# Patient Record
Sex: MALE | Race: WHITE | NOT HISPANIC OR LATINO | Employment: OTHER | URBAN - METROPOLITAN AREA
[De-identification: names, ages, dates, MRNs, and addresses within clinical notes are randomized per-mention and may not be internally consistent; named-entity substitution may affect disease eponyms.]

---

## 2020-02-27 ENCOUNTER — TELEPHONE (OUTPATIENT)
Dept: NEUROLOGY | Facility: CLINIC | Age: 55
End: 2020-02-27

## 2020-02-27 NOTE — TELEPHONE ENCOUNTER
----- Message from Viky Conley sent at 2/27/2020 10:56 AM CST -----  Good Morning,    Dr. Artis would like to refer the following patient to the Neurology department. The patients diagnosis is second opinion epilepsy surgery. I have scanned the patients referral and records into .     If there are any further questions in regards to the patient, please contact Dr. Saldana's office at,[phone] .   Please let me know if I can help schedule in any way.    Thank you,   Viky  Ext. [phone]  [facility]

## 2020-05-07 ENCOUNTER — TELEPHONE (OUTPATIENT)
Dept: NEUROLOGY | Facility: CLINIC | Age: 55
End: 2020-05-07

## 2020-05-18 ENCOUNTER — OFFICE VISIT (OUTPATIENT)
Dept: NEUROLOGY | Facility: CLINIC | Age: 55
End: 2020-05-18
Payer: COMMERCIAL

## 2020-05-18 VITALS — HEART RATE: 72 BPM | SYSTOLIC BLOOD PRESSURE: 139 MMHG | DIASTOLIC BLOOD PRESSURE: 98 MMHG | WEIGHT: 193 LBS

## 2020-05-18 DIAGNOSIS — G40.209 COMPLEX PARTIAL SEIZURES EVOLVING TO GENERALIZED TONIC-CLONIC SEIZURES: Primary | ICD-10-CM

## 2020-05-18 DIAGNOSIS — G43.909 MIGRAINE WITHOUT STATUS MIGRAINOSUS, NOT INTRACTABLE, UNSPECIFIED MIGRAINE TYPE: ICD-10-CM

## 2020-05-18 PROCEDURE — 99205 OFFICE O/P NEW HI 60 MIN: CPT | Mod: S$GLB,,, | Performed by: PSYCHIATRY & NEUROLOGY

## 2020-05-18 PROCEDURE — 99205 PR OFFICE/OUTPT VISIT, NEW, LEVL V, 60-74 MIN: ICD-10-PCS | Mod: S$GLB,,, | Performed by: PSYCHIATRY & NEUROLOGY

## 2020-05-18 PROCEDURE — 99999 PR PBB SHADOW E&M-EST. PATIENT-LVL II: ICD-10-PCS | Mod: PBBFAC,,, | Performed by: PSYCHIATRY & NEUROLOGY

## 2020-05-18 PROCEDURE — 99999 PR PBB SHADOW E&M-EST. PATIENT-LVL II: CPT | Mod: PBBFAC,,, | Performed by: PSYCHIATRY & NEUROLOGY

## 2020-05-18 RX ORDER — ESLICARBAZEPINE ACETATE 400 MG/1
TABLET ORAL
Status: ON HOLD | COMMUNITY
Start: 2020-05-06 | End: 2020-06-06 | Stop reason: HOSPADM

## 2020-05-18 RX ORDER — ZONISAMIDE 100 MG/1
CAPSULE ORAL
Status: ON HOLD | COMMUNITY
Start: 2020-04-28 | End: 2020-06-06 | Stop reason: HOSPADM

## 2020-05-18 RX ORDER — LACOSAMIDE 200 MG/1
TABLET, FILM COATED ORAL
Status: ON HOLD | COMMUNITY
Start: 2020-04-24 | End: 2020-06-06 | Stop reason: HOSPADM

## 2020-05-18 RX ORDER — RIZATRIPTAN BENZOATE 10 MG/1
10 TABLET, ORALLY DISINTEGRATING ORAL
COMMUNITY
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
## 2020-05-18 NOTE — ASSESSMENT & PLAN NOTE
54yo M with hx of seizures x 2013: sent for 2nd opinion  - prior normal (1.5T) MRI and 1 Left Temporal sz recorded in A-EEG (2018)  - intractable despite 3 current AEDs and trial of 4 AEDs prior    Current AEDs:  - EsliCBZ 1200mg q day  - LCS 200mg bid  - ZNS 300mg q day    Plan:  EMU evaluation - for diagnosis and to quantify sz  - consider changing LCS to CLOB in EMU    - will need 3T MRI    - possibly a surgical candidate: will need additional w/u including Neuro-Psy, PET and KATHY - will decide after EMU evaluation    Seizure log  Seizure precautions/restrictions    Plan of care was discussed in detail with patient and his wife.

## 2020-05-19 ENCOUNTER — TELEPHONE (OUTPATIENT)
Dept: NEUROLOGY | Facility: CLINIC | Age: 55
End: 2020-05-19

## 2020-05-19 DIAGNOSIS — Z01.812 PRE-PROCEDURE LAB EXAM: Primary | ICD-10-CM

## 2020-05-19 DIAGNOSIS — R56.9 SEIZURES: Primary | ICD-10-CM

## 2020-05-19 NOTE — TELEPHONE ENCOUNTER
Emu scheduled with wife. Wife aware of visitation policy. Covid testing to be done on 05/25 prior to 12:30. Testing times and location given. All information emailed to wife.

## 2020-05-22 ENCOUNTER — TELEPHONE (OUTPATIENT)
Dept: NEUROLOGY | Facility: CLINIC | Age: 55
End: 2020-05-22

## 2020-05-24 ENCOUNTER — LAB VISIT (OUTPATIENT)
Dept: FAMILY MEDICINE | Facility: CLINIC | Age: 55
End: 2020-05-24
Payer: COMMERCIAL

## 2020-05-24 DIAGNOSIS — Z01.812 PRE-PROCEDURE LAB EXAM: ICD-10-CM

## 2020-05-24 LAB — SARS-COV-2 RNA RESP QL NAA+PROBE: NOT DETECTED

## 2020-05-24 PROCEDURE — U0003 INFECTIOUS AGENT DETECTION BY NUCLEIC ACID (DNA OR RNA); SEVERE ACUTE RESPIRATORY SYNDROME CORONAVIRUS 2 (SARS-COV-2) (CORONAVIRUS DISEASE [COVID-19]), AMPLIFIED PROBE TECHNIQUE, MAKING USE OF HIGH THROUGHPUT TECHNOLOGIES AS DESCRIBED BY CMS-2020-01-R: HCPCS

## 2020-05-25 ENCOUNTER — TELEPHONE (OUTPATIENT)
Dept: NEUROLOGY | Facility: CLINIC | Age: 55
End: 2020-05-25

## 2020-05-25 NOTE — TELEPHONE ENCOUNTER
Spoke with wife. Informed her that 's insurance for emu is still pending. I informed her that we could move the emu admit to 05/27 or 28th. She stated they are at the [facility] until 05/29 and she will call her insurance in the am and call me.

## 2020-05-26 ENCOUNTER — HOSPITAL ENCOUNTER (INPATIENT)
Facility: HOSPITAL | Age: 55
LOS: 11 days | Discharge: HOME | DRG: 101 | End: 2020-06-06
Attending: PSYCHIATRY & NEUROLOGY | Admitting: PSYCHIATRY & NEUROLOGY
Payer: COMMERCIAL

## 2020-05-26 ENCOUNTER — TELEPHONE (OUTPATIENT)
Dept: NEUROLOGY | Facility: CLINIC | Age: 55
End: 2020-05-26

## 2020-05-26 DIAGNOSIS — R56.9 SEIZURES: ICD-10-CM

## 2020-05-26 DIAGNOSIS — G40.219 COMPLEX PARTIAL EPILEPSY WITH GENERALIZATION AND WITH INTRACTABLE EPILEPSY: ICD-10-CM

## 2020-05-26 DIAGNOSIS — G40.209 COMPLEX PARTIAL SEIZURES EVOLVING TO GENERALIZED TONIC-CLONIC SEIZURES: Primary | ICD-10-CM

## 2020-05-26 DIAGNOSIS — R06.02 SOB (SHORTNESS OF BREATH): ICD-10-CM

## 2020-05-26 LAB
ALBUMIN SERPL BCP-MCNC: 4.1 G/DL (ref 3.5–5.2)
ALP SERPL-CCNC: 123 U/L (ref 55–135)
ALT SERPL W/O P-5'-P-CCNC: 20 U/L (ref 10–44)
ANION GAP SERPL CALC-SCNC: 8 MMOL/L (ref 8–16)
AST SERPL-CCNC: 18 U/L (ref 10–40)
BASOPHILS # BLD AUTO: 0.06 K/UL (ref 0–0.2)
BASOPHILS NFR BLD: 0.7 % (ref 0–1.9)
BILIRUB SERPL-MCNC: 0.3 MG/DL (ref 0.1–1)
BUN SERPL-MCNC: 11 MG/DL (ref 6–20)
CALCIUM SERPL-MCNC: 8.9 MG/DL (ref 8.7–10.5)
CHLORIDE SERPL-SCNC: 105 MMOL/L (ref 95–110)
CO2 SERPL-SCNC: 23 MMOL/L (ref 23–29)
CREAT SERPL-MCNC: 0.8 MG/DL (ref 0.5–1.4)
DIFFERENTIAL METHOD: NORMAL
EOSINOPHIL # BLD AUTO: 0.3 K/UL (ref 0–0.5)
EOSINOPHIL NFR BLD: 3.7 % (ref 0–8)
ERYTHROCYTE [DISTWIDTH] IN BLOOD BY AUTOMATED COUNT: 13 % (ref 11.5–14.5)
EST. GFR  (AFRICAN AMERICAN): >60 ML/MIN/1.73 M^2
EST. GFR  (NON AFRICAN AMERICAN): >60 ML/MIN/1.73 M^2
GLUCOSE SERPL-MCNC: 80 MG/DL (ref 70–110)
HCT VFR BLD AUTO: 49.2 % (ref 40–54)
HGB BLD-MCNC: 16 G/DL (ref 14–18)
IMM GRANULOCYTES # BLD AUTO: 0.03 K/UL (ref 0–0.04)
IMM GRANULOCYTES NFR BLD AUTO: 0.3 % (ref 0–0.5)
LYMPHOCYTES # BLD AUTO: 2.3 K/UL (ref 1–4.8)
LYMPHOCYTES NFR BLD: 26.4 % (ref 18–48)
MCH RBC QN AUTO: 30.6 PG (ref 27–31)
MCHC RBC AUTO-ENTMCNC: 32.5 G/DL (ref 32–36)
MCV RBC AUTO: 94 FL (ref 82–98)
MONOCYTES # BLD AUTO: 0.9 K/UL (ref 0.3–1)
MONOCYTES NFR BLD: 9.7 % (ref 4–15)
NEUTROPHILS # BLD AUTO: 5.2 K/UL (ref 1.8–7.7)
NEUTROPHILS NFR BLD: 59.2 % (ref 38–73)
NRBC BLD-RTO: 0 /100 WBC
PLATELET # BLD AUTO: 318 K/UL (ref 150–350)
PMV BLD AUTO: 10.3 FL (ref 9.2–12.9)
POTASSIUM SERPL-SCNC: 4.1 MMOL/L (ref 3.5–5.1)
PROT SERPL-MCNC: 7.5 G/DL (ref 6–8.4)
RBC # BLD AUTO: 5.23 M/UL (ref 4.6–6.2)
SODIUM SERPL-SCNC: 136 MMOL/L (ref 136–145)
WBC # BLD AUTO: 8.85 K/UL (ref 3.9–12.7)

## 2020-05-26 PROCEDURE — 94761 N-INVAS EAR/PLS OXIMETRY MLT: CPT

## 2020-05-26 PROCEDURE — 99223 1ST HOSP IP/OBS HIGH 75: CPT | Mod: ,,, | Performed by: PSYCHIATRY & NEUROLOGY

## 2020-05-26 PROCEDURE — 95714 VEEG EA 12-26 HR UNMNTR: CPT

## 2020-05-26 PROCEDURE — 11000001 HC ACUTE MED/SURG PRIVATE ROOM

## 2020-05-26 PROCEDURE — 95720 EEG PHY/QHP EA INCR W/VEEG: CPT | Mod: ,,, | Performed by: PSYCHIATRY & NEUROLOGY

## 2020-05-26 PROCEDURE — 95700 EEG CONT REC W/VID EEG TECH: CPT

## 2020-05-26 PROCEDURE — 25000003 PHARM REV CODE 250: Performed by: STUDENT IN AN ORGANIZED HEALTH CARE EDUCATION/TRAINING PROGRAM

## 2020-05-26 PROCEDURE — 80299 QUANTITATIVE ASSAY DRUG: CPT | Mod: 91

## 2020-05-26 PROCEDURE — 80235 DRUG ASSAY LACOSAMIDE: CPT

## 2020-05-26 PROCEDURE — 99223 PR INITIAL HOSPITAL CARE,LEVL III: ICD-10-PCS | Mod: ,,, | Performed by: PSYCHIATRY & NEUROLOGY

## 2020-05-26 PROCEDURE — 80203 DRUG SCREEN QUANT ZONISAMIDE: CPT

## 2020-05-26 PROCEDURE — 95720 PR EEG, W/VIDEO, CONT RECORD, I&R, >12<26 HRS: ICD-10-PCS | Mod: ,,, | Performed by: PSYCHIATRY & NEUROLOGY

## 2020-05-26 PROCEDURE — 80053 COMPREHEN METABOLIC PANEL: CPT

## 2020-05-26 PROCEDURE — 85025 COMPLETE CBC W/AUTO DIFF WBC: CPT

## 2020-05-26 RX ORDER — ONDANSETRON 8 MG/1
8 TABLET, ORALLY DISINTEGRATING ORAL EVERY 8 HOURS PRN
Status: DISCONTINUED | OUTPATIENT
Start: 2020-05-26 | End: 2020-06-06 | Stop reason: HOSPADM

## 2020-05-26 RX ORDER — SODIUM CHLORIDE 0.9 % (FLUSH) 0.9 %
10 SYRINGE (ML) INJECTION
Status: DISCONTINUED | OUTPATIENT
Start: 2020-05-26 | End: 2020-06-06 | Stop reason: HOSPADM

## 2020-05-26 RX ORDER — LORAZEPAM 2 MG/ML
2 INJECTION INTRAMUSCULAR EVERY 5 MIN PRN
Status: DISCONTINUED | OUTPATIENT
Start: 2020-05-26 | End: 2020-06-06 | Stop reason: HOSPADM

## 2020-05-26 RX ORDER — IBUPROFEN 400 MG/1
800 TABLET ORAL EVERY 6 HOURS PRN
Status: DISCONTINUED | OUTPATIENT
Start: 2020-05-26 | End: 2020-06-06 | Stop reason: HOSPADM

## 2020-05-26 RX ORDER — ACETAMINOPHEN 325 MG/1
650 TABLET ORAL EVERY 4 HOURS PRN
Status: DISCONTINUED | OUTPATIENT
Start: 2020-05-26 | End: 2020-06-06 | Stop reason: HOSPADM

## 2020-05-26 RX ORDER — ASPIRIN 81 MG/1
81 TABLET ORAL DAILY
Status: DISCONTINUED | OUTPATIENT
Start: 2020-05-27 | End: 2020-06-06 | Stop reason: HOSPADM

## 2020-05-26 RX ADMIN — ESLICARBAZEPINE ACETATE 1200 MG: 400 TABLET ORAL at 08:05

## 2020-05-26 NOTE — NURSING
Patient direct admit for EMU.  Vitals stable at present.  IV attempt x2 unsuccessful.  No complaints or signs of distress noted.
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
## 2020-05-27 PROCEDURE — 94761 N-INVAS EAR/PLS OXIMETRY MLT: CPT

## 2020-05-27 PROCEDURE — 95714 VEEG EA 12-26 HR UNMNTR: CPT

## 2020-05-27 PROCEDURE — 99233 SBSQ HOSP IP/OBS HIGH 50: CPT | Mod: ,,, | Performed by: PSYCHIATRY & NEUROLOGY

## 2020-05-27 PROCEDURE — 93010 EKG 12-LEAD: ICD-10-PCS | Mod: ,,, | Performed by: INTERNAL MEDICINE

## 2020-05-27 PROCEDURE — 25000003 PHARM REV CODE 250: Performed by: STUDENT IN AN ORGANIZED HEALTH CARE EDUCATION/TRAINING PROGRAM

## 2020-05-27 PROCEDURE — 95720 EEG PHY/QHP EA INCR W/VEEG: CPT | Mod: ,,, | Performed by: PSYCHIATRY & NEUROLOGY

## 2020-05-27 PROCEDURE — 93005 ELECTROCARDIOGRAM TRACING: CPT

## 2020-05-27 PROCEDURE — 95720 PR EEG, W/VIDEO, CONT RECORD, I&R, >12<26 HRS: ICD-10-PCS | Mod: ,,, | Performed by: PSYCHIATRY & NEUROLOGY

## 2020-05-27 PROCEDURE — 99233 PR SUBSEQUENT HOSPITAL CARE,LEVL III: ICD-10-PCS | Mod: ,,, | Performed by: PSYCHIATRY & NEUROLOGY

## 2020-05-27 PROCEDURE — 11000001 HC ACUTE MED/SURG PRIVATE ROOM

## 2020-05-27 PROCEDURE — 93010 ELECTROCARDIOGRAM REPORT: CPT | Mod: ,,, | Performed by: INTERNAL MEDICINE

## 2020-05-27 RX ADMIN — ACETAMINOPHEN 650 MG: 325 TABLET ORAL at 11:05

## 2020-05-27 RX ADMIN — ASPIRIN 81 MG: 81 TABLET, COATED ORAL at 08:05

## 2020-05-27 NOTE — PROCEDURES
[facility] EEG/VIDEO MONITORING REPORT  Declan Burleson  [id number]  1965    DATE OF SERVICE:  05/26/2020  DATE OF ADMISSION: 5/26/2020  3:07 PM    ADMITTING/REQUESTING PROVIDER: Xuan Sadny MD PhD    REASON FOR CONSULT:  55-year-old man with episodes of staring and full body shaking admitted to the epilepsy monitoring unit for event capture and characterization.    METHODOLOGY   Electroencephalographic (EEG) recording is with electrodes placed according to the International 10-20 placement system.  Thirty two (32) channels of digital signal (sampling rate of 512/sec) including T1 and T2 was simultaneously recorded from the scalp and may include  EKG, EMG, and/or eye monitors.  Recording band pass was 0.1 to 512 hz.  Digital video recording of the patient is simultaneously recorded with the EEG.  The patient is instructed report clinical symptoms which may occur during the recording session.  EEG and video recording is stored and archived in digital format.  Activation procedures which include photic stimulation, hyperventilation and instructing patients to perform simple task are done in selected patients.   The EEG is displayed on a monitor screen and can be reviewed using different montages.  Computer assisted analysis is employed to detect spike and electrographic seizure activity.   The entire record is submitted for computer analysis.  The entire recording is visually reviewed and the times identified by computer analysis as being spikes or seizures are reviewed again.  Compresses spectral analysis (CSA) is also performed on the activity recorded from each individual channel.  This is displayed as a power display of frequencies from 0 to 30 Hz over time.   The CSA is reviewed looking for asymmetries in power between homologous areas of the scalp and then compared with the original EEG recording.     Vizibility software is also utilized in the review of this study.  This software suite analyzes the EEG recording  in multiple domains.  Coherence and rhythmicity is computed to identify EEG sections which may contain organized seizures.  Each channel undergoes analysis to detect presence of spike and sharp waves which have special and morphological characteristic of epileptic activity.  The routine EEG recording is converted from spacial into frequency domain.  This is then displayed comparing homologous areas to identify areas of significant asymmetry.  Algorithm to identify non-cortically generated artifact is used to separate eye movement, EMG and other artifact from the EEG.      RECORDING TIMES  Start on 05/26/2020 at 16:22 p.m.  Stop on 05/27/2020 at 07:00 a.m.  A total of 14 hr and 36 min of EEG recording is obtained.    EEG FINDINGS  Background activity:   The background is disorganized predominantly alpha/theta activity. There is a moderately well-formed, poorly sustained, 7-8 hz posterior dominant rhythm somewhat better formed over the right.  There are frequent intermittent bursts of higher voltage generalized theta and/or delta slowing as well as bilateral independent theta/delta slowing.   Primarily in sleep, there are occasional spike and wave epileptiform appearing discharges in the left frontotemporal region phase reversing at F7/T1/T9.    There are no pushbutton activations.    Sleep:  The patient transitions from wakefulness to sleep with the appearance of sleep spindles, K complexes, and vertex waves.  There are occasional left frontotemporal epileptiform appearing discharges as described above.    Activation procedures:   Hyperventilation is not performed  Photic stimulation is not performed    Cardiac Monitor:   Heart rate appears generally regular on a single lead EKG.    Impression:   This is an abnormal continuous EEG monitoring study because of occasional epileptiform appearing discharges in the left frontotemporal region, occurring primarily in sleep, consistent with an area of focal cortical  dysfunction and a potential seizure focus in this region.  There is generalized and bilateral independent intermittent slowing consistent with subcortical or deep midline dysfunction.  There is generalized background slowing consistent with diffuse cortical dysfunction and a mild-moderate encephalopathy.  This finding is nonspecific with regards to etiology but can be seen in the setting of toxic/metabolic derangements, infection, as a postictal phenomenon (the patient had a seizure earlier in the day while off EEG) and as a medication effect.  There are no pushbutton activations, no electrographic seizures, and none of the patient's typical events are captured during this recording session.    LTM Summary 05/26/2020 - TBD:  Patient events/Seizures:  None  Interictal findings:   occasional left frontotemporal epileptiform appearing discharges in sleep  Other notable abnormalities:   intermittent generalized and multifocal slowing consistent with subcortical or deep midline dysfunction as well as generalized background slowing consistent with a mild-moderate encephalopathy.    Xuan Sandy MD PhD  Neurology-Epilepsy  [facility].  [facility]

## 2020-05-27 NOTE — PROGRESS NOTES
[facility]  Epilepsy Monitoring Unit  Daily Progress Note    Date: 5/19/20  Patient Name: Declan Burleson   MRN: [id number]   PCP: Matteo Rizzo    Subjective:   This is Declan Burleson, 55 y.o. male who presents to the EMU for localization and medication adjustment. Patient w/ longstanding seizure history that has been refractory to multiple AEDs.   HOSPITAL COURSE:  05/26-05/27 - NAEON. No electrographic or clinical seizures recorded.   PAST MEDICAL HISTORY:  History reviewed. No pertinent past medical history.  PAST SURGICAL HISTORY:  History reviewed. No pertinent surgical history.  CURRENT MEDS:  Current Facility-Administered Medications   Medication Dose Route Frequency Provider Last Rate Last Dose    acetaminophen tablet 650 mg  650 mg Oral Q4H PRN Kristen Jean MD   650 mg at 05/27/20 1103    aspirin EC tablet 81 mg  81 mg Oral Daily Kristen Jean MD   81 mg at 05/27/20 0809    ibuprofen tablet 800 mg  800 mg Oral Q6H PRN Kristen Jean MD        lorazepam injection 2 mg  2 mg Intravenous Q5 Min PRN Kristen Jean MD        ondansetron disintegrating tablet 8 mg  8 mg Oral Q8H PRN Kristen Jean MD        sodium chloride 0.9% flush 10 mL  10 mL Intravenous PRN Kristen Jean MD         ALLERGIES:  Review of patient's allergies indicates:  No Known Allergies  FAMILY HISTORY:  History reviewed. No pertinent family history.  SOCIAL HISTORY:  Social History     Tobacco Use    Smoking status: Former Smoker   Substance Use Topics    Alcohol use: Yes     Frequency: Monthly or less     Drinks per session: 1 or 2     Binge frequency: Less than monthly    Drug use: Never     Review of Systems:  12 system review of systems is negative except as noted in HPI.      Objective:     Vitals:    05/27/20 0900 05/27/20 1100 05/27/20 1200 05/27/20 1500   BP: (!) 133/94  (!) 126/92    Pulse: 74 73 76 76   Resp: 14  17    Temp:       TempSrc:       SpO2: 97%  98%    Weight:       Height:           General: NAD,  well nourished   Eyes: no tearing, discharge, no erythema   ENT: moist mucous membranes of the oral cavity, nares patent    Neck: Supple, Full range of motion  Cardiovascular: Warm and well perfused, pulses equal and symmetrical  Lungs: Normal work of breathing, normal chest wall excursions  Skin: No rash, lesions, or breakdown on exposed skin  Extremeties: No cyanosis, clubbing or edema.    Neurological   MENTAL STATUS: Alert and oriented to person, place, and time. Attention and concentration within normal limits. Speech without dysarthria, able to name and repeat without difficulty. Recent and remote memory within normal limits   CRANIAL NERVES: Visual fields intact. PERRL. EOMI. Facial sensation intact. Face symmetrical. Hearing grossly intact. Full shoulder shrug bilaterally. Tongue protrudes midline   SENSORY: Sensation is intact to light touch throughout.  Joint position perception intact. Negative Romberg.   MOTOR: Normal bulk and tone. No pronator drift.  5/5 deltoid, biceps, triceps, interosseous, hand  bilaterally. 5/5 iliopsoas, knee extension/flexion, foot dorsi/plantarflexion bilaterally.    CEREBELLAR/COORDINATION/GAIT: Gait steady with normal arm swing and stride length.  Heel to shin intact. Finger to nose intact. Normal rapid alternating movements.     Labs: Reviewed.  Imaging: I have reviewed all pertinent imaging results/findings within the past 24 hours.    Diagnostic EEG Results:  05/26-05/27 Date: EEG Background: 8-9 Hz PRD    Activation Procedures: none    Seizures/Events: none    Abnormality: Abnormal EEG due to diffuse cortical dysfunction noted, as well as two isolated L temporal sharp waves noted during the study duration.          Assessment:      This is Declan Burleson, 55 y.o. male who presents to the EMU for localization and medication adjustment. Patient w/ longstanding seizure history that has been refractory to multiple AEDs.   No electrographic seizures noted on day one of  admission, no clinical seizures reported. Will stop all AEDs at this time and monitor, w/ activation procedures on 05/27.      Plan:      Problem List as of 5/27/2020 Reviewed: 5/27/2020  Kristen Jean MD       Neuro    Complex partial seizures evolving to generalized tonic-clonic seizures  - Admit to EMU  - Medication adjustment as following        - Stop Aptiom 1200 mg QHS        - Stop Zonisamide 200 mg QD and stop Vimpat 200 mg QD  - vEEG monitoring  - Activation procedure on 05/27   - Seizure precautions  - Ativan PRN for break through events ongoing for >5 minutes. Please contact the epileptologist on call prior to the administration of the PRN medication    Migraine without status migrainosus, not intractable  - PRN medication as needed

## 2020-05-27 NOTE — PLAN OF CARE
Problem: Adult Inpatient Plan of Care  Goal: Plan of Care Review  Outcome: Ongoing, Progressing     Problem: Adult Inpatient Plan of Care  Goal: Absence of Hospital-Acquired Illness or Injury  Outcome: Ongoing, Progressing     Problem: Adult Inpatient Plan of Care  Goal: Optimal Comfort and Wellbeing  Outcome: Ongoing, Progressing     Problem: Fall Injury Risk  Goal: Absence of Fall and Fall-Related Injury  Outcome: Ongoing, Progressing  Reviewed POC with Pt and spouse at bedside at 1930. Answered all questions. Acclimated Pt and spouse to unit and hospital setting. EEG in place. Pt had no acute events overnight. Safety checks completed, call bell within reach. VSS and full assessments found in flowsheets.

## 2020-05-27 NOTE — PLAN OF CARE
05/26/20 1600   Post-Acute Status   Post-Acute Authorization Other   Other Status No Post-Acute Service Needs   Discharge Delays (!) Procedure Scheduling (IR, OR, Labs, Echo, Cath, Echo, EEG)  (EMU - EEG)   Discharge Plan   Discharge Plan A Home with family     Mar Rae RN  Case Management  Ext: [phone]  05/27/2020  2:28 PM

## 2020-05-27 NOTE — PLAN OF CARE
CM spoke with patient and wife via phone in room for Discharge Planning Assessment.  Per wife,  patient lives with spouse and adult daughter in a(n) SSH with 0 steps to enter.   Per wife, patient was independent with ADLS and used no DME for ambulation.  Per patient, the patient will have assistance from family upon discharge.  Discharge Planning Booklet given to patient/family and discussed.  All questions addressed.       PCP:  Matteo Rizzo MD      Pharmacy:    [facility] - [place], [place] - [address]  [address] [facility] [place] [address]  Phone: [phone] Fax: [phone]        Emergency Contacts:  Extended Emergency Contact Information  Primary Emergency Contact: PEPPER MOSES  Address: [address] [place] [address] [facility]  Home Phone: [phone]  Mobile Phone: [phone]  Relation: Spouse  Preferred language: English   needed? No      Insurance:    Payor: [employer] [facility] / Plan: [employer] [facility] / Product Type: Commercial /        05/27/20 1424   Discharge Assessment   Assessment Type Discharge Planning Assessment   Confirmed/corrected address and phone number on facesheet? Yes   Assessment information obtained from? Patient;Caregiver  (wife)   Expected Length of Stay (days) 7   Communicated expected length of stay with patient/caregiver yes   Prior to hospitilization cognitive status: Alert/Oriented   Prior to hospitalization functional status: Independent   Current cognitive status: Alert/Oriented   Current Functional Status: Independent   Lives With spouse;child(woody), adult   Able to Return to Prior Arrangements yes   Is patient able to care for self after discharge? Yes   Who are your caregiver(s) and their phone number(s)? PEPPER MOSSE Spouse [phone]    Patient's perception of discharge disposition home or selfcare   Readmission Within the Last 30 Days no previous admission in last 30 days    Patient currently being followed by outpatient case management? No   Patient currently receives any other outside agency services? No   Equipment Currently Used at Home glucometer   Do you have any problems affording any of your prescribed medications? TBD   Is the patient taking medications as prescribed? yes   Does the patient have transportation home? Yes   Transportation Anticipated family or friend will provide  (wife)   Dialysis Name and Scheduled days na   Does the patient receive services at the Coumadin Clinic? No   Discharge Plan A Home with family   Discharge Plan B Home with family   DME Needed Upon Discharge  none   Patient/Family in Agreement with Plan yes       Mar Rae RN  Case Management  Ext: [phone]  05/27/2020  2:28 PM

## 2020-05-28 PROCEDURE — 11000001 HC ACUTE MED/SURG PRIVATE ROOM

## 2020-05-28 PROCEDURE — 25000003 PHARM REV CODE 250: Performed by: STUDENT IN AN ORGANIZED HEALTH CARE EDUCATION/TRAINING PROGRAM

## 2020-05-28 PROCEDURE — 99233 PR SUBSEQUENT HOSPITAL CARE,LEVL III: ICD-10-PCS | Mod: ,,, | Performed by: PSYCHIATRY & NEUROLOGY

## 2020-05-28 PROCEDURE — 99233 SBSQ HOSP IP/OBS HIGH 50: CPT | Mod: ,,, | Performed by: PSYCHIATRY & NEUROLOGY

## 2020-05-28 PROCEDURE — 94761 N-INVAS EAR/PLS OXIMETRY MLT: CPT

## 2020-05-28 PROCEDURE — 95714 VEEG EA 12-26 HR UNMNTR: CPT

## 2020-05-28 RX ADMIN — ACETAMINOPHEN 650 MG: 325 TABLET ORAL at 01:05

## 2020-05-28 RX ADMIN — ASPIRIN 81 MG: 81 TABLET, COATED ORAL at 09:05

## 2020-05-28 NOTE — PROGRESS NOTES
Date: 5/28/20  Patient Name: Declan Burleson   MRN: [id number]   PCP: Matteo Rizzo     Subjective:   This is Declan Burleson, 55 y.o. male who presents to the EMU for localization and medication adjustment. Patient w/ longstanding seizure history that has been refractory to multiple AEDs.   HOSPITAL COURSE:  05/26-05/27 - NAEON. No electrographic or clinical seizures recorded.   05/27-05/28 - Patient w/ some visual hallucinations as well as intermittent numbness and tingling of various body parts/face throughout the night. No electrographic seizures or typical events noted throughout the night.     PAST MEDICAL HISTORY:  History reviewed. No pertinent past medical history.  PAST SURGICAL HISTORY:  History reviewed. No pertinent surgical history.  CURRENT MEDS:  Current Medications             Current Facility-Administered Medications   Medication Dose Route Frequency Provider Last Rate Last Dose    acetaminophen tablet 650 mg  650 mg Oral Q4H PRN Kristen Jean MD   650 mg at 05/27/20 1103    aspirin EC tablet 81 mg  81 mg Oral Daily Kristen Jean MD   81 mg at 05/27/20 0809    ibuprofen tablet 800 mg  800 mg Oral Q6H PRN Kristen Jean MD        lorazepam injection 2 mg  2 mg Intravenous Q5 Min PRN Kristen Jean MD        ondansetron disintegrating tablet 8 mg  8 mg Oral Q8H PRN Kristen Jean MD        sodium chloride 0.9% flush 10 mL  10 mL Intravenous PRN Kristen Jean MD             ALLERGIES:  Review of patient's allergies indicates:  No Known Allergies  FAMILY HISTORY:  History reviewed. No pertinent family history.  SOCIAL HISTORY:  Social History            Tobacco Use    Smoking status: Former Smoker   Substance Use Topics    Alcohol use: Yes       Frequency: Monthly or less       Drinks per session: 1 or 2       Binge frequency: Less than monthly    Drug use: Never      Review of Systems:  12 system review of systems is negative except as noted in HPI.   Objective:      Vitals   Vitals:     05/27/20  0900 05/27/20 1100 05/27/20 1200 05/27/20 1500   BP: (!) 133/94   (!) 126/92     Pulse: 74 73 76 76   Resp: 14   17     Temp:           TempSrc:           SpO2: 97%   98%     Weight:           Height:                    General: NAD, well nourished   Eyes: no tearing, discharge, no erythema   ENT: moist mucous membranes of the oral cavity, nares patent    Neck: Supple, Full range of motion  Cardiovascular: Warm and well perfused, pulses equal and symmetrical  Lungs: Normal work of breathing, normal chest wall excursions  Skin: No rash, lesions, or breakdown on exposed skin  Extremeties: No cyanosis, clubbing or edema.     Neurological   MENTAL STATUS: Alert and oriented to person, place, and time. Attention and concentration within normal limits. Speech without dysarthria, able to name and repeat without difficulty. Recent and remote memory within normal limits   CRANIAL NERVES: Visual fields intact. PERRL. EOMI. Facial sensation intact. Face symmetrical. Hearing grossly intact. Full shoulder shrug bilaterally. Tongue protrudes midline   SENSORY: Sensation is intact to light touch throughout.   MOTOR: Normal bulk and tone. No pronator drift.  5/5 deltoid, biceps, triceps, interosseous, hand  bilaterally. 5/5 iliopsoas, knee extension/flexion, foot dorsi/plantarflexion bilaterally.    CEREBELLAR/COORDINATION/GAIT: Deferred 2/2 vEEG monitoring.      Labs: Reviewed.  Imaging: I have reviewed all pertinent imaging results/findings within the past 24 hours.     Diagnostic EEG Results:  05/27-05/28 Date: EEG Background: Disorganized predominantly alpha/theta activity. There is a moderately well-formed, poorly sustained, 7-8 hz PDR.     Activation Procedures: none    Seizures/Events: none    Abnormality: Abnormal EEG due to diffuse cortical dysfunction noted, as well as intermittent,  isolated L temporal sharp waves noted during the study duration, primarily during sleep            Assessment:   This is Declan  Sandeepcyrus, 55 y.o. male who presents to the EMU for localization and medication adjustment. Patient w/ longstanding seizure history that has been refractory to multiple AEDs.   No electrographic seizures noted on day one of admission, no clinical seizures reported. Will stop all AEDs at this time and monitor, w/ activation procedures on 05/27.   Sleep deprivation on 05/28-05/29.   Plan:         Problem List as of 5/28/2020 Reviewed: 5/28/2020  Kristen Jean MD              Neuro     Complex partial seizures evolving to generalized tonic-clonic seizures  - Admit to EMU  - Medication adjustment as following        - Stop Aptiom 1200 mg QHS        - Stop Zonisamide 200 mg QD and stop Vimpat 200 mg QD  - vEEG monitoring  - Activation procedure on 05/27   - Sleep deprivation on 05/28-05/29  - Seizure precautions  - Ativan PRN for break through events ongoing for >5 minutes. Please contact the epileptologist on call prior to the administration of the PRN medication     Migraine without status migrainosus, not intractable  - PRN medication as needed

## 2020-05-28 NOTE — PLAN OF CARE
Pt AAO*4, calm, cooperative. Seizure precautions maintained. Continuous EEG at bedside. Pt complained of headache and bad dreams. Administered PRN Tylenol for headache and resolved. VSS, afebrile, call light in reach, bed alarm on. No falls or injuries reported. Will continue to monitor.

## 2020-05-28 NOTE — PLAN OF CARE
Problem: Adult Inpatient Plan of Care  Goal: Plan of Care Review  Outcome: Ongoing, Progressing     Problem: Adult Inpatient Plan of Care  Goal: Absence of Hospital-Acquired Illness or Injury  Outcome: Ongoing, Progressing     Problem: Adult Inpatient Plan of Care  Goal: Optimal Comfort and Wellbeing  Outcome: Ongoing, Progressing     Problem: Fall Injury Risk  Goal: Absence of Fall and Fall-Related Injury  Outcome: Ongoing, Progressing     Problem: Seizure, Active Management  Goal: Absence of Seizure/Seizure-Related Injury  Outcome: Ongoing, Progressing   Patient remains free from injury or fall. EEG in progress. Reported once, trembling and shaking of hands and legs. Also intermittent visual hallucinations . EMU  MD was notified. Will continue to monitor.   Plan= sleep deprive patient.

## 2020-05-28 NOTE — PROCEDURES
[facility] EEG/VIDEO MONITORING REPORT  Declan Burleson  [id number]  1965    DATE OF SERVICE:  05/27/2020  DATE OF ADMISSION: 5/26/2020  3:07 PM    ADMITTING/REQUESTING PROVIDER: Xuan Sandy MD PhD    REASON FOR CONSULT:  55-year-old man with episodes of staring and full body shaking admitted to the epilepsy monitoring unit for event capture and characterization.    METHODOLOGY   Electroencephalographic (EEG) recording is with electrodes placed according to the International 10-20 placement system.  Thirty two (32) channels of digital signal (sampling rate of 512/sec) including T1 and T2 was simultaneously recorded from the scalp and may include  EKG, EMG, and/or eye monitors.  Recording band pass was 0.1 to 512 hz.  Digital video recording of the patient is simultaneously recorded with the EEG.  The patient is instructed report clinical symptoms which may occur during the recording session.  EEG and video recording is stored and archived in digital format.  Activation procedures which include photic stimulation, hyperventilation and instructing patients to perform simple task are done in selected patients.   The EEG is displayed on a monitor screen and can be reviewed using different montages.  Computer assisted analysis is employed to detect spike and electrographic seizure activity.   The entire record is submitted for computer analysis.  The entire recording is visually reviewed and the times identified by computer analysis as being spikes or seizures are reviewed again.  Compresses spectral analysis (CSA) is also performed on the activity recorded from each individual channel.  This is displayed as a power display of frequencies from 0 to 30 Hz over time.   The CSA is reviewed looking for asymmetries in power between homologous areas of the scalp and then compared with the original EEG recording.     [employer] software is also utilized in the review of this study.  This software suite analyzes the EEG recording  in multiple domains.  Coherence and rhythmicity is computed to identify EEG sections which may contain organized seizures.  Each channel undergoes analysis to detect presence of spike and sharp waves which have special and morphological characteristic of epileptic activity.  The routine EEG recording is converted from spacial into frequency domain.  This is then displayed comparing homologous areas to identify areas of significant asymmetry.  Algorithm to identify non-cortically generated artifact is used to separate eye movement, EMG and other artifact from the EEG.      RECORDING TIMES  Start on 05/27/2020 at 07:00 a.m.  Stop on 05/28/2020 at 07:00 a.m.  A total of 23 hr and 42 min of EEG recording is obtained.    EEG FINDINGS  Background activity:   The background is mildly disorganized predominantly alpha/theta activity. There is a moderately well-formed, poorly sustained, 7-8 hz posterior dominant rhythm somewhat better formed over the right.  There are frequent intermittent bursts of higher voltage generalized theta and/or delta slowing as well as bilateral independent theta/delta slowing.  In sleep, there are rare spike and wave epileptiform appearing discharges in the left frontotemporal region phase reversing at F7/T1/T9.    There are no pushbutton activations.    Sleep:  The patient transitions from wakefulness to sleep with the appearance of sleep spindles, K complexes, and vertex waves.  In sleep, there are rare left frontotemporal epileptiform appearing discharges as described above.    Activation procedures:   Hyperventilation is not performed  Photic stimulation is not performed    Cardiac Monitor:   Heart rate appears generally regular on a single lead EKG.    Impression:   This is an abnormal continuous EEG monitoring study because of rare epileptiform appearing discharges in the left frontotemporal region occurring in sleep, consistent with an area of focal cortical dysfunction and a potential  seizure focus in this region.  There is generalized and bilateral independent intermittent slowing consistent with subcortical or deep midline dysfunction.  There is generalized background slowing consistent with diffuse cortical dysfunction and a mild-moderate encephalopathy.  This finding is nonspecific with regards to etiology but can be seen in the setting of toxic/metabolic derangements, infection, as a postictal phenomenon, and as a medication effect.  There are no pushbutton activations, no electrographic seizures, and none of the patient's typical events are captured during this recording session.    LTM Summary 05/26/2020 - TBD:  Patient events/Seizures:  None  Interictal findings:   rare left frontotemporal epileptiform appearing discharges in sleep  Other notable abnormalities:   intermittent generalized and multifocal slowing consistent with subcortical or deep midline dysfunction as well as generalized background slowing consistent with a mild-moderate encephalopathy.    Xuan Sandy MD PhD  Neurology-Epilepsy  [facility].  [facility]

## 2020-05-29 LAB
LACOSAMIDE: 4 MCG/ML (ref 1–10)
ZONISAMIDE SERPL-MCNC: 6.7 MCG/ML (ref 10–40)

## 2020-05-29 PROCEDURE — 99233 PR SUBSEQUENT HOSPITAL CARE,LEVL III: ICD-10-PCS | Mod: ,,, | Performed by: PSYCHIATRY & NEUROLOGY

## 2020-05-29 PROCEDURE — 11000001 HC ACUTE MED/SURG PRIVATE ROOM

## 2020-05-29 PROCEDURE — 25000003 PHARM REV CODE 250: Performed by: STUDENT IN AN ORGANIZED HEALTH CARE EDUCATION/TRAINING PROGRAM

## 2020-05-29 PROCEDURE — 99233 SBSQ HOSP IP/OBS HIGH 50: CPT | Mod: ,,, | Performed by: PSYCHIATRY & NEUROLOGY

## 2020-05-29 PROCEDURE — 95714 VEEG EA 12-26 HR UNMNTR: CPT

## 2020-05-29 PROCEDURE — 94761 N-INVAS EAR/PLS OXIMETRY MLT: CPT

## 2020-05-29 PROCEDURE — 95720 PR EEG, W/VIDEO, CONT RECORD, I&R, >12<26 HRS: ICD-10-PCS | Mod: ,,, | Performed by: PSYCHIATRY & NEUROLOGY

## 2020-05-29 PROCEDURE — 95720 EEG PHY/QHP EA INCR W/VEEG: CPT | Mod: ,,, | Performed by: PSYCHIATRY & NEUROLOGY

## 2020-05-29 RX ADMIN — IBUPROFEN 800 MG: 400 TABLET, FILM COATED ORAL at 09:05

## 2020-05-29 RX ADMIN — ASPIRIN 81 MG: 81 TABLET, COATED ORAL at 09:05

## 2020-05-29 NOTE — PLAN OF CARE
Problem: Adult Inpatient Plan of Care  Goal: Plan of Care Review  Outcome: Ongoing, Progressing     Problem: Fall Injury Risk  Goal: Absence of Fall and Fall-Related Injury  Outcome: Ongoing, Progressing     Problem: Seizure, Active Management  Goal: Absence of Seizure/Seizure-Related Injury  Outcome: Ongoing, Progressing     Patient is AAO x4. POC reviewed with patient and wife. Patient verbalized understanding. Patient's breathing is unlabored with equal chest expansion. Patient has continuous EEG monitoring in place. Patient denies any numbness and tingling. Patient voids per urinal. Patient remained free from falls. Patient rested well through shift. Bed in lowest position,bed alarm on, side rails up x3, side rails padded, no complaints or signs of distress. WCTM.

## 2020-05-29 NOTE — NURSING
Patient's wife pressed event monitor at 22:22pm. Upon entering room, wife and patient stated that he wasn't having an event, but that he had back pain, chest pain when he breathes in and out, and that his hands feel funny like they feel rigid. VSS stable,PERRLA.  BP:155/92 P:72 O2:97% RR:20  WCTM.

## 2020-05-29 NOTE — PROCEDURES
[facility] EEG/VIDEO MONITORING REPORT  Declan Burleson  [id number]  1965    DATE OF SERVICE:  05/28/2020  DATE OF ADMISSION: 5/26/2020  3:07 PM    ADMITTING/REQUESTING PROVIDER: Xuan Sandy MD PhD    REASON FOR CONSULT:  55-year-old man with episodes of staring and full body shaking admitted to the epilepsy monitoring unit for event capture and characterization.    METHODOLOGY   Electroencephalographic (EEG) recording is with electrodes placed according to the International 10-20 placement system.  Thirty two (32) channels of digital signal (sampling rate of 512/sec) including T1 and T2 was simultaneously recorded from the scalp and may include  EKG, EMG, and/or eye monitors.  Recording band pass was 0.1 to 512 hz.  Digital video recording of the patient is simultaneously recorded with the EEG.  The patient is instructed report clinical symptoms which may occur during the recording session.  EEG and video recording is stored and archived in digital format.  Activation procedures which include photic stimulation, hyperventilation and instructing patients to perform simple task are done in selected patients.   The EEG is displayed on a monitor screen and can be reviewed using different montages.  Computer assisted analysis is employed to detect spike and electrographic seizure activity.   The entire record is submitted for computer analysis.  The entire recording is visually reviewed and the times identified by computer analysis as being spikes or seizures are reviewed again.  Compresses spectral analysis (CSA) is also performed on the activity recorded from each individual channel.  This is displayed as a power display of frequencies from 0 to 30 Hz over time.   The CSA is reviewed looking for asymmetries in power between homologous areas of the scalp and then compared with the original EEG recording.     [employer] software is also utilized in the review of this study.  This software suite analyzes the EEG recording  in multiple domains.  Coherence and rhythmicity is computed to identify EEG sections which may contain organized seizures.  Each channel undergoes analysis to detect presence of spike and sharp waves which have special and morphological characteristic of epileptic activity.  The routine EEG recording is converted from spacial into frequency domain.  This is then displayed comparing homologous areas to identify areas of significant asymmetry.  Algorithm to identify non-cortically generated artifact is used to separate eye movement, EMG and other artifact from the EEG.      RECORDING TIMES  Start on 05/28/2020 at 07:00 a.m.  Stop on 05/29/2020 at 07:00 a.m.  A total of 23 hr and 56 min of EEG recording is obtained.    EEG FINDINGS  Background activity:   The background is continuous predominantly alpha/theta activity. At times, there is a moderately well-formed 9 hz maximal posterior dominant rhythm seen bilaterally.  There are intermittent bursts of higher voltage generalized theta and/or delta slowing as well as bilateral independent theta/delta slowing.  In sleep, there are rare spike and wave epileptiform appearing discharges in the left frontotemporal region phase reversing at F7/T1/T9.    There are 4 pushbutton activations when the patient feels tingling in his feet, abnormal hand sensations, pain in the back and chest.  On video, there are no abnormal movements or vocalizations.  There are no epileptiform features on the EEG at these times.    Sleep:  The patient transitions from wakefulness to sleep with the appearance of sleep spindles, K complexes, and vertex waves.  In sleep, there are rare left frontotemporal epileptiform appearing discharges as described above.    Activation procedures:   Hyperventilation is not performed  Photic stimulation is not performed    Cardiac Monitor:   Heart rate appears generally regular on a single lead EKG.    Impression:   This is an abnormal continuous EEG monitoring study  because of rare epileptiform appearing discharges in the left frontotemporal region occurring in sleep consistent with an area of focal cortical dysfunction and a potential seizure focus in this region.  There is generalized and bilateral independent intermittent slowing consistent with subcortical or deep midline dysfunction.  There are several pushbutton activations when the patient feels tingling in his feet, stiff hands, back/chest pain, and other sensations with no EEG correlate.  There are no electrographic seizures and none of the patient's typical events are captured during this recording session.    LTM Summary 05/26/2020 - TBD:  Patient events/Seizures:  None  Interictal findings:   rare left frontotemporal epileptiform appearing discharges in sleep  Other notable abnormalities:   intermittent generalized and multifocal slowing consistent with subcortical or deep midline dysfunction as well as generalized background slowing consistent with a mild-moderate encephalopathy which improves into the normal range several days off AEDs and after his last reported seizure (prior to the start of cvEEG).     Xuan Sandy MD PhD  Neurology-Epilepsy  [facility].  [facility]

## 2020-05-29 NOTE — PLAN OF CARE
Plan is to discharge home with family when medically ready.       05/29/20 1305   Discharge Reassessment   Assessment Type Discharge Planning Reassessment   Do you have any problems affording any of your prescribed medications? TBD   Discharge Plan A Home with family   Discharge Plan B Home   DME Needed Upon Discharge  none   Anticipated Discharge Disposition Home

## 2020-05-29 NOTE — PLAN OF CARE
Problem: Adult Inpatient Plan of Care  Goal: Plan of Care Review  Outcome: Ongoing, Progressing     Problem: Fall Injury Risk  Goal: Absence of Fall and Fall-Related Injury  Outcome: Ongoing, Progressing     Problem: Adult Inpatient Plan of Care  Goal: Optimal Comfort and Wellbeing  Outcome: Ongoing, Progressing   Patient remains free from injury or fall. No seizures like activity noted or reported during this shift. Fall, aspirations and seizures precautions applied. EEG in progress. Plan= sleep deprive tonight. Will continue to monitor.

## 2020-05-29 NOTE — PROGRESS NOTES
[facility]  Epilepsy Monitoring Unit  Daily Progress Note    Date: 5/29/20  Patient Name: Declan Burleson   MRN: [id number]   PCP: Matteo Rizzo    Subjective:   This is Declan Burleson, 55 y.o. male who presents to the EMU for localization and medication adjustment. Patient w/ longstanding seizure history that has been refractory to multiple AEDs.     HOSPITAL COURSE:  05/26-05/27 - NAEON. No electrographic or clinical seizures recorded.   05/27-05/28 - Patient w/ some visual hallucinations as well as intermittent numbness and tingling of various body parts/face throughout the night. No electrographic seizures or typical events noted throughout the night.   05/28-05/29 -  No electrographic seizures nor any typical clinical events recorded overnight.     PAST MEDICAL HISTORY:  History reviewed. No pertinent past medical history.  PAST SURGICAL HISTORY:  History reviewed. No pertinent surgical history.  CURRENT MEDS:  Current Facility-Administered Medications   Medication Dose Route Frequency Provider Last Rate Last Dose    acetaminophen tablet 650 mg  650 mg Oral Q4H PRN Kristen Jean MD   650 mg at 05/28/20 0116    aspirin EC tablet 81 mg  81 mg Oral Daily Kristen Jean MD   81 mg at 05/29/20 0915    ibuprofen tablet 800 mg  800 mg Oral Q6H PRN Kristen Jean MD        lorazepam injection 2 mg  2 mg Intravenous Q5 Min PRN Kristen Jean MD        ondansetron disintegrating tablet 8 mg  8 mg Oral Q8H PRN Kristen Jean MD        sodium chloride 0.9% flush 10 mL  10 mL Intravenous PRN Kristen Jean MD         ALLERGIES:  Review of patient's allergies indicates:  No Known Allergies  FAMILY HISTORY:  History reviewed. No pertinent family history.  SOCIAL HISTORY:  Social History     Tobacco Use    Smoking status: Former Smoker   Substance Use Topics    Alcohol use: Yes     Frequency: Monthly or less     Drinks per session: 1 or 2     Binge frequency: Less than monthly    Drug use: Never     Review of  Systems:  12 system review of systems is negative except as noted in HPI.      Objective:     Vitals:    05/29/20 0810 05/29/20 1134 05/29/20 1526 05/29/20 1535   BP: 122/81   (!) 154/101   Patient Position: Lying      Pulse: 91 62 72    Resp: 19      Temp:       TempSrc:       SpO2: (!) 87%      Weight:       Height:           General: NAD, well nourished   Eyes: no tearing, discharge, no erythema   ENT: moist mucous membranes of the oral cavity, nares patent    Neck: Supple, Full range of motion  Cardiovascular: Warm and well perfused, pulses equal and symmetrical  Lungs: Normal work of breathing, normal chest wall excursions  Skin: No rash, lesions, or breakdown on exposed skin  Extremeties: No cyanosis, clubbing or edema.     Neurological   MENTAL STATUS: Alert and oriented to person, place, and time. Attention and concentration within normal limits. Speech without dysarthria, able to name and repeat without difficulty. Recent and remote memory within normal limits   CRANIAL NERVES: Visual fields intact. PERRL. EOMI. Facial sensation intact. Face symmetrical. Hearing grossly intact. Full shoulder shrug bilaterally. Tongue protrudes midline   SENSORY: Sensation is intact to light touch throughout.   MOTOR: Normal bulk and tone. No pronator drift.  5/5 deltoid, biceps, triceps, interosseous, hand  bilaterally. 5/5 iliopsoas, knee extension/flexion, foot dorsi/plantarflexion bilaterally.    CEREBELLAR/COORDINATION/GAIT: Deferred 2/2 vEEG monitoring.      Labs: Reviewed.  Imaging: I have reviewed and interpreted all pertinent imaging results/findings within the past 24 hours.    Diagnostic EEG Results:    05/28-05/29 Date: EEG Background: Disorganized predominantly alpha/theta activity. There is a moderately well-formed, poorly sustained, 7-8 hz PDR.     Activation Procedures: none    Seizures/Events: none    Abnormality: Abnormal EEG due to diffuse cortical dysfunction noted, as well as intermittent,  isolated  L temporal sharp waves noted during the study duration, primarily during sleep        Assessment:      This is Declan Burleson, 55 y.o. male who presents to the EMU for localization and medication adjustment. Patient w/ longstanding seizure history that has been refractory to multiple AEDs.   No electrographic seizures noted on day one of admission, no clinical seizures reported. Will stop all AEDs at this time and monitor, w/ activation procedures on 05/27.   Sleep deprivation, again, on 05/29-05/30.     Plan:      Problem List as of 5/29/2020 Reviewed: 5/29/2020  Kristen Jean MD       Neuro    Complex partial seizures evolving to generalized tonic-clonic seizures  - Admit to EMU  - Medication adjustment as following        - Stop Aptiom 1200 mg QHS        - Stop Zonisamide 200 mg QD and stop Vimpat 200 mg QD  - vEEG monitoring  - Activation procedure on 05/27   - Sleep deprivation on 05/29-05/30  - Seizure precautions  - Ativan PRN for break through events ongoing for >5 minutes. Please contact the epileptologist on call prior to the administration of the     Migraine without status migrainosus, not intractable  - PRN medication available     * (Principal) Complex partial epilepsy with generalization and with intractable epilepsy  - As above

## 2020-05-30 LAB
POCT GLUCOSE: 126 MG/DL (ref 70–110)
POCT GLUCOSE: 220 MG/DL (ref 70–110)

## 2020-05-30 PROCEDURE — 25000003 PHARM REV CODE 250: Performed by: STUDENT IN AN ORGANIZED HEALTH CARE EDUCATION/TRAINING PROGRAM

## 2020-05-30 PROCEDURE — 93005 ELECTROCARDIOGRAM TRACING: CPT

## 2020-05-30 PROCEDURE — 94761 N-INVAS EAR/PLS OXIMETRY MLT: CPT

## 2020-05-30 PROCEDURE — 99233 SBSQ HOSP IP/OBS HIGH 50: CPT | Mod: ,,, | Performed by: PSYCHIATRY & NEUROLOGY

## 2020-05-30 PROCEDURE — 93010 EKG 12-LEAD: ICD-10-PCS | Mod: ,,, | Performed by: INTERNAL MEDICINE

## 2020-05-30 PROCEDURE — 95714 VEEG EA 12-26 HR UNMNTR: CPT

## 2020-05-30 PROCEDURE — 93010 ELECTROCARDIOGRAM REPORT: CPT | Mod: ,,, | Performed by: INTERNAL MEDICINE

## 2020-05-30 PROCEDURE — 25000003 PHARM REV CODE 250: Performed by: NURSE PRACTITIONER

## 2020-05-30 PROCEDURE — 95720 PR EEG, W/VIDEO, CONT RECORD, I&R, >12<26 HRS: ICD-10-PCS | Mod: ,,, | Performed by: PSYCHIATRY & NEUROLOGY

## 2020-05-30 PROCEDURE — 11000001 HC ACUTE MED/SURG PRIVATE ROOM

## 2020-05-30 PROCEDURE — 99233 PR SUBSEQUENT HOSPITAL CARE,LEVL III: ICD-10-PCS | Mod: ,,, | Performed by: PSYCHIATRY & NEUROLOGY

## 2020-05-30 PROCEDURE — 95720 EEG PHY/QHP EA INCR W/VEEG: CPT | Mod: ,,, | Performed by: PSYCHIATRY & NEUROLOGY

## 2020-05-30 PROCEDURE — 25000003 PHARM REV CODE 250: Performed by: PSYCHIATRY & NEUROLOGY

## 2020-05-30 RX ORDER — LABETALOL HCL 20 MG/4 ML
2.5 SYRINGE (ML) INTRAVENOUS ONCE
Status: COMPLETED | OUTPATIENT
Start: 2020-05-30 | End: 2020-05-30

## 2020-05-30 RX ORDER — METOPROLOL TARTRATE 25 MG/1
12.5 TABLET ORAL 2 TIMES DAILY
Status: DISCONTINUED | OUTPATIENT
Start: 2020-05-30 | End: 2020-06-01

## 2020-05-30 RX ADMIN — METOPROLOL TARTRATE 12.5 MG: 25 TABLET, FILM COATED ORAL at 06:05

## 2020-05-30 RX ADMIN — ASPIRIN 81 MG: 81 TABLET, COATED ORAL at 08:05

## 2020-05-30 RX ADMIN — Medication 2.5 MG: at 09:05

## 2020-05-30 NOTE — PLAN OF CARE
Problem: Adult Inpatient Plan of Care  Goal: Plan of Care Review  Outcome: Ongoing, Progressing     Problem: Adult Inpatient Plan of Care  Goal: Optimal Comfort and Wellbeing  Outcome: Ongoing, Progressing     Problem: Fall Injury Risk  Goal: Absence of Fall and Fall-Related Injury  Outcome: Ongoing, Progressing     Problem: Seizure, Active Management  Goal: Absence of Seizure/Seizure-Related Injury  Outcome: Ongoing, Progressing   Patient remains free from injury or fall. EEG in progress. No seizure like activity noted or reported during this shift. Cardiac rhythm monitored. No neuro changes noted. will continue to monitor.

## 2020-05-30 NOTE — PROGRESS NOTES
[facility]  Epilepsy Monitoring Unit  Daily Progress Note    Date: 5/29/20  Patient Name: Declan Burleson   MRN: [id number]   PCP: Matteo Rizzo    Subjective:   This is Declan Burleson, 55 y.o. male who presents to the EMU for localization and medication adjustment. Patient w/ longstanding seizure history that has been refractory to multiple AEDs.     HOSPITAL COURSE:  05/26-05/27 - NAEON. No electrographic or clinical seizures recorded.   05/27-05/28 - Patient w/ some visual hallucinations as well as intermittent numbness and tingling of various body parts/face throughout the night. No electrographic seizures or typical events noted throughout the night.   05/28-05/29 -  No electrographic seizures nor any typical clinical events recorded overnight.   05/29-05/30 -  No electrographic seizures or clinical events overnight    PAST MEDICAL HISTORY:  History reviewed. No pertinent past medical history.  PAST SURGICAL HISTORY:  History reviewed. No pertinent surgical history.  CURRENT MEDS:  Current Facility-Administered Medications   Medication Dose Route Frequency Provider Last Rate Last Dose    acetaminophen tablet 650 mg  650 mg Oral Q4H PRN Kristen Jean MD   650 mg at 05/28/20 0116    aspirin EC tablet 81 mg  81 mg Oral Daily Kristen Jean MD   81 mg at 05/30/20 0818    ibuprofen tablet 800 mg  800 mg Oral Q6H PRN Kristen Jean MD   800 mg at 05/29/20 2135    lorazepam injection 2 mg  2 mg Intravenous Q5 Min PRN Kristen Jean MD        ondansetron disintegrating tablet 8 mg  8 mg Oral Q8H PRN Kristen Jean MD        sodium chloride 0.9% flush 10 mL  10 mL Intravenous PRN Kristen Jean MD         ALLERGIES:  Review of patient's allergies indicates:  No Known Allergies  FAMILY HISTORY:  History reviewed. No pertinent family history.  SOCIAL HISTORY:  Social History     Tobacco Use    Smoking status: Former Smoker   Substance Use Topics    Alcohol use: Yes     Frequency: Monthly or less     Drinks  per session: 1 or 2     Binge frequency: Less than monthly    Drug use: Never     Review of Systems:  12 system review of systems is negative except as noted in HPI.      Objective:     Vitals:    05/30/20 0500 05/30/20 0705 05/30/20 0825 05/30/20 1217   BP: 135/81 (!) 169/108 (!) 142/91 (!) 137/98   BP Location: Left arm  Right arm    Patient Position:   Lying    Pulse: 67 (!) 58  68   Resp: 20 19 11   Temp: 97.8 °F (36.6 °C) 98.3 °F (36.8 °C) 98.6 °F (37 °C) 98 °F (36.7 °C)   TempSrc: Oral      SpO2: 97% 96% 96% 99%   Weight:       Height:           General: NAD, well nourished   Eyes: no tearing, discharge, no erythema   ENT: moist mucous membranes of the oral cavity, nares patent    Neck: Supple, Full range of motion  Cardiovascular: Warm and well perfused, pulses equal and symmetrical  Lungs: Normal work of breathing, normal chest wall excursions  Skin: No rash, lesions, or breakdown on exposed skin  Extremeties: No cyanosis, clubbing or edema.     Neurological   MENTAL STATUS: Alert and oriented to person, place, and time. Attention and concentration within normal limits. Speech without dysarthria, able to name and repeat without difficulty. Recent and remote memory within normal limits   CRANIAL NERVES: Visual fields intact. PERRL. EOMI. Facial sensation intact. Face symmetrical. Hearing grossly intact. Full shoulder shrug bilaterally. Tongue protrudes midline   SENSORY: Sensation is intact to light touch throughout.   MOTOR: Normal bulk and tone. No pronator drift.  5/5 deltoid, biceps, triceps, interosseous, hand  bilaterally. 5/5 iliopsoas, knee extension/flexion, foot dorsi/plantarflexion bilaterally.    CEREBELLAR/COORDINATION/GAIT: Deferred 2/2 vEEG monitoring.      Labs: Reviewed.  Imaging: I have reviewed and interpreted all pertinent imaging results/findings within the past 24 hours.    Diagnostic EEG Results:    05/28-05/29 Date: EEG Background: Disorganized predominantly alpha/theta activity.  There is a moderately well-formed, poorly sustained, 7-8 hz PDR.     Activation Procedures: none    Seizures/Events: none    Abnormality: Abnormal EEG due to diffuse cortical dysfunction noted, as well as intermittent,  isolated L temporal sharp waves noted during the study duration, primarily during sleep        Assessment:      This is Declan Burleson, 55 y.o. male who presents to the EMU for localization and medication adjustment. Patient w/ longstanding seizure history that has been refractory to multiple AEDs.   No electrographic seizures noted on day one of admission, no clinical seizures reported. Will stop all AEDs at this time and monitor, w/ activation procedures on 05/27.   Sleep deprivation, again, on 05/29-05/30.     Plan:      Problem List as of 5/29/2020 Reviewed: 5/29/2020  Kristne Jean MD       Neuro    Complex partial seizures evolving to generalized tonic-clonic seizures  - Admit to EMU  - Medication adjustment as following        - Stop Aptiom 1200 mg QHS        - Stop Zonisamide 200 mg QD and stop Vimpat 200 mg QD  - vEEG monitoring  - Activation procedure on 05/27   - Sleep deprivation on 05/29-05/30  - Seizure precautions  - Ativan PRN for break through events ongoing for >5 minutes. Please contact the epileptologist on call prior to the administration of the     Migraine without status migrainosus, not intractable  - PRN medication available     * (Principal) Complex partial epilepsy with generalization and with intractable epilepsy  - As above

## 2020-05-30 NOTE — PROCEDURES
EMU Report    DATE OF PROCEDURE:  5/29/20     EEG NUMBER:  U -[id number]     REFERRING PHYSICIAN: Dr. Sandy      This EEG was performed to characterize the patient's events.     ELECTROENCEPHALOGRAM REPORT     METHODOLOGY:  Electroencephalographic (EEG) recording is recorded with electrodes placed according to the International 10-20 placement system.  Thirty two (32) channels of digital signal (sampling rate of 512/sec), including T1 and T2, were simultaneously recorded from the scalp and may include EKG, EMG, and/or eye monitors.  Recording band pass was 0.1 to 512 Hz.  Digital video recording of the patient is simultaneously recorded with the EEG.  The patient is instructed to report clinical symptoms which may occur during the recording session.  EEG and video recording are stored and archived in digital format.    Activation procedures, which include photic stimulation, hyperventilation and instructing patients to perform simple tasks, are done in selected patients.   The EEG is displayed on a monitor screen and can be reviewed using different montages.  Computer assisted-analysis is employed to detect spike and electrographic seizure activity.   The entire record is submitted for computer analysis.  The entire recording is visually reviewed, and the times identified by computer analysis as being spikes or seizures are reviewed again.    Compressed spectral analysis (CSA) is also performed on the activity recorded from each individual channel.  This is displayed as a power display of frequencies from 0 to 30 Hz over time.   The CSA is reviewed looking for asymmetries in power between homologous areas of the scalp, then compared with the original EEG recording.    [employer] software was also utilized in the review of this study.  This software suite analyzes the EEG recording in multiple domains.  Coherence and rhythmicity are computed to identify EEG sections which may contain organized seizures.  Each channel  undergoes analysis to detect the presence of spike and sharp waves which have special and morphological characteristics of epileptic activity.  The routine EEG recording is converted from special into frequency domain.  This is then displayed comparing homologous areas to identify areas of significant asymmetry.  Algorithm to identify non-cortically generated artifact is used to separate artifact from the EEG.       RECORDING TIMES:    start on May 29, 2020 at hours 7 min 1 sec 22  End on May 30, 2020 hr 7 min 0 sec 0  The total time of EEG recording for the study was 23 hr and 58 min    SEIZURES RECORDED:  During this recording no events were recorded     ELECTROENCEPHALOGRAM:  Interictal:  The recording was obtained with a number of standard bipolar and referential montages during wakefulness, drowsiness and sleep.  In the alert state, the posterior background rhythm was a symmetric, well-modulated 10 Hz alpha rhythm, which reacted symmetrically to eye opening.  Activation procedures were not performed. Intermittent photic stimulation evoked symmetric posterior driving responses. Hyperventilation produced physiological slowing.  No abnormalities were activated by photic stimulation or hyperventilation. During drowsiness, the background rhythm waxed and waned and there were periods of slowing. During stage II sleep, symmetric V waves, K complexes and sleep spindles were noted.    during this study burst of generalized irregular theta range slowing were noted which were symmetric.  In addition frequent left temporal polymorphic mixed delta and theta range slowing was noted with a large field involving the frontotemporal and frontal central regions.    Ictal:  No events recorded      FINAL SUMMARY:  ELECTROENCEPHALOGRAM:  Interictal:  This is an abnormal EEG during wakefulness, drowsiness and sleep. Burst of generalized irregular theta range slowing were noted which were symmetric suggesting a mild encephalopathy.  Left temporal polymorphic mixed delta and theta range slowing was noted with a large field involving the frontotemporal and frontal central regions.     Ictal:  During this recording no events were recorded      CLINICAL SEIZURE:  Classification:  Not applicable      CLINICAL CORRELATION:  The patient is a 55-year-old male with a history of focal epilepsy who was previously maintained on Vimpat zonisamide and Aptiom.  These medications were held during this study.  This is an abnormal EEG during wakefulness, drowsiness and sleep.  The overall degree of slowing, which occurred in bursts, is suggestive mild encephalopathy nonspecific to the cause.  The presence of left fronto-centro-temporal focal slowing suggests of focal neural dysfunction in this region.  No seizures were recorded during this study.

## 2020-05-30 NOTE — PLAN OF CARE
Problem: Adult Inpatient Plan of Care  Goal: Plan of Care Review  Outcome: Ongoing, Progressing  Goal: Optimal Comfort and Wellbeing  Outcome: Ongoing, Progressing     Problem: Fall Injury Risk  Goal: Absence of Fall and Fall-Related Injury  Outcome: Ongoing, Progressing     Problem: Seizure, Active Management  Goal: Absence of Seizure/Seizure-Related Injury  Outcome: Ongoing, Progressing     Patient is AAO x4. POC reviewed with patient and wife. Patient verbalized understanding. Patient's breathing is unlabored with equal chest expansion. Patient has continuous EEG monitoring in place. Patient did complain of numbness and tingling of LE at beginning of shift. Patient complained of HA;PRN med given. Patient voids per urinal. Patient remained free from falls. Patient rested well through shift. Bed in lowest position,bed alarm on, side rails up x3, side rails padded, no complaints or signs of distress. WCTM.   Patient remained sleep deprived until 3am.

## 2020-05-30 NOTE — NURSING
Spoke to Dr. Edgar. Blood glucose =220, LO=305/96, HR=89,O2 sat =97% on room air. Patient stated no change.   Plan=will initiate metoprolol PO, EKG ,check glucose tonight and check lab values. Will continue to monitor.

## 2020-05-30 NOTE — NURSING
At 1725 patient reported numbness feeling on arms, legs, face and tongue. Able to speak clear and answering questions clear and appropriate. Reporting dizziness. Awake and alert. See changes on vital signs. 2 attempts to call Dr. Edgar to notified event. EEG tech responded to [phone] phone and said that she will notified epilepsy 's team. Charge nurse Maggy also was notified.Will continue to monitor.

## 2020-05-31 PROBLEM — I10 HYPERTENSION: Status: ACTIVE | Noted: 2020-05-31

## 2020-05-31 PROBLEM — R73.9 ELEVATED RANDOM BLOOD GLUCOSE LEVEL: Status: ACTIVE | Noted: 2020-05-31

## 2020-05-31 PROBLEM — R73.09 ELEVATED RANDOM BLOOD GLUCOSE LEVEL: Status: ACTIVE | Noted: 2020-05-31

## 2020-05-31 LAB
ALBUMIN SERPL BCP-MCNC: 3.6 G/DL (ref 3.5–5.2)
ALP SERPL-CCNC: 124 U/L (ref 55–135)
ALT SERPL W/O P-5'-P-CCNC: 20 U/L (ref 10–44)
ANION GAP SERPL CALC-SCNC: 8 MMOL/L (ref 8–16)
AST SERPL-CCNC: 16 U/L (ref 10–40)
BILIRUB SERPL-MCNC: 0.4 MG/DL (ref 0.1–1)
BUN SERPL-MCNC: 16 MG/DL (ref 6–20)
CALCIUM SERPL-MCNC: 9 MG/DL (ref 8.7–10.5)
CHLORIDE SERPL-SCNC: 104 MMOL/L (ref 95–110)
CO2 SERPL-SCNC: 24 MMOL/L (ref 23–29)
CREAT SERPL-MCNC: 0.8 MG/DL (ref 0.5–1.4)
EST. GFR  (AFRICAN AMERICAN): >60 ML/MIN/1.73 M^2
EST. GFR  (NON AFRICAN AMERICAN): >60 ML/MIN/1.73 M^2
ESTIMATED AVG GLUCOSE: 108 MG/DL (ref 68–131)
GLUCOSE SERPL-MCNC: 95 MG/DL (ref 70–110)
HBA1C MFR BLD HPLC: 5.4 % (ref 4–5.6)
POCT GLUCOSE: 105 MG/DL (ref 70–110)
POCT GLUCOSE: 84 MG/DL (ref 70–110)
POTASSIUM SERPL-SCNC: 4.6 MMOL/L (ref 3.5–5.1)
PROT SERPL-MCNC: 7 G/DL (ref 6–8.4)
SODIUM SERPL-SCNC: 136 MMOL/L (ref 136–145)

## 2020-05-31 PROCEDURE — 25000003 PHARM REV CODE 250: Performed by: PSYCHIATRY & NEUROLOGY

## 2020-05-31 PROCEDURE — 25000003 PHARM REV CODE 250: Performed by: STUDENT IN AN ORGANIZED HEALTH CARE EDUCATION/TRAINING PROGRAM

## 2020-05-31 PROCEDURE — 99233 SBSQ HOSP IP/OBS HIGH 50: CPT | Mod: ,,, | Performed by: PSYCHIATRY & NEUROLOGY

## 2020-05-31 PROCEDURE — 94761 N-INVAS EAR/PLS OXIMETRY MLT: CPT

## 2020-05-31 PROCEDURE — 83036 HEMOGLOBIN GLYCOSYLATED A1C: CPT

## 2020-05-31 PROCEDURE — 80053 COMPREHEN METABOLIC PANEL: CPT

## 2020-05-31 PROCEDURE — 36415 COLL VENOUS BLD VENIPUNCTURE: CPT

## 2020-05-31 PROCEDURE — 99233 PR SUBSEQUENT HOSPITAL CARE,LEVL III: ICD-10-PCS | Mod: ,,, | Performed by: PSYCHIATRY & NEUROLOGY

## 2020-05-31 PROCEDURE — 99254 IP/OBS CNSLTJ NEW/EST MOD 60: CPT | Mod: ,,, | Performed by: INTERNAL MEDICINE

## 2020-05-31 PROCEDURE — 95720 PR EEG, W/VIDEO, CONT RECORD, I&R, >12<26 HRS: ICD-10-PCS | Mod: ,,, | Performed by: PSYCHIATRY & NEUROLOGY

## 2020-05-31 PROCEDURE — 95714 VEEG EA 12-26 HR UNMNTR: CPT

## 2020-05-31 PROCEDURE — 95720 EEG PHY/QHP EA INCR W/VEEG: CPT | Mod: ,,, | Performed by: PSYCHIATRY & NEUROLOGY

## 2020-05-31 PROCEDURE — 11000001 HC ACUTE MED/SURG PRIVATE ROOM

## 2020-05-31 PROCEDURE — 99254 PR INITIAL INPATIENT CONSULT,LEVL IV: ICD-10-PCS | Mod: ,,, | Performed by: INTERNAL MEDICINE

## 2020-05-31 RX ORDER — LOSARTAN POTASSIUM 25 MG/1
25 TABLET ORAL DAILY
Status: DISCONTINUED | OUTPATIENT
Start: 2020-05-31 | End: 2020-05-31

## 2020-05-31 RX ORDER — AMLODIPINE BESYLATE 5 MG/1
5 TABLET ORAL DAILY
Status: DISCONTINUED | OUTPATIENT
Start: 2020-06-01 | End: 2020-06-06 | Stop reason: HOSPADM

## 2020-05-31 RX ORDER — HYDROCORTISONE 1 %
CREAM (GRAM) TOPICAL 2 TIMES DAILY
Status: DISCONTINUED | OUTPATIENT
Start: 2020-05-31 | End: 2020-06-01

## 2020-05-31 RX ORDER — HYDROCORTISONE 1 %
CREAM (GRAM) TOPICAL 2 TIMES DAILY
Status: DISCONTINUED | OUTPATIENT
Start: 2020-05-31 | End: 2020-05-31

## 2020-05-31 RX ADMIN — IBUPROFEN 800 MG: 400 TABLET, FILM COATED ORAL at 02:05

## 2020-05-31 RX ADMIN — HYDROCORTISONE: 10 CREAM TOPICAL at 02:05

## 2020-05-31 RX ADMIN — METOPROLOL TARTRATE 12.5 MG: 25 TABLET, FILM COATED ORAL at 10:05

## 2020-05-31 RX ADMIN — LOSARTAN POTASSIUM 25 MG: 25 TABLET ORAL at 10:05

## 2020-05-31 RX ADMIN — METOPROLOL TARTRATE 12.5 MG: 25 TABLET, FILM COATED ORAL at 08:05

## 2020-05-31 RX ADMIN — ASPIRIN 81 MG: 81 TABLET, COATED ORAL at 10:05

## 2020-05-31 NOTE — PROCEDURES
EMU Report     DATE OF PROCEDURE:  5/30/20     EEG NUMBER:  U -[id number]     REFERRING PHYSICIAN: Dr. Sandy      This EEG was performed to characterize the patient's events.     ELECTROENCEPHALOGRAM REPORT     METHODOLOGY:  Electroencephalographic (EEG) recording is recorded with electrodes placed according to the International 10-20 placement system.  Thirty two (32) channels of digital signal (sampling rate of 512/sec), including T1 and T2, were simultaneously recorded from the scalp and may include EKG, EMG, and/or eye monitors.  Recording band pass was 0.1 to 512 Hz.  Digital video recording of the patient is simultaneously recorded with the EEG.  The patient is instructed to report clinical symptoms which may occur during the recording session.  EEG and video recording are stored and archived in digital format.    Activation procedures, which include photic stimulation, hyperventilation and instructing patients to perform simple tasks, are done in selected patients.   The EEG is displayed on a monitor screen and can be reviewed using different montages.  Computer assisted-analysis is employed to detect spike and electrographic seizure activity.   The entire record is submitted for computer analysis.  The entire recording is visually reviewed, and the times identified by computer analysis as being spikes or seizures are reviewed again.    Compressed spectral analysis (CSA) is also performed on the activity recorded from each individual channel.  This is displayed as a power display of frequencies from 0 to 30 Hz over time.   The CSA is reviewed looking for asymmetries in power between homologous areas of the scalp, then compared with the original EEG recording.    [employer] software was also utilized in the review of this study.  This software suite analyzes the EEG recording in multiple domains.  Coherence and rhythmicity are computed to identify EEG sections which may contain organized seizures.  Each channel  undergoes analysis to detect the presence of spike and sharp waves which have special and morphological characteristics of epileptic activity.  The routine EEG recording is converted from special into frequency domain.  This is then displayed comparing homologous areas to identify areas of significant asymmetry.  Algorithm to identify non-cortically generated artifact is used to separate artifact from the EEG.       RECORDING TIMES:    start on May 30, 2020 at hours 7 min 1 sec 22  End on May 31, 2020 hr 7 min 0 sec 3  The total time of EEG recording for the study was 23 hr and 49 min     EVENTS RECORDED:  During this recording 1 event was recorded; start at 17:15, end 18:01 on 5/30/20     ELECTROENCEPHALOGRAM:  Interictal:  The recording was obtained with a number of standard bipolar and referential montages during wakefulness, drowsiness and sleep.  In the alert state, the posterior background rhythm was a symmetric, well-modulated 10 Hz alpha rhythm, which reacted symmetrically to eye opening.  Activation procedures were not performed. Intermittent photic stimulation evoked symmetric posterior driving responses. Hyperventilation produced physiological slowing.  No abnormalities were activated by photic stimulation or hyperventilation. During drowsiness, the background rhythm waxed and waned and there were periods of slowing. During stage II sleep, symmetric V waves, K complexes and sleep spindles were noted.    during this study burst of generalized irregular theta range slowing were noted which were symmetric.  In addition frequent left temporal polymorphic mixed delta and theta range slowing was noted with a large field involving the frontotemporal and frontal central regions.     Ictal:  No events recorded      FINAL SUMMARY:  ELECTROENCEPHALOGRAM:  Interictal:  This is an abnormal EEG during wakefulness, drowsiness and sleep. Burst of generalized irregular theta range slowing were noted which were symmetric  suggesting a mild encephalopathy. Left temporal polymorphic mixed delta and theta range slowing was noted with a large field involving the frontotemporal and frontal central regions.   Infrequent sharp waves were noted maximally at FP 1, F7, T3 and T5.    Ictal:  During this 1 event was recorded.    Clinically the patient noted an alarm indicating elevated blood pressure.  He subsequently reported numbness in his lower extremities, tongue and mild dizziness.  By the end of the episode he reported resolution of the symptoms.  He did not lose awareness or consciousness.  He was able to communicate appropriately during the episode.  This was not associated any changes on EEG     CLINICAL SEIZURE:  Classification:  Elevated blood pressure with neurological symptoms     CLINICAL CORRELATION:  The patient is a 55-year-old male with a history of focal epilepsy who was previously maintained on Vimpat zonisamide and Aptiom.  These medications were held during this study.  This is an abnormal EEG during wakefulness, drowsiness and sleep.  The overall degree of slowing, which occurred in bursts, is suggestive mild encephalopathy nonspecific to the cause.  The presence of left fronto-centro-temporal focal slowing suggests of focal neural dysfunction in this region.   the presence of left temporal maximum sharp waves confers an increased risk of focal seizures from this region.  During this study 1 episode of elevated blood pressure with neurological symptoms was recorded.  No seizures were recorded during this study.
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
## 2020-05-31 NOTE — SUBJECTIVE & OBJECTIVE
History reviewed. No pertinent past medical history.    History reviewed. No pertinent surgical history.    Review of patient's allergies indicates:  No Known Allergies    No current facility-administered medications on file prior to encounter.      Current Outpatient Medications on File Prior to Encounter   Medication Sig    APTIOM 400 mg Tab     eslicarbazepine (APTIOM) 800 mg Tab Take 1,200 mg by mouth.    rizatriptan (MAXALT-MLT) 10 MG disintegrating tablet Take 10 mg by mouth.    VIMPAT 200 mg Tab tablet     zonisamide (ZONEGRAN) 100 MG Cap      Family History     None        Tobacco Use    Smoking status: Former Smoker   Substance and Sexual Activity    Alcohol use: Yes     Frequency: Monthly or less     Drinks per session: 1 or 2     Binge frequency: Less than monthly    Drug use: Never    Sexual activity: Yes     Partners: Female     Review of Systems   Constitutional: Negative for activity change, appetite change, chills, fatigue and unexpected weight change.   HENT: Negative for sinus pressure, sinus pain, trouble swallowing and voice change.    Eyes: Negative for photophobia and visual disturbance.   Respiratory: Negative for shortness of breath and wheezing.    Cardiovascular: Negative for chest pain, palpitations and leg swelling.   Gastrointestinal: Negative for abdominal distention, abdominal pain, blood in stool, constipation, diarrhea and vomiting.   Genitourinary: Negative for difficulty urinating and dysuria.   Musculoskeletal: Negative for arthralgias, back pain and gait problem.   Skin: Negative for color change and pallor.   Neurological: Negative for dizziness and headaches.   Hematological: Negative for adenopathy. Does not bruise/bleed easily.   Psychiatric/Behavioral: Negative for agitation and behavioral problems.     Objective:     Vital Signs (Most Recent):  Temp: 98.3 °F (36.8 °C) (05/31/20 1048)  Pulse: 87 (05/31/20 1048)  Resp: 18 (05/31/20 1048)  BP: (!) 140/92 (05/31/20  1048)  SpO2: 98 % (05/31/20 0847) Vital Signs (24h Range):  Temp:  [97.7 °F (36.5 °C)-98.4 °F (36.9 °C)] 98.3 °F (36.8 °C)  Pulse:  [59-91] 87  Resp:  [9-27] 18  SpO2:  [93 %-99 %] 98 %  BP: (125-202)/() 140/92     Weight: 81.2 kg (179 lb)  Body mass index is 24.97 kg/m².    Physical Exam   Constitutional: He is oriented to person, place, and time. No distress.   HENT:   Mouth/Throat: Oropharynx is clear and moist. No oropharyngeal exudate.   Eyes: Conjunctivae are normal. No scleral icterus.   Neck: Normal range of motion. Neck supple. No JVD present. No tracheal deviation present. No thyromegaly present.   Cardiovascular: Normal rate, regular rhythm, normal heart sounds and intact distal pulses. Exam reveals no gallop and no friction rub.   No murmur heard.  Pulmonary/Chest: Effort normal and breath sounds normal. No stridor. No respiratory distress. He has no wheezes. He has no rales.   Abdominal: Soft. Bowel sounds are normal. He exhibits no distension and no mass. There is no tenderness. There is no guarding.   Musculoskeletal: Normal range of motion. He exhibits no edema, tenderness or deformity.   Lymphadenopathy:     He has no cervical adenopathy.   Neurological: He is alert and oriented to person, place, and time. No cranial nerve deficit. Coordination normal.   Skin: Skin is warm. Capillary refill takes less than 2 seconds. He is not diaphoretic. No erythema. No pallor.     Significant Labs:   CBC: No results for input(s): WBC, HGB, HCT, PLT in the last 48 hours.  CMP:   Recent Labs   Lab 05/31/20  0300      K 4.6      CO2 24   GLU 95   BUN 16   CREATININE 0.8   CALCIUM 9.0   PROT 7.0   ALBUMIN 3.6   BILITOT 0.4   ALKPHOS 124   AST 16   ALT 20   ANIONGAP 8   EGFRNONAA >60.0     All pertinent labs within the past 24 hours have been reviewed.    Significant Imaging: I have reviewed all pertinent imaging results/findings within the past 24 hours.

## 2020-05-31 NOTE — NURSING
Notified Chaz the patient's wife is concerned about the rash in his face. She will notify the physician. Will continue to monitor.

## 2020-05-31 NOTE — PLAN OF CARE
POC reviewed with pt.  Pt verbalized understanding.  Questions and concerns addressed.  No acute events overnight.  AAOx4 and moves all extremities spontaneously.  Pt stated that he does have intermittent numbness and tingling to his lower extremities, which has been ongoing since his admission to the hospital.  DBP elevated >100 at beginning of shift.  Stroke team called, and one time dose of labetalol ordered.  BP stable at this time. Pt blood glucose 126.  Fall/safety/seizure precautions maintained.  Bed locked and in lowest position.  See flowsheets for full assessment and vital sign information.       Problem: Adult Inpatient Plan of Care  Goal: Plan of Care Review  Outcome: Ongoing, Progressing     Problem: Fall Injury Risk  Goal: Absence of Fall and Fall-Related Injury  Outcome: Ongoing, Progressing     Problem: Seizure, Active Management  Goal: Absence of Seizure/Seizure-Related Injury  Outcome: Ongoing, Progressing
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
## 2020-05-31 NOTE — ASSESSMENT & PLAN NOTE
Medicine team consulted for elevated glucose levels  AM glucose 95 (wnl)  HbA1C 5.4 (wnl)  Plan:  Will continue pre-meal acu-checks today and can discontinue from 06/01/20.

## 2020-05-31 NOTE — ASSESSMENT & PLAN NOTE
New onset hypertension  BP yesterday > 200 systolic, given labetalol IV  Assessment: New Onset HTN  Plan:  -- will start losartan 25 mg daily  -- low sodium diet

## 2020-06-01 PROBLEM — R29.818 NEUROLOGICAL DEFICIT, TRANSIENT: Status: ACTIVE | Noted: 2020-06-01

## 2020-06-01 PROBLEM — R21 FACIAL RASH: Status: ACTIVE | Noted: 2020-06-01

## 2020-06-01 LAB
ESLICARBAZEPINE: 12 MCG/ML
TSH SERPL DL<=0.005 MIU/L-ACNC: 2.16 UIU/ML (ref 0.4–4)
VIT B12 SERPL-MCNC: 405 PG/ML (ref 210–950)

## 2020-06-01 PROCEDURE — 25000003 PHARM REV CODE 250: Performed by: PSYCHIATRY & NEUROLOGY

## 2020-06-01 PROCEDURE — 99232 SBSQ HOSP IP/OBS MODERATE 35: CPT | Mod: ,,, | Performed by: INTERNAL MEDICINE

## 2020-06-01 PROCEDURE — 99223 PR INITIAL HOSPITAL CARE,LEVL III: ICD-10-PCS | Mod: ,,, | Performed by: PSYCHIATRY & NEUROLOGY

## 2020-06-01 PROCEDURE — 25000003 PHARM REV CODE 250: Performed by: STUDENT IN AN ORGANIZED HEALTH CARE EDUCATION/TRAINING PROGRAM

## 2020-06-01 PROCEDURE — 99223 1ST HOSP IP/OBS HIGH 75: CPT | Mod: ,,, | Performed by: PSYCHIATRY & NEUROLOGY

## 2020-06-01 PROCEDURE — 99233 SBSQ HOSP IP/OBS HIGH 50: CPT | Mod: ,,, | Performed by: PSYCHIATRY & NEUROLOGY

## 2020-06-01 PROCEDURE — 82607 VITAMIN B-12: CPT

## 2020-06-01 PROCEDURE — 95720 EEG PHY/QHP EA INCR W/VEEG: CPT | Mod: ,,, | Performed by: PSYCHIATRY & NEUROLOGY

## 2020-06-01 PROCEDURE — 94761 N-INVAS EAR/PLS OXIMETRY MLT: CPT

## 2020-06-01 PROCEDURE — 95720 PR EEG, W/VIDEO, CONT RECORD, I&R, >12<26 HRS: ICD-10-PCS | Mod: ,,, | Performed by: PSYCHIATRY & NEUROLOGY

## 2020-06-01 PROCEDURE — 84425 ASSAY OF VITAMIN B-1: CPT

## 2020-06-01 PROCEDURE — 86592 SYPHILIS TEST NON-TREP QUAL: CPT

## 2020-06-01 PROCEDURE — 95714 VEEG EA 12-26 HR UNMNTR: CPT

## 2020-06-01 PROCEDURE — 11000001 HC ACUTE MED/SURG PRIVATE ROOM

## 2020-06-01 PROCEDURE — 84443 ASSAY THYROID STIM HORMONE: CPT

## 2020-06-01 PROCEDURE — 99233 PR SUBSEQUENT HOSPITAL CARE,LEVL III: ICD-10-PCS | Mod: ,,, | Performed by: PSYCHIATRY & NEUROLOGY

## 2020-06-01 PROCEDURE — 99232 PR SUBSEQUENT HOSPITAL CARE,LEVL II: ICD-10-PCS | Mod: ,,, | Performed by: INTERNAL MEDICINE

## 2020-06-01 RX ORDER — DOXYLAMINE SUCCINATE 25 MG
TABLET ORAL 2 TIMES DAILY
Status: DISPENSED | OUTPATIENT
Start: 2020-06-01 | End: 2020-06-04

## 2020-06-01 RX ADMIN — HYDROCORTISONE: 10 CREAM TOPICAL at 08:06

## 2020-06-01 RX ADMIN — AMLODIPINE BESYLATE 5 MG: 5 TABLET ORAL at 08:06

## 2020-06-01 RX ADMIN — METOPROLOL TARTRATE 12.5 MG: 25 TABLET, FILM COATED ORAL at 08:06

## 2020-06-01 RX ADMIN — ASPIRIN 81 MG: 81 TABLET, COATED ORAL at 08:06

## 2020-06-01 RX ADMIN — MICONAZOLE NITRATE: 20 CREAM TOPICAL at 09:06

## 2020-06-01 NOTE — HPI
Declan Burleson is a 55 y.o. male with PMHx of GTC seizures and staring episodes who presented to hospital for admission to EMU. Patient was participating in seizure inducing activities (light therapy and hyperventilation) when he began having numbness and tingling. He stated he felt pressure all over his body and it felt like his head and eyes were shrinking. Nurse noted that he was also having difficulty speaking and his BP elevated to 188/117, stroke code was activated. He was last known normal at 1645 and his symptoms began at 1700.

## 2020-06-01 NOTE — NURSING
Pt had an episode following, lights and breating test done by EMU, could not state full name, numbness in toes, lasting 1-2 minutes, says head is shrinking, chest pressure from chest to your toes, bp 161/96 ,  p 67 , map 120 - five minutes later numbness returned in face, says throat feels like it is shrinking and head is also shrinking, bp 146/95 p 64 po2 94 r 18 EMU techs were present. Repeated numbness over his entire body   Pt can state his name ,  10 min later bp 137/94, pt started crying, but could not tell me why and his wife said this is new. Repeatedly states his head and eyes are shrinking. EMU MD on call notified, awaiting call.

## 2020-06-01 NOTE — ASSESSMENT & PLAN NOTE
- HM following, concern for Seborrheic Dermatitis  - Will start Ketoconazole at this time  - Appreciate recommendations

## 2020-06-01 NOTE — ASSESSMENT & PLAN NOTE
- Stable now   - Will stop  Metoprolol at this time  - Will continue Amlodipine 5mg QD for BP control, room to increase the medication if BP remains elevated

## 2020-06-01 NOTE — ASSESSMENT & PLAN NOTE
- Intermittent numbness, tingling, pressure reported over the scalp as well as in hands and b/l LE  - Intermittent visual hallucinations  - B1 - pending B12 - 405, TSH - WNL and RPR - non-reactive

## 2020-06-01 NOTE — CONSULTS
[facility]  Vascular Neurology  Comprehensive Stroke Center  Consult Note    Consults  Assessment/Plan:     Patient is a 55 y.o. year old male with:    Neurological deficit, transient  Declan Benjy is a 55 y.o. male with PMHx of seizures who presented to hospital as EMU admit. Stroke code called after seizure inducing activities (light therapy and hyperventilation) due to generalized numbness/tingling and speech difficulty. Patient's exam nonfocal. NIH 2 for dysarthria (pt has [place] accent) and generalized numbness/tingling. Patient noted to have stuttering speech and hypokinesis. Sensation equal bilaterally. Exam not correlating with cerebrovascular territory, therefore low suspicion for cerebrovascular origin for patient's symptoms. Symptoms possibly related to respiratory alkalosis in setting of hyperventilation. Discussed patient with EMU and stroke staff. Stroke code de-activated due to low concern for stroke and EEG study of greater importance than other neurologic imaging. Will defer CT at this time. Consider further neurologic workup if patient begins to experience focal neurologic deficits.        STROKE DOCUMENTATION     Acute Stroke Times   Last Known Normal Date: 06/01/20  Last Known Normal Time: 1645  Symptom Onset Date: 06/01/20  Symptom Onset Time: 1700  Stroke Team Called Date: 06/01/20  Stroke Team Called Time: 1733  Stroke Team Arrival Date: 06/01/20  Stroke Team Arrival Time: 1734  CT Interpretation Time: (n/a, stroke code deactivated)  Decision to Treat Time for Alteplase: (n/a, stroke code deactivated)  Decision to Treat Time for IR: (n/a, stroke code deactivated)    NIH Scale:  Interval: baseline  1a. Level of Consciousness: 0-->Alert, keenly responsive  1b. LOC Questions: 0-->Answers both questions correctly  1c. LOC Commands: 0-->Performs both tasks correctly  2. Best Gaze: 0-->Normal  3. Visual: 0-->No visual loss  4. Facial Palsy: 0-->Normal symmetrical  movements  5a. Motor Arm, Left: 0-->No drift, limb holds 90 (or 45) degrees for full 10 secs  5b. Motor Arm, Right: 0-->No drift, limb holds 90 (or 45) degrees for full 10 secs  6a. Motor Leg, Left: 0-->No drift, leg holds 30 degree position for full 5 secs  6b. Motor Leg, Right: 0-->No drift, leg holds 30 degree position for full 5 secs  7. Limb Ataxia: 0-->Absent  8. Sensory: 1-->Mild-to-moderate sensory loss, patient feels pinprick is less sharp or is dull on the affected side, or there is a loss of superficial pain with pinprick, but patient is aware of being touched  9. Best Language: 0-->No aphasia, normal  10. Dysarthria: 1-->Mild-to-moderate dysarthria, patient slurs at least some words and, at worst, can be understood with some difficulty  11. Extinction and Inattention (formerly Neglect): 0-->No abnormality  Total (NIH Stroke Scale): 2    Modified [place] Score: 0  [place] Coma Scale:15   ABCD2 Score:    [id number] Score:   HAS -BLED Score:   ICH Score:   Hunt & Crain Classification:       Thrombolysis Candidate? No, Strong suspicion for stroke mimic or alternative diagnosis     Delays to Thrombolysis?  No    Interventional Revascularization Candidate?   Is the patient eligible for mechanical endovascular reperfusion (VANESSA)?  No; No significant neurological deficit      Hemorrhagic change of an Ischemic Stroke: Does this patient have an ischemic stroke with hemorrhagic changes? No     Subjective:     History of Present Illness:  Declan Burleson is a 55 y.o. male with PMHx of GTC seizures and staring episodes who presented to hospital for admission to EMU. Patient was participating in seizure inducing activities (light therapy and hyperventilation) when he began having numbness and tingling. He stated he felt pressure all over his body and it felt like his head and eyes were shrinking. Nurse noted that he was also having difficulty speaking and his BP elevated to 188/117, stroke code was activated. He was  last known normal at 1645 and his symptoms began at 1700.         History reviewed. No pertinent past medical history.  History reviewed. No pertinent surgical history.  History reviewed. No pertinent family history.  Social History     Tobacco Use    Smoking status: Former Smoker   Substance Use Topics    Alcohol use: Yes     Frequency: Monthly or less     Drinks per session: 1 or 2     Binge frequency: Less than monthly    Drug use: Never     Review of patient's allergies indicates:   Allergen Reactions    Losartan Rash       Medications: I have reviewed the current medication administration record.    Medications Prior to Admission   Medication Sig Dispense Refill Last Dose    APTIOM 400 mg Tab    5/25/2020 at Unknown time    eslicarbazepine (APTIOM) 800 mg Tab Take 1,200 mg by mouth.   5/25/2020 at Unknown time    rizatriptan (MAXALT-MLT) 10 MG disintegrating tablet Take 10 mg by mouth.   5/25/2020 at Unknown time    VIMPAT 200 mg Tab tablet    5/25/2020 at 0730am    zonisamide (ZONEGRAN) 100 MG Cap    Unknown at Unknown time       Review of Systems   Constitutional: Negative for chills and fever.   Eyes: Negative for visual disturbance.   Respiratory: Negative for cough.    Cardiovascular: Negative for chest pain.   Gastrointestinal: Negative for nausea and vomiting.   Neurological: Positive for speech difficulty and numbness. Negative for facial asymmetry and weakness.     Objective:     Vital Signs (Most Recent):  Temp: 97 °F (36.1 °C) (06/01/20 1635)  Pulse: 72 (06/01/20 1635)  Resp: 18 (06/01/20 1635)  BP: (!) 146/92 (06/01/20 1635)  SpO2: (!) 94 % (06/01/20 1635)    Vital Signs Range (Last 24H):  Temp:  [97 °F (36.1 °C)-98.9 °F (37.2 °C)]   Pulse:  [52-76]   Resp:  [12-18]   BP: ()/(72-92)   SpO2:  [94 %-98 %]     Physical Exam   Constitutional: He is oriented to person, place, and time. He appears well-developed and well-nourished. No distress.   HENT:   Head: Normocephalic and atraumatic.    Eyes: EOM are normal.   Cardiovascular: Normal rate.   Pulmonary/Chest: Effort normal. No respiratory distress.   Neurological: He is alert and oriented to person, place, and time.   Skin: Skin is warm and dry.   Vitals reviewed.      Neurological Exam:   LOC: alert  Attention Span: Good   Language: No aphasia, stuttering speech  Articulation: + dysarthria but patient with [place] accent  Orientation: Person, Place, Time   Visual Fields: Full  EOM (CN III, IV, VI): Full/intact  Facial Movement (CN VII): Symmetric facial expression    Motor: Arm left  Normal 5/5  Leg left  Normal 5/5  Arm right  Normal 5/5  Leg right Normal 5/5  + hypokinesis  Cebellar: No evidence of appendicular or axial ataxia  Sensation: sensation equal bilaterally, however subjective total body numbness/tingling  Tone: Normal tone throughout      Laboratory:  CMP: No results for input(s): GLUCOSE, CALCIUM, ALBUMIN, PROT, NA, K, CO2, CL, BUN, CREATININE, ALKPHOS, ALT, AST, BILITOT in the last 24 hours.  CBC:   Recent Labs   Lab 05/26/20  1734   WBC 8.85   RBC 5.23   HGB 16.0   HCT 49.2      MCV 94   MCH 30.6   MCHC 32.5     Lipid Panel: No results for input(s): CHOL, LDLCALC, HDL, TRIG in the last 168 hours.  Coagulation: No results for input(s): PT, INR, APTT in the last 168 hours.  Hgb A1C:   Recent Labs   Lab 05/31/20  0300   HGBA1C 5.4     TSH:   Recent Labs   Lab 06/01/20  1254   TSH 2.159       Diagnostic Results:    none      Rachel Lebron PA-C  Comprehensive Stroke Center  Department of Vascular Neurology   [facility]

## 2020-06-01 NOTE — PLAN OF CARE
Patient AAOx4. POC and meds reviewed with patient. Questions answered. Patient verbalized understanding. V/S stable throughout shift; please see flowsheets for V/S information and full assessment data. NAEO. Siderails up x 3 and padded, bed locked in lowest position, call bell in reach, O2 & suction at bedside, seizure precautions maintained. WCTM.

## 2020-06-01 NOTE — PLAN OF CARE
Problem: Adult Inpatient Plan of Care  Goal: Plan of Care Review  Outcome: Ongoing, Progressing  Goal: Patient-Specific Goal (Individualization)  Outcome: Ongoing, Progressing  Goal: Absence of Hospital-Acquired Illness or Injury  Outcome: Ongoing, Progressing  Goal: Optimal Comfort and Wellbeing  Outcome: Ongoing, Progressing  Goal: Readiness for Transition of Care  Outcome: Ongoing, Progressing  Goal: Rounds/Family Conference  Outcome: Ongoing, Progressing     Problem: Fall Injury Risk  Goal: Absence of Fall and Fall-Related Injury  Outcome: Ongoing, Progressing     Problem: Seizure, Active Management  Goal: Absence of Seizure/Seizure-Related Injury  Outcome: Ongoing, Progressing     Problem: Skin Injury Risk Increased  Goal: Skin Health and Integrity  Outcome: Ongoing, Progressing     POC reviewed with pt/spouse, episode occurred, suspected stroke, pt is A/O x4, ongoing, progressing, will continue to monitor, spouse is at the bedside
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
## 2020-06-01 NOTE — ASSESSMENT & PLAN NOTE
AED on hold for EEG study  Management per Epilepsy team  Recommend MRI Brain to exclude stroke as an underlying etiology

## 2020-06-01 NOTE — SUBJECTIVE & OBJECTIVE
Interval History: Pt developed HA and facial rash after taking losartan    Current Facility-Administered Medications   Medication Dose Route Frequency Provider Last Rate Last Dose    acetaminophen tablet 650 mg  650 mg Oral Q4H PRN Kristen Jean MD   650 mg at 05/28/20 0116    [START ON 6/1/2020] amLODIPine tablet 5 mg  5 mg Oral Daily Leeann Rodriguez MD        aspirin EC tablet 81 mg  81 mg Oral Daily Kristen Jean MD   81 mg at 05/31/20 1049    hydrocortisone 1 % cream   Topical (Top) BID Leeann Rodriguez MD        ibuprofen tablet 800 mg  800 mg Oral Q6H PRN Kristen Jean MD   800 mg at 05/31/20 1423    lorazepam injection 2 mg  2 mg Intravenous Q5 Min PRN Kristen Jean MD        metoprolol tartrate (LOPRESSOR) split tablet 12.5 mg  12.5 mg Oral BID Kalbe Edgar MD   12.5 mg at 05/31/20 1048    ondansetron disintegrating tablet 8 mg  8 mg Oral Q8H PRN Kristen Jean MD        sodium chloride 0.9% flush 10 mL  10 mL Intravenous PRN Kristen Jean MD         Continuous Infusions:    Review of Systems   Constitutional: Negative.    HENT: Negative.    Eyes: Negative.    Respiratory: Negative.    Cardiovascular: Negative.    Gastrointestinal: Negative.    Endocrine: Negative.    Genitourinary: Negative.    Musculoskeletal: Negative.    Skin: Positive for rash.   Allergic/Immunologic:        Drug allergy    Neurological: Positive for headaches.   Hematological: Negative.    Psychiatric/Behavioral: Negative.      Objective:     Vital Signs (Most Recent):  Temp: 98 °F (36.7 °C) (05/31/20 1647)  Pulse: 89 (05/31/20 1700)  Resp: 15 (05/31/20 1700)  BP: (!) 134/91 (05/31/20 1700)  SpO2: 97 % (05/31/20 1700) Vital Signs (24h Range):  Temp:  [97.7 °F (36.5 °C)-98.4 °F (36.9 °C)] 98 °F (36.7 °C)  Pulse:  [59-89] 89  Resp:  [9-18] 15  SpO2:  [93 %-98 %] 97 %  BP: (119-159)/() 134/91     Weight: 81.2 kg (179 lb)  Body mass index is 24.97 kg/m².    Physical Exam   Constitutional: He is oriented to person, place, and time.  He appears well-developed and well-nourished.   HENT:   Head: Normocephalic and atraumatic.   Right Ear: External ear normal.   Mouth/Throat: Oropharynx is clear and moist.   Eyes: Pupils are equal, round, and reactive to light. EOM are normal.   Neck: Normal range of motion. Neck supple.   Cardiovascular: Normal rate and regular rhythm.   Pulmonary/Chest: Effort normal.   Musculoskeletal: Normal range of motion.   Neurological: He is alert and oriented to person, place, and time. He displays normal reflexes. No cranial nerve deficit or sensory deficit. He exhibits normal muscle tone. Coordination normal.   Skin: Rash noted.   Psychiatric: He has a normal mood and affect. His behavior is normal. Judgment and thought content normal.       NEUROLOGICAL EXAMINATION:     MENTAL STATUS   Oriented to person, place, and time.     CRANIAL NERVES     CN III, IV, VI   Pupils are equal, round, and reactive to light.  Extraocular motions are normal.       Significant Labs: All pertinent lab results from the past 24 hours have been reviewed.    Significant Studies: I have reviewed all pertinent imaging results/findings within the past 24 hours.

## 2020-06-01 NOTE — ASSESSMENT & PLAN NOTE
Declan Burleson is a 55 y.o. male with PMHx of seizures who presented to hospital as EMU admit. Stroke code called after seizure inducing activities (light therapy and hyperventilation) due to generalized numbness/tingling and speech difficulty. Patient's exam nonfocal. NIH 2 for dysarthria (pt has Romansh accent) and generalized numbness/tingling. Patient noted to have stuttering speech and hypokinesis. Sensation equal bilaterally. Exam not correlating with cerebrovascular territory, therefore low suspicion for cerebrovascular origin for patient's symptoms. Discussed patient with EMU. Stroke code de-activated due to low concern for stroke and EEG study of greater importance than other neurologic imaging. Will defer CT at this time. Consider further neurologic workup if patient begins to experience focal neurologic deficits.

## 2020-06-01 NOTE — ASSESSMENT & PLAN NOTE
New onset hypertension  BP on admit > 200 systolic, given labetalol IV  Assessment: New Onset HTN  Plan:  -- avoid losartan as patient developed facial rash afterwards  -- start amlodipine 5 mg PO daily (hold for BP <100/60) and monitor response  -- metoprolol 12.5 PO mg BID started by primary team  -- low sodium diet  -- goal BP < 140/80 mmHg

## 2020-06-01 NOTE — SUBJECTIVE & OBJECTIVE
Interval History: NAEON. Denies any seizure activity. BP better controlled, no elevated spikes recorded. Rash improving. See full EEG report for details.     Current Facility-Administered Medications   Medication Dose Route Frequency Provider Last Rate Last Dose    acetaminophen tablet 650 mg  650 mg Oral Q4H PRN Kristen Jean MD   650 mg at 05/28/20 0116    amLODIPine tablet 5 mg  5 mg Oral Daily Melissa Alarcon MD   5 mg at 06/01/20 0835    aspirin EC tablet 81 mg  81 mg Oral Daily Kristen Jean MD   81 mg at 06/01/20 0835    hydrocortisone 1 % cream   Topical (Top) BID Leeann Rodriguez MD        ibuprofen tablet 800 mg  800 mg Oral Q6H PRN Kristen Jean MD   800 mg at 05/31/20 1423    lorazepam injection 2 mg  2 mg Intravenous Q5 Min PRN Kristen Jean MD        metoprolol tartrate (LOPRESSOR) split tablet 12.5 mg  12.5 mg Oral BID Kaleb Edgar MD   12.5 mg at 06/01/20 0835    miconazole 2 % cream   Topical (Top) BID Meilssa Alarcon MD        ondansetron disintegrating tablet 8 mg  8 mg Oral Q8H PRN Kristen Jean MD        sodium chloride 0.9% flush 10 mL  10 mL Intravenous PRN Kristen Jean MD         Continuous Infusions:    Review of Systems   Constitutional: Negative.    HENT: Negative.    Eyes: Negative.    Respiratory: Negative.    Cardiovascular: Negative.    Gastrointestinal: Negative.    Endocrine: Negative.    Genitourinary: Negative.    Musculoskeletal: Negative.    Skin: Positive for rash.   Allergic/Immunologic:        Drug allergy    Neurological: Positive for headaches.   Hematological: Negative.    Psychiatric/Behavioral: Negative.       Objective:     Vital Signs (Most Recent):  Temp: 97 °F (36.1 °C) (06/01/20 1257)  Pulse: 61 (06/01/20 1257)  Resp: 18 (06/01/20 1257)  BP: (!) 148/90 (06/01/20 1257)  SpO2: 96 % (06/01/20 1257) Vital Signs (24h Range):  Temp:  [97 °F (36.1 °C)-98.9 °F (37.2 °C)] 97 °F (36.1 °C)  Pulse:  [52-89] 61  Resp:  [12-18] 18  SpO2:  [96 %-98 %] 96 %  BP:  ()/(72-98) 148/90     Weight: 81.2 kg (179 lb)  Body mass index is 24.97 kg/m².    Physical Exam  General:  Well-developed, well-nourished, nad  HEENT:  NCAT, PERRLA, EOMI, oropharyngeal membranes non-erythematous/without exudate.  Rash overlying the face as well as neck, erythematous, flaking of the skin noted.   Neck:  Supple, normal ROM without nuchal rigidity  Resp:  No visible increased WOB  CVS:  No LE edema    Neurologic Exam:  Mental Status:  AAOx3.  Speech, thought content appropriate.  Cranial Nerves:  VFs intact on counting fingers in all quadrants bilaterally.  PERRLA, EOMI.  Facial movement, sensation intact and symmetric.  Palate raises symmetrically, tongue protrudes midline.  Trapezius, SCM strength 5/5 bilaterally.  Motor:  Normal bulk and tone.  RUE shoulder abduction, biceps/triceps, wrist flexion/extension,  strength 5/5.  RLE hip flexors, knee flexion/extension, plantarflexion/dorsiflexion 5/5.  LUE shoulder abduction, biceps/triceps, wrist flexion/extension,  strength 5/5.  LLE hip flexors, knee flexion/extension, plantarflexion/dorsiflexion 5/5.  Sensory:  Intact to light touch at all extremities and face without inattention.  Vibratory sensation intact at BUE/BLE digits.  Reflexes:  Biceps, brachioradialis, patellar, Achilles 2+ and symmetric.  No ankle clonus.  Downgoing toe bilaterally.  Coordination:  FNF, CAL, HTS intact with no dysmetria/ataxia/dysdiadochokinesia.  No resting tremor.  Gait: Deferred.          Significant Labs: All pertinent lab results from the past 24 hours have been reviewed.    Significant Studies: I have reviewed all pertinent imaging results/findings within the past 24 hours.

## 2020-06-01 NOTE — ASSESSMENT & PLAN NOTE
55 yr Male with L temp slow and sharp waves   No seizures recorded     Plan:  1, cont VEEG  2, Hold AEDs  3, Sz precautions  4, Activation procedures per protocol   5, IV Ativan 2 mg if GTC longer than 5 minutes     Discussed with pt and wife

## 2020-06-01 NOTE — PROGRESS NOTES
[facility]  Neurology-Epilepsy  Progress Note    Patient Name: Declan Burleson  MRN: [id number]  Admission Date: 5/26/2020  Hospital Length of Stay: 5 days  Code Status: Full Code   Attending Provider: Kaleb Edgar MD  Primary Care Physician: Matteo Rizzo MD   Principal Problem:Complex partial epilepsy with generalization and with intractable epilepsy    Subjective:     Hospital Course:   05/26-05/27 - NAEON. No electrographic or clinical seizures recorded.   05/27-05/28 - Patient w/ some visual hallucinations as well as intermittent numbness and tingling of various body parts/face throughout the night. No electrographic seizures or typical events noted throughout the night.   05/28-05/29 -  No electrographic seizures nor any typical clinical events recorded overnight.   05/29-05/30 -  No electrographic seizures or clinical events overnight  05/30-05/31 -   Pt reported numbness in LE and dizziness. Had elevated BP and bl glucose during the event. No epileptiform activity during event     Interval History: Pt developed HA and facial rash after taking losartan    Current Facility-Administered Medications   Medication Dose Route Frequency Provider Last Rate Last Dose    acetaminophen tablet 650 mg  650 mg Oral Q4H PRN Kristen Jean MD   650 mg at 05/28/20 0116    [START ON 6/1/2020] amLODIPine tablet 5 mg  5 mg Oral Daily Leeann Rodriguez MD        aspirin EC tablet 81 mg  81 mg Oral Daily Kristen Jean MD   81 mg at 05/31/20 1049    hydrocortisone 1 % cream   Topical (Top) BID Leeann Rodriguez MD        ibuprofen tablet 800 mg  800 mg Oral Q6H PRN Kristen Jean MD   800 mg at 05/31/20 1423    lorazepam injection 2 mg  2 mg Intravenous Q5 Min PRN Kristen Jean MD        metoprolol tartrate (LOPRESSOR) split tablet 12.5 mg  12.5 mg Oral BID Kaleb Edgar MD   12.5 mg at 05/31/20 1048    ondansetron disintegrating tablet 8 mg  8 mg Oral Q8H PRN Kristen Jean MD        sodium chloride 0.9% flush  10 mL  10 mL Intravenous PRN Kristen Jean MD         Continuous Infusions:    Review of Systems   Constitutional: Negative.    HENT: Negative.    Eyes: Negative.    Respiratory: Negative.    Cardiovascular: Negative.    Gastrointestinal: Negative.    Endocrine: Negative.    Genitourinary: Negative.    Musculoskeletal: Negative.    Skin: Positive for rash.   Allergic/Immunologic:        Drug allergy    Neurological: Positive for headaches.   Hematological: Negative.    Psychiatric/Behavioral: Negative.      Objective:     Vital Signs (Most Recent):  Temp: 98 °F (36.7 °C) (05/31/20 1647)  Pulse: 89 (05/31/20 1700)  Resp: 15 (05/31/20 1700)  BP: (!) 134/91 (05/31/20 1700)  SpO2: 97 % (05/31/20 1700) Vital Signs (24h Range):  Temp:  [97.7 °F (36.5 °C)-98.4 °F (36.9 °C)] 98 °F (36.7 °C)  Pulse:  [59-89] 89  Resp:  [9-18] 15  SpO2:  [93 %-98 %] 97 %  BP: (119-159)/() 134/91     Weight: 81.2 kg (179 lb)  Body mass index is 24.97 kg/m².    Physical Exam   Constitutional: He is oriented to person, place, and time. He appears well-developed and well-nourished.   HENT:   Head: Normocephalic and atraumatic.   Right Ear: External ear normal.   Mouth/Throat: Oropharynx is clear and moist.   Eyes: Pupils are equal, round, and reactive to light. EOM are normal.   Neck: Normal range of motion. Neck supple.   Cardiovascular: Normal rate and regular rhythm.   Pulmonary/Chest: Effort normal.   Musculoskeletal: Normal range of motion.   Neurological: He is alert and oriented to person, place, and time. He displays normal reflexes. No cranial nerve deficit or sensory deficit. He exhibits normal muscle tone. Coordination normal.   Skin: Rash noted.   Psychiatric: He has a normal mood and affect. His behavior is normal. Judgment and thought content normal.       NEUROLOGICAL EXAMINATION:     MENTAL STATUS   Oriented to person, place, and time.     CRANIAL NERVES     CN III, IV, VI   Pupils are equal, round, and reactive to  light.  Extraocular motions are normal.       Significant Labs: All pertinent lab results from the past 24 hours have been reviewed.    Significant Studies: I have reviewed all pertinent imaging results/findings within the past 24 hours.    Assessment and Plan:     * Complex partial epilepsy with generalization and with intractable epilepsy  55 yr Male with L temp slow and sharp waves   No seizures recorded     Plan:  1, cont VEEG  2, Hold AEDs  3, Sz precautions  4, Activation procedures per protocol   5, IV Ativan 2 mg if GTC longer than 5 minutes     Discussed with pt and wife     Elevated random blood glucose level  Will follow closely   A1C - nml    Hypertension  Stable now   Appreciate input from         VTE Risk Mitigation (From admission, onward)         Ordered     IP VTE LOW RISK PATIENT  Once      05/26/20 4301                Kaleb Edgar MD  Neurology-Epilepsy  [facility]

## 2020-06-01 NOTE — PLAN OF CARE
Plan of care reviewed with patient and wife. Verbalized understanding. AAO and respirations equal and unlabored. Pt complains of intermittent numbness and tingling to lower extremities since admission. Blood pressure 140/90 beginning of shift metoprolol administered. Pt complains of a red and scaly face rash hydrocortisone cream applied. Pt c/o headache ibuprofen 800 mg administered. No acute events this shift. See flow chart for full assessment. Fall/ safety/ seizure precautions maintained. Bed in lowest position, bed alarm on, and personal belongings and call light within reach. Wife at the bedside. Pt in no acute distress. Will continue to monitor.

## 2020-06-01 NOTE — PLAN OF CARE
Plan is to discharge home with family.       06/01/20 1503   Discharge Reassessment   Assessment Type Discharge Planning Reassessment   Do you have any problems affording any of your prescribed medications? TBD   Discharge Plan A Home with family   Discharge Plan B Home   DME Needed Upon Discharge  none   Anticipated Discharge Disposition Home

## 2020-06-01 NOTE — ASSESSMENT & PLAN NOTE
Patient reports he has had a rash/dry skin on his forehead prior to hospitalization, though it changed/worsened on 5/31 after taking losartan. Today, he reports improvement with some persistent pruritus on his forehead. Given improvement in new rash and concern for seborrheic dermatitis, especially with his history of a rash prior to hospitalization, will recommend changing management below:  -- recommend discontinuing PRN hydrocortisone cream  -- would start ketoconazole cream for possible seborrheic dermatitis

## 2020-06-01 NOTE — HOSPITAL COURSE
Metoprolol 12.5 mg PO BID started by primary team. Patient developed facial rash after starting losartan 25 mg PO on 5/31. Started amlodipine 5 mg PO daily on 6/1. Metoprolol was discontinued 06/02. BP has been ranging 150s-130s.

## 2020-06-01 NOTE — ASSESSMENT & PLAN NOTE
55 yr male with L temp slowing and occasional sharp waves. No seizures recorded thus far.     - Cont VEEG monitoring   - Continue holding all home AEDs  - Sz precautions  - Activation procedures per protocol, sleep deprivation on 06/01  - IV Ativan 2 mg if GTC longer than 5 minutes; call epileptologist on call prior to the drug administration

## 2020-06-01 NOTE — NURSING
Responded to stroke code at 1740, stroke provider, house supervisor, and respiratory therapist at bedside. Pt on continuous EEG.  Neurology team contacted, CT to be discontinued to keep EEG cap in place. EEG notified to fix EEG leads.

## 2020-06-01 NOTE — PROGRESS NOTES
[facility]  Neurology-Epilepsy  Progress Note    Patient Name: Declan Burleson  MRN: [id number]  Admission Date: 5/26/2020  Hospital Length of Stay: 6 days  Code Status: Full Code   Attending Provider: Kaleb Edgar MD  Primary Care Physician: Matteo Rizzo MD   Principal Problem:Complex partial epilepsy with generalization and with intractable epilepsy    Subjective:     Hospital Course:   05/26-05/27 - NAEON. No electrographic or clinical seizures recorded.   05/27-05/28 - Patient w/ some visual hallucinations as well as intermittent numbness and tingling of various body parts/face throughout the night. No electrographic seizures or typical events noted throughout the night.   05/28-05/29 -  No electrographic seizures nor any typical clinical events recorded overnight.   05/29-05/30 -  No electrographic seizures or clinical events overnight  05/30-05/31 -   Pt reported numbness in LE and dizziness. Had elevated BP and bl glucose during the event. No epileptiform activity during event   05/31-06/01 - NAEON. Denies any seizure activity. BP better controlled, no elevated spikes recorded. Rash improving. See full EEG report for details.     Interval History: NAEON. Denies any seizure activity. BP better controlled, no elevated spikes recorded. Rash improving. See full EEG report for details.                Current Facility-Administered Medications   Medication Dose Route Frequency Provider Last Rate Last Dose    acetaminophen tablet 650 mg  650 mg Oral Q4H PRN Kristen Jean MD   650 mg at 05/28/20 0116    amLODIPine tablet 5 mg  5 mg Oral Daily Melissa Alarcon MD   5 mg at 06/01/20 0835    aspirin EC tablet 81 mg  81 mg Oral Daily Kristen Jean MD   81 mg at 06/01/20 0835    hydrocortisone 1 % cream   Topical (Top) BID Leeann Rodriguez MD        ibuprofen tablet 800 mg  800 mg Oral Q6H PRN Kristen Jean MD   800 mg at 05/31/20 1423    lorazepam injection 2 mg  2 mg Intravenous Q5 Min PRN Kristen  MD Miranda        metoprolol tartrate (LOPRESSOR) split tablet 12.5 mg  12.5 mg Oral BID Kaleb Edgar MD   12.5 mg at 06/01/20 0835    miconazole 2 % cream   Topical (Top) BID Melissa Alarcon MD        ondansetron disintegrating tablet 8 mg  8 mg Oral Q8H PRN Kristen Jean MD        sodium chloride 0.9% flush 10 mL  10 mL Intravenous PRN Kristen Jean MD          Continuous Infusions:     Review of Systems   Constitutional: Negative.    HENT: Negative.    Eyes: Negative.    Respiratory: Negative.    Cardiovascular: Negative.    Gastrointestinal: Negative.    Endocrine: Negative.    Genitourinary: Negative.    Musculoskeletal: Negative.    Skin: Positive for rash.   Allergic/Immunologic:        Drug allergy    Neurological: Positive for headaches.   Hematological: Negative.    Psychiatric/Behavioral: Negative.       Objective:      Vital Signs (Most Recent):  Temp: 97 °F (36.1 °C) (06/01/20 1257)  Pulse: 61 (06/01/20 1257)  Resp: 18 (06/01/20 1257)  BP: (!) 148/90 (06/01/20 1257)  SpO2: 96 % (06/01/20 1257) Vital Signs (24h Range):  Temp:  [97 °F (36.1 °C)-98.9 °F (37.2 °C)] 97 °F (36.1 °C)  Pulse:  [52-89] 61  Resp:  [12-18] 18  SpO2:  [96 %-98 %] 96 %  BP: ()/(72-98) 148/90      Weight: 81.2 kg (179 lb)  Body mass index is 24.97 kg/m².     Physical Exam  General:  Well-developed, well-nourished, nad  HEENT:  NCAT, PERRLA, EOMI, oropharyngeal membranes non-erythematous/without exudate.  Rash overlying the face as well as neck, erythematous, flaking of the skin noted.   Neck:  Supple, normal ROM without nuchal rigidity  Resp:  No visible increased WOB  CVS:  No LE edema     Neurologic Exam:  Mental Status:  AAOx3.  Speech, thought content appropriate.  Cranial Nerves:  VFs intact on counting fingers in all quadrants bilaterally.  PERRLA, EOMI.  Facial movement, sensation intact and symmetric.  Palate raises symmetrically, tongue protrudes midline.  Trapezius, SCM strength 5/5 bilaterally.  Motor:  Normal  bulk and tone.  RUE shoulder abduction, biceps/triceps, wrist flexion/extension,  strength 5/5.  RLE hip flexors, knee flexion/extension, plantarflexion/dorsiflexion 5/5.  LUE shoulder abduction, biceps/triceps, wrist flexion/extension,  strength 5/5.  LLE hip flexors, knee flexion/extension, plantarflexion/dorsiflexion 5/5.  Sensory:  Intact to light touch at all extremities and face without inattention.  Vibratory sensation intact at BUE/BLE digits.  Reflexes:  Biceps, brachioradialis, patellar, Achilles 2+ and symmetric.  No ankle clonus.  Downgoing toe bilaterally.  Coordination:  FNF, CAL, HTS intact with no dysmetria/ataxia/dysdiadochokinesia.  No resting tremor.  Gait: Deferred.            Significant Labs: All pertinent lab results from the past 24 hours have been reviewed.     Significant Studies: I have reviewed all pertinent imaging results/findings within the past 24 hour    Assessment and Plan:     * Complex partial epilepsy with generalization and with intractable epilepsy  55 yr male with L temp slowing and occasional sharp waves. No seizures recorded thus far.     - Cont VEEG monitoring   - Continue holding all home AEDs  - Sz precautions  - Activation procedures per protocol, sleep deprivation on 06/01  - IV Ativan 2 mg if GTC longer than 5 minutes; call epileptologist on call prior to the drug administration    Facial rash  - HM following, concern for Seborrheic Dermatitis  - Will start Ketoconazole at this time  - Appreciate recommendations     Elevated random blood glucose level  - Will follow closely   - A1C - nml  - No need of BG checks     Hypertension  - Stable now   - Will stop  Metoprolol at this time  - Will continue Amlodipine 5mg QD for BP control, room to increase the medication if BP remains elevated    Neurological deficit, transient   - Intermittent numbness, tingling, pressure reported over the scalp as well as in hands and b/l LE  - Intermittent visual hallucinations  -  Will obtain B1,B12, TSH and RPR at this time       VTE Risk Mitigation (From admission, onward)         Ordered     IP VTE LOW RISK PATIENT  Once      05/26/20 4247                Kristen Jean MD  Neurology-Epilepsy  [facility]

## 2020-06-01 NOTE — SUBJECTIVE & OBJECTIVE
Interval History: Mr. Burleson feels well today. He denies headaches, lightheadedness, vision changes, nausea, vomiting. BP range 124-145 systolic yesterday, dropped to 98/72 then 106/77 while sleeping this morning. Received amlodipine 5 and metoprolol 12.5. /90 this afternoon. Facial rash improving.    Review of Systems   Constitutional: Negative for appetite change, chills, fatigue and unexpected weight change.   HENT: Negative for sinus pressure, sinus pain, trouble swallowing and voice change.    Eyes: Negative for photophobia and visual disturbance.   Respiratory: Negative for shortness of breath and wheezing.    Cardiovascular: Negative for chest pain, palpitations and leg swelling.   Gastrointestinal: Negative for abdominal distention, abdominal pain, blood in stool, constipation, diarrhea and vomiting.   Genitourinary: Negative for difficulty urinating and dysuria.   Musculoskeletal: Negative for arthralgias, back pain and gait problem.   Skin: Positive for rash. Negative for color change and pallor.   Neurological: Negative for dizziness, light-headedness and headaches.   Hematological: Negative for adenopathy. Does not bruise/bleed easily.   Psychiatric/Behavioral: Negative for agitation and behavioral problems.     Objective:     Vital Signs (Most Recent):  Temp: 98.9 °F (37.2 °C) (06/01/20 0458)  Pulse: 62 (06/01/20 0724)  Resp: 17 (06/01/20 0724)  BP: 106/77 (06/01/20 0724)  SpO2: 96 % (06/01/20 0724) Vital Signs (24h Range):  Temp:  [98 °F (36.7 °C)-98.9 °F (37.2 °C)] 98.9 °F (37.2 °C)  Pulse:  [52-89] 62  Resp:  [12-18] 17  SpO2:  [96 %-98 %] 96 %  BP: ()/(72-98) 106/77     Weight: 81.2 kg (179 lb)  Body mass index is 24.97 kg/m².    Intake/Output Summary (Last 24 hours) at 6/1/2020 0929  Last data filed at 6/1/2020 0800  Gross per 24 hour   Intake 1440 ml   Output 1100 ml   Net 340 ml      Physical Exam   Constitutional: No distress.   Eyes: Conjunctivae are normal. No scleral icterus.    Neck: Normal range of motion. Neck supple. No JVD present. No tracheal deviation present. No thyromegaly present.   Cardiovascular: Normal rate, regular rhythm, normal heart sounds and intact distal pulses. Exam reveals no gallop and no friction rub.   No murmur heard.  Pulmonary/Chest: Effort normal and breath sounds normal. No stridor. No respiratory distress. He has no wheezes. He has no rales.   Abdominal: Soft. Bowel sounds are normal. He exhibits no distension and no mass. There is no tenderness. There is no guarding.   Musculoskeletal: He exhibits no edema, tenderness or deformity.   Lymphadenopathy:     He has no cervical adenopathy.   Neurological: He is alert.   oriented   Skin: Skin is warm. Rash noted. He is not diaphoretic. No erythema. No pallor.     Significant Labs: All pertinent labs within the past 24 hours have been reviewed.    Significant Imaging: I have reviewed and interpreted all pertinent imaging results/findings within the past 24 hours.
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
## 2020-06-01 NOTE — PROCEDURES
EPILEPSY MONITORING UNIT  EEG/VIDEO TELEMETRY REPORT    DATE OF SERVICE: 5/31/2020  EEG NUMBER: [id number] -[id number]  REQUESTED BY:   LOCATION OF SERVICE:     METHODOLOGY      Electroencephalographic (EEG) is recorded with electrodes placed according to the International 10-20 placement system.  Thirty Two (32) channels of digital signal including the T1 and T2 electrodes are simultaneously recorded from the scalp and also including EKG, EMG  and/or eye movement monitors.  Recording band pass was 0.1 to 512 hz.  Digital video recording of the patient is simultaneously recorded with the EEG.  The patient is instructed report clinical symptoms which may occur during the recording session.  EEG and video recording is stored and archived in digital format. Activation procedures which include photic stimulation, hyperventilation and instructing patients to perform simple task are done in selected patients.       The EEG is displayed on a monitor screen and can be reformatted into different montages for evaluation.  The entire recoding is submitted for computer assisted analysis to detect spike and electrographic seizure activity.  The entire recording is visually reviewed and the times identified by computer analysis as being spikes or seizures are reviewed again.  Compresses spectral analysis (CSA) is also performed on the activity recorded from each individual channel.  This is displayed as a power display of frequencies from 0 to 30 Hz over time.   The CSA analysis is done and displayed continuously.  This is reviewed for asymmetries in power between homologous areas of the scalp and for presence of changes in power which can be seen when seizures occur.  Sections of suspected abnormalities on the CSA is then compared with the original EEG recording.      [employer] software was also utilized in the review of this study.  This software suite analyzes the EEG recording in multiple domains.  Coherence and rhythmicity is computed  to identify EEG sections which may contain organized seizures.  Each channel undergoes analysis to detect presence of spike and sharp waves which have special and morphological characteristic of epileptic activity.  The routine EEG recording is converted from spacial into frequency domain.  This is then displayed comparing homologous areas to identify areas of significant asymmetry.  Algorithm to identify non-cortically generated artifact is used to separate eye movement, EMG and other artifact from the EEG.      Recording Times  Start on 5/31/2020 at 07:00:15 stop on 6/1/2020 at 07:00:01    A total of 23:55:58 hours of EEG/Video telemetry was recorded.    Events/Seizures recorded  None    ELECTROENCEPHALOGRAM  INTERICTAL:  Background activity:   The background over the right hemisphere consists of alpha and anterior dominant beta with 9-10Hz posterior dominant rhythm.    The left hemisphere demonstrates a similar alpha beta background with frequent polymorphic delta and theta activity in the frontal and temporal regions.    There are occasional runs of generalized polymorphic and quasirhythmic delta and theta activity.     Sleep:   Normal sleep transients including sleep spindles, K complexes, vertex waves and POSTS were seen.    Abnormal activity:   There is frequent rhythmic delta activity in the left frontotemporal region best seen at F7>Fp1, T3, F3 in the waking state.  There are occasional left anterior temporal sharp waves in sleep phase reversing at F7, T1.    FINAL SUMMARY  This is an abnormal one day video EEG due to the finding of left fronto-temporal cerebral dysfunction as well a seizure focus in that area.  There is also evidence of a more generalized cerebral dysfunction.  None of the patient's typical seizures were recorded.      Douglas Feliciano M.D.

## 2020-06-02 PROBLEM — F41.9 ANXIETY: Status: ACTIVE | Noted: 2020-06-02

## 2020-06-02 LAB — RPR SER QL: NORMAL

## 2020-06-02 PROCEDURE — 99233 SBSQ HOSP IP/OBS HIGH 50: CPT | Mod: ,,, | Performed by: PSYCHIATRY & NEUROLOGY

## 2020-06-02 PROCEDURE — 94761 N-INVAS EAR/PLS OXIMETRY MLT: CPT

## 2020-06-02 PROCEDURE — 63600175 PHARM REV CODE 636 W HCPCS: Performed by: STUDENT IN AN ORGANIZED HEALTH CARE EDUCATION/TRAINING PROGRAM

## 2020-06-02 PROCEDURE — 11000001 HC ACUTE MED/SURG PRIVATE ROOM

## 2020-06-02 PROCEDURE — 99232 PR SUBSEQUENT HOSPITAL CARE,LEVL II: ICD-10-PCS | Mod: ,,, | Performed by: INTERNAL MEDICINE

## 2020-06-02 PROCEDURE — 95714 VEEG EA 12-26 HR UNMNTR: CPT

## 2020-06-02 PROCEDURE — 95720 EEG PHY/QHP EA INCR W/VEEG: CPT | Mod: ,,, | Performed by: PSYCHIATRY & NEUROLOGY

## 2020-06-02 PROCEDURE — 25000003 PHARM REV CODE 250: Performed by: STUDENT IN AN ORGANIZED HEALTH CARE EDUCATION/TRAINING PROGRAM

## 2020-06-02 PROCEDURE — 99233 PR SUBSEQUENT HOSPITAL CARE,LEVL III: ICD-10-PCS | Mod: ,,, | Performed by: PSYCHIATRY & NEUROLOGY

## 2020-06-02 PROCEDURE — 99232 SBSQ HOSP IP/OBS MODERATE 35: CPT | Mod: ,,, | Performed by: INTERNAL MEDICINE

## 2020-06-02 PROCEDURE — 27000221 HC OXYGEN, UP TO 24 HOURS

## 2020-06-02 PROCEDURE — 95720 PR EEG, W/VIDEO, CONT RECORD, I&R, >12<26 HRS: ICD-10-PCS | Mod: ,,, | Performed by: PSYCHIATRY & NEUROLOGY

## 2020-06-02 RX ORDER — ESCITALOPRAM OXALATE 10 MG/1
10 TABLET ORAL DAILY
Status: DISCONTINUED | OUTPATIENT
Start: 2020-06-02 | End: 2020-06-06 | Stop reason: HOSPADM

## 2020-06-02 RX ADMIN — IBUPROFEN 800 MG: 400 TABLET, FILM COATED ORAL at 07:06

## 2020-06-02 RX ADMIN — LORAZEPAM 2 MG: 2 INJECTION INTRAMUSCULAR; INTRAVENOUS at 05:06

## 2020-06-02 RX ADMIN — MICONAZOLE NITRATE: 20 CREAM TOPICAL at 09:06

## 2020-06-02 RX ADMIN — ASPIRIN 81 MG: 81 TABLET, COATED ORAL at 10:06

## 2020-06-02 RX ADMIN — IBUPROFEN 800 MG: 400 TABLET, FILM COATED ORAL at 10:06

## 2020-06-02 RX ADMIN — ESCITALOPRAM OXALATE 10 MG: 10 TABLET ORAL at 06:06

## 2020-06-02 RX ADMIN — MICONAZOLE NITRATE: 20 CREAM TOPICAL at 10:06

## 2020-06-02 RX ADMIN — AMLODIPINE BESYLATE 5 MG: 5 TABLET ORAL at 10:06

## 2020-06-02 NOTE — ASSESSMENT & PLAN NOTE
New onset hypertension  BP on admit > 200 systolic, given labetalol IV  Assessment: New Onset HTN  Plan:  -- avoid losartan as patient developed facial rash afterwards  -- Continue amlodipine 5 mg PO daily (hold for BP <100/60) increase to 10 if needed  -- Agree with discontinuing metoprolol as does not have great BP lowering effect.  -- low sodium diet  -- goal BP < 140/80 mmHg

## 2020-06-02 NOTE — PROGRESS NOTES
Responded to call from wife at bedside during shift change between [id number] that patient was seizing. RN walked into patient's room and patient was laying down, awake, alert, and oriented x4. Vitals obtained per flowsheet. Patrient satted 86% per ViSI and was confirmed with vitals. Oxygen increased to 5L NC. Patient closed eyes and snored. Upon RN opening eyes to check pupils, eyes were roving side to side and patient appeared to be having seizure-like activity. EEG button pressed at this time to capture. Patient appeared to come out of episode with calling name in clear-loud voice and touching the patient. Patient answered orientation questions and repeated historical words spoken from RN. Patient continued to have episodes lasting approximately 1-2 minutes and appeared to come out of them quickly. Dr. Jean called and notified of events of this morning. No new orders at this time.    Patient's spouse has personal SPO2 monitor at bedside and it was placed on patient's finger to verify accuracy of ViSi and Dynamap. Recommended to MD to order continuous pulse ox for patient and order was placed for patient safety monitoring of vitals. Simple face mask, venti, and non-rebreather equipment also placed at bedside at this time in case of desat events throughout shift. Patient educated provided to patient and spouse as well as updates on plan of care.

## 2020-06-02 NOTE — PROCEDURES
EPILEPSY MONITORING UNIT  EEG/VIDEO TELEMETRY REPORT    DATE OF SERVICE: 6/1/2020  EEG NUMBER: EMU -[id number]  REQUESTED BY:   LOCATION OF SERVICE:     METHODOLOGY      Electroencephalographic (EEG) is recorded with electrodes placed according to the International 10-20 placement system.  Thirty Two (32) channels of digital signal including the T1 and T2 electrodes are simultaneously recorded from the scalp and also including EKG, EMG  and/or eye movement monitors.  Recording band pass was 0.1 to 512 hz.  Digital video recording of the patient is simultaneously recorded with the EEG.  The patient is instructed report clinical symptoms which may occur during the recording session.  EEG and video recording is stored and archived in digital format. Activation procedures which include photic stimulation, hyperventilation and instructing patients to perform simple task are done in selected patients.       The EEG is displayed on a monitor screen and can be reformatted into different montages for evaluation.  The entire recoding is submitted for computer assisted analysis to detect spike and electrographic seizure activity.  The entire recording is visually reviewed and the times identified by computer analysis as being spikes or seizures are reviewed again.  Compresses spectral analysis (CSA) is also performed on the activity recorded from each individual channel.  This is displayed as a power display of frequencies from 0 to 30 Hz over time.   The CSA analysis is done and displayed continuously.  This is reviewed for asymmetries in power between homologous areas of the scalp and for presence of changes in power which can be seen when seizures occur.  Sections of suspected abnormalities on the CSA is then compared with the original EEG recording.      [employer] software was also utilized in the review of this study.  This software suite analyzes the EEG recording in multiple domains.  Coherence and rhythmicity is computed to  identify EEG sections which may contain organized seizures.  Each channel undergoes analysis to detect presence of spike and sharp waves which have special and morphological characteristic of epileptic activity.  The routine EEG recording is converted from spacial into frequency domain.  This is then displayed comparing homologous areas to identify areas of significant asymmetry.  Algorithm to identify non-cortically generated artifact is used to separate eye movement, EMG and other artifact from the EEG.      Recording Times  Start on 6/1/2020 at 07:00:15 stop on 6/2/2020 at 07:00:01    A total of 23:38:55 hours of EEG/Video telemetry was recorded.    Events/Seizures recorded  Seizure 1 on 6/2/2020 from 04:57:51 to 04:59:14    ELECTROENCEPHALOGRAM  INTERICTAL:  Background activity:   The background over the right hemisphere consists of alpha and anterior dominant beta with 9-10Hz posterior dominant rhythm.    The left hemisphere demonstrates a similar alpha beta background with frequent polymorphic delta and theta activity in the frontal and temporal regions.    There are occasional runs of generalized polymorphic and quasirhythmic delta and theta activity.     Sleep:   Normal sleep transients including sleep spindles, K complexes, vertex waves and POSTS were seen.    Abnormal activity:   There is frequent rhythmic delta activity in the left frontotemporal region best seen at F7>Fp1, T3, F3 in the waking state.  There are occasional left anterior temporal sharp waves in sleep phase reversing at F7, T1.    Seizures:    Seizure 1  Electrographic:  Seizure occurs from sleep with brief appearance of disorganized alpha and theta activity for 2-3 seconds before rapid transition to confluent muscle artifact.  There is generalized EEG suppression for over a minute following offset.  Clinical:  Patient is lying on his right side when he develops posturing of the right arm transitioning into figure of four posturing with  clonic movements while remaining on his right side.    FINAL SUMMARY  This is an abnormal one day video EEG due to the finding of left fronto-temporal cerebral dysfunction as well a seizure focus in that area.  There is also evidence of a more generalized cerebral dysfunction.  One typical seizure was recorded which was poorly localizing electrographically although with semiology clearly lateralizing to the left and suggestive of localization to the temporal lobe.    Douglas Feliciano M.D.

## 2020-06-02 NOTE — PROGRESS NOTES
Wife educated at this time to not feed patient breakfast due to increased periods of lethargy and seizure-like episodes. Patient at high risk for aspiration at this time. Patient's spouse verbalized understanding at this time.

## 2020-06-02 NOTE — ASSESSMENT & PLAN NOTE
55 yr male with L temp slowing and occasional sharp waves. 1 GTC recorded thus far on 06/02, will continue to monitor, hoping to capture at least 2 more typical events.     - Cont VEEG monitoring   - Continue holding all home AEDs  - Sz precautions  - Activation procedures per protocol  - IV Ativan 2 mg if GTC longer than 5 minutes; call epileptologist on call prior to the drug administration

## 2020-06-02 NOTE — ASSESSMENT & PLAN NOTE
- Discussed could be the reason for intermittent transient symptoms w/o EEG correlate as well as relation to limbic pathway dysregulation w/ Epilepsy  - Also shown to have benefit in reducing the risk of SUDEP  - Will start Lexapro 10 mg QD   - Patient and wife are both agreeable at this time

## 2020-06-02 NOTE — NURSING
"After giving report off to day shift nurse, Franny, we entered the room to find the patient seizing. Patient's spouse said pt was disoriented to place. O2 increased to 5L NC. Patient was in and out of consciousness, with roving eye movements. Patient was able to follow commands, open eyes to voice, and remember 2/3 words when asked to remember "fork, knife, spoon." Patient had several events while we were in room. Patient had another seizure after we left room. Franny called epileptologist. Patient handed off to GWEN Blanton.   "

## 2020-06-02 NOTE — PROGRESS NOTES
[facility]  Neurology-Epilepsy  Progress Note    Patient Name: Declan Burleson  MRN: [id number]  Admission Date: 5/26/2020  Hospital Length of Stay: 7 days  Code Status: Full Code   Attending Provider: Douglas Feliciano MD  Primary Care Physician: Matteo Rizzo MD   Principal Problem:Complex partial epilepsy with generalization and with intractable epilepsy    Subjective:     Hospital Course:   05/26-05/27 - NAEON. No electrographic or clinical seizures recorded.   05/27-05/28 - Patient w/ some visual hallucinations as well as intermittent numbness and tingling of various body parts/face throughout the night. No electrographic seizures or typical events noted throughout the night.   05/28-05/29 -  No electrographic seizures nor any typical clinical events recorded overnight.   05/29-05/30 -  No electrographic seizures or clinical events overnight  05/30-05/31 -   Pt reported numbness in LE and dizziness. Had elevated BP and bl glucose during the event. No epileptiform activity during event   05/31-06/01 - NAEON. Denies any seizure activity. BP better controlled, no elevated spikes recorded. Rash improving. See full EEG report for details.   06/01-06/02 - 1 typical GTC event recorded overnight. Episodes of AMS intermittently on 06/01 PM and 06/02 in the AM w/o electrographic correlate. Continuing w/ EEG to capture at least 2 more events to complete the initial pre-surgical w/u.      Interval History: NAEON. Denies any seizure activity. BP better controlled, no elevated spikes recorded. Rash improving. See full EEG report for details.     Current Facility-Administered Medications   Medication Dose Route Frequency Provider Last Rate Last Dose    acetaminophen tablet 650 mg  650 mg Oral Q4H PRN Kristen Jean MD   650 mg at 05/28/20 0116    amLODIPine tablet 5 mg  5 mg Oral Daily Melissa Alarcon MD   5 mg at 06/02/20 1039    aspirin EC tablet 81 mg  81 mg Oral Daily Kristen Jean MD   81 mg at  06/02/20 1039    ibuprofen tablet 800 mg  800 mg Oral Q6H PRN Kristen Jean MD   800 mg at 06/02/20 1039    lorazepam injection 2 mg  2 mg Intravenous Q5 Min PRN Kristen Jean MD   2 mg at 06/02/20 0505    miconazole 2 % cream   Topical (Top) BID Melissa Alarcon MD        ondansetron disintegrating tablet 8 mg  8 mg Oral Q8H PRN Kristen Jean MD        sodium chloride 0.9% flush 10 mL  10 mL Intravenous PRN Kristen Jean MD         Continuous Infusions:    Review of Systems     Constitutional: Negative.    HENT: Negative.    Eyes: Negative.    Respiratory: Negative.    Cardiovascular: Negative.    Gastrointestinal: Negative.    Endocrine: Negative.    Genitourinary: Negative.    Musculoskeletal: Negative.    Skin: Positive for rash.   Allergic/Immunologic:        Drug allergy    Neurological: Positive for headaches.   Hematological: Negative.    Psychiatric/Behavioral: Negative.       Objective:     Vital Signs (Most Recent):  Temp: 98.5 °F (36.9 °C) (06/02/20 1143)  Pulse: 88 (06/02/20 1244)  Resp: 15 (06/02/20 1244)  BP: 100/80 (06/02/20 1244)  SpO2: (!) 93 % (06/02/20 1244) Vital Signs (24h Range):  Temp:  [96.4 °F (35.8 °C)-98.8 °F (37.1 °C)] 98.5 °F (36.9 °C)  Pulse:  [] 88  Resp:  [11-24] 15  SpO2:  [93 %-99 %] 93 %  BP: (100-174)/(57-99) 100/80     Weight: 81.2 kg (179 lb)  Body mass index is 24.97 kg/m².    Physical Exam  General:  Well-developed, well-nourished, nad  HEENT:  NCAT, PERRLA, EOMI, oropharyngeal membranes non-erythematous/without exudate.  Rash overlying the face as well as neck, erythematous, but improving.  L sided frontal tongue laceration noted.   Neck:  Supple, normal ROM without nuchal rigidity  Resp:  No visible increased WOB  CVS:  No LE edema    Neurologic Exam:  Mental Status:  AAOx3.  Speech, thought content appropriate.  Cranial Nerves:  VFs intact on counting fingers in all quadrants bilaterally.  PERRLA, EOMI.  Facial movement, sensation intact and symmetric.  Palate  raises symmetrically, tongue protrudes midline.  Trapezius, SCM strength 5/5 bilaterally.  Motor:  Normal bulk and tone.  RUE shoulder abduction, biceps/triceps, wrist flexion/extension,  strength 5/5.  RLE hip flexors, knee flexion/extension, plantarflexion/dorsiflexion 5/5.  LUE shoulder abduction, biceps/triceps, wrist flexion/extension,  strength 5/5.  LLE hip flexors, knee flexion/extension, plantarflexion/dorsiflexion 5/5.  Sensory:  Intact to light touch at all extremities and face without inattention.  Vibratory sensation intact at BUE/BLE digits.  Reflexes:  Biceps, brachioradialis, patellar, Achilles 2+ and symmetric.  No ankle clonus.  Downgoing toe bilaterally.  Coordination:  FNF, CAL, HTS intact with no dysmetria/ataxia/dysdiadochokinesia.  No resting tremor.  Gait: Deferred.        EEG 06/01-06/02 - Abnormal EEG   1 event recorded on 6/2/2020 from 04:57:51 to 04:59:14  Seizure occurs from sleep with brief appearance of disorganized alpha and theta activity for 2-3 seconds before rapid transition to confluent muscle artifact. There is generalized EEG suppression for over a minute following offset. Patient is lying on his right side when he develops posturing of the right arm transitioning into figure of four posturing with clonic movements while remaining on his right side.  PDR 9-10Hz noted, occasional runs of generalized polymorphic and quasirhythmic delta and theta activity.       Significant Labs: All pertinent lab results from the past 24 hours have been reviewed.    Significant Studies: I have reviewed all pertinent imaging results/findings within the past 24 hours.    Assessment and Plan:     * Complex partial epilepsy with generalization and with intractable epilepsy  55 yr male with L temp slowing and occasional sharp waves. 1 GTC recorded thus far on 06/02, will continue to monitor, hoping to capture at least 2 more typical events.     - Cont VEEG monitoring   - Continue holding all  home AEDs  - Sz precautions  - Activation procedures per protocol  - IV Ativan 2 mg if GTC longer than 5 minutes; call epileptologist on call prior to the drug administration     Anxiety  - Discussed could be the reason for intermittent transient symptoms w/o EEG correlate as well as relation to limbic pathway dysregulation w/ Epilepsy  - Also shown to have benefit in reducing the risk of SUDEP  - Will start Lexapro 10 mg QD   - Patient and wife are both agreeable at this time     Neurological deficit, transient  - Intermittent numbness, tingling, pressure reported over the scalp as well as in hands and b/l LE  - Intermittent visual hallucinations  - B1 - pending B12 - 405, TSH - WNL and RPR - non-reactive     Facial rash  - HM following, concern for Seborrheic Dermatitis  - Will start Ketoconazole at this time  - Appreciate recommendations from H/M    Elevated random blood glucose level  - Will follow closely   - A1C - nml  - No need of BG checks    Hypertension  - Stable now   - Will continue Amlodipine 5mg QD for BP control, room to increase the medication if BP remains elevated      Migraine without status migrainosus, not intractable  - PRN medication available         VTE Risk Mitigation (From admission, onward)         Ordered     IP VTE LOW RISK PATIENT  Once      05/26/20 1537                Kristen Jean MD  Neurology-Epilepsy  [facility]

## 2020-06-02 NOTE — SUBJECTIVE & OBJECTIVE
Interval History: NAEON. Denies any seizure activity. BP better controlled, no elevated spikes recorded. Rash improving. See full EEG report for details.     Current Facility-Administered Medications   Medication Dose Route Frequency Provider Last Rate Last Dose    acetaminophen tablet 650 mg  650 mg Oral Q4H PRN Kristen Jean MD   650 mg at 05/28/20 0116    amLODIPine tablet 5 mg  5 mg Oral Daily Melissa Alarcon MD   5 mg at 06/02/20 1039    aspirin EC tablet 81 mg  81 mg Oral Daily Kristen Jean MD   81 mg at 06/02/20 1039    ibuprofen tablet 800 mg  800 mg Oral Q6H PRN Kristen Jean MD   800 mg at 06/02/20 1039    lorazepam injection 2 mg  2 mg Intravenous Q5 Min PRN Kristen Jean MD   2 mg at 06/02/20 0505    miconazole 2 % cream   Topical (Top) BID Melissa Alarcon MD        ondansetron disintegrating tablet 8 mg  8 mg Oral Q8H PRN Kristen Jean MD        sodium chloride 0.9% flush 10 mL  10 mL Intravenous PRN Kristen Jean MD         Continuous Infusions:    Review of Systems     Constitutional: Negative.    HENT: Negative.    Eyes: Negative.    Respiratory: Negative.    Cardiovascular: Negative.    Gastrointestinal: Negative.    Endocrine: Negative.    Genitourinary: Negative.    Musculoskeletal: Negative.    Skin: Positive for rash.   Allergic/Immunologic:        Drug allergy    Neurological: Positive for headaches.   Hematological: Negative.    Psychiatric/Behavioral: Negative.       Objective:     Vital Signs (Most Recent):  Temp: 98.5 °F (36.9 °C) (06/02/20 1143)  Pulse: 88 (06/02/20 1244)  Resp: 15 (06/02/20 1244)  BP: 100/80 (06/02/20 1244)  SpO2: (!) 93 % (06/02/20 1244) Vital Signs (24h Range):  Temp:  [96.4 °F (35.8 °C)-98.8 °F (37.1 °C)] 98.5 °F (36.9 °C)  Pulse:  [] 88  Resp:  [11-24] 15  SpO2:  [93 %-99 %] 93 %  BP: (100-174)/(57-99) 100/80     Weight: 81.2 kg (179 lb)  Body mass index is 24.97 kg/m².    Physical Exam  General:  Well-developed, well-nourished, nad  HEENT:  MAURISIO ANN,  EOMI, oropharyngeal membranes non-erythematous/without exudate.  Rash overlying the face as well as neck, erythematous, but improving.  L sided frontal tongue laceration noted.   Neck:  Supple, normal ROM without nuchal rigidity  Resp:  No visible increased WOB  CVS:  No LE edema    Neurologic Exam:  Mental Status:  AAOx3.  Speech, thought content appropriate.  Cranial Nerves:  VFs intact on counting fingers in all quadrants bilaterally.  PERRLA, EOMI.  Facial movement, sensation intact and symmetric.  Palate raises symmetrically, tongue protrudes midline.  Trapezius, SCM strength 5/5 bilaterally.  Motor:  Normal bulk and tone.  RUE shoulder abduction, biceps/triceps, wrist flexion/extension,  strength 5/5.  RLE hip flexors, knee flexion/extension, plantarflexion/dorsiflexion 5/5.  LUE shoulder abduction, biceps/triceps, wrist flexion/extension,  strength 5/5.  LLE hip flexors, knee flexion/extension, plantarflexion/dorsiflexion 5/5.  Sensory:  Intact to light touch at all extremities and face without inattention.  Vibratory sensation intact at BUE/BLE digits.  Reflexes:  Biceps, brachioradialis, patellar, Achilles 2+ and symmetric.  No ankle clonus.  Downgoing toe bilaterally.  Coordination:  FNF, CAL, HTS intact with no dysmetria/ataxia/dysdiadochokinesia.  No resting tremor.  Gait: Deferred.        EEG 06/01-06/02 - Abnormal EEG   1 event recorded on 6/2/2020 from 04:57:51 to 04:59:14  Seizure occurs from sleep with brief appearance of disorganized alpha and theta activity for 2-3 seconds before rapid transition to confluent muscle artifact. There is generalized EEG suppression for over a minute following offset. Patient is lying on his right side when he develops posturing of the right arm transitioning into figure of four posturing with clonic movements while remaining on his right side.  PDR 9-10Hz noted, occasional runs of generalized polymorphic and quasirhythmic delta and theta  activity.       Significant Labs: All pertinent lab results from the past 24 hours have been reviewed.    Significant Studies: I have reviewed all pertinent imaging results/findings within the past 24 hours.

## 2020-06-02 NOTE — PROGRESS NOTES
Received call from CT ([place] [phone]) to obtain CT scan ordered from yesterday. Called Dr. Jean to verify if team wants CT this morning. Per verbal order, please hold off on CT scan at this time. CT called and notified. Will update family as well. Will continue to monitor.

## 2020-06-02 NOTE — ASSESSMENT & PLAN NOTE
- HM following, concern for Seborrheic Dermatitis  - Will start Ketoconazole at this time  - Appreciate recommendations from H/M

## 2020-06-02 NOTE — ASSESSMENT & PLAN NOTE
- Stable now   - Will continue Amlodipine 5mg QD for BP control, room to increase the medication if BP remains elevated
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
## 2020-06-02 NOTE — NURSING
"Patient sleep deprived until 0300 tonight. Patient's wife hit event button at 0459. Upon entering, patient was convulsing, drooling (with saliva and blood coming from mouth), snorting, and was dusky/blue in color. O2 was 87, oxygen placed, patient was not responding. At 0501, /77, O2 up to 96 on 10L NC, RR 24, HR 87, pupils 2 mm equal & reactive. Patient still seizing at 0504; charge nurse notified, given ativan per order at 0505. Called MD on call as order on MAR states "notify MD when administered." No new orders. Current VS: /60, O2 99 on 1L NC, HR 92, RR 20.  Patient currently (0535) opening eyes to touch but still not responding verbally and not following commands. WCTM.  "

## 2020-06-02 NOTE — SUBJECTIVE & OBJECTIVE
Interval History: Mr. Burleson feels well today. He denies headaches, lightheadedness, vision changes, nausea, vomiting. BP range 150-120 systolic yesterday. Received amlodipine 5 and metoprolol was discontinued. Facial rash improving.    Review of Systems   Constitutional: Negative for appetite change, chills, fatigue and unexpected weight change.   HENT: Negative for sinus pressure, sinus pain, trouble swallowing and voice change.    Eyes: Negative for photophobia and visual disturbance.   Respiratory: Negative for shortness of breath and wheezing.    Cardiovascular: Negative for chest pain, palpitations and leg swelling.   Gastrointestinal: Negative for abdominal distention, abdominal pain, blood in stool, constipation, diarrhea and vomiting.   Genitourinary: Negative for difficulty urinating and dysuria.   Musculoskeletal: Negative for arthralgias, back pain and gait problem.   Skin: Positive for rash. Negative for color change and pallor.   Neurological: Negative for dizziness, light-headedness and headaches.   Hematological: Negative for adenopathy. Does not bruise/bleed easily.   Psychiatric/Behavioral: Negative for agitation and behavioral problems.     Objective:     Vital Signs (Most Recent):  Temp: 98.8 °F (37.1 °C) (06/02/20 0700)  Pulse: 95 (06/02/20 0809)  Resp: 17 (06/02/20 0809)  BP: (!) 137/94 (06/02/20 0700)  SpO2: 99 % (06/02/20 0809) Vital Signs (24h Range):  Temp:  [96.4 °F (35.8 °C)-98.8 °F (37.1 °C)] 98.8 °F (37.1 °C)  Pulse:  [] 95  Resp:  [11-24] 17  SpO2:  [93 %-99 %] 99 %  BP: (121-174)/(57-99) 137/94     Weight: 81.2 kg (179 lb)  Body mass index is 24.97 kg/m².    Intake/Output Summary (Last 24 hours) at 6/2/2020 0930  Last data filed at 6/1/2020 1300  Gross per 24 hour   Intake 240 ml   Output 350 ml   Net -110 ml      Physical Exam   Constitutional: No distress.   Eyes: Conjunctivae are normal. No scleral icterus.   Neck: Normal range of motion. Neck supple. No JVD present. No  tracheal deviation present. No thyromegaly present.   Cardiovascular: Normal rate, regular rhythm, normal heart sounds and intact distal pulses. Exam reveals no gallop and no friction rub.   No murmur heard.  Pulmonary/Chest: Effort normal and breath sounds normal. No stridor. No respiratory distress. He has no wheezes. He has no rales.   Abdominal: Soft. Bowel sounds are normal. He exhibits no distension and no mass. There is no tenderness. There is no guarding.   Musculoskeletal: He exhibits no edema, tenderness or deformity.   Lymphadenopathy:     He has no cervical adenopathy.   Neurological: He is alert.   oriented   Skin: Skin is warm. Rash noted. He is not diaphoretic. No erythema. No pallor.     Significant Labs: All pertinent labs within the past 24 hours have been reviewed.    Significant Imaging: I have reviewed and interpreted all pertinent imaging results/findings within the past 24 hours.

## 2020-06-02 NOTE — PLAN OF CARE
Plan of care reviewed with patient and family at 1400. Patient and spouse verbalized understanding. All questions and concerns addressed today. Patient remains free from injury and falls. No acute events today. Patient is progressing towards goals. CEEG remains in place today. Patient had no active seizures today, but had few episodes this morning of eyes roving while having periods of wakefulness and sleepyness. CT d/c today Continuous pulse ox ordered this morning, but still not at bedside. Called telemetry again for status. Will continue to monitor. See flowsheets for full assessments and vitals throughout shift.

## 2020-06-02 NOTE — PROGRESS NOTES
"Vitals obtained during seizure-like episode this morning. See note. Oxygen increased from 1LPM to 5LPM and titrated down as needed.     06/02/20 0653   Vital Signs   Temp 98.8 °F (37.1 °C)   Temp src Axillary   Pulse 106   Heart Rate Source Monitor   Resp (!) 24   SpO2 (!) 93 %   Pulse Oximetry Type Intermittent   Flow (L/min) 4   O2 Device (Oxygen Therapy) nasal cannula   /81   MAP (mmHg) 103   BP Location Left arm   BP Method Automatic   Patient Position Lying        Cardiac/Telemetry Details / Alarms   Cardiac/Telemetry Monitor On Yes   Cardiac/Telemetry Audible Yes   Cardiac/Telemetry Alarms Set Yes   Height and Weight   Height 5' 11" (1.803 m)   Height Method Stated   Weight in (lb) to have BMI = 25 178.9     "

## 2020-06-03 PROBLEM — R73.09 ELEVATED RANDOM BLOOD GLUCOSE LEVEL: Status: RESOLVED | Noted: 2020-05-31 | Resolved: 2020-06-03

## 2020-06-03 PROBLEM — R73.9 ELEVATED RANDOM BLOOD GLUCOSE LEVEL: Status: RESOLVED | Noted: 2020-05-31 | Resolved: 2020-06-03

## 2020-06-03 PROCEDURE — 94761 N-INVAS EAR/PLS OXIMETRY MLT: CPT

## 2020-06-03 PROCEDURE — 95720 PR EEG, W/VIDEO, CONT RECORD, I&R, >12<26 HRS: ICD-10-PCS | Mod: ,,, | Performed by: PSYCHIATRY & NEUROLOGY

## 2020-06-03 PROCEDURE — 11000001 HC ACUTE MED/SURG PRIVATE ROOM

## 2020-06-03 PROCEDURE — 25000003 PHARM REV CODE 250: Performed by: STUDENT IN AN ORGANIZED HEALTH CARE EDUCATION/TRAINING PROGRAM

## 2020-06-03 PROCEDURE — 99233 PR SUBSEQUENT HOSPITAL CARE,LEVL III: ICD-10-PCS | Mod: ,,, | Performed by: PSYCHIATRY & NEUROLOGY

## 2020-06-03 PROCEDURE — 95714 VEEG EA 12-26 HR UNMNTR: CPT

## 2020-06-03 PROCEDURE — 99233 SBSQ HOSP IP/OBS HIGH 50: CPT | Mod: ,,, | Performed by: PSYCHIATRY & NEUROLOGY

## 2020-06-03 PROCEDURE — 95720 EEG PHY/QHP EA INCR W/VEEG: CPT | Mod: ,,, | Performed by: PSYCHIATRY & NEUROLOGY

## 2020-06-03 RX ADMIN — ESCITALOPRAM OXALATE 10 MG: 10 TABLET ORAL at 11:06

## 2020-06-03 RX ADMIN — MICONAZOLE NITRATE: 20 CREAM TOPICAL at 11:06

## 2020-06-03 RX ADMIN — AMLODIPINE BESYLATE 5 MG: 5 TABLET ORAL at 11:06

## 2020-06-03 RX ADMIN — ASPIRIN 81 MG: 81 TABLET, COATED ORAL at 11:06

## 2020-06-03 NOTE — ASSESSMENT & PLAN NOTE
- Intermittent numbness, tingling, pressure reported over the scalp as well as in hands and b/l LE  - Intermittent visual hallucinations  - B1 - pending   - B12 - 405, TSH - WNL and RPR - non-reactive

## 2020-06-03 NOTE — ASSESSMENT & PLAN NOTE
- Discussed could be the reason for intermittent transient symptoms w/o EEG correlate as well as relation to limbic pathway dysregulation w/ Epilepsy  - Also shown to have benefit in reducing the risk of SUDEP  - Continue Lexapro 10 mg QD   - Patient and wife are both agreeable at this time

## 2020-06-03 NOTE — PROCEDURES
EPILEPSY MONITORING UNIT  EEG/VIDEO TELEMETRY REPORT    DATE OF SERVICE: 6/2/2020  EEG NUMBER: [id number]  REQUESTED BY:   LOCATION OF SERVICE:     METHODOLOGY      Electroencephalographic (EEG) is recorded with electrodes placed according to the International 10-20 placement system.  Thirty Two (32) channels of digital signal including the T1 and T2 electrodes are simultaneously recorded from the scalp and also including EKG, EMG  and/or eye movement monitors.  Recording band pass was 0.1 to 512 hz.  Digital video recording of the patient is simultaneously recorded with the EEG.  The patient is instructed report clinical symptoms which may occur during the recording session.  EEG and video recording is stored and archived in digital format. Activation procedures which include photic stimulation, hyperventilation and instructing patients to perform simple task are done in selected patients.       The EEG is displayed on a monitor screen and can be reformatted into different montages for evaluation.  The entire recoding is submitted for computer assisted analysis to detect spike and electrographic seizure activity.  The entire recording is visually reviewed and the times identified by computer analysis as being spikes or seizures are reviewed again.  Compresses spectral analysis (CSA) is also performed on the activity recorded from each individual channel.  This is displayed as a power display of frequencies from 0 to 30 Hz over time.   The CSA analysis is done and displayed continuously.  This is reviewed for asymmetries in power between homologous areas of the scalp and for presence of changes in power which can be seen when seizures occur.  Sections of suspected abnormalities on the CSA is then compared with the original EEG recording.      [employer] software was also utilized in the review of this study.  This software suite analyzes the EEG recording in multiple domains.  Coherence and rhythmicity is computed to  identify EEG sections which may contain organized seizures.  Each channel undergoes analysis to detect presence of spike and sharp waves which have special and morphological characteristic of epileptic activity.  The routine EEG recording is converted from spacial into frequency domain.  This is then displayed comparing homologous areas to identify areas of significant asymmetry.  Algorithm to identify non-cortically generated artifact is used to separate eye movement, EMG and other artifact from the EEG.      Recording Times  Start on 6/2/2020 at 07:00:50 stop on 6/3/2020 at 07:00:02    A total of 23:58:11 hours of EEG/Video telemetry was recorded.    Events/Seizures recorded  None    ELECTROENCEPHALOGRAM  INTERICTAL:  Background activity:   The background over the right hemisphere consists of alpha and anterior dominant beta with 9-10Hz posterior dominant rhythm.    The left hemisphere demonstrates a similar alpha beta background with frequent polymorphic delta and theta activity in the frontal and temporal regions.    There are occasional runs of generalized polymorphic and quasirhythmic delta and theta activity.     Sleep:   Normal sleep transients including sleep spindles, K complexes, vertex waves and POSTS were seen.    Abnormal activity:   There is occasional rhythmic delta activity in the left frontotemporal region best seen at F7>Fp1, T3, F3 in the waking state.  There are occasional left anterior temporal sharp waves in sleep phase reversing at F7, T1.    FINAL SUMMARY  This is an abnormal one day video EEG due to the finding of left fronto-temporal cerebral dysfunction as well a seizure focus in that area.  There is also evidence of a more generalized cerebral dysfunction.  None of the patient's typical seizures were recorded.    Douglas Feliciano M.D.

## 2020-06-03 NOTE — SUBJECTIVE & OBJECTIVE
"Interval History:  NAEON. No typical events overnight. Did have some intermittent non-specific complaints such as "lightning bolt going through the head" as well as intermittent numbness and tingling - pt event button pressed by wife several times during those episodes w/o EEG correlate noted.    Current Facility-Administered Medications   Medication Dose Route Frequency Provider Last Rate Last Dose    acetaminophen tablet 650 mg  650 mg Oral Q4H PRN Kristen Jean MD   650 mg at 05/28/20 0116    amLODIPine tablet 5 mg  5 mg Oral Daily Melissa Alarcon MD   5 mg at 06/03/20 1130    aspirin EC tablet 81 mg  81 mg Oral Daily Kristen Jean MD   81 mg at 06/03/20 1129    escitalopram oxalate tablet 10 mg  10 mg Oral Daily Kristen Jean MD   10 mg at 06/03/20 1129    ibuprofen tablet 800 mg  800 mg Oral Q6H PRN Kristen Jean MD   800 mg at 06/02/20 1938    lorazepam injection 2 mg  2 mg Intravenous Q5 Min PRN Kristen Jean MD   2 mg at 06/02/20 0505    miconazole 2 % cream   Topical (Top) BID Melissa Alarcon MD        ondansetron disintegrating tablet 8 mg  8 mg Oral Q8H PRN Kristen Jean MD        sodium chloride 0.9% flush 10 mL  10 mL Intravenous PRN Kristen Jean MD         Continuous Infusions:    Review of Systems     Constitutional: Negative.    HENT: Negative.    Eyes: Negative.    Respiratory: Negative.    Cardiovascular: Negative.    Gastrointestinal: Negative.    Endocrine: Negative.    Genitourinary: Negative.    Musculoskeletal: Negative.    Skin: Positive for rash, improving   Allergic/Immunologic:        Drug allergy    Neurological: Positive for headaches.   Hematological: Negative.    Psychiatric/Behavioral: Negative.       Objective:     Vital Signs (Most Recent):  Temp: 99.6 °F (37.6 °C) (06/03/20 0800)  Pulse: 86 (06/03/20 1120)  Resp: 18 (06/03/20 1120)  BP: (!) 137/99 (06/03/20 1120)  SpO2: 96 % (06/03/20 1120) Vital Signs (24h Range):  Temp:  [98.3 °F (36.8 °C)-99.6 °F (37.6 °C)] 99.6 °F (37.6 " °C)  Pulse:  [] 86  Resp:  [10-19] 18  SpO2:  [92 %-99 %] 96 %  BP: (103-138)/() 137/99     Weight: 81.2 kg (179 lb)  Body mass index is 24.97 kg/m².    Physical Exam  General:  Well-developed, well-nourished, nad  HEENT:  NCAT, PERRLA, EOMI, oropharyngeal membranes non-erythematous/without exudate.  Rash overlying the face as well as neck, erythematous, but improving.  L sided frontal tongue laceration noted.   Neck:  Supple, normal ROM without nuchal rigidity  Resp:  No visible increased WOB  CVS:  No LE edema    Neurologic Exam:  Mental Status:  AAOx3.  Speech, thought content appropriate.  Cranial Nerves:  VFs intact on counting fingers in all quadrants bilaterally.  PERRLA, EOMI.  Facial movement, sensation intact and symmetric.  Palate raises symmetrically, tongue protrudes midline.  Trapezius, SCM strength 5/5 bilaterally.  Motor:  Normal bulk and tone.  RUE shoulder abduction, biceps/triceps, wrist flexion/extension,  strength 5/5.  RLE hip flexors, knee flexion/extension, plantarflexion/dorsiflexion 5/5.  LUE shoulder abduction, biceps/triceps, wrist flexion/extension,  strength 5/5.  LLE hip flexors, knee flexion/extension, plantarflexion/dorsiflexion 5/5.  Sensory:  Intact to light touch at all extremities and face without inattention.  Vibratory sensation intact at BUE/BLE digits.  Reflexes:  Biceps, brachioradialis, patellar, Achilles 2+ and symmetric.  No ankle clonus.  Downgoing toe bilaterally.  Coordination:  FNF, CAL, HTS intact with no dysmetria/ataxia/dysdiadochokinesia.  No resting tremor.  Gait: Deferred.        EEG 06/02-06/03 - Abnormal EEG  Frequent rhythmic delta activity in the left frontotemporal region best seen at F7>Fp1, T3, F3 in the waking state.  There are occasional left anterior temporal sharp waves in sleep phase reversing at F7, T1.  Normal sleep transients including sleep spindles, K complexes, vertex waves and POSTS were seen.  PDR 9-10Hz noted, occasional  runs of generalized polymorphic and quasirhythmic delta and theta activity.   EEG 06/01-06/02  1 event recorded on 6/2/2020 from 04:57:51 to 04:59:14  Seizure occurs from sleep with brief appearance of disorganized alpha and theta activity for 2-3 seconds before rapid transition to confluent muscle artifact. There is generalized EEG suppression for over a minute following offset. Patient is lying on his right side when he develops posturing of the right arm transitioning into figure of four posturing with clonic movements while remaining on his right side.  PDR 9-10Hz noted, occasional runs of generalized polymorphic and quasirhythmic delta and theta activity.       Significant Labs: All pertinent lab results from the past 24 hours have been reviewed.    Significant Studies: I have reviewed all pertinent imaging results/findings within the past 24 hours.

## 2020-06-03 NOTE — PROGRESS NOTES
"[facility]  Neurology-Epilepsy  Progress Note    Patient Name: Declan Burleson  MRN: [id number]  Admission Date: 5/26/2020  Hospital Length of Stay: 8 days  Code Status: Full Code   Attending Provider: Douglas Feliciano MD  Primary Care Physician: Matteo Rizzo MD   Principal Problem:Complex partial epilepsy with generalization and with intractable epilepsy    Subjective:     Hospital Course:   05/26-05/27 - NAEON. No electrographic or clinical seizures recorded.   05/27-05/28 - Patient w/ some visual hallucinations as well as intermittent numbness and tingling of various body parts/face throughout the night. No electrographic seizures or typical events noted throughout the night.   05/28-05/29 -  No electrographic seizures nor any typical clinical events recorded overnight.   05/29-05/30 -  No electrographic seizures or clinical events overnight  05/30-05/31 -   Pt reported numbness in LE and dizziness. Had elevated BP and bl glucose during the event. No epileptiform activity during event   05/31-06/01 - NAEON. Denies any seizure activity. BP better controlled, no elevated spikes recorded. Rash improving. See full EEG report for details.   06/01-06/02 - 1 typical GTC event recorded overnight. Episodes of AMS intermittently on 06/01 PM and 06/02 in the AM w/o electrographic correlate. Continuing w/ EEG to capture at least 2 more events to complete the initial pre-surgical w/u.    06/02-06/03 - NAEON. No typical events overnight. Did have some intermittent non-specific complaints such as "lightning bolt going through the head" as well as intermittent numbness and tingling - pt event button pressed by wife several times during those episodes w/o EEG correlate noted.    Interval History:  NAEON. No typical events overnight. Did have some intermittent non-specific complaints such as "lightning bolt going through the head" as well as intermittent numbness and tingling - pt event button pressed by " wife several times during those episodes w/o EEG correlate noted.    Current Facility-Administered Medications   Medication Dose Route Frequency Provider Last Rate Last Dose    acetaminophen tablet 650 mg  650 mg Oral Q4H PRN Kristen Jean MD   650 mg at 05/28/20 0116    amLODIPine tablet 5 mg  5 mg Oral Daily Melissa Alarcon MD   5 mg at 06/03/20 1130    aspirin EC tablet 81 mg  81 mg Oral Daily Kristen Jean MD   81 mg at 06/03/20 1129    escitalopram oxalate tablet 10 mg  10 mg Oral Daily Kristen Jean MD   10 mg at 06/03/20 1129    ibuprofen tablet 800 mg  800 mg Oral Q6H PRN Kristen Jean MD   800 mg at 06/02/20 1938    lorazepam injection 2 mg  2 mg Intravenous Q5 Min PRN Kristen Jean MD   2 mg at 06/02/20 0505    miconazole 2 % cream   Topical (Top) BID Melissa Alarcon MD        ondansetron disintegrating tablet 8 mg  8 mg Oral Q8H PRN Kristen Jean MD        sodium chloride 0.9% flush 10 mL  10 mL Intravenous PRN Kristen Jean MD         Continuous Infusions:    Review of Systems     Constitutional: Negative.    HENT: Negative.    Eyes: Negative.    Respiratory: Negative.    Cardiovascular: Negative.    Gastrointestinal: Negative.    Endocrine: Negative.    Genitourinary: Negative.    Musculoskeletal: Negative.    Skin: Positive for rash, improving   Allergic/Immunologic:        Drug allergy    Neurological: Positive for headaches.   Hematological: Negative.    Psychiatric/Behavioral: Negative.       Objective:     Vital Signs (Most Recent):  Temp: 99.6 °F (37.6 °C) (06/03/20 0800)  Pulse: 86 (06/03/20 1120)  Resp: 18 (06/03/20 1120)  BP: (!) 137/99 (06/03/20 1120)  SpO2: 96 % (06/03/20 1120) Vital Signs (24h Range):  Temp:  [98.3 °F (36.8 °C)-99.6 °F (37.6 °C)] 99.6 °F (37.6 °C)  Pulse:  [] 86  Resp:  [10-19] 18  SpO2:  [92 %-99 %] 96 %  BP: (103-138)/() 137/99     Weight: 81.2 kg (179 lb)  Body mass index is 24.97 kg/m².    Physical Exam  General:  Well-developed, well-nourished,  nad  HEENT:  NCAT, PERRLA, EOMI, oropharyngeal membranes non-erythematous/without exudate.  Rash overlying the face as well as neck, erythematous, but improving.  L sided frontal tongue laceration noted.   Neck:  Supple, normal ROM without nuchal rigidity  Resp:  No visible increased WOB  CVS:  No LE edema    Neurologic Exam:  Mental Status:  AAOx3.  Speech, thought content appropriate.  Cranial Nerves:  VFs intact on counting fingers in all quadrants bilaterally.  PERRLA, EOMI.  Facial movement, sensation intact and symmetric.  Palate raises symmetrically, tongue protrudes midline.  Trapezius, SCM strength 5/5 bilaterally.  Motor:  Normal bulk and tone.  RUE shoulder abduction, biceps/triceps, wrist flexion/extension,  strength 5/5.  RLE hip flexors, knee flexion/extension, plantarflexion/dorsiflexion 5/5.  LUE shoulder abduction, biceps/triceps, wrist flexion/extension,  strength 5/5.  LLE hip flexors, knee flexion/extension, plantarflexion/dorsiflexion 5/5.  Sensory:  Intact to light touch at all extremities and face without inattention.  Vibratory sensation intact at BUE/BLE digits.  Reflexes:  Biceps, brachioradialis, patellar, Achilles 2+ and symmetric.  No ankle clonus.  Downgoing toe bilaterally.  Coordination:  FNF, CAL, HTS intact with no dysmetria/ataxia/dysdiadochokinesia.  No resting tremor.  Gait: Deferred.        EEG 06/02-06/03 - Abnormal EEG  Frequent rhythmic delta activity in the left frontotemporal region best seen at F7>Fp1, T3, F3 in the waking state.  There are occasional left anterior temporal sharp waves in sleep phase reversing at F7, T1.  Normal sleep transients including sleep spindles, K complexes, vertex waves and POSTS were seen.  PDR 9-10Hz noted, occasional runs of generalized polymorphic and quasirhythmic delta and theta activity.   EEG 06/01-06/02  1 event recorded on 6/2/2020 from 04:57:51 to 04:59:14  Seizure occurs from sleep with brief appearance of disorganized alpha  and theta activity for 2-3 seconds before rapid transition to confluent muscle artifact. There is generalized EEG suppression for over a minute following offset. Patient is lying on his right side when he develops posturing of the right arm transitioning into figure of four posturing with clonic movements while remaining on his right side.  PDR 9-10Hz noted, occasional runs of generalized polymorphic and quasirhythmic delta and theta activity.       Significant Labs: All pertinent lab results from the past 24 hours have been reviewed.    Significant Studies: I have reviewed all pertinent imaging results/findings within the past 24 hours.    Assessment and Plan:     * Complex partial epilepsy with generalization and with intractable epilepsy  55 yr male with L temp slowing and occasional sharp waves. 1 GTC recorded thus far on 06/02, will continue to monitor, hoping to capture at least 2 more typical events.     - Cont VEEG monitoring   - Continue holding all home AEDs  - Sz precautions  - Activation procedures per protocol  - IV Ativan 2 mg if GTC longer than 5 minutes; call epileptologist on call prior to the drug administration     Anxiety  - Discussed could be the reason for intermittent transient symptoms w/o EEG correlate as well as relation to limbic pathway dysregulation w/ Epilepsy  - Also shown to have benefit in reducing the risk of SUDEP  - Continue Lexapro 10 mg QD   - Patient and wife are both agreeable at this time      Neurological deficit, transient  - Intermittent numbness, tingling, pressure reported over the scalp as well as in hands and b/l LE  - Intermittent visual hallucinations  - B1 - pending   - B12 - 405, TSH - WNL and RPR - non-reactive     Facial rash  - HM following, concern for Seborrheic Dermatitis  - Continue Ketoconazole; improving        Hypertension  - Stable now   - Will continue Amlodipine 5mg QD       Migraine without status migrainosus, not intractable  - PRN medication  available        VTE Risk Mitigation (From admission, onward)         Ordered     IP VTE LOW RISK PATIENT  Once      05/26/20 0198                Kristen Jean MD  Neurology-Epilepsy  [facility]

## 2020-06-04 PROCEDURE — 95720 EEG PHY/QHP EA INCR W/VEEG: CPT | Mod: ,,, | Performed by: PSYCHIATRY & NEUROLOGY

## 2020-06-04 PROCEDURE — 25000003 PHARM REV CODE 250: Performed by: STUDENT IN AN ORGANIZED HEALTH CARE EDUCATION/TRAINING PROGRAM

## 2020-06-04 PROCEDURE — 95714 VEEG EA 12-26 HR UNMNTR: CPT

## 2020-06-04 PROCEDURE — 99233 PR SUBSEQUENT HOSPITAL CARE,LEVL III: ICD-10-PCS | Mod: ,,, | Performed by: PSYCHIATRY & NEUROLOGY

## 2020-06-04 PROCEDURE — 94761 N-INVAS EAR/PLS OXIMETRY MLT: CPT

## 2020-06-04 PROCEDURE — 11000001 HC ACUTE MED/SURG PRIVATE ROOM

## 2020-06-04 PROCEDURE — 95720 PR EEG, W/VIDEO, CONT RECORD, I&R, >12<26 HRS: ICD-10-PCS | Mod: ,,, | Performed by: PSYCHIATRY & NEUROLOGY

## 2020-06-04 PROCEDURE — 99233 SBSQ HOSP IP/OBS HIGH 50: CPT | Mod: ,,, | Performed by: PSYCHIATRY & NEUROLOGY

## 2020-06-04 PROCEDURE — 27000221 HC OXYGEN, UP TO 24 HOURS

## 2020-06-04 PROCEDURE — 99900035 HC TECH TIME PER 15 MIN (STAT)

## 2020-06-04 RX ADMIN — ESCITALOPRAM OXALATE 10 MG: 10 TABLET ORAL at 08:06

## 2020-06-04 RX ADMIN — AMLODIPINE BESYLATE 5 MG: 5 TABLET ORAL at 08:06

## 2020-06-04 RX ADMIN — ACETAMINOPHEN 650 MG: 325 TABLET ORAL at 08:06

## 2020-06-04 RX ADMIN — IBUPROFEN 800 MG: 400 TABLET, FILM COATED ORAL at 08:06

## 2020-06-04 RX ADMIN — IBUPROFEN 800 MG: 400 TABLET, FILM COATED ORAL at 10:06

## 2020-06-04 RX ADMIN — MICONAZOLE NITRATE: 20 CREAM TOPICAL at 08:06

## 2020-06-04 RX ADMIN — ACETAMINOPHEN 650 MG: 325 TABLET ORAL at 10:06

## 2020-06-04 RX ADMIN — ASPIRIN 81 MG: 81 TABLET, COATED ORAL at 08:06

## 2020-06-04 NOTE — PROCEDURES
EPILEPSY MONITORING UNIT  EEG/VIDEO TELEMETRY REPORT    DATE OF SERVICE: 6/3/2020  EEG NUMBER: [id number] -[id number]  REQUESTED BY:   LOCATION OF SERVICE:     METHODOLOGY      Electroencephalographic (EEG) is recorded with electrodes placed according to the International 10-20 placement system.  Thirty Two (32) channels of digital signal including the T1 and T2 electrodes are simultaneously recorded from the scalp and also including EKG, EMG  and/or eye movement monitors.  Recording band pass was 0.1 to 512 hz.  Digital video recording of the patient is simultaneously recorded with the EEG.  The patient is instructed report clinical symptoms which may occur during the recording session.  EEG and video recording is stored and archived in digital format. Activation procedures which include photic stimulation, hyperventilation and instructing patients to perform simple task are done in selected patients.       The EEG is displayed on a monitor screen and can be reformatted into different montages for evaluation.  The entire recoding is submitted for computer assisted analysis to detect spike and electrographic seizure activity.  The entire recording is visually reviewed and the times identified by computer analysis as being spikes or seizures are reviewed again.  Compresses spectral analysis (CSA) is also performed on the activity recorded from each individual channel.  This is displayed as a power display of frequencies from 0 to 30 Hz over time.   The CSA analysis is done and displayed continuously.  This is reviewed for asymmetries in power between homologous areas of the scalp and for presence of changes in power which can be seen when seizures occur.  Sections of suspected abnormalities on the CSA is then compared with the original EEG recording.      [employer] software was also utilized in the review of this study.  This software suite analyzes the EEG recording in multiple domains.  Coherence and rhythmicity is computed to  identify EEG sections which may contain organized seizures.  Each channel undergoes analysis to detect presence of spike and sharp waves which have special and morphological characteristic of epileptic activity.  The routine EEG recording is converted from spacial into frequency domain.  This is then displayed comparing homologous areas to identify areas of significant asymmetry.  Algorithm to identify non-cortically generated artifact is used to separate eye movement, EMG and other artifact from the EEG.      Recording Times  Start on 6/3/2020 at 07:00:51 stop on 6/4/2020 at 07:00:00    A total of 23:48:20 hours of EEG/Video telemetry was recorded.    Events/Seizures recorded  None    ELECTROENCEPHALOGRAM  INTERICTAL:  Background activity:   The background over the right hemisphere consists of alpha and anterior dominant beta with 9-10Hz posterior dominant rhythm.    The left hemisphere demonstrates a similar alpha beta background with frequent polymorphic delta and theta activity in the frontal and temporal regions.    There are occasional runs of generalized polymorphic and quasirhythmic delta and theta activity.     Sleep:   Normal sleep transients including sleep spindles, K complexes, vertex waves and POSTS were seen.    Abnormal activity:   There is occasional rhythmic delta activity in the left frontotemporal region best seen at F7>Fp1, T3, F3 in the waking state.  There are occasional left anterior temporal sharp waves in sleep phase reversing at F7, T1.    FINAL SUMMARY  This is an abnormal one day video EEG due to the finding of left fronto-temporal cerebral dysfunction as well a seizure focus in that area.  There is also evidence of a more generalized cerebral dysfunction.  None of the patient's typical seizures were recorded.    Douglas Feliciano M.D.

## 2020-06-04 NOTE — CARE UPDATE
Rapid Response Nurse Chart Check     Chart check completed, abnormal VS noted. bedside RNElena contacted, no concerns verbalized at this time, instructed to call [phone] for further concerns or assistance.

## 2020-06-04 NOTE — PLAN OF CARE
Problem: Fall Injury Risk  Goal: Absence of Fall and Fall-Related Injury  Outcome: Ongoing, Progressing     Problem: Seizure, Active Management  Goal: Absence of Seizure/Seizure-Related Injury  Outcome: Ongoing, Progressing     Problem: Adult Inpatient Plan of Care  Goal: Optimal Comfort and Wellbeing  Outcome: Ongoing, Progressing     Patient has had no acute falls or injury. Safety precautions in place( call light in reach, side rails up x 2 , bed in low position, wheels locked.    Patient has been free of seizure activity throughout shift , seizure precautions in place ( padded bed rails, suction, oxygen, emergency equipment)    Patient has been free of acute pain , pain management interventions in place ( round the clock medication regimen if needed, diversional activities, comfort measures)

## 2020-06-04 NOTE — SUBJECTIVE & OBJECTIVE
Interval History:  NAEON. One typical GTC event early in the AM on 6/04 has been recorded w/ associated lip biting and loss of bladder control. Fairly lengthy post-ictal state. .    Current Facility-Administered Medications   Medication Dose Route Frequency Provider Last Rate Last Dose    acetaminophen tablet 650 mg  650 mg Oral Q4H PRN Kristen Jean MD   650 mg at 06/04/20 1050    amLODIPine tablet 5 mg  5 mg Oral Daily Melissa Alarcon MD   5 mg at 06/04/20 0832    aspirin EC tablet 81 mg  81 mg Oral Daily Kristen Jean MD   81 mg at 06/04/20 0833    escitalopram oxalate tablet 10 mg  10 mg Oral Daily Kristen Jean MD   10 mg at 06/04/20 0833    ibuprofen tablet 800 mg  800 mg Oral Q6H PRN Kristen Jean MD   800 mg at 06/04/20 1051    lorazepam injection 2 mg  2 mg Intravenous Q5 Min PRN Kristen Jean MD   2 mg at 06/02/20 0505    miconazole 2 % cream   Topical (Top) BID Melissa Alarcon MD        ondansetron disintegrating tablet 8 mg  8 mg Oral Q8H PRN Kristen Jean MD        sodium chloride 0.9% flush 10 mL  10 mL Intravenous PRN Kristen Jean MD         Continuous Infusions:    Review of Systems    Constitutional: Lethargy, fatigue.   HENT: Negative.    Eyes: Negative.    Respiratory: Negative.    Cardiovascular: Negative.    Gastrointestinal: Negative.    Endocrine: Negative.    Genitourinary: Negative.    Musculoskeletal: Negative.    Skin: Positive for rash, improving   Allergic/Immunologic:        Drug allergy    Neurological: Positive for headaches.   Hematological: Negative.    Psychiatric/Behavioral: Negative.       Objective:     Vital Signs (Most Recent):  Temp: 98.9 °F (37.2 °C) (06/04/20 1142)  Pulse: 80 (06/04/20 1142)  Resp: 14 (06/04/20 1142)  BP: 100/73 (06/04/20 1142)  SpO2: 96 % (06/04/20 1142) Vital Signs (24h Range):  Temp:  [97.8 °F (36.6 °C)-99.3 °F (37.4 °C)] 98.9 °F (37.2 °C)  Pulse:  [] 80  Resp:  [12-33] 14  SpO2:  [93 %-99 %] 96 %  BP: (100-157)/() 100/73     Weight:  81.2 kg (179 lb)  Body mass index is 24.97 kg/m².    Physical Exam  General:  Well-developed, well-nourished, nad  HEENT:  NCAT, PERRLA, EOMI, oropharyngeal membranes non-erythematous/without exudate.  Rash overlying the face as well as neck, erythematous, but improving.  L sided frontal tongue laceration noted.   L bite noted.   Neck:  Supple, normal ROM without nuchal rigidity  Resp:  No visible increased WOB  CVS:  No LE edema    Neurologic Exam:  Mental Status:  AAOx3.  Speech, thought content appropriate.  Cranial Nerves:  VFs intact on counting fingers in all quadrants bilaterally.  PERRLA, EOMI.  Facial movement, sensation intact and symmetric.  Palate raises symmetrically, tongue protrudes midline.  Trapezius, SCM strength 5/5 bilaterally.  Motor:  Normal bulk and tone.  RUE shoulder abduction, biceps/triceps, wrist flexion/extension,  strength 5/5.  RLE hip flexors, knee flexion/extension, plantarflexion/dorsiflexion 5/5.  LUE shoulder abduction, biceps/triceps, wrist flexion/extension,  strength 5/5.  LLE hip flexors, knee flexion/extension, plantarflexion/dorsiflexion 5/5.  Sensory:  Intact to light touch at all extremities and face without inattention.  Vibratory sensation intact at BUE/BLE digits.  Reflexes:  Biceps, brachioradialis, patellar, Achilles 2+ and symmetric.  No ankle clonus.  Downgoing toe bilaterally.  Coordination:  FNF, CAL, HTS intact with no dysmetria/ataxia/dysdiadochokinesia.  No resting tremor.  Gait: Deferred.        EEG 06/03-06/04 - Abnormal EEG  9-10Hz PDR.    The left hemisphere demonstrates a similar alpha beta background with frequent polymorphic delta and theta activity in the frontal and temporal regions.    Normal sleep transients including sleep spindles, K complexes, vertex waves and POSTS were seen.  Occasional rhythmic delta activity in the left frontotemporal region best seen at F7>Fp1, T3, F3 in the waking state.  There are occasional left anterior temporal  sharp waves in sleep phase reversing at F7, T1.  EEG 06/02-06/03 - Abnormal EEG  Frequent rhythmic delta activity in the left frontotemporal region best seen at F7>Fp1, T3, F3 in the waking state.  There are occasional left anterior temporal sharp waves in sleep phase reversing at F7, T1.  Normal sleep transients including sleep spindles, K complexes, vertex waves and POSTS were seen.  PDR 9-10Hz noted, occasional runs of generalized polymorphic and quasirhythmic delta and theta activity.   EEG 06/01-06/02  1 event recorded on 6/2/2020 from 04:57:51 to 04:59:14  Seizure occurs from sleep with brief appearance of disorganized alpha and theta activity for 2-3 seconds before rapid transition to confluent muscle artifact. There is generalized EEG suppression for over a minute following offset. Patient is lying on his right side when he develops posturing of the right arm transitioning into figure of four posturing with clonic movements while remaining on his right side.  PDR 9-10Hz noted, occasional runs of generalized polymorphic and quasirhythmic delta and theta activity.       Significant Labs: All pertinent lab results from the past 24 hours have been reviewed.    Significant Studies: I have reviewed all pertinent imaging results/findings within the past 24 hours.

## 2020-06-04 NOTE — PROGRESS NOTES
"[facility]  Neurology-Epilepsy  Progress Note    Patient Name: Declan Burleson  MRN: [id number]  Admission Date: 5/26/2020  Hospital Length of Stay: 9 days  Code Status: Full Code   Attending Provider: Douglas Feliciano MD  Primary Care Physician: Matteo Rizzo MD   Principal Problem:Complex partial epilepsy with generalization and with intractable epilepsy    Subjective:     Hospital Course:   05/26-05/27 - NAEON. No electrographic or clinical seizures recorded.   05/27-05/28 - Patient w/ some visual hallucinations as well as intermittent numbness and tingling of various body parts/face throughout the night. No electrographic seizures or typical events noted throughout the night.   05/28-05/29 -  No electrographic seizures nor any typical clinical events recorded overnight.   05/29-05/30 -  No electrographic seizures or clinical events overnight  05/30-05/31 -   Pt reported numbness in LE and dizziness. Had elevated BP and bl glucose during the event. No epileptiform activity during event   05/31-06/01 - NAEON. Denies any seizure activity. BP better controlled, no elevated spikes recorded. Rash improving. See full EEG report for details.   06/01-06/02 - 1 typical GTC event recorded overnight. Episodes of AMS intermittently on 06/01 PM and 06/02 in the AM w/o electrographic correlate. Continuing w/ EEG to capture at least 2 more events to complete the initial pre-surgical w/u.    06/02-06/03 - NAEON. No typical events overnight. Did have some intermittent non-specific complaints such as "lightning bolt going through the head" as well as intermittent numbness and tingling - pt event button pressed by wife several times during those episodes w/o EEG correlate noted.  06/03-06/04 - NAEON. One typical GTC event early in the AM on 6/04 has been recorded w/ associated lip biting and loss of bladder control. Fairly lengthy post-ictal state.     Interval History:  NAEON. One typical GTC event early in " the AM on 6/04 has been recorded w/ associated lip biting and loss of bladder control. Fairly lengthy post-ictal state. .    Current Facility-Administered Medications   Medication Dose Route Frequency Provider Last Rate Last Dose    acetaminophen tablet 650 mg  650 mg Oral Q4H PRN Kristen Jean MD   650 mg at 06/04/20 1050    amLODIPine tablet 5 mg  5 mg Oral Daily Melissa Alarcon MD   5 mg at 06/04/20 0832    aspirin EC tablet 81 mg  81 mg Oral Daily Kristen Jean MD   81 mg at 06/04/20 0833    escitalopram oxalate tablet 10 mg  10 mg Oral Daily Kristen Jean MD   10 mg at 06/04/20 0833    ibuprofen tablet 800 mg  800 mg Oral Q6H PRN Kristen Jean MD   800 mg at 06/04/20 1051    lorazepam injection 2 mg  2 mg Intravenous Q5 Min PRN Kristen Jean MD   2 mg at 06/02/20 0505    miconazole 2 % cream   Topical (Top) BID Melissa Alarcon MD        ondansetron disintegrating tablet 8 mg  8 mg Oral Q8H PRN Kristen Jean MD        sodium chloride 0.9% flush 10 mL  10 mL Intravenous PRN Kristen Jean MD         Continuous Infusions:    Review of Systems    Constitutional: Lethargy, fatigue.   HENT: Negative.    Eyes: Negative.    Respiratory: Negative.    Cardiovascular: Negative.    Gastrointestinal: Negative.    Endocrine: Negative.    Genitourinary: Negative.    Musculoskeletal: Negative.    Skin: Positive for rash, improving   Allergic/Immunologic:        Drug allergy    Neurological: Positive for headaches.   Hematological: Negative.    Psychiatric/Behavioral: Negative.       Objective:     Vital Signs (Most Recent):  Temp: 98.9 °F (37.2 °C) (06/04/20 1142)  Pulse: 80 (06/04/20 1142)  Resp: 14 (06/04/20 1142)  BP: 100/73 (06/04/20 1142)  SpO2: 96 % (06/04/20 1142) Vital Signs (24h Range):  Temp:  [97.8 °F (36.6 °C)-99.3 °F (37.4 °C)] 98.9 °F (37.2 °C)  Pulse:  [] 80  Resp:  [12-33] 14  SpO2:  [93 %-99 %] 96 %  BP: (100-157)/() 100/73     Weight: 81.2 kg (179 lb)  Body mass index is 24.97  kg/m².    Physical Exam  General:  Well-developed, well-nourished, nad  HEENT:  NCAT, PERRLA, EOMI, oropharyngeal membranes non-erythematous/without exudate.  Rash overlying the face as well as neck, erythematous, but improving.  L sided frontal tongue laceration noted.   L bite noted.   Neck:  Supple, normal ROM without nuchal rigidity  Resp:  No visible increased WOB  CVS:  No LE edema    Neurologic Exam:  Mental Status:  AAOx3.  Speech, thought content appropriate.  Cranial Nerves:  VFs intact on counting fingers in all quadrants bilaterally.  PERRLA, EOMI.  Facial movement, sensation intact and symmetric.  Palate raises symmetrically, tongue protrudes midline.  Trapezius, SCM strength 5/5 bilaterally.  Motor:  Normal bulk and tone.  RUE shoulder abduction, biceps/triceps, wrist flexion/extension,  strength 5/5.  RLE hip flexors, knee flexion/extension, plantarflexion/dorsiflexion 5/5.  LUE shoulder abduction, biceps/triceps, wrist flexion/extension,  strength 5/5.  LLE hip flexors, knee flexion/extension, plantarflexion/dorsiflexion 5/5.  Sensory:  Intact to light touch at all extremities and face without inattention.  Vibratory sensation intact at BUE/BLE digits.  Reflexes:  Biceps, brachioradialis, patellar, Achilles 2+ and symmetric.  No ankle clonus.  Downgoing toe bilaterally.  Coordination:  FNF, CAL, HTS intact with no dysmetria/ataxia/dysdiadochokinesia.  No resting tremor.  Gait: Deferred.        EEG 06/03-06/04 - Abnormal EEG  9-10Hz PDR.    The left hemisphere demonstrates a similar alpha beta background with frequent polymorphic delta and theta activity in the frontal and temporal regions.    Normal sleep transients including sleep spindles, K complexes, vertex waves and POSTS were seen.  Occasional rhythmic delta activity in the left frontotemporal region best seen at F7>Fp1, T3, F3 in the waking state.  There are occasional left anterior temporal sharp waves in sleep phase reversing at F7,  T1.  EEG 06/02-06/03 - Abnormal EEG  Frequent rhythmic delta activity in the left frontotemporal region best seen at F7>Fp1, T3, F3 in the waking state.  There are occasional left anterior temporal sharp waves in sleep phase reversing at F7, T1.  Normal sleep transients including sleep spindles, K complexes, vertex waves and POSTS were seen.  PDR 9-10Hz noted, occasional runs of generalized polymorphic and quasirhythmic delta and theta activity.   EEG 06/01-06/02  1 event recorded on 6/2/2020 from 04:57:51 to 04:59:14  Seizure occurs from sleep with brief appearance of disorganized alpha and theta activity for 2-3 seconds before rapid transition to confluent muscle artifact. There is generalized EEG suppression for over a minute following offset. Patient is lying on his right side when he develops posturing of the right arm transitioning into figure of four posturing with clonic movements while remaining on his right side.  PDR 9-10Hz noted, occasional runs of generalized polymorphic and quasirhythmic delta and theta activity.       Significant Labs: All pertinent lab results from the past 24 hours have been reviewed.    Significant Studies: I have reviewed all pertinent imaging results/findings within the past 24 hours.    Assessment and Plan:     * Complex partial epilepsy with generalization and with intractable epilepsy  55 yr male with L temp slowing and occasional sharp waves. 2 GTC recorded thus far on 06/02 and 06/04, will continue to monitor, hoping to capture at least 21 more typical event as this is a pre-surgical evaluation/EMU admission.      - Cont VEEG monitoring   - Continue holding all home AEDs  - Sz precautions  - Activation procedures per protocol  - IV Ativan 2 mg if GTC longer than 5 minutes; call epileptologist on call prior to the drug administration   - Discussed with pt and wife regarding driving laws in [place] after seizures.  Pt must refrain from driving for 6 months after having a seizure  before can legally resume driving.  Physician team not required to report pt to DMV.  Informed pt that I would document this conversation in the medical record.  Additionally, advised pt to avoid bathing alone, working in high places, and to not supervise children swimming alone or engage in other activities where losing consciousness or motor control would risk harm to self or others.      Anxiety  - Discussed could be the reason for intermittent transient symptoms w/o EEG correlate as well as relation to limbic pathway dysregulation w/ Epilepsy  - Also shown to have benefit in reducing the risk of SUDEP  - Continue Lexapro 10 mg QD   - Patient and wife are both agreeable at this time       Neurological deficit, transient  - Intermittent numbness, tingling, pressure reported over the scalp as well as in hands and b/l LE  - Intermittent visual hallucinations  - B1 - pending   - B12 - 405, TSH - WNL and RPR - non-reactive    Facial rash  - HM following, concern for Seborrheic Dermatitis  - Continue Ketoconazole; improving       Hypertension  - Stable now   - Will continue Amlodipine 5mg QD    Migraine without status migrainosus, not intractable  - PRN medication available         VTE Risk Mitigation (From admission, onward)         Ordered     IP VTE LOW RISK PATIENT  Once      05/26/20 8961                Kristen Jean MD  Neurology-Epilepsy  [facility]

## 2020-06-04 NOTE — NURSING
Patient started to open eyes at 7:56. Able to Answer some neuro questions but still foggy. Unable to answer time/date. Patient resting but easily arouses to name.

## 2020-06-04 NOTE — NURSING
EMU SEIZURE EVENT NOTE  Time event started:729  Duration: undetermined , shaking lasted for 1 minute but patient still asleep and unable to answer neuro questions at this time  Describe symptoms: upon entering the room, patient found on left side, shaking. Patient snorting and drool coming out of mouth. Patient on 2 liters nc at the time. Spo2 dropped to 84%. Nc increased to 8 liters and spo2 was at 94%  Who notified: Epilepsy team called at 730.  Intervention: Secretions and blood suctioned from patients mouth. Oxygen via nc increased. Vital signs taken see flowsheet  Notified EMU team.

## 2020-06-04 NOTE — ASSESSMENT & PLAN NOTE
55 yr male with L temp slowing and occasional sharp waves. 2 GTC recorded thus far on 06/02 and 06/04, will continue to monitor, hoping to capture at least 21 more typical event as this is a pre-surgical evaluation/EMU admission.      - Cont VEEG monitoring   - Continue holding all home AEDs  - Sz precautions  - Activation procedures per protocol  - IV Ativan 2 mg if GTC longer than 5 minutes; call epileptologist on call prior to the drug administration   - Discussed with pt and wife regarding driving laws in [place] after seizures.  Pt must refrain from driving for 6 months after having a seizure before can legally resume driving.  Physician team not required to report pt to DMV.  Informed pt that I would document this conversation in the medical record.  Additionally, advised pt to avoid bathing alone, working in high places, and to not supervise children swimming alone or engage in other activities where losing consciousness or motor control would risk harm to self or others.

## 2020-06-05 ENCOUNTER — TELEPHONE (OUTPATIENT)
Dept: NEUROLOGY | Facility: CLINIC | Age: 55
End: 2020-06-05

## 2020-06-05 DIAGNOSIS — R56.9 SEIZURE: ICD-10-CM

## 2020-06-05 LAB — VIT B1 BLD-MCNC: 50 UG/L (ref 38–122)

## 2020-06-05 PROCEDURE — 99233 PR SUBSEQUENT HOSPITAL CARE,LEVL III: ICD-10-PCS | Mod: ,,, | Performed by: PSYCHIATRY & NEUROLOGY

## 2020-06-05 PROCEDURE — 95720 EEG PHY/QHP EA INCR W/VEEG: CPT | Mod: ,,, | Performed by: PSYCHIATRY & NEUROLOGY

## 2020-06-05 PROCEDURE — 63600175 PHARM REV CODE 636 W HCPCS: Performed by: PSYCHIATRY & NEUROLOGY

## 2020-06-05 PROCEDURE — 94761 N-INVAS EAR/PLS OXIMETRY MLT: CPT

## 2020-06-05 PROCEDURE — 99233 SBSQ HOSP IP/OBS HIGH 50: CPT | Mod: ,,, | Performed by: PSYCHIATRY & NEUROLOGY

## 2020-06-05 PROCEDURE — 95714 VEEG EA 12-26 HR UNMNTR: CPT

## 2020-06-05 PROCEDURE — 25000003 PHARM REV CODE 250: Performed by: STUDENT IN AN ORGANIZED HEALTH CARE EDUCATION/TRAINING PROGRAM

## 2020-06-05 PROCEDURE — 95720 PR EEG, W/VIDEO, CONT RECORD, I&R, >12<26 HRS: ICD-10-PCS | Mod: ,,, | Performed by: PSYCHIATRY & NEUROLOGY

## 2020-06-05 PROCEDURE — 63600175 PHARM REV CODE 636 W HCPCS: Performed by: STUDENT IN AN ORGANIZED HEALTH CARE EDUCATION/TRAINING PROGRAM

## 2020-06-05 PROCEDURE — 25000003 PHARM REV CODE 250: Performed by: PSYCHIATRY & NEUROLOGY

## 2020-06-05 PROCEDURE — 97802 MEDICAL NUTRITION INDIV IN: CPT

## 2020-06-05 PROCEDURE — 11000001 HC ACUTE MED/SURG PRIVATE ROOM

## 2020-06-05 RX ORDER — LORAZEPAM 2 MG/ML
2 INJECTION INTRAMUSCULAR ONCE
Status: COMPLETED | OUTPATIENT
Start: 2020-06-05 | End: 2020-06-05

## 2020-06-05 RX ORDER — ZONISAMIDE 100 MG/1
300 CAPSULE ORAL DAILY
Status: DISCONTINUED | OUTPATIENT
Start: 2020-06-05 | End: 2020-06-06 | Stop reason: HOSPADM

## 2020-06-05 RX ORDER — CLOBAZAM 10 MG/1
20 TABLET ORAL DAILY
Status: DISCONTINUED | OUTPATIENT
Start: 2020-06-05 | End: 2020-06-06 | Stop reason: HOSPADM

## 2020-06-05 RX ADMIN — ESLICARBAZEPINE ACETATE 1200 MG: 400 TABLET ORAL at 01:06

## 2020-06-05 RX ADMIN — LORAZEPAM 2 MG: 2 INJECTION INTRAMUSCULAR; INTRAVENOUS at 11:06

## 2020-06-05 RX ADMIN — ESCITALOPRAM OXALATE 10 MG: 10 TABLET ORAL at 08:06

## 2020-06-05 RX ADMIN — LORAZEPAM 2 MG: 2 INJECTION INTRAMUSCULAR; INTRAVENOUS at 03:06

## 2020-06-05 RX ADMIN — AMLODIPINE BESYLATE 5 MG: 5 TABLET ORAL at 08:06

## 2020-06-05 RX ADMIN — ZONISAMIDE 300 MG: 100 CAPSULE ORAL at 11:06

## 2020-06-05 RX ADMIN — ASPIRIN 81 MG: 81 TABLET, COATED ORAL at 08:06

## 2020-06-05 RX ADMIN — CLOBAZAM 20 MG: 10 TABLET ORAL at 12:06

## 2020-06-05 NOTE — PLAN OF CARE
Recommendations    1. Continue low sodium diet as tolerated.    - Pt with good po intake.     2. If po intake <50%, recommend adding Boost Plus with meals.     3. Please obtain new wt for accuracy.     4. RD following.     Goals: continue to consume % of meals by RD follow-up  Nutrition Goal Status: new

## 2020-06-05 NOTE — ASSESSMENT & PLAN NOTE
55 yr male with L temp slowing and occasional sharp waves. 3 GTC recorded thus far on 06/02,06/04, and 06/05.    - Cont VEEG monitoring   - Continue holding all home AEDs  - Sz precautions  - Activation procedures per protocol  - IV Ativan 2 mg if GTC longer than 5 minutes; call epileptologist on call prior to the drug administration    - Administered at 03:17 on 06/05  - Will require a MRI Epilepsy protocol on discharge as well as a PET scan.   - Discussed with pt and wife regarding driving laws in [place] after seizures.  Pt must refrain from driving for 6 months after having a seizure before can legally resume driving.  Physician team not required to report pt to DMV.  Informed pt that I would document this conversation in the medical record.  Additionally, advised pt to avoid bathing alone, working in high places, and to not supervise children swimming alone or engage in other activities where losing consciousness or motor control would risk harm to self or others.

## 2020-06-05 NOTE — PLAN OF CARE
06/05/20 1341   Post-Acute Status   Post-Acute Authorization Other   Other Status Community Services       Called and talked with wife Kilo . She asked for the number for financial assistance. Called back and number given to her.  All questions answered.  SW in contact with CM and Medical staff. Will continue to follow and offer support as needed.     Clinton Palma, [place]  [facility]   Ext. [phone]

## 2020-06-05 NOTE — ASSESSMENT & PLAN NOTE
- Intermittent numbness, tingling, pressure reported over the scalp as well as in hands and b/l LE  - Intermittent visual hallucinations  - B1 - 50, B12 - 405, TSH - WNL and RPR - non-reactive  - Likely all related to anxiety

## 2020-06-05 NOTE — PLAN OF CARE
CM completed reassessment - Seizure activity this morning - no medically stable at this time. Will continue to monitor.        06/05/20 1401   Discharge Reassessment   Assessment Type Discharge Planning Reassessment   Do you have any problems affording any of your prescribed medications? TBD   Discharge Plan A Home with family   Discharge Plan B Home with family   DME Needed Upon Discharge  none   Patient choice form signed by patient/caregiver N/A   Anticipated Discharge Disposition Home   Can the patient/caregiver answer the patient profile reliably? Yes, cognitively intact

## 2020-06-05 NOTE — NURSING
EMU SEIZURE EVENT NOTE    Time event started: 0311  Duration: 6 minutes before receiving PRN IV lorazepem. MD notified.  Describe symptoms: Patient is on his left side convulsing, snorting, and drooling salvia and blood. Patient suctioned and placed on oxygen. See flow sheet for vitals.  Postictal: Patient is snoring, but responds to noxious stimuli, and is incontinent of urine.

## 2020-06-05 NOTE — NURSING
Patient is alert and responsive. Patient is disoriented and unable to recall seizure but is able to state that he is okay. Patient is up to the bathroom with assistance. Will continue to monitor.

## 2020-06-05 NOTE — PROCEDURES
EPILEPSY MONITORING UNIT  EEG/VIDEO TELEMETRY REPORT    DATE OF SERVICE: 6/4/2020  EEG NUMBER: [id number]  REQUESTED BY:   LOCATION OF SERVICE:     METHODOLOGY      Electroencephalographic (EEG) is recorded with electrodes placed according to the International 10-20 placement system.  Thirty Two (32) channels of digital signal including the T1 and T2 electrodes are simultaneously recorded from the scalp and also including EKG, EMG  and/or eye movement monitors.  Recording band pass was 0.1 to 512 hz.  Digital video recording of the patient is simultaneously recorded with the EEG.  The patient is instructed report clinical symptoms which may occur during the recording session.  EEG and video recording is stored and archived in digital format. Activation procedures which include photic stimulation, hyperventilation and instructing patients to perform simple task are done in selected patients.       The EEG is displayed on a monitor screen and can be reformatted into different montages for evaluation.  The entire recoding is submitted for computer assisted analysis to detect spike and electrographic seizure activity.  The entire recording is visually reviewed and the times identified by computer analysis as being spikes or seizures are reviewed again.  Compresses spectral analysis (CSA) is also performed on the activity recorded from each individual channel.  This is displayed as a power display of frequencies from 0 to 30 Hz over time.   The CSA analysis is done and displayed continuously.  This is reviewed for asymmetries in power between homologous areas of the scalp and for presence of changes in power which can be seen when seizures occur.  Sections of suspected abnormalities on the CSA is then compared with the original EEG recording.      "[employer] software was also utilized in the review of this study.  This software suite analyzes the EEG recording in multiple domains.  Coherence and rhythmicity is computed  to identify EEG sections which may contain organized seizures.  Each channel undergoes analysis to detect presence of spike and sharp waves which have special and morphological characteristic of epileptic activity.  The routine EEG recording is converted from spacial into frequency domain.  This is then displayed comparing homologous areas to identify areas of significant asymmetry.  Algorithm to identify non-cortically generated artifact is used to separate eye movement, EMG and other artifact from the EEG.      Recording Times  Start on 6/4/2020 at 07:00:46 stop on 6/5/2020 at 06:59:59    A total of 23:56:23 hours of EEG/Video telemetry was recorded.    Events/Seizures recorded  Seizure 1 from 07:28:45 to 07:30:09   Seizure 2 from 03:09:55 to 03:11:00    ELECTROENCEPHALOGRAM  INTERICTAL:  Background activity:   The background over the right hemisphere consists of alpha and anterior dominant beta with 9-10Hz posterior dominant rhythm.    The left hemisphere demonstrates a similar alpha beta background with frequent polymorphic delta and theta activity in the frontal and temporal regions.    There are occasional runs of generalized polymorphic and quasirhythmic delta and theta activity.     Sleep:   Normal sleep transients including sleep spindles, K complexes, vertex waves and POSTS were seen.    Abnormal activity:   There is occasional rhythmic delta activity in the left frontotemporal region best seen at F7>Fp1, T3, F3 in the waking state.  There are occasional left anterior temporal sharp waves in sleep phase reversing at F7, T1.    Seizure 1  Electrographic:  From electrographic drowsiness, there is arousal then two seconds of generalized quasi rhythmic delta prior to several seconds in which the recording is obscured by artifact.  Following this there is a few seconds where rhythmic delta activity is visible in the parasaggital chain before the tracing is obscured by confluent muscle artifact.  Clinical:   Patient is lying on his right side and makes restless movement at onset electrographic then remains without significant clinical activity for several seconds before turning into the supine position with both arms flexed and then transitioning into extensor posturing    Seizure 2  Electrographic:  Same as seizure 1, left parasaggital rhythmic delta is somewhat more clearly visible.  Clinical: Same as seizure 1    FINAL SUMMARY  This is an abnormal one day video EEG due to the finding of left fronto-temporal cerebral dysfunction as well a seizure focus in that area.  There is also evidence of a more generalized cerebral dysfunction.  Two of the patient's seizure were recorded which did not have localizing or lateralizing semiology but did appear to lateralize to the left hemisphere electrographically.    Douglas Feliciano M.D.

## 2020-06-05 NOTE — PROGRESS NOTES
"[facility]  Adult Nutrition  Progress Note    SUMMARY       Recommendations    1. Continue low sodium diet as tolerated.    - Pt with good po intake.     2. If po intake <50%, recommend adding Boost Plus with meals.     3. Please obtain new wt for accuracy.     4. RD following.     Goals: continue to consume % of meals by RD follow-up  Nutrition Goal Status: new  Communication of RD Recs: (POC)    Reason for Assessment    Reason For Assessment: length of stay  Diagnosis: (Complex partial epilepsy)  Relevant Medical History: HTN  Interdisciplinary Rounds: did not attend  General Information Comments: Pt resting in bed with family member at bedside. Pt reports good appetite at this time and eating most of all meals. Pt states that appetite was good with no wt loss PTA. UBW 193lbs. Noted wt loss per chart review, possibly inaccurate. NFPE not warranted. Pt appears nourished.  Nutrition Discharge Planning: adequate po intake    Nutrition Risk Screen    Nutrition Risk Screen: no indicators present    Nutrition/Diet History    Factors Affecting Nutritional Intake: None identified at this time    Anthropometrics    Temp: 98.7 °F (37.1 °C)  Height Method: Stated  Height: 5' 11" (180.3 cm)  Height (inches): 71 in  Weight Method: Bed Scale  Weight: 81.2 kg (179 lb)  Weight (lb): 179 lb  Ideal Body Weight (IBW), Male: 172 lb  % Ideal Body Weight, Male (lb): 104.07 %  BMI (Calculated): 25  BMI Grade: 25 - 29.9 - overweight     Lab/Procedures/Meds    Pertinent Labs Reviewed: reviewed  Pertinent Medications Reviewed: reviewed  Pertinent Medications Comments: amlodipine, aspirin     Estimated/Assessed Needs    Weight Used For Calorie Calculations: 87.5 kg (192 lb 14.4 oz)(per chart reveiw 5/18)  Energy Calorie Requirements (kcal): 2152 kcal/day  Energy Need Method: Coral-St Jeor(x 1.25)  Protein Requirements: 88 gm/day(1.0 gm/kg)  Weight Used For Protein Calculations: 87.5 kg (192 lb 14.4 oz)  Fluid " Requirements (mL): 1 mL/kcal or per MD     RDA Method (mL): 2152     Nutrition Prescription Ordered    Current Diet Order: Low Sodium    Evaluation of Received Nutrient/Fluid Intake    Comments: LBM 6/4  Tolerance: tolerating  % Intake of Estimated Energy Needs: 75 - 100 %  % Meal Intake: 75 - 100 %    Nutrition Risk    Level of Risk/Frequency of Follow-up: (f/u 1 x wk)     Assessment and Plan    No nutrition diagnosis at this time. Will continue to monitor.     Monitor and Evaluation    Food and Nutrient Intake: energy intake, food and beverage intake  Food and Nutrient Adminstration: diet order  Physical Activity and Function: nutrition-related ADLs and IADLs  Anthropometric Measurements: weight, weight change, body mass index  Biochemical Data, Medical Tests and Procedures: electrolyte and renal panel, gastrointestinal profile, glucose/endocrine profile, inflammatory profile, lipid profile  Nutrition-Focused Physical Findings: overall appearance     Nutrition Follow-Up    RD Follow-up?: Yes

## 2020-06-05 NOTE — SUBJECTIVE & OBJECTIVE
Interval History:   One typical event overnight at around 3 AM - GTC w/ associated tongue biting and bladder incontinence. Received Ativan 2 overnight at 3:17 as he was still post-ictal. Was awake and alert, although not aware of the event this AM.     Current Facility-Administered Medications   Medication Dose Route Frequency Provider Last Rate Last Dose    acetaminophen tablet 650 mg  650 mg Oral Q4H PRN Kristen Jean MD   650 mg at 06/04/20 2026    amLODIPine tablet 5 mg  5 mg Oral Daily Melissa Alarcon MD   5 mg at 06/05/20 0828    aspirin EC tablet 81 mg  81 mg Oral Daily Kristen Jean MD   81 mg at 06/05/20 0828    escitalopram oxalate tablet 10 mg  10 mg Oral Daily Kristen Jean MD   10 mg at 06/05/20 0828    ibuprofen tablet 800 mg  800 mg Oral Q6H PRN Kristen Jean MD   800 mg at 06/04/20 2025    lorazepam injection 2 mg  2 mg Intravenous Q5 Min PRN Kristen Jean MD   2 mg at 06/05/20 0317    ondansetron disintegrating tablet 8 mg  8 mg Oral Q8H PRN Kristen Jean MD        sodium chloride 0.9% flush 10 mL  10 mL Intravenous PRN Kristen Jean MD         Continuous Infusions:    Review of Systems  Constitutional: Lethargy, fatigue.   HENT: Negative.    Eyes: Negative.    Respiratory: Negative.    Cardiovascular: Negative.    Gastrointestinal: Negative.    Endocrine: Negative.    Genitourinary: Negative.    Musculoskeletal: Negative.    Skin: Positive for rash, improving   Allergic/Immunologic:        Drug allergy    Neurological: Positive for headaches.   Hematological: Negative.    Psychiatric/Behavioral: Negative.       Objective:     Vital Signs (Most Recent):  Temp: 98.9 °F (37.2 °C) (06/05/20 0729)  Pulse: 73 (06/05/20 0729)  Resp: 14 (06/05/20 0729)  BP: (!) 130/96 (06/05/20 0729)  SpO2: 96 % (06/05/20 0729) Vital Signs (24h Range):  Temp:  [97.6 °F (36.4 °C)-98.9 °F (37.2 °C)] 98.9 °F (37.2 °C)  Pulse:  [] 73  Resp:  [10-16] 14  SpO2:  [90 %-100 %] 96 %  BP: ()/(61-99) 130/96      Weight: 81.2 kg (179 lb)  Body mass index is 24.97 kg/m².    Physical Exam  General:  Well-developed, well-nourished, nad  HEENT:  NCAT, PERRLA, EOMI, oropharyngeal membranes non-erythematous/without exudate.  Rash overlying the face as well as neck, erythematous, but improving.  L sided frontal tongue laceration noted.   L bite noted.   Neck:  Supple, normal ROM without nuchal rigidity  Resp:  No visible increased WOB  CVS:  No LE edema    Neurologic Exam:  Mental Status:  AAOx3.  Speech, thought content appropriate. Does not recall the events this morning.   Cranial Nerves:  VFs intact on counting fingers in all quadrants bilaterally.  PERRLA, EOMI.  Facial movement, sensation intact and symmetric.  Palate raises symmetrically, tongue protrudes midline.  Trapezius, SCM strength 5/5 bilaterally.  Motor:  Normal bulk and tone.  RUE shoulder abduction, biceps/triceps, wrist flexion/extension,  strength 5/5.  RLE hip flexors, knee flexion/extension, plantarflexion/dorsiflexion 5/5.  LUE shoulder abduction, biceps/triceps, wrist flexion/extension,  strength 5/5.  LLE hip flexors, knee flexion/extension, plantarflexion/dorsiflexion 5/5.  Sensory:  Intact to light touch at all extremities and face without inattention.  Vibratory sensation intact at BUE/BLE digits.  Coordination:  FNF, CAL, HTS intact with no dysmetria/ataxia/dysdiadochokinesia.  No resting tremor.  Gait: Deferred.        EEG 06/04 - 06/05- Abnormal EEG   9-10Hz PDR.    The left hemisphere demonstrates a similar alpha beta background with frequent polymorphic delta and theta activity in the frontal and temporal regions.    Normal sleep transients including sleep spindles, K complexes, vertex waves and POSTS were seen.  Occasional rhythmic delta activity in the left frontotemporal region best seen at F7>Fp1, T3, F3 in the waking state.   1 typical event occurred overnight at 3:09:55. Please see full EEG procedure report for details.   EEG  06/03-06/04 - Abnormal EEG  9-10Hz PDR.    The left hemisphere demonstrates a similar alpha beta background with frequent polymorphic delta and theta activity in the frontal and temporal regions.    Normal sleep transients including sleep spindles, K complexes, vertex waves and POSTS were seen.  Occasional rhythmic delta activity in the left frontotemporal region best seen at F7>Fp1, T3, F3 in the waking state.  There are occasional left anterior temporal sharp waves in sleep phase reversing at F7, T1.  EEG 06/02-06/03 - Abnormal EEG  Frequent rhythmic delta activity in the left frontotemporal region best seen at F7>Fp1, T3, F3 in the waking state.  There are occasional left anterior temporal sharp waves in sleep phase reversing at F7, T1.  Normal sleep transients including sleep spindles, K complexes, vertex waves and POSTS were seen.  PDR 9-10Hz noted, occasional runs of generalized polymorphic and quasirhythmic delta and theta activity.   EEG 06/01-06/02  1 event recorded on 6/2/2020 from 04:57:51 to 04:59:14  Seizure occurs from sleep with brief appearance of disorganized alpha and theta activity for 2-3 seconds before rapid transition to confluent muscle artifact. There is generalized EEG suppression for over a minute following offset. Patient is lying on his right side when he develops posturing of the right arm transitioning into figure of four posturing with clonic movements while remaining on his right side.  PDR 9-10Hz noted, occasional runs of generalized polymorphic and quasirhythmic delta and theta activity.       Significant Labs: All pertinent lab results from the past 24 hours have been reviewed.    Significant Studies: I have reviewed all pertinent imaging results/findings within the past 24 hours.

## 2020-06-05 NOTE — PROGRESS NOTES
"[facility]  Neurology-Epilepsy  Progress Note    Patient Name: Declan Burleson  MRN: [id number]  Admission Date: 5/26/2020  Hospital Length of Stay: 10 days  Code Status: Full Code   Attending Provider: Douglas Feliciano MD  Primary Care Physician: Matteo Rizzo MD   Principal Problem:Complex partial epilepsy with generalization and with intractable epilepsy    Subjective:     Hospital Course:   05/26-05/27 - NAEON. No electrographic or clinical seizures recorded.   05/27-05/28 - Patient w/ some visual hallucinations as well as intermittent numbness and tingling of various body parts/face throughout the night. No electrographic seizures or typical events noted throughout the night.   05/28-05/29 -  No electrographic seizures nor any typical clinical events recorded overnight.   05/29-05/30 -  No electrographic seizures or clinical events overnight  05/30-05/31 -   Pt reported numbness in LE and dizziness. Had elevated BP and bl glucose during the event. No epileptiform activity during event   05/31-06/01 - NAEON. Denies any seizure activity. BP better controlled, no elevated spikes recorded. Rash improving. See full EEG report for details.   06/01-06/02 - 1 typical GTC event recorded overnight. Episodes of AMS intermittently on 06/01 PM and 06/02 in the AM w/o electrographic correlate. Continuing w/ EEG to capture at least 2 more events to complete the initial pre-surgical w/u.    06/02-06/03 - NAEON. No typical events overnight. Did have some intermittent non-specific complaints such as "lightning bolt going through the head" as well as intermittent numbness and tingling - pt event button pressed by wife several times during those episodes w/o EEG correlate noted.  06/03-06/04 - NAEON. One typical GTC event early in the AM on 6/04 has been recorded w/ associated lip biting and loss of bladder control. Fairly lengthy post-ictal state.   06/04-06/05 - One typical event overnight at around 3 AM - " GTC w/ associated tongue biting and bladder incontinence. Received Ativan 2 overnight at 3:17 as he was still post-ictal.     Interval History:   One typical event overnight at around 3 AM - GTC w/ associated tongue biting and bladder incontinence. Received Ativan 2 overnight at 3:17 as he was still post-ictal. Was awake and alert, although not aware of the event this AM.     Current Facility-Administered Medications   Medication Dose Route Frequency Provider Last Rate Last Dose    acetaminophen tablet 650 mg  650 mg Oral Q4H PRN Kristen Jean MD   650 mg at 06/04/20 2026    amLODIPine tablet 5 mg  5 mg Oral Daily Melissa Alarcon MD   5 mg at 06/05/20 0828    aspirin EC tablet 81 mg  81 mg Oral Daily Kristen Jean MD   81 mg at 06/05/20 0828    escitalopram oxalate tablet 10 mg  10 mg Oral Daily Kristen Jean MD   10 mg at 06/05/20 0828    ibuprofen tablet 800 mg  800 mg Oral Q6H PRN Kristen Jean MD   800 mg at 06/04/20 2025    lorazepam injection 2 mg  2 mg Intravenous Q5 Min PRN Kristen Jean MD   2 mg at 06/05/20 0317    ondansetron disintegrating tablet 8 mg  8 mg Oral Q8H PRN Kristen Jean MD        sodium chloride 0.9% flush 10 mL  10 mL Intravenous PRN Kristen Jean MD         Continuous Infusions:    Review of Systems  Constitutional: Lethargy, fatigue.   HENT: Negative.    Eyes: Negative.    Respiratory: Negative.    Cardiovascular: Negative.    Gastrointestinal: Negative.    Endocrine: Negative.    Genitourinary: Negative.    Musculoskeletal: Negative.    Skin: Positive for rash, improving   Allergic/Immunologic:        Drug allergy    Neurological: Positive for headaches.   Hematological: Negative.    Psychiatric/Behavioral: Negative.       Objective:     Vital Signs (Most Recent):  Temp: 98.9 °F (37.2 °C) (06/05/20 0729)  Pulse: 73 (06/05/20 0729)  Resp: 14 (06/05/20 0729)  BP: (!) 130/96 (06/05/20 0729)  SpO2: 96 % (06/05/20 0729) Vital Signs (24h Range):  Temp:  [97.6 °F (36.4 °C)-98.9 °F (37.2  °C)] 98.9 °F (37.2 °C)  Pulse:  [] 73  Resp:  [10-16] 14  SpO2:  [90 %-100 %] 96 %  BP: ()/(61-99) 130/96     Weight: 81.2 kg (179 lb)  Body mass index is 24.97 kg/m².    Physical Exam  General:  Well-developed, well-nourished, nad  HEENT:  NCAT, PERRLA, EOMI, oropharyngeal membranes non-erythematous/without exudate.  Rash overlying the face as well as neck, erythematous, but improving.  L sided frontal tongue laceration noted.   L bite noted.   Neck:  Supple, normal ROM without nuchal rigidity  Resp:  No visible increased WOB  CVS:  No LE edema    Neurologic Exam:  Mental Status:  AAOx3.  Speech, thought content appropriate. Does not recall the events this morning.   Cranial Nerves:  VFs intact on counting fingers in all quadrants bilaterally.  PERRLA, EOMI.  Facial movement, sensation intact and symmetric.  Palate raises symmetrically, tongue protrudes midline.  Trapezius, SCM strength 5/5 bilaterally.  Motor:  Normal bulk and tone.  RUE shoulder abduction, biceps/triceps, wrist flexion/extension,  strength 5/5.  RLE hip flexors, knee flexion/extension, plantarflexion/dorsiflexion 5/5.  LUE shoulder abduction, biceps/triceps, wrist flexion/extension,  strength 5/5.  LLE hip flexors, knee flexion/extension, plantarflexion/dorsiflexion 5/5.  Sensory:  Intact to light touch at all extremities and face without inattention.  Vibratory sensation intact at BUE/BLE digits.  Coordination:  FNF, CAL, HTS intact with no dysmetria/ataxia/dysdiadochokinesia.  No resting tremor.  Gait: Deferred.        EEG 06/04 - 06/05- Abnormal EEG   9-10Hz PDR.    The left hemisphere demonstrates a similar alpha beta background with frequent polymorphic delta and theta activity in the frontal and temporal regions.    Normal sleep transients including sleep spindles, K complexes, vertex waves and POSTS were seen.  Occasional rhythmic delta activity in the left frontotemporal region best seen at F7>Fp1, T3, F3 in the waking  state.   1 typical event occurred overnight at 3:09:55. Please see full EEG procedure report for details.   EEG 06/03-06/04 - Abnormal EEG  9-10Hz PDR.    The left hemisphere demonstrates a similar alpha beta background with frequent polymorphic delta and theta activity in the frontal and temporal regions.    Normal sleep transients including sleep spindles, K complexes, vertex waves and POSTS were seen.  Occasional rhythmic delta activity in the left frontotemporal region best seen at F7>Fp1, T3, F3 in the waking state.  There are occasional left anterior temporal sharp waves in sleep phase reversing at F7, T1.  EEG 06/02-06/03 - Abnormal EEG  Frequent rhythmic delta activity in the left frontotemporal region best seen at F7>Fp1, T3, F3 in the waking state.  There are occasional left anterior temporal sharp waves in sleep phase reversing at F7, T1.  Normal sleep transients including sleep spindles, K complexes, vertex waves and POSTS were seen.  PDR 9-10Hz noted, occasional runs of generalized polymorphic and quasirhythmic delta and theta activity.   EEG 06/01-06/02  1 event recorded on 6/2/2020 from 04:57:51 to 04:59:14  Seizure occurs from sleep with brief appearance of disorganized alpha and theta activity for 2-3 seconds before rapid transition to confluent muscle artifact. There is generalized EEG suppression for over a minute following offset. Patient is lying on his right side when he develops posturing of the right arm transitioning into figure of four posturing with clonic movements while remaining on his right side.  PDR 9-10Hz noted, occasional runs of generalized polymorphic and quasirhythmic delta and theta activity.       Significant Labs: All pertinent lab results from the past 24 hours have been reviewed.    Significant Studies: I have reviewed all pertinent imaging results/findings within the past 24 hours.    Assessment and Plan:     * Complex partial epilepsy with generalization and with  intractable epilepsy  55 yr male with L temp slowing and occasional sharp waves. 3 GTC recorded thus far on 06/02,06/04, and 06/05.    - Cont VEEG monitoring   - Continue holding all home AEDs  - Sz precautions  - Activation procedures per protocol  - IV Ativan 2 mg if GTC longer than 5 minutes; call epileptologist on call prior to the drug administration    - Administered at 03:17 on 06/05  - Will require a MRI Epilepsy protocol on discharge as well as a PET scan.   - Discussed with pt and wife regarding driving laws in [place] after seizures.  Pt must refrain from driving for 6 months after having a seizure before can legally resume driving.  Physician team not required to report pt to [facility].  Informed pt that I would document this conversation in the medical record.  Additionally, advised pt to avoid bathing alone, working in high places, and to not supervise children swimming alone or engage in other activities where losing consciousness or motor control would risk harm to self or others.      Anxiety  - Discussed could be the reason for intermittent transient symptoms w/o EEG correlate as well as relation to limbic pathway dysregulation w/ Epilepsy  - Also shown to have benefit in reducing the risk of SUDEP  - Continue Lexapro 10 mg QD   - Patient and wife are both agreeable at this time    Neurological deficit, transient  - Intermittent numbness, tingling, pressure reported over the scalp as well as in hands and b/l LE  - Intermittent visual hallucinations  - B1 - 50, B12 - 405, TSH - WNL and RPR - non-reactive  - Likely all related to anxiety     Facial rash  - HM following, concern for Seborrheic Dermatitis  - Continue Ketoconazole; improving        Hypertension  - Stable now   - Will continue Amlodipine 5mg QD  - Variation during seizure events     Migraine without status migrainosus, not intractable  - PRN medication available        VTE Risk Mitigation (From admission, onward)         Ordered     IP VTE LOW RISK  PATIENT  Once      05/26/20 0887                Kristen Jean MD  Neurology-Epilepsy  [facility]

## 2020-06-06 ENCOUNTER — HOSPITAL ENCOUNTER (OUTPATIENT)
Dept: RADIOLOGY | Facility: HOSPITAL | Age: 55
Discharge: HOME | DRG: 101 | End: 2020-06-06
Attending: PSYCHIATRY & NEUROLOGY | Admitting: PSYCHIATRY & NEUROLOGY
Payer: COMMERCIAL

## 2020-06-06 VITALS
HEART RATE: 80 BPM | SYSTOLIC BLOOD PRESSURE: 136 MMHG | OXYGEN SATURATION: 94 % | WEIGHT: 179 LBS | DIASTOLIC BLOOD PRESSURE: 93 MMHG | BODY MASS INDEX: 25.06 KG/M2 | HEIGHT: 71 IN | RESPIRATION RATE: 12 BRPM | TEMPERATURE: 98 F

## 2020-06-06 DIAGNOSIS — R56.9 SEIZURE: ICD-10-CM

## 2020-06-06 PROCEDURE — 70553 MRI BRAIN STEM W/O & W/DYE: CPT | Mod: 26,,, | Performed by: RADIOLOGY

## 2020-06-06 PROCEDURE — 95718 EEG PHYS/QHP 2-12 HR W/VEEG: CPT | Mod: ,,, | Performed by: PSYCHIATRY & NEUROLOGY

## 2020-06-06 PROCEDURE — A9585 GADOBUTROL INJECTION: HCPCS | Performed by: PSYCHIATRY & NEUROLOGY

## 2020-06-06 PROCEDURE — 25000003 PHARM REV CODE 250: Performed by: STUDENT IN AN ORGANIZED HEALTH CARE EDUCATION/TRAINING PROGRAM

## 2020-06-06 PROCEDURE — 25000003 PHARM REV CODE 250: Performed by: PSYCHIATRY & NEUROLOGY

## 2020-06-06 PROCEDURE — 70551 MRI BRAIN STEM W/O DYE: CPT | Mod: TC

## 2020-06-06 PROCEDURE — 99239 PR HOSPITAL DISCHARGE DAY,>30 MIN: ICD-10-PCS | Mod: ,,, | Performed by: PSYCHIATRY & NEUROLOGY

## 2020-06-06 PROCEDURE — 95718 PR EEG, W/VIDEO, CONT RECORD, I&R, 2-12 HRS: ICD-10-PCS | Mod: ,,, | Performed by: PSYCHIATRY & NEUROLOGY

## 2020-06-06 PROCEDURE — 25500020 PHARM REV CODE 255: Performed by: PSYCHIATRY & NEUROLOGY

## 2020-06-06 PROCEDURE — 70553 MRI BRAIN EPILEPSY W W/O CONTRAST: ICD-10-PCS | Mod: 26,,, | Performed by: RADIOLOGY

## 2020-06-06 PROCEDURE — 70553 MRI BRAIN STEM W/O & W/DYE: CPT | Mod: TC

## 2020-06-06 PROCEDURE — 99239 HOSP IP/OBS DSCHRG MGMT >30: CPT | Mod: ,,, | Performed by: PSYCHIATRY & NEUROLOGY

## 2020-06-06 RX ORDER — LORAZEPAM 1 MG/1
1 TABLET ORAL ONCE
Status: COMPLETED | OUTPATIENT
Start: 2020-06-06 | End: 2020-06-06

## 2020-06-06 RX ORDER — AMLODIPINE BESYLATE 5 MG/1
5 TABLET ORAL DAILY
Qty: 30 TABLET | Refills: 11 | Status: SHIPPED | OUTPATIENT
Start: 2020-06-07 | End: 2021-07-06 | Stop reason: SDUPTHER

## 2020-06-06 RX ORDER — LORAZEPAM 1 MG/1
1 TABLET ORAL NIGHTLY
Qty: 5 TABLET | Refills: 0 | Status: SHIPPED | OUTPATIENT
Start: 2020-06-06 | End: 2020-12-22 | Stop reason: CLARIF

## 2020-06-06 RX ORDER — ASPIRIN 81 MG/1
81 TABLET ORAL DAILY
Qty: 360 TABLET | Refills: 0 | Status: SHIPPED | OUTPATIENT
Start: 2020-06-07 | End: 2023-03-30 | Stop reason: CLARIF

## 2020-06-06 RX ORDER — CLOBAZAM 20 MG/1
20 TABLET ORAL DAILY
Qty: 30 TABLET | Refills: 5 | Status: SHIPPED | OUTPATIENT
Start: 2020-06-07 | End: 2020-06-06

## 2020-06-06 RX ORDER — ZONISAMIDE 100 MG/1
300 CAPSULE ORAL DAILY
Qty: 90 CAPSULE | Refills: 11 | Status: SHIPPED | OUTPATIENT
Start: 2020-06-07 | End: 2021-06-17

## 2020-06-06 RX ORDER — LORAZEPAM 1 MG/1
1 TABLET ORAL NIGHTLY
Qty: 5 TABLET | Refills: 0 | Status: SHIPPED | OUTPATIENT
Start: 2020-06-06 | End: 2020-06-06 | Stop reason: SDUPTHER

## 2020-06-06 RX ORDER — CLOBAZAM 20 MG/1
20 TABLET ORAL DAILY
Qty: 30 TABLET | Refills: 0 | Status: SHIPPED | OUTPATIENT
Start: 2020-06-07 | End: 2020-06-06

## 2020-06-06 RX ORDER — ESCITALOPRAM OXALATE 10 MG/1
10 TABLET ORAL DAILY
Qty: 30 TABLET | Refills: 11 | Status: SHIPPED | OUTPATIENT
Start: 2020-06-07 | End: 2021-07-06 | Stop reason: SDUPTHER

## 2020-06-06 RX ORDER — GADOBUTROL 604.72 MG/ML
9 INJECTION INTRAVENOUS
Status: COMPLETED | OUTPATIENT
Start: 2020-06-06 | End: 2020-06-06

## 2020-06-06 RX ORDER — CLOBAZAM 20 MG/1
20 TABLET ORAL DAILY
Qty: 30 TABLET | Refills: 5 | Status: SHIPPED | OUTPATIENT
Start: 2020-06-07 | End: 2020-12-01

## 2020-06-06 RX ADMIN — LORAZEPAM 1 MG: 1 TABLET ORAL at 09:06

## 2020-06-06 RX ADMIN — AMLODIPINE BESYLATE 5 MG: 5 TABLET ORAL at 08:06

## 2020-06-06 RX ADMIN — CLOBAZAM 20 MG: 10 TABLET ORAL at 09:06

## 2020-06-06 RX ADMIN — ZONISAMIDE 300 MG: 100 CAPSULE ORAL at 08:06

## 2020-06-06 RX ADMIN — ASPIRIN 81 MG: 81 TABLET, COATED ORAL at 08:06

## 2020-06-06 RX ADMIN — ESCITALOPRAM OXALATE 10 MG: 10 TABLET ORAL at 08:06

## 2020-06-06 RX ADMIN — GADOBUTROL 9 ML: 604.72 INJECTION INTRAVENOUS at 02:06

## 2020-06-06 NOTE — ASSESSMENT & PLAN NOTE
55 yr male with L temp slowing and occasional sharp waves. 3 GTC recorded thus far on 06/02,06/04, and 06/05.    - Cont VEEG monitoring   - Sz precautions  - New medication regimen as follows   - Aptiom 1200 mg QD   - Zonisamide 300 mg QHS   - Onfi 20 mg QD  - Will be discharged on Ativan taper - 1 mg QHS x 5 days.   - Follow up w/ Dr. Benoit   - IV Ativan 2 mg if GTC longer than 5 minutes; call epileptologist on call prior to the drug administration    - Administered at 03:17 on 06/05  - MRI Epilepsy protocol on discharge as well as a PET scan outpatient for surgical planning   - Discussed with pt and wife regarding driving laws in [place] after seizures.  Pt must refrain from driving for 6 months after having a seizure before can legally resume driving.  Physician team not required to report pt to DM.  Informed pt that I would document this conversation in the medical record.  Additionally, advised pt to avoid bathing alone, working in high places, and to not supervise children swimming alone or engage in other activities where losing consciousness or motor control would risk harm to self or others.

## 2020-06-06 NOTE — ASSESSMENT & PLAN NOTE
- HM following, concern for Seborrheic Dermatitis  - Continue Ketoconazole; practically resolved
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
## 2020-06-06 NOTE — ASSESSMENT & PLAN NOTE
- Discussed could be the reason for intermittent transient symptoms w/o EEG correlate as well as relation to limbic pathway dysregulation w/ Epilepsy  - Also shown to have benefit in reducing the risk of SUDEP  - Continue Lexapro 10 mg QD   - Patient and wife are both agreeable at this time   - Will continue medication outpatient

## 2020-06-06 NOTE — PLAN OF CARE
Patient is discharging home with family. MESSI faxed cost transfer of $43.30 to pharmacy for medication ONFI and Ativan to be filled. SW scanned cost transfer form into .Pharmacy will deliver to bedside.        06/06/20 1219   Discharge Assessment   Assessment Type Final Discharge Note   Discharge Plan A Home   Discharge Plan B Home with family     Matteo Rizzo MD    [facility] ([facility] - [place], [place] - [address]  [address] [place] [address]  Phone: [phone] Fax: [phone]    Nayely Palacios LMSW  [facility]  [id number]
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
## 2020-06-06 NOTE — CARE UPDATE
Issues w/ insurance as no information available for Part D of Medicare. CM contacted, able to cover the cost of Onfi x 30 days as well as Ativan x 5 days. Patient will have to contact Medicare during business hours to address the issue.   Bedside delivery for Medication discussed w/ the pharmacy prior to d/c. Patient is scheduled for MRI across the street at 1:15.     Kristen Jean MD  PGY-III  Neurology Resident  Epilepsy Monitoring Unit

## 2020-06-06 NOTE — PLAN OF CARE
Problem: Adult Inpatient Plan of Care  Goal: Plan of Care Review  Outcome: Ongoing, Progressing  Flowsheets (Taken 6/6/2020 0318)  Plan of Care Reviewed With: patient; spouse     Problem: Fall Injury Risk  Goal: Absence of Fall and Fall-Related Injury  Outcome: Ongoing, Progressing     Problem: Seizure, Active Management  Goal: Absence of Seizure/Seizure-Related Injury  Outcome: Ongoing, Progressing   POC reviewed with patient and spouse, voiced understanding. Pt aaox4; VSS. Denies any pain or discomfort upon assessment. Continuous EEG/Tele/Visi monitoring ongoing. No seizure events over night; patient resting in bed with spouse present at bedside. Fall and seizure precautions maintained; side rails padded, suction and emergency equipments at bedside. Bed locked and in lowest position. Pt instructed to call for assistance with mobility; WCTM.

## 2020-06-06 NOTE — PROGRESS NOTES
"[facility]  Neurology-Epilepsy  Progress Note    Patient Name: Declan Burleson  MRN: [id number]  Admission Date: 5/26/2020  Hospital Length of Stay: 11 days  Code Status: Full Code   Attending Provider: Maxime Drummond MD  Primary Care Physician: Matteo Rizzo MD   Principal Problem:Complex partial epilepsy with generalization and with intractable epilepsy    Subjective:     Hospital Course:   05/26-05/27 - NAEON. No electrographic or clinical seizures recorded.   05/27-05/28 - Patient w/ some visual hallucinations as well as intermittent numbness and tingling of various body parts/face throughout the night. No electrographic seizures or typical events noted throughout the night.   05/28-05/29 -  No electrographic seizures nor any typical clinical events recorded overnight.   05/29-05/30 -  No electrographic seizures or clinical events overnight  05/30-05/31 -   Pt reported numbness in LE and dizziness. Had elevated BP and bl glucose during the event. No epileptiform activity during event   05/31-06/01 - NAEON. Denies any seizure activity. BP better controlled, no elevated spikes recorded. Rash improving. See full EEG report for details.   06/01-06/02 - 1 typical GTC event recorded overnight. Episodes of AMS intermittently on 06/01 PM and 06/02 in the AM w/o electrographic correlate. Continuing w/ EEG to capture at least 2 more events to complete the initial pre-surgical w/u.    06/02-06/03 - NAEON. No typical events overnight. Did have some intermittent non-specific complaints such as "lightning bolt going through the head" as well as intermittent numbness and tingling - pt event button pressed by wife several times during those episodes w/o EEG correlate noted.  06/03-06/04 - NAEON. One typical GTC event early in the AM on 6/04 has been recorded w/ associated lip biting and loss of bladder control. Fairly lengthy post-ictal state.   06/04-06/05 - One typical event overnight at around 3 AM - " GTC w/ associated tongue biting and bladder incontinence. Received Ativan 2 overnight at 3:17 as he was still post-ictal.   06/05-06/06 - No seizures overnight. Tolerating medication well w/o issues. Pending discharge and outpatient MRI.     Interval History:  No seizures overnight. Tolerating medication well w/o issues. Pending discharge and outpatient MRI.   In good spirits, all questions answered. Will follow up w/ Dr. Benoit     Current Facility-Administered Medications   Medication Dose Route Frequency Provider Last Rate Last Dose    acetaminophen tablet 650 mg  650 mg Oral Q4H PRN Kristen Jean MD   650 mg at 06/04/20 2026    amLODIPine tablet 5 mg  5 mg Oral Daily Melissa Alarcon MD   5 mg at 06/06/20 0804    aspirin EC tablet 81 mg  81 mg Oral Daily Kristen Jean MD   81 mg at 06/06/20 0804    cloBAZam Tab 20 mg  20 mg Oral Daily Douglas Feliciano MD   20 mg at 06/06/20 0900    escitalopram oxalate tablet 10 mg  10 mg Oral Daily Kristen Jean MD   10 mg at 06/06/20 0805    eslicarbazepine Tab 1,200 mg  1,200 mg Oral QHS Kristen Jean MD   1,200 mg at 06/05/20 1330    ibuprofen tablet 800 mg  800 mg Oral Q6H PRN Kristen Jean MD   800 mg at 06/04/20 2025    lorazepam injection 2 mg  2 mg Intravenous Q5 Min PRN Kristen Jean MD   2 mg at 06/05/20 0317    ondansetron disintegrating tablet 8 mg  8 mg Oral Q8H PRN Kristen Jean MD        sodium chloride 0.9% flush 10 mL  10 mL Intravenous PRN Kristen Jean MD        zonisamide capsule 300 mg  300 mg Oral Daily Douglas Feliciano MD   300 mg at 06/06/20 0804     Continuous Infusions:    Review of Systems  Constitutional: Lethargy, fatigue.   HENT: Negative.    Eyes: Negative.    Respiratory: Negative.    Cardiovascular: Negative.    Gastrointestinal: Negative.    Endocrine: Negative.    Genitourinary: Negative.    Musculoskeletal: Negative.    Skin: Positive for rash, practically resolved.   Allergic/Immunologic:        Drug allergy    Neurological:  Denies any symptoms  Hematological: Negative.    Psychiatric/Behavioral: Negative.       Objective:     Vital Signs (Most Recent):  Temp: 97.6 °F (36.4 °C) (06/06/20 0420)  Pulse: 80 (06/06/20 0740)  Resp: 12 (06/06/20 0718)  BP: (!) 136/93 (06/06/20 0718)  SpO2: (!) 94 % (06/06/20 0718) Vital Signs (24h Range):  Temp:  [96.3 °F (35.7 °C)-98.7 °F (37.1 °C)] 97.6 °F (36.4 °C)  Pulse:  [] 80  Resp:  [12-20] 12  SpO2:  [93 %-97 %] 94 %  BP: (120-141)/() 136/93     Weight: 81.2 kg (179 lb)  Body mass index is 24.97 kg/m².    Physical Exam  General:  Well-developed, well-nourished, nad  HEENT:  NCAT, PERRLA, EOMI, oropharyngeal membranes non-erythematous/without exudate.  Rash overlying the face as well as neck, erythematous, but improving.  L sided frontal tongue laceration noted.   L bite noted.   Neck:  Supple, normal ROM without nuchal rigidity  Resp:  No visible increased WOB  CVS:  No LE edema    Neurologic Exam:  Mental Status:  AAOx3.  Speech, thought content appropriate. Does not recall the events this morning.   Cranial Nerves:  VFs intact on counting fingers in all quadrants bilaterally.  PERRLA, EOMI.  Facial movement, sensation intact and symmetric.  Palate raises symmetrically, tongue protrudes midline.  Trapezius, SCM strength 5/5 bilaterally.  Motor:  Normal bulk and tone.  RUE shoulder abduction, biceps/triceps, wrist flexion/extension,  strength 5/5.  RLE hip flexors, knee flexion/extension, plantarflexion/dorsiflexion 5/5.  LUE shoulder abduction, biceps/triceps, wrist flexion/extension,  strength 5/5.  LLE hip flexors, knee flexion/extension, plantarflexion/dorsiflexion 5/5.  Sensory:  Intact to light touch at all extremities and face without inattention.  Vibratory sensation intact at BUE/BLE digits.  Coordination:  FNF, CAL, HTS intact with no dysmetria/ataxia/dysdiadochokinesia.  No resting tremor.  Gait: Deferred.        EEG 06/05-06/06- Abnormal EEG  8-9 Hz PDR  There are  occasional left frontotemporal sharp waves noted predominantly during sleep ( phase reversal at T1).  Normal sleep transients including sleep spindles, K compleze, vertex waves and POSTS.   Towards the end of the study, overriding beta activity noted over the typical background, likely related to initiation of Onfi.   Occasional rhythmic delta activity in the left frontotemporal region best seen at F7>Fp1, T3, F3 in the waking state. Diffusely slow background.   EEG 06/04 - 06/05- Abnormal EEG   9-10Hz PDR.    The left hemisphere demonstrates a similar alpha beta background with frequent polymorphic delta and theta activity in the frontal and temporal regions.    Normal sleep transients including sleep spindles, K complexes, vertex waves and POSTS were seen.  Occasional rhythmic delta activity in the left frontotemporal region best seen at F7>Fp1, T3, F3 in the waking state.   1 typical event occurred overnight at 3:09:55. Please see full EEG procedure report for details.   EEG 06/03-06/04 - Abnormal EEG  9-10Hz PDR.    The left hemisphere demonstrates a similar alpha beta background with frequent polymorphic delta and theta activity in the frontal and temporal regions.    Normal sleep transients including sleep spindles, K complexes, vertex waves and POSTS were seen.  Occasional rhythmic delta activity in the left frontotemporal region best seen at F7>Fp1, T3, F3 in the waking state.  There are occasional left anterior temporal sharp waves in sleep phase reversing at F7, T1.  EEG 06/02-06/03 - Abnormal EEG  Frequent rhythmic delta activity in the left frontotemporal region best seen at F7>Fp1, T3, F3 in the waking state.  There are occasional left anterior temporal sharp waves in sleep phase reversing at F7, T1.  Normal sleep transients including sleep spindles, K complexes, vertex waves and POSTS were seen.  PDR 9-10Hz noted, occasional runs of generalized polymorphic and quasirhythmic delta and theta  activity.   EEG 06/01-06/02  1 event recorded on 6/2/2020 from 04:57:51 to 04:59:14  Seizure occurs from sleep with brief appearance of disorganized alpha and theta activity for 2-3 seconds before rapid transition to confluent muscle artifact. There is generalized EEG suppression for over a minute following offset. Patient is lying on his right side when he develops posturing of the right arm transitioning into figure of four posturing with clonic movements while remaining on his right side.  PDR 9-10Hz noted, occasional runs of generalized polymorphic and quasirhythmic delta and theta activity.       Significant Labs: All pertinent lab results from the past 24 hours have been reviewed.    Significant Studies: I have reviewed all pertinent imaging results/findings within the past 24 hours.    Assessment and Plan:     * Complex partial epilepsy with generalization and with intractable epilepsy  55 yr male with L temp slowing and occasional sharp waves. 3 GTC recorded thus far on 06/02,06/04, and 06/05.    - Cont VEEG monitoring   - Sz precautions  - New medication regimen as follows   - Aptiom 1200 mg QD   - Zonisamide 300 mg QHS   - Onfi 20 mg QD  - Will be discharged on Ativan taper - 1 mg QHS x 5 days.   - Follow up w/ Dr. Benoit   - IV Ativan 2 mg if GTC longer than 5 minutes; call epileptologist on call prior to the drug administration    - Administered at 03:17 on 06/05  - MRI Epilepsy protocol on discharge as well as a PET scan outpatient for surgical planning   - Discussed with pt and wife regarding driving laws in [place] after seizures.  Pt must refrain from driving for 6 months after having a seizure before can legally resume driving.  Physician team not required to report pt to DM.  Informed pt that I would document this conversation in the medical record.  Additionally, advised pt to avoid bathing alone, working in high places, and to not supervise children swimming alone or engage in other activities where  losing consciousness or motor control would risk harm to self or others.      Anxiety  - Discussed could be the reason for intermittent transient symptoms w/o EEG correlate as well as relation to limbic pathway dysregulation w/ Epilepsy  - Also shown to have benefit in reducing the risk of SUDEP  - Continue Lexapro 10 mg QD   - Patient and wife are both agreeable at this time   - Will continue medication outpatient     Neurological deficit, transient  - Intermittent numbness, tingling, pressure reported over the scalp as well as in hands and b/l LE  - Intermittent visual hallucinations  - B1 - 50, B12 - 405, TSH - WNL and RPR - non-reactive  - Likely all related to anxiety    Facial rash  - HM following, concern for Seborrheic Dermatitis  - Continue Ketoconazole; practically resolved       Hypertension  - Stable now   - Continue Amlodipine 5mg QD    Migraine without status migrainosus, not intractable  - PRN medication available        VTE Risk Mitigation (From admission, onward)         Ordered     IP VTE LOW RISK PATIENT  Once      05/26/20 1478                Kristen Jean MD  Neurology-Epilepsy  [facility]

## 2020-06-06 NOTE — SUBJECTIVE & OBJECTIVE
Interval History:  No seizures overnight. Tolerating medication well w/o issues. Pending discharge and outpatient MRI.   In good spirits, all questions answered. Will follow up w/ Dr. Benoit     Current Facility-Administered Medications   Medication Dose Route Frequency Provider Last Rate Last Dose    acetaminophen tablet 650 mg  650 mg Oral Q4H PRN Kristen Jean MD   650 mg at 06/04/20 2026    amLODIPine tablet 5 mg  5 mg Oral Daily Melissa Alarcon MD   5 mg at 06/06/20 0804    aspirin EC tablet 81 mg  81 mg Oral Daily Kristen Jean MD   81 mg at 06/06/20 0804    cloBAZam Tab 20 mg  20 mg Oral Daily Douglas Feliciano MD   20 mg at 06/06/20 0900    escitalopram oxalate tablet 10 mg  10 mg Oral Daily Kristen Jean MD   10 mg at 06/06/20 0805    eslicarbazepine Tab 1,200 mg  1,200 mg Oral QHS Kristen Jean MD   1,200 mg at 06/05/20 1330    ibuprofen tablet 800 mg  800 mg Oral Q6H PRN Kristen Jean MD   800 mg at 06/04/20 2025    lorazepam injection 2 mg  2 mg Intravenous Q5 Min PRN Kristen Jean MD   2 mg at 06/05/20 0317    ondansetron disintegrating tablet 8 mg  8 mg Oral Q8H PRN Kristen Jean MD        sodium chloride 0.9% flush 10 mL  10 mL Intravenous PRN Kristen Jean MD        zonisamide capsule 300 mg  300 mg Oral Daily Douglas Feliciano MD   300 mg at 06/06/20 0804     Continuous Infusions:    Review of Systems  Constitutional: Lethargy, fatigue.   HENT: Negative.    Eyes: Negative.    Respiratory: Negative.    Cardiovascular: Negative.    Gastrointestinal: Negative.    Endocrine: Negative.    Genitourinary: Negative.    Musculoskeletal: Negative.    Skin: Positive for rash, practically resolved.   Allergic/Immunologic:        Drug allergy    Neurological: Denies any symptoms  Hematological: Negative.    Psychiatric/Behavioral: Negative.       Objective:     Vital Signs (Most Recent):  Temp: 97.6 °F (36.4 °C) (06/06/20 0420)  Pulse: 80 (06/06/20 0740)  Resp: 12 (06/06/20 0718)  BP: (!) 136/93  (06/06/20 0718)  SpO2: (!) 94 % (06/06/20 0718) Vital Signs (24h Range):  Temp:  [96.3 °F (35.7 °C)-98.7 °F (37.1 °C)] 97.6 °F (36.4 °C)  Pulse:  [] 80  Resp:  [12-20] 12  SpO2:  [93 %-97 %] 94 %  BP: (120-141)/() 136/93     Weight: 81.2 kg (179 lb)  Body mass index is 24.97 kg/m².    Physical Exam  General:  Well-developed, well-nourished, nad  HEENT:  NCAT, PERRLA, EOMI, oropharyngeal membranes non-erythematous/without exudate.  Rash overlying the face as well as neck, erythematous, but improving.  L sided frontal tongue laceration noted.   L bite noted.   Neck:  Supple, normal ROM without nuchal rigidity  Resp:  No visible increased WOB  CVS:  No LE edema    Neurologic Exam:  Mental Status:  AAOx3.  Speech, thought content appropriate. Does not recall the events this morning.   Cranial Nerves:  VFs intact on counting fingers in all quadrants bilaterally.  PERRLA, EOMI.  Facial movement, sensation intact and symmetric.  Palate raises symmetrically, tongue protrudes midline.  Trapezius, SCM strength 5/5 bilaterally.  Motor:  Normal bulk and tone.  RUE shoulder abduction, biceps/triceps, wrist flexion/extension,  strength 5/5.  RLE hip flexors, knee flexion/extension, plantarflexion/dorsiflexion 5/5.  LUE shoulder abduction, biceps/triceps, wrist flexion/extension,  strength 5/5.  LLE hip flexors, knee flexion/extension, plantarflexion/dorsiflexion 5/5.  Sensory:  Intact to light touch at all extremities and face without inattention.  Vibratory sensation intact at BUE/BLE digits.  Coordination:  FNF, CAL, HTS intact with no dysmetria/ataxia/dysdiadochokinesia.  No resting tremor.  Gait: Deferred.        EEG 06/05-06/06- Abnormal EEG  8-9 Hz PDR  There are occasional left frontotemporal sharp waves noted predominantly during sleep ( phase reversal at T1).  Normal sleep transients including sleep spindles, K compleze, vertex waves and POSTS.   Towards the end of the study, overriding beta activity  noted over the typical background, likely related to initiation of Onfi.   Occasional rhythmic delta activity in the left frontotemporal region best seen at F7>Fp1, T3, F3 in the waking state. Diffusely slow background.   EEG 06/04 - 06/05- Abnormal EEG   9-10Hz PDR.    The left hemisphere demonstrates a similar alpha beta background with frequent polymorphic delta and theta activity in the frontal and temporal regions.    Normal sleep transients including sleep spindles, K complexes, vertex waves and POSTS were seen.  Occasional rhythmic delta activity in the left frontotemporal region best seen at F7>Fp1, T3, F3 in the waking state.   1 typical event occurred overnight at 3:09:55. Please see full EEG procedure report for details.   EEG 06/03-06/04 - Abnormal EEG  9-10Hz PDR.    The left hemisphere demonstrates a similar alpha beta background with frequent polymorphic delta and theta activity in the frontal and temporal regions.    Normal sleep transients including sleep spindles, K complexes, vertex waves and POSTS were seen.  Occasional rhythmic delta activity in the left frontotemporal region best seen at F7>Fp1, T3, F3 in the waking state.  There are occasional left anterior temporal sharp waves in sleep phase reversing at F7, T1.  EEG 06/02-06/03 - Abnormal EEG  Frequent rhythmic delta activity in the left frontotemporal region best seen at F7>Fp1, T3, F3 in the waking state.  There are occasional left anterior temporal sharp waves in sleep phase reversing at F7, T1.  Normal sleep transients including sleep spindles, K complexes, vertex waves and POSTS were seen.  PDR 9-10Hz noted, occasional runs of generalized polymorphic and quasirhythmic delta and theta activity.   EEG 06/01-06/02  1 event recorded on 6/2/2020 from 04:57:51 to 04:59:14  Seizure occurs from sleep with brief appearance of disorganized alpha and theta activity for 2-3 seconds before rapid transition to confluent muscle artifact. There is  generalized EEG suppression for over a minute following offset. Patient is lying on his right side when he develops posturing of the right arm transitioning into figure of four posturing with clonic movements while remaining on his right side.  PDR 9-10Hz noted, occasional runs of generalized polymorphic and quasirhythmic delta and theta activity.       Significant Labs: All pertinent lab results from the past 24 hours have been reviewed.    Significant Studies: I have reviewed all pertinent imaging results/findings within the past 24 hours.

## 2020-06-08 ENCOUNTER — TELEPHONE (OUTPATIENT)
Dept: NEUROLOGY | Facility: CLINIC | Age: 55
End: 2020-06-08

## 2020-06-08 NOTE — TELEPHONE ENCOUNTER
----- Message from Federico Villasenor sent at 6/8/2020  9:48 AM CDT -----  Pharmacy Calling    Reason for call: states insurance will not recognize NPI, needing the attending doctor name, verbal that it's okay to fill under them, and MIC#    Pharmacy Name: [facility]    Prescription Name:     Phone Number: [phone]    Additional Information:

## 2020-06-08 NOTE — TELEPHONE ENCOUNTER
During EMU rotation. He typically follows w/ Dr. Benoit here and Dr. Ambriz in [facility].

## 2020-06-10 ENCOUNTER — TELEPHONE (OUTPATIENT)
Dept: NEUROLOGY | Facility: CLINIC | Age: 55
End: 2020-06-10

## 2020-06-15 ENCOUNTER — PATIENT MESSAGE (OUTPATIENT)
Dept: NEUROLOGY | Facility: CLINIC | Age: 55
End: 2020-06-15

## 2020-06-23 ENCOUNTER — HOSPITAL ENCOUNTER (OUTPATIENT)
Dept: RADIOLOGY | Facility: HOSPITAL | Age: 55
Discharge: HOME | End: 2020-06-23
Attending: PSYCHIATRY & NEUROLOGY
Payer: COMMERCIAL

## 2020-06-23 DIAGNOSIS — R56.9 SEIZURE: ICD-10-CM

## 2020-06-23 LAB — POCT GLUCOSE: 83 MG/DL (ref 70–110)

## 2020-06-23 PROCEDURE — 78608 BRAIN IMAGING (PET): CPT | Mod: TC

## 2020-06-23 PROCEDURE — 78608 BRAIN IMAGING (PET): CPT | Mod: 26,PS,, | Performed by: RADIOLOGY

## 2020-06-23 PROCEDURE — 78608 NM PET BRAIN: ICD-10-PCS | Mod: 26,PS,, | Performed by: RADIOLOGY

## 2020-06-29 NOTE — PROCEDURES
EPILEPSY MONITORING UNIT  EEG/VIDEO TELEMETRY REPORT    DATE OF SERVICE: 6/5/2020  EEG NUMBER: [id number]  REQUESTED BY:   LOCATION OF SERVICE:     METHODOLOGY      Electroencephalographic (EEG) is recorded with electrodes placed according to the International 10-20 placement system.  Thirty Two (32) channels of digital signal including the T1 and T2 electrodes are simultaneously recorded from the scalp and also including EKG, EMG  and/or eye movement monitors.  Recording band pass was 0.1 to 512 hz.  Digital video recording of the patient is simultaneously recorded with the EEG.  The patient is instructed report clinical symptoms which may occur during the recording session.  EEG and video recording is stored and archived in digital format. Activation procedures which include photic stimulation, hyperventilation and instructing patients to perform simple task are done in selected patients.       The EEG is displayed on a monitor screen and can be reformatted into different montages for evaluation.  The entire recoding is submitted for computer assisted analysis to detect spike and electrographic seizure activity.  The entire recording is visually reviewed and the times identified by computer analysis as being spikes or seizures are reviewed again.  Compresses spectral analysis (CSA) is also performed on the activity recorded from each individual channel.  This is displayed as a power display of frequencies from 0 to 30 Hz over time.   The CSA analysis is done and displayed continuously.  This is reviewed for asymmetries in power between homologous areas of the scalp and for presence of changes in power which can be seen when seizures occur.  Sections of suspected abnormalities on the CSA is then compared with the original EEG recording.      [employer] software was also utilized in the review of this study.  This software suite analyzes the EEG recording in multiple domains.  Coherence and rhythmicity is computed  to identify EEG sections which may contain organized seizures.  Each channel undergoes analysis to detect presence of spike and sharp waves which have special and morphological characteristic of epileptic activity.  The routine EEG recording is converted from spacial into frequency domain.  This is then displayed comparing homologous areas to identify areas of significant asymmetry.  Algorithm to identify non-cortically generated artifact is used to separate eye movement, EMG and other artifact from the EEG.      Recording Times  Start on 6/5/2020 at 07:00:46 stop on 6/6/2020 at 11:57am    A total of 28 hours of EEG/Video telemetry was recorded.    Events/Seizures recorded  None      ELECTROENCEPHALOGRAM  INTERICTAL:  Background activity:   The background rhythm consists of a posterior dominant alpha rhythm with frequency of 9Hz in addition to a mixture of alpha and beta frequencies anteriorly. The PDR was at times assymetric between 6-8Hz in left hemisphere. There was frequent generalized irregular theta-delta activity. There was frequent left temporal theta-delta activity that was at times semi-rhythmic and other times irregular. There were occasional left temporal epileptiform discharges.      Events/Seizures recorded  none       Sleep:   Normal sleep transients including sleep spindles, K complexes, vertex waves and POSTS were seen.     Activation procedures:   Hyperventilation was not performed.  Photic stimulation was not performed.     Abnormal activity:   1. Left temporal epileptiform discharges  2. Left temporal theta-delta activity.  3. Generalized theta-delta activity.        FINAL SUMMARY  This is an abnormal video EEG suggestive of a seizure tendency in the left temporal region. There is additional evidence of a left temporal>generalized nonspecific cerebral dysfunction. There were no electrographic seizures recorded during this study.     Maxime Drummond MD  [facility]

## 2020-06-30 ENCOUNTER — TELEPHONE (OUTPATIENT)
Dept: NEUROLOGY | Facility: CLINIC | Age: 55
End: 2020-06-30

## 2020-06-30 NOTE — TELEPHONE ENCOUNTER
PA submitted again to [employer]. Requested cover sheet to submit additional information but was told that they could only send me that if they needed additional information

## 2020-07-06 ENCOUNTER — TELEPHONE (OUTPATIENT)
Dept: NEUROLOGY | Facility: CLINIC | Age: 55
End: 2020-07-06

## 2020-07-06 NOTE — TELEPHONE ENCOUNTER
----- Message from Johnnie Briones sent at 7/6/2020  9:53 AM CDT -----  Contact: Trista ( spouse ) @[phone]  Caller requesting a return call regarding the Prior Authorization on ( cloBAZam (ONFI) 20 mg Tab) pls call     Patient is out of medication     [facility] ([place]) - [place], [place] - [address]  [address] [place] [address]  Phone: [phone] Fax: [phone]

## 2020-07-06 NOTE — TELEPHONE ENCOUNTER
Spoke to insurance company, they state that they can not see where the additional information has been scanned in to the system. I have faxed it 3 times. I will check back this afternoon to see if it has been sent. Notified patients mother of ongoing issue with insurance company

## 2020-07-09 ENCOUNTER — TELEPHONE (OUTPATIENT)
Dept: NEUROLOGY | Facility: CLINIC | Age: 55
End: 2020-07-09

## 2020-07-09 DIAGNOSIS — G40.812 LENNOX-GASTAUT SYNDROME WITH TONIC SEIZURES: Primary | ICD-10-CM

## 2020-07-09 NOTE — TELEPHONE ENCOUNTER
Spoke with patients mother, advised that the prior authorization is still pending for the Clobazam. Patient is currently out of medication and is at risk for seizures. Advised he should go to the ED if seizures occur. Paperwork has been faxed multiple times to the insurance.
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
## 2020-07-13 ENCOUNTER — OFFICE VISIT (OUTPATIENT)
Dept: NEUROLOGY | Facility: CLINIC | Age: 55
End: 2020-07-13
Payer: COMMERCIAL

## 2020-07-13 VITALS
WEIGHT: 193.31 LBS | HEART RATE: 55 BPM | HEIGHT: 71 IN | BODY MASS INDEX: 27.06 KG/M2 | DIASTOLIC BLOOD PRESSURE: 87 MMHG | SYSTOLIC BLOOD PRESSURE: 126 MMHG

## 2020-07-13 DIAGNOSIS — G40.209 COMPLEX PARTIAL SEIZURES EVOLVING TO GENERALIZED TONIC-CLONIC SEIZURES: Primary | ICD-10-CM

## 2020-07-13 PROCEDURE — 99214 OFFICE O/P EST MOD 30 MIN: CPT | Mod: S$GLB,,, | Performed by: PSYCHIATRY & NEUROLOGY

## 2020-07-13 PROCEDURE — 3008F BODY MASS INDEX DOCD: CPT | Mod: S$GLB,,, | Performed by: PSYCHIATRY & NEUROLOGY

## 2020-07-13 PROCEDURE — 99214 PR OFFICE/OUTPT VISIT, EST, LEVL IV, 30-39 MIN: ICD-10-PCS | Mod: S$GLB,,, | Performed by: PSYCHIATRY & NEUROLOGY

## 2020-07-13 PROCEDURE — 3008F PR BODY MASS INDEX (BMI) DOCUMENTED: ICD-10-PCS | Mod: S$GLB,,, | Performed by: PSYCHIATRY & NEUROLOGY

## 2020-07-13 PROCEDURE — 99999 PR PBB SHADOW E&M-EST. PATIENT-LVL III: ICD-10-PCS | Mod: PBBFAC,,, | Performed by: PSYCHIATRY & NEUROLOGY

## 2020-07-13 PROCEDURE — 99999 PR PBB SHADOW E&M-EST. PATIENT-LVL III: CPT | Mod: PBBFAC,,, | Performed by: PSYCHIATRY & NEUROLOGY

## 2020-07-20 NOTE — ASSESSMENT & PLAN NOTE
56yo M with hx of seizures x 2013: sent for 2nd opinion  - prior normal (1.5T) MRI and 1 Left Temporal sz recorded in A-EEG (2018)  - intractable despite 3 current AEDs and trial of 4 AEDs prior    Recent EMU evaluation (May-June 2020): One typical seizure was recorded which was poorly localizing electrographically although with semiology clearly lateralizing to the left and suggestive of localization to the temporal lobe.    Current AEDs:  - EsliCBZ 1200mg q day  - ZNS 300mg q day  - Clob 20mg bid - started in EMU  (LCS 200mg bid was d/grant in EMU)    Plan:  - continue current AEDs   - seizure-free on the current AED regimen after d/c from EMU and patient desirous of trying AEDs for now  - however, will initiate surgical evaluation in anticipation - had a detailed discussion with patient and his wife and they are in agreement of the plan  - will need Neuro-Psy evaluation    COVID-19 precautions  Seizure log  Seizure precautions/restrictions    Plan of care was discussed in detail with patient and his wife.

## 2020-07-22 ENCOUNTER — OFFICE VISIT (OUTPATIENT)
Dept: NEUROLOGY | Facility: CLINIC | Age: 55
End: 2020-07-22
Payer: COMMERCIAL

## 2020-07-22 DIAGNOSIS — G40.219 COMPLEX PARTIAL EPILEPSY WITH GENERALIZATION AND WITH INTRACTABLE EPILEPSY: Primary | ICD-10-CM

## 2020-07-22 DIAGNOSIS — G43.909 MIGRAINE WITHOUT STATUS MIGRAINOSUS, NOT INTRACTABLE, UNSPECIFIED MIGRAINE TYPE: ICD-10-CM

## 2020-07-22 DIAGNOSIS — R41.89 OTHER SYMPTOMS AND SIGNS INVOLVING COGNITIVE FUNCTIONS AND AWARENESS: ICD-10-CM

## 2020-07-22 DIAGNOSIS — G40.209 COMPLEX PARTIAL SEIZURES EVOLVING TO GENERALIZED TONIC-CLONIC SEIZURES: ICD-10-CM

## 2020-07-22 PROCEDURE — 96116 NUBHVL XM PHYS/QHP 1ST HR: CPT | Mod: 95,,, | Performed by: CLINICAL NEUROPSYCHOLOGIST

## 2020-07-22 PROCEDURE — 99499 NO LOS: ICD-10-PCS | Mod: 95,,, | Performed by: CLINICAL NEUROPSYCHOLOGIST

## 2020-07-22 PROCEDURE — 96116 PR NEUROBEHAVIORAL STATUS EXAM BY PSYCH/PHYS: ICD-10-PCS | Mod: 95,,, | Performed by: CLINICAL NEUROPSYCHOLOGIST

## 2020-07-22 PROCEDURE — 99499 UNLISTED E&M SERVICE: CPT | Mod: 95,,, | Performed by: CLINICAL NEUROPSYCHOLOGIST
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
## 2020-08-05 ENCOUNTER — TELEPHONE (OUTPATIENT)
Dept: NEUROLOGY | Facility: CLINIC | Age: 55
End: 2020-08-05

## 2020-08-05 RX ORDER — CLOBAZAM 20 MG/1
20 TABLET ORAL DAILY
Qty: 30 TABLET | Refills: 5 | Status: CANCELLED | OUTPATIENT
Start: 2020-08-05 | End: 2021-02-01

## 2020-08-05 NOTE — TELEPHONE ENCOUNTER
----- Message from Oralia Baker sent at 8/5/2020  9:05 AM CDT -----  Regarding: refill  Contact: Roxanna (wife) @ [phone]  Rx Refill/Request     Is this a Refill or New Rx:  refill (needing a prior authorization)    Rx Name and Strength:  cloBAZam (ONFI) 20 mg Tab    Preferred Pharmacy with phone number:     [facility] - [place], [place] - [address]  [address] [address]  Phone: [phone] Fax: [phone]    Communication Preference:Roxanna (wife)@ [phone]  Additional Information: caller says the patient is completely out of the medication. Please call.

## 2020-08-07 ENCOUNTER — TELEPHONE (OUTPATIENT)
Dept: NEUROLOGY | Facility: CLINIC | Age: 55
End: 2020-08-07

## 2020-08-10 ENCOUNTER — INITIAL CONSULT (OUTPATIENT)
Dept: NEUROLOGY | Facility: CLINIC | Age: 55
End: 2020-08-10
Payer: COMMERCIAL

## 2020-08-10 DIAGNOSIS — G40.219 COMPLEX PARTIAL EPILEPSY WITH GENERALIZATION AND WITH INTRACTABLE EPILEPSY: Primary | ICD-10-CM

## 2020-08-10 DIAGNOSIS — F06.70 MILD NEUROCOGNITIVE DISORDER DUE TO ANOTHER MEDICAL CONDITION: ICD-10-CM

## 2020-08-10 DIAGNOSIS — G40.209 COMPLEX PARTIAL SEIZURES EVOLVING TO GENERALIZED TONIC-CLONIC SEIZURES: ICD-10-CM

## 2020-08-10 DIAGNOSIS — F43.23 ADJUSTMENT DISORDER WITH MIXED ANXIETY AND DEPRESSED MOOD: ICD-10-CM

## 2020-08-10 PROCEDURE — 96133 NRPSYC TST EVAL PHYS/QHP EA: CPT | Mod: S$GLB,,, | Performed by: CLINICAL NEUROPSYCHOLOGIST

## 2020-08-10 PROCEDURE — 96139 PSYCL/NRPSYC TST TECH EA: CPT | Mod: S$GLB,,, | Performed by: CLINICAL NEUROPSYCHOLOGIST

## 2020-08-10 PROCEDURE — 99499 UNLISTED E&M SERVICE: CPT | Mod: S$GLB,,, | Performed by: CLINICAL NEUROPSYCHOLOGIST

## 2020-08-10 PROCEDURE — 96132 PR NEUROPSYCHOLOGIC TEST EVAL SVCS, 1ST HR: ICD-10-PCS | Mod: S$GLB,,, | Performed by: CLINICAL NEUROPSYCHOLOGIST

## 2020-08-10 PROCEDURE — 96139 PR PSYCH/NEUROPSYCH TEST ADMIN/SCORING, BY TECH, 2+ TESTS, EA ADDTL 30 MIN: ICD-10-PCS | Mod: S$GLB,,, | Performed by: CLINICAL NEUROPSYCHOLOGIST

## 2020-08-10 PROCEDURE — 96138 PR PSYCH/NEUROPSYCH TEST ADMIN/SCORING, BY TECH, 2+ TESTS, 1ST 30 MIN: ICD-10-PCS | Mod: S$GLB,,, | Performed by: CLINICAL NEUROPSYCHOLOGIST

## 2020-08-10 PROCEDURE — 99999 PR PBB SHADOW E&M-EST. PATIENT-LVL I: ICD-10-PCS | Mod: PBBFAC,,, | Performed by: CLINICAL NEUROPSYCHOLOGIST

## 2020-08-10 PROCEDURE — 99999 PR PBB SHADOW E&M-EST. PATIENT-LVL I: CPT | Mod: PBBFAC,,, | Performed by: CLINICAL NEUROPSYCHOLOGIST

## 2020-08-10 PROCEDURE — 96132 NRPSYC TST EVAL PHYS/QHP 1ST: CPT | Mod: S$GLB,,, | Performed by: CLINICAL NEUROPSYCHOLOGIST

## 2020-08-10 PROCEDURE — 96138 PSYCL/NRPSYC TECH 1ST: CPT | Mod: S$GLB,,, | Performed by: CLINICAL NEUROPSYCHOLOGIST

## 2020-08-10 PROCEDURE — 96133 PR NEUROPSYCHOLOGIC TEST EVAL SVCS, EA ADDTL HR: ICD-10-PCS | Mod: S$GLB,,, | Performed by: CLINICAL NEUROPSYCHOLOGIST

## 2020-08-10 PROCEDURE — 99499 NO LOS: ICD-10-PCS | Mod: S$GLB,,, | Performed by: CLINICAL NEUROPSYCHOLOGIST

## 2020-08-17 NOTE — PROGRESS NOTES
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 69 Average   Trial 2 6 34 5 Below Average   Trial 3 8 37 9 Low Average   Trial 4 8 32 4 Below Average   Trial 5 9 35 7 Below Average   List B 3 38 12 Low Average   Immediate FR 6 36 8 Below Average   Delayed FR 7 41 18 Low Average   Recognition 10 22 0.3 Exceptionally Low    Total Learning 38 38 12 Low Average   Learning to Learn N/A 30 2 Below Average   Proactive Interference N/A 33 4 Below Average   WMS-IV Subtests        LM I 27 11 63 Average   LM II 19 9 37 Average   LM Recognition 21 - 10-16 Low Average    BVMT-R        IR 10 26 1 Exceptionally Low    DR 4 28 1 Exceptionally Low    Discrimination Index 4 - 6-10 Below Average   ACS Social Cognition        Faces I 49 3 1 Exceptionally Low    Faces II 12 1 0.1 Exceptionally Low    Faces Content 27 3 1 Exceptionally Low    Faces Spatial 34 5 5 Below Average   ATTENTION/WORKING MEMORY Raw Score Standardized Score Percentile/CP Descriptor   WAIS-IV WMI - 80 9 Low Average   WAIS-IV Digit Span 18 6 9 Low Average         DS Forward 6 5 5 Below Average         DS Backward 6 7 16 Low Average         DS Sequence 6 7 16 Low Average         Longest Digit Forward 5 - - -         Longest Digit Backward 3 - - -         Longest Digit Sequence 5 - - -   WAIS-IV Arithmetic 11 7 16 Low Average   MENTAL PROCESSING SPEED  Raw Score Standardized Score Percentile/CP Descriptor   WAIS-IV PSI - 84 14 Low Average   WAIS-IV Symbol Search 18 6 9 Low Average   WAIS-IV Coding 47 8 25 Average   TMT A  36 43 24 Low Average   TMT A errors 0 - - -   Stroop Color Word: Word Reading 72 29 2 Below Average   Stroop Color Word: Color Naming 55 33 4 Below Average   EXECUTIVE FUNCTIONING Raw Score Standardized Score Percentile/CP Descriptor   TMT B 108 37 9 Low Average   TMT B errors 1 - - -   Stroop Color Word: C/W 10 22 0.3 Exceptionally Low    Stroop Color Word: Interference -20 30 2 Below Average   WCST-128        Total Correct 55  - -   Total Errors 73 68 2 Below Average   Perseverative Resp. 53 71 3 Below Average   Perseverative Err. 46 69 2 Below Average   Nonperseverative Err. 27 75 5 Below Average   Concept. Level Response 33 69 2 Below Average   Categories Completed 2 - 6-10 Below Average   FMS 1 - >16 WNL   Learning to Learn -30.07 - <1 Exceptionally Low    FRONTOMOTOR  Raw Score Standardized Score Percentile/CP Descriptor   GPT DH 83 39 14 Low Average   GPD NDH 95 39 14 Low Average   MOOD & PERSONALITY Raw Score Standardized Score Percentile/CP Descriptor   GDS-30 14 - - Mild   STAISF:State 22 - 87 High Average   STAISF:Trait 21 - 87 High Average   QOLIE-31        Seizure Worry 7 30 2 Moderate   Overall Quality of Life 78 56 73 WNL   Emotional Well-Being 64 48 42 WNL   Energy/Fatigue 65 55 70 WNL   Cognitive 39 41 19 WNL   Medication Effects 0 32 4 Moderate   Social Function 26 35 7 Moderate   Overall Score N/A 39 14 Moderate         BILLING  Service Description CPT Code Minutes Units   Psychiatric diagnostic evaluation by physician [id number] (previously billed)   Neurobehavioral status exam by physician [id number] (previously billed)   Each additional hour by physician [id number] (previously billed)   Test Evaluation Services --  --   Neuropsychological testing evaluation services by physician [id number] [phone] 1   Each additional hour by physician [id number]  4    Test Administration and Scoring --  --   Psychological or neuropsychological test administration and scoring by physician [id number]  0   Each additional 30 minutes by physician [id number]  0   Psychological or neuropsychological test administration and scoring by technician [id number] 1   Each additional 30 minutes by technician [id number]  11

## 2020-08-19 PROBLEM — F43.23 ADJUSTMENT DISORDER WITH MIXED ANXIETY AND DEPRESSED MOOD: Status: ACTIVE | Noted: 2020-06-02

## 2020-08-19 PROBLEM — F06.70 MILD NEUROCOGNITIVE DISORDER DUE TO ANOTHER MEDICAL CONDITION: Status: ACTIVE | Noted: 2020-07-22

## 2020-09-09 ENCOUNTER — TELEPHONE (OUTPATIENT)
Dept: NEUROLOGY | Facility: CLINIC | Age: 55
End: 2020-09-09

## 2020-09-24 ENCOUNTER — OFFICE VISIT (OUTPATIENT)
Dept: NEUROLOGY | Facility: CLINIC | Age: 55
End: 2020-09-24
Payer: COMMERCIAL

## 2020-09-24 DIAGNOSIS — F43.23 ADJUSTMENT DISORDER WITH MIXED ANXIETY AND DEPRESSED MOOD: ICD-10-CM

## 2020-09-24 DIAGNOSIS — G40.219 COMPLEX PARTIAL EPILEPSY WITH GENERALIZATION AND WITH INTRACTABLE EPILEPSY: ICD-10-CM

## 2020-09-24 DIAGNOSIS — F06.70 MILD NEUROCOGNITIVE DISORDER DUE TO ANOTHER MEDICAL CONDITION: ICD-10-CM

## 2020-09-24 PROCEDURE — 99499 UNLISTED E&M SERVICE: CPT | Mod: 95,,, | Performed by: CLINICAL NEUROPSYCHOLOGIST

## 2020-09-24 PROCEDURE — 99499 NO LOS: ICD-10-PCS | Mod: 95,,, | Performed by: CLINICAL NEUROPSYCHOLOGIST

## 2020-09-24 NOTE — PROGRESS NOTES
NEUROPSYCHOLOGICAL EVALUATION FEEDBACK    Declan Burleson attended a feedback session today in person and was accompanied by his wife. We discussed the results of the neuropsychological evaluation and I gave time to discuss questions and concerns. For full evaluation details, please see the note from this provider dated 8/10/2020 . A copy of the report was provided today along with some handouts.     Gwen Horton, PhD  Licensed Clinical Neuropsychologist  [facility] - Department of Neurology

## 2020-10-15 ENCOUNTER — TELEPHONE (OUTPATIENT)
Dept: NEUROLOGY | Facility: CLINIC | Age: 55
End: 2020-10-15

## 2020-10-15 NOTE — TELEPHONE ENCOUNTER
Called wife to discuss next steps in process for surgery. She states he is ready. Stated I would let  know. We will contact them soon.

## 2020-10-15 NOTE — TELEPHONE ENCOUNTER
----- Message from Teresa Evans MA sent at 10/15/2020 11:44 AM CDT -----  Patient has questions about moving forward with scheduling surgery

## 2020-10-23 ENCOUNTER — TELEPHONE (OUTPATIENT)
Dept: NEUROLOGY | Facility: CLINIC | Age: 55
End: 2020-10-23

## 2020-10-23 NOTE — TELEPHONE ENCOUNTER
Left message for wife. Informed her that we presented  today in conference.We wanted to discuss the numbers of seizures that he has and consider doing at home video eeg.

## 2020-10-26 ENCOUNTER — TELEPHONE (OUTPATIENT)
Dept: NEUROLOGY | Facility: CLINIC | Age: 55
End: 2020-10-26

## 2020-10-26 DIAGNOSIS — R56.9 CONVULSIONS, UNSPECIFIED CONVULSION TYPE: ICD-10-CM

## 2020-11-03 ENCOUNTER — HOSPITAL ENCOUNTER (OUTPATIENT)
Dept: RADIOLOGY | Facility: HOSPITAL | Age: 55
Discharge: HOME | End: 2020-11-03
Attending: PSYCHIATRY & NEUROLOGY
Payer: COMMERCIAL

## 2020-11-03 DIAGNOSIS — R56.9 CONVULSIONS, UNSPECIFIED CONVULSION TYPE: ICD-10-CM

## 2020-11-03 PROCEDURE — 70555 FMRI BRAIN BY PHYS/PSYCH: CPT | Mod: TC

## 2020-11-03 PROCEDURE — 70555 MRI BRAIN FUNCTIONAL WITH A PHYSICIAN: ICD-10-PCS | Mod: 26,,, | Performed by: RADIOLOGY

## 2020-11-03 PROCEDURE — 70555 FMRI BRAIN BY PHYS/PSYCH: CPT | Mod: 26,,, | Performed by: RADIOLOGY

## 2020-11-03 PROCEDURE — 25500020 PHARM REV CODE 255: Performed by: PSYCHIATRY & NEUROLOGY

## 2020-11-03 PROCEDURE — A9585 GADOBUTROL INJECTION: HCPCS | Performed by: PSYCHIATRY & NEUROLOGY

## 2020-11-03 PROCEDURE — 70553 MRI BRAIN STEM W/O & W/DYE: CPT | Mod: TC

## 2020-11-03 PROCEDURE — 70553 MRI BRAIN STEM W/O & W/DYE: CPT | Mod: 26,,, | Performed by: RADIOLOGY

## 2020-11-03 PROCEDURE — 70553 MRI BRAIN W WO CONTRAST: ICD-10-PCS | Mod: 26,,, | Performed by: RADIOLOGY

## 2020-11-03 RX ORDER — GADOBUTROL 604.72 MG/ML
9 INJECTION INTRAVENOUS
Status: COMPLETED | OUTPATIENT
Start: 2020-11-03 | End: 2020-11-03

## 2020-11-03 RX ADMIN — GADOBUTROL 9 ML: 604.72 INJECTION INTRAVENOUS at 11:11

## 2020-11-17 ENCOUNTER — OFFICE VISIT (OUTPATIENT)
Dept: NEUROSURGERY | Facility: CLINIC | Age: 55
End: 2020-11-17
Payer: COMMERCIAL

## 2020-11-17 VITALS
HEART RATE: 65 BPM | SYSTOLIC BLOOD PRESSURE: 134 MMHG | HEIGHT: 71 IN | BODY MASS INDEX: 26.97 KG/M2 | TEMPERATURE: 99 F | DIASTOLIC BLOOD PRESSURE: 75 MMHG

## 2020-11-17 DIAGNOSIS — G40.209 COMPLEX PARTIAL SEIZURES EVOLVING TO GENERALIZED TONIC-CLONIC SEIZURES: Primary | ICD-10-CM

## 2020-11-17 PROCEDURE — 99205 OFFICE O/P NEW HI 60 MIN: CPT | Mod: 57,S$GLB,, | Performed by: NEUROLOGICAL SURGERY

## 2020-11-17 PROCEDURE — 99999 PR PBB SHADOW E&M-EST. PATIENT-LVL III: ICD-10-PCS | Mod: PBBFAC,,, | Performed by: NEUROLOGICAL SURGERY

## 2020-11-17 PROCEDURE — 99999 PR PBB SHADOW E&M-EST. PATIENT-LVL III: CPT | Mod: PBBFAC,,, | Performed by: NEUROLOGICAL SURGERY

## 2020-11-17 PROCEDURE — 99205 PR OFFICE/OUTPT VISIT, NEW, LEVL V, 60-74 MIN: ICD-10-PCS | Mod: 57,S$GLB,, | Performed by: NEUROLOGICAL SURGERY

## 2020-11-17 RX ORDER — ESLICARBAZEPINE ACETATE 800 MG/1
1200 TABLET ORAL NIGHTLY
Status: ON HOLD | COMMUNITY
End: 2021-01-23 | Stop reason: HOSPADM

## 2020-11-17 NOTE — LETTER
November 22, 2020      Jyoti Benoit MD  [address]  [id number]  [place] [facility] [address]           [facility] - Neurosurgery 7th Fl  [address]  [facility] [address]  Phone: [phone]  Fax: [phone]          Patient: Declan Burleson   MR Number: [id number]   YOB: 1965   Date of Visit: 11/17/2020       Dear Dr. Jyoti Benoit:    Thank you for referring Declan Burleson to me for evaluation. Attached you will find relevant portions of my assessment and plan of care.    If you have questions, please do not hesitate to call me. I look forward to following Declan Burleson along with you.    Sincerely,    Ruchi Chen MD    Enclosure  CC:  No Recipients    If you would like to receive this communication electronically, please contact [email or web address] or [phone] to request more information on [facility] access.    For providers and/or their staff who would like to refer a patient to [facility], please contact us through our one-stop-shop provider referral line, [facility], at [phone].    If you feel you have received this communication in error or would no longer like to receive these types of communications, please e-mail [email or web address]

## 2020-11-17 NOTE — PROGRESS NOTES
"Neurosurgery  History & Physical    SUBJECTIVE:     Chief Complaint: intractable epilepsy      History of Present Illness:  Declan Burleson is a 55 y.o. right-handed male referred by Dr. Benoit (originally by Dr. Saldana of [place]) for consideration of surgical therapy for intractable epilepsy. The patient was formerly employed as a . He is accompanied by his wife Trista today, who helps to provide the history. His seizures began when he was 48 years old. He can recall no inciting event. He has 3 semiologies: "full blown" generalized events, staring episodes, and "mild" seizures, which are characterized as generalized events of shorter duration without incontinence and a shorter post-ictal period. He and his wife estimate that he persists in having about 2 events/month.     He has failed multiple AEDs, including topiramate, lamotrigine, levetiracetam, and oxcarbazepine. He is currently taking eslicarbazepine, zonisamide, and clobazam. He had EMU monitoring in May-June of this year, which suggests left-sided activity. He had 3T MRI in June (personally reviewed) that was non-lesional; he also had PET at that time suggesting possible left-sided activity. He had neuropsychological testing on 8/10/20; summary findings included below. He lives with his wife and 16-year-old daughter, who provide excellent social support.     He takes ASA 81, which will need to be held for one week prior to surgery.     Review of patient's allergies indicates:   Allergen Reactions    Losartan Rash       Current Outpatient Medications   Medication Sig Dispense Refill    amLODIPine (NORVASC) 5 MG tablet Take 1 tablet (5 mg total) by mouth once daily. 30 tablet 11    aspirin (ECOTRIN) 81 MG EC tablet Take 1 tablet (81 mg total) by mouth once daily. 360 tablet 0    cloBAZam (ONFI) 20 mg Tab Take 1 tablet (20 mg total) by mouth once daily. 30 tablet 5    escitalopram oxalate (LEXAPRO) 10 MG tablet Take 1 tablet (10 " mg total) by mouth once daily. 30 tablet 11    LORazepam (ATIVAN) 1 MG tablet Take 1 tablet (1 mg total) by mouth every evening. for 5 days 5 tablet 0    rizatriptan (MAXALT-MLT) 10 MG disintegrating tablet Take 10 mg by mouth.      zonisamide (ZONEGRAN) 100 MG Cap Take 3 capsules (300 mg total) by mouth once daily. 90 capsule 11     No current facility-administered medications for this visit.        Past Medical History:   Diagnosis Date    Anxiety     Migraine      No past surgical history on file.  Family History     None        Social History     Socioeconomic History    Marital status:      Spouse name: Reilly    Number of children: Not on file    Years of education: 2 year college    Highest education level: Associate degree: occupational, technical, or vocational program   Occupational History    Not on file   Social Needs    Financial resource strain: Not very hard    Food insecurity     Worry: Not on file     Inability: Not on file    Transportation needs     Medical: Not on file     Non-medical: Not on file   Tobacco Use    Smoking status: Former Smoker   Substance and Sexual Activity    Alcohol use: Yes     Frequency: Monthly or less     Drinks per session: 1 or 2     Binge frequency: Less than monthly    Drug use: Never    Sexual activity: Yes     Partners: Female   Lifestyle    Physical activity     Days per week: Not on file     Minutes per session: Not on file    Stress: Not on file   Relationships    Social connections     Talks on phone: Not on file     Gets together: Not on file     Attends [employer]ism service: Not on file     Active member of club or organization: Not on file     Attends meetings of clubs or organizations: Not on file     Relationship status: Not on file   Other Topics Concern    Not on file   Social History Narrative    Not on file       Review of Systems   Constitutional: Negative for fever.   HENT: Positive for nosebleeds.    Eyes: Negative for visual  disturbance.   Respiratory: Negative for shortness of breath.    Cardiovascular: Negative for chest pain.   Gastrointestinal: Negative for vomiting.   Endocrine: Negative for cold intolerance.   Genitourinary: Negative for difficulty urinating.   Musculoskeletal: Negative for back pain.   Skin: Negative for color change.   Neurological: Positive for seizures.   Hematological: Does not bruise/bleed easily.   Psychiatric/Behavioral: The patient is nervous/anxious.        OBJECTIVE:     Vital Signs     There is no height or weight on file to calculate BMI.      Physical Exam:    Constitutional: He appears well-developed and well-nourished. No distress.     Eyes: Pupils are equal, round, and reactive to light. EOM are normal.     Cardiovascular: No edema.     Abdominal: Soft.     Skin: Skin displays no rash on extremities. Skin displays no lesions on extremities.   Mostly bald     Psych/Behavior: He is alert. He is oriented to person, place, and time.     Musculoskeletal: Gait is normal.        Right Upper Extremities: Muscle strength is 5/5.        Left Upper Extremities: Muscle strength is 5/5.       Right Lower Extremities: Muscle strength is 5/5.        Left Lower Extremities: Muscle strength is 5/5.     Neurological:        Coordination: He has normal finger to nose coordination.        DTRs: DTRs are DTRS NORMAL AND SYMMETRICnormal and symmetric.        Cranial nerves:   Face grossly symmetric   Shoulder shrug equal bilaterally    Pulmonary: symmetric expansion     Diagnostic Results:  Neuropsych:     Handedness: Right      Language Acquisition: English third language (Ch[place]an first, [place] second). Was taught [place] English at a young age. Moved to [place] at 14 years old. Began using English regularly at the age of 14 years and his classes were then taught in English (Ch[place]an prior to this). Has primarily used English in his adult life; speaks with brother in [place]an, though they switch back and forth to  "English.      Cognitive Functioning:   Low average attention/working memory   Low average mental processing speed   Low average fine motor skills bilaterally   Below average language skills but exceptionally low confrontation naming and auditory comprehension   Below average visuospatial skills   Average to below average verbal learning and memory and exceptionally low visual learning and memory      Lateralization & Localization:   · EEG suggestive of "a left temporal>generalized nonspecific cerebral dysfunction"  · PET - "multiple regions of decreased uptake, most prominent within the left rolandic operculum"  · MRI brain - "few scattered punctate size foci of T2 FLAIR signal hyperintensity supratentorial white matter most concentrated in the right frontal lobe which are nonspecific and of doubtful clinical significance...The temporal lobes are relatively symmetric"  · Cognitive testing -   ? Lateralization - Pattern seen on memory testing only (no pattern seen on fine motor skills or verbal/visuospatial IQ measures). VERBAL LEARNING AND MEMORY BETTER THAN VISUAL LEARNING AND MEMORY   ? Localization - Temporal lobe involvement as well as extra-temporal lobe involvement (frontosubortical). Significant naming and comprehension difficulties > possibly related to English as third language?      Mood/Psychiatric:   Mild anxiety and depression indicated on screening questionnaires, seemingly related to his seizure disorder.   Moderate worry regarding seizures, medication effects, and social functioning   Recently started on Lexapro with limited benefit seen. May benefit from increase in dosage if indicated.   Mood symptoms are not a contraindication to surgery.  Reporting having auditory hallucinations for the past year - friendly, giving him advice, "Be a good boy and take care of the family." Unsure of exact etiology of these.      Presurgical Considerations:   · Capacity - No concerns for decision-making " capacity  · Risk - Given that verbal learning and memory are areas of strength, he is at increased risk of experience cognitive decline following surgical intervention (should left temporal lobe be target area).   · Given pattern of cognitive testing, further evaluation for localization may be warranted.     ASSESSMENT/PLAN:     Declan Burleson is a 55 y.o. male with intractable epilepsy who presents to discuss surgical treatment options. He was discussed at length in the interdisciplinary epilepsy surgical conference, and sEEG for intracranial localization was recommended.     I had a lengthy, detailed discussion with the patient and his wife about the risks, benefits, and alternatives to intracranial localization for seizures.      We discussed that monitoring typically takes 1-2 weeks but may be longer or shorter depending on how long it takes for him to have concordant seizures.  We discussed that he will not be able to get out of bed while the electrodes are in his head, and that he will remain in the ICU during that time. We also discussed that this is a diagnostic, not therapeutic, procedure, and that the definitive surgery would be performed 3-8 weeks after the time of sEEG localization.      We discussed that there is approximately 20% rate of a symptomatic hemorrhage and approximately 1% rate of symptomatic hemorrhage from placement of sEEG electrode per review of the international literature.  They expressed understanding of this.      We also discussed the specific risks of the localization surgery. Risks include, but are not limited to, bleeding, pain, infection, scarring, need for further/repeat procedure, death, paralysis, stroke (including venous infarct)/damage to major blood vessels, leak of cerebrospinal fluid, speech difficulty, and hardware-related complications. There is a chance that we would fail to localize the seizures with the initial electrode plan, which would require placement of  additional electrodes. We discussed that my partner Dr. Montoya will also be involved in the surgical process. Informed consent was obtained for placement and removal of sEEG electrodes, together with blood transfusion.      He asked about her chances for seizure freedom, which we explained we cannot predict until we determine where the seizures are beginning, and moreover, whether they are uni- or multifocal. We also discussed that there are also surgical treatments for seizures that do not require localization intracranially: VNS and DBS.     Because he has only had epilepsy care in the [facility] system, I would like him to see Dr. Gilman for medical optimization and clearance. He and his wife are in agreement with this plan. A decolonization protocol was given.     We will tentatively plan for surgery in early January due to sEEG monitoring equipment availability. He has already obtained an MRI brain STEALTH protocol, which I believe should be sufficient for operative planning purposes.     I have encouraged him to contact the clinic in interim with any questions, concerns, or adverse clinical change.     Note generated with voice recognition software; please excuse any typographical errors.

## 2020-11-22 NOTE — PATIENT INSTRUCTIONS
Please use the Bactroban ointment twice daily in your nose for 5 days leading up to surgery. (You may use a Q-tip to apply a little bit of ointment inside each nostril.)    Please use the Hibiclens soap every other day in the shower for the 5 days before your surgery. For example, if surgery is on Monday, use the Hibiclens when you shower on Thursday, Saturday, and Monday morning.     We are asking you to do this to help reduce the chance of having an infection associated with surgery. Thank you!     Please hold aspirin and all other blood thinning agents for one week prior to surgery.

## 2020-12-16 ENCOUNTER — TELEPHONE (OUTPATIENT)
Dept: NEUROSURGERY | Facility: CLINIC | Age: 55
End: 2020-12-16

## 2020-12-17 ENCOUNTER — TELEPHONE (OUTPATIENT)
Dept: NEUROLOGY | Facility: CLINIC | Age: 55
End: 2020-12-17

## 2020-12-18 ENCOUNTER — TELEPHONE (OUTPATIENT)
Dept: NEUROLOGY | Facility: CLINIC | Age: 55
End: 2020-12-18

## 2020-12-18 ENCOUNTER — TELEPHONE (OUTPATIENT)
Dept: NEUROSURGERY | Facility: CLINIC | Age: 55
End: 2020-12-18

## 2020-12-18 NOTE — TELEPHONE ENCOUNTER
Spoke to patients wife. Advised that they need to fill out paperwork and resubmit proof of income to [facility]

## 2020-12-18 NOTE — TELEPHONE ENCOUNTER
I spoke with pt wife regarding scheduling a f/u appt to discuss DBS surgery.  agreed to Tuesday @7am   ----- Message from Leyda Ewing sent at 12/18/2020 10:42 AM CST -----  Contact: Trista (wife) @ [phone] until 4:30 then  [phone]  Pt says he is returning [employer]'s call.

## 2020-12-18 NOTE — TELEPHONE ENCOUNTER
----- Message from Johnnie Briones sent at 12/18/2020 10:46 AM CST -----  Contact: Trista ( spouse ) @ [phone] till 4:30pm  Caller returning a missed call from amol Moore return call ( caller states the eslicarbazepine (APTIOM) ? mg Tab is not covered and they're having issues getting it filled )

## 2020-12-18 NOTE — TELEPHONE ENCOUNTER
Spoke to pharmacy and insurance regarding a PA for Aptiom. Patient needs to resubmit paperwork and proof of income to [facility] in order for them to cover the medication

## 2020-12-22 ENCOUNTER — INITIAL CONSULT (OUTPATIENT)
Dept: INTERNAL MEDICINE | Facility: CLINIC | Age: 55
End: 2020-12-22
Payer: COMMERCIAL

## 2020-12-22 ENCOUNTER — HOSPITAL ENCOUNTER (OUTPATIENT)
Dept: CARDIOLOGY | Facility: CLINIC | Age: 55
Discharge: HOME | End: 2020-12-22
Payer: COMMERCIAL

## 2020-12-22 ENCOUNTER — OFFICE VISIT (OUTPATIENT)
Dept: NEUROSURGERY | Facility: CLINIC | Age: 55
End: 2020-12-22
Payer: COMMERCIAL

## 2020-12-22 ENCOUNTER — HOSPITAL ENCOUNTER (OUTPATIENT)
Dept: PREADMISSION TESTING | Facility: HOSPITAL | Age: 55
Discharge: HOME | End: 2020-12-22
Attending: ANESTHESIOLOGY
Payer: COMMERCIAL

## 2020-12-22 VITALS — WEIGHT: 193 LBS | HEIGHT: 71 IN | BODY MASS INDEX: 27.02 KG/M2

## 2020-12-22 VITALS
SYSTOLIC BLOOD PRESSURE: 141 MMHG | RESPIRATION RATE: 16 BRPM | BODY MASS INDEX: 27.44 KG/M2 | OXYGEN SATURATION: 98 % | HEART RATE: 82 BPM | WEIGHT: 196 LBS | HEIGHT: 71 IN | DIASTOLIC BLOOD PRESSURE: 81 MMHG | TEMPERATURE: 98 F

## 2020-12-22 DIAGNOSIS — K21.9 GASTROESOPHAGEAL REFLUX DISEASE, UNSPECIFIED WHETHER ESOPHAGITIS PRESENT: ICD-10-CM

## 2020-12-22 DIAGNOSIS — Z87.891 FORMER SMOKER: ICD-10-CM

## 2020-12-22 DIAGNOSIS — I10 ESSENTIAL HYPERTENSION: ICD-10-CM

## 2020-12-22 DIAGNOSIS — G43.909 MIGRAINE WITHOUT STATUS MIGRAINOSUS, NOT INTRACTABLE, UNSPECIFIED MIGRAINE TYPE: ICD-10-CM

## 2020-12-22 DIAGNOSIS — F43.23 ADJUSTMENT DISORDER WITH MIXED ANXIETY AND DEPRESSED MOOD: ICD-10-CM

## 2020-12-22 DIAGNOSIS — G40.209 COMPLEX PARTIAL SEIZURES EVOLVING TO GENERALIZED TONIC-CLONIC SEIZURES: Primary | ICD-10-CM

## 2020-12-22 DIAGNOSIS — R60.9 EDEMA, UNSPECIFIED TYPE: ICD-10-CM

## 2020-12-22 DIAGNOSIS — R21 FACIAL RASH: ICD-10-CM

## 2020-12-22 DIAGNOSIS — Z79.02 LONG TERM (CURRENT) USE OF ANTITHROMBOTICS/ANTIPLATELETS: ICD-10-CM

## 2020-12-22 DIAGNOSIS — F06.70 MILD NEUROCOGNITIVE DISORDER DUE TO ANOTHER MEDICAL CONDITION: ICD-10-CM

## 2020-12-22 DIAGNOSIS — R06.83 SNORING: ICD-10-CM

## 2020-12-22 DIAGNOSIS — Z01.818 PRE-OP TESTING: ICD-10-CM

## 2020-12-22 DIAGNOSIS — R74.8 ALKALINE PHOSPHATASE ELEVATION: ICD-10-CM

## 2020-12-22 PROCEDURE — 99999 PR PBB SHADOW E&M-EST. PATIENT-LVL III: ICD-10-PCS | Mod: PBBFAC,,, | Performed by: NEUROLOGICAL SURGERY

## 2020-12-22 PROCEDURE — 99999 PR PBB SHADOW E&M-EST. PATIENT-LVL III: CPT | Mod: PBBFAC,,, | Performed by: NEUROLOGICAL SURGERY

## 2020-12-22 PROCEDURE — 99244 OFF/OP CNSLTJ NEW/EST MOD 40: CPT | Mod: S$GLB,,, | Performed by: HOSPITALIST

## 2020-12-22 PROCEDURE — 93005 ELECTROCARDIOGRAM TRACING: CPT | Mod: S$GLB,,, | Performed by: ANESTHESIOLOGY

## 2020-12-22 PROCEDURE — 99244 PR OFFICE CONSULTATION,LEVEL IV: ICD-10-PCS | Mod: S$GLB,,, | Performed by: HOSPITALIST

## 2020-12-22 PROCEDURE — 1126F AMNT PAIN NOTED NONE PRSNT: CPT | Mod: S$GLB,,, | Performed by: NEUROLOGICAL SURGERY

## 2020-12-22 PROCEDURE — 93010 ELECTROCARDIOGRAM REPORT: CPT | Mod: S$GLB,,, | Performed by: INTERNAL MEDICINE

## 2020-12-22 PROCEDURE — 3008F PR BODY MASS INDEX (BMI) DOCUMENTED: ICD-10-PCS | Mod: S$GLB,,, | Performed by: NEUROLOGICAL SURGERY

## 2020-12-22 PROCEDURE — 99999 PR PBB SHADOW E&M-EST. PATIENT-LVL III: ICD-10-PCS | Mod: PBBFAC,,, | Performed by: HOSPITALIST

## 2020-12-22 PROCEDURE — 99214 PR OFFICE/OUTPT VISIT, EST, LEVL IV, 30-39 MIN: ICD-10-PCS | Mod: S$GLB,,, | Performed by: NEUROLOGICAL SURGERY

## 2020-12-22 PROCEDURE — 3008F BODY MASS INDEX DOCD: CPT | Mod: S$GLB,,, | Performed by: NEUROLOGICAL SURGERY

## 2020-12-22 PROCEDURE — 99999 PR PBB SHADOW E&M-EST. PATIENT-LVL III: CPT | Mod: PBBFAC,,, | Performed by: HOSPITALIST

## 2020-12-22 PROCEDURE — 93005 EKG 12-LEAD: ICD-10-PCS | Mod: S$GLB,,, | Performed by: ANESTHESIOLOGY

## 2020-12-22 PROCEDURE — 93010 EKG 12-LEAD: ICD-10-PCS | Mod: S$GLB,,, | Performed by: INTERNAL MEDICINE

## 2020-12-22 PROCEDURE — 1126F PR PAIN SEVERITY QUANTIFIED, NO PAIN PRESENT: ICD-10-PCS | Mod: S$GLB,,, | Performed by: NEUROLOGICAL SURGERY

## 2020-12-22 PROCEDURE — 99214 OFFICE O/P EST MOD 30 MIN: CPT | Mod: S$GLB,,, | Performed by: NEUROLOGICAL SURGERY

## 2020-12-22 RX ORDER — ASCORBIC ACID 500 MG
500 TABLET ORAL DAILY
COMMUNITY
End: 2022-02-22

## 2020-12-22 NOTE — ASSESSMENT & PLAN NOTE
He Is an active person  [place]   Due to his limitation from driving and not able to do things     I suggest monitoring the sodium as SIADH from Escitalopram use and hypersecretion of ADH associated with surgery can reduce sodium in the perioperative period   (1) weak, irregular

## 2020-12-22 NOTE — OUTPATIENT SUBJECTIVE & OBJECTIVE
"Outpatient Subjective & Objective     Chief complaint-Preoperative evaluation, Perioperative Medical management, complication reduction plan     Active cardiac conditions- none    Revised cardiac risk index predictors- none    Functional capacity -Examples of physical activity, stays active , gardening ,  house work and can take 1 flight of stairs----- He can undertake all the above activities without  chest pain,chest tightness, Shortness of breath ,dizziness,lightheadedness making his exercise tolerance more,  than 4 Mets.       Review of Systems   Constitutional: Negative for chills and fever.          Weight gain from reduced activity   HENT:        STOPBANG score 4 / 8    Occasional snoring   HTN  Age over 50 years  Neck size over 40 CM  Male gender    Suggested follow up    Eyes:        No unusual vision changes   Respiratory:          Dry cough - not new  No Hemoptysis   Cardiovascular:        As noted   Gastrointestinal:        Bowels- Regular    No overt GI/ blood losses   Endocrine:        Prednisone use > 20 mg daily for 3 weeks- none   Genitourinary: Negative for dysuria.        No urinary hesitancy    Musculoskeletal:          No unusual muscle/ joint pains   Neurological:        No unilateral weakness   Hematological:        Current use of Anticoagulants  None  Aspirin use for preventive reasons    Psychiatric/Behavioral:          No SI/HI       No past medical history pertinent negatives.  No family history on file.  No past surgical history on file.    No anesthesia, bleeding, cardiac problems , PONVwith previous surgeries/procedures.  Medications and Allergies reviewed in epic.   FH- No anesthesia,bleeding / venous thrombosis , problems  in family   Physical Exam  Blood pressure (!) 141/81, pulse 82, temperature 98.4 °F (36.9 °C), temperature source Oral, resp. rate 16, height 5' 11" (1.803 m), weight 88.9 kg (196 lb), SpO2 98 %.      Physical Exam  Constitutional- Vitals - Body mass index is " 27.34 kg/m².,   Vitals:    12/22/20 0933   BP: (!) 141/81   Pulse: 82   Resp:    Temp:      General appearance-Conscious,Coherent  Eyes- No conjunctival icterus,pupils  round  and reactive to light   ENT-Oral cavity- moist  , Hearing grossly normal   Neck- No thyromegaly ,Trachea -central, No jugular venous distension,   No Carotid Bruit   Cardiovascular -Heart Sounds- Normal  and  no murmur   , No gallop rhythm   Respiratory - Normal Respiratory Effort, Normal breath sounds, crepitations bases,  no wheeze  and  no forced expiratory wheeze    Peripheral pitting pedal edema-- mild, no calf pain   Gastrointestinal -Soft abdomen, No palpable masses, Non Tender,Liver,Spleen not palpable. No-- free fluid and shifting dullness  Musculoskeletal- No finger Clubbing. Strength grossly normal   Lymphatic-No Palpable cervical, axillary,Inguinal lymphadenopathy   Psychiatric - normal effect,Orientation  Rt Dorsalis pedis pulses-palpable    Lt Dorsalis pedis pulses- palpable   Rt Posterior tibial pulses -palpable   Left posterior tibial pulses -palpable   Miscellaneous -  no renal bruit  Investigations  Lab and Imaging have been reviewed in [facility].    Review of Medicine tests    EKG- I had independently reviewed the EKG from--5/30/2020  It was reported to be showing     Normal sinus rhythm   Voltage criteria for left ventricular hypertrophy   Abnormal ECG   When compared with ECG of 27-MAY-2020 18:13,   No significant change was found  Review of clinical lab tests:  Lab Results   Component Value Date    CREATININE 0.8 05/31/2020    HGB 16.0 05/26/2020     05/26/2020           Review of old records- Was done and information gathered regards to events leading to surgery and health conditions of significance in the perioperative period.  Neurology paper records reviewed     Outpatient Subjective & Objective

## 2020-12-22 NOTE — ASSESSMENT & PLAN NOTE
ASA 81 mg po daily   On ASA Since having seizures   Not known to have circulation trouble     No vascular stenting     Planning on Holding for surgery

## 2020-12-22 NOTE — HPI
History of present illness- I had the pleasure of meeting this pleasant 55 y.o. gentleman in the pre op clinic prior to his elective Neuro  surgery. The patient is new to me . Declan was accompanied by wife Jonah.    I have obtained the history by speaking to the patient and by reviewing the electronic health records.    Events leading up to surgery / History of presenting illness -    Intractable epilepsy   His Seizures started at age 48 Years   He tried multiple seizures medication without lasting relief   When he tries Medication , they work for some time , but have recurrence of seizures    He does not have pre warning signs      Under the care of [facility]   Planned for surgical Intervention    He is unable to drive and follows seizures safety precautions     Discussed not to have bath and not to lock the bathroom door -so that wife has access and can help  No aggravating factors. No relieving factors     Relevant health conditions of significance for the perioperative period/ History of presenting illness -    Subjectively describes health as excellent      Health conditions of significance for the perioperative period     -Migraine headaches    - HTN    He lives in [place] wife who works at a Pharmacy   Single level house   Wife can help post op   Daughter can help also   [place] at about age [age] Years   He was a        Not known to have heart disease , Diabetes Mellitus, Lung disease

## 2020-12-22 NOTE — ASSESSMENT & PLAN NOTE
Possible sleep apnea- I suggest a sleep study and suggest caution with usage of medication that can cause respiratory suppression in the perioperative period  potential ramifications of untreated sleep apnea, which could include daytime sleepiness, hypertension, heart disease and stroke were discussed    Avoidance of  supine sleep, weight gain , alcoholic beverages , care with , sedative , CNS depressant use indicated  since all of these can worsen SHILO     Suggested sleep evaluation  Offered sleep evaluation that might help seizures   They wants to address it by them selves in [place]

## 2020-12-22 NOTE — LETTER
December 22, 2020      Ruchi Chen MD  [address] [facility] [address]           [facility]  [address] [facility] [address]  Phone: [phone]          Patient: Declan Burleson   MR Number: [id number]   YOB: 1965   Date of Visit: 12/22/2020       Dear Dr. Ruchi Chen:    Thank you for referring Declan Burleson to me for evaluation. Attached you will find relevant portions of my assessment and plan of care.    If you have questions, please do not hesitate to call me. I look forward to following Declan Burleson along with you.    Sincerely,    Allegra Gilman MD    Enclosure  CC:  MD Matteo Roy MD    If you would like to receive this communication electronically, please contact [email or web address] or [phone] to request more information on [employer] Link access.    For providers and/or their staff who would like to refer a patient to [facility], please contact us through our one-stop-shop provider referral line, [facility], at [phone].    If you feel you have received this communication in error or would no longer like to receive these types of communications, please e-mail [email or web address]

## 2020-12-22 NOTE — PROGRESS NOTES
[facility]  Progress Note    Patient Name: Declan Burleson  MRN: [id number]  Date of Evaluation- 01/08/2021  PCP- Matteo iRzzo MD        HPI:  History of present illness- I had the pleasure of meeting this pleasant 55 y.o. gentleman in the pre op clinic prior to his elective Neuro  surgery. The patient is new to me . Declan was accompanied by wife Jonah.    I have obtained the history by speaking to the patient and by reviewing the electronic health records.    Events leading up to surgery / History of presenting illness -    Intractable epilepsy   His Seizures started at age 48 Years   He tried multiple seizures medication without lasting relief   When he tries Medication , they work for some time , but have recurrence of seizures    He does not have pre warning signs      Under the care of [facility]   Planned for surgical Intervention    He is unable to drive and follows seizures safety precautions     Discussed not to have bath and not to lock the bathroom door -so that wife has access and can help  No aggravating factors. No relieving factors     Relevant health conditions of significance for the perioperative period/ History of presenting illness -    Subjectively describes health as excellent      Health conditions of significance for the perioperative period     -Migraine headaches    - HTN    He lives in [place] wife who works at a [email or web address]   Single level house   Wife can help post op   Daughter can help also   He moved [place] from [place] at about age [age] Years   He was a        Not known to have heart disease , Diabetes Mellitus, Lung disease         Subjective/ Objective:     Chief complaint-Preoperative evaluation, Perioperative Medical management, complication reduction plan     Active cardiac conditions- none    Revised cardiac risk index predictors- none    Functional capacity -Examples of physical activity, stays active , gardening ,  house work  "and can take 1 flight of stairs----- He can undertake all the above activities without  chest pain,chest tightness, Shortness of breath ,dizziness,lightheadedness making his exercise tolerance more,  than 4 Mets.       Review of Systems   Constitutional: Negative for chills and fever.          Weight gain from reduced activity   HENT:        STOPBANG score 4 / 8    Occasional snoring   HTN  Age over 48 years  Neck size over 40 CM  Male gender    Suggested follow up    Eyes:        No unusual vision changes   Respiratory:          Dry cough - not new  No Hemoptysis   Cardiovascular:        As noted   Gastrointestinal:        Bowels- Regular    No overt GI/ blood losses   Endocrine:        Prednisone use > 20 mg daily for 3 weeks- none   Genitourinary: Negative for dysuria.        No urinary hesitancy    Musculoskeletal:          No unusual muscle/ joint pains   Neurological:        No unilateral weakness   Hematological:        Current use of Anticoagulants  None  Aspirin use for preventive reasons    Psychiatric/Behavioral:          No SI/HI       No past medical history pertinent negatives.  No family history on file.  No past surgical history on file.    No anesthesia, bleeding, cardiac problems , PONVwith previous surgeries/procedures.  Medications and Allergies reviewed in epic.   FH- No anesthesia,bleeding / venous thrombosis , problems  in family   Physical Exam  Blood pressure (!) 141/81, pulse 82, temperature 98.4 °F (36.9 °C), temperature source Oral, resp. rate 16, height 5' 11" (1.803 m), weight 88.9 kg (196 lb), SpO2 98 %.      Physical Exam  Constitutional- Vitals - Body mass index is 27.34 kg/m².,   Vitals:    12/22/20 0933   BP: (!) 141/81   Pulse: 82   Resp:    Temp:      General appearance-Conscious,Coherent  Eyes- No conjunctival icterus,pupils  round  and reactive to light   ENT-Oral cavity- moist  , Hearing grossly normal   Neck- No thyromegaly ,Trachea -central, No jugular venous distension,   " No Carotid Bruit   Cardiovascular -Heart Sounds- Normal  and  no murmur   , No gallop rhythm   Respiratory - Normal Respiratory Effort, Normal breath sounds, crepitations bases,  no wheeze  and  no forced expiratory wheeze    Peripheral pitting pedal edema-- mild, no calf pain   Gastrointestinal -Soft abdomen, No palpable masses, Non Tender,Liver,Spleen not palpable. No-- free fluid and shifting dullness  Musculoskeletal- No finger Clubbing. Strength grossly normal   Lymphatic-No Palpable cervical, axillary,Inguinal lymphadenopathy   Psychiatric - normal effect,Orientation  Rt Dorsalis pedis pulses-palpable    Lt Dorsalis pedis pulses- palpable   Rt Posterior tibial pulses -palpable   Left posterior tibial pulses -palpable   Miscellaneous -  no renal bruit  Investigations  Lab and Imaging have been reviewed in [facility].    Review of Medicine tests    EKG- I had independently reviewed the EKG from--5/30/2020  It was reported to be showing     Normal sinus rhythm   Voltage criteria for left ventricular hypertrophy   Abnormal ECG   When compared with ECG of 27-MAY-2020 18:13,   No significant change was found  Review of clinical lab tests:  Lab Results   Component Value Date    CREATININE 0.8 05/31/2020    HGB 16.0 05/26/2020     05/26/2020           Review of old records- Was done and information gathered regards to events leading to surgery and health conditions of significance in the perioperative period.  Neurology paper records reviewed         Preoperative cardiac risk assessment-  The patient does not have any active cardiac conditions . Revised cardiac risk index predictors- 0---.Functional capacity is more than 4 Mets. He will be undergoing a Neuro  procedure that carries a Moderate Risk risk     Risk of a major Cardiac event ( Defined as death, myocardial infarction, or cardiac arrest at 30 days after noncardiac surgery), based on RCRI score     -3.9%         No further cardiac work up is indicated prior  to proceeding with the surgery     Orders Placed This Encounter    Ambulatory referral/consult to Sleep Disorders       American Society of Anesthesiologists Physical status classification ( ASA ) class: 3     Postoperative pulmonary complication risk assessment:     ARISCAT ( Canet) risk index- risk class -  Low, if duration of surgery is under 3 hours, intermediate, if duration of surgery is over 3 hours      Sophie Respiratory failure index- percentage risk of respiratory failure: 1.8 %     Assessment/Plan:     Hypertension  Amlodipine nightly   Home BP readings -135/85's   Recent BP readings in the record-135/85's   Hypertension-  Blood pressure is acceptable . I suggest continuation of Amlodipine - during the entire perioperative period. I suggest  blood pressure, monitoring .I suggest addressing pain control as uncontrolled pain can increased blood pressure     Tries to avoid lozoya, salty foods  Suggested avoidance ofd potato chips  Hopefully once his physical activity improves , BP might get better           Mild neurocognitive disorder due to another medical condition  Has occasional difficulty with expression   Since having multiple seizures     Migraine without status migrainosus, not intractable  Long standing migraines   Has troublesome migraines   On Maxalt   When has migraines - rests in a dark room / quiet place   Suggested follow up for consideration of prophylactic Medication for migraine     Adjustment disorder with mixed anxiety and depressed mood  He Is an active person  Was a  [place]   Due to his limitation from driving and not able to do things     I suggest monitoring the sodium as SIADH from Escitalopram use and hypersecretion of ADH associated with surgery can reduce sodium in the perioperative period    Facial rash  Attributes to Medication  Not acute   Uses Mositurizing     Long term (current) use of antithrombotics/antiplatelets  ASA 81 mg po daily   On ASA  Since having seizures   Not known to have circulation trouble     No vascular stenting     Planning on Holding for surgery     Snoring    Possible sleep apnea- I suggest a sleep study and suggest caution with usage of medication that can cause respiratory suppression in the perioperative period  potential ramifications of untreated sleep apnea, which could include daytime sleepiness, hypertension, heart disease and stroke were discussed    Avoidance of  supine sleep, weight gain , alcoholic beverages , care with , sedative , CNS depressant use indicated  since all of these can worsen SHILO     Suggested sleep evaluation  Offered sleep evaluation that might help seizures   They wants to address it by them selves in [place]    Acid reflux  Triggered by acidic foods like Citrus/ tomato based foods  GERD Symptoms -acid taste to the throat    Does not sound Cardiac   Tips to control reflux discussed      I suggest aspiration precautions    Former smoker  Smoked for about 30 years   Not known to have COPD, Bronchitis, Emphysema, wheezing , chronic phlegm   No inhaler, oxygen use   Subjectively feels good with breathing     Edema        Edema- I suggested avoidance of added salt,avoidance of NSAID's, unless advised or ordered  and suggested Limb elevation and akanksha hose use    Alkaline phosphatase elevation  Lab from 12/22/2020    Alkaline phosphatase  mildly elevated at 147 - Normal bilirubin  No suggestion of  hepatic decompensation      Suggest follow up for abnormal Alk phosphatase     Alkaline phosphatase-   Not known to have liver disease    No jaundice , itching , dark urine , pale stool   No gall stone problem, RUQ abdomen   No colon problem  No fractures     Suggested follow up         Preventive perioperative care    Thromboembolic prophylaxis:  His risk factors for thrombosis include surgical procedure and age.I suggest  thromboembolic prophylaxis ( mechanical/pharmacological, weighing the risk benefits of  pharmacological agent use considering tamar procedural bleeding )  during the perioperative period.I suggested being active in the post operative period.      Postoperative pulmonary complication prophylaxis-Risk factors for post operative pulmonary complications include ASA class >2- I suggest incentive spirometry use, early ambulation and end tidal carbon dioxide monitoring  Oral care , head end of bed elevation      Renal complication prophylaxis-Risk factors for renal complications include hypertension . I suggest keeping him well hydrated  in the perioperative period.     Surgical site Infection Prophylaxis-I  suggest appropriate antibiotic for Prophylaxis against Surgical site infections  No reported Staph infection   Skin antibacterial discussed      Delirium prophylaxis-Risk factors - cognitive impairment  - I suggest avoidance / minimizing the use of  Benzodiazepines ( unless the patient has been taking it on a regular basis ),Anticholinergic medication,Antihistamines ( like  Benadryl).I suggest minimizing the use of opioid medication and use of IV tylenol,if it is appropriate. I suggest using the lowest possible dose of opioids for the shortest duration possible in the perioperative period. I suggest to Keep shades/blinds open during the day, lights off and shades closed at night to encourage normal sleep/wake cycle.I encourage the presence of the family member with the patient at all times, if at all possible as mental status changes can be picked up early by the family members and they help with reorientation. I encouraged the presence of family to help with orientation in the perioperative period. Benadryl avoidance suggested        This visit was focused on Preoperative evaluation, Perioperative Medical management, complication reduction plans. I suggest that the patient follows up with primary care or relevant sub specialists for ongoing health care.    I appreciate the opportunity to be involved in  this patients care. Please feel free to contact me if there were any questions about this consultation.    Patient is optimized     Patient  was instructed to call and update me about any changes to health,  medication, office visits ,testing out side of the [facility] , hospitalizations between now and surgery     Allegra Gilman MD  Perioperative Medicine  [facility]   Pager [phone]    COVID screening     No fever   No new cough   No SOB  No sore throat   No loss of taste or smell   No muscle aches   No nausea, vomiting , diarrhea  --  1/7/2021- 15 30     Lab from 12/22/2020  Glucose was 113  Alkaline phosphatase  mildly elevated at 147 - Normal bilirubin  No suggestion of  hepatic decompensation    Called him to discuss labs   Unable to speak -  Left a message     Contact , if needed     Called wife   Spoke to father in law   Could not tell, if the lab is fasting / non fasting     Called wife   Left a message   Call, if needed     Suggest follow up for abnormal Alk phosphatase     ---  1/8/2021- 16 48     Spoke to him this afternoon  Lab from today - APTT-N     Called to follow up , spoke to wife  to address any concerns with the up coming surgery or any questions on Medication instructions -  Doing well ,No changes to Medication, Health -     Spoke to wife - lab was non fasting   Not a known diabetic- gets tested    Alkaline phosphatase-   Not known to have liver disease    No jaundice , itching , dark urine , pale stool   No gall stone problem, RUQ abdomen   No colon problem  No fractures     No suggestions of UTI- no fever chills

## 2020-12-22 NOTE — ASSESSMENT & PLAN NOTE
Smoked for about 30 years   Not known to have COPD, Bronchitis, Emphysema, wheezing , chronic phlegm   No inhaler, oxygen use   Subjectively feels good with breathing

## 2020-12-22 NOTE — DISCHARGE INSTRUCTIONS
Your surgery has been scheduled for:______1/11/2020____________________    You should report to:  __X__[facility], located on the [facility] side of the            Second floor of the [facility] ([phone])  Please Note   - Tell your doctor if you take Aspirin, products containing Aspirin, herbal medications  or blood thinners, such as Coumadin, Ticlid, or Plavix.  (Consult your provider regarding holding or stopping before surgery).  - Arrange for someone to drive you home following surgery.  You will not be allowed to leave the surgical facility alone or drive yourself home following sedation and anesthesia.  Before Surgery  - Stop taking all vitamins/ herbal medications 7days prior to surgery  - No Motrin/Advil (Ibuprofen) 7 day before surgery  - No Aleve (Naproxen) 7 days before surgery  - Stop Taking Asprin, products containing Asprin __7___days before surgery  - Stop taking blood thinners___7____days before surgery  - No Goody's/BC  Powder 7 days before surgery  - Refrain from drinking alcoholic beverages for 24hours before and after surgery  - Stop or limit smoking _________days before surgery  - You may take Tylenol for pain  Night before Surgery   Stop ALL solid food, gum, candy (including vitamins) 8 hours before arrival time.  (Please note: If your surgeon gives you different eating and drinking instructions, please follow surgeon's directions.)   Stop all CLOUDY liquids: coffee with creamer, formula, tube feeds, cloudy juices, non-human milk and breast milk with additives, 6 hours prior to arrival time.   Stop plain breast milk 4 hours prior to arrival time.   The patient should be ENCOURAGED to drink carbohydrate-rich clear liquids (sports drinks, clear juices) until 2 hours prior to arrival time.   CLEAR liquids include only water, black coffee NO creamer, clear oral rehydration drinks, clear sports drinks or clear fruit juices (no orange juice, no pulpy  juices, no apple cider). Advise patients if they can read newsprint through the liquid, it qualifies as clear liquid.    IF IN DOUBT, drink water instead.   - Take a shower or bath (shower is recommended).  Bathe with Hibiclens soap or an antibacterial soap from the neck down.  If not supplied by your surgeon, hibiclens soap will need to be purchased over the counter in pharmacy.  Rinse soap off thoroughly.  - Shampoo your hair with your regular shampoo  The Day of Surgery  · NOTHING TO  DRINK 2 hours before arrival time. If you are told to take medication on the morning of surgery, it may be taken with a sip of water.   - Take another bath or shower with hibiclens or any antibacterial soap, to reduce the chance of infection.  - Take heart and blood pressure medications with a small sip of water, as advised by the perioperative team.  - Do not take fluid pills  - You may brush your teeth and rinse your mouth, but do not swall any additional water.   - Do not apply perfumes, powder, body lotions or deodorant on the day of surgery.  - Nail polish should be removed.  - Do not wear makeup or moisturizer  - Wear comfortable clothes, such as a button front shirt and loose fitting pants.  - Leave all jewelry, including body piercings, and valuables at home.    - Bring any devices you will neeed after surgery such as crutches or canes.  - If you have sleep apnea, please bring your CPAP machine  In the event that your physical condition changes including the onset of a cold or respiratory illness, or if you have to delay or cancel your surgery, please notify your surgeon.    Anesthesia: General Anesthesia     You are watched continuously during your procedure by your anesthesia provider.     Youre due to have surgery. During surgery, youll be given medicine called anesthesia or anesthetic. This will keep you comfortable and pain-free. Your anesthesia provider will use general anesthesia.  What is general  anesthesia?  General anesthesia puts you into a state like deep sleep. It goes into the bloodstream (IV anesthetics), into the lungs (gas anesthetics), or both. You feel nothing during the procedure. You will not remember it. During the procedure, the anesthesia provider monitors you continuously. He or she checks your heart rate and rhythm, blood pressure, breathing, and blood oxygen.  · IV anesthetics. IV anesthetics are given through an IV line in your arm. Theyre often given first. This is so you are asleep before a gas anesthetic is started. Some kinds of IV anesthetics relieve pain. Others relax you. Your doctor will decide which kind is best in your case.  · Gas anesthetics. Gas anesthetics are breathed into the lungs. They are often used to keep you asleep. They can be given through a facemask or a tube placed in your larynx or trachea (breathing tube).  ? If you have a facemask, your anesthesia provider will most likely place it over your nose and mouth while youre still awake. Youll breathe oxygen through the mask as your IV anesthetic is started. Gas anesthetic may be added through the mask.  ? If you have a tube in the larynx or trachea, it will be inserted into your throat after youre asleep.  Anesthesia tools and medicines  You will likely have:  · IV anesthetics. These are put into an IV line into your bloodstream.  · Gas anesthetics. You breathe these anesthetics into your lungs, where they pass into your bloodstream.  · Pulse oximeter. This is a small clip that is attached to the end of your finger. This measures your blood oxygen level.  · Electrocardiography leads (electrodes). These are small sticky pads that are placed on your chest. They record your heart rate and rhythm.  · Blood pressure cuff. This reads your blood pressure.  Risks and possible complications  General anesthesia has some risks. These include:  · Breathing problems  · Nausea and vomiting  · Sore throat or hoarseness  (usually temporary)  · Allergic reaction to the anesthetic  · Irregular heartbeat (rare)  · Cardiac arrest (rare)   Anesthesia safety  · Follow all instructions you are given for how long not to eat or drink before your procedure.  · Be sure your doctor knows what medicines and drugs you take. This includes over-the-counter medicines, herbs, supplements, alcohol or other drugs. You will be asked when those were last taken.  · Have an adult family member or friend drive you home after the procedure.  · For the first 24 hours after your surgery:  ? Do not drive or use heavy equipment.  ? Do not make important decisions or sign legal documents. If important decisions or signing legal documents is necessary during the first 24 hours after surgery, have a trusted family member or spouse act on your behalf.  ? Avoid alcohol.  ? Have a responsible adult stay with you. He or she can watch for problems and help keep you safe.  Date Last Reviewed: 12/1/2016  © 3426-1681 [employer]. [address] [place] [address], [place] [address]. All rights reserved. This information is not intended as a substitute for professional medical care. Always follow your healthcare professional's instructions

## 2020-12-22 NOTE — ASSESSMENT & PLAN NOTE
Edema- I suggested avoidance of added salt,avoidance of NSAID's, unless advised or ordered  and suggested Limb elevation and akanksha hose use

## 2020-12-22 NOTE — LETTER
December 23, 2020      Kaleb Edgar MD  [address]  [facility] [address]           [facility]  [address]  [facility] [address]  Phone: [phone]  Fax: [phone]          Patient: Declan Burleson   MR Number: [id number]   YOB: 1965   Date of Visit: 12/22/2020       Dear Dr. Kaleb Edgar:    Thank you for referring Declan Burleson to me for evaluation. Attached you will find relevant portions of my assessment and plan of care.    If you have questions, please do not hesitate to call me. I look forward to following Declan Burleson along with you.    Sincerely,    Ruchi Chen MD    Enclosure  CC:  No Recipients    If you would like to receive this communication electronically, please contact [email or web address] or [phone] to request more information on [facility] access.    For providers and/or their staff who would like to refer a patient to [facility], please contact us through our one-stop-shop provider referral line, [facility] [place], at [phone].    If you feel you have received this communication in error or would no longer like to receive these types of communications, please e-mail [email or web address]

## 2020-12-22 NOTE — ASSESSMENT & PLAN NOTE
Long standing migraines   Has troublesome migraines   On Maxalt   When has migraines - rests in a dark room / quiet place   Suggested follow up for consideration of prophylactic Medication for migraine

## 2020-12-22 NOTE — PROGRESS NOTES
"Neurosurgery  Follow-up     SUBJECTIVE:     Chief Complaint: intractable epilepsy      History of Present Illness:  Declan Burleson is a 55 y.o. right-handed male referred by Dr. Benoit (originally by Dr. Saldana of [facility]) for consideration of surgical therapy for intractable epilepsy. The patient was formerly employed as a . He is accompanied by his wife Trista today, who helps to provide the history. His seizures began when he was 48 years old. He can recall no inciting event. He has 3 semiologies: "full blown" generalized events, staring episodes, and "mild" seizures, which are characterized as generalized events of shorter duration without incontinence and a shorter post-ictal period. He and his wife estimate that he persists in having about 2 events/month.     He has failed multiple AEDs, including topiramate, lamotrigine, levetiracetam, and oxcarbazepine. He is currently taking eslicarbazepine, zonisamide, and clobazam. He had EMU monitoring in May-June of this year, which suggests left-sided activity. He had 3T MRI in June (personally reviewed) that was non-lesional; he also had PET at that time suggesting possible left-sided activity. He had neuropsychological testing on 8/10/20; summary findings included below. He lives with his wife and 16-year-old daughter, who provide excellent social support.     He takes ASA 81, which will need to be held for one week prior to surgery.     As of 12/22/20, the patient reports he has had no significant change. He is hoping to be seen today for pre-operative optimization. He is out of Aptiom, which his wife is working to re-obtain for him.      Review of patient's allergies indicates:   Allergen Reactions    Losartan Rash       Current Outpatient Medications   Medication Sig Dispense Refill    amLODIPine (NORVASC) 5 MG tablet Take 1 tablet (5 mg total) by mouth once daily. 30 tablet 11    aspirin (ECOTRIN) 81 MG EC tablet Take 1 tablet (81 mg " total) by mouth once daily. 360 tablet 0    cloBAZam (ONFI) 20 mg Tab TAKE 1 TABLET BY MOUTH ONCE DAILY 30 tablet 5    escitalopram oxalate (LEXAPRO) 10 MG tablet Take 1 tablet (10 mg total) by mouth once daily. 30 tablet 11    rizatriptan (MAXALT-MLT) 10 MG disintegrating tablet Take 10 mg by mouth.      zonisamide (ZONEGRAN) 100 MG Cap Take 3 capsules (300 mg total) by mouth once daily. 90 capsule 11    eslicarbazepine (APTIOM) 800 mg Tab Take 800 mg by mouth. 800mg +400mg  1200mg total      LORazepam (ATIVAN) 1 MG tablet Take 1 tablet (1 mg total) by mouth every evening. for 5 days 5 tablet 0     No current facility-administered medications for this visit.        Past Medical History:   Diagnosis Date    Anxiety     Migraine      No past surgical history on file.  Family History     None        Social History     Socioeconomic History    Marital status:      Spouse name: Reilly    Number of children: Not on file    Years of education: 2 year college    Highest education level: Associate degree: occupational, technical, or vocational program   Occupational History    Not on file   Social Needs    Financial resource strain: Not very hard    Food insecurity     Worry: Not on file     Inability: Not on file    Transportation needs     Medical: Not on file     Non-medical: Not on file   Tobacco Use    Smoking status: Former Smoker   Substance and Sexual Activity    Alcohol use: Yes     Frequency: Monthly or less     Drinks per session: 1 or 2     Binge frequency: Less than monthly    Drug use: Never    Sexual activity: Yes     Partners: Female   Lifestyle    Physical activity     Days per week: Not on file     Minutes per session: Not on file    Stress: Not on file   Relationships    Social connections     Talks on phone: Not on file     Gets together: Not on file     Attends Latter-day service: Not on file     Active member of club or organization: Not on file     Attends meetings of  "clubs or organizations: Not on file     Relationship status: Not on file   Other Topics Concern    Not on file   Social History Narrative    Not on file       Review of Systems   Constitutional: Negative for fever.   HENT: Positive for nosebleeds.    Eyes: Negative for visual disturbance.   Respiratory: Negative for shortness of breath.    Cardiovascular: Negative for chest pain.   Gastrointestinal: Negative for vomiting.   Endocrine: Negative for cold intolerance.   Genitourinary: Negative for difficulty urinating.   Musculoskeletal: Negative for back pain.   Skin: Negative for color change.   Neurological: Positive for seizures.   Hematological: Does not bruise/bleed easily.   Psychiatric/Behavioral: The patient is nervous/anxious.        OBJECTIVE:     Vital Signs  Pain Score: 0-No pain  Height: 5' 11" (180.3 cm)  Weight: 87.5 kg (193 lb)  Body mass index is 26.92 kg/m².      Physical Exam:    Constitutional: He appears well-developed and well-nourished. No distress.     Eyes: Pupils are equal, round, and reactive to light. EOM are normal.     Cardiovascular: No edema.     Abdominal: Soft.     Skin: Skin displays no rash on extremities. Skin displays no lesions on extremities.   Mostly bald     Psych/Behavior: He is alert. He is oriented to person, place, and time.     Musculoskeletal: Gait is normal.        Right Upper Extremities: Muscle strength is 5/5.        Left Upper Extremities: Muscle strength is 5/5.       Right Lower Extremities: Muscle strength is 5/5.        Left Lower Extremities: Muscle strength is 5/5.     Neurological:        Coordination: He has normal finger to nose coordination.        DTRs: DTRs are DTRS NORMAL AND SYMMETRICnormal and symmetric.        Cranial nerves:   Face grossly symmetric   Shoulder shrug equal bilaterally    Pulmonary: symmetric expansion     Diagnostic Results:  Neuropsych:     Handedness: Right      Language Acquisition: English third language (Swedish first, " "Kazakh second). Was taught Ethiopian English at a young age. Moved to [place] at 14 years old. Began using English regularly at the age of 14 years and his classes were then taught in English ([place]inean prior to this). Has primarily used English in his adult life; speaks with brother in Guinean, though they switch back and forth to English.      Cognitive Functioning:   Low average attention/working memory   Low average mental processing speed   Low average fine motor skills bilaterally   Below average language skills but exceptionally low confrontation naming and auditory comprehension   Below average visuospatial skills   Average to below average verbal learning and memory and exceptionally low visual learning and memory      Lateralization & Localization:   · EEG suggestive of "a left temporal>generalized nonspecific cerebral dysfunction"  · PET - "multiple regions of decreased uptake, most prominent within the left rolandic operculum"  · MRI brain - "few scattered punctate size foci of T2 FLAIR signal hyperintensity supratentorial white matter most concentrated in the right frontal lobe which are nonspecific and of doubtful clinical significance...The temporal lobes are relatively symmetric"  · Cognitive testing -   ? Lateralization - Pattern seen on memory testing only (no pattern seen on fine motor skills or verbal/visuospatial IQ measures). VERBAL LEARNING AND MEMORY BETTER THAN VISUAL LEARNING AND MEMORY   ? Localization - Temporal lobe involvement as well as extra-temporal lobe involvement (frontosubortical). Significant naming and comprehension difficulties > possibly related to English as third language?      Mood/Psychiatric:   Mild anxiety and depression indicated on screening questionnaires, seemingly related to his seizure disorder.   Moderate worry regarding seizures, medication effects, and social functioning   Recently started on Lexapro with limited benefit seen. May benefit from increase in dosage if " "indicated.   Mood symptoms are not a contraindication to surgery.  Reporting having auditory hallucinations for the past year - friendly, giving him advice, "Be a good boy and take care of the family." Unsure of exact etiology of these.      Presurgical Considerations:   · Capacity - No concerns for decision-making capacity  · Risk - Given that verbal learning and memory are areas of strength, he is at increased risk of experience cognitive decline following surgical intervention (should left temporal lobe be target area).   · Given pattern of cognitive testing, further evaluation for localization may be warranted.     ASSESSMENT/PLAN:     Declan Burleson is a 55 y.o. male with intractable epilepsy who presents to discuss surgical treatment options. He was discussed at length in the interdisciplinary epilepsy surgical conference, and sEEG for intracranial localization was recommended.      We again discussed that monitoring typically takes 1-2 weeks but may be longer or shorter depending on how long it takes for him to have concordant seizures.  We discussed that he will not be able to get out of bed while the electrodes are in his head, and that he will remain in the ICU during that time. We also discussed that this is a diagnostic, not therapeutic, procedure, and that the definitive surgery would be performed 3-8 weeks after the time of sEEG localization.      We discussed that there is approximately 20% rate of a symptomatic hemorrhage and approximately 1% rate of symptomatic hemorrhage from placement of sEEG electrode per review of the international literature.  They expressed understanding of this.      We also discussed the specific risks of the localization surgery. Risks include, but are not limited to, bleeding, pain, infection, scarring, need for further/repeat procedure, death, paralysis, stroke (including venous infarct)/damage to major blood vessels, leak of cerebrospinal fluid, speech difficulty, " and hardware-related complications. There is a chance that we would fail to localize the seizures with the initial electrode plan, which would require placement of additional electrodes. We discussed that my partner Dr. Montoya may also be involved in the surgical process.      He asked about her chances for seizure freedom, which we explained we cannot predict until we determine where the seizures are beginning, and moreover, whether they are uni- or multifocal. We also discussed that there are also surgical treatments for seizures that do not require localization intracranially: VNS and DBS.     We are planning for surgery on January 11 due to sEEG monitoring equipment availability. He has already obtained an MRI brain STEALTH protocol, which I believe should be sufficient for operative planning purposes.     Coleman John RN, came into the visit to help with Aptiom re-approval.     I have encouraged him to contact the clinic in interim with any questions, concerns, or adverse clinical change.     Note generated with voice recognition software; please excuse any typographical errors.

## 2020-12-22 NOTE — ASSESSMENT & PLAN NOTE
Amlodipine nightly   Home BP readings -135/85's   Recent BP readings in the record-135/85's   Hypertension-  Blood pressure is acceptable . I suggest continuation of Amlodipine - during the entire perioperative period. I suggest  blood pressure, monitoring .I suggest addressing pain control as uncontrolled pain can increased blood pressure     Tries to avoid lozoya, salty foods  Suggested avoidance ofd potato chips  Hopefully once his physical activity improves , BP might get better

## 2020-12-22 NOTE — ASSESSMENT & PLAN NOTE
Triggered by acidic foods like Citrus/ tomato based foods  GERD Symptoms -acid taste to the throat    Does not sound Cardiac   Tips to control reflux discussed      I suggest aspiration precautions

## 2021-01-04 ENCOUNTER — TELEPHONE (OUTPATIENT)
Dept: NEUROSURGERY | Facility: CLINIC | Age: 56
End: 2021-01-04

## 2021-01-04 DIAGNOSIS — Z01.818 PRE-OP TESTING: ICD-10-CM

## 2021-01-04 DIAGNOSIS — Z78.9 KNOWN HEALTH PROBLEMS: NONE: ICD-10-CM

## 2021-01-04 DIAGNOSIS — G40.211 PARTIAL SYMPTOMATIC EPILEPSY WITH COMPLEX PARTIAL SEIZURES, INTRACTABLE, WITH STATUS EPILEPTICUS: Primary | ICD-10-CM

## 2021-01-05 ENCOUNTER — TELEPHONE (OUTPATIENT)
Dept: PREADMISSION TESTING | Facility: HOSPITAL | Age: 56
End: 2021-01-05

## 2021-01-07 ENCOUNTER — TELEPHONE (OUTPATIENT)
Dept: NEUROSURGERY | Facility: CLINIC | Age: 56
End: 2021-01-07

## 2021-01-07 PROBLEM — R74.8 ALKALINE PHOSPHATASE ELEVATION: Status: ACTIVE | Noted: 2021-01-07

## 2021-01-07 NOTE — ASSESSMENT & PLAN NOTE
Lab from 12/22/2020    Alkaline phosphatase  mildly elevated at 147 - Normal bilirubin  No suggestion of  hepatic decompensation      Suggest follow up for abnormal Alk phosphatase     Alkaline phosphatase-   Not known to have liver disease    No jaundice , itching , dark urine , pale stool   No gall stone problem, RUQ abdomen   No colon problem  No fractures     Suggested follow up

## 2021-01-08 ENCOUNTER — LAB VISIT (OUTPATIENT)
Dept: LAB | Facility: HOSPITAL | Age: 56
End: 2021-01-08
Attending: ANESTHESIOLOGY
Payer: COMMERCIAL

## 2021-01-08 ENCOUNTER — LAB VISIT (OUTPATIENT)
Dept: FAMILY MEDICINE | Facility: CLINIC | Age: 56
End: 2021-01-08
Payer: COMMERCIAL

## 2021-01-08 DIAGNOSIS — Z01.818 PRE-OP TESTING: ICD-10-CM

## 2021-01-08 DIAGNOSIS — Z78.9 KNOWN HEALTH PROBLEMS: NONE: ICD-10-CM

## 2021-01-08 LAB
ABO + RH BLD: NORMAL
APTT BLDCRRT: 27.1 SEC (ref 21–32)
BLD GP AB SCN CELLS X3 SERPL QL: NORMAL

## 2021-01-08 PROCEDURE — U0003 INFECTIOUS AGENT DETECTION BY NUCLEIC ACID (DNA OR RNA); SEVERE ACUTE RESPIRATORY SYNDROME CORONAVIRUS 2 (SARS-COV-2) (CORONAVIRUS DISEASE [COVID-19]), AMPLIFIED PROBE TECHNIQUE, MAKING USE OF HIGH THROUGHPUT TECHNOLOGIES AS DESCRIBED BY CMS-2020-01-R: HCPCS

## 2021-01-08 PROCEDURE — 36415 COLL VENOUS BLD VENIPUNCTURE: CPT

## 2021-01-08 PROCEDURE — 86900 BLOOD TYPING SEROLOGIC ABO: CPT

## 2021-01-08 PROCEDURE — 85730 THROMBOPLASTIN TIME PARTIAL: CPT

## 2021-01-09 LAB — SARS-COV-2 RNA RESP QL NAA+PROBE: NOT DETECTED

## 2021-01-11 ENCOUNTER — HOSPITAL ENCOUNTER (INPATIENT)
Facility: HOSPITAL | Age: 56
LOS: 13 days | Discharge: HOME HEALTH | DRG: 026 | End: 2021-01-24
Attending: NEUROLOGICAL SURGERY | Admitting: NEUROLOGICAL SURGERY
Payer: COMMERCIAL

## 2021-01-11 ENCOUNTER — ANESTHESIA EVENT (OUTPATIENT)
Dept: SURGERY | Facility: HOSPITAL | Age: 56
DRG: 026 | End: 2021-01-11
Payer: COMMERCIAL

## 2021-01-11 ENCOUNTER — ANESTHESIA (OUTPATIENT)
Dept: SURGERY | Facility: HOSPITAL | Age: 56
DRG: 026 | End: 2021-01-11
Payer: COMMERCIAL

## 2021-01-11 DIAGNOSIS — E83.51 HYPOCALCEMIA: ICD-10-CM

## 2021-01-11 DIAGNOSIS — G40.219 COMPLEX PARTIAL EPILEPSY WITH GENERALIZATION AND WITH INTRACTABLE EPILEPSY: ICD-10-CM

## 2021-01-11 DIAGNOSIS — G40.909 EPILEPSY: Primary | ICD-10-CM

## 2021-01-11 DIAGNOSIS — I10 ESSENTIAL HYPERTENSION: ICD-10-CM

## 2021-01-11 DIAGNOSIS — F41.8 DEPRESSION WITH ANXIETY: ICD-10-CM

## 2021-01-11 DIAGNOSIS — E87.6 HYPOKALEMIA: ICD-10-CM

## 2021-01-11 DIAGNOSIS — R07.9 CHEST PAIN: ICD-10-CM

## 2021-01-11 DIAGNOSIS — G40.209 PARTIAL SYMPTOMATIC EPILEPSY WITH COMPLEX PARTIAL SEIZURES, NOT INTRACTABLE, WITHOUT STATUS EPILEPTICUS: ICD-10-CM

## 2021-01-11 LAB
ANION GAP SERPL CALC-SCNC: 10 MMOL/L (ref 8–16)
ANION GAP SERPL CALC-SCNC: 9 MMOL/L (ref 8–16)
APTT BLDCRRT: 25.1 SEC (ref 21–32)
BASOPHILS # BLD AUTO: 0.02 K/UL (ref 0–0.2)
BASOPHILS # BLD AUTO: 0.05 K/UL (ref 0–0.2)
BASOPHILS NFR BLD: 0.2 % (ref 0–1.9)
BASOPHILS NFR BLD: 0.7 % (ref 0–1.9)
BUN SERPL-MCNC: 13 MG/DL (ref 6–20)
BUN SERPL-MCNC: 17 MG/DL (ref 6–20)
CALCIUM SERPL-MCNC: 7.7 MG/DL (ref 8.7–10.5)
CALCIUM SERPL-MCNC: 8.5 MG/DL (ref 8.7–10.5)
CHLORIDE SERPL-SCNC: 104 MMOL/L (ref 95–110)
CHLORIDE SERPL-SCNC: 105 MMOL/L (ref 95–110)
CO2 SERPL-SCNC: 19 MMOL/L (ref 23–29)
CO2 SERPL-SCNC: 25 MMOL/L (ref 23–29)
CREAT SERPL-MCNC: 0.8 MG/DL (ref 0.5–1.4)
CREAT SERPL-MCNC: 0.8 MG/DL (ref 0.5–1.4)
DIFFERENTIAL METHOD: ABNORMAL
DIFFERENTIAL METHOD: ABNORMAL
EOSINOPHIL # BLD AUTO: 0 K/UL (ref 0–0.5)
EOSINOPHIL # BLD AUTO: 0.4 K/UL (ref 0–0.5)
EOSINOPHIL NFR BLD: 0.2 % (ref 0–8)
EOSINOPHIL NFR BLD: 6 % (ref 0–8)
ERYTHROCYTE [DISTWIDTH] IN BLOOD BY AUTOMATED COUNT: 13 % (ref 11.5–14.5)
ERYTHROCYTE [DISTWIDTH] IN BLOOD BY AUTOMATED COUNT: 13.1 % (ref 11.5–14.5)
EST. GFR  (AFRICAN AMERICAN): >60 ML/MIN/1.73 M^2
EST. GFR  (AFRICAN AMERICAN): >60 ML/MIN/1.73 M^2
EST. GFR  (NON AFRICAN AMERICAN): >60 ML/MIN/1.73 M^2
EST. GFR  (NON AFRICAN AMERICAN): >60 ML/MIN/1.73 M^2
GLUCOSE SERPL-MCNC: 133 MG/DL (ref 70–110)
GLUCOSE SERPL-MCNC: 90 MG/DL (ref 70–110)
HCT VFR BLD AUTO: 43.2 % (ref 40–54)
HCT VFR BLD AUTO: 43.7 % (ref 40–54)
HGB BLD-MCNC: 14 G/DL (ref 14–18)
HGB BLD-MCNC: 14.1 G/DL (ref 14–18)
IMM GRANULOCYTES # BLD AUTO: 0.02 K/UL (ref 0–0.04)
IMM GRANULOCYTES # BLD AUTO: 0.05 K/UL (ref 0–0.04)
IMM GRANULOCYTES NFR BLD AUTO: 0.3 % (ref 0–0.5)
IMM GRANULOCYTES NFR BLD AUTO: 0.6 % (ref 0–0.5)
INR PPP: 1 (ref 0.8–1.2)
LYMPHOCYTES # BLD AUTO: 0.7 K/UL (ref 1–4.8)
LYMPHOCYTES # BLD AUTO: 2.2 K/UL (ref 1–4.8)
LYMPHOCYTES NFR BLD: 31.5 % (ref 18–48)
LYMPHOCYTES NFR BLD: 7.7 % (ref 18–48)
MCH RBC QN AUTO: 30.6 PG (ref 27–31)
MCH RBC QN AUTO: 30.7 PG (ref 27–31)
MCHC RBC AUTO-ENTMCNC: 32.3 G/DL (ref 32–36)
MCHC RBC AUTO-ENTMCNC: 32.4 G/DL (ref 32–36)
MCV RBC AUTO: 94 FL (ref 82–98)
MCV RBC AUTO: 95 FL (ref 82–98)
MONOCYTES # BLD AUTO: 0.1 K/UL (ref 0.3–1)
MONOCYTES # BLD AUTO: 0.7 K/UL (ref 0.3–1)
MONOCYTES NFR BLD: 1.4 % (ref 4–15)
MONOCYTES NFR BLD: 9.8 % (ref 4–15)
NEUTROPHILS # BLD AUTO: 3.6 K/UL (ref 1.8–7.7)
NEUTROPHILS # BLD AUTO: 8.2 K/UL (ref 1.8–7.7)
NEUTROPHILS NFR BLD: 51.7 % (ref 38–73)
NEUTROPHILS NFR BLD: 89.9 % (ref 38–73)
NRBC BLD-RTO: 0 /100 WBC
NRBC BLD-RTO: 0 /100 WBC
PLATELET # BLD AUTO: 268 K/UL (ref 150–350)
PLATELET # BLD AUTO: 285 K/UL (ref 150–350)
PMV BLD AUTO: 10.2 FL (ref 9.2–12.9)
PMV BLD AUTO: 9.9 FL (ref 9.2–12.9)
POTASSIUM SERPL-SCNC: 3.7 MMOL/L (ref 3.5–5.1)
POTASSIUM SERPL-SCNC: 4.8 MMOL/L (ref 3.5–5.1)
PROTHROMBIN TIME: 10.5 SEC (ref 9–12.5)
RBC # BLD AUTO: 4.58 M/UL (ref 4.6–6.2)
RBC # BLD AUTO: 4.59 M/UL (ref 4.6–6.2)
SODIUM SERPL-SCNC: 133 MMOL/L (ref 136–145)
SODIUM SERPL-SCNC: 137 MMOL/L (ref 136–145)
SODIUM SERPL-SCNC: 139 MMOL/L (ref 136–145)
WBC # BLD AUTO: 6.96 K/UL (ref 3.9–12.7)
WBC # BLD AUTO: 9.08 K/UL (ref 3.9–12.7)

## 2021-01-11 PROCEDURE — 99223 PR INITIAL HOSPITAL CARE,LEVL III: ICD-10-PCS | Mod: ,,, | Performed by: PSYCHIATRY & NEUROLOGY

## 2021-01-11 PROCEDURE — 37000008 HC ANESTHESIA 1ST 15 MINUTES: Performed by: NEUROLOGICAL SURGERY

## 2021-01-11 PROCEDURE — 94761 N-INVAS EAR/PLS OXIMETRY MLT: CPT

## 2021-01-11 PROCEDURE — 61760 IMPLANT BRAIN ELECTRODES: CPT | Mod: ,,, | Performed by: NEUROLOGICAL SURGERY

## 2021-01-11 PROCEDURE — 64999 UNLISTED PX NERVOUS SYSTEM: CPT | Mod: ,,, | Performed by: NEUROLOGICAL SURGERY

## 2021-01-11 PROCEDURE — 25000003 PHARM REV CODE 250: Performed by: NURSE PRACTITIONER

## 2021-01-11 PROCEDURE — 85730 THROMBOPLASTIN TIME PARTIAL: CPT

## 2021-01-11 PROCEDURE — 64999 PR  PLACEMENT OF SKULL FIDUCIALS FOR DBS: ICD-10-PCS | Mod: ,,, | Performed by: NEUROLOGICAL SURGERY

## 2021-01-11 PROCEDURE — 37000009 HC ANESTHESIA EA ADD 15 MINS: Performed by: NEUROLOGICAL SURGERY

## 2021-01-11 PROCEDURE — 36000710: Performed by: NEUROLOGICAL SURGERY

## 2021-01-11 PROCEDURE — 25000003 PHARM REV CODE 250: Performed by: STUDENT IN AN ORGANIZED HEALTH CARE EDUCATION/TRAINING PROGRAM

## 2021-01-11 PROCEDURE — 95957 PR EEG DIGITAL ANALYSIS: ICD-10-PCS | Mod: 26,,, | Performed by: PSYCHIATRY & NEUROLOGY

## 2021-01-11 PROCEDURE — A4648 IMPLANTABLE TISSUE MARKER: HCPCS | Performed by: NEUROLOGICAL SURGERY

## 2021-01-11 PROCEDURE — 95714 VEEG EA 12-26 HR UNMNTR: CPT

## 2021-01-11 PROCEDURE — 84295 ASSAY OF SERUM SODIUM: CPT

## 2021-01-11 PROCEDURE — 27201423 OPTIME MED/SURG SUP & DEVICES STERILE SUPPLY: Performed by: NEUROLOGICAL SURGERY

## 2021-01-11 PROCEDURE — 36620 INSERTION CATHETER ARTERY: CPT | Mod: 59,,, | Performed by: ANESTHESIOLOGY

## 2021-01-11 PROCEDURE — 36000711: Performed by: NEUROLOGICAL SURGERY

## 2021-01-11 PROCEDURE — C1713 ANCHOR/SCREW BN/BN,TIS/BN: HCPCS | Performed by: NEUROLOGICAL SURGERY

## 2021-01-11 PROCEDURE — 99223 PR INITIAL HOSPITAL CARE,LEVL III: ICD-10-PCS | Mod: ,,, | Performed by: NURSE PRACTITIONER

## 2021-01-11 PROCEDURE — 20000000 HC ICU ROOM

## 2021-01-11 PROCEDURE — 95720 PR EEG, W/VIDEO, CONT RECORD, I&R, >12<26 HRS: ICD-10-PCS | Mod: ,,, | Performed by: PSYCHIATRY & NEUROLOGY

## 2021-01-11 PROCEDURE — 63600175 PHARM REV CODE 636 W HCPCS: Performed by: STUDENT IN AN ORGANIZED HEALTH CARE EDUCATION/TRAINING PROGRAM

## 2021-01-11 PROCEDURE — 85025 COMPLETE CBC W/AUTO DIFF WBC: CPT | Mod: 91

## 2021-01-11 PROCEDURE — 80048 BASIC METABOLIC PNL TOTAL CA: CPT | Mod: 91

## 2021-01-11 PROCEDURE — 85610 PROTHROMBIN TIME: CPT

## 2021-01-11 PROCEDURE — 25000003 PHARM REV CODE 250: Performed by: PSYCHIATRY & NEUROLOGY

## 2021-01-11 PROCEDURE — 95720 EEG PHY/QHP EA INCR W/VEEG: CPT | Mod: ,,, | Performed by: PSYCHIATRY & NEUROLOGY

## 2021-01-11 PROCEDURE — 95957 EEG DIGITAL ANALYSIS: CPT | Mod: 26,,, | Performed by: PSYCHIATRY & NEUROLOGY

## 2021-01-11 PROCEDURE — 25000003 PHARM REV CODE 250: Performed by: NEUROLOGICAL SURGERY

## 2021-01-11 PROCEDURE — D9220A PRA ANESTHESIA: ICD-10-PCS | Mod: ,,, | Performed by: ANESTHESIOLOGY

## 2021-01-11 PROCEDURE — 99223 1ST HOSP IP/OBS HIGH 75: CPT | Mod: ,,, | Performed by: PSYCHIATRY & NEUROLOGY

## 2021-01-11 PROCEDURE — 61760 PR STEREO IMPLANT BRAIN ELECTRODES, SEIZURE MONITOR: ICD-10-PCS | Mod: ,,, | Performed by: NEUROLOGICAL SURGERY

## 2021-01-11 PROCEDURE — 27201037 HC PRESSURE MONITORING SET UP

## 2021-01-11 PROCEDURE — C1778 LEAD, NEUROSTIMULATOR: HCPCS | Performed by: NEUROLOGICAL SURGERY

## 2021-01-11 PROCEDURE — 36620 PR INSERT CATH,ART,PERCUT,SHORTTERM: ICD-10-PCS | Mod: 59,,, | Performed by: ANESTHESIOLOGY

## 2021-01-11 PROCEDURE — D9220A PRA ANESTHESIA: Mod: ,,, | Performed by: ANESTHESIOLOGY

## 2021-01-11 PROCEDURE — 99223 1ST HOSP IP/OBS HIGH 75: CPT | Mod: ,,, | Performed by: NURSE PRACTITIONER

## 2021-01-11 DEVICE — FIDUCIAL KIT UNI STEREO 10 MM: Type: IMPLANTABLE DEVICE | Site: CRANIAL | Status: FUNCTIONAL

## 2021-01-11 RX ORDER — CLOBAZAM 10 MG/1
20 TABLET ORAL NIGHTLY
Status: DISCONTINUED | OUTPATIENT
Start: 2021-01-11 | End: 2021-01-12

## 2021-01-11 RX ORDER — SODIUM CHLORIDE 9 MG/ML
INJECTION, SOLUTION INTRAVENOUS CONTINUOUS PRN
Status: DISCONTINUED | OUTPATIENT
Start: 2021-01-11 | End: 2021-01-11

## 2021-01-11 RX ORDER — MAG HYDROX/ALUMINUM HYD/SIMETH 200-200-20
15 SUSPENSION, ORAL (FINAL DOSE FORM) ORAL EVERY 6 HOURS PRN
Status: CANCELLED | OUTPATIENT
Start: 2021-01-11

## 2021-01-11 RX ORDER — ACETAMINOPHEN 325 MG/1
650 TABLET ORAL EVERY 6 HOURS PRN
Status: DISCONTINUED | OUTPATIENT
Start: 2021-01-11 | End: 2021-01-12

## 2021-01-11 RX ORDER — ROCURONIUM BROMIDE 10 MG/ML
INJECTION, SOLUTION INTRAVENOUS
Status: DISCONTINUED | OUTPATIENT
Start: 2021-01-11 | End: 2021-01-11

## 2021-01-11 RX ORDER — MIDAZOLAM HYDROCHLORIDE 1 MG/ML
INJECTION INTRAMUSCULAR; INTRAVENOUS
Status: DISCONTINUED | OUTPATIENT
Start: 2021-01-11 | End: 2021-01-11

## 2021-01-11 RX ORDER — ZONISAMIDE 100 MG/1
300 CAPSULE ORAL DAILY
Status: CANCELLED | OUTPATIENT
Start: 2021-01-11

## 2021-01-11 RX ORDER — VASOPRESSIN 20 [USP'U]/ML
INJECTION, SOLUTION INTRAMUSCULAR; SUBCUTANEOUS
Status: DISCONTINUED | OUTPATIENT
Start: 2021-01-11 | End: 2021-01-11

## 2021-01-11 RX ORDER — ONDANSETRON 2 MG/ML
8 INJECTION INTRAMUSCULAR; INTRAVENOUS EVERY 8 HOURS PRN
Status: DISCONTINUED | OUTPATIENT
Start: 2021-01-11 | End: 2021-01-12

## 2021-01-11 RX ORDER — MUPIROCIN 20 MG/G
OINTMENT TOPICAL
Status: DISCONTINUED | OUTPATIENT
Start: 2021-01-11 | End: 2021-01-11 | Stop reason: HOSPADM

## 2021-01-11 RX ORDER — AMLODIPINE BESYLATE 5 MG/1
5 TABLET ORAL NIGHTLY
Status: DISCONTINUED | OUTPATIENT
Start: 2021-01-11 | End: 2021-01-24 | Stop reason: HOSPADM

## 2021-01-11 RX ORDER — PROPOFOL 10 MG/ML
VIAL (ML) INTRAVENOUS
Status: DISCONTINUED | OUTPATIENT
Start: 2021-01-11 | End: 2021-01-11

## 2021-01-11 RX ORDER — CLOBAZAM 10 MG/1
20 TABLET ORAL DAILY
Status: CANCELLED | OUTPATIENT
Start: 2021-01-11

## 2021-01-11 RX ORDER — ESCITALOPRAM OXALATE 10 MG/1
10 TABLET ORAL NIGHTLY
Status: DISCONTINUED | OUTPATIENT
Start: 2021-01-11 | End: 2021-01-24 | Stop reason: HOSPADM

## 2021-01-11 RX ORDER — ONDANSETRON 8 MG/1
8 TABLET, ORALLY DISINTEGRATING ORAL EVERY 8 HOURS PRN
Status: DISCONTINUED | OUTPATIENT
Start: 2021-01-11 | End: 2021-01-24 | Stop reason: HOSPADM

## 2021-01-11 RX ORDER — ONDANSETRON 2 MG/ML
INJECTION INTRAMUSCULAR; INTRAVENOUS
Status: DISCONTINUED | OUTPATIENT
Start: 2021-01-11 | End: 2021-01-11

## 2021-01-11 RX ORDER — LIDOCAINE HYDROCHLORIDE AND EPINEPHRINE 15; 5 MG/ML; UG/ML
INJECTION, SOLUTION EPIDURAL
Status: DISCONTINUED | OUTPATIENT
Start: 2021-01-11 | End: 2021-01-11 | Stop reason: HOSPADM

## 2021-01-11 RX ORDER — CEFAZOLIN SODIUM 1 G/3ML
INJECTION, POWDER, FOR SOLUTION INTRAMUSCULAR; INTRAVENOUS
Status: DISCONTINUED | OUTPATIENT
Start: 2021-01-11 | End: 2021-01-11

## 2021-01-11 RX ORDER — PHENYLEPHRINE HYDROCHLORIDE 10 MG/ML
INJECTION INTRAVENOUS
Status: DISCONTINUED | OUTPATIENT
Start: 2021-01-11 | End: 2021-01-11

## 2021-01-11 RX ORDER — MUPIROCIN 20 MG/G
OINTMENT TOPICAL 2 TIMES DAILY
Status: COMPLETED | OUTPATIENT
Start: 2021-01-11 | End: 2021-01-16

## 2021-01-11 RX ORDER — SODIUM CHLORIDE 0.9 % (FLUSH) 0.9 %
10 SYRINGE (ML) INJECTION
Status: DISCONTINUED | OUTPATIENT
Start: 2021-01-11 | End: 2021-01-24 | Stop reason: HOSPADM

## 2021-01-11 RX ORDER — BACITRACIN ZINC 500 UNIT/G
OINTMENT (GRAM) TOPICAL
Status: DISCONTINUED | OUTPATIENT
Start: 2021-01-11 | End: 2021-01-11 | Stop reason: HOSPADM

## 2021-01-11 RX ORDER — FENTANYL CITRATE 50 UG/ML
INJECTION, SOLUTION INTRAMUSCULAR; INTRAVENOUS
Status: DISCONTINUED | OUTPATIENT
Start: 2021-01-11 | End: 2021-01-11

## 2021-01-11 RX ORDER — ACETAMINOPHEN 650 MG/1
650 SUPPOSITORY RECTAL EVERY 6 HOURS PRN
Status: DISCONTINUED | OUTPATIENT
Start: 2021-01-11 | End: 2021-01-12

## 2021-01-11 RX ORDER — DEXAMETHASONE SODIUM PHOSPHATE 4 MG/ML
INJECTION, SOLUTION INTRA-ARTICULAR; INTRALESIONAL; INTRAMUSCULAR; INTRAVENOUS; SOFT TISSUE
Status: DISCONTINUED | OUTPATIENT
Start: 2021-01-11 | End: 2021-01-11

## 2021-01-11 RX ORDER — SUMATRIPTAN 50 MG/1
50 TABLET, FILM COATED ORAL EVERY 6 HOURS PRN
Status: CANCELLED | OUTPATIENT
Start: 2021-01-11

## 2021-01-11 RX ORDER — NEOSTIGMINE METHYLSULFATE 0.5 MG/ML
INJECTION, SOLUTION INTRAVENOUS
Status: DISCONTINUED | OUTPATIENT
Start: 2021-01-11 | End: 2021-01-11

## 2021-01-11 RX ORDER — LIDOCAINE HYDROCHLORIDE 10 MG/ML
1 INJECTION, SOLUTION EPIDURAL; INFILTRATION; INTRACAUDAL; PERINEURAL ONCE
Status: COMPLETED | OUTPATIENT
Start: 2021-01-11 | End: 2021-01-11

## 2021-01-11 RX ORDER — DOCUSATE SODIUM 100 MG/1
100 CAPSULE, LIQUID FILLED ORAL 2 TIMES DAILY
Status: DISCONTINUED | OUTPATIENT
Start: 2021-01-11 | End: 2021-01-24 | Stop reason: HOSPADM

## 2021-01-11 RX ORDER — LIDOCAINE HCL/PF 100 MG/5ML
SYRINGE (ML) INTRAVENOUS
Status: DISCONTINUED | OUTPATIENT
Start: 2021-01-11 | End: 2021-01-11

## 2021-01-11 RX ORDER — SODIUM CHLORIDE 9 MG/ML
INJECTION, SOLUTION INTRAVENOUS CONTINUOUS
Status: DISCONTINUED | OUTPATIENT
Start: 2021-01-11 | End: 2021-01-11

## 2021-01-11 RX ORDER — MUPIROCIN 20 MG/G
1 OINTMENT TOPICAL 2 TIMES DAILY
Status: DISCONTINUED | OUTPATIENT
Start: 2021-01-11 | End: 2021-01-11 | Stop reason: HOSPADM

## 2021-01-11 RX ORDER — HEPARIN SODIUM 5000 [USP'U]/ML
5000 INJECTION, SOLUTION INTRAVENOUS; SUBCUTANEOUS EVERY 8 HOURS
Status: DISCONTINUED | OUTPATIENT
Start: 2021-01-12 | End: 2021-01-24 | Stop reason: HOSPADM

## 2021-01-11 RX ORDER — HYDROCODONE BITARTRATE AND ACETAMINOPHEN 5; 325 MG/1; MG/1
1 TABLET ORAL EVERY 4 HOURS PRN
Status: DISCONTINUED | OUTPATIENT
Start: 2021-01-11 | End: 2021-01-12

## 2021-01-11 RX ADMIN — CEFAZOLIN 2 G: 330 INJECTION, POWDER, FOR SOLUTION INTRAMUSCULAR; INTRAVENOUS at 07:01

## 2021-01-11 RX ADMIN — MUPIROCIN: 20 OINTMENT TOPICAL at 06:01

## 2021-01-11 RX ADMIN — SODIUM CHLORIDE 10 ML/HR: 0.9 INJECTION, SOLUTION INTRAVENOUS at 06:01

## 2021-01-11 RX ADMIN — CEFTRIAXONE SODIUM 2 G: 2 INJECTION, SOLUTION INTRAVENOUS at 09:01

## 2021-01-11 RX ADMIN — ROCURONIUM BROMIDE 10 MG: 10 INJECTION, SOLUTION INTRAVENOUS at 09:01

## 2021-01-11 RX ADMIN — DOCUSATE SODIUM 100 MG: 100 CAPSULE, LIQUID FILLED ORAL at 09:01

## 2021-01-11 RX ADMIN — PROPOFOL 30 MG: 10 INJECTION, EMULSION INTRAVENOUS at 08:01

## 2021-01-11 RX ADMIN — ONDANSETRON 4 MG: 2 INJECTION, SOLUTION INTRAMUSCULAR; INTRAVENOUS at 11:01

## 2021-01-11 RX ADMIN — ESCITALOPRAM OXALATE 10 MG: 10 TABLET ORAL at 09:01

## 2021-01-11 RX ADMIN — CEFAZOLIN 2 G: 330 INJECTION, POWDER, FOR SOLUTION INTRAMUSCULAR; INTRAVENOUS at 11:01

## 2021-01-11 RX ADMIN — ROCURONIUM BROMIDE 10 MG: 10 INJECTION, SOLUTION INTRAVENOUS at 10:01

## 2021-01-11 RX ADMIN — NEOSTIGMINE METHYLSULFATE 4 MG: 0.5 INJECTION INTRAVENOUS at 12:01

## 2021-01-11 RX ADMIN — ACETAMINOPHEN 650 MG: 325 TABLET ORAL at 10:01

## 2021-01-11 RX ADMIN — SODIUM CHLORIDE: 0.9 INJECTION, SOLUTION INTRAVENOUS at 06:01

## 2021-01-11 RX ADMIN — DEXAMETHASONE SODIUM PHOSPHATE 12 MG: 4 INJECTION, SOLUTION INTRAMUSCULAR; INTRAVENOUS at 07:01

## 2021-01-11 RX ADMIN — AMLODIPINE BESYLATE 5 MG: 5 TABLET ORAL at 09:01

## 2021-01-11 RX ADMIN — PHENYLEPHRINE HYDROCHLORIDE 100 MCG: 10 INJECTION INTRAVENOUS at 07:01

## 2021-01-11 RX ADMIN — CEFTRIAXONE SODIUM 2 G: 2 INJECTION, SOLUTION INTRAVENOUS at 01:01

## 2021-01-11 RX ADMIN — ROCURONIUM BROMIDE 10 MG: 10 INJECTION, SOLUTION INTRAVENOUS at 07:01

## 2021-01-11 RX ADMIN — PROPOFOL 170 MG: 10 INJECTION, EMULSION INTRAVENOUS at 07:01

## 2021-01-11 RX ADMIN — VASOPRESSIN 1 UNITS: 20 INJECTION INTRAVENOUS at 09:01

## 2021-01-11 RX ADMIN — SODIUM CHLORIDE, SODIUM GLUCONATE, SODIUM ACETATE, POTASSIUM CHLORIDE, MAGNESIUM CHLORIDE, SODIUM PHOSPHATE, DIBASIC, AND POTASSIUM PHOSPHATE: .53; .5; .37; .037; .03; .012; .00082 INJECTION, SOLUTION INTRAVENOUS at 07:01

## 2021-01-11 RX ADMIN — ROCURONIUM BROMIDE 40 MG: 10 INJECTION, SOLUTION INTRAVENOUS at 07:01

## 2021-01-11 RX ADMIN — LIDOCAINE HYDROCHLORIDE 80 MG: 20 INJECTION, SOLUTION INTRAVENOUS at 07:01

## 2021-01-11 RX ADMIN — VASOPRESSIN 1 UNITS: 20 INJECTION INTRAVENOUS at 10:01

## 2021-01-11 RX ADMIN — ACETAMINOPHEN 650 MG: 325 TABLET ORAL at 02:01

## 2021-01-11 RX ADMIN — MIDAZOLAM HYDROCHLORIDE 2 MG: 1 INJECTION, SOLUTION INTRAMUSCULAR; INTRAVENOUS at 07:01

## 2021-01-11 RX ADMIN — LIDOCAINE HYDROCHLORIDE 10 MG: 10 INJECTION, SOLUTION EPIDURAL; INFILTRATION; INTRACAUDAL at 06:01

## 2021-01-11 RX ADMIN — MUPIROCIN: 20 OINTMENT TOPICAL at 09:01

## 2021-01-11 RX ADMIN — CLOBAZAM 20 MG: 10 TABLET ORAL at 09:01

## 2021-01-11 RX ADMIN — SODIUM CHLORIDE 0.4 MCG/KG/MIN: 9 INJECTION, SOLUTION INTRAVENOUS at 07:01

## 2021-01-11 RX ADMIN — ROCURONIUM BROMIDE 20 MG: 10 INJECTION, SOLUTION INTRAVENOUS at 08:01

## 2021-01-11 RX ADMIN — ROCURONIUM BROMIDE 20 MG: 10 INJECTION, SOLUTION INTRAVENOUS at 07:01

## 2021-01-11 RX ADMIN — FENTANYL CITRATE 100 MCG: 50 INJECTION, SOLUTION INTRAMUSCULAR; INTRAVENOUS at 07:01

## 2021-01-11 RX ADMIN — HYDROCODONE BITARTRATE AND ACETAMINOPHEN 1 TABLET: 5; 325 TABLET ORAL at 05:01

## 2021-01-12 ENCOUNTER — RESEARCH ENCOUNTER (OUTPATIENT)
Dept: RESEARCH | Facility: HOSPITAL | Age: 56
End: 2021-01-12

## 2021-01-12 LAB
ALBUMIN SERPL BCP-MCNC: 3.4 G/DL (ref 3.5–5.2)
ALP SERPL-CCNC: 105 U/L (ref 55–135)
ALT SERPL W/O P-5'-P-CCNC: 18 U/L (ref 10–44)
ANION GAP SERPL CALC-SCNC: 9 MMOL/L (ref 8–16)
AST SERPL-CCNC: 22 U/L (ref 10–40)
BASOPHILS # BLD AUTO: 0.02 K/UL (ref 0–0.2)
BASOPHILS NFR BLD: 0.2 % (ref 0–1.9)
BILIRUB SERPL-MCNC: 0.3 MG/DL (ref 0.1–1)
BUN SERPL-MCNC: 15 MG/DL (ref 6–20)
CALCIUM SERPL-MCNC: 8.4 MG/DL (ref 8.7–10.5)
CHLORIDE SERPL-SCNC: 104 MMOL/L (ref 95–110)
CO2 SERPL-SCNC: 21 MMOL/L (ref 23–29)
CREAT SERPL-MCNC: 0.7 MG/DL (ref 0.5–1.4)
DIFFERENTIAL METHOD: ABNORMAL
EOSINOPHIL # BLD AUTO: 0 K/UL (ref 0–0.5)
EOSINOPHIL NFR BLD: 0.1 % (ref 0–8)
ERYTHROCYTE [DISTWIDTH] IN BLOOD BY AUTOMATED COUNT: 13.2 % (ref 11.5–14.5)
EST. GFR  (AFRICAN AMERICAN): >60 ML/MIN/1.73 M^2
EST. GFR  (NON AFRICAN AMERICAN): >60 ML/MIN/1.73 M^2
GLUCOSE SERPL-MCNC: 99 MG/DL (ref 70–110)
HCT VFR BLD AUTO: 41.6 % (ref 40–54)
HGB BLD-MCNC: 14 G/DL (ref 14–18)
IMM GRANULOCYTES # BLD AUTO: 0.05 K/UL (ref 0–0.04)
IMM GRANULOCYTES NFR BLD AUTO: 0.4 % (ref 0–0.5)
LYMPHOCYTES # BLD AUTO: 1.6 K/UL (ref 1–4.8)
LYMPHOCYTES NFR BLD: 12.4 % (ref 18–48)
MAGNESIUM SERPL-MCNC: 1.8 MG/DL (ref 1.6–2.6)
MCH RBC QN AUTO: 30.8 PG (ref 27–31)
MCHC RBC AUTO-ENTMCNC: 33.7 G/DL (ref 32–36)
MCV RBC AUTO: 91 FL (ref 82–98)
MONOCYTES # BLD AUTO: 1.1 K/UL (ref 0.3–1)
MONOCYTES NFR BLD: 8.8 % (ref 4–15)
NEUTROPHILS # BLD AUTO: 9.9 K/UL (ref 1.8–7.7)
NEUTROPHILS NFR BLD: 78.1 % (ref 38–73)
NRBC BLD-RTO: 0 /100 WBC
PHOSPHATE SERPL-MCNC: 4.1 MG/DL (ref 2.7–4.5)
PLATELET # BLD AUTO: 326 K/UL (ref 150–350)
PMV BLD AUTO: 9.9 FL (ref 9.2–12.9)
POTASSIUM SERPL-SCNC: 3.8 MMOL/L (ref 3.5–5.1)
PROT SERPL-MCNC: 6.4 G/DL (ref 6–8.4)
RBC # BLD AUTO: 4.55 M/UL (ref 4.6–6.2)
SODIUM SERPL-SCNC: 134 MMOL/L (ref 136–145)
SODIUM SERPL-SCNC: 136 MMOL/L (ref 136–145)
WBC # BLD AUTO: 12.7 K/UL (ref 3.9–12.7)

## 2021-01-12 PROCEDURE — 99233 PR SUBSEQUENT HOSPITAL CARE,LEVL III: ICD-10-PCS | Mod: ,,, | Performed by: PSYCHIATRY & NEUROLOGY

## 2021-01-12 PROCEDURE — 84100 ASSAY OF PHOSPHORUS: CPT

## 2021-01-12 PROCEDURE — 63600175 PHARM REV CODE 636 W HCPCS: Performed by: STUDENT IN AN ORGANIZED HEALTH CARE EDUCATION/TRAINING PROGRAM

## 2021-01-12 PROCEDURE — 25000003 PHARM REV CODE 250: Performed by: STUDENT IN AN ORGANIZED HEALTH CARE EDUCATION/TRAINING PROGRAM

## 2021-01-12 PROCEDURE — 95714 VEEG EA 12-26 HR UNMNTR: CPT

## 2021-01-12 PROCEDURE — 94761 N-INVAS EAR/PLS OXIMETRY MLT: CPT

## 2021-01-12 PROCEDURE — 95720 PR EEG, W/VIDEO, CONT RECORD, I&R, >12<26 HRS: ICD-10-PCS | Mod: ,,, | Performed by: PSYCHIATRY & NEUROLOGY

## 2021-01-12 PROCEDURE — 84295 ASSAY OF SERUM SODIUM: CPT

## 2021-01-12 PROCEDURE — 25000003 PHARM REV CODE 250: Performed by: NURSE PRACTITIONER

## 2021-01-12 PROCEDURE — 99233 SBSQ HOSP IP/OBS HIGH 50: CPT | Mod: ,,, | Performed by: NURSE PRACTITIONER

## 2021-01-12 PROCEDURE — 95720 EEG PHY/QHP EA INCR W/VEEG: CPT | Mod: ,,, | Performed by: PSYCHIATRY & NEUROLOGY

## 2021-01-12 PROCEDURE — 85025 COMPLETE CBC W/AUTO DIFF WBC: CPT

## 2021-01-12 PROCEDURE — 99233 SBSQ HOSP IP/OBS HIGH 50: CPT | Mod: ,,, | Performed by: PSYCHIATRY & NEUROLOGY

## 2021-01-12 PROCEDURE — 99233 PR SUBSEQUENT HOSPITAL CARE,LEVL III: ICD-10-PCS | Mod: ,,, | Performed by: NURSE PRACTITIONER

## 2021-01-12 PROCEDURE — 80053 COMPREHEN METABOLIC PANEL: CPT

## 2021-01-12 PROCEDURE — 83735 ASSAY OF MAGNESIUM: CPT

## 2021-01-12 PROCEDURE — 20000000 HC ICU ROOM

## 2021-01-12 RX ORDER — HYDROMORPHONE HYDROCHLORIDE 1 MG/ML
0.2 INJECTION, SOLUTION INTRAMUSCULAR; INTRAVENOUS; SUBCUTANEOUS EVERY 4 HOURS PRN
Status: DISCONTINUED | OUTPATIENT
Start: 2021-01-12 | End: 2021-01-13

## 2021-01-12 RX ORDER — ACETAMINOPHEN 500 MG
1000 TABLET ORAL EVERY 6 HOURS PRN
Status: DISCONTINUED | OUTPATIENT
Start: 2021-01-12 | End: 2021-01-13

## 2021-01-12 RX ORDER — OXYCODONE HYDROCHLORIDE 5 MG/1
5 TABLET ORAL EVERY 6 HOURS PRN
Status: DISCONTINUED | OUTPATIENT
Start: 2021-01-12 | End: 2021-01-24 | Stop reason: HOSPADM

## 2021-01-12 RX ORDER — ONDANSETRON 2 MG/ML
8 INJECTION INTRAMUSCULAR; INTRAVENOUS EVERY 8 HOURS PRN
Status: DISCONTINUED | OUTPATIENT
Start: 2021-01-12 | End: 2021-01-24 | Stop reason: HOSPADM

## 2021-01-12 RX ORDER — OXYCODONE HYDROCHLORIDE 5 MG/1
10 TABLET ORAL EVERY 6 HOURS PRN
Status: DISCONTINUED | OUTPATIENT
Start: 2021-01-12 | End: 2021-01-13

## 2021-01-12 RX ORDER — ACETAMINOPHEN 500 MG
1000 TABLET ORAL EVERY 6 HOURS PRN
Status: DISCONTINUED | OUTPATIENT
Start: 2021-01-12 | End: 2021-01-12

## 2021-01-12 RX ORDER — PANTOPRAZOLE SODIUM 40 MG/1
40 TABLET, DELAYED RELEASE ORAL DAILY
Status: DISCONTINUED | OUTPATIENT
Start: 2021-01-12 | End: 2021-01-24 | Stop reason: HOSPADM

## 2021-01-12 RX ORDER — HYDROMORPHONE HYDROCHLORIDE 1 MG/ML
0.2 INJECTION, SOLUTION INTRAMUSCULAR; INTRAVENOUS; SUBCUTANEOUS EVERY 6 HOURS PRN
Status: DISCONTINUED | OUTPATIENT
Start: 2021-01-12 | End: 2021-01-12

## 2021-01-12 RX ADMIN — ESCITALOPRAM OXALATE 10 MG: 10 TABLET ORAL at 08:01

## 2021-01-12 RX ADMIN — HYDROMORPHONE HYDROCHLORIDE 0.2 MG: 1 INJECTION, SOLUTION INTRAMUSCULAR; INTRAVENOUS; SUBCUTANEOUS at 05:01

## 2021-01-12 RX ADMIN — DOCUSATE SODIUM 100 MG: 100 CAPSULE, LIQUID FILLED ORAL at 08:01

## 2021-01-12 RX ADMIN — PANTOPRAZOLE SODIUM 40 MG: 40 TABLET, DELAYED RELEASE ORAL at 04:01

## 2021-01-12 RX ADMIN — HEPARIN SODIUM 5000 UNITS: 5000 INJECTION INTRAVENOUS; SUBCUTANEOUS at 10:01

## 2021-01-12 RX ADMIN — HYDROCODONE BITARTRATE AND ACETAMINOPHEN 1 TABLET: 5; 325 TABLET ORAL at 03:01

## 2021-01-12 RX ADMIN — AMLODIPINE BESYLATE 5 MG: 5 TABLET ORAL at 08:01

## 2021-01-12 RX ADMIN — HYDROCODONE BITARTRATE AND ACETAMINOPHEN 1 TABLET: 5; 325 TABLET ORAL at 10:01

## 2021-01-12 RX ADMIN — MUPIROCIN: 20 OINTMENT TOPICAL at 08:01

## 2021-01-12 RX ADMIN — OXYCODONE HYDROCHLORIDE 10 MG: 5 TABLET ORAL at 04:01

## 2021-01-12 RX ADMIN — HEPARIN SODIUM 5000 UNITS: 5000 INJECTION INTRAVENOUS; SUBCUTANEOUS at 01:01

## 2021-01-12 RX ADMIN — HYDROCODONE BITARTRATE AND ACETAMINOPHEN 1 TABLET: 5; 325 TABLET ORAL at 02:01

## 2021-01-12 RX ADMIN — ACETAMINOPHEN 1000 MG: 500 TABLET ORAL at 04:01

## 2021-01-12 RX ADMIN — CEFTRIAXONE SODIUM 2 G: 2 INJECTION, SOLUTION INTRAVENOUS at 01:01

## 2021-01-13 ENCOUNTER — SOCIAL WORK (OUTPATIENT)
Dept: NEUROLOGY | Facility: CLINIC | Age: 56
End: 2021-01-13
Payer: COMMERCIAL

## 2021-01-13 PROBLEM — E87.6 HYPOKALEMIA: Status: ACTIVE | Noted: 2021-01-13

## 2021-01-13 PROBLEM — E83.51 HYPOCALCEMIA: Status: ACTIVE | Noted: 2021-01-13

## 2021-01-13 LAB
ALBUMIN SERPL BCP-MCNC: 3.3 G/DL (ref 3.5–5.2)
ALP SERPL-CCNC: 113 U/L (ref 55–135)
ALT SERPL W/O P-5'-P-CCNC: 20 U/L (ref 10–44)
ANION GAP SERPL CALC-SCNC: 11 MMOL/L (ref 8–16)
AST SERPL-CCNC: 31 U/L (ref 10–40)
BASOPHILS # BLD AUTO: 0.06 K/UL (ref 0–0.2)
BASOPHILS NFR BLD: 0.7 % (ref 0–1.9)
BILIRUB SERPL-MCNC: 0.3 MG/DL (ref 0.1–1)
BUN SERPL-MCNC: 15 MG/DL (ref 6–20)
CALCIUM SERPL-MCNC: 8.4 MG/DL (ref 8.7–10.5)
CHLORIDE SERPL-SCNC: 106 MMOL/L (ref 95–110)
CO2 SERPL-SCNC: 21 MMOL/L (ref 23–29)
CREAT SERPL-MCNC: 0.8 MG/DL (ref 0.5–1.4)
DIFFERENTIAL METHOD: ABNORMAL
EOSINOPHIL # BLD AUTO: 0.3 K/UL (ref 0–0.5)
EOSINOPHIL NFR BLD: 3.3 % (ref 0–8)
ERYTHROCYTE [DISTWIDTH] IN BLOOD BY AUTOMATED COUNT: 13.3 % (ref 11.5–14.5)
EST. GFR  (AFRICAN AMERICAN): >60 ML/MIN/1.73 M^2
EST. GFR  (NON AFRICAN AMERICAN): >60 ML/MIN/1.73 M^2
GLUCOSE SERPL-MCNC: 87 MG/DL (ref 70–110)
HCT VFR BLD AUTO: 41.8 % (ref 40–54)
HGB BLD-MCNC: 13.8 G/DL (ref 14–18)
IMM GRANULOCYTES # BLD AUTO: 0.03 K/UL (ref 0–0.04)
IMM GRANULOCYTES NFR BLD AUTO: 0.3 % (ref 0–0.5)
LYMPHOCYTES # BLD AUTO: 2.4 K/UL (ref 1–4.8)
LYMPHOCYTES NFR BLD: 27.8 % (ref 18–48)
MAGNESIUM SERPL-MCNC: 1.9 MG/DL (ref 1.6–2.6)
MCH RBC QN AUTO: 30.5 PG (ref 27–31)
MCHC RBC AUTO-ENTMCNC: 33 G/DL (ref 32–36)
MCV RBC AUTO: 92 FL (ref 82–98)
MONOCYTES # BLD AUTO: 0.9 K/UL (ref 0.3–1)
MONOCYTES NFR BLD: 10.5 % (ref 4–15)
NEUTROPHILS # BLD AUTO: 5 K/UL (ref 1.8–7.7)
NEUTROPHILS NFR BLD: 57.4 % (ref 38–73)
NRBC BLD-RTO: 0 /100 WBC
PHOSPHATE SERPL-MCNC: 4.3 MG/DL (ref 2.7–4.5)
PLATELET # BLD AUTO: 287 K/UL (ref 150–350)
PMV BLD AUTO: 10 FL (ref 9.2–12.9)
POTASSIUM SERPL-SCNC: 3.4 MMOL/L (ref 3.5–5.1)
POTASSIUM SERPL-SCNC: 3.6 MMOL/L (ref 3.5–5.1)
PROT SERPL-MCNC: 6.4 G/DL (ref 6–8.4)
RBC # BLD AUTO: 4.53 M/UL (ref 4.6–6.2)
SODIUM SERPL-SCNC: 138 MMOL/L (ref 136–145)
WBC # BLD AUTO: 8.78 K/UL (ref 3.9–12.7)

## 2021-01-13 PROCEDURE — 84132 ASSAY OF SERUM POTASSIUM: CPT

## 2021-01-13 PROCEDURE — 84100 ASSAY OF PHOSPHORUS: CPT

## 2021-01-13 PROCEDURE — 63600175 PHARM REV CODE 636 W HCPCS: Performed by: STUDENT IN AN ORGANIZED HEALTH CARE EDUCATION/TRAINING PROGRAM

## 2021-01-13 PROCEDURE — 95720 EEG PHY/QHP EA INCR W/VEEG: CPT | Mod: ,,, | Performed by: PSYCHIATRY & NEUROLOGY

## 2021-01-13 PROCEDURE — 25000003 PHARM REV CODE 250: Performed by: STUDENT IN AN ORGANIZED HEALTH CARE EDUCATION/TRAINING PROGRAM

## 2021-01-13 PROCEDURE — 25000003 PHARM REV CODE 250: Performed by: NURSE PRACTITIONER

## 2021-01-13 PROCEDURE — 80053 COMPREHEN METABOLIC PANEL: CPT

## 2021-01-13 PROCEDURE — 83735 ASSAY OF MAGNESIUM: CPT

## 2021-01-13 PROCEDURE — 94761 N-INVAS EAR/PLS OXIMETRY MLT: CPT

## 2021-01-13 PROCEDURE — 99233 PR SUBSEQUENT HOSPITAL CARE,LEVL III: ICD-10-PCS | Mod: ,,, | Performed by: PSYCHIATRY & NEUROLOGY

## 2021-01-13 PROCEDURE — 85025 COMPLETE CBC W/AUTO DIFF WBC: CPT

## 2021-01-13 PROCEDURE — 99233 SBSQ HOSP IP/OBS HIGH 50: CPT | Mod: ,,, | Performed by: PSYCHIATRY & NEUROLOGY

## 2021-01-13 PROCEDURE — 99233 SBSQ HOSP IP/OBS HIGH 50: CPT | Mod: ,,, | Performed by: NURSE PRACTITIONER

## 2021-01-13 PROCEDURE — 99233 PR SUBSEQUENT HOSPITAL CARE,LEVL III: ICD-10-PCS | Mod: ,,, | Performed by: NURSE PRACTITIONER

## 2021-01-13 PROCEDURE — 99999 PR PBB SHADOW E&M-EST. PATIENT-LVL III: CPT | Mod: PBBFAC,,, | Performed by: SOCIAL WORKER

## 2021-01-13 PROCEDURE — 95720 PR EEG, W/VIDEO, CONT RECORD, I&R, >12<26 HRS: ICD-10-PCS | Mod: ,,, | Performed by: PSYCHIATRY & NEUROLOGY

## 2021-01-13 PROCEDURE — 95714 VEEG EA 12-26 HR UNMNTR: CPT

## 2021-01-13 PROCEDURE — 99999 PR PBB SHADOW E&M-EST. PATIENT-LVL III: ICD-10-PCS | Mod: PBBFAC,,, | Performed by: SOCIAL WORKER

## 2021-01-13 PROCEDURE — 20000000 HC ICU ROOM

## 2021-01-13 RX ORDER — LANOLIN ALCOHOL/MO/W.PET/CERES
800 CREAM (GRAM) TOPICAL
Status: DISCONTINUED | OUTPATIENT
Start: 2021-01-13 | End: 2021-01-24 | Stop reason: HOSPADM

## 2021-01-13 RX ORDER — SODIUM,POTASSIUM PHOSPHATES 280-250MG
2 POWDER IN PACKET (EA) ORAL
Status: DISCONTINUED | OUTPATIENT
Start: 2021-01-13 | End: 2021-01-24 | Stop reason: HOSPADM

## 2021-01-13 RX ORDER — OXYCODONE HYDROCHLORIDE 10 MG/1
10 TABLET ORAL EVERY 4 HOURS PRN
Status: DISCONTINUED | OUTPATIENT
Start: 2021-01-13 | End: 2021-01-23

## 2021-01-13 RX ORDER — ACETAMINOPHEN 500 MG
1000 TABLET ORAL EVERY 8 HOURS
Status: DISCONTINUED | OUTPATIENT
Start: 2021-01-13 | End: 2021-01-20

## 2021-01-13 RX ORDER — HYDROMORPHONE HYDROCHLORIDE 1 MG/ML
0.2 INJECTION, SOLUTION INTRAMUSCULAR; INTRAVENOUS; SUBCUTANEOUS EVERY 6 HOURS PRN
Status: DISCONTINUED | OUTPATIENT
Start: 2021-01-13 | End: 2021-01-23

## 2021-01-13 RX ADMIN — OXYCODONE HYDROCHLORIDE 5 MG: 5 TABLET ORAL at 07:01

## 2021-01-13 RX ADMIN — ACETAMINOPHEN 1000 MG: 500 TABLET ORAL at 09:01

## 2021-01-13 RX ADMIN — HYDROMORPHONE HYDROCHLORIDE 0.2 MG: 1 INJECTION, SOLUTION INTRAMUSCULAR; INTRAVENOUS; SUBCUTANEOUS at 05:01

## 2021-01-13 RX ADMIN — PANTOPRAZOLE SODIUM 40 MG: 40 TABLET, DELAYED RELEASE ORAL at 09:01

## 2021-01-13 RX ADMIN — MUPIROCIN: 20 OINTMENT TOPICAL at 09:01

## 2021-01-13 RX ADMIN — DOCUSATE SODIUM 100 MG: 100 CAPSULE, LIQUID FILLED ORAL at 09:01

## 2021-01-13 RX ADMIN — AMLODIPINE BESYLATE 5 MG: 5 TABLET ORAL at 09:01

## 2021-01-13 RX ADMIN — HEPARIN SODIUM 5000 UNITS: 5000 INJECTION INTRAVENOUS; SUBCUTANEOUS at 09:01

## 2021-01-13 RX ADMIN — CEFTRIAXONE SODIUM 2 G: 2 INJECTION, SOLUTION INTRAVENOUS at 01:01

## 2021-01-13 RX ADMIN — HEPARIN SODIUM 5000 UNITS: 5000 INJECTION INTRAVENOUS; SUBCUTANEOUS at 05:01

## 2021-01-13 RX ADMIN — POTASSIUM BICARBONATE 35 MEQ: 391 TABLET, EFFERVESCENT ORAL at 09:01

## 2021-01-13 RX ADMIN — ESCITALOPRAM OXALATE 10 MG: 10 TABLET ORAL at 09:01

## 2021-01-13 RX ADMIN — OXYCODONE HYDROCHLORIDE 5 MG: 5 TABLET ORAL at 12:01

## 2021-01-13 RX ADMIN — HEPARIN SODIUM 5000 UNITS: 5000 INJECTION INTRAVENOUS; SUBCUTANEOUS at 01:01

## 2021-01-13 RX ADMIN — ACETAMINOPHEN 1000 MG: 500 TABLET ORAL at 01:01

## 2021-01-13 RX ADMIN — POTASSIUM BICARBONATE 35 MEQ: 391 TABLET, EFFERVESCENT ORAL at 05:01

## 2021-01-13 RX ADMIN — OXYCODONE HYDROCHLORIDE 10 MG: 5 TABLET ORAL at 11:01

## 2021-01-14 LAB
ALBUMIN SERPL BCP-MCNC: 3.3 G/DL (ref 3.5–5.2)
ALP SERPL-CCNC: 122 U/L (ref 55–135)
ALT SERPL W/O P-5'-P-CCNC: 27 U/L (ref 10–44)
ANION GAP SERPL CALC-SCNC: 12 MMOL/L (ref 8–16)
AST SERPL-CCNC: 39 U/L (ref 10–40)
BASOPHILS # BLD AUTO: 0.04 K/UL (ref 0–0.2)
BASOPHILS NFR BLD: 0.4 % (ref 0–1.9)
BILIRUB SERPL-MCNC: 0.4 MG/DL (ref 0.1–1)
BUN SERPL-MCNC: 13 MG/DL (ref 6–20)
CALCIUM SERPL-MCNC: 8.8 MG/DL (ref 8.7–10.5)
CHLORIDE SERPL-SCNC: 105 MMOL/L (ref 95–110)
CO2 SERPL-SCNC: 21 MMOL/L (ref 23–29)
CREAT SERPL-MCNC: 0.7 MG/DL (ref 0.5–1.4)
DIFFERENTIAL METHOD: ABNORMAL
EOSINOPHIL # BLD AUTO: 0.3 K/UL (ref 0–0.5)
EOSINOPHIL NFR BLD: 3.1 % (ref 0–8)
ERYTHROCYTE [DISTWIDTH] IN BLOOD BY AUTOMATED COUNT: 13.1 % (ref 11.5–14.5)
EST. GFR  (AFRICAN AMERICAN): >60 ML/MIN/1.73 M^2
EST. GFR  (NON AFRICAN AMERICAN): >60 ML/MIN/1.73 M^2
GLUCOSE SERPL-MCNC: 101 MG/DL (ref 70–110)
HCT VFR BLD AUTO: 42.6 % (ref 40–54)
HGB BLD-MCNC: 13.9 G/DL (ref 14–18)
IMM GRANULOCYTES # BLD AUTO: 0.03 K/UL (ref 0–0.04)
IMM GRANULOCYTES NFR BLD AUTO: 0.3 % (ref 0–0.5)
LYMPHOCYTES # BLD AUTO: 2.1 K/UL (ref 1–4.8)
LYMPHOCYTES NFR BLD: 22.7 % (ref 18–48)
MAGNESIUM SERPL-MCNC: 1.9 MG/DL (ref 1.6–2.6)
MCH RBC QN AUTO: 30.3 PG (ref 27–31)
MCHC RBC AUTO-ENTMCNC: 32.6 G/DL (ref 32–36)
MCV RBC AUTO: 93 FL (ref 82–98)
MONOCYTES # BLD AUTO: 1.1 K/UL (ref 0.3–1)
MONOCYTES NFR BLD: 12 % (ref 4–15)
NEUTROPHILS # BLD AUTO: 5.6 K/UL (ref 1.8–7.7)
NEUTROPHILS NFR BLD: 61.5 % (ref 38–73)
NRBC BLD-RTO: 0 /100 WBC
PHOSPHATE SERPL-MCNC: 4.3 MG/DL (ref 2.7–4.5)
PLATELET # BLD AUTO: 275 K/UL (ref 150–350)
PMV BLD AUTO: 9.9 FL (ref 9.2–12.9)
POTASSIUM SERPL-SCNC: 3.7 MMOL/L (ref 3.5–5.1)
PROT SERPL-MCNC: 6.7 G/DL (ref 6–8.4)
RBC # BLD AUTO: 4.59 M/UL (ref 4.6–6.2)
SODIUM SERPL-SCNC: 138 MMOL/L (ref 136–145)
TROPONIN I SERPL DL<=0.01 NG/ML-MCNC: 0.01 NG/ML (ref 0–0.03)
WBC # BLD AUTO: 9.09 K/UL (ref 3.9–12.7)

## 2021-01-14 PROCEDURE — 99233 SBSQ HOSP IP/OBS HIGH 50: CPT | Mod: ,,, | Performed by: PSYCHIATRY & NEUROLOGY

## 2021-01-14 PROCEDURE — 95720 EEG PHY/QHP EA INCR W/VEEG: CPT | Mod: ,,, | Performed by: PSYCHIATRY & NEUROLOGY

## 2021-01-14 PROCEDURE — 80053 COMPREHEN METABOLIC PANEL: CPT

## 2021-01-14 PROCEDURE — 95714 VEEG EA 12-26 HR UNMNTR: CPT

## 2021-01-14 PROCEDURE — 93010 EKG 12-LEAD: ICD-10-PCS | Mod: ,,, | Performed by: INTERNAL MEDICINE

## 2021-01-14 PROCEDURE — 20000000 HC ICU ROOM

## 2021-01-14 PROCEDURE — 99233 SBSQ HOSP IP/OBS HIGH 50: CPT | Mod: ,,, | Performed by: NURSE PRACTITIONER

## 2021-01-14 PROCEDURE — 83735 ASSAY OF MAGNESIUM: CPT

## 2021-01-14 PROCEDURE — 95720 PR EEG, W/VIDEO, CONT RECORD, I&R, >12<26 HRS: ICD-10-PCS | Mod: ,,, | Performed by: PSYCHIATRY & NEUROLOGY

## 2021-01-14 PROCEDURE — 25000003 PHARM REV CODE 250: Performed by: STUDENT IN AN ORGANIZED HEALTH CARE EDUCATION/TRAINING PROGRAM

## 2021-01-14 PROCEDURE — 94761 N-INVAS EAR/PLS OXIMETRY MLT: CPT

## 2021-01-14 PROCEDURE — 99233 PR SUBSEQUENT HOSPITAL CARE,LEVL III: ICD-10-PCS | Mod: ,,, | Performed by: NURSE PRACTITIONER

## 2021-01-14 PROCEDURE — 84100 ASSAY OF PHOSPHORUS: CPT

## 2021-01-14 PROCEDURE — 85025 COMPLETE CBC W/AUTO DIFF WBC: CPT

## 2021-01-14 PROCEDURE — 84484 ASSAY OF TROPONIN QUANT: CPT

## 2021-01-14 PROCEDURE — 63600175 PHARM REV CODE 636 W HCPCS: Performed by: STUDENT IN AN ORGANIZED HEALTH CARE EDUCATION/TRAINING PROGRAM

## 2021-01-14 PROCEDURE — 93010 ELECTROCARDIOGRAM REPORT: CPT | Mod: ,,, | Performed by: INTERNAL MEDICINE

## 2021-01-14 PROCEDURE — 93005 ELECTROCARDIOGRAM TRACING: CPT

## 2021-01-14 PROCEDURE — 25000003 PHARM REV CODE 250: Performed by: NURSE PRACTITIONER

## 2021-01-14 PROCEDURE — 99233 PR SUBSEQUENT HOSPITAL CARE,LEVL III: ICD-10-PCS | Mod: ,,, | Performed by: PSYCHIATRY & NEUROLOGY

## 2021-01-14 RX ADMIN — DOCUSATE SODIUM 100 MG: 100 CAPSULE, LIQUID FILLED ORAL at 08:01

## 2021-01-14 RX ADMIN — HEPARIN SODIUM 5000 UNITS: 5000 INJECTION INTRAVENOUS; SUBCUTANEOUS at 02:01

## 2021-01-14 RX ADMIN — MUPIROCIN: 20 OINTMENT TOPICAL at 08:01

## 2021-01-14 RX ADMIN — ACETAMINOPHEN 1000 MG: 500 TABLET ORAL at 02:01

## 2021-01-14 RX ADMIN — OXYCODONE HYDROCHLORIDE 5 MG: 5 TABLET ORAL at 04:01

## 2021-01-14 RX ADMIN — ACETAMINOPHEN 1000 MG: 500 TABLET ORAL at 09:01

## 2021-01-14 RX ADMIN — AMLODIPINE BESYLATE 5 MG: 5 TABLET ORAL at 09:01

## 2021-01-14 RX ADMIN — HEPARIN SODIUM 5000 UNITS: 5000 INJECTION INTRAVENOUS; SUBCUTANEOUS at 09:01

## 2021-01-14 RX ADMIN — CEFTRIAXONE SODIUM 2 G: 2 INJECTION, SOLUTION INTRAVENOUS at 01:01

## 2021-01-14 RX ADMIN — PANTOPRAZOLE SODIUM 40 MG: 40 TABLET, DELAYED RELEASE ORAL at 08:01

## 2021-01-14 RX ADMIN — ESCITALOPRAM OXALATE 10 MG: 10 TABLET ORAL at 09:01

## 2021-01-14 RX ADMIN — CEFTRIAXONE SODIUM 2 G: 2 INJECTION, SOLUTION INTRAVENOUS at 02:01

## 2021-01-14 RX ADMIN — DOCUSATE SODIUM 100 MG: 100 CAPSULE, LIQUID FILLED ORAL at 09:01

## 2021-01-14 RX ADMIN — OXYCODONE HYDROCHLORIDE 5 MG: 5 TABLET ORAL at 03:01

## 2021-01-14 RX ADMIN — MUPIROCIN: 20 OINTMENT TOPICAL at 09:01

## 2021-01-15 PROBLEM — F41.8 DEPRESSION WITH ANXIETY: Status: ACTIVE | Noted: 2021-01-15

## 2021-01-15 LAB
ABO + RH BLD: NORMAL
ALBUMIN SERPL BCP-MCNC: 3.3 G/DL (ref 3.5–5.2)
ALP SERPL-CCNC: 131 U/L (ref 55–135)
ALT SERPL W/O P-5'-P-CCNC: 39 U/L (ref 10–44)
ANION GAP SERPL CALC-SCNC: 11 MMOL/L (ref 8–16)
AST SERPL-CCNC: 39 U/L (ref 10–40)
BASOPHILS # BLD AUTO: 0.05 K/UL (ref 0–0.2)
BASOPHILS NFR BLD: 0.7 % (ref 0–1.9)
BILIRUB SERPL-MCNC: 0.3 MG/DL (ref 0.1–1)
BLD GP AB SCN CELLS X3 SERPL QL: NORMAL
BUN SERPL-MCNC: 13 MG/DL (ref 6–20)
CALCIUM SERPL-MCNC: 8.9 MG/DL (ref 8.7–10.5)
CHLORIDE SERPL-SCNC: 103 MMOL/L (ref 95–110)
CO2 SERPL-SCNC: 21 MMOL/L (ref 23–29)
CREAT SERPL-MCNC: 0.7 MG/DL (ref 0.5–1.4)
DIFFERENTIAL METHOD: NORMAL
EOSINOPHIL # BLD AUTO: 0.4 K/UL (ref 0–0.5)
EOSINOPHIL NFR BLD: 5 % (ref 0–8)
ERYTHROCYTE [DISTWIDTH] IN BLOOD BY AUTOMATED COUNT: 13.1 % (ref 11.5–14.5)
EST. GFR  (AFRICAN AMERICAN): >60 ML/MIN/1.73 M^2
EST. GFR  (NON AFRICAN AMERICAN): >60 ML/MIN/1.73 M^2
GLUCOSE SERPL-MCNC: 111 MG/DL (ref 70–110)
HCT VFR BLD AUTO: 44.7 % (ref 40–54)
HGB BLD-MCNC: 14.4 G/DL (ref 14–18)
IMM GRANULOCYTES # BLD AUTO: 0.03 K/UL (ref 0–0.04)
IMM GRANULOCYTES NFR BLD AUTO: 0.4 % (ref 0–0.5)
LYMPHOCYTES # BLD AUTO: 1.8 K/UL (ref 1–4.8)
LYMPHOCYTES NFR BLD: 24.6 % (ref 18–48)
MAGNESIUM SERPL-MCNC: 1.9 MG/DL (ref 1.6–2.6)
MCH RBC QN AUTO: 30.1 PG (ref 27–31)
MCHC RBC AUTO-ENTMCNC: 32.2 G/DL (ref 32–36)
MCV RBC AUTO: 94 FL (ref 82–98)
MONOCYTES # BLD AUTO: 0.8 K/UL (ref 0.3–1)
MONOCYTES NFR BLD: 10.5 % (ref 4–15)
NEUTROPHILS # BLD AUTO: 4.4 K/UL (ref 1.8–7.7)
NEUTROPHILS NFR BLD: 58.8 % (ref 38–73)
NRBC BLD-RTO: 0 /100 WBC
PHOSPHATE SERPL-MCNC: 4.2 MG/DL (ref 2.7–4.5)
PLATELET # BLD AUTO: 286 K/UL (ref 150–350)
PMV BLD AUTO: 9.8 FL (ref 9.2–12.9)
POTASSIUM SERPL-SCNC: 4 MMOL/L (ref 3.5–5.1)
PROT SERPL-MCNC: 7.1 G/DL (ref 6–8.4)
RBC # BLD AUTO: 4.78 M/UL (ref 4.6–6.2)
SODIUM SERPL-SCNC: 135 MMOL/L (ref 136–145)
WBC # BLD AUTO: 7.44 K/UL (ref 3.9–12.7)

## 2021-01-15 PROCEDURE — 25000003 PHARM REV CODE 250: Performed by: STUDENT IN AN ORGANIZED HEALTH CARE EDUCATION/TRAINING PROGRAM

## 2021-01-15 PROCEDURE — 95720 EEG PHY/QHP EA INCR W/VEEG: CPT | Mod: ,,, | Performed by: PSYCHIATRY & NEUROLOGY

## 2021-01-15 PROCEDURE — 63600175 PHARM REV CODE 636 W HCPCS: Performed by: STUDENT IN AN ORGANIZED HEALTH CARE EDUCATION/TRAINING PROGRAM

## 2021-01-15 PROCEDURE — 99233 PR SUBSEQUENT HOSPITAL CARE,LEVL III: ICD-10-PCS | Mod: ,,, | Performed by: NURSE PRACTITIONER

## 2021-01-15 PROCEDURE — 20000000 HC ICU ROOM

## 2021-01-15 PROCEDURE — 25000003 PHARM REV CODE 250: Performed by: NURSE PRACTITIONER

## 2021-01-15 PROCEDURE — 83735 ASSAY OF MAGNESIUM: CPT

## 2021-01-15 PROCEDURE — 86900 BLOOD TYPING SEROLOGIC ABO: CPT

## 2021-01-15 PROCEDURE — 85025 COMPLETE CBC W/AUTO DIFF WBC: CPT

## 2021-01-15 PROCEDURE — 99233 SBSQ HOSP IP/OBS HIGH 50: CPT | Mod: ,,, | Performed by: PSYCHIATRY & NEUROLOGY

## 2021-01-15 PROCEDURE — 84100 ASSAY OF PHOSPHORUS: CPT

## 2021-01-15 PROCEDURE — 94761 N-INVAS EAR/PLS OXIMETRY MLT: CPT

## 2021-01-15 PROCEDURE — 80053 COMPREHEN METABOLIC PANEL: CPT

## 2021-01-15 PROCEDURE — 95714 VEEG EA 12-26 HR UNMNTR: CPT

## 2021-01-15 PROCEDURE — 99233 PR SUBSEQUENT HOSPITAL CARE,LEVL III: ICD-10-PCS | Mod: ,,, | Performed by: PSYCHIATRY & NEUROLOGY

## 2021-01-15 PROCEDURE — 95720 PR EEG, W/VIDEO, CONT RECORD, I&R, >12<26 HRS: ICD-10-PCS | Mod: ,,, | Performed by: PSYCHIATRY & NEUROLOGY

## 2021-01-15 PROCEDURE — 99233 SBSQ HOSP IP/OBS HIGH 50: CPT | Mod: ,,, | Performed by: NURSE PRACTITIONER

## 2021-01-15 RX ORDER — POLYETHYLENE GLYCOL 3350 17 G/17G
17 POWDER, FOR SOLUTION ORAL DAILY
Status: DISCONTINUED | OUTPATIENT
Start: 2021-01-15 | End: 2021-01-24 | Stop reason: HOSPADM

## 2021-01-15 RX ADMIN — MUPIROCIN: 20 OINTMENT TOPICAL at 12:01

## 2021-01-15 RX ADMIN — OXYCODONE HYDROCHLORIDE 5 MG: 5 TABLET ORAL at 02:01

## 2021-01-15 RX ADMIN — HEPARIN SODIUM 5000 UNITS: 5000 INJECTION INTRAVENOUS; SUBCUTANEOUS at 02:01

## 2021-01-15 RX ADMIN — AMLODIPINE BESYLATE 5 MG: 5 TABLET ORAL at 09:01

## 2021-01-15 RX ADMIN — HEPARIN SODIUM 5000 UNITS: 5000 INJECTION INTRAVENOUS; SUBCUTANEOUS at 10:01

## 2021-01-15 RX ADMIN — PANTOPRAZOLE SODIUM 40 MG: 40 TABLET, DELAYED RELEASE ORAL at 09:01

## 2021-01-15 RX ADMIN — OXYCODONE HYDROCHLORIDE 10 MG: 5 TABLET ORAL at 10:01

## 2021-01-15 RX ADMIN — CEFTRIAXONE SODIUM 2 G: 2 INJECTION, SOLUTION INTRAVENOUS at 12:01

## 2021-01-15 RX ADMIN — ESCITALOPRAM OXALATE 10 MG: 10 TABLET ORAL at 09:01

## 2021-01-15 RX ADMIN — ACETAMINOPHEN 1000 MG: 500 TABLET ORAL at 03:01

## 2021-01-15 RX ADMIN — OXYCODONE HYDROCHLORIDE 10 MG: 5 TABLET ORAL at 06:01

## 2021-01-15 RX ADMIN — OXYCODONE HYDROCHLORIDE 10 MG: 5 TABLET ORAL at 12:01

## 2021-01-15 RX ADMIN — DOCUSATE SODIUM 100 MG: 100 CAPSULE, LIQUID FILLED ORAL at 09:01

## 2021-01-15 RX ADMIN — ACETAMINOPHEN 1000 MG: 500 TABLET ORAL at 10:01

## 2021-01-15 RX ADMIN — ACETAMINOPHEN 1000 MG: 500 TABLET ORAL at 06:01

## 2021-01-15 RX ADMIN — HEPARIN SODIUM 5000 UNITS: 5000 INJECTION INTRAVENOUS; SUBCUTANEOUS at 06:01

## 2021-01-15 RX ADMIN — MUPIROCIN: 20 OINTMENT TOPICAL at 09:01

## 2021-01-15 RX ADMIN — POLYETHYLENE GLYCOL 3350 17 G: 17 POWDER, FOR SOLUTION ORAL at 12:01

## 2021-01-16 LAB
ALBUMIN SERPL BCP-MCNC: 3.5 G/DL (ref 3.5–5.2)
ALP SERPL-CCNC: 145 U/L (ref 55–135)
ALT SERPL W/O P-5'-P-CCNC: 70 U/L (ref 10–44)
ANION GAP SERPL CALC-SCNC: 11 MMOL/L (ref 8–16)
AST SERPL-CCNC: 66 U/L (ref 10–40)
BASOPHILS # BLD AUTO: 0.04 K/UL (ref 0–0.2)
BASOPHILS # BLD AUTO: 0.04 K/UL (ref 0–0.2)
BASOPHILS NFR BLD: 0.5 % (ref 0–1.9)
BASOPHILS NFR BLD: 0.5 % (ref 0–1.9)
BILIRUB SERPL-MCNC: 0.3 MG/DL (ref 0.1–1)
BUN SERPL-MCNC: 15 MG/DL (ref 6–20)
CALCIUM SERPL-MCNC: 8.9 MG/DL (ref 8.7–10.5)
CHLORIDE SERPL-SCNC: 103 MMOL/L (ref 95–110)
CO2 SERPL-SCNC: 22 MMOL/L (ref 23–29)
CREAT SERPL-MCNC: 0.7 MG/DL (ref 0.5–1.4)
DIFFERENTIAL METHOD: NORMAL
DIFFERENTIAL METHOD: NORMAL
EOSINOPHIL # BLD AUTO: 0.5 K/UL (ref 0–0.5)
EOSINOPHIL # BLD AUTO: 0.5 K/UL (ref 0–0.5)
EOSINOPHIL NFR BLD: 6 % (ref 0–8)
EOSINOPHIL NFR BLD: 6 % (ref 0–8)
ERYTHROCYTE [DISTWIDTH] IN BLOOD BY AUTOMATED COUNT: 13.2 % (ref 11.5–14.5)
ERYTHROCYTE [DISTWIDTH] IN BLOOD BY AUTOMATED COUNT: 13.2 % (ref 11.5–14.5)
EST. GFR  (AFRICAN AMERICAN): >60 ML/MIN/1.73 M^2
EST. GFR  (NON AFRICAN AMERICAN): >60 ML/MIN/1.73 M^2
GLUCOSE SERPL-MCNC: 111 MG/DL (ref 70–110)
HCT VFR BLD AUTO: 47 % (ref 40–54)
HCT VFR BLD AUTO: 47 % (ref 40–54)
HGB BLD-MCNC: 15.2 G/DL (ref 14–18)
HGB BLD-MCNC: 15.2 G/DL (ref 14–18)
IMM GRANULOCYTES # BLD AUTO: 0.03 K/UL (ref 0–0.04)
IMM GRANULOCYTES # BLD AUTO: 0.03 K/UL (ref 0–0.04)
IMM GRANULOCYTES NFR BLD AUTO: 0.4 % (ref 0–0.5)
IMM GRANULOCYTES NFR BLD AUTO: 0.4 % (ref 0–0.5)
LYMPHOCYTES # BLD AUTO: 1.8 K/UL (ref 1–4.8)
LYMPHOCYTES # BLD AUTO: 1.8 K/UL (ref 1–4.8)
LYMPHOCYTES NFR BLD: 21.4 % (ref 18–48)
LYMPHOCYTES NFR BLD: 21.4 % (ref 18–48)
MAGNESIUM SERPL-MCNC: 2 MG/DL (ref 1.6–2.6)
MAGNESIUM SERPL-MCNC: 2 MG/DL (ref 1.6–2.6)
MCH RBC QN AUTO: 30.6 PG (ref 27–31)
MCH RBC QN AUTO: 30.6 PG (ref 27–31)
MCHC RBC AUTO-ENTMCNC: 32.3 G/DL (ref 32–36)
MCHC RBC AUTO-ENTMCNC: 32.3 G/DL (ref 32–36)
MCV RBC AUTO: 95 FL (ref 82–98)
MCV RBC AUTO: 95 FL (ref 82–98)
MONOCYTES # BLD AUTO: 0.8 K/UL (ref 0.3–1)
MONOCYTES # BLD AUTO: 0.8 K/UL (ref 0.3–1)
MONOCYTES NFR BLD: 9.6 % (ref 4–15)
MONOCYTES NFR BLD: 9.6 % (ref 4–15)
NEUTROPHILS # BLD AUTO: 5.1 K/UL (ref 1.8–7.7)
NEUTROPHILS # BLD AUTO: 5.1 K/UL (ref 1.8–7.7)
NEUTROPHILS NFR BLD: 62.1 % (ref 38–73)
NEUTROPHILS NFR BLD: 62.1 % (ref 38–73)
NRBC BLD-RTO: 0 /100 WBC
NRBC BLD-RTO: 0 /100 WBC
PHOSPHATE SERPL-MCNC: 4.6 MG/DL (ref 2.7–4.5)
PHOSPHATE SERPL-MCNC: 4.6 MG/DL (ref 2.7–4.5)
PLATELET # BLD AUTO: 287 K/UL (ref 150–350)
PLATELET # BLD AUTO: 287 K/UL (ref 150–350)
PMV BLD AUTO: 9.8 FL (ref 9.2–12.9)
PMV BLD AUTO: 9.8 FL (ref 9.2–12.9)
POTASSIUM SERPL-SCNC: 4 MMOL/L (ref 3.5–5.1)
PROT SERPL-MCNC: 7.2 G/DL (ref 6–8.4)
RBC # BLD AUTO: 4.97 M/UL (ref 4.6–6.2)
RBC # BLD AUTO: 4.97 M/UL (ref 4.6–6.2)
SODIUM SERPL-SCNC: 136 MMOL/L (ref 136–145)
WBC # BLD AUTO: 8.19 K/UL (ref 3.9–12.7)
WBC # BLD AUTO: 8.19 K/UL (ref 3.9–12.7)

## 2021-01-16 PROCEDURE — 80053 COMPREHEN METABOLIC PANEL: CPT

## 2021-01-16 PROCEDURE — 25000003 PHARM REV CODE 250: Performed by: NURSE PRACTITIONER

## 2021-01-16 PROCEDURE — 25000003 PHARM REV CODE 250: Performed by: STUDENT IN AN ORGANIZED HEALTH CARE EDUCATION/TRAINING PROGRAM

## 2021-01-16 PROCEDURE — 99233 SBSQ HOSP IP/OBS HIGH 50: CPT | Mod: ,,, | Performed by: PSYCHIATRY & NEUROLOGY

## 2021-01-16 PROCEDURE — 94761 N-INVAS EAR/PLS OXIMETRY MLT: CPT

## 2021-01-16 PROCEDURE — 99233 PR SUBSEQUENT HOSPITAL CARE,LEVL III: ICD-10-PCS | Mod: ,,, | Performed by: PSYCHIATRY & NEUROLOGY

## 2021-01-16 PROCEDURE — 63600175 PHARM REV CODE 636 W HCPCS: Performed by: STUDENT IN AN ORGANIZED HEALTH CARE EDUCATION/TRAINING PROGRAM

## 2021-01-16 PROCEDURE — 95714 VEEG EA 12-26 HR UNMNTR: CPT

## 2021-01-16 PROCEDURE — 27000221 HC OXYGEN, UP TO 24 HOURS

## 2021-01-16 PROCEDURE — 85025 COMPLETE CBC W/AUTO DIFF WBC: CPT

## 2021-01-16 PROCEDURE — 95720 PR EEG, W/VIDEO, CONT RECORD, I&R, >12<26 HRS: ICD-10-PCS | Mod: ,,, | Performed by: PSYCHIATRY & NEUROLOGY

## 2021-01-16 PROCEDURE — 83735 ASSAY OF MAGNESIUM: CPT

## 2021-01-16 PROCEDURE — 20000000 HC ICU ROOM

## 2021-01-16 PROCEDURE — 84100 ASSAY OF PHOSPHORUS: CPT

## 2021-01-16 PROCEDURE — 95720 EEG PHY/QHP EA INCR W/VEEG: CPT | Mod: ,,, | Performed by: PSYCHIATRY & NEUROLOGY

## 2021-01-16 RX ADMIN — ONDANSETRON 8 MG: 8 TABLET, ORALLY DISINTEGRATING ORAL at 12:01

## 2021-01-16 RX ADMIN — HEPARIN SODIUM 5000 UNITS: 5000 INJECTION INTRAVENOUS; SUBCUTANEOUS at 09:01

## 2021-01-16 RX ADMIN — CEFTRIAXONE SODIUM 2 G: 2 INJECTION, SOLUTION INTRAVENOUS at 12:01

## 2021-01-16 RX ADMIN — DOCUSATE SODIUM 100 MG: 100 CAPSULE, LIQUID FILLED ORAL at 09:01

## 2021-01-16 RX ADMIN — ACETAMINOPHEN 1000 MG: 500 TABLET ORAL at 05:01

## 2021-01-16 RX ADMIN — OXYCODONE HYDROCHLORIDE 10 MG: 5 TABLET ORAL at 11:01

## 2021-01-16 RX ADMIN — ACETAMINOPHEN 1000 MG: 500 TABLET ORAL at 09:01

## 2021-01-16 RX ADMIN — OXYCODONE HYDROCHLORIDE 5 MG: 5 TABLET ORAL at 09:01

## 2021-01-16 RX ADMIN — PANTOPRAZOLE SODIUM 40 MG: 40 TABLET, DELAYED RELEASE ORAL at 11:01

## 2021-01-16 RX ADMIN — AMLODIPINE BESYLATE 5 MG: 5 TABLET ORAL at 09:01

## 2021-01-16 RX ADMIN — HEPARIN SODIUM 5000 UNITS: 5000 INJECTION INTRAVENOUS; SUBCUTANEOUS at 01:01

## 2021-01-16 RX ADMIN — HEPARIN SODIUM 5000 UNITS: 5000 INJECTION INTRAVENOUS; SUBCUTANEOUS at 05:01

## 2021-01-16 RX ADMIN — MUPIROCIN: 20 OINTMENT TOPICAL at 11:01

## 2021-01-16 RX ADMIN — CEFTRIAXONE SODIUM 2 G: 2 INJECTION, SOLUTION INTRAVENOUS at 01:01

## 2021-01-16 RX ADMIN — ESCITALOPRAM OXALATE 10 MG: 10 TABLET ORAL at 09:01

## 2021-01-17 LAB
ALBUMIN SERPL BCP-MCNC: 3.1 G/DL (ref 3.5–5.2)
ALP SERPL-CCNC: 140 U/L (ref 55–135)
ALT SERPL W/O P-5'-P-CCNC: 96 U/L (ref 10–44)
ANION GAP SERPL CALC-SCNC: 11 MMOL/L (ref 8–16)
AST SERPL-CCNC: 73 U/L (ref 10–40)
BASOPHILS # BLD AUTO: 0.06 K/UL (ref 0–0.2)
BASOPHILS NFR BLD: 0.7 % (ref 0–1.9)
BILIRUB SERPL-MCNC: 0.3 MG/DL (ref 0.1–1)
BUN SERPL-MCNC: 20 MG/DL (ref 6–20)
CALCIUM SERPL-MCNC: 8.7 MG/DL (ref 8.7–10.5)
CHLORIDE SERPL-SCNC: 102 MMOL/L (ref 95–110)
CO2 SERPL-SCNC: 22 MMOL/L (ref 23–29)
CREAT SERPL-MCNC: 0.7 MG/DL (ref 0.5–1.4)
DIFFERENTIAL METHOD: ABNORMAL
EOSINOPHIL # BLD AUTO: 0.7 K/UL (ref 0–0.5)
EOSINOPHIL NFR BLD: 7.9 % (ref 0–8)
ERYTHROCYTE [DISTWIDTH] IN BLOOD BY AUTOMATED COUNT: 13.2 % (ref 11.5–14.5)
EST. GFR  (AFRICAN AMERICAN): >60 ML/MIN/1.73 M^2
EST. GFR  (NON AFRICAN AMERICAN): >60 ML/MIN/1.73 M^2
GLUCOSE SERPL-MCNC: 104 MG/DL (ref 70–110)
HCT VFR BLD AUTO: 43.9 % (ref 40–54)
HGB BLD-MCNC: 13.9 G/DL (ref 14–18)
HGB BLD-MCNC: 14.1 G/DL (ref 14–18)
IMM GRANULOCYTES # BLD AUTO: 0.03 K/UL (ref 0–0.04)
IMM GRANULOCYTES NFR BLD AUTO: 0.4 % (ref 0–0.5)
LYMPHOCYTES # BLD AUTO: 2.1 K/UL (ref 1–4.8)
LYMPHOCYTES NFR BLD: 25.2 % (ref 18–48)
MAGNESIUM SERPL-MCNC: 1.9 MG/DL (ref 1.6–2.6)
MCH RBC QN AUTO: 30.4 PG (ref 27–31)
MCHC RBC AUTO-ENTMCNC: 31.7 G/DL (ref 32–36)
MCV RBC AUTO: 96 FL (ref 82–98)
MONOCYTES # BLD AUTO: 1 K/UL (ref 0.3–1)
MONOCYTES NFR BLD: 12.3 % (ref 4–15)
NEUTROPHILS # BLD AUTO: 4.4 K/UL (ref 1.8–7.7)
NEUTROPHILS NFR BLD: 53.5 % (ref 38–73)
NRBC BLD-RTO: 0 /100 WBC
PHOSPHATE SERPL-MCNC: 4.5 MG/DL (ref 2.7–4.5)
PLATELET # BLD AUTO: 282 K/UL (ref 150–350)
PMV BLD AUTO: 9.9 FL (ref 9.2–12.9)
POCT GLUCOSE: 114 MG/DL (ref 70–110)
POTASSIUM SERPL-SCNC: 3.7 MMOL/L (ref 3.5–5.1)
POTASSIUM SERPL-SCNC: 4 MMOL/L (ref 3.5–5.1)
PROT SERPL-MCNC: 6.5 G/DL (ref 6–8.4)
RBC # BLD AUTO: 4.57 M/UL (ref 4.6–6.2)
SODIUM SERPL-SCNC: 135 MMOL/L (ref 136–145)
WBC # BLD AUTO: 8.18 K/UL (ref 3.9–12.7)

## 2021-01-17 PROCEDURE — 25000003 PHARM REV CODE 250: Performed by: NURSE PRACTITIONER

## 2021-01-17 PROCEDURE — 20000000 HC ICU ROOM

## 2021-01-17 PROCEDURE — 83735 ASSAY OF MAGNESIUM: CPT

## 2021-01-17 PROCEDURE — 95720 EEG PHY/QHP EA INCR W/VEEG: CPT | Mod: ,,, | Performed by: PSYCHIATRY & NEUROLOGY

## 2021-01-17 PROCEDURE — 25000003 PHARM REV CODE 250: Performed by: STUDENT IN AN ORGANIZED HEALTH CARE EDUCATION/TRAINING PROGRAM

## 2021-01-17 PROCEDURE — 94761 N-INVAS EAR/PLS OXIMETRY MLT: CPT

## 2021-01-17 PROCEDURE — 85018 HEMOGLOBIN: CPT

## 2021-01-17 PROCEDURE — 95720 PR EEG, W/VIDEO, CONT RECORD, I&R, >12<26 HRS: ICD-10-PCS | Mod: ,,, | Performed by: PSYCHIATRY & NEUROLOGY

## 2021-01-17 PROCEDURE — 63600175 PHARM REV CODE 636 W HCPCS: Performed by: STUDENT IN AN ORGANIZED HEALTH CARE EDUCATION/TRAINING PROGRAM

## 2021-01-17 PROCEDURE — 99233 PR SUBSEQUENT HOSPITAL CARE,LEVL III: ICD-10-PCS | Mod: ,,, | Performed by: NURSE PRACTITIONER

## 2021-01-17 PROCEDURE — 80053 COMPREHEN METABOLIC PANEL: CPT

## 2021-01-17 PROCEDURE — 95714 VEEG EA 12-26 HR UNMNTR: CPT

## 2021-01-17 PROCEDURE — 99233 SBSQ HOSP IP/OBS HIGH 50: CPT | Mod: ,,, | Performed by: NURSE PRACTITIONER

## 2021-01-17 PROCEDURE — 99233 PR SUBSEQUENT HOSPITAL CARE,LEVL III: ICD-10-PCS | Mod: ,,, | Performed by: PSYCHIATRY & NEUROLOGY

## 2021-01-17 PROCEDURE — 84100 ASSAY OF PHOSPHORUS: CPT

## 2021-01-17 PROCEDURE — 85025 COMPLETE CBC W/AUTO DIFF WBC: CPT

## 2021-01-17 PROCEDURE — 84132 ASSAY OF SERUM POTASSIUM: CPT

## 2021-01-17 PROCEDURE — 99233 SBSQ HOSP IP/OBS HIGH 50: CPT | Mod: ,,, | Performed by: PSYCHIATRY & NEUROLOGY

## 2021-01-17 RX ADMIN — HEPARIN SODIUM 5000 UNITS: 5000 INJECTION INTRAVENOUS; SUBCUTANEOUS at 02:01

## 2021-01-17 RX ADMIN — HEPARIN SODIUM 5000 UNITS: 5000 INJECTION INTRAVENOUS; SUBCUTANEOUS at 09:01

## 2021-01-17 RX ADMIN — POLYETHYLENE GLYCOL 3350 17 G: 17 POWDER, FOR SOLUTION ORAL at 08:01

## 2021-01-17 RX ADMIN — ACETAMINOPHEN 1000 MG: 500 TABLET ORAL at 05:01

## 2021-01-17 RX ADMIN — HEPARIN SODIUM 5000 UNITS: 5000 INJECTION INTRAVENOUS; SUBCUTANEOUS at 05:01

## 2021-01-17 RX ADMIN — ACETAMINOPHEN 1000 MG: 500 TABLET ORAL at 09:01

## 2021-01-17 RX ADMIN — AMLODIPINE BESYLATE 5 MG: 5 TABLET ORAL at 09:01

## 2021-01-17 RX ADMIN — CEFTRIAXONE SODIUM 2 G: 2 INJECTION, SOLUTION INTRAVENOUS at 01:01

## 2021-01-17 RX ADMIN — DOCUSATE SODIUM 100 MG: 100 CAPSULE, LIQUID FILLED ORAL at 08:01

## 2021-01-17 RX ADMIN — ESCITALOPRAM OXALATE 10 MG: 10 TABLET ORAL at 09:01

## 2021-01-17 RX ADMIN — CEFTRIAXONE SODIUM 2 G: 2 INJECTION, SOLUTION INTRAVENOUS at 12:01

## 2021-01-17 RX ADMIN — ACETAMINOPHEN 1000 MG: 500 TABLET ORAL at 01:01

## 2021-01-17 RX ADMIN — POTASSIUM BICARBONATE 50 MEQ: 977.5 TABLET, EFFERVESCENT ORAL at 05:01

## 2021-01-17 RX ADMIN — OXYCODONE HYDROCHLORIDE 10 MG: 5 TABLET ORAL at 08:01

## 2021-01-17 RX ADMIN — OXYCODONE HYDROCHLORIDE 10 MG: 5 TABLET ORAL at 04:01

## 2021-01-17 RX ADMIN — DOCUSATE SODIUM 100 MG: 100 CAPSULE, LIQUID FILLED ORAL at 09:01

## 2021-01-17 RX ADMIN — PANTOPRAZOLE SODIUM 40 MG: 40 TABLET, DELAYED RELEASE ORAL at 08:01

## 2021-01-18 LAB
ALBUMIN SERPL BCP-MCNC: 3.4 G/DL (ref 3.5–5.2)
ALP SERPL-CCNC: 129 U/L (ref 55–135)
ALT SERPL W/O P-5'-P-CCNC: 88 U/L (ref 10–44)
ANION GAP SERPL CALC-SCNC: 8 MMOL/L (ref 8–16)
AST SERPL-CCNC: 58 U/L (ref 10–40)
BASOPHILS # BLD AUTO: 0.06 K/UL (ref 0–0.2)
BASOPHILS NFR BLD: 0.8 % (ref 0–1.9)
BILIRUB SERPL-MCNC: 0.3 MG/DL (ref 0.1–1)
BUN SERPL-MCNC: 18 MG/DL (ref 6–20)
CALCIUM SERPL-MCNC: 8.7 MG/DL (ref 8.7–10.5)
CHLORIDE SERPL-SCNC: 103 MMOL/L (ref 95–110)
CO2 SERPL-SCNC: 24 MMOL/L (ref 23–29)
CREAT SERPL-MCNC: 0.8 MG/DL (ref 0.5–1.4)
DIFFERENTIAL METHOD: ABNORMAL
EOSINOPHIL # BLD AUTO: 0.6 K/UL (ref 0–0.5)
EOSINOPHIL NFR BLD: 7.1 % (ref 0–8)
ERYTHROCYTE [DISTWIDTH] IN BLOOD BY AUTOMATED COUNT: 13.1 % (ref 11.5–14.5)
EST. GFR  (AFRICAN AMERICAN): >60 ML/MIN/1.73 M^2
EST. GFR  (NON AFRICAN AMERICAN): >60 ML/MIN/1.73 M^2
GLUCOSE SERPL-MCNC: 96 MG/DL (ref 70–110)
HCT VFR BLD AUTO: 43.5 % (ref 40–54)
HGB BLD-MCNC: 14.2 G/DL (ref 14–18)
IMM GRANULOCYTES # BLD AUTO: 0.03 K/UL (ref 0–0.04)
IMM GRANULOCYTES NFR BLD AUTO: 0.4 % (ref 0–0.5)
LYMPHOCYTES # BLD AUTO: 2.2 K/UL (ref 1–4.8)
LYMPHOCYTES NFR BLD: 27.5 % (ref 18–48)
MAGNESIUM SERPL-MCNC: 1.9 MG/DL (ref 1.6–2.6)
MCH RBC QN AUTO: 31 PG (ref 27–31)
MCHC RBC AUTO-ENTMCNC: 32.6 G/DL (ref 32–36)
MCV RBC AUTO: 95 FL (ref 82–98)
MONOCYTES # BLD AUTO: 0.9 K/UL (ref 0.3–1)
MONOCYTES NFR BLD: 11.8 % (ref 4–15)
NEUTROPHILS # BLD AUTO: 4.2 K/UL (ref 1.8–7.7)
NEUTROPHILS NFR BLD: 52.4 % (ref 38–73)
NRBC BLD-RTO: 0 /100 WBC
PHOSPHATE SERPL-MCNC: 4.4 MG/DL (ref 2.7–4.5)
PLATELET # BLD AUTO: 278 K/UL (ref 150–350)
PMV BLD AUTO: 10.1 FL (ref 9.2–12.9)
POTASSIUM SERPL-SCNC: 3.9 MMOL/L (ref 3.5–5.1)
PROT SERPL-MCNC: 7 G/DL (ref 6–8.4)
RBC # BLD AUTO: 4.58 M/UL (ref 4.6–6.2)
SODIUM SERPL-SCNC: 135 MMOL/L (ref 136–145)
WBC # BLD AUTO: 8 K/UL (ref 3.9–12.7)

## 2021-01-18 PROCEDURE — 83735 ASSAY OF MAGNESIUM: CPT

## 2021-01-18 PROCEDURE — 99233 PR SUBSEQUENT HOSPITAL CARE,LEVL III: ICD-10-PCS | Mod: ,,, | Performed by: NURSE PRACTITIONER

## 2021-01-18 PROCEDURE — 99233 SBSQ HOSP IP/OBS HIGH 50: CPT | Mod: ,,, | Performed by: PSYCHIATRY & NEUROLOGY

## 2021-01-18 PROCEDURE — 25000003 PHARM REV CODE 250: Performed by: NURSE PRACTITIONER

## 2021-01-18 PROCEDURE — 25000003 PHARM REV CODE 250: Performed by: STUDENT IN AN ORGANIZED HEALTH CARE EDUCATION/TRAINING PROGRAM

## 2021-01-18 PROCEDURE — 63600175 PHARM REV CODE 636 W HCPCS: Performed by: STUDENT IN AN ORGANIZED HEALTH CARE EDUCATION/TRAINING PROGRAM

## 2021-01-18 PROCEDURE — 80053 COMPREHEN METABOLIC PANEL: CPT

## 2021-01-18 PROCEDURE — 95720 EEG PHY/QHP EA INCR W/VEEG: CPT | Mod: ,,, | Performed by: PSYCHIATRY & NEUROLOGY

## 2021-01-18 PROCEDURE — 99233 PR SUBSEQUENT HOSPITAL CARE,LEVL III: ICD-10-PCS | Mod: ,,, | Performed by: PSYCHIATRY & NEUROLOGY

## 2021-01-18 PROCEDURE — 20000000 HC ICU ROOM

## 2021-01-18 PROCEDURE — 99233 SBSQ HOSP IP/OBS HIGH 50: CPT | Mod: ,,, | Performed by: NURSE PRACTITIONER

## 2021-01-18 PROCEDURE — 85025 COMPLETE CBC W/AUTO DIFF WBC: CPT

## 2021-01-18 PROCEDURE — 95720 PR EEG, W/VIDEO, CONT RECORD, I&R, >12<26 HRS: ICD-10-PCS | Mod: ,,, | Performed by: PSYCHIATRY & NEUROLOGY

## 2021-01-18 PROCEDURE — 95714 VEEG EA 12-26 HR UNMNTR: CPT

## 2021-01-18 PROCEDURE — 84100 ASSAY OF PHOSPHORUS: CPT

## 2021-01-18 PROCEDURE — 94761 N-INVAS EAR/PLS OXIMETRY MLT: CPT

## 2021-01-18 RX ADMIN — OXYCODONE HYDROCHLORIDE 5 MG: 5 TABLET ORAL at 12:01

## 2021-01-18 RX ADMIN — AMLODIPINE BESYLATE 5 MG: 5 TABLET ORAL at 09:01

## 2021-01-18 RX ADMIN — DOCUSATE SODIUM 100 MG: 100 CAPSULE, LIQUID FILLED ORAL at 12:01

## 2021-01-18 RX ADMIN — ACETAMINOPHEN 1000 MG: 500 TABLET ORAL at 03:01

## 2021-01-18 RX ADMIN — CEFTRIAXONE SODIUM 2 G: 2 INJECTION, SOLUTION INTRAVENOUS at 01:01

## 2021-01-18 RX ADMIN — ACETAMINOPHEN 1000 MG: 500 TABLET ORAL at 06:01

## 2021-01-18 RX ADMIN — ACETAMINOPHEN 1000 MG: 500 TABLET ORAL at 10:01

## 2021-01-18 RX ADMIN — HEPARIN SODIUM 5000 UNITS: 5000 INJECTION INTRAVENOUS; SUBCUTANEOUS at 06:01

## 2021-01-18 RX ADMIN — ESCITALOPRAM OXALATE 10 MG: 10 TABLET ORAL at 09:01

## 2021-01-18 RX ADMIN — HEPARIN SODIUM 5000 UNITS: 5000 INJECTION INTRAVENOUS; SUBCUTANEOUS at 09:01

## 2021-01-18 RX ADMIN — HEPARIN SODIUM 5000 UNITS: 5000 INJECTION INTRAVENOUS; SUBCUTANEOUS at 03:01

## 2021-01-18 RX ADMIN — PANTOPRAZOLE SODIUM 40 MG: 40 TABLET, DELAYED RELEASE ORAL at 10:01

## 2021-01-19 LAB
ALBUMIN SERPL BCP-MCNC: 3.1 G/DL (ref 3.5–5.2)
ALP SERPL-CCNC: 122 U/L (ref 55–135)
ALT SERPL W/O P-5'-P-CCNC: 79 U/L (ref 10–44)
ANION GAP SERPL CALC-SCNC: 10 MMOL/L (ref 8–16)
AST SERPL-CCNC: 50 U/L (ref 10–40)
BILIRUB SERPL-MCNC: 0.3 MG/DL (ref 0.1–1)
BUN SERPL-MCNC: 17 MG/DL (ref 6–20)
CALCIUM SERPL-MCNC: 8.6 MG/DL (ref 8.7–10.5)
CHLORIDE SERPL-SCNC: 103 MMOL/L (ref 95–110)
CO2 SERPL-SCNC: 24 MMOL/L (ref 23–29)
CREAT SERPL-MCNC: 0.7 MG/DL (ref 0.5–1.4)
EST. GFR  (AFRICAN AMERICAN): >60 ML/MIN/1.73 M^2
EST. GFR  (NON AFRICAN AMERICAN): >60 ML/MIN/1.73 M^2
GLUCOSE SERPL-MCNC: 116 MG/DL (ref 70–110)
POTASSIUM SERPL-SCNC: 3.9 MMOL/L (ref 3.5–5.1)
PROT SERPL-MCNC: 6.6 G/DL (ref 6–8.4)
SODIUM SERPL-SCNC: 137 MMOL/L (ref 136–145)

## 2021-01-19 PROCEDURE — 27000221 HC OXYGEN, UP TO 24 HOURS

## 2021-01-19 PROCEDURE — 63600175 PHARM REV CODE 636 W HCPCS: Performed by: STUDENT IN AN ORGANIZED HEALTH CARE EDUCATION/TRAINING PROGRAM

## 2021-01-19 PROCEDURE — 25000003 PHARM REV CODE 250: Performed by: STUDENT IN AN ORGANIZED HEALTH CARE EDUCATION/TRAINING PROGRAM

## 2021-01-19 PROCEDURE — 99233 SBSQ HOSP IP/OBS HIGH 50: CPT | Mod: ,,, | Performed by: PSYCHIATRY & NEUROLOGY

## 2021-01-19 PROCEDURE — 20000000 HC ICU ROOM

## 2021-01-19 PROCEDURE — 95714 VEEG EA 12-26 HR UNMNTR: CPT

## 2021-01-19 PROCEDURE — 80053 COMPREHEN METABOLIC PANEL: CPT

## 2021-01-19 PROCEDURE — 63600175 PHARM REV CODE 636 W HCPCS: Performed by: NURSE PRACTITIONER

## 2021-01-19 PROCEDURE — 25000003 PHARM REV CODE 250: Performed by: NURSE PRACTITIONER

## 2021-01-19 PROCEDURE — 99233 PR SUBSEQUENT HOSPITAL CARE,LEVL III: ICD-10-PCS | Mod: ,,, | Performed by: PSYCHIATRY & NEUROLOGY

## 2021-01-19 PROCEDURE — 63600175 PHARM REV CODE 636 W HCPCS: Performed by: PSYCHIATRY & NEUROLOGY

## 2021-01-19 PROCEDURE — 94761 N-INVAS EAR/PLS OXIMETRY MLT: CPT

## 2021-01-19 RX ADMIN — ACETAMINOPHEN 1000 MG: 500 TABLET ORAL at 09:01

## 2021-01-19 RX ADMIN — ACETAMINOPHEN 1000 MG: 500 TABLET ORAL at 06:01

## 2021-01-19 RX ADMIN — AMLODIPINE BESYLATE 5 MG: 5 TABLET ORAL at 09:01

## 2021-01-19 RX ADMIN — HYDROMORPHONE HYDROCHLORIDE 0.2 MG: 1 INJECTION, SOLUTION INTRAMUSCULAR; INTRAVENOUS; SUBCUTANEOUS at 06:01

## 2021-01-19 RX ADMIN — ESCITALOPRAM OXALATE 10 MG: 10 TABLET ORAL at 09:01

## 2021-01-19 RX ADMIN — OXYCODONE HYDROCHLORIDE 5 MG: 5 TABLET ORAL at 10:01

## 2021-01-19 RX ADMIN — HEPARIN SODIUM 5000 UNITS: 5000 INJECTION INTRAVENOUS; SUBCUTANEOUS at 06:01

## 2021-01-19 RX ADMIN — DOCUSATE SODIUM 100 MG: 100 CAPSULE, LIQUID FILLED ORAL at 09:01

## 2021-01-19 RX ADMIN — OXYCODONE HYDROCHLORIDE 10 MG: 5 TABLET ORAL at 04:01

## 2021-01-19 RX ADMIN — HEPARIN SODIUM 5000 UNITS: 5000 INJECTION INTRAVENOUS; SUBCUTANEOUS at 03:01

## 2021-01-19 RX ADMIN — DOCUSATE SODIUM 100 MG: 100 CAPSULE, LIQUID FILLED ORAL at 10:01

## 2021-01-19 RX ADMIN — HEPARIN SODIUM 5000 UNITS: 5000 INJECTION INTRAVENOUS; SUBCUTANEOUS at 09:01

## 2021-01-19 RX ADMIN — ACETAMINOPHEN 1000 MG: 500 TABLET ORAL at 04:01

## 2021-01-19 RX ADMIN — ONDANSETRON 8 MG: 2 INJECTION INTRAMUSCULAR; INTRAVENOUS at 02:01

## 2021-01-20 ENCOUNTER — SOCIAL WORK (OUTPATIENT)
Dept: NEUROLOGY | Facility: CLINIC | Age: 56
End: 2021-01-20

## 2021-01-20 LAB
ABO + RH BLD: NORMAL
ALBUMIN SERPL BCP-MCNC: 3.5 G/DL (ref 3.5–5.2)
ALP SERPL-CCNC: 117 U/L (ref 55–135)
ALT SERPL W/O P-5'-P-CCNC: 81 U/L (ref 10–44)
ANION GAP SERPL CALC-SCNC: 10 MMOL/L (ref 8–16)
AST SERPL-CCNC: 48 U/L (ref 10–40)
BASOPHILS # BLD AUTO: 0.06 K/UL (ref 0–0.2)
BASOPHILS NFR BLD: 0.7 % (ref 0–1.9)
BILIRUB SERPL-MCNC: 0.5 MG/DL (ref 0.1–1)
BLD GP AB SCN CELLS X3 SERPL QL: NORMAL
BUN SERPL-MCNC: 18 MG/DL (ref 6–20)
CALCIUM SERPL-MCNC: 9.1 MG/DL (ref 8.7–10.5)
CHLORIDE SERPL-SCNC: 103 MMOL/L (ref 95–110)
CO2 SERPL-SCNC: 22 MMOL/L (ref 23–29)
CREAT SERPL-MCNC: 0.7 MG/DL (ref 0.5–1.4)
DIFFERENTIAL METHOD: ABNORMAL
EOSINOPHIL # BLD AUTO: 0.2 K/UL (ref 0–0.5)
EOSINOPHIL NFR BLD: 2.7 % (ref 0–8)
ERYTHROCYTE [DISTWIDTH] IN BLOOD BY AUTOMATED COUNT: 13.1 % (ref 11.5–14.5)
EST. GFR  (AFRICAN AMERICAN): >60 ML/MIN/1.73 M^2
EST. GFR  (NON AFRICAN AMERICAN): >60 ML/MIN/1.73 M^2
GLUCOSE SERPL-MCNC: 108 MG/DL (ref 70–110)
HCT VFR BLD AUTO: 44.2 % (ref 40–54)
HGB BLD-MCNC: 14.2 G/DL (ref 14–18)
IMM GRANULOCYTES # BLD AUTO: 0.04 K/UL (ref 0–0.04)
IMM GRANULOCYTES NFR BLD AUTO: 0.5 % (ref 0–0.5)
LYMPHOCYTES # BLD AUTO: 2.2 K/UL (ref 1–4.8)
LYMPHOCYTES NFR BLD: 25.5 % (ref 18–48)
MAGNESIUM SERPL-MCNC: 2.1 MG/DL (ref 1.6–2.6)
MCH RBC QN AUTO: 30.5 PG (ref 27–31)
MCHC RBC AUTO-ENTMCNC: 32.1 G/DL (ref 32–36)
MCV RBC AUTO: 95 FL (ref 82–98)
MONOCYTES # BLD AUTO: 1.1 K/UL (ref 0.3–1)
MONOCYTES NFR BLD: 12.1 % (ref 4–15)
NEUTROPHILS # BLD AUTO: 5.1 K/UL (ref 1.8–7.7)
NEUTROPHILS NFR BLD: 58.5 % (ref 38–73)
NRBC BLD-RTO: 0 /100 WBC
PHOSPHATE SERPL-MCNC: 4.2 MG/DL (ref 2.7–4.5)
PLATELET # BLD AUTO: 289 K/UL (ref 150–350)
PMV BLD AUTO: 9.9 FL (ref 9.2–12.9)
POCT GLUCOSE: 118 MG/DL (ref 70–110)
POTASSIUM SERPL-SCNC: 3.9 MMOL/L (ref 3.5–5.1)
PROT SERPL-MCNC: 7.1 G/DL (ref 6–8.4)
RBC # BLD AUTO: 4.66 M/UL (ref 4.6–6.2)
SODIUM SERPL-SCNC: 135 MMOL/L (ref 136–145)
WBC # BLD AUTO: 8.77 K/UL (ref 3.9–12.7)

## 2021-01-20 PROCEDURE — 99233 SBSQ HOSP IP/OBS HIGH 50: CPT | Mod: ,,, | Performed by: NURSE PRACTITIONER

## 2021-01-20 PROCEDURE — 25000003 PHARM REV CODE 250: Performed by: STUDENT IN AN ORGANIZED HEALTH CARE EDUCATION/TRAINING PROGRAM

## 2021-01-20 PROCEDURE — 95720 PR EEG, W/VIDEO, CONT RECORD, I&R, >12<26 HRS: ICD-10-PCS | Mod: ,,, | Performed by: PSYCHIATRY & NEUROLOGY

## 2021-01-20 PROCEDURE — 99233 SBSQ HOSP IP/OBS HIGH 50: CPT | Mod: ,,, | Performed by: PSYCHIATRY & NEUROLOGY

## 2021-01-20 PROCEDURE — 27000221 HC OXYGEN, UP TO 24 HOURS

## 2021-01-20 PROCEDURE — 83735 ASSAY OF MAGNESIUM: CPT

## 2021-01-20 PROCEDURE — 99233 PR SUBSEQUENT HOSPITAL CARE,LEVL III: ICD-10-PCS | Mod: ,,, | Performed by: PSYCHIATRY & NEUROLOGY

## 2021-01-20 PROCEDURE — 86900 BLOOD TYPING SEROLOGIC ABO: CPT

## 2021-01-20 PROCEDURE — 25000003 PHARM REV CODE 250: Performed by: NURSE PRACTITIONER

## 2021-01-20 PROCEDURE — 84100 ASSAY OF PHOSPHORUS: CPT

## 2021-01-20 PROCEDURE — 85025 COMPLETE CBC W/AUTO DIFF WBC: CPT

## 2021-01-20 PROCEDURE — 99233 PR SUBSEQUENT HOSPITAL CARE,LEVL III: ICD-10-PCS | Mod: ,,, | Performed by: NURSE PRACTITIONER

## 2021-01-20 PROCEDURE — 80053 COMPREHEN METABOLIC PANEL: CPT

## 2021-01-20 PROCEDURE — 95711 VEEG 2-12 HR UNMONITORED: CPT

## 2021-01-20 PROCEDURE — 95720 EEG PHY/QHP EA INCR W/VEEG: CPT | Mod: ,,, | Performed by: PSYCHIATRY & NEUROLOGY

## 2021-01-20 PROCEDURE — 20000000 HC ICU ROOM

## 2021-01-20 PROCEDURE — 63600175 PHARM REV CODE 636 W HCPCS: Performed by: STUDENT IN AN ORGANIZED HEALTH CARE EDUCATION/TRAINING PROGRAM

## 2021-01-20 PROCEDURE — 94761 N-INVAS EAR/PLS OXIMETRY MLT: CPT

## 2021-01-20 RX ORDER — ACETAMINOPHEN 500 MG
1000 TABLET ORAL EVERY 8 HOURS PRN
Status: DISCONTINUED | OUTPATIENT
Start: 2021-01-20 | End: 2021-01-24 | Stop reason: HOSPADM

## 2021-01-20 RX ADMIN — HEPARIN SODIUM 5000 UNITS: 5000 INJECTION INTRAVENOUS; SUBCUTANEOUS at 09:01

## 2021-01-20 RX ADMIN — ESCITALOPRAM OXALATE 10 MG: 10 TABLET ORAL at 08:01

## 2021-01-20 RX ADMIN — AMLODIPINE BESYLATE 5 MG: 5 TABLET ORAL at 08:01

## 2021-01-20 RX ADMIN — HEPARIN SODIUM 5000 UNITS: 5000 INJECTION INTRAVENOUS; SUBCUTANEOUS at 05:01

## 2021-01-20 RX ADMIN — HEPARIN SODIUM 5000 UNITS: 5000 INJECTION INTRAVENOUS; SUBCUTANEOUS at 03:01

## 2021-01-20 RX ADMIN — OXYCODONE HYDROCHLORIDE 10 MG: 5 TABLET ORAL at 09:01

## 2021-01-20 RX ADMIN — OXYCODONE HYDROCHLORIDE 5 MG: 5 TABLET ORAL at 06:01

## 2021-01-20 RX ADMIN — DOCUSATE SODIUM 100 MG: 100 CAPSULE, LIQUID FILLED ORAL at 08:01

## 2021-01-20 RX ADMIN — PANTOPRAZOLE SODIUM 40 MG: 40 TABLET, DELAYED RELEASE ORAL at 09:01

## 2021-01-20 RX ADMIN — DOCUSATE SODIUM 100 MG: 100 CAPSULE, LIQUID FILLED ORAL at 09:01

## 2021-01-21 ENCOUNTER — ANESTHESIA (OUTPATIENT)
Dept: SURGERY | Facility: HOSPITAL | Age: 56
DRG: 026 | End: 2021-01-21
Payer: COMMERCIAL

## 2021-01-21 ENCOUNTER — ANESTHESIA EVENT (OUTPATIENT)
Dept: SURGERY | Facility: HOSPITAL | Age: 56
DRG: 026 | End: 2021-01-21
Payer: COMMERCIAL

## 2021-01-21 ENCOUNTER — SOCIAL WORK (OUTPATIENT)
Dept: NEUROLOGY | Facility: CLINIC | Age: 56
End: 2021-01-21

## 2021-01-21 LAB
ABO + RH BLD: NORMAL
ALBUMIN SERPL BCP-MCNC: 3.6 G/DL (ref 3.5–5.2)
ALP SERPL-CCNC: 117 U/L (ref 55–135)
ALT SERPL W/O P-5'-P-CCNC: 73 U/L (ref 10–44)
ANION GAP SERPL CALC-SCNC: 10 MMOL/L (ref 8–16)
APTT BLDCRRT: 29.5 SEC (ref 21–32)
AST SERPL-CCNC: 43 U/L (ref 10–40)
BASOPHILS # BLD AUTO: 0.06 K/UL (ref 0–0.2)
BASOPHILS NFR BLD: 0.5 % (ref 0–1.9)
BILIRUB SERPL-MCNC: 0.4 MG/DL (ref 0.1–1)
BLD GP AB SCN CELLS X3 SERPL QL: NORMAL
BUN SERPL-MCNC: 22 MG/DL (ref 6–20)
CALCIUM SERPL-MCNC: 9.3 MG/DL (ref 8.7–10.5)
CHLORIDE SERPL-SCNC: 102 MMOL/L (ref 95–110)
CO2 SERPL-SCNC: 26 MMOL/L (ref 23–29)
CREAT SERPL-MCNC: 0.8 MG/DL (ref 0.5–1.4)
DIFFERENTIAL METHOD: ABNORMAL
EOSINOPHIL # BLD AUTO: 0.3 K/UL (ref 0–0.5)
EOSINOPHIL NFR BLD: 2.5 % (ref 0–8)
ERYTHROCYTE [DISTWIDTH] IN BLOOD BY AUTOMATED COUNT: 12.9 % (ref 11.5–14.5)
EST. GFR  (AFRICAN AMERICAN): >60 ML/MIN/1.73 M^2
EST. GFR  (NON AFRICAN AMERICAN): >60 ML/MIN/1.73 M^2
GLUCOSE SERPL-MCNC: 111 MG/DL (ref 70–110)
HCT VFR BLD AUTO: 43.3 % (ref 40–54)
HGB BLD-MCNC: 14 G/DL (ref 14–18)
IMM GRANULOCYTES # BLD AUTO: 0.08 K/UL (ref 0–0.04)
IMM GRANULOCYTES NFR BLD AUTO: 0.7 % (ref 0–0.5)
INR PPP: 1 (ref 0.8–1.2)
LYMPHOCYTES # BLD AUTO: 2.3 K/UL (ref 1–4.8)
LYMPHOCYTES NFR BLD: 18.4 % (ref 18–48)
MAGNESIUM SERPL-MCNC: 2 MG/DL (ref 1.6–2.6)
MCH RBC QN AUTO: 30.5 PG (ref 27–31)
MCHC RBC AUTO-ENTMCNC: 32.3 G/DL (ref 32–36)
MCV RBC AUTO: 94 FL (ref 82–98)
MONOCYTES # BLD AUTO: 1.3 K/UL (ref 0.3–1)
MONOCYTES NFR BLD: 10.6 % (ref 4–15)
NEUTROPHILS # BLD AUTO: 8.2 K/UL (ref 1.8–7.7)
NEUTROPHILS NFR BLD: 67.3 % (ref 38–73)
NRBC BLD-RTO: 0 /100 WBC
PHOSPHATE SERPL-MCNC: 4.1 MG/DL (ref 2.7–4.5)
PLATELET # BLD AUTO: 295 K/UL (ref 150–350)
PMV BLD AUTO: 10.2 FL (ref 9.2–12.9)
POTASSIUM SERPL-SCNC: 4 MMOL/L (ref 3.5–5.1)
PROT SERPL-MCNC: 7.2 G/DL (ref 6–8.4)
PROTHROMBIN TIME: 11.3 SEC (ref 9–12.5)
RBC # BLD AUTO: 4.59 M/UL (ref 4.6–6.2)
SARS-COV-2 RNA RESP QL NAA+PROBE: NOT DETECTED
SODIUM SERPL-SCNC: 138 MMOL/L (ref 136–145)
WBC # BLD AUTO: 12.23 K/UL (ref 3.9–12.7)

## 2021-01-21 PROCEDURE — 84100 ASSAY OF PHOSPHORUS: CPT

## 2021-01-21 PROCEDURE — 99233 SBSQ HOSP IP/OBS HIGH 50: CPT | Mod: ,,, | Performed by: PSYCHIATRY & NEUROLOGY

## 2021-01-21 PROCEDURE — D9220A PRA ANESTHESIA: Mod: ,,, | Performed by: ANESTHESIOLOGY

## 2021-01-21 PROCEDURE — 64999 UNLISTED PX NERVOUS SYSTEM: CPT | Mod: 58,,, | Performed by: NEUROLOGICAL SURGERY

## 2021-01-21 PROCEDURE — 20000000 HC ICU ROOM

## 2021-01-21 PROCEDURE — 37000008 HC ANESTHESIA 1ST 15 MINUTES: Performed by: NEUROLOGICAL SURGERY

## 2021-01-21 PROCEDURE — 25000003 PHARM REV CODE 250: Performed by: NEUROLOGICAL SURGERY

## 2021-01-21 PROCEDURE — U0003 INFECTIOUS AGENT DETECTION BY NUCLEIC ACID (DNA OR RNA); SEVERE ACUTE RESPIRATORY SYNDROME CORONAVIRUS 2 (SARS-COV-2) (CORONAVIRUS DISEASE [COVID-19]), AMPLIFIED PROBE TECHNIQUE, MAKING USE OF HIGH THROUGHPUT TECHNOLOGIES AS DESCRIBED BY CMS-2020-01-R: HCPCS

## 2021-01-21 PROCEDURE — 86900 BLOOD TYPING SEROLOGIC ABO: CPT

## 2021-01-21 PROCEDURE — 36000711: Performed by: NEUROLOGICAL SURGERY

## 2021-01-21 PROCEDURE — 25000003 PHARM REV CODE 250: Performed by: NURSE ANESTHETIST, CERTIFIED REGISTERED

## 2021-01-21 PROCEDURE — 63600175 PHARM REV CODE 636 W HCPCS: Performed by: NEUROLOGICAL SURGERY

## 2021-01-21 PROCEDURE — 63600175 PHARM REV CODE 636 W HCPCS: Performed by: PHYSICIAN ASSISTANT

## 2021-01-21 PROCEDURE — 36000707: Performed by: NEUROLOGICAL SURGERY

## 2021-01-21 PROCEDURE — 97802 MEDICAL NUTRITION INDIV IN: CPT

## 2021-01-21 PROCEDURE — 80053 COMPREHEN METABOLIC PANEL: CPT

## 2021-01-21 PROCEDURE — 36000706: Performed by: NEUROLOGICAL SURGERY

## 2021-01-21 PROCEDURE — 85610 PROTHROMBIN TIME: CPT

## 2021-01-21 PROCEDURE — 83735 ASSAY OF MAGNESIUM: CPT

## 2021-01-21 PROCEDURE — 63600175 PHARM REV CODE 636 W HCPCS: Performed by: NURSE ANESTHETIST, CERTIFIED REGISTERED

## 2021-01-21 PROCEDURE — 25000003 PHARM REV CODE 250: Performed by: STUDENT IN AN ORGANIZED HEALTH CARE EDUCATION/TRAINING PROGRAM

## 2021-01-21 PROCEDURE — 25000003 PHARM REV CODE 250: Performed by: PHYSICIAN ASSISTANT

## 2021-01-21 PROCEDURE — 94761 N-INVAS EAR/PLS OXIMETRY MLT: CPT

## 2021-01-21 PROCEDURE — 25000003 PHARM REV CODE 250: Performed by: NURSE PRACTITIONER

## 2021-01-21 PROCEDURE — 63600175 PHARM REV CODE 636 W HCPCS: Performed by: STUDENT IN AN ORGANIZED HEALTH CARE EDUCATION/TRAINING PROGRAM

## 2021-01-21 PROCEDURE — 37000009 HC ANESTHESIA EA ADD 15 MINS: Performed by: NEUROLOGICAL SURGERY

## 2021-01-21 PROCEDURE — 85025 COMPLETE CBC W/AUTO DIFF WBC: CPT

## 2021-01-21 PROCEDURE — D9220A PRA ANESTHESIA: ICD-10-PCS | Mod: ,,, | Performed by: ANESTHESIOLOGY

## 2021-01-21 PROCEDURE — 64999 PR REMOVAL OF EEG ELECTRODES: ICD-10-PCS | Mod: 58,,, | Performed by: NEUROLOGICAL SURGERY

## 2021-01-21 PROCEDURE — 85730 THROMBOPLASTIN TIME PARTIAL: CPT

## 2021-01-21 PROCEDURE — 36000710: Performed by: NEUROLOGICAL SURGERY

## 2021-01-21 PROCEDURE — 99233 PR SUBSEQUENT HOSPITAL CARE,LEVL III: ICD-10-PCS | Mod: ,,, | Performed by: PSYCHIATRY & NEUROLOGY

## 2021-01-21 RX ORDER — CLOBAZAM 10 MG/1
20 TABLET ORAL NIGHTLY
Status: DISCONTINUED | OUTPATIENT
Start: 2021-01-21 | End: 2021-01-24 | Stop reason: HOSPADM

## 2021-01-21 RX ORDER — NEOSTIGMINE METHYLSULFATE 0.5 MG/ML
INJECTION, SOLUTION INTRAVENOUS
Status: DISCONTINUED | OUTPATIENT
Start: 2021-01-21 | End: 2021-01-21

## 2021-01-21 RX ORDER — PROPOFOL 10 MG/ML
VIAL (ML) INTRAVENOUS
Status: DISCONTINUED | OUTPATIENT
Start: 2021-01-21 | End: 2021-01-21

## 2021-01-21 RX ORDER — ROCURONIUM BROMIDE 10 MG/ML
INJECTION, SOLUTION INTRAVENOUS
Status: DISCONTINUED | OUTPATIENT
Start: 2021-01-21 | End: 2021-01-21

## 2021-01-21 RX ORDER — LIDOCAINE HCL/PF 100 MG/5ML
SYRINGE (ML) INTRAVENOUS
Status: DISCONTINUED | OUTPATIENT
Start: 2021-01-21 | End: 2021-01-21

## 2021-01-21 RX ORDER — FENTANYL CITRATE 50 UG/ML
INJECTION, SOLUTION INTRAMUSCULAR; INTRAVENOUS
Status: DISCONTINUED | OUTPATIENT
Start: 2021-01-21 | End: 2021-01-21

## 2021-01-21 RX ORDER — FENTANYL CITRATE 50 UG/ML
25 INJECTION, SOLUTION INTRAMUSCULAR; INTRAVENOUS EVERY 5 MIN PRN
Status: CANCELLED | OUTPATIENT
Start: 2021-01-21

## 2021-01-21 RX ORDER — HYDROMORPHONE HYDROCHLORIDE 1 MG/ML
0.2 INJECTION, SOLUTION INTRAMUSCULAR; INTRAVENOUS; SUBCUTANEOUS EVERY 5 MIN PRN
Status: CANCELLED | OUTPATIENT
Start: 2021-01-21

## 2021-01-21 RX ORDER — ZONISAMIDE 100 MG/1
300 CAPSULE ORAL DAILY
Status: DISCONTINUED | OUTPATIENT
Start: 2021-01-21 | End: 2021-01-24 | Stop reason: HOSPADM

## 2021-01-21 RX ORDER — GENTAMICIN SULFATE 40 MG/ML
INJECTION, SOLUTION INTRAMUSCULAR; INTRAVENOUS
Status: DISCONTINUED | OUTPATIENT
Start: 2021-01-21 | End: 2021-01-21 | Stop reason: HOSPADM

## 2021-01-21 RX ORDER — ONDANSETRON 2 MG/ML
INJECTION INTRAMUSCULAR; INTRAVENOUS
Status: DISCONTINUED | OUTPATIENT
Start: 2021-01-21 | End: 2021-01-21

## 2021-01-21 RX ORDER — ONDANSETRON 2 MG/ML
4 INJECTION INTRAMUSCULAR; INTRAVENOUS ONCE AS NEEDED
Status: CANCELLED | OUTPATIENT
Start: 2021-01-21 | End: 2032-06-18

## 2021-01-21 RX ORDER — VANCOMYCIN HYDROCHLORIDE 1 G/20ML
INJECTION, POWDER, LYOPHILIZED, FOR SOLUTION INTRAVENOUS
Status: DISCONTINUED | OUTPATIENT
Start: 2021-01-21 | End: 2021-01-21 | Stop reason: HOSPADM

## 2021-01-21 RX ORDER — CEFTRIAXONE 2 G/50ML
2 INJECTION, SOLUTION INTRAVENOUS
Status: DISCONTINUED | OUTPATIENT
Start: 2021-01-21 | End: 2021-01-23

## 2021-01-21 RX ORDER — LABETALOL HYDROCHLORIDE 5 MG/ML
10 INJECTION, SOLUTION INTRAVENOUS EVERY 6 HOURS PRN
Status: DISCONTINUED | OUTPATIENT
Start: 2021-01-21 | End: 2021-01-24 | Stop reason: HOSPADM

## 2021-01-21 RX ORDER — CEFAZOLIN SODIUM 1 G/3ML
INJECTION, POWDER, FOR SOLUTION INTRAMUSCULAR; INTRAVENOUS
Status: DISCONTINUED | OUTPATIENT
Start: 2021-01-21 | End: 2021-01-21

## 2021-01-21 RX ORDER — BACITRACIN ZINC 500 UNIT/G
OINTMENT (GRAM) TOPICAL
Status: DISCONTINUED | OUTPATIENT
Start: 2021-01-21 | End: 2021-01-21 | Stop reason: HOSPADM

## 2021-01-21 RX ADMIN — HEPARIN SODIUM 5000 UNITS: 5000 INJECTION INTRAVENOUS; SUBCUTANEOUS at 09:01

## 2021-01-21 RX ADMIN — NEOSTIGMINE METHYLSULFATE 5 MG: 0.5 INJECTION INTRAVENOUS at 08:01

## 2021-01-21 RX ADMIN — ESLICARBAZEPINE ACETATE 1200 MG: 200 TABLET ORAL at 11:01

## 2021-01-21 RX ADMIN — POLYETHYLENE GLYCOL 3350 17 G: 17 POWDER, FOR SOLUTION ORAL at 10:01

## 2021-01-21 RX ADMIN — HEPARIN SODIUM 5000 UNITS: 5000 INJECTION INTRAVENOUS; SUBCUTANEOUS at 03:01

## 2021-01-21 RX ADMIN — LIDOCAINE HYDROCHLORIDE 100 MG: 20 INJECTION, SOLUTION INTRAVENOUS at 07:01

## 2021-01-21 RX ADMIN — OXYCODONE HYDROCHLORIDE 10 MG: 5 TABLET ORAL at 10:01

## 2021-01-21 RX ADMIN — PANTOPRAZOLE SODIUM 40 MG: 40 TABLET, DELAYED RELEASE ORAL at 10:01

## 2021-01-21 RX ADMIN — GLYCOPYRROLATE 0.6 MG: 0.2 INJECTION, SOLUTION INTRAMUSCULAR; INTRAVITREAL at 08:01

## 2021-01-21 RX ADMIN — AMLODIPINE BESYLATE 5 MG: 5 TABLET ORAL at 08:01

## 2021-01-21 RX ADMIN — PROPOFOL 120 MG: 10 INJECTION, EMULSION INTRAVENOUS at 07:01

## 2021-01-21 RX ADMIN — DOCUSATE SODIUM 100 MG: 100 CAPSULE, LIQUID FILLED ORAL at 08:01

## 2021-01-21 RX ADMIN — PROPOFOL 30 MG: 10 INJECTION, EMULSION INTRAVENOUS at 08:01

## 2021-01-21 RX ADMIN — FENTANYL CITRATE 100 MCG: 50 INJECTION, SOLUTION INTRAMUSCULAR; INTRAVENOUS at 07:01

## 2021-01-21 RX ADMIN — DOCUSATE SODIUM 100 MG: 100 CAPSULE, LIQUID FILLED ORAL at 09:01

## 2021-01-21 RX ADMIN — ROCURONIUM BROMIDE 50 MG: 10 INJECTION, SOLUTION INTRAVENOUS at 07:01

## 2021-01-21 RX ADMIN — CEFAZOLIN 2 G: 330 INJECTION, POWDER, FOR SOLUTION INTRAMUSCULAR; INTRAVENOUS at 07:01

## 2021-01-21 RX ADMIN — CLOBAZAM 20 MG: 10 TABLET ORAL at 08:01

## 2021-01-21 RX ADMIN — ZONISAMIDE 300 MG: 100 CAPSULE ORAL at 10:01

## 2021-01-21 RX ADMIN — ESCITALOPRAM OXALATE 10 MG: 10 TABLET ORAL at 08:01

## 2021-01-21 RX ADMIN — CEFTRIAXONE SODIUM 2 G: 2 INJECTION, SOLUTION INTRAVENOUS at 02:01

## 2021-01-21 RX ADMIN — ONDANSETRON 4 MG: 2 INJECTION, SOLUTION INTRAMUSCULAR; INTRAVENOUS at 08:01

## 2021-01-22 ENCOUNTER — TELEPHONE (OUTPATIENT)
Dept: NEUROLOGY | Facility: CLINIC | Age: 56
End: 2021-01-22

## 2021-01-22 ENCOUNTER — SOCIAL WORK (OUTPATIENT)
Dept: NEUROLOGY | Facility: CLINIC | Age: 56
End: 2021-01-22

## 2021-01-22 LAB
ALBUMIN SERPL BCP-MCNC: 3.8 G/DL (ref 3.5–5.2)
ALP SERPL-CCNC: 120 U/L (ref 55–135)
ALT SERPL W/O P-5'-P-CCNC: 55 U/L (ref 10–44)
ANION GAP SERPL CALC-SCNC: 12 MMOL/L (ref 8–16)
AST SERPL-CCNC: 30 U/L (ref 10–40)
BASOPHILS # BLD AUTO: 0.05 K/UL (ref 0–0.2)
BASOPHILS NFR BLD: 0.5 % (ref 0–1.9)
BILIRUB SERPL-MCNC: 0.5 MG/DL (ref 0.1–1)
BUN SERPL-MCNC: 13 MG/DL (ref 6–20)
CALCIUM SERPL-MCNC: 9.5 MG/DL (ref 8.7–10.5)
CHLORIDE SERPL-SCNC: 102 MMOL/L (ref 95–110)
CO2 SERPL-SCNC: 24 MMOL/L (ref 23–29)
CREAT SERPL-MCNC: 0.9 MG/DL (ref 0.5–1.4)
DIFFERENTIAL METHOD: ABNORMAL
EOSINOPHIL # BLD AUTO: 0.4 K/UL (ref 0–0.5)
EOSINOPHIL NFR BLD: 4.1 % (ref 0–8)
ERYTHROCYTE [DISTWIDTH] IN BLOOD BY AUTOMATED COUNT: 12.8 % (ref 11.5–14.5)
EST. GFR  (AFRICAN AMERICAN): >60 ML/MIN/1.73 M^2
EST. GFR  (NON AFRICAN AMERICAN): >60 ML/MIN/1.73 M^2
GLUCOSE SERPL-MCNC: 104 MG/DL (ref 70–110)
HCT VFR BLD AUTO: 45.3 % (ref 40–54)
HGB BLD-MCNC: 14.8 G/DL (ref 14–18)
IMM GRANULOCYTES # BLD AUTO: 0.04 K/UL (ref 0–0.04)
IMM GRANULOCYTES NFR BLD AUTO: 0.4 % (ref 0–0.5)
LYMPHOCYTES # BLD AUTO: 2.4 K/UL (ref 1–4.8)
LYMPHOCYTES NFR BLD: 23.4 % (ref 18–48)
MAGNESIUM SERPL-MCNC: 2.2 MG/DL (ref 1.6–2.6)
MCH RBC QN AUTO: 31 PG (ref 27–31)
MCHC RBC AUTO-ENTMCNC: 32.7 G/DL (ref 32–36)
MCV RBC AUTO: 95 FL (ref 82–98)
MONOCYTES # BLD AUTO: 1.5 K/UL (ref 0.3–1)
MONOCYTES NFR BLD: 14.5 % (ref 4–15)
NEUTROPHILS # BLD AUTO: 5.9 K/UL (ref 1.8–7.7)
NEUTROPHILS NFR BLD: 57.1 % (ref 38–73)
NRBC BLD-RTO: 0 /100 WBC
PHOSPHATE SERPL-MCNC: 4.7 MG/DL (ref 2.7–4.5)
PLATELET # BLD AUTO: 308 K/UL (ref 150–350)
PMV BLD AUTO: 9.9 FL (ref 9.2–12.9)
POTASSIUM SERPL-SCNC: 4.2 MMOL/L (ref 3.5–5.1)
PROT SERPL-MCNC: 7.5 G/DL (ref 6–8.4)
RBC # BLD AUTO: 4.78 M/UL (ref 4.6–6.2)
SODIUM SERPL-SCNC: 138 MMOL/L (ref 136–145)
WBC # BLD AUTO: 10.39 K/UL (ref 3.9–12.7)

## 2021-01-22 PROCEDURE — 97116 GAIT TRAINING THERAPY: CPT

## 2021-01-22 PROCEDURE — 27000221 HC OXYGEN, UP TO 24 HOURS

## 2021-01-22 PROCEDURE — 25000003 PHARM REV CODE 250: Performed by: NURSE PRACTITIONER

## 2021-01-22 PROCEDURE — 25000003 PHARM REV CODE 250: Performed by: STUDENT IN AN ORGANIZED HEALTH CARE EDUCATION/TRAINING PROGRAM

## 2021-01-22 PROCEDURE — 25000003 PHARM REV CODE 250: Performed by: PHYSICIAN ASSISTANT

## 2021-01-22 PROCEDURE — 97161 PT EVAL LOW COMPLEX 20 MIN: CPT

## 2021-01-22 PROCEDURE — 11000001 HC ACUTE MED/SURG PRIVATE ROOM

## 2021-01-22 PROCEDURE — 99233 PR SUBSEQUENT HOSPITAL CARE,LEVL III: ICD-10-PCS | Mod: ,,, | Performed by: PSYCHIATRY & NEUROLOGY

## 2021-01-22 PROCEDURE — 83735 ASSAY OF MAGNESIUM: CPT

## 2021-01-22 PROCEDURE — 99233 SBSQ HOSP IP/OBS HIGH 50: CPT | Mod: ,,, | Performed by: PSYCHIATRY & NEUROLOGY

## 2021-01-22 PROCEDURE — 84100 ASSAY OF PHOSPHORUS: CPT

## 2021-01-22 PROCEDURE — 80053 COMPREHEN METABOLIC PANEL: CPT

## 2021-01-22 PROCEDURE — 99900035 HC TECH TIME PER 15 MIN (STAT)

## 2021-01-22 PROCEDURE — 97165 OT EVAL LOW COMPLEX 30 MIN: CPT

## 2021-01-22 PROCEDURE — 63600175 PHARM REV CODE 636 W HCPCS: Performed by: STUDENT IN AN ORGANIZED HEALTH CARE EDUCATION/TRAINING PROGRAM

## 2021-01-22 PROCEDURE — 94761 N-INVAS EAR/PLS OXIMETRY MLT: CPT

## 2021-01-22 PROCEDURE — 63600175 PHARM REV CODE 636 W HCPCS: Performed by: PHYSICIAN ASSISTANT

## 2021-01-22 PROCEDURE — 99233 SBSQ HOSP IP/OBS HIGH 50: CPT | Mod: ,,, | Performed by: NURSE PRACTITIONER

## 2021-01-22 PROCEDURE — 97530 THERAPEUTIC ACTIVITIES: CPT

## 2021-01-22 PROCEDURE — 85025 COMPLETE CBC W/AUTO DIFF WBC: CPT

## 2021-01-22 PROCEDURE — 99233 PR SUBSEQUENT HOSPITAL CARE,LEVL III: ICD-10-PCS | Mod: ,,, | Performed by: NURSE PRACTITIONER

## 2021-01-22 RX ADMIN — CEFTRIAXONE SODIUM 2 G: 2 INJECTION, SOLUTION INTRAVENOUS at 02:01

## 2021-01-22 RX ADMIN — CLOBAZAM 20 MG: 10 TABLET ORAL at 09:01

## 2021-01-22 RX ADMIN — ESCITALOPRAM OXALATE 10 MG: 10 TABLET ORAL at 09:01

## 2021-01-22 RX ADMIN — PANTOPRAZOLE SODIUM 40 MG: 40 TABLET, DELAYED RELEASE ORAL at 08:01

## 2021-01-22 RX ADMIN — ESLICARBAZEPINE ACETATE 1200 MG: 200 TABLET ORAL at 10:01

## 2021-01-22 RX ADMIN — DOCUSATE SODIUM 100 MG: 100 CAPSULE, LIQUID FILLED ORAL at 09:01

## 2021-01-22 RX ADMIN — DOCUSATE SODIUM 100 MG: 100 CAPSULE, LIQUID FILLED ORAL at 08:01

## 2021-01-22 RX ADMIN — HEPARIN SODIUM 5000 UNITS: 5000 INJECTION INTRAVENOUS; SUBCUTANEOUS at 02:01

## 2021-01-22 RX ADMIN — HEPARIN SODIUM 5000 UNITS: 5000 INJECTION INTRAVENOUS; SUBCUTANEOUS at 06:01

## 2021-01-22 RX ADMIN — AMLODIPINE BESYLATE 5 MG: 5 TABLET ORAL at 09:01

## 2021-01-22 RX ADMIN — ZONISAMIDE 300 MG: 100 CAPSULE ORAL at 08:01

## 2021-01-22 RX ADMIN — POLYETHYLENE GLYCOL 3350 17 G: 17 POWDER, FOR SOLUTION ORAL at 08:01

## 2021-01-22 RX ADMIN — HEPARIN SODIUM 5000 UNITS: 5000 INJECTION INTRAVENOUS; SUBCUTANEOUS at 09:01

## 2021-01-23 LAB
ALBUMIN SERPL BCP-MCNC: 3.5 G/DL (ref 3.5–5.2)
ALP SERPL-CCNC: 111 U/L (ref 55–135)
ALT SERPL W/O P-5'-P-CCNC: 41 U/L (ref 10–44)
ANION GAP SERPL CALC-SCNC: 12 MMOL/L (ref 8–16)
AST SERPL-CCNC: 21 U/L (ref 10–40)
BASOPHILS # BLD AUTO: 0.06 K/UL (ref 0–0.2)
BASOPHILS NFR BLD: 0.6 % (ref 0–1.9)
BILIRUB SERPL-MCNC: 0.3 MG/DL (ref 0.1–1)
BUN SERPL-MCNC: 17 MG/DL (ref 6–20)
CALCIUM SERPL-MCNC: 9.1 MG/DL (ref 8.7–10.5)
CHLORIDE SERPL-SCNC: 100 MMOL/L (ref 95–110)
CO2 SERPL-SCNC: 23 MMOL/L (ref 23–29)
CREAT SERPL-MCNC: 0.8 MG/DL (ref 0.5–1.4)
DIFFERENTIAL METHOD: ABNORMAL
EOSINOPHIL # BLD AUTO: 0.9 K/UL (ref 0–0.5)
EOSINOPHIL NFR BLD: 8.7 % (ref 0–8)
ERYTHROCYTE [DISTWIDTH] IN BLOOD BY AUTOMATED COUNT: 13.1 % (ref 11.5–14.5)
EST. GFR  (AFRICAN AMERICAN): >60 ML/MIN/1.73 M^2
EST. GFR  (NON AFRICAN AMERICAN): >60 ML/MIN/1.73 M^2
GLUCOSE SERPL-MCNC: 116 MG/DL (ref 70–110)
HCT VFR BLD AUTO: 41.9 % (ref 40–54)
HGB BLD-MCNC: 13.8 G/DL (ref 14–18)
IMM GRANULOCYTES # BLD AUTO: 0.05 K/UL (ref 0–0.04)
IMM GRANULOCYTES NFR BLD AUTO: 0.5 % (ref 0–0.5)
LYMPHOCYTES # BLD AUTO: 2.1 K/UL (ref 1–4.8)
LYMPHOCYTES NFR BLD: 21.6 % (ref 18–48)
MAGNESIUM SERPL-MCNC: 2 MG/DL (ref 1.6–2.6)
MCH RBC QN AUTO: 31.2 PG (ref 27–31)
MCHC RBC AUTO-ENTMCNC: 32.9 G/DL (ref 32–36)
MCV RBC AUTO: 95 FL (ref 82–98)
MONOCYTES # BLD AUTO: 0.9 K/UL (ref 0.3–1)
MONOCYTES NFR BLD: 9.5 % (ref 4–15)
NEUTROPHILS # BLD AUTO: 5.8 K/UL (ref 1.8–7.7)
NEUTROPHILS NFR BLD: 59.1 % (ref 38–73)
NRBC BLD-RTO: 0 /100 WBC
PHOSPHATE SERPL-MCNC: 3.9 MG/DL (ref 2.7–4.5)
PLATELET # BLD AUTO: 281 K/UL (ref 150–350)
PMV BLD AUTO: 10.9 FL (ref 9.2–12.9)
POTASSIUM SERPL-SCNC: 3.7 MMOL/L (ref 3.5–5.1)
PROT SERPL-MCNC: 7 G/DL (ref 6–8.4)
RBC # BLD AUTO: 4.42 M/UL (ref 4.6–6.2)
SODIUM SERPL-SCNC: 135 MMOL/L (ref 136–145)
WBC # BLD AUTO: 9.8 K/UL (ref 3.9–12.7)

## 2021-01-23 PROCEDURE — 25000003 PHARM REV CODE 250: Performed by: NURSE PRACTITIONER

## 2021-01-23 PROCEDURE — 84100 ASSAY OF PHOSPHORUS: CPT

## 2021-01-23 PROCEDURE — 25000003 PHARM REV CODE 250: Performed by: PHYSICIAN ASSISTANT

## 2021-01-23 PROCEDURE — 63600175 PHARM REV CODE 636 W HCPCS: Performed by: PHYSICIAN ASSISTANT

## 2021-01-23 PROCEDURE — 99233 SBSQ HOSP IP/OBS HIGH 50: CPT | Mod: ,,, | Performed by: PSYCHIATRY & NEUROLOGY

## 2021-01-23 PROCEDURE — 25000003 PHARM REV CODE 250: Performed by: STUDENT IN AN ORGANIZED HEALTH CARE EDUCATION/TRAINING PROGRAM

## 2021-01-23 PROCEDURE — 85025 COMPLETE CBC W/AUTO DIFF WBC: CPT

## 2021-01-23 PROCEDURE — 11000001 HC ACUTE MED/SURG PRIVATE ROOM

## 2021-01-23 PROCEDURE — 80053 COMPREHEN METABOLIC PANEL: CPT

## 2021-01-23 PROCEDURE — 99233 PR SUBSEQUENT HOSPITAL CARE,LEVL III: ICD-10-PCS | Mod: ,,, | Performed by: PSYCHIATRY & NEUROLOGY

## 2021-01-23 PROCEDURE — 63600175 PHARM REV CODE 636 W HCPCS: Performed by: STUDENT IN AN ORGANIZED HEALTH CARE EDUCATION/TRAINING PROGRAM

## 2021-01-23 PROCEDURE — 36415 COLL VENOUS BLD VENIPUNCTURE: CPT

## 2021-01-23 PROCEDURE — 83735 ASSAY OF MAGNESIUM: CPT

## 2021-01-23 RX ORDER — ONDANSETRON 8 MG/1
8 TABLET, ORALLY DISINTEGRATING ORAL EVERY 8 HOURS PRN
Qty: 14 TABLET | Refills: 0 | Status: SHIPPED | OUTPATIENT
Start: 2021-01-23 | End: 2021-01-24

## 2021-01-23 RX ORDER — CEPHALEXIN 500 MG/1
500 CAPSULE ORAL EVERY 6 HOURS
Qty: 10 CAPSULE | Refills: 0 | Status: SHIPPED | OUTPATIENT
Start: 2021-01-23 | End: 2021-01-23 | Stop reason: SDUPTHER

## 2021-01-23 RX ORDER — DOCUSATE SODIUM 100 MG/1
100 CAPSULE, LIQUID FILLED ORAL 2 TIMES DAILY
Qty: 14 CAPSULE | Refills: 0 | Status: SHIPPED | OUTPATIENT
Start: 2021-01-23 | End: 2021-01-24

## 2021-01-23 RX ORDER — ACETAMINOPHEN 500 MG
1000 TABLET ORAL EVERY 8 HOURS PRN
Qty: 28 TABLET | Refills: 0 | Status: SHIPPED | OUTPATIENT
Start: 2021-01-23 | End: 2021-01-24

## 2021-01-23 RX ORDER — CEPHALEXIN 500 MG/1
500 CAPSULE ORAL EVERY 6 HOURS
Qty: 10 CAPSULE | Refills: 0 | Status: SHIPPED | OUTPATIENT
Start: 2021-01-23 | End: 2021-01-24 | Stop reason: HOSPADM

## 2021-01-23 RX ORDER — CLOBAZAM 20 MG/1
20 TABLET ORAL NIGHTLY
Qty: 30 TABLET | Refills: 5 | Status: SHIPPED | OUTPATIENT
Start: 2021-01-23 | End: 2021-08-05 | Stop reason: SDUPTHER

## 2021-01-23 RX ORDER — CEPHALEXIN 500 MG/1
500 CAPSULE ORAL EVERY 12 HOURS
Status: DISCONTINUED | OUTPATIENT
Start: 2021-01-23 | End: 2021-01-24 | Stop reason: HOSPADM

## 2021-01-23 RX ORDER — OXYCODONE HYDROCHLORIDE 5 MG/1
5 TABLET ORAL EVERY 6 HOURS PRN
Qty: 15 TABLET | Refills: 0 | Status: SHIPPED | OUTPATIENT
Start: 2021-01-23 | End: 2021-01-26

## 2021-01-23 RX ADMIN — DOCUSATE SODIUM 100 MG: 100 CAPSULE, LIQUID FILLED ORAL at 08:01

## 2021-01-23 RX ADMIN — ESCITALOPRAM OXALATE 10 MG: 10 TABLET ORAL at 08:01

## 2021-01-23 RX ADMIN — CLOBAZAM 20 MG: 10 TABLET ORAL at 08:01

## 2021-01-23 RX ADMIN — POLYETHYLENE GLYCOL 3350 17 G: 17 POWDER, FOR SOLUTION ORAL at 08:01

## 2021-01-23 RX ADMIN — PANTOPRAZOLE SODIUM 40 MG: 40 TABLET, DELAYED RELEASE ORAL at 08:01

## 2021-01-23 RX ADMIN — ESLICARBAZEPINE ACETATE 1200 MG: 200 TABLET ORAL at 11:01

## 2021-01-23 RX ADMIN — CEFTRIAXONE SODIUM 2 G: 2 INJECTION, SOLUTION INTRAVENOUS at 02:01

## 2021-01-23 RX ADMIN — CEPHALEXIN 500 MG: 500 CAPSULE ORAL at 08:01

## 2021-01-23 RX ADMIN — HEPARIN SODIUM 5000 UNITS: 5000 INJECTION INTRAVENOUS; SUBCUTANEOUS at 05:01

## 2021-01-23 RX ADMIN — HEPARIN SODIUM 5000 UNITS: 5000 INJECTION INTRAVENOUS; SUBCUTANEOUS at 10:01

## 2021-01-23 RX ADMIN — ZONISAMIDE 300 MG: 100 CAPSULE ORAL at 08:01

## 2021-01-23 RX ADMIN — AMLODIPINE BESYLATE 5 MG: 5 TABLET ORAL at 08:01

## 2021-01-24 VITALS
WEIGHT: 198.44 LBS | OXYGEN SATURATION: 99 % | RESPIRATION RATE: 17 BRPM | HEIGHT: 71 IN | SYSTOLIC BLOOD PRESSURE: 141 MMHG | BODY MASS INDEX: 27.78 KG/M2 | TEMPERATURE: 98 F | DIASTOLIC BLOOD PRESSURE: 77 MMHG | HEART RATE: 95 BPM

## 2021-01-24 LAB
ALBUMIN SERPL BCP-MCNC: 3.6 G/DL (ref 3.5–5.2)
ALP SERPL-CCNC: 105 U/L (ref 55–135)
ALT SERPL W/O P-5'-P-CCNC: 36 U/L (ref 10–44)
ANION GAP SERPL CALC-SCNC: 12 MMOL/L (ref 8–16)
AST SERPL-CCNC: 18 U/L (ref 10–40)
BASOPHILS # BLD AUTO: 0.08 K/UL (ref 0–0.2)
BASOPHILS NFR BLD: 0.7 % (ref 0–1.9)
BILIRUB SERPL-MCNC: 0.4 MG/DL (ref 0.1–1)
BUN SERPL-MCNC: 20 MG/DL (ref 6–20)
CALCIUM SERPL-MCNC: 9.2 MG/DL (ref 8.7–10.5)
CHLORIDE SERPL-SCNC: 102 MMOL/L (ref 95–110)
CO2 SERPL-SCNC: 21 MMOL/L (ref 23–29)
CREAT SERPL-MCNC: 0.8 MG/DL (ref 0.5–1.4)
DIFFERENTIAL METHOD: ABNORMAL
EOSINOPHIL # BLD AUTO: 0.8 K/UL (ref 0–0.5)
EOSINOPHIL NFR BLD: 7.6 % (ref 0–8)
ERYTHROCYTE [DISTWIDTH] IN BLOOD BY AUTOMATED COUNT: 13.1 % (ref 11.5–14.5)
EST. GFR  (AFRICAN AMERICAN): >60 ML/MIN/1.73 M^2
EST. GFR  (NON AFRICAN AMERICAN): >60 ML/MIN/1.73 M^2
GLUCOSE SERPL-MCNC: 109 MG/DL (ref 70–110)
HCT VFR BLD AUTO: 44.2 % (ref 40–54)
HGB BLD-MCNC: 14.5 G/DL (ref 14–18)
IMM GRANULOCYTES # BLD AUTO: 0.07 K/UL (ref 0–0.04)
IMM GRANULOCYTES NFR BLD AUTO: 0.6 % (ref 0–0.5)
LYMPHOCYTES # BLD AUTO: 2.2 K/UL (ref 1–4.8)
LYMPHOCYTES NFR BLD: 20.6 % (ref 18–48)
MAGNESIUM SERPL-MCNC: 2 MG/DL (ref 1.6–2.6)
MCH RBC QN AUTO: 31 PG (ref 27–31)
MCHC RBC AUTO-ENTMCNC: 32.8 G/DL (ref 32–36)
MCV RBC AUTO: 95 FL (ref 82–98)
MONOCYTES # BLD AUTO: 1.1 K/UL (ref 0.3–1)
MONOCYTES NFR BLD: 10 % (ref 4–15)
NEUTROPHILS # BLD AUTO: 6.6 K/UL (ref 1.8–7.7)
NEUTROPHILS NFR BLD: 60.5 % (ref 38–73)
NRBC BLD-RTO: 0 /100 WBC
PHOSPHATE SERPL-MCNC: 4.6 MG/DL (ref 2.7–4.5)
PLATELET # BLD AUTO: 322 K/UL (ref 150–350)
PMV BLD AUTO: 10.6 FL (ref 9.2–12.9)
POTASSIUM SERPL-SCNC: 4.1 MMOL/L (ref 3.5–5.1)
PROT SERPL-MCNC: 7.2 G/DL (ref 6–8.4)
RBC # BLD AUTO: 4.67 M/UL (ref 4.6–6.2)
SODIUM SERPL-SCNC: 135 MMOL/L (ref 136–145)
WBC # BLD AUTO: 10.84 K/UL (ref 3.9–12.7)

## 2021-01-24 PROCEDURE — 36415 COLL VENOUS BLD VENIPUNCTURE: CPT

## 2021-01-24 PROCEDURE — 25000003 PHARM REV CODE 250: Performed by: PHYSICIAN ASSISTANT

## 2021-01-24 PROCEDURE — 25000003 PHARM REV CODE 250: Performed by: NURSE PRACTITIONER

## 2021-01-24 PROCEDURE — 63600175 PHARM REV CODE 636 W HCPCS: Performed by: STUDENT IN AN ORGANIZED HEALTH CARE EDUCATION/TRAINING PROGRAM

## 2021-01-24 PROCEDURE — 84100 ASSAY OF PHOSPHORUS: CPT

## 2021-01-24 PROCEDURE — 25000003 PHARM REV CODE 250: Performed by: STUDENT IN AN ORGANIZED HEALTH CARE EDUCATION/TRAINING PROGRAM

## 2021-01-24 PROCEDURE — 83735 ASSAY OF MAGNESIUM: CPT

## 2021-01-24 PROCEDURE — 80053 COMPREHEN METABOLIC PANEL: CPT

## 2021-01-24 PROCEDURE — 85025 COMPLETE CBC W/AUTO DIFF WBC: CPT

## 2021-01-24 RX ORDER — ACETAMINOPHEN 500 MG
1000 TABLET ORAL EVERY 8 HOURS PRN
Qty: 28 TABLET | Refills: 0 | Status: SHIPPED | OUTPATIENT
Start: 2021-01-24 | End: 2021-02-07

## 2021-01-24 RX ORDER — ONDANSETRON 8 MG/1
8 TABLET, ORALLY DISINTEGRATING ORAL EVERY 8 HOURS PRN
Qty: 14 TABLET | Refills: 0 | Status: SHIPPED | OUTPATIENT
Start: 2021-01-24 | End: 2021-01-24

## 2021-01-24 RX ORDER — ONDANSETRON 8 MG/1
8 TABLET, ORALLY DISINTEGRATING ORAL EVERY 8 HOURS PRN
Qty: 14 TABLET | Refills: 0 | Status: SHIPPED | OUTPATIENT
Start: 2021-01-24 | End: 2021-01-31

## 2021-01-24 RX ORDER — DOCUSATE SODIUM 100 MG/1
100 CAPSULE, LIQUID FILLED ORAL 2 TIMES DAILY
Qty: 14 CAPSULE | Refills: 0 | Status: SHIPPED | OUTPATIENT
Start: 2021-01-24 | End: 2021-02-07

## 2021-01-24 RX ORDER — CEPHALEXIN 500 MG/1
500 CAPSULE ORAL EVERY 12 HOURS
Qty: 8 CAPSULE | Refills: 0 | Status: SHIPPED | OUTPATIENT
Start: 2021-01-24 | End: 2021-01-28

## 2021-01-24 RX ADMIN — ZONISAMIDE 300 MG: 100 CAPSULE ORAL at 09:01

## 2021-01-24 RX ADMIN — PANTOPRAZOLE SODIUM 40 MG: 40 TABLET, DELAYED RELEASE ORAL at 09:01

## 2021-01-24 RX ADMIN — ESLICARBAZEPINE ACETATE 1200 MG: 200 TABLET ORAL at 09:01

## 2021-01-24 RX ADMIN — HEPARIN SODIUM 5000 UNITS: 5000 INJECTION INTRAVENOUS; SUBCUTANEOUS at 05:01

## 2021-01-24 RX ADMIN — CEPHALEXIN 500 MG: 500 CAPSULE ORAL at 09:01

## 2021-01-24 RX ADMIN — POLYETHYLENE GLYCOL 3350 17 G: 17 POWDER, FOR SOLUTION ORAL at 09:01

## 2021-01-24 RX ADMIN — DOCUSATE SODIUM 100 MG: 100 CAPSULE, LIQUID FILLED ORAL at 09:01

## 2021-01-25 ENCOUNTER — TELEPHONE (OUTPATIENT)
Dept: NEUROLOGY | Facility: CLINIC | Age: 56
End: 2021-01-25

## 2021-01-26 ENCOUNTER — PATIENT OUTREACH (OUTPATIENT)
Dept: ADMINISTRATIVE | Facility: CLINIC | Age: 56
End: 2021-01-26

## 2021-01-26 DIAGNOSIS — G40.219 COMPLEX PARTIAL EPILEPSY WITH GENERALIZATION AND WITH INTRACTABLE EPILEPSY: Primary | ICD-10-CM

## 2021-01-26 PROCEDURE — G0180 PR HOME HEALTH MD CERTIFICATION: ICD-10-PCS | Mod: ,,, | Performed by: PSYCHIATRY & NEUROLOGY

## 2021-01-26 PROCEDURE — G0180 MD CERTIFICATION HHA PATIENT: HCPCS | Mod: ,,, | Performed by: PSYCHIATRY & NEUROLOGY

## 2021-01-29 ENCOUNTER — TELEPHONE (OUTPATIENT)
Dept: NEUROLOGY | Facility: CLINIC | Age: 56
End: 2021-01-29

## 2021-02-09 ENCOUNTER — OFFICE VISIT (OUTPATIENT)
Dept: NEUROLOGY | Facility: CLINIC | Age: 56
End: 2021-02-09
Payer: COMMERCIAL

## 2021-02-09 ENCOUNTER — OFFICE VISIT (OUTPATIENT)
Dept: NEUROSURGERY | Facility: CLINIC | Age: 56
End: 2021-02-09
Payer: COMMERCIAL

## 2021-02-09 ENCOUNTER — EXTERNAL HOME HEALTH (OUTPATIENT)
Dept: HOME HEALTH SERVICES | Facility: HOSPITAL | Age: 56
End: 2021-02-09
Payer: COMMERCIAL

## 2021-02-09 ENCOUNTER — TELEPHONE (OUTPATIENT)
Dept: NEUROSURGERY | Facility: CLINIC | Age: 56
End: 2021-02-09

## 2021-02-09 VITALS — DIASTOLIC BLOOD PRESSURE: 90 MMHG | HEART RATE: 69 BPM | TEMPERATURE: 98 F | SYSTOLIC BLOOD PRESSURE: 131 MMHG

## 2021-02-09 DIAGNOSIS — G40.209 COMPLEX PARTIAL SEIZURES EVOLVING TO GENERALIZED TONIC-CLONIC SEIZURES: Primary | ICD-10-CM

## 2021-02-09 DIAGNOSIS — G40.209 COMPLEX PARTIAL SEIZURES EVOLVING TO GENERALIZED TONIC-CLONIC SEIZURES: ICD-10-CM

## 2021-02-09 PROCEDURE — 99999 PR PBB SHADOW E&M-EST. PATIENT-LVL III: ICD-10-PCS | Mod: PBBFAC,,, | Performed by: NEUROLOGICAL SURGERY

## 2021-02-09 PROCEDURE — 99024 POSTOP FOLLOW-UP VISIT: CPT | Mod: S$GLB,,, | Performed by: NEUROLOGICAL SURGERY

## 2021-02-09 PROCEDURE — 99999 PR PBB SHADOW E&M-EST. PATIENT-LVL III: CPT | Mod: PBBFAC,,, | Performed by: NEUROLOGICAL SURGERY

## 2021-02-09 PROCEDURE — 99214 PR OFFICE/OUTPT VISIT, EST, LEVL IV, 30-39 MIN: ICD-10-PCS | Mod: S$GLB,,, | Performed by: PSYCHIATRY & NEUROLOGY

## 2021-02-09 PROCEDURE — 99214 OFFICE O/P EST MOD 30 MIN: CPT | Mod: S$GLB,,, | Performed by: PSYCHIATRY & NEUROLOGY

## 2021-02-09 PROCEDURE — 99024 PR POST-OP FOLLOW-UP VISIT: ICD-10-PCS | Mod: S$GLB,,, | Performed by: NEUROLOGICAL SURGERY

## 2021-02-11 ENCOUNTER — TELEPHONE (OUTPATIENT)
Dept: NEUROSURGERY | Facility: CLINIC | Age: 56
End: 2021-02-11

## 2021-02-11 ENCOUNTER — TELEPHONE (OUTPATIENT)
Dept: PREADMISSION TESTING | Facility: HOSPITAL | Age: 56
End: 2021-02-11

## 2021-02-11 DIAGNOSIS — G40.209 COMPLEX PARTIAL SEIZURES EVOLVING TO GENERALIZED TONIC-CLONIC SEIZURES: ICD-10-CM

## 2021-02-11 DIAGNOSIS — Z01.818 PRE-OP TESTING: Primary | ICD-10-CM

## 2021-02-11 DIAGNOSIS — Z01.818 PREOPERATIVE TESTING: Primary | ICD-10-CM

## 2021-02-11 DIAGNOSIS — G40.219 COMPLEX PARTIAL EPILEPSY WITH GENERALIZATION AND WITH INTRACTABLE EPILEPSY: ICD-10-CM

## 2021-02-19 ENCOUNTER — PATIENT MESSAGE (OUTPATIENT)
Dept: SURGERY | Facility: HOSPITAL | Age: 56
End: 2021-02-19

## 2021-02-19 DIAGNOSIS — G43.009 MIGRAINE WITHOUT AURA AND WITHOUT STATUS MIGRAINOSUS, NOT INTRACTABLE: Primary | ICD-10-CM

## 2021-02-22 ENCOUNTER — PATIENT MESSAGE (OUTPATIENT)
Dept: SURGERY | Facility: HOSPITAL | Age: 56
End: 2021-02-22

## 2021-02-22 ENCOUNTER — PATIENT MESSAGE (OUTPATIENT)
Dept: NEUROLOGY | Facility: CLINIC | Age: 56
End: 2021-02-22

## 2021-02-22 RX ORDER — RIZATRIPTAN BENZOATE 10 MG/1
10 TABLET, ORALLY DISINTEGRATING ORAL
Qty: 10 TABLET | Refills: 5 | OUTPATIENT
Start: 2021-02-22

## 2021-02-23 ENCOUNTER — TELEPHONE (OUTPATIENT)
Dept: NEUROLOGY | Facility: CLINIC | Age: 56
End: 2021-02-23

## 2021-02-26 ENCOUNTER — HOSPITAL ENCOUNTER (OUTPATIENT)
Dept: RADIOLOGY | Facility: HOSPITAL | Age: 56
Discharge: HOME | End: 2021-02-26
Attending: PHYSICIAN ASSISTANT
Payer: COMMERCIAL

## 2021-02-26 ENCOUNTER — OFFICE VISIT (OUTPATIENT)
Dept: INTERNAL MEDICINE | Facility: CLINIC | Age: 56
End: 2021-02-26
Payer: COMMERCIAL

## 2021-02-26 ENCOUNTER — LAB VISIT (OUTPATIENT)
Dept: INTERNAL MEDICINE | Facility: CLINIC | Age: 56
End: 2021-02-26
Payer: COMMERCIAL

## 2021-02-26 ENCOUNTER — HOSPITAL ENCOUNTER (OUTPATIENT)
Dept: CARDIOLOGY | Facility: CLINIC | Age: 56
Discharge: HOME | End: 2021-02-26
Payer: COMMERCIAL

## 2021-02-26 ENCOUNTER — CLINICAL SUPPORT (OUTPATIENT)
Dept: OPHTHALMOLOGY | Facility: CLINIC | Age: 56
End: 2021-02-26
Payer: COMMERCIAL

## 2021-02-26 ENCOUNTER — OFFICE VISIT (OUTPATIENT)
Dept: OPHTHALMOLOGY | Facility: CLINIC | Age: 56
End: 2021-02-26
Payer: COMMERCIAL

## 2021-02-26 ENCOUNTER — TELEPHONE (OUTPATIENT)
Dept: NEUROLOGY | Facility: CLINIC | Age: 56
End: 2021-02-26

## 2021-02-26 ENCOUNTER — HOSPITAL ENCOUNTER (OUTPATIENT)
Dept: PREADMISSION TESTING | Facility: HOSPITAL | Age: 56
Discharge: HOME | End: 2021-02-26
Attending: ANESTHESIOLOGY
Payer: COMMERCIAL

## 2021-02-26 VITALS
DIASTOLIC BLOOD PRESSURE: 84 MMHG | HEIGHT: 71 IN | SYSTOLIC BLOOD PRESSURE: 124 MMHG | BODY MASS INDEX: 27.16 KG/M2 | WEIGHT: 194 LBS | RESPIRATION RATE: 18 BRPM | TEMPERATURE: 98 F | OXYGEN SATURATION: 98 % | HEART RATE: 60 BPM

## 2021-02-26 DIAGNOSIS — I49.9 CARDIAC ARRHYTHMIA, UNSPECIFIED CARDIAC ARRHYTHMIA TYPE: ICD-10-CM

## 2021-02-26 DIAGNOSIS — R29.818 NEUROLOGICAL DEFICIT, TRANSIENT: ICD-10-CM

## 2021-02-26 DIAGNOSIS — I10 ESSENTIAL HYPERTENSION: ICD-10-CM

## 2021-02-26 DIAGNOSIS — Z79.02 LONG TERM (CURRENT) USE OF ANTITHROMBOTICS/ANTIPLATELETS: ICD-10-CM

## 2021-02-26 DIAGNOSIS — G40.209 COMPLEX PARTIAL SEIZURES EVOLVING TO GENERALIZED TONIC-CLONIC SEIZURES: Primary | ICD-10-CM

## 2021-02-26 DIAGNOSIS — Z01.818 PRE-OP TESTING: ICD-10-CM

## 2021-02-26 DIAGNOSIS — E87.1 HYPONATREMIA: ICD-10-CM

## 2021-02-26 DIAGNOSIS — R06.83 SNORING: ICD-10-CM

## 2021-02-26 DIAGNOSIS — G40.209 PARTIAL SYMPTOMATIC EPILEPSY WITH COMPLEX PARTIAL SEIZURES, NOT INTRACTABLE, WITHOUT STATUS EPILEPTICUS: ICD-10-CM

## 2021-02-26 DIAGNOSIS — G40.209 COMPLEX PARTIAL SEIZURES EVOLVING TO GENERALIZED TONIC-CLONIC SEIZURES: ICD-10-CM

## 2021-02-26 DIAGNOSIS — R21 FACIAL RASH: ICD-10-CM

## 2021-02-26 DIAGNOSIS — R60.9 EDEMA, UNSPECIFIED TYPE: ICD-10-CM

## 2021-02-26 DIAGNOSIS — R41.0 DELIRIUM: ICD-10-CM

## 2021-02-26 DIAGNOSIS — G43.909 MIGRAINE WITHOUT STATUS MIGRAINOSUS, NOT INTRACTABLE, UNSPECIFIED MIGRAINE TYPE: ICD-10-CM

## 2021-02-26 DIAGNOSIS — Z87.891 FORMER SMOKER: ICD-10-CM

## 2021-02-26 DIAGNOSIS — F41.8 DEPRESSION WITH ANXIETY: ICD-10-CM

## 2021-02-26 PROBLEM — E87.6 HYPOKALEMIA: Status: RESOLVED | Noted: 2021-01-13 | Resolved: 2021-02-26

## 2021-02-26 PROBLEM — E83.51 HYPOCALCEMIA: Status: RESOLVED | Noted: 2021-01-13 | Resolved: 2021-02-26

## 2021-02-26 PROBLEM — R74.8 ALKALINE PHOSPHATASE ELEVATION: Status: RESOLVED | Noted: 2021-01-07 | Resolved: 2021-02-26

## 2021-02-26 LAB — SARS-COV-2 RNA RESP QL NAA+PROBE: NOT DETECTED

## 2021-02-26 PROCEDURE — 93010 EKG 12-LEAD: ICD-10-PCS | Mod: S$GLB,,, | Performed by: INTERNAL MEDICINE

## 2021-02-26 PROCEDURE — 3074F PR MOST RECENT SYSTOLIC BLOOD PRESSURE < 130 MM HG: ICD-10-PCS | Mod: CPTII,S$GLB,, | Performed by: HOSPITALIST

## 2021-02-26 PROCEDURE — 92004 PR EYE EXAM, NEW PATIENT,COMPREHESV: ICD-10-PCS | Mod: S$GLB,,, | Performed by: OPHTHALMOLOGY

## 2021-02-26 PROCEDURE — 99214 PR OFFICE/OUTPT VISIT, EST, LEVL IV, 30-39 MIN: ICD-10-PCS | Mod: S$GLB,,, | Performed by: HOSPITALIST

## 2021-02-26 PROCEDURE — 93010 ELECTROCARDIOGRAM REPORT: CPT | Mod: S$GLB,,, | Performed by: INTERNAL MEDICINE

## 2021-02-26 PROCEDURE — U0005 INFEC AGEN DETEC AMPLI PROBE: HCPCS

## 2021-02-26 PROCEDURE — 99214 OFFICE O/P EST MOD 30 MIN: CPT | Mod: S$GLB,,, | Performed by: HOSPITALIST

## 2021-02-26 PROCEDURE — 70552 MRI BRAIN STEALTH WITHOUT FIDUCIALS: ICD-10-PCS | Mod: 26,,, | Performed by: RADIOLOGY

## 2021-02-26 PROCEDURE — 3078F PR MOST RECENT DIASTOLIC BLOOD PRESSURE < 80 MM HG: ICD-10-PCS | Mod: CPTII,S$GLB,, | Performed by: HOSPITALIST

## 2021-02-26 PROCEDURE — U0003 INFECTIOUS AGENT DETECTION BY NUCLEIC ACID (DNA OR RNA); SEVERE ACUTE RESPIRATORY SYNDROME CORONAVIRUS 2 (SARS-COV-2) (CORONAVIRUS DISEASE [COVID-19]), AMPLIFIED PROBE TECHNIQUE, MAKING USE OF HIGH THROUGHPUT TECHNOLOGIES AS DESCRIBED BY CMS-2020-01-R: HCPCS

## 2021-02-26 PROCEDURE — 3078F DIAST BP <80 MM HG: CPT | Mod: CPTII,S$GLB,, | Performed by: HOSPITALIST

## 2021-02-26 PROCEDURE — 92004 COMPRE OPH EXAM NEW PT 1/>: CPT | Mod: S$GLB,,, | Performed by: OPHTHALMOLOGY

## 2021-02-26 PROCEDURE — 93005 ELECTROCARDIOGRAM TRACING: CPT | Mod: S$GLB,,, | Performed by: HOSPITALIST

## 2021-02-26 PROCEDURE — 99999 PR PBB SHADOW E&M-EST. PATIENT-LVL II: CPT | Mod: PBBFAC,,, | Performed by: OPHTHALMOLOGY

## 2021-02-26 PROCEDURE — 99999 PR PBB SHADOW E&M-EST. PATIENT-LVL II: ICD-10-PCS | Mod: PBBFAC,,, | Performed by: OPHTHALMOLOGY

## 2021-02-26 PROCEDURE — 99999 PR PBB SHADOW E&M-EST. PATIENT-LVL II: CPT | Mod: PBBFAC,,, | Performed by: HOSPITALIST

## 2021-02-26 PROCEDURE — 1126F AMNT PAIN NOTED NONE PRSNT: CPT | Mod: S$GLB,,, | Performed by: OPHTHALMOLOGY

## 2021-02-26 PROCEDURE — A9585 GADOBUTROL INJECTION: HCPCS | Performed by: PHYSICIAN ASSISTANT

## 2021-02-26 PROCEDURE — 3074F SYST BP LT 130 MM HG: CPT | Mod: CPTII,S$GLB,, | Performed by: HOSPITALIST

## 2021-02-26 PROCEDURE — 92083 EXTENDED VISUAL FIELD XM: CPT | Mod: S$GLB,,, | Performed by: OPHTHALMOLOGY

## 2021-02-26 PROCEDURE — 93005 EKG 12-LEAD: ICD-10-PCS | Mod: S$GLB,,, | Performed by: HOSPITALIST

## 2021-02-26 PROCEDURE — 1126F PR PAIN SEVERITY QUANTIFIED, NO PAIN PRESENT: ICD-10-PCS | Mod: S$GLB,,, | Performed by: OPHTHALMOLOGY

## 2021-02-26 PROCEDURE — 25500020 PHARM REV CODE 255: Performed by: PHYSICIAN ASSISTANT

## 2021-02-26 PROCEDURE — 92083 HUMPHREY VISUAL FIELD - OU - BOTH EYES: ICD-10-PCS | Mod: S$GLB,,, | Performed by: OPHTHALMOLOGY

## 2021-02-26 PROCEDURE — 99999 PR PBB SHADOW E&M-EST. PATIENT-LVL II: ICD-10-PCS | Mod: PBBFAC,,, | Performed by: HOSPITALIST

## 2021-02-26 PROCEDURE — 70552 MRI BRAIN STEM W/DYE: CPT | Mod: 26,,, | Performed by: RADIOLOGY

## 2021-02-26 PROCEDURE — 70552 MRI BRAIN STEM W/DYE: CPT | Mod: TC

## 2021-02-26 RX ORDER — OXYCODONE HYDROCHLORIDE 5 MG/1
5 TABLET ORAL EVERY 6 HOURS PRN
Status: ON HOLD | COMMUNITY
Start: 2021-01-26 | End: 2021-03-03 | Stop reason: SDUPTHER

## 2021-02-26 RX ORDER — ONDANSETRON 8 MG/1
8 TABLET, ORALLY DISINTEGRATING ORAL EVERY 8 HOURS PRN
COMMUNITY

## 2021-02-26 RX ORDER — GADOBUTROL 604.72 MG/ML
10 INJECTION INTRAVENOUS
Status: COMPLETED | OUTPATIENT
Start: 2021-02-26 | End: 2021-02-26

## 2021-02-26 RX ADMIN — GADOBUTROL 10 ML: 604.72 INJECTION INTRAVENOUS at 12:02

## 2021-02-28 ENCOUNTER — CLINICAL SUPPORT (OUTPATIENT)
Dept: URGENT CARE | Facility: CLINIC | Age: 56
End: 2021-02-28
Payer: COMMERCIAL

## 2021-02-28 ENCOUNTER — ANESTHESIA EVENT (OUTPATIENT)
Dept: SURGERY | Facility: HOSPITAL | Age: 56
End: 2021-02-28
Payer: COMMERCIAL

## 2021-02-28 DIAGNOSIS — E87.1 HYPONATREMIA: ICD-10-CM

## 2021-02-28 LAB
ALBUMIN SERPL BCP-MCNC: 4 G/DL (ref 3.5–5.2)
ALP SERPL-CCNC: 118 U/L (ref 55–135)
ALT SERPL W/O P-5'-P-CCNC: 18 U/L (ref 10–44)
ANION GAP SERPL CALC-SCNC: 9 MMOL/L (ref 8–16)
AST SERPL-CCNC: 17 U/L (ref 10–40)
BILIRUB SERPL-MCNC: 0.3 MG/DL (ref 0.1–1)
BUN SERPL-MCNC: 10 MG/DL (ref 6–20)
CALCIUM SERPL-MCNC: 9.3 MG/DL (ref 8.7–10.5)
CHLORIDE SERPL-SCNC: 102 MMOL/L (ref 95–110)
CO2 SERPL-SCNC: 24 MMOL/L (ref 23–29)
CREAT SERPL-MCNC: 0.9 MG/DL (ref 0.5–1.4)
EST. GFR  (AFRICAN AMERICAN): >60 ML/MIN/1.73 M^2
EST. GFR  (NON AFRICAN AMERICAN): >60 ML/MIN/1.73 M^2
GLUCOSE SERPL-MCNC: 86 MG/DL (ref 70–110)
POTASSIUM SERPL-SCNC: 4.3 MMOL/L (ref 3.5–5.1)
PROT SERPL-MCNC: 7.1 G/DL (ref 6–8.4)
SODIUM SERPL-SCNC: 135 MMOL/L (ref 136–145)

## 2021-02-28 PROCEDURE — 80053 COMPREHEN METABOLIC PANEL: CPT

## 2021-03-01 ENCOUNTER — HOSPITAL ENCOUNTER (OUTPATIENT)
Dept: RADIOLOGY | Facility: HOSPITAL | Age: 56
Discharge: HOME | End: 2021-03-01
Attending: PHYSICIAN ASSISTANT | Admitting: NEUROLOGICAL SURGERY
Payer: COMMERCIAL

## 2021-03-01 ENCOUNTER — PATIENT MESSAGE (OUTPATIENT)
Dept: SURGERY | Facility: HOSPITAL | Age: 56
End: 2021-03-01

## 2021-03-01 ENCOUNTER — ANESTHESIA (OUTPATIENT)
Dept: SURGERY | Facility: HOSPITAL | Age: 56
End: 2021-03-01
Payer: COMMERCIAL

## 2021-03-01 ENCOUNTER — HOSPITAL ENCOUNTER (OUTPATIENT)
Dept: RADIOLOGY | Facility: HOSPITAL | Age: 56
Discharge: HOME | End: 2021-03-01
Attending: NEUROLOGICAL SURGERY | Admitting: NEUROLOGICAL SURGERY
Payer: COMMERCIAL

## 2021-03-01 ENCOUNTER — HOSPITAL ENCOUNTER (OUTPATIENT)
Facility: HOSPITAL | Age: 56
Discharge: HOME | End: 2021-03-04
Attending: NEUROLOGICAL SURGERY | Admitting: NEUROLOGICAL SURGERY
Payer: COMMERCIAL

## 2021-03-01 DIAGNOSIS — G40.909 EPILEPSY: ICD-10-CM

## 2021-03-01 DIAGNOSIS — G40.209 COMPLEX PARTIAL SEIZURES EVOLVING TO GENERALIZED TONIC-CLONIC SEIZURES: ICD-10-CM

## 2021-03-01 DIAGNOSIS — E87.1 HYPONATREMIA: ICD-10-CM

## 2021-03-01 DIAGNOSIS — G40.219 COMPLEX PARTIAL EPILEPSY WITH GENERALIZATION AND WITH INTRACTABLE EPILEPSY: ICD-10-CM

## 2021-03-01 PROBLEM — R41.0 DELIRIUM: Status: RESOLVED | Noted: 2021-02-26 | Resolved: 2021-03-01

## 2021-03-01 LAB
ANION GAP SERPL CALC-SCNC: 7 MMOL/L (ref 8–16)
ANION GAP SERPL CALC-SCNC: 7 MMOL/L (ref 8–16)
ANION GAP SERPL CALC-SCNC: 8 MMOL/L (ref 8–16)
APTT BLDCRRT: 25.4 SEC (ref 21–32)
BACTERIA #/AREA URNS AUTO: ABNORMAL /HPF
BASOPHILS # BLD AUTO: 0.04 K/UL (ref 0–0.2)
BASOPHILS # BLD AUTO: 0.07 K/UL (ref 0–0.2)
BASOPHILS NFR BLD: 0.6 % (ref 0–1.9)
BASOPHILS NFR BLD: 1.1 % (ref 0–1.9)
BILIRUB UR QL STRIP: NEGATIVE
BUN SERPL-MCNC: 10 MG/DL (ref 6–20)
BUN SERPL-MCNC: 11 MG/DL (ref 6–20)
BUN SERPL-MCNC: 17 MG/DL (ref 6–20)
CALCIUM SERPL-MCNC: 7.5 MG/DL (ref 8.7–10.5)
CALCIUM SERPL-MCNC: 8.6 MG/DL (ref 8.7–10.5)
CALCIUM SERPL-MCNC: 8.8 MG/DL (ref 8.7–10.5)
CHLORIDE SERPL-SCNC: 105 MMOL/L (ref 95–110)
CHLORIDE SERPL-SCNC: 108 MMOL/L (ref 95–110)
CHLORIDE SERPL-SCNC: 109 MMOL/L (ref 95–110)
CLARITY UR REFRACT.AUTO: ABNORMAL
CO2 SERPL-SCNC: 22 MMOL/L (ref 23–29)
COLOR UR AUTO: ABNORMAL
CREAT SERPL-MCNC: 0.7 MG/DL (ref 0.5–1.4)
CREAT SERPL-MCNC: 0.7 MG/DL (ref 0.5–1.4)
CREAT SERPL-MCNC: 0.9 MG/DL (ref 0.5–1.4)
DIFFERENTIAL METHOD: ABNORMAL
DIFFERENTIAL METHOD: ABNORMAL
EOSINOPHIL # BLD AUTO: 0.1 K/UL (ref 0–0.5)
EOSINOPHIL # BLD AUTO: 0.4 K/UL (ref 0–0.5)
EOSINOPHIL NFR BLD: 1.9 % (ref 0–8)
EOSINOPHIL NFR BLD: 6.5 % (ref 0–8)
ERYTHROCYTE [DISTWIDTH] IN BLOOD BY AUTOMATED COUNT: 13.1 % (ref 11.5–14.5)
ERYTHROCYTE [DISTWIDTH] IN BLOOD BY AUTOMATED COUNT: 13.2 % (ref 11.5–14.5)
EST. GFR  (AFRICAN AMERICAN): >60 ML/MIN/1.73 M^2
EST. GFR  (NON AFRICAN AMERICAN): >60 ML/MIN/1.73 M^2
GLUCOSE SERPL-MCNC: 102 MG/DL (ref 70–110)
GLUCOSE SERPL-MCNC: 104 MG/DL (ref 70–110)
GLUCOSE SERPL-MCNC: 80 MG/DL (ref 70–110)
GLUCOSE UR QL STRIP: NEGATIVE
HCT VFR BLD AUTO: 37 % (ref 40–54)
HCT VFR BLD AUTO: 43.4 % (ref 40–54)
HGB BLD-MCNC: 12.6 G/DL (ref 14–18)
HGB BLD-MCNC: 14.2 G/DL (ref 14–18)
HGB UR QL STRIP: ABNORMAL
IMM GRANULOCYTES # BLD AUTO: 0.02 K/UL (ref 0–0.04)
IMM GRANULOCYTES # BLD AUTO: 0.04 K/UL (ref 0–0.04)
IMM GRANULOCYTES NFR BLD AUTO: 0.3 % (ref 0–0.5)
IMM GRANULOCYTES NFR BLD AUTO: 0.6 % (ref 0–0.5)
INR PPP: 0.9 (ref 0.8–1.2)
KETONES UR QL STRIP: NEGATIVE
LEUKOCYTE ESTERASE UR QL STRIP: NEGATIVE
LYMPHOCYTES # BLD AUTO: 1.5 K/UL (ref 1–4.8)
LYMPHOCYTES # BLD AUTO: 2.3 K/UL (ref 1–4.8)
LYMPHOCYTES NFR BLD: 20.1 % (ref 18–48)
LYMPHOCYTES NFR BLD: 34.1 % (ref 18–48)
MCH RBC QN AUTO: 31.1 PG (ref 27–31)
MCH RBC QN AUTO: 31.3 PG (ref 27–31)
MCHC RBC AUTO-ENTMCNC: 32.7 G/DL (ref 32–36)
MCHC RBC AUTO-ENTMCNC: 34.1 G/DL (ref 32–36)
MCV RBC AUTO: 92 FL (ref 82–98)
MCV RBC AUTO: 95 FL (ref 82–98)
MICROSCOPIC COMMENT: ABNORMAL
MONOCYTES # BLD AUTO: 0.7 K/UL (ref 0.3–1)
MONOCYTES # BLD AUTO: 0.7 K/UL (ref 0.3–1)
MONOCYTES NFR BLD: 10.2 % (ref 4–15)
MONOCYTES NFR BLD: 10.8 % (ref 4–15)
NEUTROPHILS # BLD AUTO: 3.1 K/UL (ref 1.8–7.7)
NEUTROPHILS # BLD AUTO: 4.8 K/UL (ref 1.8–7.7)
NEUTROPHILS NFR BLD: 47.2 % (ref 38–73)
NEUTROPHILS NFR BLD: 66.6 % (ref 38–73)
NITRITE UR QL STRIP: NEGATIVE
NRBC BLD-RTO: 0 /100 WBC
NRBC BLD-RTO: 0 /100 WBC
PH UR STRIP: 8 [PH] (ref 5–8)
PLATELET # BLD AUTO: 221 K/UL (ref 150–350)
PLATELET # BLD AUTO: 272 K/UL (ref 150–350)
PMV BLD AUTO: 9.7 FL (ref 9.2–12.9)
PMV BLD AUTO: 9.9 FL (ref 9.2–12.9)
POTASSIUM SERPL-SCNC: 3.9 MMOL/L (ref 3.5–5.1)
POTASSIUM SERPL-SCNC: 4 MMOL/L (ref 3.5–5.1)
POTASSIUM SERPL-SCNC: 4.2 MMOL/L (ref 3.5–5.1)
PROT UR QL STRIP: NEGATIVE
PROTHROMBIN TIME: 10.4 SEC (ref 9–12.5)
RBC # BLD AUTO: 4.02 M/UL (ref 4.6–6.2)
RBC # BLD AUTO: 4.56 M/UL (ref 4.6–6.2)
RBC #/AREA URNS AUTO: >100 /HPF (ref 0–4)
SODIUM SERPL-SCNC: 135 MMOL/L (ref 136–145)
SODIUM SERPL-SCNC: 137 MMOL/L (ref 136–145)
SODIUM SERPL-SCNC: 138 MMOL/L (ref 136–145)
SODIUM UR-SCNC: 144 MMOL/L (ref 20–250)
SP GR UR STRIP: 1.01 (ref 1–1.03)
URN SPEC COLLECT METH UR: ABNORMAL
WBC # BLD AUTO: 6.6 K/UL (ref 3.9–12.7)
WBC # BLD AUTO: 7.25 K/UL (ref 3.9–12.7)

## 2021-03-01 PROCEDURE — 94761 N-INVAS EAR/PLS OXIMETRY MLT: CPT

## 2021-03-01 PROCEDURE — 63600175 PHARM REV CODE 636 W HCPCS: Performed by: ANESTHESIOLOGY

## 2021-03-01 PROCEDURE — 85610 PROTHROMBIN TIME: CPT

## 2021-03-01 PROCEDURE — 64999 UNLISTED PX NERVOUS SYSTEM: CPT | Mod: ,,, | Performed by: NEUROLOGICAL SURGERY

## 2021-03-01 PROCEDURE — 25000003 PHARM REV CODE 250: Performed by: STUDENT IN AN ORGANIZED HEALTH CARE EDUCATION/TRAINING PROGRAM

## 2021-03-01 PROCEDURE — 63600175 PHARM REV CODE 636 W HCPCS: Performed by: NEUROLOGICAL SURGERY

## 2021-03-01 PROCEDURE — 83935 ASSAY OF URINE OSMOLALITY: CPT

## 2021-03-01 PROCEDURE — 81001 URINALYSIS AUTO W/SCOPE: CPT

## 2021-03-01 PROCEDURE — 25500020 PHARM REV CODE 255: Performed by: NEUROLOGICAL SURGERY

## 2021-03-01 PROCEDURE — 70553 MRI BRAIN STEM W/O & W/DYE: CPT | Mod: TC

## 2021-03-01 PROCEDURE — A9585 GADOBUTROL INJECTION: HCPCS | Performed by: NEUROLOGICAL SURGERY

## 2021-03-01 PROCEDURE — 70553 MRI BRAIN W WO CONTRAST: ICD-10-PCS | Mod: 26,,, | Performed by: RADIOLOGY

## 2021-03-01 PROCEDURE — 70450 CT HEAD/BRAIN W/O DYE: CPT | Mod: 26,,, | Performed by: RADIOLOGY

## 2021-03-01 PROCEDURE — 36415 COLL VENOUS BLD VENIPUNCTURE: CPT

## 2021-03-01 PROCEDURE — 27100019 HC AMBU BAG ADULT/PED: Performed by: STUDENT IN AN ORGANIZED HEALTH CARE EDUCATION/TRAINING PROGRAM

## 2021-03-01 PROCEDURE — 37000009 HC ANESTHESIA EA ADD 15 MINS: Performed by: NEUROLOGICAL SURGERY

## 2021-03-01 PROCEDURE — 27201037 HC PRESSURE MONITORING SET UP

## 2021-03-01 PROCEDURE — A4648 IMPLANTABLE TISSUE MARKER: HCPCS | Performed by: NEUROLOGICAL SURGERY

## 2021-03-01 PROCEDURE — 36620 ARTERIAL: ICD-10-PCS | Mod: 59,,, | Performed by: STUDENT IN AN ORGANIZED HEALTH CARE EDUCATION/TRAINING PROGRAM

## 2021-03-01 PROCEDURE — 27100025 HC TUBING, SET FLUID WARMER: Performed by: STUDENT IN AN ORGANIZED HEALTH CARE EDUCATION/TRAINING PROGRAM

## 2021-03-01 PROCEDURE — 64999 PR MRI GUIDED LASER ABLATION(VISUALASE); BRAIN: ICD-10-PCS | Mod: ,,, | Performed by: NEUROLOGICAL SURGERY

## 2021-03-01 PROCEDURE — D9220A PRA ANESTHESIA: ICD-10-PCS | Mod: ,,, | Performed by: STUDENT IN AN ORGANIZED HEALTH CARE EDUCATION/TRAINING PROGRAM

## 2021-03-01 PROCEDURE — 71000033 HC RECOVERY, INTIAL HOUR: Performed by: NEUROLOGICAL SURGERY

## 2021-03-01 PROCEDURE — 63600175 PHARM REV CODE 636 W HCPCS: Performed by: STUDENT IN AN ORGANIZED HEALTH CARE EDUCATION/TRAINING PROGRAM

## 2021-03-01 PROCEDURE — 85730 THROMBOPLASTIN TIME PARTIAL: CPT

## 2021-03-01 PROCEDURE — 70553 MRI BRAIN STEM W/O & W/DYE: CPT | Mod: 26,,, | Performed by: RADIOLOGY

## 2021-03-01 PROCEDURE — 27800505 HC CATH, RADIAL ARTERY KIT: Performed by: STUDENT IN AN ORGANIZED HEALTH CARE EDUCATION/TRAINING PROGRAM

## 2021-03-01 PROCEDURE — 37000008 HC ANESTHESIA 1ST 15 MINUTES: Performed by: NEUROLOGICAL SURGERY

## 2021-03-01 PROCEDURE — D9220A PRA ANESTHESIA: Mod: ,,, | Performed by: STUDENT IN AN ORGANIZED HEALTH CARE EDUCATION/TRAINING PROGRAM

## 2021-03-01 PROCEDURE — 36000710: Performed by: NEUROLOGICAL SURGERY

## 2021-03-01 PROCEDURE — 25000003 PHARM REV CODE 250: Performed by: NEUROLOGICAL SURGERY

## 2021-03-01 PROCEDURE — 36620 INSERTION CATHETER ARTERY: CPT | Mod: 59,,, | Performed by: STUDENT IN AN ORGANIZED HEALTH CARE EDUCATION/TRAINING PROGRAM

## 2021-03-01 PROCEDURE — 71000039 HC RECOVERY, EACH ADD'L HOUR: Performed by: NEUROLOGICAL SURGERY

## 2021-03-01 PROCEDURE — 70450 CT HEAD/BRAIN W/O DYE: CPT | Mod: TC

## 2021-03-01 PROCEDURE — 27201423 OPTIME MED/SURG SUP & DEVICES STERILE SUPPLY: Performed by: NEUROLOGICAL SURGERY

## 2021-03-01 PROCEDURE — C1713 ANCHOR/SCREW BN/BN,TIS/BN: HCPCS | Performed by: NEUROLOGICAL SURGERY

## 2021-03-01 PROCEDURE — C1751 CATH, INF, PER/CENT/MIDLINE: HCPCS | Performed by: STUDENT IN AN ORGANIZED HEALTH CARE EDUCATION/TRAINING PROGRAM

## 2021-03-01 PROCEDURE — 70450 CT HEAD WITHOUT CONTRAST: ICD-10-PCS | Mod: 26,,, | Performed by: RADIOLOGY

## 2021-03-01 PROCEDURE — 84300 ASSAY OF URINE SODIUM: CPT

## 2021-03-01 PROCEDURE — 86920 COMPATIBILITY TEST SPIN: CPT

## 2021-03-01 PROCEDURE — 36000711: Performed by: NEUROLOGICAL SURGERY

## 2021-03-01 PROCEDURE — 80048 BASIC METABOLIC PNL TOTAL CA: CPT | Mod: 91

## 2021-03-01 PROCEDURE — 85025 COMPLETE CBC W/AUTO DIFF WBC: CPT

## 2021-03-01 RX ORDER — ESCITALOPRAM OXALATE 10 MG/1
10 TABLET ORAL DAILY
Status: DISCONTINUED | OUTPATIENT
Start: 2021-03-01 | End: 2021-03-04 | Stop reason: HOSPADM

## 2021-03-01 RX ORDER — FENTANYL CITRATE 50 UG/ML
INJECTION, SOLUTION INTRAMUSCULAR; INTRAVENOUS
Status: DISCONTINUED | OUTPATIENT
Start: 2021-03-01 | End: 2021-03-01

## 2021-03-01 RX ORDER — CEFAZOLIN SODIUM 1 G/3ML
2 INJECTION, POWDER, FOR SOLUTION INTRAMUSCULAR; INTRAVENOUS
Status: COMPLETED | OUTPATIENT
Start: 2021-03-01 | End: 2021-03-02

## 2021-03-01 RX ORDER — LANOLIN ALCOHOL/MO/W.PET/CERES
800 CREAM (GRAM) TOPICAL
Status: DISCONTINUED | OUTPATIENT
Start: 2021-03-01 | End: 2021-03-03

## 2021-03-01 RX ORDER — SODIUM,POTASSIUM PHOSPHATES 280-250MG
2 POWDER IN PACKET (EA) ORAL
Status: DISCONTINUED | OUTPATIENT
Start: 2021-03-01 | End: 2021-03-03

## 2021-03-01 RX ORDER — ONDANSETRON 2 MG/ML
4 INJECTION INTRAMUSCULAR; INTRAVENOUS DAILY PRN
Status: DISCONTINUED | OUTPATIENT
Start: 2021-03-01 | End: 2021-03-01

## 2021-03-01 RX ORDER — MUPIROCIN 20 MG/G
1 OINTMENT TOPICAL 2 TIMES DAILY
Status: DISCONTINUED | OUTPATIENT
Start: 2021-03-01 | End: 2021-03-01

## 2021-03-01 RX ORDER — ONDANSETRON 8 MG/1
8 TABLET, ORALLY DISINTEGRATING ORAL EVERY 6 HOURS PRN
Status: DISCONTINUED | OUTPATIENT
Start: 2021-03-01 | End: 2021-03-04 | Stop reason: HOSPADM

## 2021-03-01 RX ORDER — GADOBUTROL 604.72 MG/ML
10 INJECTION INTRAVENOUS
Status: COMPLETED | OUTPATIENT
Start: 2021-03-01 | End: 2021-03-01

## 2021-03-01 RX ORDER — AMLODIPINE BESYLATE 5 MG/1
5 TABLET ORAL DAILY
Status: DISCONTINUED | OUTPATIENT
Start: 2021-03-01 | End: 2021-03-04 | Stop reason: HOSPADM

## 2021-03-01 RX ORDER — ACETAMINOPHEN 325 MG/1
650 TABLET ORAL EVERY 4 HOURS PRN
Status: DISCONTINUED | OUTPATIENT
Start: 2021-03-01 | End: 2021-03-04 | Stop reason: HOSPADM

## 2021-03-01 RX ORDER — LABETALOL HYDROCHLORIDE 5 MG/ML
20 INJECTION, SOLUTION INTRAVENOUS EVERY 10 MIN PRN
Status: DISCONTINUED | OUTPATIENT
Start: 2021-03-01 | End: 2021-03-04 | Stop reason: HOSPADM

## 2021-03-01 RX ORDER — MUPIROCIN 20 MG/G
OINTMENT TOPICAL 2 TIMES DAILY
Status: DISCONTINUED | OUTPATIENT
Start: 2021-03-01 | End: 2021-03-04 | Stop reason: HOSPADM

## 2021-03-01 RX ORDER — MIDAZOLAM HYDROCHLORIDE 1 MG/ML
INJECTION INTRAMUSCULAR; INTRAVENOUS
Status: DISCONTINUED | OUTPATIENT
Start: 2021-03-01 | End: 2021-03-01

## 2021-03-01 RX ORDER — DEXAMETHASONE SODIUM PHOSPHATE 4 MG/ML
INJECTION, SOLUTION INTRA-ARTICULAR; INTRALESIONAL; INTRAMUSCULAR; INTRAVENOUS; SOFT TISSUE
Status: DISCONTINUED | OUTPATIENT
Start: 2021-03-01 | End: 2021-03-01

## 2021-03-01 RX ORDER — SODIUM CHLORIDE 9 MG/ML
INJECTION, SOLUTION INTRAVENOUS CONTINUOUS PRN
Status: DISCONTINUED | OUTPATIENT
Start: 2021-03-01 | End: 2021-03-01

## 2021-03-01 RX ORDER — ZONISAMIDE 100 MG/1
300 CAPSULE ORAL DAILY
Status: DISCONTINUED | OUTPATIENT
Start: 2021-03-01 | End: 2021-03-04 | Stop reason: HOSPADM

## 2021-03-01 RX ORDER — GENTAMICIN SULFATE 40 MG/ML
INJECTION, SOLUTION INTRAMUSCULAR; INTRAVENOUS
Status: DISCONTINUED | OUTPATIENT
Start: 2021-03-01 | End: 2021-03-01 | Stop reason: HOSPADM

## 2021-03-01 RX ORDER — MUPIROCIN 20 MG/G
OINTMENT TOPICAL
Status: DISCONTINUED | OUTPATIENT
Start: 2021-03-01 | End: 2021-03-01

## 2021-03-01 RX ORDER — FENTANYL CITRATE 50 UG/ML
25 INJECTION, SOLUTION INTRAMUSCULAR; INTRAVENOUS EVERY 5 MIN PRN
Status: DISCONTINUED | OUTPATIENT
Start: 2021-03-01 | End: 2021-03-01

## 2021-03-01 RX ORDER — HYDROMORPHONE HYDROCHLORIDE 1 MG/ML
0.2 INJECTION, SOLUTION INTRAMUSCULAR; INTRAVENOUS; SUBCUTANEOUS EVERY 5 MIN PRN
Status: DISCONTINUED | OUTPATIENT
Start: 2021-03-01 | End: 2021-03-01

## 2021-03-01 RX ORDER — OXYCODONE HYDROCHLORIDE 5 MG/1
5 TABLET ORAL EVERY 4 HOURS PRN
Status: DISCONTINUED | OUTPATIENT
Start: 2021-03-01 | End: 2021-03-04 | Stop reason: HOSPADM

## 2021-03-01 RX ORDER — FENTANYL CITRATE 50 UG/ML
25 INJECTION, SOLUTION INTRAMUSCULAR; INTRAVENOUS EVERY 5 MIN PRN
Status: DISCONTINUED | OUTPATIENT
Start: 2021-03-01 | End: 2021-03-01 | Stop reason: HOSPADM

## 2021-03-01 RX ORDER — CLOBAZAM 10 MG/1
20 TABLET ORAL NIGHTLY
Refills: 5 | Status: DISCONTINUED | OUTPATIENT
Start: 2021-03-01 | End: 2021-03-04 | Stop reason: HOSPADM

## 2021-03-01 RX ORDER — NICARDIPINE HYDROCHLORIDE 0.2 MG/ML
0-15 INJECTION INTRAVENOUS CONTINUOUS
Status: DISCONTINUED | OUTPATIENT
Start: 2021-03-01 | End: 2021-03-02

## 2021-03-01 RX ORDER — PROPOFOL 10 MG/ML
VIAL (ML) INTRAVENOUS
Status: DISCONTINUED | OUTPATIENT
Start: 2021-03-01 | End: 2021-03-01

## 2021-03-01 RX ORDER — MEPERIDINE HYDROCHLORIDE 50 MG/ML
12.5 INJECTION INTRAMUSCULAR; INTRAVENOUS; SUBCUTANEOUS ONCE AS NEEDED
Status: DISCONTINUED | OUTPATIENT
Start: 2021-03-01 | End: 2021-03-01 | Stop reason: HOSPADM

## 2021-03-01 RX ORDER — BACITRACIN ZINC 500 UNIT/G
OINTMENT (GRAM) TOPICAL
Status: DISCONTINUED | OUTPATIENT
Start: 2021-03-01 | End: 2021-03-01 | Stop reason: HOSPADM

## 2021-03-01 RX ORDER — LIDOCAINE HYDROCHLORIDE AND EPINEPHRINE 20; 10 MG/ML; UG/ML
INJECTION, SOLUTION INFILTRATION; PERINEURAL
Status: DISCONTINUED | OUTPATIENT
Start: 2021-03-01 | End: 2021-03-01 | Stop reason: HOSPADM

## 2021-03-01 RX ORDER — PHENYLEPHRINE HYDROCHLORIDE 10 MG/ML
INJECTION INTRAVENOUS
Status: DISCONTINUED | OUTPATIENT
Start: 2021-03-01 | End: 2021-03-01

## 2021-03-01 RX ORDER — ROCURONIUM BROMIDE 10 MG/ML
INJECTION, SOLUTION INTRAVENOUS
Status: DISCONTINUED | OUTPATIENT
Start: 2021-03-01 | End: 2021-03-01

## 2021-03-01 RX ORDER — PROPOFOL 10 MG/ML
VIAL (ML) INTRAVENOUS CONTINUOUS PRN
Status: DISCONTINUED | OUTPATIENT
Start: 2021-03-01 | End: 2021-03-01

## 2021-03-01 RX ORDER — ACETAMINOPHEN 325 MG/1
650 TABLET ORAL EVERY 6 HOURS PRN
Status: DISCONTINUED | OUTPATIENT
Start: 2021-03-01 | End: 2021-03-04 | Stop reason: HOSPADM

## 2021-03-01 RX ORDER — ACETAMINOPHEN 650 MG/1
650 SUPPOSITORY RECTAL EVERY 6 HOURS PRN
Status: DISCONTINUED | OUTPATIENT
Start: 2021-03-01 | End: 2021-03-04 | Stop reason: HOSPADM

## 2021-03-01 RX ORDER — ONDANSETRON 2 MG/ML
4 INJECTION INTRAMUSCULAR; INTRAVENOUS ONCE AS NEEDED
Status: COMPLETED | OUTPATIENT
Start: 2021-03-01 | End: 2021-03-01

## 2021-03-01 RX ORDER — HYDROMORPHONE HYDROCHLORIDE 1 MG/ML
0.2 INJECTION, SOLUTION INTRAMUSCULAR; INTRAVENOUS; SUBCUTANEOUS EVERY 5 MIN PRN
Status: DISCONTINUED | OUTPATIENT
Start: 2021-03-01 | End: 2021-03-01 | Stop reason: HOSPADM

## 2021-03-01 RX ORDER — LIDOCAINE HCL/PF 100 MG/5ML
SYRINGE (ML) INTRAVENOUS
Status: DISCONTINUED | OUTPATIENT
Start: 2021-03-01 | End: 2021-03-01

## 2021-03-01 RX ADMIN — DEXAMETHASONE SODIUM PHOSPHATE 4 MG: 4 INJECTION, SOLUTION INTRAMUSCULAR; INTRAVENOUS at 12:03

## 2021-03-01 RX ADMIN — SUGAMMADEX 200 MG: 100 INJECTION, SOLUTION INTRAVENOUS at 12:03

## 2021-03-01 RX ADMIN — PROPOFOL 50 MG: 10 INJECTION, EMULSION INTRAVENOUS at 07:03

## 2021-03-01 RX ADMIN — ONDANSETRON 4 MG: 2 INJECTION, SOLUTION INTRAMUSCULAR; INTRAVENOUS at 12:03

## 2021-03-01 RX ADMIN — SODIUM CHLORIDE 2 G: 9 INJECTION, SOLUTION INTRAVENOUS at 07:03

## 2021-03-01 RX ADMIN — SODIUM CHLORIDE, SODIUM GLUCONATE, SODIUM ACETATE, POTASSIUM CHLORIDE, MAGNESIUM CHLORIDE, SODIUM PHOSPHATE, DIBASIC, AND POTASSIUM PHOSPHATE: .53; .5; .37; .037; .03; .012; .00082 INJECTION, SOLUTION INTRAVENOUS at 07:03

## 2021-03-01 RX ADMIN — SODIUM CHLORIDE: 0.9 INJECTION, SOLUTION INTRAVENOUS at 07:03

## 2021-03-01 RX ADMIN — MUPIROCIN: 20 OINTMENT TOPICAL at 05:03

## 2021-03-01 RX ADMIN — PHENYLEPHRINE HYDROCHLORIDE 200 MCG: 10 INJECTION INTRAVENOUS at 10:03

## 2021-03-01 RX ADMIN — MUPIROCIN: 20 OINTMENT TOPICAL at 10:03

## 2021-03-01 RX ADMIN — PHENYLEPHRINE HYDROCHLORIDE 200 MCG: 10 INJECTION INTRAVENOUS at 09:03

## 2021-03-01 RX ADMIN — MIDAZOLAM HYDROCHLORIDE 0.5 MG: 1 INJECTION, SOLUTION INTRAMUSCULAR; INTRAVENOUS at 07:03

## 2021-03-01 RX ADMIN — OXYCODONE 5 MG: 5 TABLET ORAL at 05:03

## 2021-03-01 RX ADMIN — ROCURONIUM BROMIDE 10 MG: 10 INJECTION, SOLUTION INTRAVENOUS at 11:03

## 2021-03-01 RX ADMIN — ROCURONIUM BROMIDE 10 MG: 10 INJECTION, SOLUTION INTRAVENOUS at 12:03

## 2021-03-01 RX ADMIN — PHENYLEPHRINE HYDROCHLORIDE 200 MCG: 10 INJECTION INTRAVENOUS at 07:03

## 2021-03-01 RX ADMIN — ESCITALOPRAM OXALATE 10 MG: 10 TABLET ORAL at 03:03

## 2021-03-01 RX ADMIN — LIDOCAINE HYDROCHLORIDE 100 MG: 20 INJECTION, SOLUTION INTRAVENOUS at 07:03

## 2021-03-01 RX ADMIN — ROCURONIUM BROMIDE 50 MG: 10 INJECTION, SOLUTION INTRAVENOUS at 07:03

## 2021-03-01 RX ADMIN — PHENYLEPHRINE HYDROCHLORIDE 200 MCG: 10 INJECTION INTRAVENOUS at 08:03

## 2021-03-01 RX ADMIN — ROCURONIUM BROMIDE 10 MG: 10 INJECTION, SOLUTION INTRAVENOUS at 10:03

## 2021-03-01 RX ADMIN — ESLICARBAZEPINE ACETATE 1200 MG: 400 TABLET ORAL at 03:03

## 2021-03-01 RX ADMIN — NICARDIPINE HYDROCHLORIDE 2.5 MG/HR: 0.2 INJECTION, SOLUTION INTRAVENOUS at 01:03

## 2021-03-01 RX ADMIN — CLOBAZAM 20 MG: 10 TABLET ORAL at 10:03

## 2021-03-01 RX ADMIN — CEFAZOLIN 2 G: 330 INJECTION, POWDER, FOR SOLUTION INTRAMUSCULAR; INTRAVENOUS at 01:03

## 2021-03-01 RX ADMIN — FENTANYL CITRATE 100 MCG: 50 INJECTION, SOLUTION INTRAMUSCULAR; INTRAVENOUS at 07:03

## 2021-03-01 RX ADMIN — GADOBUTROL 10 ML: 604.72 INJECTION INTRAVENOUS at 01:03

## 2021-03-01 RX ADMIN — ZONISAMIDE 300 MG: 100 CAPSULE ORAL at 07:03

## 2021-03-01 RX ADMIN — PROPOFOL 150 MG: 10 INJECTION, EMULSION INTRAVENOUS at 07:03

## 2021-03-01 RX ADMIN — MIDAZOLAM HYDROCHLORIDE 1 MG: 1 INJECTION, SOLUTION INTRAMUSCULAR; INTRAVENOUS at 07:03

## 2021-03-01 RX ADMIN — AMLODIPINE BESYLATE 5 MG: 5 TABLET ORAL at 02:03

## 2021-03-01 RX ADMIN — PROPOFOL 50 MCG/KG/MIN: 10 INJECTION, EMULSION INTRAVENOUS at 08:03

## 2021-03-01 RX ADMIN — CEFAZOLIN 2 G: 330 INJECTION, POWDER, FOR SOLUTION INTRAMUSCULAR; INTRAVENOUS at 10:03

## 2021-03-01 RX ADMIN — ROCURONIUM BROMIDE 20 MG: 10 INJECTION, SOLUTION INTRAVENOUS at 10:03

## 2021-03-01 RX ADMIN — CEFTRIAXONE 2 G: 1 INJECTION, POWDER, FOR SOLUTION INTRAMUSCULAR; INTRAVENOUS at 09:03

## 2021-03-02 ENCOUNTER — PATIENT MESSAGE (OUTPATIENT)
Dept: NEUROLOGY | Facility: CLINIC | Age: 56
End: 2021-03-02

## 2021-03-02 ENCOUNTER — SOCIAL WORK (OUTPATIENT)
Dept: NEUROLOGY | Facility: CLINIC | Age: 56
End: 2021-03-02

## 2021-03-02 LAB
ANION GAP SERPL CALC-SCNC: 10 MMOL/L (ref 8–16)
ANION GAP SERPL CALC-SCNC: 7 MMOL/L (ref 8–16)
BASOPHILS # BLD AUTO: 0.02 K/UL (ref 0–0.2)
BASOPHILS NFR BLD: 0.2 % (ref 0–1.9)
BUN SERPL-MCNC: 12 MG/DL (ref 6–20)
BUN SERPL-MCNC: 12 MG/DL (ref 6–20)
CALCIUM SERPL-MCNC: 8.3 MG/DL (ref 8.7–10.5)
CALCIUM SERPL-MCNC: 8.6 MG/DL (ref 8.7–10.5)
CHLORIDE SERPL-SCNC: 105 MMOL/L (ref 95–110)
CHLORIDE SERPL-SCNC: 106 MMOL/L (ref 95–110)
CO2 SERPL-SCNC: 20 MMOL/L (ref 23–29)
CO2 SERPL-SCNC: 24 MMOL/L (ref 23–29)
CREAT SERPL-MCNC: 0.8 MG/DL (ref 0.5–1.4)
CREAT SERPL-MCNC: 0.8 MG/DL (ref 0.5–1.4)
DIFFERENTIAL METHOD: ABNORMAL
EOSINOPHIL # BLD AUTO: 0 K/UL (ref 0–0.5)
EOSINOPHIL NFR BLD: 0.1 % (ref 0–8)
ERYTHROCYTE [DISTWIDTH] IN BLOOD BY AUTOMATED COUNT: 13.2 % (ref 11.5–14.5)
EST. GFR  (AFRICAN AMERICAN): >60 ML/MIN/1.73 M^2
EST. GFR  (AFRICAN AMERICAN): >60 ML/MIN/1.73 M^2
EST. GFR  (NON AFRICAN AMERICAN): >60 ML/MIN/1.73 M^2
EST. GFR  (NON AFRICAN AMERICAN): >60 ML/MIN/1.73 M^2
GLUCOSE SERPL-MCNC: 100 MG/DL (ref 70–110)
GLUCOSE SERPL-MCNC: 101 MG/DL (ref 70–110)
HCT VFR BLD AUTO: 41.5 % (ref 40–54)
HGB BLD-MCNC: 13.8 G/DL (ref 14–18)
IMM GRANULOCYTES # BLD AUTO: 0.04 K/UL (ref 0–0.04)
IMM GRANULOCYTES NFR BLD AUTO: 0.4 % (ref 0–0.5)
INR PPP: 1 (ref 0.8–1.2)
LYMPHOCYTES # BLD AUTO: 1.4 K/UL (ref 1–4.8)
LYMPHOCYTES NFR BLD: 15.2 % (ref 18–48)
MAGNESIUM SERPL-MCNC: 2 MG/DL (ref 1.6–2.6)
MCH RBC QN AUTO: 30.8 PG (ref 27–31)
MCHC RBC AUTO-ENTMCNC: 33.3 G/DL (ref 32–36)
MCV RBC AUTO: 93 FL (ref 82–98)
MONOCYTES # BLD AUTO: 0.7 K/UL (ref 0.3–1)
MONOCYTES NFR BLD: 8.1 % (ref 4–15)
NEUTROPHILS # BLD AUTO: 6.9 K/UL (ref 1.8–7.7)
NEUTROPHILS NFR BLD: 76 % (ref 38–73)
NRBC BLD-RTO: 0 /100 WBC
OSMOLALITY UR: 376 MOSM/KG (ref 50–1200)
PHOSPHATE SERPL-MCNC: 4.5 MG/DL (ref 2.7–4.5)
PLATELET # BLD AUTO: 276 K/UL (ref 150–350)
PMV BLD AUTO: 10 FL (ref 9.2–12.9)
POTASSIUM SERPL-SCNC: 3.8 MMOL/L (ref 3.5–5.1)
POTASSIUM SERPL-SCNC: 3.8 MMOL/L (ref 3.5–5.1)
PROTHROMBIN TIME: 10.7 SEC (ref 9–12.5)
RBC # BLD AUTO: 4.48 M/UL (ref 4.6–6.2)
SODIUM SERPL-SCNC: 135 MMOL/L (ref 136–145)
SODIUM SERPL-SCNC: 137 MMOL/L (ref 136–145)
WBC # BLD AUTO: 9.13 K/UL (ref 3.9–12.7)

## 2021-03-02 PROCEDURE — 99225 PR SUBSEQUENT OBSERVATION CARE,LEVEL II: CPT | Mod: ,,, | Performed by: PSYCHIATRY & NEUROLOGY

## 2021-03-02 PROCEDURE — 25000003 PHARM REV CODE 250: Performed by: STUDENT IN AN ORGANIZED HEALTH CARE EDUCATION/TRAINING PROGRAM

## 2021-03-02 PROCEDURE — 80048 BASIC METABOLIC PNL TOTAL CA: CPT

## 2021-03-02 PROCEDURE — 99225 PR SUBSEQUENT OBSERVATION CARE,LEVEL II: ICD-10-PCS | Mod: ,,, | Performed by: PSYCHIATRY & NEUROLOGY

## 2021-03-02 PROCEDURE — 84100 ASSAY OF PHOSPHORUS: CPT

## 2021-03-02 PROCEDURE — 83735 ASSAY OF MAGNESIUM: CPT

## 2021-03-02 PROCEDURE — 63600175 PHARM REV CODE 636 W HCPCS: Performed by: STUDENT IN AN ORGANIZED HEALTH CARE EDUCATION/TRAINING PROGRAM

## 2021-03-02 PROCEDURE — 80048 BASIC METABOLIC PNL TOTAL CA: CPT | Mod: 91

## 2021-03-02 PROCEDURE — 85025 COMPLETE CBC W/AUTO DIFF WBC: CPT

## 2021-03-02 PROCEDURE — 85610 PROTHROMBIN TIME: CPT

## 2021-03-02 RX ORDER — AMOXICILLIN 250 MG
1 CAPSULE ORAL DAILY
Status: DISCONTINUED | OUTPATIENT
Start: 2021-03-02 | End: 2021-03-04 | Stop reason: HOSPADM

## 2021-03-02 RX ORDER — HEPARIN SODIUM 5000 [USP'U]/ML
5000 INJECTION, SOLUTION INTRAVENOUS; SUBCUTANEOUS EVERY 8 HOURS
Status: DISCONTINUED | OUTPATIENT
Start: 2021-03-02 | End: 2021-03-04 | Stop reason: HOSPADM

## 2021-03-02 RX ADMIN — HEPARIN SODIUM 5000 UNITS: 5000 INJECTION INTRAVENOUS; SUBCUTANEOUS at 09:03

## 2021-03-02 RX ADMIN — OXYCODONE 5 MG: 5 TABLET ORAL at 12:03

## 2021-03-02 RX ADMIN — OXYCODONE 5 MG: 5 TABLET ORAL at 09:03

## 2021-03-02 RX ADMIN — ESLICARBAZEPINE ACETATE 1200 MG: 400 TABLET ORAL at 10:03

## 2021-03-02 RX ADMIN — MUPIROCIN: 20 OINTMENT TOPICAL at 08:03

## 2021-03-02 RX ADMIN — ESCITALOPRAM OXALATE 10 MG: 10 TABLET ORAL at 08:03

## 2021-03-02 RX ADMIN — POTASSIUM BICARBONATE 50 MEQ: 978 TABLET, EFFERVESCENT ORAL at 06:03

## 2021-03-02 RX ADMIN — HEPARIN SODIUM 5000 UNITS: 5000 INJECTION INTRAVENOUS; SUBCUTANEOUS at 02:03

## 2021-03-02 RX ADMIN — CEFAZOLIN 2 G: 330 INJECTION, POWDER, FOR SOLUTION INTRAMUSCULAR; INTRAVENOUS at 02:03

## 2021-03-02 RX ADMIN — MUPIROCIN: 20 OINTMENT TOPICAL at 09:03

## 2021-03-02 RX ADMIN — ZONISAMIDE 300 MG: 100 CAPSULE ORAL at 08:03

## 2021-03-02 RX ADMIN — DOCUSATE SODIUM 50MG AND SENNOSIDES 8.6MG 1 TABLET: 8.6; 5 TABLET, FILM COATED ORAL at 10:03

## 2021-03-02 RX ADMIN — CLOBAZAM 20 MG: 10 TABLET ORAL at 09:03

## 2021-03-02 RX ADMIN — AMLODIPINE BESYLATE 5 MG: 5 TABLET ORAL at 08:03

## 2021-03-02 RX ADMIN — ACETAMINOPHEN 650 MG: 325 TABLET ORAL at 08:03

## 2021-03-03 LAB
ANION GAP SERPL CALC-SCNC: 9 MMOL/L (ref 8–16)
BASOPHILS # BLD AUTO: 0.06 K/UL (ref 0–0.2)
BASOPHILS NFR BLD: 0.7 % (ref 0–1.9)
BLD PROD TYP BPU: NORMAL
BLD PROD TYP BPU: NORMAL
BLOOD UNIT EXPIRATION DATE: NORMAL
BLOOD UNIT EXPIRATION DATE: NORMAL
BLOOD UNIT TYPE CODE: 5100
BLOOD UNIT TYPE CODE: 5100
BLOOD UNIT TYPE: NORMAL
BLOOD UNIT TYPE: NORMAL
BUN SERPL-MCNC: 18 MG/DL (ref 6–20)
CALCIUM SERPL-MCNC: 8.5 MG/DL (ref 8.7–10.5)
CHLORIDE SERPL-SCNC: 102 MMOL/L (ref 95–110)
CO2 SERPL-SCNC: 23 MMOL/L (ref 23–29)
CODING SYSTEM: NORMAL
CODING SYSTEM: NORMAL
CREAT SERPL-MCNC: 0.8 MG/DL (ref 0.5–1.4)
DIFFERENTIAL METHOD: ABNORMAL
DISPENSE STATUS: NORMAL
DISPENSE STATUS: NORMAL
EOSINOPHIL # BLD AUTO: 0.4 K/UL (ref 0–0.5)
EOSINOPHIL NFR BLD: 4.7 % (ref 0–8)
ERYTHROCYTE [DISTWIDTH] IN BLOOD BY AUTOMATED COUNT: 13.1 % (ref 11.5–14.5)
EST. GFR  (AFRICAN AMERICAN): >60 ML/MIN/1.73 M^2
EST. GFR  (NON AFRICAN AMERICAN): >60 ML/MIN/1.73 M^2
GLUCOSE SERPL-MCNC: 104 MG/DL (ref 70–110)
HCT VFR BLD AUTO: 41.5 % (ref 40–54)
HGB BLD-MCNC: 13.8 G/DL (ref 14–18)
IMM GRANULOCYTES # BLD AUTO: 0.02 K/UL (ref 0–0.04)
IMM GRANULOCYTES NFR BLD AUTO: 0.2 % (ref 0–0.5)
INR PPP: 1 (ref 0.8–1.2)
LYMPHOCYTES # BLD AUTO: 2.2 K/UL (ref 1–4.8)
LYMPHOCYTES NFR BLD: 25.9 % (ref 18–48)
MAGNESIUM SERPL-MCNC: 1.8 MG/DL (ref 1.6–2.6)
MCH RBC QN AUTO: 31 PG (ref 27–31)
MCHC RBC AUTO-ENTMCNC: 33.3 G/DL (ref 32–36)
MCV RBC AUTO: 93 FL (ref 82–98)
MONOCYTES # BLD AUTO: 0.8 K/UL (ref 0.3–1)
MONOCYTES NFR BLD: 10 % (ref 4–15)
NEUTROPHILS # BLD AUTO: 4.9 K/UL (ref 1.8–7.7)
NEUTROPHILS NFR BLD: 58.5 % (ref 38–73)
NRBC BLD-RTO: 0 /100 WBC
NUM UNITS TRANS PACKED RBC: NORMAL
NUM UNITS TRANS PACKED RBC: NORMAL
PHOSPHATE SERPL-MCNC: 3.5 MG/DL (ref 2.7–4.5)
PLATELET # BLD AUTO: 279 K/UL (ref 150–350)
PMV BLD AUTO: 9.9 FL (ref 9.2–12.9)
POTASSIUM SERPL-SCNC: 3.8 MMOL/L (ref 3.5–5.1)
PROTHROMBIN TIME: 10.5 SEC (ref 9–12.5)
RBC # BLD AUTO: 4.45 M/UL (ref 4.6–6.2)
SODIUM SERPL-SCNC: 134 MMOL/L (ref 136–145)
WBC # BLD AUTO: 8.3 K/UL (ref 3.9–12.7)

## 2021-03-03 PROCEDURE — 99024 PR POST-OP FOLLOW-UP VISIT: ICD-10-PCS | Mod: ,,, | Performed by: PHYSICIAN ASSISTANT

## 2021-03-03 PROCEDURE — 25000003 PHARM REV CODE 250: Performed by: STUDENT IN AN ORGANIZED HEALTH CARE EDUCATION/TRAINING PROGRAM

## 2021-03-03 PROCEDURE — 97530 THERAPEUTIC ACTIVITIES: CPT

## 2021-03-03 PROCEDURE — 25000003 PHARM REV CODE 250: Performed by: PHYSICIAN ASSISTANT

## 2021-03-03 PROCEDURE — 85025 COMPLETE CBC W/AUTO DIFF WBC: CPT

## 2021-03-03 PROCEDURE — 97130 THER IVNTJ EA ADDL 15 MIN: CPT

## 2021-03-03 PROCEDURE — 97162 PT EVAL MOD COMPLEX 30 MIN: CPT

## 2021-03-03 PROCEDURE — 99225 PR SUBSEQUENT OBSERVATION CARE,LEVEL II: ICD-10-PCS | Mod: ,,, | Performed by: PSYCHIATRY & NEUROLOGY

## 2021-03-03 PROCEDURE — 99225 PR SUBSEQUENT OBSERVATION CARE,LEVEL II: CPT | Mod: ,,, | Performed by: PSYCHIATRY & NEUROLOGY

## 2021-03-03 PROCEDURE — 85610 PROTHROMBIN TIME: CPT

## 2021-03-03 PROCEDURE — 97535 SELF CARE MNGMENT TRAINING: CPT

## 2021-03-03 PROCEDURE — 63600175 PHARM REV CODE 636 W HCPCS: Performed by: PHYSICIAN ASSISTANT

## 2021-03-03 PROCEDURE — 99024 POSTOP FOLLOW-UP VISIT: CPT | Mod: ,,, | Performed by: PHYSICIAN ASSISTANT

## 2021-03-03 PROCEDURE — 84100 ASSAY OF PHOSPHORUS: CPT

## 2021-03-03 PROCEDURE — 63600175 PHARM REV CODE 636 W HCPCS: Performed by: STUDENT IN AN ORGANIZED HEALTH CARE EDUCATION/TRAINING PROGRAM

## 2021-03-03 PROCEDURE — 36415 COLL VENOUS BLD VENIPUNCTURE: CPT

## 2021-03-03 PROCEDURE — 97116 GAIT TRAINING THERAPY: CPT

## 2021-03-03 PROCEDURE — 80048 BASIC METABOLIC PNL TOTAL CA: CPT

## 2021-03-03 PROCEDURE — 83735 ASSAY OF MAGNESIUM: CPT

## 2021-03-03 PROCEDURE — 92523 SPEECH SOUND LANG COMPREHEN: CPT

## 2021-03-03 PROCEDURE — 97165 OT EVAL LOW COMPLEX 30 MIN: CPT

## 2021-03-03 RX ORDER — DEXAMETHASONE SODIUM PHOSPHATE 4 MG/ML
4 INJECTION, SOLUTION INTRA-ARTICULAR; INTRALESIONAL; INTRAMUSCULAR; INTRAVENOUS; SOFT TISSUE EVERY 6 HOURS
Status: DISCONTINUED | OUTPATIENT
Start: 2021-03-04 | End: 2021-03-04 | Stop reason: HOSPADM

## 2021-03-03 RX ORDER — DEXAMETHASONE SODIUM PHOSPHATE 4 MG/ML
10 INJECTION, SOLUTION INTRA-ARTICULAR; INTRALESIONAL; INTRAMUSCULAR; INTRAVENOUS; SOFT TISSUE ONCE
Status: COMPLETED | OUTPATIENT
Start: 2021-03-03 | End: 2021-03-03

## 2021-03-03 RX ORDER — CEPHALEXIN 500 MG/1
500 CAPSULE ORAL EVERY 6 HOURS
Qty: 20 CAPSULE | Refills: 0 | Status: SHIPPED | OUTPATIENT
Start: 2021-03-03 | End: 2021-03-08

## 2021-03-03 RX ORDER — HYDROMORPHONE HYDROCHLORIDE 1 MG/ML
0.5 INJECTION, SOLUTION INTRAMUSCULAR; INTRAVENOUS; SUBCUTANEOUS
Status: DISCONTINUED | OUTPATIENT
Start: 2021-03-03 | End: 2021-03-04 | Stop reason: HOSPADM

## 2021-03-03 RX ORDER — OXYCODONE HYDROCHLORIDE 5 MG/1
5 TABLET ORAL EVERY 6 HOURS PRN
Qty: 30 TABLET | Refills: 0 | Status: SHIPPED | OUTPATIENT
Start: 2021-03-03 | End: 2023-02-03

## 2021-03-03 RX ADMIN — MUPIROCIN: 20 OINTMENT TOPICAL at 08:03

## 2021-03-03 RX ADMIN — ONDANSETRON 8 MG: 8 TABLET, ORALLY DISINTEGRATING ORAL at 10:03

## 2021-03-03 RX ADMIN — DEXAMETHASONE SODIUM PHOSPHATE 4 MG: 4 INJECTION INTRA-ARTICULAR; INTRALESIONAL; INTRAMUSCULAR; INTRAVENOUS; SOFT TISSUE at 11:03

## 2021-03-03 RX ADMIN — ESCITALOPRAM OXALATE 10 MG: 10 TABLET ORAL at 08:03

## 2021-03-03 RX ADMIN — HEPARIN SODIUM 5000 UNITS: 5000 INJECTION INTRAVENOUS; SUBCUTANEOUS at 05:03

## 2021-03-03 RX ADMIN — HEPARIN SODIUM 5000 UNITS: 5000 INJECTION INTRAVENOUS; SUBCUTANEOUS at 08:03

## 2021-03-03 RX ADMIN — OXYCODONE 5 MG: 5 TABLET ORAL at 08:03

## 2021-03-03 RX ADMIN — ESLICARBAZEPINE ACETATE 1200 MG: 400 TABLET ORAL at 08:03

## 2021-03-03 RX ADMIN — OXYCODONE 5 MG: 5 TABLET ORAL at 01:03

## 2021-03-03 RX ADMIN — DOCUSATE SODIUM 50MG AND SENNOSIDES 8.6MG 1 TABLET: 8.6; 5 TABLET, FILM COATED ORAL at 08:03

## 2021-03-03 RX ADMIN — HEPARIN SODIUM 5000 UNITS: 5000 INJECTION INTRAVENOUS; SUBCUTANEOUS at 01:03

## 2021-03-03 RX ADMIN — OXYCODONE 5 MG: 5 TABLET ORAL at 02:03

## 2021-03-03 RX ADMIN — CLOBAZAM 20 MG: 10 TABLET ORAL at 08:03

## 2021-03-03 RX ADMIN — DEXAMETHASONE SODIUM PHOSPHATE 10 MG: 4 INJECTION INTRA-ARTICULAR; INTRALESIONAL; INTRAMUSCULAR; INTRAVENOUS; SOFT TISSUE at 03:03

## 2021-03-03 RX ADMIN — AMLODIPINE BESYLATE 5 MG: 5 TABLET ORAL at 08:03

## 2021-03-03 RX ADMIN — ACETAMINOPHEN 650 MG: 325 TABLET ORAL at 11:03

## 2021-03-03 RX ADMIN — ACETAMINOPHEN 650 MG: 325 TABLET ORAL at 12:03

## 2021-03-03 RX ADMIN — HYDROMORPHONE HYDROCHLORIDE 0.5 MG: 1 INJECTION, SOLUTION INTRAMUSCULAR; INTRAVENOUS; SUBCUTANEOUS at 03:03

## 2021-03-03 RX ADMIN — ZONISAMIDE 300 MG: 100 CAPSULE ORAL at 08:03

## 2021-03-03 RX ADMIN — PROMETHAZINE HYDROCHLORIDE 12.5 MG: 25 INJECTION INTRAMUSCULAR; INTRAVENOUS at 02:03

## 2021-03-04 ENCOUNTER — TELEPHONE (OUTPATIENT)
Dept: NEUROLOGY | Facility: CLINIC | Age: 56
End: 2021-03-04

## 2021-03-04 VITALS
SYSTOLIC BLOOD PRESSURE: 126 MMHG | HEART RATE: 70 BPM | BODY MASS INDEX: 28.83 KG/M2 | WEIGHT: 205.94 LBS | RESPIRATION RATE: 18 BRPM | OXYGEN SATURATION: 96 % | TEMPERATURE: 98 F | HEIGHT: 71 IN | DIASTOLIC BLOOD PRESSURE: 68 MMHG

## 2021-03-04 DIAGNOSIS — E22.2 SIADH (SYNDROME OF INAPPROPRIATE ADH PRODUCTION): Primary | ICD-10-CM

## 2021-03-04 LAB
ANION GAP SERPL CALC-SCNC: 13 MMOL/L (ref 8–16)
BASOPHILS # BLD AUTO: 0.01 K/UL (ref 0–0.2)
BASOPHILS NFR BLD: 0.1 % (ref 0–1.9)
BUN SERPL-MCNC: 14 MG/DL (ref 6–20)
CALCIUM SERPL-MCNC: 9.2 MG/DL (ref 8.7–10.5)
CHLORIDE SERPL-SCNC: 97 MMOL/L (ref 95–110)
CO2 SERPL-SCNC: 20 MMOL/L (ref 23–29)
CREAT SERPL-MCNC: 0.8 MG/DL (ref 0.5–1.4)
DIFFERENTIAL METHOD: ABNORMAL
EOSINOPHIL # BLD AUTO: 0 K/UL (ref 0–0.5)
EOSINOPHIL NFR BLD: 0 % (ref 0–8)
ERYTHROCYTE [DISTWIDTH] IN BLOOD BY AUTOMATED COUNT: 12.6 % (ref 11.5–14.5)
EST. GFR  (AFRICAN AMERICAN): >60 ML/MIN/1.73 M^2
EST. GFR  (NON AFRICAN AMERICAN): >60 ML/MIN/1.73 M^2
GLUCOSE SERPL-MCNC: 127 MG/DL (ref 70–110)
HCT VFR BLD AUTO: 43.7 % (ref 40–54)
HGB BLD-MCNC: 14.8 G/DL (ref 14–18)
IMM GRANULOCYTES # BLD AUTO: 0.04 K/UL (ref 0–0.04)
IMM GRANULOCYTES NFR BLD AUTO: 0.5 % (ref 0–0.5)
INR PPP: 1 (ref 0.8–1.2)
LYMPHOCYTES # BLD AUTO: 1.4 K/UL (ref 1–4.8)
LYMPHOCYTES NFR BLD: 16.4 % (ref 18–48)
MCH RBC QN AUTO: 31.2 PG (ref 27–31)
MCHC RBC AUTO-ENTMCNC: 33.9 G/DL (ref 32–36)
MCV RBC AUTO: 92 FL (ref 82–98)
MONOCYTES # BLD AUTO: 0.7 K/UL (ref 0.3–1)
MONOCYTES NFR BLD: 8.4 % (ref 4–15)
NEUTROPHILS # BLD AUTO: 6.5 K/UL (ref 1.8–7.7)
NEUTROPHILS NFR BLD: 74.6 % (ref 38–73)
NRBC BLD-RTO: 0 /100 WBC
PLATELET # BLD AUTO: 351 K/UL (ref 150–350)
PMV BLD AUTO: 9.8 FL (ref 9.2–12.9)
POTASSIUM SERPL-SCNC: 4.2 MMOL/L (ref 3.5–5.1)
PROTHROMBIN TIME: 10.8 SEC (ref 9–12.5)
RBC # BLD AUTO: 4.75 M/UL (ref 4.6–6.2)
SODIUM SERPL-SCNC: 130 MMOL/L (ref 136–145)
WBC # BLD AUTO: 8.68 K/UL (ref 3.9–12.7)

## 2021-03-04 PROCEDURE — 99024 PR POST-OP FOLLOW-UP VISIT: ICD-10-PCS | Mod: ,,, | Performed by: PHYSICIAN ASSISTANT

## 2021-03-04 PROCEDURE — 25000003 PHARM REV CODE 250: Performed by: STUDENT IN AN ORGANIZED HEALTH CARE EDUCATION/TRAINING PROGRAM

## 2021-03-04 PROCEDURE — 99024 POSTOP FOLLOW-UP VISIT: CPT | Mod: ,,, | Performed by: PHYSICIAN ASSISTANT

## 2021-03-04 PROCEDURE — 85610 PROTHROMBIN TIME: CPT | Performed by: STUDENT IN AN ORGANIZED HEALTH CARE EDUCATION/TRAINING PROGRAM

## 2021-03-04 PROCEDURE — 63600175 PHARM REV CODE 636 W HCPCS: Performed by: PHYSICIAN ASSISTANT

## 2021-03-04 PROCEDURE — 36415 COLL VENOUS BLD VENIPUNCTURE: CPT | Performed by: STUDENT IN AN ORGANIZED HEALTH CARE EDUCATION/TRAINING PROGRAM

## 2021-03-04 PROCEDURE — 99225 PR SUBSEQUENT OBSERVATION CARE,LEVEL II: CPT | Mod: ,,, | Performed by: PSYCHIATRY & NEUROLOGY

## 2021-03-04 PROCEDURE — 63600175 PHARM REV CODE 636 W HCPCS: Performed by: STUDENT IN AN ORGANIZED HEALTH CARE EDUCATION/TRAINING PROGRAM

## 2021-03-04 PROCEDURE — 85025 COMPLETE CBC W/AUTO DIFF WBC: CPT | Performed by: STUDENT IN AN ORGANIZED HEALTH CARE EDUCATION/TRAINING PROGRAM

## 2021-03-04 PROCEDURE — 80048 BASIC METABOLIC PNL TOTAL CA: CPT | Performed by: STUDENT IN AN ORGANIZED HEALTH CARE EDUCATION/TRAINING PROGRAM

## 2021-03-04 PROCEDURE — 99225 PR SUBSEQUENT OBSERVATION CARE,LEVEL II: ICD-10-PCS | Mod: ,,, | Performed by: PSYCHIATRY & NEUROLOGY

## 2021-03-04 RX ORDER — DEXAMETHASONE 2 MG/1
4 TABLET ORAL EVERY 6 HOURS
Qty: 16 TABLET | Refills: 0 | Status: SHIPPED | OUTPATIENT
Start: 2021-03-04 | End: 2021-03-06

## 2021-03-04 RX ADMIN — HEPARIN SODIUM 5000 UNITS: 5000 INJECTION INTRAVENOUS; SUBCUTANEOUS at 05:03

## 2021-03-04 RX ADMIN — ESCITALOPRAM OXALATE 10 MG: 10 TABLET ORAL at 08:03

## 2021-03-04 RX ADMIN — DOCUSATE SODIUM 50MG AND SENNOSIDES 8.6MG 1 TABLET: 8.6; 5 TABLET, FILM COATED ORAL at 08:03

## 2021-03-04 RX ADMIN — ESLICARBAZEPINE ACETATE 1200 MG: 400 TABLET ORAL at 11:03

## 2021-03-04 RX ADMIN — DEXAMETHASONE SODIUM PHOSPHATE 4 MG: 4 INJECTION INTRA-ARTICULAR; INTRALESIONAL; INTRAMUSCULAR; INTRAVENOUS; SOFT TISSUE at 05:03

## 2021-03-04 RX ADMIN — ZONISAMIDE 300 MG: 100 CAPSULE ORAL at 08:03

## 2021-03-04 RX ADMIN — AMLODIPINE BESYLATE 5 MG: 5 TABLET ORAL at 08:03

## 2021-03-04 RX ADMIN — MUPIROCIN: 20 OINTMENT TOPICAL at 08:03

## 2021-03-04 RX ADMIN — OXYCODONE 5 MG: 5 TABLET ORAL at 02:03

## 2021-03-07 ENCOUNTER — PATIENT MESSAGE (OUTPATIENT)
Dept: INTERNAL MEDICINE | Facility: CLINIC | Age: 56
End: 2021-03-07

## 2021-03-09 ENCOUNTER — DOCUMENTATION ONLY (OUTPATIENT)
Dept: INTERNAL MEDICINE | Facility: CLINIC | Age: 56
End: 2021-03-09

## 2021-03-11 ENCOUNTER — PATIENT MESSAGE (OUTPATIENT)
Dept: NEUROSURGERY | Facility: CLINIC | Age: 56
End: 2021-03-11

## 2021-03-12 RX ORDER — DEXAMETHASONE 2 MG/1
TABLET ORAL
Qty: 20 TABLET | Refills: 0 | Status: SHIPPED | OUTPATIENT
Start: 2021-03-12 | End: 2022-02-22

## 2021-03-15 ENCOUNTER — CLINICAL SUPPORT (OUTPATIENT)
Dept: NEUROSURGERY | Facility: CLINIC | Age: 56
End: 2021-03-15
Payer: COMMERCIAL

## 2021-03-15 ENCOUNTER — HOSPITAL ENCOUNTER (OUTPATIENT)
Dept: RADIOLOGY | Facility: HOSPITAL | Age: 56
Discharge: HOME | End: 2021-03-15
Attending: NEUROLOGICAL SURGERY
Payer: COMMERCIAL

## 2021-03-15 VITALS — DIASTOLIC BLOOD PRESSURE: 82 MMHG | HEART RATE: 74 BPM | SYSTOLIC BLOOD PRESSURE: 122 MMHG

## 2021-03-15 DIAGNOSIS — G40.209 COMPLEX PARTIAL SEIZURES EVOLVING TO GENERALIZED TONIC-CLONIC SEIZURES: Primary | ICD-10-CM

## 2021-03-15 DIAGNOSIS — G40.209 COMPLEX PARTIAL SEIZURES EVOLVING TO GENERALIZED TONIC-CLONIC SEIZURES: ICD-10-CM

## 2021-03-15 PROCEDURE — 99999 PR PBB SHADOW E&M-EST. PATIENT-LVL II: CPT | Mod: PBBFAC,,,

## 2021-03-15 PROCEDURE — 99999 PR PBB SHADOW E&M-EST. PATIENT-LVL II: ICD-10-PCS | Mod: PBBFAC,,,

## 2021-03-15 PROCEDURE — 70450 CT HEAD/BRAIN W/O DYE: CPT | Mod: 26,,, | Performed by: RADIOLOGY

## 2021-03-15 PROCEDURE — 70450 CT HEAD WITHOUT CONTRAST: ICD-10-PCS | Mod: 26,,, | Performed by: RADIOLOGY

## 2021-03-15 PROCEDURE — 70450 CT HEAD/BRAIN W/O DYE: CPT | Mod: TC

## 2021-03-22 ENCOUNTER — PATIENT MESSAGE (OUTPATIENT)
Dept: NEUROSURGERY | Facility: CLINIC | Age: 56
End: 2021-03-22

## 2021-03-30 ENCOUNTER — PATIENT MESSAGE (OUTPATIENT)
Dept: NEUROSURGERY | Facility: CLINIC | Age: 56
End: 2021-03-30

## 2021-03-31 ENCOUNTER — PATIENT MESSAGE (OUTPATIENT)
Dept: NEUROLOGY | Facility: CLINIC | Age: 56
End: 2021-03-31

## 2021-04-13 ENCOUNTER — OFFICE VISIT (OUTPATIENT)
Dept: NEUROSURGERY | Facility: CLINIC | Age: 56
End: 2021-04-13
Payer: COMMERCIAL

## 2021-04-13 VITALS
TEMPERATURE: 99 F | HEIGHT: 71 IN | DIASTOLIC BLOOD PRESSURE: 86 MMHG | BODY MASS INDEX: 27.6 KG/M2 | HEART RATE: 74 BPM | WEIGHT: 197.19 LBS | SYSTOLIC BLOOD PRESSURE: 120 MMHG

## 2021-04-13 DIAGNOSIS — G40.209 COMPLEX PARTIAL SEIZURES EVOLVING TO GENERALIZED TONIC-CLONIC SEIZURES: Primary | ICD-10-CM

## 2021-04-13 PROCEDURE — 99999 PR PBB SHADOW E&M-EST. PATIENT-LVL IV: ICD-10-PCS | Mod: PBBFAC,,, | Performed by: NEUROLOGICAL SURGERY

## 2021-04-13 PROCEDURE — 99999 PR PBB SHADOW E&M-EST. PATIENT-LVL IV: CPT | Mod: PBBFAC,,, | Performed by: NEUROLOGICAL SURGERY

## 2021-04-13 PROCEDURE — 3008F BODY MASS INDEX DOCD: CPT | Mod: S$GLB,,, | Performed by: NEUROLOGICAL SURGERY

## 2021-04-13 PROCEDURE — 99024 PR POST-OP FOLLOW-UP VISIT: ICD-10-PCS | Mod: S$GLB,,, | Performed by: NEUROLOGICAL SURGERY

## 2021-04-13 PROCEDURE — 99024 POSTOP FOLLOW-UP VISIT: CPT | Mod: S$GLB,,, | Performed by: NEUROLOGICAL SURGERY

## 2021-04-13 PROCEDURE — 3008F PR BODY MASS INDEX (BMI) DOCUMENTED: ICD-10-PCS | Mod: S$GLB,,, | Performed by: NEUROLOGICAL SURGERY

## 2021-04-19 RX ORDER — CLOBAZAM 20 MG/1
20 TABLET ORAL NIGHTLY
Qty: 30 TABLET | Refills: 5 | Status: CANCELLED | OUTPATIENT
Start: 2021-04-19 | End: 2021-10-16

## 2021-04-19 RX ORDER — ESCITALOPRAM OXALATE 10 MG/1
10 TABLET ORAL DAILY
Qty: 30 TABLET | Refills: 11 | Status: CANCELLED | OUTPATIENT
Start: 2021-04-19 | End: 2022-04-19

## 2021-04-19 RX ORDER — RIZATRIPTAN BENZOATE 10 MG/1
10 TABLET, ORALLY DISINTEGRATING ORAL
Qty: 18 TABLET | Refills: 0 | Status: CANCELLED | OUTPATIENT
Start: 2021-04-19

## 2021-04-19 RX ORDER — ZONISAMIDE 100 MG/1
300 CAPSULE ORAL DAILY
Qty: 90 CAPSULE | Refills: 11 | Status: CANCELLED | OUTPATIENT
Start: 2021-04-19 | End: 2022-04-19

## 2021-05-04 ENCOUNTER — PATIENT MESSAGE (OUTPATIENT)
Dept: NEUROSURGERY | Facility: CLINIC | Age: 56
End: 2021-05-04

## 2021-05-04 ENCOUNTER — PATIENT MESSAGE (OUTPATIENT)
Dept: NEUROLOGY | Facility: CLINIC | Age: 56
End: 2021-05-04

## 2021-05-20 ENCOUNTER — PATIENT MESSAGE (OUTPATIENT)
Dept: NEUROSURGERY | Facility: CLINIC | Age: 56
End: 2021-05-20

## 2021-05-20 ENCOUNTER — PATIENT MESSAGE (OUTPATIENT)
Dept: NEUROLOGY | Facility: CLINIC | Age: 56
End: 2021-05-20

## 2021-06-16 ENCOUNTER — TELEPHONE (OUTPATIENT)
Dept: NEUROLOGY | Facility: CLINIC | Age: 56
End: 2021-06-16

## 2021-06-16 NOTE — TELEPHONE ENCOUNTER
----- Message from Oralia Baker sent at 6/16/2021  4:02 PM CDT -----  Regarding: refills  Contact: Trista (wife) @ [phone]  Caller asking to speak with someone in Dr. Jean's office regarding getting refills on patient's medications, says patient has been seeing Dr. Benoit, but is completely out of his medications that were last requested by Dr. Jean.    Rx Refill/Request     Is this a Refill or New Rx:  refill    Rx Name and Strength:  escitalopram oxalate (LEXAPRO) 10 MG tablet and amLODIPine (NORVASC) 5 MG tablet    Preferred Pharmacy with phone number:     [facility] ([place]) - [place], [place] - [address]  [address] [facility] [place] [address]  Phone: [phone] Fax: [phone]      Communication Preference:Trista (wife) @ [phone]  Additional Information:

## 2021-07-01 ENCOUNTER — PATIENT MESSAGE (OUTPATIENT)
Dept: NEUROSURGERY | Facility: CLINIC | Age: 56
End: 2021-07-01

## 2021-07-02 ENCOUNTER — PATIENT MESSAGE (OUTPATIENT)
Dept: NEUROSURGERY | Facility: CLINIC | Age: 56
End: 2021-07-02

## 2021-07-02 ENCOUNTER — TELEPHONE (OUTPATIENT)
Dept: NEUROSURGERY | Facility: CLINIC | Age: 56
End: 2021-07-02

## 2021-07-02 DIAGNOSIS — G40.209 COMPLEX PARTIAL SEIZURES EVOLVING TO GENERALIZED TONIC-CLONIC SEIZURES: Primary | ICD-10-CM

## 2021-07-03 ENCOUNTER — HOSPITAL ENCOUNTER (OUTPATIENT)
Dept: RADIOLOGY | Facility: HOSPITAL | Age: 56
Discharge: HOME | End: 2021-07-03
Attending: NEUROLOGICAL SURGERY
Payer: COMMERCIAL

## 2021-07-03 DIAGNOSIS — G40.209 COMPLEX PARTIAL SEIZURES EVOLVING TO GENERALIZED TONIC-CLONIC SEIZURES: ICD-10-CM

## 2021-07-03 PROCEDURE — 70450 CT HEAD/BRAIN W/O DYE: CPT | Mod: TC

## 2021-07-03 PROCEDURE — 70450 CT HEAD/BRAIN W/O DYE: CPT | Mod: 26,,, | Performed by: RADIOLOGY

## 2021-07-03 PROCEDURE — 70450 CT HEAD WITHOUT CONTRAST: ICD-10-PCS | Mod: 26,,, | Performed by: RADIOLOGY

## 2021-07-06 ENCOUNTER — OFFICE VISIT (OUTPATIENT)
Dept: NEUROSURGERY | Facility: CLINIC | Age: 56
End: 2021-07-06
Payer: COMMERCIAL

## 2021-07-06 DIAGNOSIS — G40.209 COMPLEX PARTIAL SEIZURES EVOLVING TO GENERALIZED TONIC-CLONIC SEIZURES: Primary | ICD-10-CM

## 2021-07-06 DIAGNOSIS — G40.219 COMPLEX PARTIAL EPILEPSY WITH GENERALIZATION AND WITH INTRACTABLE EPILEPSY: Primary | ICD-10-CM

## 2021-07-06 PROCEDURE — 99215 OFFICE O/P EST HI 40 MIN: CPT | Mod: 95,,, | Performed by: NEUROLOGICAL SURGERY

## 2021-07-06 PROCEDURE — 99215 PR OFFICE/OUTPT VISIT, EST, LEVL V, 40-54 MIN: ICD-10-PCS | Mod: 95,,, | Performed by: NEUROLOGICAL SURGERY

## 2021-07-07 RX ORDER — AMLODIPINE BESYLATE 5 MG/1
5 TABLET ORAL DAILY
Qty: 30 TABLET | Refills: 11 | Status: SHIPPED | OUTPATIENT
Start: 2021-07-07 | End: 2023-02-03

## 2021-07-07 RX ORDER — ESCITALOPRAM OXALATE 10 MG/1
10 TABLET ORAL DAILY
Qty: 30 TABLET | Refills: 11 | Status: ON HOLD | OUTPATIENT
Start: 2021-07-07 | End: 2023-05-04 | Stop reason: HOSPADM

## 2021-07-08 ENCOUNTER — TELEPHONE (OUTPATIENT)
Dept: NEUROLOGY | Facility: CLINIC | Age: 56
End: 2021-07-08

## 2021-07-08 NOTE — TELEPHONE ENCOUNTER
----- Message from Jay Almaraz RN sent at 7/7/2021  9:42 AM CDT -----    ----- Message -----  From: Kaleb Edgar MD  Sent: 7/7/2021   7:19 AM CDT  To: Ruchi Chen MD, Magdalena Cortez MD, #    Hi,  Yes we can make this work.  amol Thompson get in touch with Danae.  Kaleb   ----- Message -----  From: Ruchi Chen MD  Sent: 7/6/2021   3:46 PM CDT  To: Kaleb Edgar MD, Magdalena Cortez MD, #    Hi Kaleb,     Mr. Burleson will see me again July 22. He would like to see you the same day, if possible, since they are coming from 2.5 hours away. Would this be feasible?     Thanks!  Ruchi

## 2021-07-22 ENCOUNTER — PATIENT MESSAGE (OUTPATIENT)
Dept: NEUROLOGY | Facility: CLINIC | Age: 56
End: 2021-07-22

## 2021-07-22 ENCOUNTER — OFFICE VISIT (OUTPATIENT)
Dept: NEUROLOGY | Facility: CLINIC | Age: 56
End: 2021-07-22
Payer: COMMERCIAL

## 2021-07-22 ENCOUNTER — OFFICE VISIT (OUTPATIENT)
Dept: NEUROSURGERY | Facility: CLINIC | Age: 56
End: 2021-07-22
Payer: COMMERCIAL

## 2021-07-22 ENCOUNTER — LAB VISIT (OUTPATIENT)
Dept: LAB | Facility: HOSPITAL | Age: 56
End: 2021-07-22
Attending: PSYCHIATRY & NEUROLOGY
Payer: COMMERCIAL

## 2021-07-22 ENCOUNTER — TELEPHONE (OUTPATIENT)
Dept: NEUROLOGY | Facility: CLINIC | Age: 56
End: 2021-07-22

## 2021-07-22 VITALS — BODY MASS INDEX: 27.3 KG/M2 | TEMPERATURE: 97 F | WEIGHT: 195 LBS | HEIGHT: 71 IN

## 2021-07-22 DIAGNOSIS — G40.209 COMPLEX PARTIAL SEIZURES EVOLVING TO GENERALIZED TONIC-CLONIC SEIZURES: Primary | ICD-10-CM

## 2021-07-22 DIAGNOSIS — Z79.899 ENCOUNTER FOR MONITORING ANTICONVULSANT THERAPY: ICD-10-CM

## 2021-07-22 DIAGNOSIS — Z51.81 ENCOUNTER FOR MONITORING ANTICONVULSANT THERAPY: Primary | ICD-10-CM

## 2021-07-22 DIAGNOSIS — Z51.81 ENCOUNTER FOR MONITORING ANTICONVULSANT THERAPY: ICD-10-CM

## 2021-07-22 DIAGNOSIS — Z79.899 ENCOUNTER FOR MONITORING ANTICONVULSANT THERAPY: Primary | ICD-10-CM

## 2021-07-22 LAB
TSH SERPL DL<=0.005 MIU/L-ACNC: 1.48 UIU/ML (ref 0.4–4)
VIT B12 SERPL-MCNC: 468 PG/ML (ref 210–950)

## 2021-07-22 PROCEDURE — 3008F PR BODY MASS INDEX (BMI) DOCUMENTED: ICD-10-PCS | Mod: S$GLB,,, | Performed by: NEUROLOGICAL SURGERY

## 2021-07-22 PROCEDURE — 99999 PR PBB SHADOW E&M-EST. PATIENT-LVL II: CPT | Mod: PBBFAC,,, | Performed by: PSYCHIATRY & NEUROLOGY

## 2021-07-22 PROCEDURE — 82607 VITAMIN B-12: CPT | Performed by: PSYCHIATRY & NEUROLOGY

## 2021-07-22 PROCEDURE — 3008F BODY MASS INDEX DOCD: CPT | Mod: S$GLB,,, | Performed by: NEUROLOGICAL SURGERY

## 2021-07-22 PROCEDURE — 99999 PR PBB SHADOW E&M-EST. PATIENT-LVL IV: ICD-10-PCS | Mod: PBBFAC,,, | Performed by: NEUROLOGICAL SURGERY

## 2021-07-22 PROCEDURE — 84425 ASSAY OF VITAMIN B-1: CPT | Performed by: PSYCHIATRY & NEUROLOGY

## 2021-07-22 PROCEDURE — 99214 OFFICE O/P EST MOD 30 MIN: CPT | Mod: S$GLB,,, | Performed by: NEUROLOGICAL SURGERY

## 2021-07-22 PROCEDURE — 36415 COLL VENOUS BLD VENIPUNCTURE: CPT | Performed by: PSYCHIATRY & NEUROLOGY

## 2021-07-22 PROCEDURE — 99999 PR PBB SHADOW E&M-EST. PATIENT-LVL II: ICD-10-PCS | Mod: PBBFAC,,, | Performed by: PSYCHIATRY & NEUROLOGY

## 2021-07-22 PROCEDURE — 80203 DRUG SCREEN QUANT ZONISAMIDE: CPT | Performed by: PSYCHIATRY & NEUROLOGY

## 2021-07-22 PROCEDURE — 99214 PR OFFICE/OUTPT VISIT, EST, LEVL IV, 30-39 MIN: ICD-10-PCS | Mod: S$GLB,,, | Performed by: NEUROLOGICAL SURGERY

## 2021-07-22 PROCEDURE — 99999 PR PBB SHADOW E&M-EST. PATIENT-LVL IV: CPT | Mod: PBBFAC,,, | Performed by: NEUROLOGICAL SURGERY

## 2021-07-22 PROCEDURE — 80299 QUANTITATIVE ASSAY DRUG: CPT | Performed by: PSYCHIATRY & NEUROLOGY

## 2021-07-22 PROCEDURE — 84443 ASSAY THYROID STIM HORMONE: CPT | Performed by: PSYCHIATRY & NEUROLOGY

## 2021-07-22 PROCEDURE — 1125F AMNT PAIN NOTED PAIN PRSNT: CPT | Mod: S$GLB,,, | Performed by: NEUROLOGICAL SURGERY

## 2021-07-22 PROCEDURE — 99215 OFFICE O/P EST HI 40 MIN: CPT | Mod: S$GLB,,, | Performed by: PSYCHIATRY & NEUROLOGY

## 2021-07-22 PROCEDURE — 1125F PR PAIN SEVERITY QUANTIFIED, PAIN PRESENT: ICD-10-PCS | Mod: S$GLB,,, | Performed by: NEUROLOGICAL SURGERY

## 2021-07-22 PROCEDURE — 80339 ANTIEPILEPTICS NOS 1-3: CPT | Performed by: PSYCHIATRY & NEUROLOGY

## 2021-07-22 PROCEDURE — 99215 PR OFFICE/OUTPT VISIT, EST, LEVL V, 40-54 MIN: ICD-10-PCS | Mod: S$GLB,,, | Performed by: PSYCHIATRY & NEUROLOGY

## 2021-07-22 NOTE — TELEPHONE ENCOUNTER
----- Message from Kaleb Edgar MD sent at 7/22/2021  5:03 PM CDT -----  pls schedule him with Yolie in [facility] as a new pt. Thanks

## 2021-07-23 ENCOUNTER — TELEPHONE (OUTPATIENT)
Dept: NEUROLOGY | Facility: CLINIC | Age: 56
End: 2021-07-23

## 2021-07-23 ENCOUNTER — PATIENT MESSAGE (OUTPATIENT)
Dept: NEUROLOGY | Facility: CLINIC | Age: 56
End: 2021-07-23

## 2021-07-24 LAB
CLOBAZAM SERPL-MCNC: 338 NG/ML (ref 30–300)
NORCLOBAZAM SERPL-MCNC: 2670 NG/ML (ref 300–3000)
ZONISAMIDE SERPL-MCNC: 12 MCG/ML (ref 10–40)

## 2021-07-27 LAB — VIT B1 BLD-MCNC: 91 UG/L (ref 38–122)

## 2021-07-29 LAB — ESLICARBAZEPINE: 19.3 MCG/ML

## 2021-08-04 DIAGNOSIS — G40.812 LENNOX-GASTAUT SYNDROME WITH TONIC SEIZURES: ICD-10-CM

## 2021-08-04 DIAGNOSIS — G40.209 COMPLEX PARTIAL SEIZURES EVOLVING TO GENERALIZED TONIC-CLONIC SEIZURES: Primary | ICD-10-CM

## 2021-08-04 NOTE — TELEPHONE ENCOUNTER
----- Message from Estephania Nguyen sent at 8/4/2021  4:32 PM CDT -----  Regarding: ADVISE  Contact: Wife - Roxanna  Rx Refill/Request     Is this a Refill or New Rx:  REFILL    Rx Name and Strength:  APTIOM 800MG                                         APTIOM 400MG    Preferred Pharmacy with phone number: [facility] ([place] - [place], [place] - [address]   Phone: [phone]  Fax:  [phone]        Communication Preference:  [phone]    Additional Information: Wife Roxanna stated if there's any other medication that need a refill to please send to the pharmacy Wife ask for a call

## 2021-08-05 NOTE — TELEPHONE ENCOUNTER
Spoke with patient's wife, Trista, and she confirmed that the aptiom must be 800s and 400s because this is what is available at the pharmacy. She says the Rx bottle says

## 2021-08-06 ENCOUNTER — TELEPHONE (OUTPATIENT)
Dept: NEUROLOGY | Facility: CLINIC | Age: 56
End: 2021-08-06

## 2021-08-06 ENCOUNTER — PATIENT MESSAGE (OUTPATIENT)
Dept: NEUROLOGY | Facility: CLINIC | Age: 56
End: 2021-08-06

## 2021-08-06 RX ORDER — CLOBAZAM 20 MG/1
20 TABLET ORAL NIGHTLY
Qty: 30 TABLET | Refills: 5 | Status: SHIPPED | OUTPATIENT
Start: 2021-08-06 | End: 2022-02-10

## 2021-08-07 ENCOUNTER — PATIENT MESSAGE (OUTPATIENT)
Dept: NEUROSURGERY | Facility: CLINIC | Age: 56
End: 2021-08-07

## 2021-08-07 ENCOUNTER — PATIENT MESSAGE (OUTPATIENT)
Dept: NEUROLOGY | Facility: CLINIC | Age: 56
End: 2021-08-07

## 2021-08-08 ENCOUNTER — PATIENT MESSAGE (OUTPATIENT)
Dept: NEUROLOGY | Facility: CLINIC | Age: 56
End: 2021-08-08

## 2021-08-08 ENCOUNTER — PATIENT MESSAGE (OUTPATIENT)
Dept: NEUROSURGERY | Facility: CLINIC | Age: 56
End: 2021-08-08

## 2021-08-12 ENCOUNTER — TELEPHONE (OUTPATIENT)
Dept: NEUROLOGY | Facility: CLINIC | Age: 56
End: 2021-08-12

## 2021-08-12 ENCOUNTER — TELEPHONE (OUTPATIENT)
Dept: OPHTHALMOLOGY | Facility: CLINIC | Age: 56
End: 2021-08-12

## 2021-08-12 DIAGNOSIS — H53.15 VISUAL DISTORTIONS OF SHAPE AND SIZE: Primary | ICD-10-CM

## 2021-08-17 ENCOUNTER — PATIENT MESSAGE (OUTPATIENT)
Dept: NEUROLOGY | Facility: CLINIC | Age: 56
End: 2021-08-17

## 2021-09-03 ENCOUNTER — PATIENT MESSAGE (OUTPATIENT)
Dept: NEUROSURGERY | Facility: CLINIC | Age: 56
End: 2021-09-03

## 2021-09-04 ENCOUNTER — PATIENT MESSAGE (OUTPATIENT)
Dept: NEUROLOGY | Facility: CLINIC | Age: 56
End: 2021-09-04

## 2021-09-30 ENCOUNTER — OFFICE VISIT (OUTPATIENT)
Dept: NEUROLOGY | Facility: CLINIC | Age: 56
End: 2021-09-30
Payer: COMMERCIAL

## 2021-09-30 ENCOUNTER — OFFICE VISIT (OUTPATIENT)
Dept: NEUROSURGERY | Facility: CLINIC | Age: 56
End: 2021-09-30
Payer: COMMERCIAL

## 2021-09-30 ENCOUNTER — SOCIAL WORK (OUTPATIENT)
Dept: NEUROLOGY | Facility: CLINIC | Age: 56
End: 2021-09-30

## 2021-09-30 VITALS
BODY MASS INDEX: 26.35 KG/M2 | HEART RATE: 54 BPM | HEIGHT: 71 IN | SYSTOLIC BLOOD PRESSURE: 129 MMHG | WEIGHT: 188.25 LBS | DIASTOLIC BLOOD PRESSURE: 85 MMHG

## 2021-09-30 VITALS
WEIGHT: 188.94 LBS | SYSTOLIC BLOOD PRESSURE: 119 MMHG | HEIGHT: 71 IN | BODY MASS INDEX: 26.45 KG/M2 | DIASTOLIC BLOOD PRESSURE: 78 MMHG | TEMPERATURE: 98 F | HEART RATE: 58 BPM

## 2021-09-30 DIAGNOSIS — R56.9 SEIZURE: ICD-10-CM

## 2021-09-30 DIAGNOSIS — G40.209 COMPLEX PARTIAL SEIZURES EVOLVING TO GENERALIZED TONIC-CLONIC SEIZURES: Primary | ICD-10-CM

## 2021-09-30 DIAGNOSIS — G43.009 MIGRAINE WITHOUT AURA AND WITHOUT STATUS MIGRAINOSUS, NOT INTRACTABLE: ICD-10-CM

## 2021-09-30 DIAGNOSIS — G43.009 MIGRAINE WITHOUT AURA AND WITHOUT STATUS MIGRAINOSUS, NOT INTRACTABLE: Primary | ICD-10-CM

## 2021-09-30 PROCEDURE — 3008F BODY MASS INDEX DOCD: CPT | Mod: S$GLB,,, | Performed by: PSYCHIATRY & NEUROLOGY

## 2021-09-30 PROCEDURE — 99999 PR PBB SHADOW E&M-EST. PATIENT-LVL IV: ICD-10-PCS | Mod: PBBFAC,,, | Performed by: NEUROLOGICAL SURGERY

## 2021-09-30 PROCEDURE — 99214 PR OFFICE/OUTPT VISIT, EST, LEVL IV, 30-39 MIN: ICD-10-PCS | Mod: S$GLB,,, | Performed by: PSYCHIATRY & NEUROLOGY

## 2021-09-30 PROCEDURE — 3074F SYST BP LT 130 MM HG: CPT | Mod: S$GLB,,, | Performed by: PSYCHIATRY & NEUROLOGY

## 2021-09-30 PROCEDURE — 99214 PR OFFICE/OUTPT VISIT, EST, LEVL IV, 30-39 MIN: ICD-10-PCS | Mod: S$GLB,,, | Performed by: NEUROLOGICAL SURGERY

## 2021-09-30 PROCEDURE — 99999 PR PBB SHADOW E&M-EST. PATIENT-LVL III: CPT | Mod: PBBFAC,,, | Performed by: PSYCHIATRY & NEUROLOGY

## 2021-09-30 PROCEDURE — 3074F PR MOST RECENT SYSTOLIC BLOOD PRESSURE < 130 MM HG: ICD-10-PCS | Mod: S$GLB,,, | Performed by: PSYCHIATRY & NEUROLOGY

## 2021-09-30 PROCEDURE — 1160F PR REVIEW ALL MEDS BY PRESCRIBER/CLIN PHARMACIST DOCUMENTED: ICD-10-PCS | Mod: S$GLB,,, | Performed by: PSYCHIATRY & NEUROLOGY

## 2021-09-30 PROCEDURE — 3074F PR MOST RECENT SYSTOLIC BLOOD PRESSURE < 130 MM HG: ICD-10-PCS | Mod: S$GLB,,, | Performed by: NEUROLOGICAL SURGERY

## 2021-09-30 PROCEDURE — 1159F PR MEDICATION LIST DOCUMENTED IN MEDICAL RECORD: ICD-10-PCS | Mod: S$GLB,,, | Performed by: PSYCHIATRY & NEUROLOGY

## 2021-09-30 PROCEDURE — 3079F PR MOST RECENT DIASTOLIC BLOOD PRESSURE 80-89 MM HG: ICD-10-PCS | Mod: S$GLB,,, | Performed by: PSYCHIATRY & NEUROLOGY

## 2021-09-30 PROCEDURE — 99999 PR PBB SHADOW E&M-EST. PATIENT-LVL IV: CPT | Mod: PBBFAC,,, | Performed by: NEUROLOGICAL SURGERY

## 2021-09-30 PROCEDURE — 3008F BODY MASS INDEX DOCD: CPT | Mod: S$GLB,,, | Performed by: NEUROLOGICAL SURGERY

## 2021-09-30 PROCEDURE — 3078F PR MOST RECENT DIASTOLIC BLOOD PRESSURE < 80 MM HG: ICD-10-PCS | Mod: S$GLB,,, | Performed by: NEUROLOGICAL SURGERY

## 2021-09-30 PROCEDURE — 3079F DIAST BP 80-89 MM HG: CPT | Mod: S$GLB,,, | Performed by: PSYCHIATRY & NEUROLOGY

## 2021-09-30 PROCEDURE — 3008F PR BODY MASS INDEX (BMI) DOCUMENTED: ICD-10-PCS | Mod: S$GLB,,, | Performed by: PSYCHIATRY & NEUROLOGY

## 2021-09-30 PROCEDURE — 3008F PR BODY MASS INDEX (BMI) DOCUMENTED: ICD-10-PCS | Mod: S$GLB,,, | Performed by: NEUROLOGICAL SURGERY

## 2021-09-30 PROCEDURE — 1159F MED LIST DOCD IN RCRD: CPT | Mod: S$GLB,,, | Performed by: PSYCHIATRY & NEUROLOGY

## 2021-09-30 PROCEDURE — 1160F RVW MEDS BY RX/DR IN RCRD: CPT | Mod: S$GLB,,, | Performed by: PSYCHIATRY & NEUROLOGY

## 2021-09-30 PROCEDURE — 99214 OFFICE O/P EST MOD 30 MIN: CPT | Mod: S$GLB,,, | Performed by: PSYCHIATRY & NEUROLOGY

## 2021-09-30 PROCEDURE — 99214 OFFICE O/P EST MOD 30 MIN: CPT | Mod: S$GLB,,, | Performed by: NEUROLOGICAL SURGERY

## 2021-09-30 PROCEDURE — 3074F SYST BP LT 130 MM HG: CPT | Mod: S$GLB,,, | Performed by: NEUROLOGICAL SURGERY

## 2021-09-30 PROCEDURE — 99999 PR PBB SHADOW E&M-EST. PATIENT-LVL III: ICD-10-PCS | Mod: PBBFAC,,, | Performed by: PSYCHIATRY & NEUROLOGY

## 2021-09-30 PROCEDURE — 3078F DIAST BP <80 MM HG: CPT | Mod: S$GLB,,, | Performed by: NEUROLOGICAL SURGERY

## 2021-09-30 RX ORDER — RIMEGEPANT SULFATE 75 MG/75MG
75 TABLET, ORALLY DISINTEGRATING ORAL ONCE AS NEEDED
Qty: 9 TABLET | Refills: 12 | Status: SHIPPED | OUTPATIENT
Start: 2021-09-30 | End: 2021-09-30 | Stop reason: SDUPTHER

## 2021-10-01 ENCOUNTER — OFFICE VISIT (OUTPATIENT)
Dept: OPHTHALMOLOGY | Facility: CLINIC | Age: 56
End: 2021-10-01
Payer: COMMERCIAL

## 2021-10-01 ENCOUNTER — CLINICAL SUPPORT (OUTPATIENT)
Dept: OPHTHALMOLOGY | Facility: CLINIC | Age: 56
End: 2021-10-01
Payer: COMMERCIAL

## 2021-10-01 DIAGNOSIS — H53.451 NASAL STEP VISUAL FIELD DEFECT OF RIGHT EYE: ICD-10-CM

## 2021-10-01 DIAGNOSIS — G40.209 COMPLEX PARTIAL SEIZURES EVOLVING TO GENERALIZED TONIC-CLONIC SEIZURES: Primary | ICD-10-CM

## 2021-10-01 PROCEDURE — 99999 PR PBB SHADOW E&M-EST. PATIENT-LVL III: CPT | Mod: PBBFAC,,, | Performed by: OPHTHALMOLOGY

## 2021-10-01 PROCEDURE — 99213 OFFICE O/P EST LOW 20 MIN: CPT | Mod: S$GLB,,, | Performed by: OPHTHALMOLOGY

## 2021-10-01 PROCEDURE — 1159F MED LIST DOCD IN RCRD: CPT | Mod: S$GLB,,, | Performed by: OPHTHALMOLOGY

## 2021-10-01 PROCEDURE — 99213 PR OFFICE/OUTPT VISIT, EST, LEVL III, 20-29 MIN: ICD-10-PCS | Mod: S$GLB,,, | Performed by: OPHTHALMOLOGY

## 2021-10-01 PROCEDURE — 1160F RVW MEDS BY RX/DR IN RCRD: CPT | Mod: S$GLB,,, | Performed by: OPHTHALMOLOGY

## 2021-10-01 PROCEDURE — 1159F PR MEDICATION LIST DOCUMENTED IN MEDICAL RECORD: ICD-10-PCS | Mod: S$GLB,,, | Performed by: OPHTHALMOLOGY

## 2021-10-01 PROCEDURE — 99999 PR PBB SHADOW E&M-EST. PATIENT-LVL III: ICD-10-PCS | Mod: PBBFAC,,, | Performed by: OPHTHALMOLOGY

## 2021-10-01 PROCEDURE — 1160F PR REVIEW ALL MEDS BY PRESCRIBER/CLIN PHARMACIST DOCUMENTED: ICD-10-PCS | Mod: S$GLB,,, | Performed by: OPHTHALMOLOGY

## 2021-10-03 RX ORDER — RIMEGEPANT SULFATE 75 MG/75MG
75 TABLET, ORALLY DISINTEGRATING ORAL ONCE AS NEEDED
Qty: 9 TABLET | Refills: 12 | Status: SHIPPED | OUTPATIENT
Start: 2021-10-03 | End: 2021-10-03

## 2021-10-12 ENCOUNTER — HOSPITAL ENCOUNTER (OUTPATIENT)
Dept: NEUROLOGY | Facility: CLINIC | Age: 56
Discharge: HOME | End: 2021-10-12
Payer: COMMERCIAL

## 2021-10-12 DIAGNOSIS — R56.9 SEIZURE: ICD-10-CM

## 2021-10-12 PROCEDURE — 95719 EEG PHYS/QHP EA INCR W/O VID: CPT | Mod: S$GLB,,, | Performed by: PSYCHIATRY & NEUROLOGY

## 2021-10-12 PROCEDURE — 95719 PR EEG, W/O VIDEO, CONT RECORD, I&R, >12<26 HRS: ICD-10-PCS | Mod: S$GLB,,, | Performed by: PSYCHIATRY & NEUROLOGY

## 2021-10-13 ENCOUNTER — HOSPITAL ENCOUNTER (OUTPATIENT)
Dept: NEUROLOGY | Facility: CLINIC | Age: 56
Discharge: HOME | End: 2021-10-13
Payer: COMMERCIAL

## 2021-10-23 ENCOUNTER — HOSPITAL ENCOUNTER (EMERGENCY)
Facility: HOSPITAL | Age: 56
Discharge: HOME | End: 2021-10-23
Attending: FAMILY MEDICINE
Payer: COMMERCIAL

## 2021-10-23 ENCOUNTER — PATIENT MESSAGE (OUTPATIENT)
Dept: NEUROLOGY | Facility: CLINIC | Age: 56
End: 2021-10-23
Payer: COMMERCIAL

## 2021-10-23 VITALS
TEMPERATURE: 98 F | HEIGHT: 71 IN | HEART RATE: 60 BPM | WEIGHT: 188 LBS | DIASTOLIC BLOOD PRESSURE: 91 MMHG | RESPIRATION RATE: 18 BRPM | SYSTOLIC BLOOD PRESSURE: 134 MMHG | BODY MASS INDEX: 26.32 KG/M2 | OXYGEN SATURATION: 100 %

## 2021-10-23 DIAGNOSIS — R07.9 CHEST PAIN: ICD-10-CM

## 2021-10-23 DIAGNOSIS — R52 PAIN: ICD-10-CM

## 2021-10-23 DIAGNOSIS — S06.0X0A CONCUSSION WITHOUT LOSS OF CONSCIOUSNESS, INITIAL ENCOUNTER: ICD-10-CM

## 2021-10-23 DIAGNOSIS — M25.579 ANKLE PAIN: ICD-10-CM

## 2021-10-23 DIAGNOSIS — S93.409A SPRAIN OF ANKLE, UNSPECIFIED LATERALITY, UNSPECIFIED LIGAMENT, INITIAL ENCOUNTER: ICD-10-CM
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
## 2021-10-23 PROCEDURE — 93005 ELECTROCARDIOGRAM TRACING: CPT

## 2021-10-23 PROCEDURE — 25000003 PHARM REV CODE 250: Performed by: FAMILY MEDICINE

## 2021-10-23 PROCEDURE — 99284 EMERGENCY DEPT VISIT MOD MDM: CPT | Mod: 25

## 2021-10-23 PROCEDURE — 73610 X-RAY EXAM OF ANKLE: CPT | Mod: TC,FY,RT

## 2021-10-23 PROCEDURE — 73610 XR ANKLE COMPLETE 3 VIEW RIGHT: ICD-10-PCS | Mod: 26,RT,, | Performed by: RADIOLOGY

## 2021-10-23 PROCEDURE — 73610 X-RAY EXAM OF ANKLE: CPT | Mod: 26,RT,, | Performed by: RADIOLOGY

## 2021-10-23 PROCEDURE — 70450 CT HEAD/BRAIN W/O DYE: CPT | Mod: 26,,, | Performed by: RADIOLOGY

## 2021-10-23 PROCEDURE — 70450 CT HEAD/BRAIN W/O DYE: CPT | Mod: TC

## 2021-10-23 PROCEDURE — 70450 CT HEAD WITHOUT CONTRAST: ICD-10-PCS | Mod: 26,,, | Performed by: RADIOLOGY

## 2021-10-23 RX ORDER — HYDROCODONE BITARTRATE AND ACETAMINOPHEN 5; 325 MG/1; MG/1
1 TABLET ORAL
Status: COMPLETED | OUTPATIENT
Start: 2021-10-23 | End: 2021-10-23

## 2021-10-23 RX ADMIN — HYDROCODONE BITARTRATE AND ACETAMINOPHEN 1 TABLET: 5; 325 TABLET ORAL at 06:10

## 2021-10-23 NOTE — ED NOTES
Pt arrived to ED s/p fall at 1100 - pt reports hitting his head and twisting his right ankle. Right ankle is swollen. Pt denies LOC - reports one episode of emesis at home followed by dizziness. Wife reports pt was slower to respond at home after episode of emesis. Pt also reports 5/10 CP after emesis with no radiation or cardiac hx. Pt is aaox3, NAD noted.

## 2021-10-23 NOTE — ED PROVIDER NOTES
Encounter Date: 10/23/2021       History     Chief Complaint   Patient presents with    Fall    Head Injury    Ankle Pain     Right     Chest Pain     Was working yard and fell, twisting ankle and hitting head on ground. Happened about 6 hours prior to arrival. After a few hours, became nauseated and vomited. Has been unsteady since that time. Wife states he was disoriented a few minutes after he vomited.  States had brain surgery in March for epilepsy. Has not had a seizure since surgery and has done well.     Also, complains of mid sternal chest pain. States pain is 6/10 currently. Pain does not radiate        Review of patient's allergies indicates:   Allergen Reactions    Losartan Rash     Past Medical History:   Diagnosis Date    Anxiety     Depression     GERD (gastroesophageal reflux disease)     Hyperlipidemia     Hypertension     Migraine     Seizures      Past Surgical History:   Procedure Laterality Date    CYST REMOVAL      skin cyst - benign    REMOVAL OF STEREO EEG LEADS (DEPTH ELECTRODES) Bilateral 2021    Procedure: REMOVAL OF STEREO EEG LEADS (DEPTH ELECTRODES);  Surgeon: Ruchi Chen MD;  Location: [facility] OR [address] [place] [address];  Service: Neurosurgery;  Laterality: Bilateral;    TONSILLECTOMY      teenage      Family History   Problem Relation Age of Onset    Heart disease Maternal Uncle 50    Migraines Paternal Grandfather      Social History     Tobacco Use    Smoking status: Former Smoker     Quit date:      Years since quittin.8    Smokeless tobacco: Never Used   Substance Use Topics    Alcohol use: Yes     Comment: one drink once a week    Drug use: Never     Review of Systems   Constitutional: Negative for fatigue and fever.   HENT: Negative for congestion and sore throat.    Respiratory: Positive for shortness of breath. Negative for cough, choking and wheezing.    Cardiovascular: Positive for chest pain. Negative for palpitations.   Gastrointestinal:  Positive for nausea and vomiting. Negative for diarrhea.   Musculoskeletal:        Right ankle pain     Neurological: Positive for dizziness, light-headedness and headaches. Negative for weakness.   Hematological: Negative for adenopathy.       Physical Exam     Initial Vitals [10/23/21 1656]   BP Pulse Resp Temp SpO2   (!) 134/91 60 18 97.9 °F (36.6 °C) 100 %      MAP       --         Physical Exam    Nursing note and vitals reviewed.  Constitutional: He appears well-developed and well-nourished. He is not diaphoretic. No distress.   HENT:   Head: Normocephalic and atraumatic.   Right Ear: External ear normal.   Left Ear: External ear normal.   Nose: Nose normal.   Mouth/Throat: Oropharynx is clear and moist. No oropharyngeal exudate.   Eyes: EOM are normal.   Neck: Neck supple. No tracheal deviation present.   Normal range of motion.  Cardiovascular: Normal rate and regular rhythm.   No murmur heard.  Pulmonary/Chest: Breath sounds normal. No stridor. No respiratory distress. He has no rales.   Abdominal: Abdomen is soft. He exhibits no distension and no mass. There is no abdominal tenderness. There is no rebound.   Musculoskeletal:         General: Tenderness present. No edema. Normal range of motion.      Cervical back: Normal range of motion and neck supple.      Comments: Positive tenderness minimal swelling to the right ankle     Lymphadenopathy:     He has no cervical adenopathy.   Neurological: He is alert and oriented to person, place, and time. He has normal strength.   Skin: Skin is warm and dry. Capillary refill takes less than 2 seconds. No pallor.   Psychiatric: He has a normal mood and affect.         ED Course   Procedures  Labs Reviewed - No data to display  EKG Readings: (Independently Interpreted)   Initial Reading: No STEMI. Rhythm: Normal Sinus Rhythm. Heart Rate: 62. Ectopy: No Ectopy. Conduction: Normal. ST Segments: Normal ST Segments. T Waves: Normal.       Imaging Results    None           Medications - No data to display              ED Course as of 10/23/21 1830   Sat Oct 23, 2021   1714 Discussed with Dr Dai [NP]   1814 I applied compression wrap to the right ankle [WK]      ED Course User Index  [NP] Mabel Lang, FNP  [WK] Edgar Dai MD             Clinical Impression:   Final diagnoses:  [M25.579] Ankle pain  [R07.9] Chest pain                 Edgar Dai MD  10/23/21 1837       Edgar Dai MD  10/23/21 1841       Edgar Dai MD  11/17/21 0519

## 2021-10-23 NOTE — ED NOTES
Pt states that he was outside working pt states that he tripped on a large tree root. Pt states that he fell, hitting the back of his head. Pt states that he did vomit one time after the incident pt states that he is also having pain in his right ankle. Pt states that he is having pain in his head. Pt states that he is also having pain in his chest area that is a constant pain. Pt states that he twisted his right ankle while walking. Pt is awake alert and answering questions at this time

## 2021-10-25 ENCOUNTER — PATIENT MESSAGE (OUTPATIENT)
Dept: NEUROLOGY | Facility: CLINIC | Age: 56
End: 2021-10-25
Payer: COMMERCIAL

## 2021-10-26 ENCOUNTER — TELEPHONE (OUTPATIENT)
Dept: NEUROLOGY | Facility: CLINIC | Age: 56
End: 2021-10-26
Payer: COMMERCIAL

## 2021-10-26 ENCOUNTER — PATIENT MESSAGE (OUTPATIENT)
Dept: NEUROLOGY | Facility: CLINIC | Age: 56
End: 2021-10-26
Payer: COMMERCIAL

## 2021-11-01 ENCOUNTER — TELEPHONE (OUTPATIENT)
Dept: NEUROLOGY | Facility: CLINIC | Age: 56
End: 2021-11-01
Payer: COMMERCIAL

## 2021-11-07 ENCOUNTER — PATIENT MESSAGE (OUTPATIENT)
Dept: NEUROLOGY | Facility: CLINIC | Age: 56
End: 2021-11-07
Payer: COMMERCIAL

## 2021-11-26 ENCOUNTER — PATIENT MESSAGE (OUTPATIENT)
Dept: NEUROLOGY | Facility: CLINIC | Age: 56
End: 2021-11-26
Payer: COMMERCIAL

## 2022-02-03 NOTE — PROGRESS NOTES
"Name: Declan Burleson  MRN: [id number]   CSN: [id number]      Date: 2-      Referring physician:  No referring provider defined for this encounter.    Subjective:      Chief Complaint: ? Parkinson's disease     History of Present Illness (HPI):    Declan Burleson is a 57 y.o. right-handed [place]  male who underwent left laser amygdalohippocampectomy on 3/1/21 and presents today for an initial evaluation of possible Parkinson's Disease, accompanied by wife.     Wife says Dr Edgar had him walk at his last appt and thought he had parkinson's based on his movement that day. He is established in Dr. Ambriz's office (neuro in [place]). They went to see him after this to get his opinion. He did a skin biopsy for Parkinson's disease and it came back as negative.     They came here today because Dr. Edgar made the appt. However, they are no longer worried about Parkinson's disease. They are worried about his memory since he had epilepsy surgery March, 2021.       Wife feels his memory has been going down the past 6 mos. She will have conversation with him but he does not know what she is talking about when the subject comes back up.   He notes that "pronunciation but that is not the right word, like something is not clicking."  He will know the word but cannot pronounce it. He can tell the word is right there but just doesn't come out, gets him frustrated. He is fluent in Italian, Lao and English. He is trying to figure out why he cannot speak as fluently in English as he once did. He can come with the Italian or Lao word but not the English word.   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 He will put the water in but forget to turn it on. He will come back 5 min later and realize what he did.     He spoke Lao and Italian in his household before coming to the [place]. He learned English in school and was fluent in it by the time [place] 14.     He denies tremor.   Wife " "says she see tremor every once in awhile in . She first noticed this about 6 mos ago. She does not feel it is getting worse. She notices it when they are watching TV.    He denies stiffness.   He is not aware of physical slowness but describes slowness with thinking.    He could not think of their daughter name the other day.   Wife feels he is slower. She has noted this about 6 months. She does not feel it is getting worse.    Balance is "about 80% oikay, about 20% time will lose it."  Wife says she sees him losing balance a lot.  Fell once about 4 mos ago while doing yard work.   Otherwise no falls.   He does not shuffle, wife agrees.     Wife says he does act out dreams. He has been doing this for about 4 months.    Last seizure was a little over 6 mos ago per wife.   She later says he has not had seizure since surgery (3-1-21).         Review of Systems   Constitutional: Negative for appetite change.   HENT: Positive for drooling (especially at night). Negative for hearing loss and trouble swallowing.         Smell intact   Eyes: Negative for visual disturbance.   Respiratory: Negative for cough and choking.    Cardiovascular: Positive for chest pain (once in awhile). Negative for palpitations and leg swelling.   Gastrointestinal: Negative for abdominal pain, constipation, diarrhea and nausea.   Genitourinary: Negative.    Musculoskeletal: Negative for back pain, gait problem and neck pain.   Neurological: Positive for seizures and light-headedness. Negative for dizziness, tremors and syncope.       Past Medical History: The patient  has a past medical history of Anxiety, Depression, GERD (gastroesophageal reflux disease), Hyperlipidemia, Hypertension, Migraine, and Seizures.    Social History: The patient  reports that he quit smoking about 12 years ago. He has never used smokeless tobacco. He reports current alcohol use. He reports that he does not use drugs.    Family History: Their family history includes " Heart disease (age of onset: 50) in his maternal uncle; Migraines in his paternal grandfather.    Allergies: Losartan     Meds:   Current Outpatient Medications on File Prior to Visit   Medication Sig Dispense Refill    amLODIPine (NORVASC) 5 MG tablet Take 1 tablet (5 mg total) by mouth once daily. 30 tablet 11    cloBAZam (ONFI) 20 mg Tab TAKE 1 TABLET BY MOUTH ONCE EVERY EVENING 30 tablet 5    EScitalopram oxalate (LEXAPRO) 10 MG tablet Take 1 tablet (10 mg total) by mouth once daily. 30 tablet 11    eslicarbazepine (APTIOM) 400 mg Tab tablet Take 1 tablet (400 mg total) by mouth once daily. 30 tablet 5    eslicarbazepine (APTIOM) 800 mg Tab Take 800 mg by mouth once daily. 30 tablet 5    mag hydrox/aluminum hyd/simeth (ANTACID ORAL) Take by mouth as needed. Acid reflux      midazolam (NAYZILAM) 5 mg/spray (0.1 mL) Spry Instill 1 spray into the nostril for seizure and may repeat in 10 minutes if needed; not more than 1 unit per week per MD 4 each 1    ondansetron (ZOFRAN-ODT) 8 MG TbDL Take 8 mg by mouth every 8 (eight) hours as needed. nausea      oxyCODONE (ROXICODONE) 5 MG immediate release tablet Take 1 tablet (5 mg total) by mouth every 6 (six) hours as needed for Pain. 30 tablet 0    rimegepant (NURTEC) 75 mg odt Take 75 mg by mouth.      rizatriptan (MAXALT-MLT) 10 MG disintegrating tablet Take 10 mg by mouth as needed. No more than 3 doses / week      zonisamide (ZONEGRAN) 100 MG Cap TAKE 3 CAPSULES BY MOUTH ONCE DAILY 90 capsule 11    ascorbic acid, vitamin C, (VITAMIN C) 500 MG tablet Take 500 mg by mouth once daily.      aspirin (ECOTRIN) 81 MG EC tablet Take 1 tablet (81 mg total) by mouth once daily. 360 tablet 0    dexAMETHasone (DECADRON) 2 MG tablet Take 4 tablets every 6 hours for 24 hours, take 4 tablets every 8 hours for 24 hours, take 4 tablets every 12 hours for 48 hours, take 2 tablets every 12 hours for 48 hours and take 2 tablets daily for 48 hours. Take pepcid or  "omeprazole. Stop after decadron doses complete. (Patient not taking: Reported on 2/15/2022) 20 tablet 0     No current facility-administered medications on file prior to visit.       Objective:     Physical Exam:    Vitals:    02/15/22 1447   BP: 126/83   BP Location: Left arm   Patient Position: Sitting   Pulse: 64   Weight: 91.3 kg (201 lb 4.5 oz)   Height: 5' 11" (1.803 m)     Body mass index is 28.07 kg/m².    Constitutional  Well-developed, well-nourished, appears stated age   Cardiovascular   no LE edema bilaterally     ..  Neurological    * Mental status     - Orientation Oriented to: person, place and situation     - Memory     Not Tested but provides good hx     - Attention/concentration  Attends to interview without distraction     - Language  spontaneous, fluent, rare occ will pause to come with English word     - Fund of knowledge  Aware of current events     - Executive  Generally well-organized thoughts     ..  * Cranial nerves       - CN II  PERRL, visual fields full to confrontation     - CN III, IV, VI  Extraocular movements full, normal pursuits and saccades     - CN V  Sensation V1 - V3 intact     - CN VII  Face strong and symmetric bilaterally     - CN VIII  Hearing intact bilaterally     - CN IX, X  Palate raises midline and symmetric     - CN XI  SCM and trapezius 5/5 bilaterally     - CN XII  Tongue midline   * Motor  Muscle bulk normal, strength 5/5 throughout   * Coordination  No dysmetria with finger-to-nose   * Gait  See below.         * Specialized movement exam  No hypophonic speech.    No facial masking.   No cogwheel rigidity.     No bradykinesia.   No tremor with rest, posture, kinesis, or intention.    No other dystonia, chorea, athetosis, myoclonus, or tics.   No motor impersistence.   Normal-based gait.   No shortened stride length.   He does have reduced B arm swing.               Laboratory Results:  No visits with results within 3 Month(s) from this visit.   Latest known visit " with results is:   Lab Visit on 07/22/2021   Component Date Value Ref Range Status    Eslicarbazepine 07/22/2021 19.3  mcg/mL Final    Zonisamide 07/22/2021 12  10 - 40 mcg/mL Final    Clobazam 07/22/2021 338.0 (A) 30 - 300 ng/mL Final    Desmethylclobazam 07/22/2021 2670.0  300 - 3000 ng/mL Final    TSH 07/22/2021 1.476  0.400 - 4.000 uIU/mL Final    Vitamin B-12 07/22/2021 468  210 - 950 pg/mL Final    Thiamine 07/22/2021 91  38 - 122 ug/L Final           Imaging:                 Results for orders placed or performed during the hospital encounter of 10/23/21   CT Head Without Contrast    Narrative    EXAMINATION:  CT HEAD WITHOUT CONTRAST    CLINICAL HISTORY:  Head Trauma;    TECHNIQUE:  Low dose axial images were obtained through the head.  Coronal and sagittal reformations were also performed. Contrast was not administered.    COMPARISON:  CT 07/03/2021.    FINDINGS:  There is no acute hemorrhage or infarction.  There is mild cortical atrophy.  Chronic focal encephalomalacia/gliosis of the medial left temporal lobe from prior ablation.  There is a normal pattern of gray-white matter differentiation.    No extra-axial fluid collections.  Ventricles are normal in size, shape and configuration.  The basal cisterns are patent.    The imaged paranasal sinuses and ethmoid air cells are well aerated.    The mastoid air cells and middle ears are normally pneumatized.      Impression    1. Mild cortical atrophy without acute intracranial abnormality.  2. Chronic focal encephalomalacia/gliosis of the left temporal lobe.  This report agrees with the virtual radiologic preliminary report except for inclusion of the left temporal lobe findings.      Electronically signed by: Demarcus Lay  Date:    10/23/2021  Time:    20:08   Results for orders placed or performed during the hospital encounter of 03/01/21   MRI Brain W WO Contrast    Narrative    EXAMINATION:  MRI BRAIN W WO CONTRAST    CLINICAL HISTORY:  to be  done with laser visualase ablation; Localization-related (focal) (partial) symptomatic epilepsy and epileptic syndromes with complex partial seizures, not intractable, without status epilepticus    TECHNIQUE:  Multiplanar imaging is submitted that was utilized during Visualase ablation    COMPARISON:  None    FINDINGS:  Visualace oblique procedure was performed from a left posterior temporal approach.  A probe tract is seen extending to the left medial temporal lobe.  The FLAIR and postcontrast T1 images show a zone of signal abnormality measuring 3.5 cm AP by 1.8 cm transverse consistent with the area of ablation.  No surgical complication is seen.  There is no hydrocephalus.  Fiducials remain in place.      Impression    Intraoperative MR imaging demonstrates localization images obtained during left medial temporal ablation therapy, as above.      Electronically signed by: Stiven Barnett MD  Date:    03/01/2021  Time:    13:26           Assessment and Plan     Mild neurocognitive disorder due to another medical condition    Temporal lobe epilepsy  Comments:  s/p left laser amygdalohippocampectomy on 3/1/21 a    Dream enactment behavior  Comments:  for the last 4 mos per wife        Medical Decision Making:    I do not see any evidence of Parkinson's disease or tremor today. He does have reduced B arm swing.   Wife notes dream enactment the past 4 months.     Their bigger concern is his memory. I will message Dr. Edgar and Dr. Horton to see if they want to repeat Neuropsychological evaluation.     Follow up PRN       I spent 70 minutes face-to-face with the patient with >50% of the time spent with counseling and education regarding:  - results of data, diagnosis, and recommendations stated above  - discussed cardinal motor features of Parkinson's disease, does not have   - importance of diet and exercise    Luda Simons, CORTEZ, NP-C  Division of Movement and Memory Disorders  [facility]  [place] [phone]

## 2022-02-15 ENCOUNTER — OFFICE VISIT (OUTPATIENT)
Dept: NEUROLOGY | Facility: CLINIC | Age: 57
End: 2022-02-15
Payer: COMMERCIAL

## 2022-02-15 VITALS
SYSTOLIC BLOOD PRESSURE: 126 MMHG | HEIGHT: 71 IN | DIASTOLIC BLOOD PRESSURE: 83 MMHG | WEIGHT: 201.25 LBS | BODY MASS INDEX: 28.18 KG/M2 | HEART RATE: 64 BPM

## 2022-02-15 DIAGNOSIS — G47.52 DREAM ENACTMENT BEHAVIOR: ICD-10-CM

## 2022-02-15 DIAGNOSIS — F06.70 MILD NEUROCOGNITIVE DISORDER DUE TO ANOTHER MEDICAL CONDITION: Primary | ICD-10-CM

## 2022-02-15 DIAGNOSIS — G40.109 TEMPORAL LOBE EPILEPSY: ICD-10-CM

## 2022-02-15 PROCEDURE — 3008F PR BODY MASS INDEX (BMI) DOCUMENTED: ICD-10-PCS | Mod: CPTII,S$GLB,, | Performed by: NURSE PRACTITIONER

## 2022-02-15 PROCEDURE — 1159F MED LIST DOCD IN RCRD: CPT | Mod: CPTII,S$GLB,, | Performed by: NURSE PRACTITIONER

## 2022-02-15 PROCEDURE — 3008F BODY MASS INDEX DOCD: CPT | Mod: CPTII,S$GLB,, | Performed by: NURSE PRACTITIONER

## 2022-02-15 PROCEDURE — 1160F RVW MEDS BY RX/DR IN RCRD: CPT | Mod: CPTII,S$GLB,, | Performed by: NURSE PRACTITIONER

## 2022-02-15 PROCEDURE — 3074F SYST BP LT 130 MM HG: CPT | Mod: CPTII,S$GLB,, | Performed by: NURSE PRACTITIONER

## 2022-02-15 PROCEDURE — 99417 PR PROLONGED SVC, OUTPT, W/WO DIRECT PT CONTACT,  EA ADDTL 15 MIN: ICD-10-PCS | Mod: S$GLB,,, | Performed by: NURSE PRACTITIONER

## 2022-02-15 PROCEDURE — 99215 OFFICE O/P EST HI 40 MIN: CPT | Mod: S$GLB,,, | Performed by: NURSE PRACTITIONER

## 2022-02-15 PROCEDURE — 1159F PR MEDICATION LIST DOCUMENTED IN MEDICAL RECORD: ICD-10-PCS | Mod: CPTII,S$GLB,, | Performed by: NURSE PRACTITIONER

## 2022-02-15 PROCEDURE — 1160F PR REVIEW ALL MEDS BY PRESCRIBER/CLIN PHARMACIST DOCUMENTED: ICD-10-PCS | Mod: CPTII,S$GLB,, | Performed by: NURSE PRACTITIONER

## 2022-02-15 PROCEDURE — 99999 PR PBB SHADOW E&M-EST. PATIENT-LVL IV: ICD-10-PCS | Mod: PBBFAC,,, | Performed by: NURSE PRACTITIONER

## 2022-02-15 PROCEDURE — 99215 PR OFFICE/OUTPT VISIT, EST, LEVL V, 40-54 MIN: ICD-10-PCS | Mod: S$GLB,,, | Performed by: NURSE PRACTITIONER

## 2022-02-15 PROCEDURE — 3074F PR MOST RECENT SYSTOLIC BLOOD PRESSURE < 130 MM HG: ICD-10-PCS | Mod: CPTII,S$GLB,, | Performed by: NURSE PRACTITIONER

## 2022-02-15 PROCEDURE — 99999 PR PBB SHADOW E&M-EST. PATIENT-LVL IV: CPT | Mod: PBBFAC,,, | Performed by: NURSE PRACTITIONER

## 2022-02-15 PROCEDURE — 99417 PROLNG OP E/M EACH 15 MIN: CPT | Mod: S$GLB,,, | Performed by: NURSE PRACTITIONER

## 2022-02-15 PROCEDURE — 3079F DIAST BP 80-89 MM HG: CPT | Mod: CPTII,S$GLB,, | Performed by: NURSE PRACTITIONER

## 2022-02-15 PROCEDURE — 3079F PR MOST RECENT DIASTOLIC BLOOD PRESSURE 80-89 MM HG: ICD-10-PCS | Mod: CPTII,S$GLB,, | Performed by: NURSE PRACTITIONER

## 2022-02-15 RX ORDER — RIMEGEPANT SULFATE 75 MG/75MG
75 TABLET, ORALLY DISINTEGRATING ORAL
Status: ON HOLD | COMMUNITY
Start: 2022-02-02 | End: 2023-04-24

## 2022-02-15 NOTE — Clinical Note
Saw this gentleman last week. He does have reduced arm swing and dream enactment the past 4 mos but no evidence of Parkinson's disease. He and wife are mainly concerned with his memory. Wasn't sure if you all would want to re-test him. I will Follow up as needed. Thanks!

## 2022-03-15 ENCOUNTER — TELEPHONE (OUTPATIENT)
Dept: NEUROLOGY | Facility: CLINIC | Age: 57
End: 2022-03-15
Payer: COMMERCIAL

## 2022-03-24 ENCOUNTER — TELEPHONE (OUTPATIENT)
Dept: NEUROLOGY | Facility: CLINIC | Age: 57
End: 2022-03-24
Payer: COMMERCIAL

## 2022-04-15 ENCOUNTER — PATIENT MESSAGE (OUTPATIENT)
Dept: NEUROLOGY | Facility: CLINIC | Age: 57
End: 2022-04-15
Payer: COMMERCIAL

## 2022-05-05 ENCOUNTER — PATIENT MESSAGE (OUTPATIENT)
Dept: NEUROLOGY | Facility: CLINIC | Age: 57
End: 2022-05-05
Payer: COMMERCIAL

## 2022-05-08 ENCOUNTER — PATIENT MESSAGE (OUTPATIENT)
Dept: NEUROLOGY | Facility: CLINIC | Age: 57
End: 2022-05-08
Payer: COMMERCIAL

## 2022-05-23 ENCOUNTER — LAB VISIT (OUTPATIENT)
Dept: LAB | Facility: HOSPITAL | Age: 57
End: 2022-05-23
Payer: COMMERCIAL

## 2022-05-23 ENCOUNTER — OFFICE VISIT (OUTPATIENT)
Dept: NEUROLOGY | Facility: CLINIC | Age: 57
End: 2022-05-23
Payer: COMMERCIAL

## 2022-05-23 VITALS
DIASTOLIC BLOOD PRESSURE: 86 MMHG | BODY MASS INDEX: 27.59 KG/M2 | WEIGHT: 197.06 LBS | HEART RATE: 62 BPM | HEIGHT: 71 IN | SYSTOLIC BLOOD PRESSURE: 137 MMHG

## 2022-05-23 DIAGNOSIS — F06.70 MILD NEUROCOGNITIVE DISORDER DUE TO ANOTHER MEDICAL CONDITION: ICD-10-CM

## 2022-05-23 DIAGNOSIS — R41.3 COMPLAINTS OF MEMORY DISTURBANCE: ICD-10-CM

## 2022-05-23 DIAGNOSIS — G40.109 TEMPORAL LOBE EPILEPSY: Primary | ICD-10-CM

## 2022-05-23 DIAGNOSIS — G40.109 TEMPORAL LOBE EPILEPSY: ICD-10-CM

## 2022-05-23 DIAGNOSIS — G43.009 MIGRAINE WITHOUT AURA AND WITHOUT STATUS MIGRAINOSUS, NOT INTRACTABLE: ICD-10-CM

## 2022-05-23 LAB
ALBUMIN SERPL BCP-MCNC: 3.9 G/DL (ref 3.5–5.2)
ALP SERPL-CCNC: 128 U/L (ref 55–135)
ALT SERPL W/O P-5'-P-CCNC: 16 U/L (ref 10–44)
ANION GAP SERPL CALC-SCNC: 7 MMOL/L (ref 8–16)
AST SERPL-CCNC: 18 U/L (ref 10–40)
BILIRUB SERPL-MCNC: 0.3 MG/DL (ref 0.1–1)
BUN SERPL-MCNC: 17 MG/DL (ref 6–20)
CALCIUM SERPL-MCNC: 8.9 MG/DL (ref 8.7–10.5)
CHLORIDE SERPL-SCNC: 101 MMOL/L (ref 95–110)
CO2 SERPL-SCNC: 25 MMOL/L (ref 23–29)
CREAT SERPL-MCNC: 0.8 MG/DL (ref 0.5–1.4)
EST. GFR  (AFRICAN AMERICAN): >60 ML/MIN/1.73 M^2
EST. GFR  (NON AFRICAN AMERICAN): >60 ML/MIN/1.73 M^2
GLUCOSE SERPL-MCNC: 80 MG/DL (ref 70–110)
POTASSIUM SERPL-SCNC: 4.1 MMOL/L (ref 3.5–5.1)
PROT SERPL-MCNC: 7 G/DL (ref 6–8.4)
SODIUM SERPL-SCNC: 133 MMOL/L (ref 136–145)

## 2022-05-23 PROCEDURE — 3079F PR MOST RECENT DIASTOLIC BLOOD PRESSURE 80-89 MM HG: ICD-10-PCS | Mod: CPTII,S$GLB,,

## 2022-05-23 PROCEDURE — 3008F BODY MASS INDEX DOCD: CPT | Mod: CPTII,S$GLB,,

## 2022-05-23 PROCEDURE — 36415 COLL VENOUS BLD VENIPUNCTURE: CPT

## 2022-05-23 PROCEDURE — 80203 DRUG SCREEN QUANT ZONISAMIDE: CPT

## 2022-05-23 PROCEDURE — 99999 PR PBB SHADOW E&M-EST. PATIENT-LVL III: CPT | Mod: PBBFAC,,,

## 2022-05-23 PROCEDURE — 3008F PR BODY MASS INDEX (BMI) DOCUMENTED: ICD-10-PCS | Mod: CPTII,S$GLB,,

## 2022-05-23 PROCEDURE — 99215 PR OFFICE/OUTPT VISIT, EST, LEVL V, 40-54 MIN: ICD-10-PCS | Mod: S$GLB,,,

## 2022-05-23 PROCEDURE — 80299 QUANTITATIVE ASSAY DRUG: CPT

## 2022-05-23 PROCEDURE — 99417 PROLNG OP E/M EACH 15 MIN: CPT | Mod: S$GLB,,,

## 2022-05-23 PROCEDURE — 3079F DIAST BP 80-89 MM HG: CPT | Mod: CPTII,S$GLB,,

## 2022-05-23 PROCEDURE — 99999 PR PBB SHADOW E&M-EST. PATIENT-LVL III: ICD-10-PCS | Mod: PBBFAC,,,

## 2022-05-23 PROCEDURE — 80053 COMPREHEN METABOLIC PANEL: CPT

## 2022-05-23 PROCEDURE — 3075F PR MOST RECENT SYSTOLIC BLOOD PRESS GE 130-139MM HG: ICD-10-PCS | Mod: CPTII,S$GLB,,

## 2022-05-23 PROCEDURE — 3075F SYST BP GE 130 - 139MM HG: CPT | Mod: CPTII,S$GLB,,

## 2022-05-23 PROCEDURE — 99417 PR PROLONGED SVC, OUTPT, W/WO DIRECT PT CONTACT,  EA ADDTL 15 MIN: ICD-10-PCS | Mod: S$GLB,,,

## 2022-05-23 PROCEDURE — 80339 ANTIEPILEPTICS NOS 1-3: CPT

## 2022-05-23 PROCEDURE — 99215 OFFICE O/P EST HI 40 MIN: CPT | Mod: S$GLB,,,

## 2022-05-23 RX ORDER — CLOBAZAM 10 MG/1
TABLET ORAL
Qty: 30 TABLET | Refills: 5 | Status: SHIPPED | OUTPATIENT
Start: 2022-05-23 | End: 2022-08-29 | Stop reason: SDUPTHER

## 2022-05-23 NOTE — LETTER
May 23, 2022      [facility] [address]  [address]  [facility] [address]  Phone: [phone]  Fax: [phone]       Patient: Declan Burleson   YOB: 1965  Date of Visit: 05/23/2022    To Whom It May Concern:    Luci Burleson  was at [facility] on 05/23/2022. He was accompanied by his wife Trista for the visit. If you have any questions or concerns, or if I can be of further assistance, please do not hesitate to contact me.    Sincerely,    Wilma Mckinney PA-C

## 2022-05-23 NOTE — PROGRESS NOTES
"CC: Epilepsy    Interval Events/ROS 5/23/2022:  - L TLE s/p ablation on 3/1/21  - current ASM regimen: eslicarbazepine 1200mg nightly, zonisamide 300mg nightly, clobazam 20mg nightly, and nayzilam PRN  - been following w/ Dr. Saldana (neuro in [place]), who recently referred patient back to [facility] since patient is still having seizures after surgery     - last seizure was 4/22/2022, also had 2 seizures 03/2022 (makes grunting/growling noise, shaking 30-60 seconds, postictal unable to respond, confused, returns to baseline in around 5-10 minutes)  - feels he is cognitively declining, word finding issues  - sleeps a lot, reports some days he "feels completely lazy", denies low mood/feeling depressed   - missed neuropsych appointment, needs to reschedule   - still having frequent migraines, doesn't feel like nurtec is helping   - Otherwise, no fever, no cold symptoms, no changes in vision, no new weakness, no chest pain, no shortness of breath, no nausea, no vomiting, no diarrhea, no constipation, no tingling/numbness, no problems walking, no reported mood issues      Interval events 9/30/2021:   Pt of Dr. Benoit   He presents with wife   Not driving   Not working   No sz   Trouble word finding (this is pronounced in English)   No falls   3 nights/week - per wife has growling sound     Severe L occipital HA with intoleranace to mvmnt   1/month     Also has mild HA 3/week      Current AEDs: compliant  1) Eslicarbazepine 1200 mg q day  2) Zonisamide 300 mg q day   3) Clobazam 20 mg qHS  4) Nayzilam prn   Also takes Lexapro 10 mg QHS      Admission Date: 3/1/2021  Hospital Length of Stay: 0 days  Discharge Date and Time: 3/4/2021  2:57 PM     HPI:   Patient is a 55 y/o male with intractable epilepsy who is s/p bilateral sEEG presents today for elective left laser ablation amygdalohippocampectomy. All questions answered and patient wishes to proceed with surgery.    Patient originally had presented to Dr." "uGstavo to discuss elective options of his intractable epilepsy. His seizures began when he was 48 years old. He can recall no inciting event. He has 3 semiologies: "full blown" generalized events, staring episodes, and "mild" seizures, which are characterized as generalized events of shorter duration without incontinence and a shorter post-ictal period. He and his wife estimate that he persists in having about 2 events/month.    He has failed multiple AEDs, including topiramate, lamotrigine, levetiracetam, and oxcarbazepine. He is currently taking eslicarbazepine, zonisamide, and clobazam. He had EMU monitoring in May-June of this year, which suggests left-sided activity. He had 3T MRI in June (personally reviewed) that was non-lesional; he also had PET at that time suggesting possible left-sided activity. He had neuropsychological testing on 8/10/20  Hospital Course: 3/1: POD 0. NCC overnight  3/2: POD1 from L laser amygdalophippocampectomy. Recommend wife at bedside as much as nursing policies will allow. OK for TTF (9th floor only) 24 hours postop if otherwise medically stable/appropriate. Wife reports they have home Aptiom.  3/3: POD 2. Patient with headache behind bilateral eyes. Patient also with episode of emesis today when sitting up. He is otherwise neurologically intact. CTH ordered to evaluate and was stable. Patient has not gotten up with PT/OT yet, it is ordered for tomorrow. Will keep patient overnight and reevaluate with PT/OT tomorrow regarding dispo recommendations.   3/4: Patient much improved from yesterday. Headache and N/V resolved. Will continue decadron 4 mg q6h x 48 hours. Continue 1L fluid restriction for SIADH, will obtain BMP on 3/8 outpatient to reevaluate. Therapy recommending home with outpatient PT. Patient and his wife are requesting Home Health. Medically stable to discharge home.      Pt reports no auras or seizures since ablation   Sleep - ok   Diet - ok      Migraine Headache "   Onset - 12 yrs   F/h migraine   Hz - rare   Tylenol 500 mg effective      HA Hz worse in last 10 yrs   Hz 2-4/week   No change in HA after SEEG   Meds tried - tylenol 1000 mg - mild relief   Since April Worsening:    Word finding difficulty   attention span shorter      Exam:  Physical Exam  Constitutional:       Appearance: Normal appearance.   HENT:      Head: Normocephalic and atraumatic.      Right Ear: External ear normal.      Left Ear: External ear normal.      Nose: Nose normal.      Mouth/Throat:      Mouth: Mucous membranes are moist.   Eyes:      Extraocular Movements: Extraocular movements intact.      Conjunctiva/sclera: Conjunctivae normal.      Pupils: Pupils are equal, round, and reactive to light.   Cardiovascular:      Rate and Rhythm: Normal rate.      Pulses: Normal pulses.   Pulmonary:      Effort: Pulmonary effort is normal.   Musculoskeletal:         General: Normal range of motion.      Cervical back: Normal range of motion.   Skin:     General: Skin is warm and dry.   Neurological:      General: No focal deficit present.      Mental Status: He is alert.   Psychiatric:         Mood and Affect: Mood normal.         Behavior: Behavior normal.         Thought Content: Thought content normal.         Judgment: Judgment normal.     Flat affect   Masked facies   Slow gait   Slow turns   No retropulsion   Decreased arm swing   No tremor   Mild cogwheel on RUE      Impression:   L TLE s/p ablation on 3/1/21   Neuropsych effects of ablation? - Word finding difficulty; attention span shorter; irritability      Results for RAMYA MOSES (MRN [id number]) as of 9/30/2021 08:43   Ref. Range 7/22/2021 11:32   Clobazam Latest Ref Range: 30 - 300 ng/mL 338.0 (H)   Eslicarbazepine Latest Units: mcg/mL 19.3   Zonisamide Latest Ref Range: 10 - 40 mcg/mL 12   Desmethylclobazam Latest Ref Range: 300 - 3000 ng/mL 2670.0       Problem List Items Addressed This Visit        Neuro    Migraine without status  migrainosus, not intractable    Mild neurocognitive disorder due to another medical condition    Relevant Orders    Ambulatory consult to Neuropsychology      Other Visit Diagnoses     Temporal lobe epilepsy    -  Primary    Relevant Medications    cloBAZam (ONFI) 10 mg Tab    Other Relevant Orders    Eslicarbazepine    Zonisamide level    Clobazam    Comprehensive metabolic panel    Ambulatory EEG monitoring    MRI Brain W WO Contrast    Complaints of memory disturbance        Relevant Orders    Ambulatory consult to Neuropsychology        Plan:   - continue eslicarbazepine 1200mg qhs  - continue zonisamide 300mg qhs  - increase clobazam to 30mg qhs given continued breakthrough events   - nayzilam PRN for seizure cluster  - levels and CMP today  - repeat MRI s/p laser ablation 03/2021  - 48hr ambulatory EEG to asses for cortical irritability/subclinical seizures  - referral placed again for neuropsychology evaluation  - Advised patient to follow up with neurologist Dr. Saldana for continued management of migraines, advised inquiring about migraine prevention options  - Follow up in 2 months or sooner with issues    Wilma Mckinney PA-C   Neurology-Epilepsy  [facility]    Collaborating physician, Dr. Kaleb Edgar, was available during today's encounter.     I spent approximately 120 minutes on the day of this encounter preparing to see the patient, obtaining and reviewing history and results, performing a medically appropriate exam, counseling and educating the patient/family/caregiver, documenting clinical information, coordinating care, and ordering medications, tests, procedures, and referrals.

## 2022-05-23 NOTE — PATIENT INSTRUCTIONS
Here is the plan we discussed today:  - Please stop by the lab on your way out to check levels of your medications  - Please increase the onfi to 30mg nightly (please take one 20mg tablet and one 10mg tablet nightly)  - we will schedule you for a reapeat MRI  - someone from our team will call you to schedule the ambulatory EEG  - someone should also reach out within one week to schedule you for an appointment with neuropsychology  - Follow up in 2 months or sooner with issues  - Please patient portal with any questions or concerns

## 2022-05-26 LAB
CLOBAZAM SERPL-MCNC: 261 NG/ML (ref 30–300)
NORCLOBAZAM SERPL-MCNC: 2550 NG/ML (ref 300–3000)
ZONISAMIDE SERPL-MCNC: 11 MCG/ML (ref 10–40)

## 2022-05-30 ENCOUNTER — HOSPITAL ENCOUNTER (OUTPATIENT)
Dept: NEUROLOGY | Facility: CLINIC | Age: 57
Discharge: HOME | End: 2022-05-30
Payer: COMMERCIAL

## 2022-05-30 PROCEDURE — 95721 EEG PHY/QHP>36<60 HR W/O VID: CPT | Mod: S$GLB,,, | Performed by: PSYCHIATRY & NEUROLOGY

## 2022-05-30 PROCEDURE — 95721 PR EEG, W/O VIDEO, CONT RECORD, CMPLT STDY, I&R, >36<60 HRS: ICD-10-PCS | Mod: S$GLB,,, | Performed by: PSYCHIATRY & NEUROLOGY

## 2022-06-01 ENCOUNTER — HOSPITAL ENCOUNTER (OUTPATIENT)
Dept: NEUROLOGY | Facility: CLINIC | Age: 57
Discharge: HOME | End: 2022-06-01
Payer: COMMERCIAL

## 2022-06-01 DIAGNOSIS — G40.109 TEMPORAL LOBE EPILEPSY: ICD-10-CM

## 2022-06-03 ENCOUNTER — HOSPITAL ENCOUNTER (OUTPATIENT)
Dept: RADIOLOGY | Facility: HOSPITAL | Age: 57
Discharge: HOME | End: 2022-06-03
Payer: COMMERCIAL

## 2022-06-03 DIAGNOSIS — G40.109 TEMPORAL LOBE EPILEPSY: ICD-10-CM

## 2022-06-03 PROCEDURE — A9585 GADOBUTROL INJECTION: HCPCS

## 2022-06-03 PROCEDURE — 70553 MRI BRAIN STEM W/O & W/DYE: CPT | Mod: TC

## 2022-06-03 PROCEDURE — 70553 MRI BRAIN STEM W/O & W/DYE: CPT | Mod: 26,,, | Performed by: RADIOLOGY

## 2022-06-03 PROCEDURE — 70553 MRI BRAIN W WO CONTRAST: ICD-10-PCS | Mod: 26,,, | Performed by: RADIOLOGY

## 2022-06-03 PROCEDURE — 25500020 PHARM REV CODE 255

## 2022-06-03 RX ORDER — GADOBUTROL 604.72 MG/ML
9 INJECTION INTRAVENOUS
Status: COMPLETED | OUTPATIENT
Start: 2022-06-03 | End: 2022-06-03

## 2022-06-03 RX ADMIN — GADOBUTROL 9 ML: 604.72 INJECTION INTRAVENOUS at 01:06

## 2022-06-08 LAB — ESLICARBAZEPINE: 16.8 MCG/ML

## 2022-07-14 ENCOUNTER — PATIENT MESSAGE (OUTPATIENT)
Dept: NEUROLOGY | Facility: CLINIC | Age: 57
End: 2022-07-14
Payer: COMMERCIAL

## 2022-07-25 ENCOUNTER — OFFICE VISIT (OUTPATIENT)
Dept: NEUROLOGY | Facility: CLINIC | Age: 57
End: 2022-07-25
Payer: MEDICARE

## 2022-07-25 ENCOUNTER — TELEPHONE (OUTPATIENT)
Dept: NEUROLOGY | Facility: CLINIC | Age: 57
End: 2022-07-25
Payer: COMMERCIAL

## 2022-07-25 VITALS
WEIGHT: 192.56 LBS | SYSTOLIC BLOOD PRESSURE: 125 MMHG | HEART RATE: 61 BPM | BODY MASS INDEX: 26.96 KG/M2 | HEIGHT: 71 IN | DIASTOLIC BLOOD PRESSURE: 90 MMHG

## 2022-07-25 DIAGNOSIS — G43.009 MIGRAINE WITHOUT AURA AND WITHOUT STATUS MIGRAINOSUS, NOT INTRACTABLE: ICD-10-CM

## 2022-07-25 DIAGNOSIS — R41.3 COMPLAINTS OF MEMORY DISTURBANCE: ICD-10-CM

## 2022-07-25 DIAGNOSIS — G40.109 TEMPORAL LOBE EPILEPSY: Primary | ICD-10-CM

## 2022-07-25 PROCEDURE — 99215 PR OFFICE/OUTPT VISIT, EST, LEVL V, 40-54 MIN: ICD-10-PCS | Mod: S$PBB,,,

## 2022-07-25 PROCEDURE — 99999 PR PBB SHADOW E&M-EST. PATIENT-LVL IV: CPT | Mod: PBBFAC,,,

## 2022-07-25 PROCEDURE — 99214 OFFICE O/P EST MOD 30 MIN: CPT | Mod: PBBFAC

## 2022-07-25 PROCEDURE — 99417 PR PROLONGED SVC, OUTPT, W/WO DIRECT PT CONTACT,  EA ADDTL 15 MIN: ICD-10-PCS | Mod: S$PBB,,,

## 2022-07-25 PROCEDURE — 99417 PROLNG OP E/M EACH 15 MIN: CPT | Mod: S$PBB,,,

## 2022-07-25 PROCEDURE — 99215 OFFICE O/P EST HI 40 MIN: CPT | Mod: S$PBB,,,

## 2022-07-25 PROCEDURE — 99999 PR PBB SHADOW E&M-EST. PATIENT-LVL IV: ICD-10-PCS | Mod: PBBFAC,,,

## 2022-07-25 RX ORDER — ZONISAMIDE 100 MG/1
600 CAPSULE ORAL NIGHTLY
Qty: 540 CAPSULE | Refills: 3 | Status: ON HOLD | OUTPATIENT
Start: 2022-07-25 | End: 2023-03-09 | Stop reason: SDUPTHER

## 2022-07-25 NOTE — PATIENT INSTRUCTIONS
You came to [facility] because of your seizure disorder. Your seizures are partially controlled on eslicarbazepine 1200mg nightly, zonisamide 300mg nightly, and clobazam 30mg nightly.     Because you continue to have breakthrough events, I would like to increase the zonisamide to 600mg at night. This medication is also helpful for headaches. Please follow the schedule below in order to increase this medication safely:     7/25/2022  - increase zonisamide to 400mg at night  - continue eslicarbazepine 1200mg nightly   - continue clobazam 30mg nightly     08/01/2022  - increase zonisamide to 500mg at night   - continue eslicarbazepine 1200mg nightly  - continue clobazam 30mg nightly     08/08/2022  - increase zonisamide to 600mg at night   - continue eslicarbazepine 1200mg nightly  - continue clobazam 30mg nightly    Do not miss any doses of your medication. If you do miss a dose, take it as soon as you remember even if that means doubling up on the dose. Please get regular sleep. Go to sleep at the same time and wake up at the same time every day. People with epilepsy require more sleep than people without epilepsy.  Sleeping 10-12 hours a day can be normal for a person with epilepsy.  Give yourself the time you need to get enough sleep.     I would like to admit you to the epilepsy monitoring unit to initiate the surgical pathway again. Our epilepsy nurse Bita will call you to schedule this.    I have placed a referral for an evaluation by our neurocognitive team. Please call [phone] during normal working hours and ask for Marie Fields in order to schedule this.      Please follow up with Dr. Saldana for continued management of migraines and ask about migraine prevention options.    Per [place] law, no episodes of loss of consciousness for 6 months before you may drive.  Please avoid dangerous situations.  For example, no baths/pools by yourself, no heights, no power tools.  Please wear a bike  helmet.  If you have a single breakthrough seizure, please patient portal me or call the office and we will decide the next steps. If you have multiple seizures in a row and do not return back to baseline, 911 needs to be called.     Return to clinic in 3 months or sooner with issues.  Please patient portal with any questions or concerns.    Wilma Mckinney PA-C   Neurology-Epilepsy  [facility]

## 2022-07-25 NOTE — PROGRESS NOTES
CC: Epilepsy    Interval Events/ROS 7/25/2022:  Recent Labs   Lab 05/26/20  1733 05/26/20  1734 07/22/21  1132 05/23/22  1508   Lacosamide 4.0  --   --   --    Clobazam  --   --  338.0 H 261.0   Desmethylclobazam  --   --  2670.0 2550.0   Zonisamide  --  6.7 L 12 11   Eslicarbazepine  --  12.0 19.3 16.8      - accompanied by wife who also contributes to the history   - current ASM regimen: eslicarbazepine 1200mg nightly, zonisamide 300mg nightly, clobazam 30mg nightly   - current sz frequency is around 1-2 focal events per week, last event was yesterday 7/24/2022 (described as staring, decreased responsiveness)  - last GTC event was 04/2022  - feels seizure frequency improved for a while s/p mesial temporal ablation 03/2021 but has gradually been worsening again  - memory complaints  - interested in what surgical options are still available to him for his epilepsy, expresses disappointment that he is still having frequent seizures and he thought he would be able to decrease his medications but instead we are increasing medication   - migraines around 3 days per month, sometimes last 3 days at a time, not relieved w/ OTC medications   - Otherwise, no fever, no cold symptoms, no changes in vision, no new weakness, no chest pain, no shortness of breath, no nausea, no vomiting, no diarrhea, no constipation, no problems walking    Interval Events/ROS 5/23/2022:  - L TLE s/p ablation on 3/1/21  - current ASM regimen: eslicarbazepine 1200mg nightly, zonisamide 300mg nightly, clobazam 20mg nightly, and nayzilam PRN  - been following w/ Dr. Saldana (neuro in [place]), who recently referred patient back to [facility] since patient is still having seizures after surgery     - last seizure was 4/22/2022, also had 2 seizures 03/2022 (makes grunting/growling noise, shaking 30-60 seconds, postictal unable to respond, confused, returns to baseline in around 5-10 minutes)  - feels he is cognitively declining, word  "finding issues  - sleeps a lot, reports some days he "feels completely lazy", denies low mood/feeling depressed   - missed neuropsych appointment, needs to reschedule   - still having frequent migraines, doesn't feel like nurtec is helping   - Otherwise, no fever, no cold symptoms, no changes in vision, no new weakness, no chest pain, no shortness of breath, no nausea, no vomiting, no diarrhea, no constipation, no tingling/numbness, no problems walking, no reported mood issues      Interval events 9/30/2021:   Pt of Dr. Benoit   He presents with wife   Not driving   Not working   No sz   Trouble word finding (this is pronounced in English)   No falls   3 nights/week - per wife has growling sound     Severe L occipital HA with intoleranace to mvmnt   1/month     Also has mild HA 3/week      Current AEDs: compliant  1) Eslicarbazepine 1200 mg q day  2) Zonisamide 300 mg q day   3) Clobazam 20 mg qHS  4) Nayzilam prn   Also takes Lexapro 10 mg QHS      Admission Date: 3/1/2021  Hospital Length of Stay: 0 days  Discharge Date and Time: 3/4/2021  2:57 PM     HPI:   Patient is a 55 y/o male with intractable epilepsy who is s/p bilateral sEEG presents today for elective left laser ablation amygdalohippocampectomy. All questions answered and patient wishes to proceed with surgery.    Patient originally had presented to Dr. Chen to discuss elective options of his intractable epilepsy. His seizures began when he was 48 years old. He can recall no inciting event. He has 3 semiologies: "full blown" generalized events, staring episodes, and "mild" seizures, which are characterized as generalized events of shorter duration without incontinence and a shorter post-ictal period. He and his wife estimate that he persists in having about 2 events/month.    He has failed multiple AEDs, including topiramate, lamotrigine, levetiracetam, and oxcarbazepine. He is currently taking eslicarbazepine, zonisamide, and clobazam. He had EMU " monitoring in May-June of this year, which suggests left-sided activity. He had 3T MRI in June (personally reviewed) that was non-lesional; he also had PET at that time suggesting possible left-sided activity. He had neuropsychological testing on 8/10/20  Hospital Course: 3/1: POD 0. NCC overnight  3/2: POD1 from L laser amygdalophippocampectomy. Recommend wife at bedside as much as nursing policies will allow. OK for TTF (9th floor only) 24 hours postop if otherwise medically stable/appropriate. Wife reports they have home Aptiom.  3/3: POD 2. Patient with headache behind bilateral eyes. Patient also with episode of emesis today when sitting up. He is otherwise neurologically intact. CTH ordered to evaluate and was stable. Patient has not gotten up with PT/OT yet, it is ordered for tomorrow. Will keep patient overnight and reevaluate with PT/OT tomorrow regarding dispo recommendations.   3/4: Patient much improved from yesterday. Headache and N/V resolved. Will continue decadron 4 mg q6h x 48 hours. Continue 1L fluid restriction for SIADH, will obtain BMP on 3/8 outpatient to reevaluate. Therapy recommending home with outpatient PT. Patient and his wife are requesting Home Health. Medically stable to discharge home.      Pt reports no auras or seizures since ablation   Sleep - ok   Diet - ok      Migraine Headache   Onset - 12 yrs   F/h migraine   Hz - rare   Tylenol 500 mg effective      HA Hz worse in last 10 yrs   Hz 2-4/week   No change in HA after SEEG   Meds tried - tylenol 1000 mg - mild relief   Since April Worsening:    Word finding difficulty   attention span shorter      Exam:  Physical Exam  Constitutional:       Appearance: Normal appearance.   HENT:      Head: Normocephalic and atraumatic.      Right Ear: External ear normal.      Left Ear: External ear normal.      Nose: Nose normal.      Mouth/Throat:      Mouth: Mucous membranes are moist.   Eyes:      Extraocular Movements: Extraocular movements  intact.      Conjunctiva/sclera: Conjunctivae normal.      Pupils: Pupils are equal, round, and reactive to light.   Cardiovascular:      Rate and Rhythm: Normal rate.      Pulses: Normal pulses.   Pulmonary:      Effort: Pulmonary effort is normal.   Musculoskeletal:         General: Normal range of motion.      Cervical back: Normal range of motion.   Skin:     General: Skin is warm and dry.   Neurological:      General: No focal deficit present.      Mental Status: He is alert.   Psychiatric:         Mood and Affect: Mood normal.         Behavior: Behavior normal.         Thought Content: Thought content normal.         Judgment: Judgment normal.     Flat affect   Masked facies   Slow gait   Slow turns   No retropulsion   Decreased arm swing   No tremor   Mild cogwheel on RUE      Impression:   L TLE s/p ablation on 3/1/21   Neuropsych effects of ablation? - Word finding difficulty; attention span shorter; irritability      Results for RAMYA MOSES (MRN [id number]) as of 9/30/2021 08:43   Ref. Range 7/22/2021 11:32   Clobazam Latest Ref Range: 30 - 300 ng/mL 338.0 (H)   Eslicarbazepine Latest Units: mcg/mL 19.3   Zonisamide Latest Ref Range: 10 - 40 mcg/mL 12   Desmethylclobazam Latest Ref Range: 300 - 3000 ng/mL 2670.0       Problem List Items Addressed This Visit        Neuro    Migraine without status migrainosus, not intractable      Other Visit Diagnoses     Temporal lobe epilepsy    -  Primary    Relevant Medications    zonisamide (ZONEGRAN) 100 MG Cap    Other Relevant Orders    EMU Monitoring    Complaints of memory disturbance            Plan:   - continue eslicarbazepine 1200mg qhs  - increase zonisamide to 600mg qhs, up titration schedule provided to patient   - continue clobazam to 30mg qhs   - nayzilam PRN for seizure cluster   - 48hr ambulatory EEG to asses for cortical irritability/subclinical seizures, results pending    - Patient is interested in initiating the surgical pathway again  given refractory seizures. Will schedule for EMU and repeat neuropsychology evaluation to begin this process. We will need to decrease or stop antiseizure drugs in order to provoke epileptic seizures for event semiology/characterization.  Must be done inpatient. Neuropsych referral also to evaluate patient's memory complaints.    - Advised patient to follow up with neurologist Dr. Saldana for continued management of migraines, advised inquiring about migraine prevention options, patient has appointment scheduled for 8/08/2022   - Follow up w/ Dr. Edgar 8/29/2022 to discuss next steps     Wilma Mckinney PA-C   Neurology-Epilepsy  [facility]    Collaborating physician, Dr. Kaleb Edgar, was available during today's encounter.     I spent approximately 120 minutes on the day of this encounter preparing to see the patient, obtaining and reviewing history and results, performing a medically appropriate exam, counseling and educating the patient/family/caregiver, documenting clinical information, coordinating care, and ordering medications, tests, procedures, and referrals.

## 2022-07-26 ENCOUNTER — TELEPHONE (OUTPATIENT)
Dept: NEUROLOGY | Facility: CLINIC | Age: 57
End: 2022-07-26
Payer: COMMERCIAL

## 2022-07-26 DIAGNOSIS — G40.109 TEMPORAL LOBE EPILEPSY: Primary | ICD-10-CM

## 2022-07-26 NOTE — TELEPHONE ENCOUNTER
Scheduled EMU 8/9/22, 1pm w/ pre admit clinic swab at 12 noon. Instructions thoroughly discussed with patient and his wife. Mailed via [employer] and sent in portal. Aware an adult is required to accompany patient for duration of this admission, including overnight.     In regards to an EMU admission:    Can you tolerate being connected to monitoring equipment/lines to your head, arms, and chest for a possible 7+ days? yes    Can you tolerate being tethered to the hospital room 24/7 during your EMU stay (meaning you cannot leave the room)? yes      Please describe your typical events and the behaviors you demonstrate during and after the event: Generalized convulsions, sometimes stares, sometimes bites tongue and/ or falls. Currently has events 2 x week.         Have you ever had to be restrained, sedated, PEC'd, or require 1:1 observation in any past hospitalizations? no

## 2022-08-09 ENCOUNTER — HOSPITAL ENCOUNTER (INPATIENT)
Facility: HOSPITAL | Age: 57
LOS: 7 days | Discharge: HOME | DRG: 101 | End: 2022-08-16
Attending: PSYCHIATRY & NEUROLOGY | Admitting: PSYCHIATRY & NEUROLOGY
Payer: MEDICARE

## 2022-08-09 ENCOUNTER — LAB VISIT (OUTPATIENT)
Dept: INTERNAL MEDICINE | Facility: CLINIC | Age: 57
End: 2022-08-09
Payer: MEDICARE

## 2022-08-09 DIAGNOSIS — G40.109 TEMPORAL LOBE EPILEPSY: ICD-10-CM

## 2022-08-09 DIAGNOSIS — R40.4 STARING EPISODES: ICD-10-CM

## 2022-08-09 DIAGNOSIS — F06.70 MILD NEUROCOGNITIVE DISORDER DUE TO ANOTHER MEDICAL CONDITION: Primary | ICD-10-CM

## 2022-08-09 DIAGNOSIS — Z11.52 ENCOUNTER FOR SCREENING FOR COVID-19: Primary | ICD-10-CM

## 2022-08-09 DIAGNOSIS — Z91.89 AT RISK FOR LONG QT SYNDROME: ICD-10-CM

## 2022-08-09 PROBLEM — G43.909 MIGRAINE WITHOUT STATUS MIGRAINOSUS, NOT INTRACTABLE: Chronic | Status: ACTIVE | Noted: 2020-05-18

## 2022-08-09 PROBLEM — G40.209 COMPLEX PARTIAL SEIZURES EVOLVING TO GENERALIZED TONIC-CLONIC SEIZURES: Chronic | Status: ACTIVE | Noted: 2020-05-18

## 2022-08-09 PROBLEM — F41.8 DEPRESSION WITH ANXIETY: Chronic | Status: ACTIVE | Noted: 2021-01-15

## 2022-08-09 PROBLEM — I10 HYPERTENSION: Chronic | Status: ACTIVE | Noted: 2020-05-31

## 2022-08-09 LAB
ALBUMIN SERPL BCP-MCNC: 4.5 G/DL (ref 3.5–5.2)
ALP SERPL-CCNC: 157 U/L (ref 55–135)
ALT SERPL W/O P-5'-P-CCNC: 24 U/L (ref 10–44)
AMPHET+METHAMPHET UR QL: NEGATIVE
ANION GAP SERPL CALC-SCNC: 7 MMOL/L (ref 8–16)
AST SERPL-CCNC: 24 U/L (ref 10–40)
BARBITURATES UR QL SCN>200 NG/ML: NEGATIVE
BASOPHILS # BLD AUTO: 0.06 K/UL (ref 0–0.2)
BASOPHILS NFR BLD: 0.8 % (ref 0–1.9)
BENZODIAZ UR QL SCN>200 NG/ML: ABNORMAL
BILIRUB SERPL-MCNC: 0.3 MG/DL (ref 0.1–1)
BUN SERPL-MCNC: 11 MG/DL (ref 6–20)
BZE UR QL SCN: NEGATIVE
CALCIUM SERPL-MCNC: 9.5 MG/DL (ref 8.7–10.5)
CANNABINOIDS UR QL SCN: NEGATIVE
CHLORIDE SERPL-SCNC: 99 MMOL/L (ref 95–110)
CO2 SERPL-SCNC: 27 MMOL/L (ref 23–29)
CREAT SERPL-MCNC: 0.8 MG/DL (ref 0.5–1.4)
CREAT UR-MCNC: 66 MG/DL (ref 23–375)
CTP QC/QA: YES
DIFFERENTIAL METHOD: ABNORMAL
EOSINOPHIL # BLD AUTO: 0.9 K/UL (ref 0–0.5)
EOSINOPHIL NFR BLD: 11.8 % (ref 0–8)
ERYTHROCYTE [DISTWIDTH] IN BLOOD BY AUTOMATED COUNT: 13.3 % (ref 11.5–14.5)
EST. GFR  (NO RACE VARIABLE): >60 ML/MIN/1.73 M^2
GLUCOSE SERPL-MCNC: 84 MG/DL (ref 70–110)
HCT VFR BLD AUTO: 46.8 % (ref 40–54)
HGB BLD-MCNC: 15.7 G/DL (ref 14–18)
IMM GRANULOCYTES # BLD AUTO: 0.04 K/UL (ref 0–0.04)
IMM GRANULOCYTES NFR BLD AUTO: 0.5 % (ref 0–0.5)
LYMPHOCYTES # BLD AUTO: 1.9 K/UL (ref 1–4.8)
LYMPHOCYTES NFR BLD: 25.5 % (ref 18–48)
MCH RBC QN AUTO: 30.5 PG (ref 27–31)
MCHC RBC AUTO-ENTMCNC: 33.5 G/DL (ref 32–36)
MCV RBC AUTO: 91 FL (ref 82–98)
METHADONE UR QL SCN>300 NG/ML: NEGATIVE
MONOCYTES # BLD AUTO: 0.8 K/UL (ref 0.3–1)
MONOCYTES NFR BLD: 11.4 % (ref 4–15)
NEUTROPHILS # BLD AUTO: 3.6 K/UL (ref 1.8–7.7)
NEUTROPHILS NFR BLD: 50 % (ref 38–73)
NRBC BLD-RTO: 0 /100 WBC
OPIATES UR QL SCN: NEGATIVE
PCP UR QL SCN>25 NG/ML: NEGATIVE
PLATELET # BLD AUTO: 362 K/UL (ref 150–450)
PMV BLD AUTO: 9.4 FL (ref 9.2–12.9)
POTASSIUM SERPL-SCNC: 4.2 MMOL/L (ref 3.5–5.1)
PROT SERPL-MCNC: 8.1 G/DL (ref 6–8.4)
RBC # BLD AUTO: 5.14 M/UL (ref 4.6–6.2)
SARS-COV-2 RDRP RESP QL NAA+PROBE: NEGATIVE
SODIUM SERPL-SCNC: 133 MMOL/L (ref 136–145)
TOXICOLOGY INFORMATION: ABNORMAL
WBC # BLD AUTO: 7.28 K/UL (ref 3.9–12.7)

## 2022-08-09 PROCEDURE — 93010 EKG 12-LEAD: ICD-10-PCS | Mod: ,,, | Performed by: INTERNAL MEDICINE

## 2022-08-09 PROCEDURE — 95700 EEG CONT REC W/VID EEG TECH: CPT

## 2022-08-09 PROCEDURE — 11000001 HC ACUTE MED/SURG PRIVATE ROOM

## 2022-08-09 PROCEDURE — 95720 PR EEG, W/VIDEO, CONT RECORD, I&R, >12<26 HRS: ICD-10-PCS | Mod: ,,, | Performed by: PSYCHIATRY & NEUROLOGY

## 2022-08-09 PROCEDURE — 80307 DRUG TEST PRSMV CHEM ANLYZR: CPT | Performed by: STUDENT IN AN ORGANIZED HEALTH CARE EDUCATION/TRAINING PROGRAM

## 2022-08-09 PROCEDURE — 36415 COLL VENOUS BLD VENIPUNCTURE: CPT | Performed by: STUDENT IN AN ORGANIZED HEALTH CARE EDUCATION/TRAINING PROGRAM

## 2022-08-09 PROCEDURE — 25000003 PHARM REV CODE 250: Performed by: STUDENT IN AN ORGANIZED HEALTH CARE EDUCATION/TRAINING PROGRAM

## 2022-08-09 PROCEDURE — 99223 1ST HOSP IP/OBS HIGH 75: CPT | Mod: AI,GC,, | Performed by: PSYCHIATRY & NEUROLOGY

## 2022-08-09 PROCEDURE — 85025 COMPLETE CBC W/AUTO DIFF WBC: CPT | Performed by: STUDENT IN AN ORGANIZED HEALTH CARE EDUCATION/TRAINING PROGRAM

## 2022-08-09 PROCEDURE — 80203 DRUG SCREEN QUANT ZONISAMIDE: CPT | Performed by: STUDENT IN AN ORGANIZED HEALTH CARE EDUCATION/TRAINING PROGRAM

## 2022-08-09 PROCEDURE — U0002 COVID-19 LAB TEST NON-CDC: HCPCS | Mod: QW,S$GLB,,

## 2022-08-09 PROCEDURE — 95720 EEG PHY/QHP EA INCR W/VEEG: CPT | Mod: ,,, | Performed by: PSYCHIATRY & NEUROLOGY

## 2022-08-09 PROCEDURE — U0002: ICD-10-PCS | Mod: QW,S$GLB,,

## 2022-08-09 PROCEDURE — 93005 ELECTROCARDIOGRAM TRACING: CPT

## 2022-08-09 PROCEDURE — 80053 COMPREHEN METABOLIC PANEL: CPT | Performed by: STUDENT IN AN ORGANIZED HEALTH CARE EDUCATION/TRAINING PROGRAM

## 2022-08-09 PROCEDURE — 99223 PR INITIAL HOSPITAL CARE,LEVL III: ICD-10-PCS | Mod: AI,GC,, | Performed by: PSYCHIATRY & NEUROLOGY

## 2022-08-09 PROCEDURE — 80339 ANTIEPILEPTICS NOS 1-3: CPT | Performed by: STUDENT IN AN ORGANIZED HEALTH CARE EDUCATION/TRAINING PROGRAM

## 2022-08-09 PROCEDURE — 95714 VEEG EA 12-26 HR UNMNTR: CPT

## 2022-08-09 PROCEDURE — 93010 ELECTROCARDIOGRAM REPORT: CPT | Mod: ,,, | Performed by: INTERNAL MEDICINE

## 2022-08-09 PROCEDURE — 80299 QUANTITATIVE ASSAY DRUG: CPT | Performed by: STUDENT IN AN ORGANIZED HEALTH CARE EDUCATION/TRAINING PROGRAM

## 2022-08-09 RX ORDER — CLOBAZAM 10 MG/1
10 TABLET ORAL NIGHTLY
Status: DISCONTINUED | OUTPATIENT
Start: 2022-08-09 | End: 2022-08-11

## 2022-08-09 RX ORDER — LORAZEPAM 2 MG/ML
2 INJECTION INTRAMUSCULAR
Status: DISCONTINUED | OUTPATIENT
Start: 2022-08-09 | End: 2022-08-16 | Stop reason: HOSPADM

## 2022-08-09 RX ORDER — SODIUM CHLORIDE 0.9 % (FLUSH) 0.9 %
10 SYRINGE (ML) INJECTION
Status: DISCONTINUED | OUTPATIENT
Start: 2022-08-09 | End: 2022-08-16 | Stop reason: HOSPADM

## 2022-08-09 RX ORDER — ESCITALOPRAM OXALATE 10 MG/1
10 TABLET ORAL DAILY
Status: DISCONTINUED | OUTPATIENT
Start: 2022-08-10 | End: 2022-08-12

## 2022-08-09 RX ORDER — DOCUSATE SODIUM 100 MG/1
100 CAPSULE, LIQUID FILLED ORAL 2 TIMES DAILY PRN
Status: DISCONTINUED | OUTPATIENT
Start: 2022-08-09 | End: 2022-08-16 | Stop reason: HOSPADM

## 2022-08-09 RX ORDER — ACETAMINOPHEN 325 MG/1
650 TABLET ORAL EVERY 4 HOURS PRN
Status: DISCONTINUED | OUTPATIENT
Start: 2022-08-09 | End: 2022-08-15

## 2022-08-09 RX ADMIN — CLOBAZAM 10 MG: 10 TABLET ORAL at 08:08

## 2022-08-09 NOTE — NURSING
EMU NURSING INTERVIEW  Pt admitted to EMU room --- 909, EMU team notified.  Onset-When did your seizures start? 2017  Aura-Do you experience an aura? No  Symptoms-What symptoms do you experience? Staring, unresponsive, bladder incontinence, LOC, convulsions, eyelid fluttering, tongue biting  Triggers-Do you have any Triggers? no  Do you bite your tongue? yes  Do become incontinent of bladder or bowels? bladder  Have you been told your BP or oxygen drops during an event? no    Bed locked in lowest position, call light within reach. AAOx4. All comments and concerns addressed. Seizure and safety precautions maintained. WCTM for events this shift.

## 2022-08-09 NOTE — PLAN OF CARE
Problem: Adult Inpatient Plan of Care  Goal: Plan of Care Review  Outcome: Ongoing, Progressing  Flowsheets (Taken 8/9/2022 1649)  Plan of Care Reviewed With:   patient   spouse  Goal: Patient-Specific Goal (Individualized)  Outcome: Ongoing, Progressing  Flowsheets (Taken 8/9/2022 1649)  Anxieties, Fears or Concerns: none  Individualized Care Needs: none  Patient-Specific Goals (Include Timeframe): none       POC reviewed with the patient and they verbalized understanding. All comments and concerns addressed. Bed locked in lowest position with bed alarm set, call light within reach. Safety precautions maintained. VSS, see flowsheets. No events this shift. Seizure precautions maintained. EEG, telemetry monitoring, and VISI mobile in place.

## 2022-08-09 NOTE — PROCEDURES
Procedures     AMBULATORY EEG REPORT      Declan Burleson  [id number]  1965    DATE OF SERVICE: 5/30/2022         METHODOLOGY      Extended electroencephalographic recording is made while the patient is ambulatory and continuing normal daily activities.  Electrodes are placed according to the International 10-20 placement system and included T1 and T2 electrode placement.  Twenty four (24) channels of digital signal (sampling rate of 512/sec) was simultaneously recorded from the scalp including EKG and eye monitors.  Recording band pass was 0.1 to 100 hz and all data was stored digitally on the recorder.  The patient is instructed to press an event button when clinical symptoms occur and write the symptoms into a diary. Activation procedures which include photic stimulation, hyperventilation and instructing patients to perform simple task are done in selected patients.        The EEG is displayed on a monitor screen and can be reformatted into different montages for evaluation.  The entire recoding is submitted for computer assisted analysis to detect spike and electrographic seizure activity.  The entire recording is visually reviewed and the times identified by computer analysis as being spikes or seizures are reviewed again.  Compresses spectral analysis (CSA) is also performed on the activity recorded from each individual channel.  This is displayed as a power display of frequencies from 0 to 30 Hz over time.   The CSA analysis is done and displayed continuously.  This is reviewed for asymmetries in power between homologous areas of the scalp and for presence of changes in power which canbe seen when seizures occur.  Sections of suspected abnormalities on the CSA is then compared with the original EEG recording.  .     Spondo software was also utilized in the review of this study.  This software suite analyzes the EEG recording in multiple domains.  Coherence and rhythmicity is computed to  identify EEG sections which may contain organized seizures.  Each channel undergoes analysis to detect presence of spike and sharp waves which have special and morphological characteristic of epileptic activity.  The routine EEG recording is converted from spacial into frequency domain.  This is then displayed comparing homologous areas to identify areas of significant asymmetry.  Algorithm to identify non-cortically generated artifact is used to separate eye movement, EMG and other artifact from the EEG     Recording Times  Start on 5/30/2022  Stop on 6/1/2022    A total of 47:07:14 hours of ambulatory EEG was recorded.      EEG FINDINGS:  Background activity:   The background rhythm was characterized by alpha and anterior dominant beta activity with a 10Hz posterior dominant alpha rhythm at 30-70 microvolts.   Symmetry and continuity: the background was continuous and symmetric     Sleep:   Normal sleep transients including sleep spindles, K complexes, vertex waves and POSTS were seen.    Activation procedures:   Photic stimulation was performed with no abnormalities seen  Hyperventilation was performed with no abnormalities seen    Abnormal activity:   No epileptiform discharges, periodic discharges, lateralized rhythmic delta activity or electrographic seizures were seen.    There are 18 event button marks between May 30 at 10:20am and June 1 at 06:37 and with no associated EEG change.  Event log is not available.    IMPRESSION:   Normal two day ambulatory EEG.      Douglas Feliciano MD  Neurology-Epilepsy.  [facility].

## 2022-08-09 NOTE — ASSESSMENT & PLAN NOTE
Does not take antihypertensive medication at home. Previously on amlodipine 5 mg QD.    - monitor BP

## 2022-08-09 NOTE — H&P
[facility] ([facility])  Neurology-Epilepsy  History & Physical    Patient Name: Declan Burleson  MRN: [id number]   Admission Date: 8/9/2022  Code Status: Full Code   Attending Provider: Kaleb Edgar MD   Primary Care Physician: Matteo Rizzo MD  Principal Problem:Staring episodes    Subjective:     Chief Complaint:  Staring episodes     HPI:   Declan Burleson is a 57 year old man with history of seizures s/p left mesial temporal laser ablation 3/2021 admitted to EMU for evaluation of continued staring/unresponsive episodes postop while on medication. Patient is interested in pursuing surgical pathway again. Began having seizures at age 48.    Event type 1  No preceding aura/prodrome. LOC and convulsions with rhythmic shaking of all extremities. Confused afterward. Last occurred 4/2022. Have become less intense since surgery, has not had seizures from sleep since.    Event type 2  Staring and unresponsive. [place]. Frequency initially improved after surgery, since then has been increasing. Occurs twice per week, last on 8/7. Had similar episodes prior to surgery.    Since surgery, has noticed memory issues--forgetting to run the washing machine after putting clothes in, difficulty using , does not recall conversation from earlier same day. Occasionally has difficulty recalling names, including his children's names. Also has noticed less motivation to do things on some days.    Current AEDs:  - eslicarbazepine 1200 mg QHS  - zonisamide 500 mg QHS - has been gradually increasing from 300 to 600 mg since last clinic visit   - clobazam 30 mg QHS   Has noticed significant daytime sleepiness on increased zonisamide dose. Reports adherence, last dose 8/8 evening.    Prior medications and SE/reason for stopping:  - topiramate - continued seizures  - lamotrigine - continued seizures  - Keppra - anger, mood disturbance  - oxcarbazepine - continued seizures    Prior seizure evaluation:  -  ambulatory EEG 6/1/22 - No epileptiform discharges, periodic discharges, lateralized rhythmic delta activity or electrographic seizures were seen. There are 18 event button marks with no associated EEG change.   - sEEG 1/21/21 - 2 seizures (subclinical) were captured. Onset of both appear to be from left amygdala and hippocampus without generalization. Abundant epileptiform activity is also seen in the left amygdala and hippocampus, frequent to abundant epileptiform discharges in the right pre-motor anterior cingulate region, and frequent epileptiform activity in the left superior frontal SMA region.  - MRI Brain w/wo contrast 5/23/22 - Focal postablation encephalomalacia of the medial left temporal lobe  - MRI Brain Stealth 2/11/21 - no definite intracranial enhancing mass lesion  - fMRI 10/26/20 - No epileptogenic lesions identified. 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      Past Medical History:   Diagnosis Date    Anxiety     Depression     GERD (gastroesophageal reflux disease)     Hyperlipidemia     Hypertension     Migraine     Seizures        Past Surgical History:   Procedure Laterality Date    CYST REMOVAL  March 2016/2017    skin cyst - benign    REMOVAL OF STEREO EEG LEADS (DEPTH ELECTRODES) Bilateral 1/21/2021    Procedure: REMOVAL OF STEREO EEG LEADS (DEPTH ELECTRODES);  Surgeon: Ruchi Chen MD;  Location: [facility] OR [address] [place] [address];  Service: Neurosurgery;  Laterality: Bilateral;    TONSILLECTOMY      teenage        Review of patient's allergies indicates:   Allergen Reactions    Losartan Rash       No current facility-administered medications on file prior to encounter.     Current Outpatient Medications on File Prior to Encounter   Medication Sig    cloBAZam (ONFI) 10 mg Tab Take 1 tablet (10mg) by mouth once nightly.    cloBAZam (ONFI) 20 mg Tab TAKE 1 TABLET BY MOUTH ONCE DAILY EVERY EVENING    eslicarbazepine (APTIOM) 400 mg Tab tablet Take 1 tablet (400 mg total) by  mouth once daily.    eslicarbazepine (APTIOM) 800 mg Tab Take 800 mg by mouth once daily.    zonisamide (ZONEGRAN) 100 MG Cap TAKE 3 CAPSULES BY MOUTH ONCE DAILY    zonisamide (ZONEGRAN) 100 MG Cap Take 6 capsules (600 mg total) by mouth every evening.    amLODIPine (NORVASC) 5 MG tablet Take 1 tablet (5 mg total) by mouth once daily.    aspirin (ECOTRIN) 81 MG EC tablet Take 1 tablet (81 mg total) by mouth once daily.    EScitalopram oxalate (LEXAPRO) 10 MG tablet Take 1 tablet (10 mg total) by mouth once daily.    mag hydrox/aluminum hyd/simeth (ANTACID ORAL) Take by mouth as needed. Acid reflux    midazolam (NAYZILAM) 5 mg/spray (0.1 mL) Spry Instill 1 spray into the nostril for seizure and may repeat in 10 minutes if needed; not more than 1 unit per week per MD    ondansetron (ZOFRAN-ODT) 8 MG TbDL Take 8 mg by mouth every 8 (eight) hours as needed. nausea    oxyCODONE (ROXICODONE) 5 MG immediate release tablet Take 1 tablet (5 mg total) by mouth every 6 (six) hours as needed for Pain.    rimegepant (NURTEC) 75 mg odt Take 75 mg by mouth.    rizatriptan (MAXALT-MLT) 10 MG disintegrating tablet Take 10 mg by mouth as needed. No more than 3 doses / week     Continuous Infusions: None    Family History       Problem Relation (Age of Onset)    Heart disease Maternal Uncle (50)    Migraines Paternal Grandfather          Tobacco Use    Smoking status: Former Smoker     Quit date:      Years since quittin.6    Smokeless tobacco: Never Used   Substance and Sexual Activity    Alcohol use: Yes     Comment: one drink once a week    Drug use: Never    Sexual activity: Yes     Partners: Female     Review of Systems   Constitutional:  Negative for chills and fever.   HENT:  Negative for rhinorrhea and sneezing.    Eyes:  Negative for pain and redness.   Respiratory:  Negative for cough and shortness of breath.    Cardiovascular:  Negative for chest pain and palpitations.   Gastrointestinal:  Negative  for abdominal pain and nausea.   Genitourinary:  Negative for difficulty urinating and dysuria.   Musculoskeletal:  Negative for back pain and neck pain.   Neurological:  Positive for seizures, syncope and speech difficulty. Negative for tremors.   Psychiatric/Behavioral:  Positive for confusion. Negative for sleep disturbance.    Objective:     Vital Signs (Most Recent):  Temp: 97.7 °F (36.5 °C) (08/09/22 1432)  Pulse: 67 (08/09/22 1432)  Resp: 16 (08/09/22 1432)  BP: 136/77 (08/09/22 1432)  SpO2: (!) 92 % (08/09/22 1432)   Vital Signs (24h Range):  Temp:  [97.7 °F (36.5 °C)] 97.7 °F (36.5 °C)  Pulse:  [67] 67  Resp:  [16] 16  SpO2:  [92 %] 92 %  BP: (136)/(77) 136/77        There is no height or weight on file to calculate BMI.    Physical Exam  Vitals and nursing note reviewed.   Constitutional:       General: He is not in acute distress.  HENT:      Head: Normocephalic and atraumatic.   Eyes:      Extraocular Movements: EOM normal.      Conjunctiva/sclera: Conjunctivae normal.      Pupils: Pupils are equal, round, and reactive to light.   Pulmonary:      Effort: Pulmonary effort is normal. No respiratory distress.   Musculoskeletal:      Right lower leg: No edema.      Left lower leg: No edema.   Skin:     General: Skin is warm and dry.      Capillary Refill: Capillary refill takes less than 2 seconds.   Neurological:      Mental Status: He is alert and oriented to person, place, and time.      Coordination: Finger-Nose-Finger Test and Heel to Shin Test normal.      Deep Tendon Reflexes:      Reflex Scores:       Bicep reflexes are 2+ on the right side and 2+ on the left side.       Brachioradialis reflexes are 2+ on the right side and 2+ on the left side.       Patellar reflexes are 2+ on the right side and 2+ on the left side.       Achilles reflexes are 1+ on the right side and 1+ on the left side.      NEUROLOGICAL EXAMINATION:     MENTAL STATUS   Oriented to person, place, and time.   Attention: normal.  Concentration: normal.   Level of consciousness: alert       Latency of a few seconds before answering a question.      CRANIAL NERVES     CN II   Visual fields full to confrontation.     CN III, IV, VI   Pupils are equal, round, and reactive to light.  Extraocular motions are normal.     CN VII   Facial expression full, symmetric.     CN VIII   Hearing: intact    CN IX, X   Palate: symmetric    CN XI   CN XI normal.     CN XII   Tongue: not atrophic  Tongue deviation: none    MOTOR EXAM   Muscle bulk: normal    Strength   Right deltoid: 5/5  Left deltoid: 5/5  Right biceps: 5/5  Left biceps: 5/5  Right triceps: 5/5  Left triceps: 5/5  Right iliopsoas: 5/5  Left iliopsoas: 5/5  Right quadriceps: 5/5  Left quadriceps: 5/5  Right hamstrin/5  Left hamstrin/5  Right anterior tibial: 5/5  Left anterior tibial: 5/5  Right posterior tibial: 5/5  Left posterior tibial: 5/5    REFLEXES     Reflexes   Right brachioradialis: 2+  Left brachioradialis: 2+  Right biceps: 2+  Left biceps: 2+  Right patellar: 2+  Left patellar: 2+  Right achilles: 1+  Left achilles: 1+    SENSORY EXAM   Light touch normal.     GAIT AND COORDINATION      Coordination   Finger to nose coordination: normal  Heel to shin coordination: normal    Significant Labs: All pertinent lab results from the past 24 hours have been reviewed.    Significant Studies: I have reviewed all pertinent imaging results/findings within the past 24 hours.    Assessment and Plan:     * Staring episodes  57 year old man with migraines, left temporal lobe epilepsy here for consideration of surgical pathway again. Underwent left mesial temporal laser ablation in 3/2021 with continued seizures afterward. Has also had intermittent word finding difficulty and confusion. Last convulsion in 2022. Currently having staring/WELL[place] episodes twice per week. Has tried topiramate, lamotrigine, and Trileptal in the past without improvement in seizures, had mood side effects from  Keppra. Currently taking eslicarbazepine 1200 mg QHS, clobazam 30 mg QHS, and zonisamide 500 mg QHS--has been gradually titrating up zonisamide from 300 to 600 mg QD since last clinic visit 7/25 with noted daytime sleepiness. Will attempt to evaluate whether patient would benefit from repeat ablation of same region or different region from prior procedure.    - reduce clobazam to 10 mg QHS  - hold eslicarbazepine and zonisamide  - check eslicarbazepine, clobazam, zonisamide levels  - continuous vEEG   - speech therapy eval for aphasia  - PRN IV ativan for GTC >5 minutes, notify epilepsy on call before administering  - seizure precautions, suction and oxygen at bedside  - fall precautions, side rail padding in place  - Visi monitoring with continuous pulse oximetry   - telemetry     Depression with anxiety  - continue home Lexapro 10 mg QD    Hypertension  Does not take antihypertensive medication at home. Previously on amlodipine 5 mg QD.    - monitor BP    Migraine without status migrainosus, not intractable  Takes PRN Nurtec at home. No headache at this time.    Complex partial seizures evolving to generalized tonic-clonic seizures  See Staring episodes        VTE Risk Mitigation (From admission, onward)         Ordered     IP VTE LOW RISK PATIENT  Once         08/09/22 1505     Place sequential compression device  Until discontinued         08/09/22 1505                Nadia Alfonso MD  Neurology-Epilepsy  [facility] - Neurosurgery ([facility])

## 2022-08-09 NOTE — SUBJECTIVE & OBJECTIVE
Past Medical History:   Diagnosis Date    Anxiety     Depression     GERD (gastroesophageal reflux disease)     Hyperlipidemia     Hypertension     Migraine     Seizures        Past Surgical History:   Procedure Laterality Date    CYST REMOVAL  March 2016/2017    skin cyst - benign    REMOVAL OF STEREO EEG LEADS (DEPTH ELECTRODES) Bilateral 1/21/2021    Procedure: REMOVAL OF STEREO EEG LEADS (DEPTH ELECTRODES);  Surgeon: Ruchi Chen MD;  Location: [facility] OR [address] [place] [address];  Service: Neurosurgery;  Laterality: Bilateral;    TONSILLECTOMY      teenage        Review of patient's allergies indicates:   Allergen Reactions    Losartan Rash       No current facility-administered medications on file prior to encounter.     Current Outpatient Medications on File Prior to Encounter   Medication Sig    cloBAZam (ONFI) 10 mg Tab Take 1 tablet (10mg) by mouth once nightly.    cloBAZam (ONFI) 20 mg Tab TAKE 1 TABLET BY MOUTH ONCE DAILY EVERY EVENING    eslicarbazepine (APTIOM) 400 mg Tab tablet Take 1 tablet (400 mg total) by mouth once daily.    eslicarbazepine (APTIOM) 800 mg Tab Take 800 mg by mouth once daily.    zonisamide (ZONEGRAN) 100 MG Cap TAKE 3 CAPSULES BY MOUTH ONCE DAILY    zonisamide (ZONEGRAN) 100 MG Cap Take 6 capsules (600 mg total) by mouth every evening.    amLODIPine (NORVASC) 5 MG tablet Take 1 tablet (5 mg total) by mouth once daily.    aspirin (ECOTRIN) 81 MG EC tablet Take 1 tablet (81 mg total) by mouth once daily.    EScitalopram oxalate (LEXAPRO) 10 MG tablet Take 1 tablet (10 mg total) by mouth once daily.    mag hydrox/aluminum hyd/simeth (ANTACID ORAL) Take by mouth as needed. Acid reflux    midazolam (NAYZILAM) 5 mg/spray (0.1 mL) Spry Instill 1 spray into the nostril for seizure and may repeat in 10 minutes if needed; not more than 1 unit per week per MD    ondansetron (ZOFRAN-ODT) 8 MG TbDL Take 8 mg by mouth every 8 (eight) hours as needed. nausea    oxyCODONE (ROXICODONE) 5 MG immediate release tablet  Take 1 tablet (5 mg total) by mouth every 6 (six) hours as needed for Pain.    rimegepant (NURTEC) 75 mg odt Take 75 mg by mouth.    rizatriptan (MAXALT-MLT) 10 MG disintegrating tablet Take 10 mg by mouth as needed. No more than 3 doses / week     Continuous Infusions: None    Family History       Problem Relation (Age of Onset)    Heart disease Maternal Uncle (50)    Migraines Paternal Grandfather          Tobacco Use    Smoking status: Former Smoker     Quit date:      Years since quittin.6    Smokeless tobacco: Never Used   Substance and Sexual Activity    Alcohol use: Yes     Comment: one drink once a week    Drug use: Never    Sexual activity: Yes     Partners: Female     Review of Systems   Constitutional:  Negative for chills and fever.   HENT:  Negative for rhinorrhea and sneezing.    Eyes:  Negative for pain and redness.   Respiratory:  Negative for cough and shortness of breath.    Cardiovascular:  Negative for chest pain and palpitations.   Gastrointestinal:  Negative for abdominal pain and nausea.   Genitourinary:  Negative for difficulty urinating and dysuria.   Musculoskeletal:  Negative for back pain and neck pain.   Neurological:  Positive for seizures, syncope and speech difficulty. Negative for tremors.   Psychiatric/Behavioral:  Positive for confusion. Negative for sleep disturbance.    Objective:     Vital Signs (Most Recent):  Temp: 97.7 °F (36.5 °C) (22 143)  Pulse: 67 (22 1432)  Resp: 16 (22 1432)  BP: 136/77 (22 1432)  SpO2: (!) 92 % (22 143)   Vital Signs (24h Range):  Temp:  [97.7 °F (36.5 °C)] 97.7 °F (36.5 °C)  Pulse:  [67] 67  Resp:  [16] 16  SpO2:  [92 %] 92 %  BP: (136)/(77) 136/77        There is no height or weight on file to calculate BMI.    Physical Exam  Vitals and nursing note reviewed.   Constitutional:       General: He is not in acute distress.  HENT:      Head: Normocephalic and atraumatic.   Eyes:      Extraocular Movements: EOM  normal.      Conjunctiva/sclera: Conjunctivae normal.      Pupils: Pupils are equal, round, and reactive to light.   Pulmonary:      Effort: Pulmonary effort is normal. No respiratory distress.   Musculoskeletal:      Right lower leg: No edema.      Left lower leg: No edema.   Skin:     General: Skin is warm and dry.      Capillary Refill: Capillary refill takes less than 2 seconds.   Neurological:      Mental Status: He is alert and oriented to person, place, and time.      Coordination: Finger-Nose-Finger Test and Heel to Shin Test normal.      Deep Tendon Reflexes:      Reflex Scores:       Bicep reflexes are 2+ on the right side and 2+ on the left side.       Brachioradialis reflexes are 2+ on the right side and 2+ on the left side.       Patellar reflexes are 2+ on the right side and 2+ on the left side.       Achilles reflexes are 1+ on the right side and 1+ on the left side.      NEUROLOGICAL EXAMINATION:     MENTAL STATUS   Oriented to person, place, and time.   Attention: normal. Concentration: normal.   Level of consciousness: alert       Latency of a few seconds before answering a question.      CRANIAL NERVES     CN II   Visual fields full to confrontation.     CN III, IV, VI   Pupils are equal, round, and reactive to light.  Extraocular motions are normal.     CN VII   Facial expression full, symmetric.     CN VIII   Hearing: intact    CN IX, X   Palate: symmetric    CN XI   CN XI normal.     CN XII   Tongue: not atrophic  Tongue deviation: none    MOTOR EXAM   Muscle bulk: normal    Strength   Right deltoid: 5/5  Left deltoid: 5/5  Right biceps: 5/5  Left biceps: 5/5  Right triceps: 5/5  Left triceps: 5/5  Right iliopsoas: 5/5  Left iliopsoas: 5/5  Right quadriceps: 5/5  Left quadriceps: 5/5  Right hamstrin/5  Left hamstrin/5  Right anterior tibial: 5/5  Left anterior tibial: 5/5  Right posterior tibial: 5/5  Left posterior tibial: 5/5    REFLEXES     Reflexes   Right brachioradialis: 2+  Left  brachioradialis: 2+  Right biceps: 2+  Left biceps: 2+  Right patellar: 2+  Left patellar: 2+  Right achilles: 1+  Left achilles: 1+    SENSORY EXAM   Light touch normal.     GAIT AND COORDINATION      Coordination   Finger to nose coordination: normal  Heel to shin coordination: normal    Significant Labs: All pertinent lab results from the past 24 hours have been reviewed.    Significant Studies: I have reviewed all pertinent imaging results/findings within the past 24 hours.

## 2022-08-09 NOTE — ASSESSMENT & PLAN NOTE
57 year old man with migraines, left temporal lobe epilepsy here for consideration of surgical pathway again. Underwent left mesial temporal laser ablation in 3/2021 with continued seizures afterward. Has also had intermittent word finding difficulty and confusion. Last convulsion in 4/2022. Currently having staring/WELL[place] episodes twice per week. Has tried topiramate, lamotrigine, and Trileptal in the past without improvement in seizures, had mood side effects from Keppra. Currently taking eslicarbazepine 1200 mg QHS, clobazam 30 mg QHS, and zonisamide 500 mg QHS--has been gradually titrating up zonisamide from 300 to 600 mg QD since last clinic visit 7/25 with noted daytime sleepiness. Will attempt to evaluate whether patient would benefit from repeat ablation of same region or different region from prior procedure.    - reduce clobazam to 10 mg QHS  - hold eslicarbazepine and zonisamide  - check eslicarbazepine, clobazam, zonisamide levels  - continuous vEEG   - speech therapy eval for aphasia  - PRN IV ativan for GTC >5 minutes, notify epilepsy on call before administering  - seizure precautions, suction and oxygen at bedside  - fall precautions, side rail padding in place  - Visi monitoring with continuous pulse oximetry   - telemetry

## 2022-08-10 LAB
FOLATE SERPL-MCNC: 12.3 NG/ML (ref 4–24)
VIT B12 SERPL-MCNC: 551 PG/ML (ref 210–950)

## 2022-08-10 PROCEDURE — 25000003 PHARM REV CODE 250: Performed by: PHYSICIAN ASSISTANT

## 2022-08-10 PROCEDURE — 99233 SBSQ HOSP IP/OBS HIGH 50: CPT | Mod: ,,, | Performed by: PSYCHIATRY & NEUROLOGY

## 2022-08-10 PROCEDURE — 82607 VITAMIN B-12: CPT | Performed by: STUDENT IN AN ORGANIZED HEALTH CARE EDUCATION/TRAINING PROGRAM

## 2022-08-10 PROCEDURE — 95720 PR EEG, W/VIDEO, CONT RECORD, I&R, >12<26 HRS: ICD-10-PCS | Mod: ,,, | Performed by: PSYCHIATRY & NEUROLOGY

## 2022-08-10 PROCEDURE — 36415 COLL VENOUS BLD VENIPUNCTURE: CPT | Performed by: STUDENT IN AN ORGANIZED HEALTH CARE EDUCATION/TRAINING PROGRAM

## 2022-08-10 PROCEDURE — 84425 ASSAY OF VITAMIN B-1: CPT | Performed by: STUDENT IN AN ORGANIZED HEALTH CARE EDUCATION/TRAINING PROGRAM

## 2022-08-10 PROCEDURE — 82746 ASSAY OF FOLIC ACID SERUM: CPT | Performed by: STUDENT IN AN ORGANIZED HEALTH CARE EDUCATION/TRAINING PROGRAM

## 2022-08-10 PROCEDURE — 97535 SELF CARE MNGMENT TRAINING: CPT

## 2022-08-10 PROCEDURE — 95720 EEG PHY/QHP EA INCR W/VEEG: CPT | Mod: ,,, | Performed by: PSYCHIATRY & NEUROLOGY

## 2022-08-10 PROCEDURE — 25000003 PHARM REV CODE 250: Performed by: STUDENT IN AN ORGANIZED HEALTH CARE EDUCATION/TRAINING PROGRAM

## 2022-08-10 PROCEDURE — 92523 SPEECH SOUND LANG COMPREHEN: CPT

## 2022-08-10 PROCEDURE — 95714 VEEG EA 12-26 HR UNMNTR: CPT

## 2022-08-10 PROCEDURE — 11000001 HC ACUTE MED/SURG PRIVATE ROOM

## 2022-08-10 PROCEDURE — 94761 N-INVAS EAR/PLS OXIMETRY MLT: CPT

## 2022-08-10 PROCEDURE — 84207 ASSAY OF VITAMIN B-6: CPT | Performed by: STUDENT IN AN ORGANIZED HEALTH CARE EDUCATION/TRAINING PROGRAM

## 2022-08-10 PROCEDURE — 99233 PR SUBSEQUENT HOSPITAL CARE,LEVL III: ICD-10-PCS | Mod: ,,, | Performed by: PSYCHIATRY & NEUROLOGY

## 2022-08-10 RX ORDER — IBUPROFEN 400 MG/1
400 TABLET ORAL ONCE
Status: COMPLETED | OUTPATIENT
Start: 2022-08-10 | End: 2022-08-10

## 2022-08-10 RX ORDER — IBUPROFEN 400 MG/1
400 TABLET ORAL EVERY 6 HOURS PRN
Status: DISCONTINUED | OUTPATIENT
Start: 2022-08-10 | End: 2022-08-16 | Stop reason: HOSPADM

## 2022-08-10 RX ADMIN — ESCITALOPRAM OXALATE 10 MG: 10 TABLET ORAL at 08:08

## 2022-08-10 RX ADMIN — IBUPROFEN 400 MG: 400 TABLET, FILM COATED ORAL at 12:08

## 2022-08-10 RX ADMIN — IBUPROFEN 400 MG: 400 TABLET, FILM COATED ORAL at 08:08

## 2022-08-10 RX ADMIN — CLOBAZAM 10 MG: 10 TABLET ORAL at 08:08

## 2022-08-10 RX ADMIN — IBUPROFEN 400 MG: 400 TABLET, FILM COATED ORAL at 03:08

## 2022-08-10 NOTE — ASSESSMENT & PLAN NOTE
Hx of, previously on Amlodipine 5 mg daily; not currently on antiHTN regimen  - BP stable  - Continue to monitor

## 2022-08-10 NOTE — PLAN OF CARE
Anthony Schulz - Neurosurgery ([facility])  Initial Discharge Assessment       Primary Care Provider: Matteo Rizzo MD    Admission Diagnosis: Temporal lobe epilepsy [G40.109]    Admission Date: 8/9/2022  Expected Discharge Date: 8/13/2022    SW met with patient/wife Pepper at bedside for Discharge Planning Assessment.  Per patient, patient lives with his wife and 2 daughters in a single story home with 2 step(s) to enter.   Per patient, patient was independent with ADLs and used no dme for ambulation prior to admit.  Patient will have assistance from wife upon discharge.  Patient does not attend a coumadin clinic and is not on dialysis.     Discharge Planning Booklet given to patient/wife and discussed.  All questions addressed.     SW/CM to follow and assist with d/c needs as needed.    Discharge Barriers Identified: None    Payor: [employer] MEDICARE / Plan: [employer] MEDICARE PPO / Product Type: Medicare Advantage /     Extended Emergency Contact Information  Primary Emergency Contact: JOSUÉDAMONPEPPER  Address: [address] [place] [address] [facility]  Home Phone: [phone]  Work Phone: [phone]  Mobile Phone: [phone]  Relation: Spouse  Preferred language: English   needed? No  Secondary Emergency Contact: Lucinda Demarco  Home Phone: [phone]  Mobile Phone: [phone]  Relation: Relative    Discharge Plan A: Home with family  Discharge Plan B: Home Health      [facility] ([place] - [place], [place] - [address]  [address]  [place] [address]  Phone: [phone] Fax: [phone]      Initial Assessment (most recent)     Adult Discharge Assessment - 08/10/22 1328        Discharge Assessment    Assessment Type Discharge Planning Assessment     Confirmed/corrected address, phone number and insurance Yes     Confirmed Demographics Correct on Facesheet     Source of Information patient     Communicated KENNEDY with patient/caregiver Date not  available/Unable to determine     Reason For Admission Staring episodes     Lives With child(woody), adult;spouse     Facility Arrived From: home     Do you expect to return to your current living situation? Yes     Do you have help at home or someone to help you manage your care at home? Yes     Who are your caregiver(s) and their phone number(s)? wife Trista Burleson [phone]     Prior to hospitilization cognitive status: Unable to Assess     Current cognitive status: Alert/Oriented     Walking or Climbing Stairs Difficulty none     Dressing/Bathing Difficulty none     Home Accessibility stairs to enter home     Number of Stairs, Main Entrance two     Home Layout Able to live on 1st floor     Equipment Currently Used at Home none     Do you currently have service(s) that help you manage your care at home? No     Do you have prescription coverage? Yes     Who is going to help you get home at discharge? wife     How do you get to doctors appointments? family or friend will provide     Are you on dialysis? No     Do you take coumadin? No     Discharge Plan A Home with family     Discharge Plan B Home Health     Discharge Plan discussed with: Patient;Spouse/sig other     Discharge Barriers Identified None        Relationship/Environment    Name(s) of Who Lives With Patient wife Trista and 2 daughters                      Marnie Heri, [place]  [id number]

## 2022-08-10 NOTE — PLAN OF CARE
Problem: Adult Inpatient Plan of Care  Goal: Plan of Care Review  Outcome: Ongoing, Progressing     Patient is AAO x4. POC reviewed with patient. Patient verbalized understanding. Patient's breathing is unlabored with equal chest expansion. Patient has continuous EEG monitoring in place. NAEO during shift. Patient ambulates to the restroom. Patient remained free from falls. Patient rested well through shift. Bed in lowest position,bed alarm on, side rails up x3, no complaints or signs of distress. WCTM during shift.  See flowsheets for full assessment and VS info.

## 2022-08-10 NOTE — NURSING
"EMU SEIZURE EVENT NOTE  Time event started: 1221 push button via wife at bedside  Duration: a few seconds  Describe symptoms during event and post-ictal symptoms: pt's wife reported pt started complaining of headache getting worse then had a "spacey" look like staring. Pt is alert and oriented x 4, pupils 4mm and brisk. VSS, pt reports a headache 6/10 which is worse from his previous reported headache 2/10. Pt reports his eyes hurt and feel like they are sinking into his head. Pt also reported mild chest pain, 2/10 and states it is stabbing in nature that comes and goes. Pt HR is NSR 62 on telemetry monitor. Pt agreed to take PRN ibuprofen.   Who notified: Nadia with EMU team  Intervention: Ibuprofen given, VS monitored.   Neuro assment back at baseline, VSS at this time. See flowsheets for more event information.    "

## 2022-08-10 NOTE — PLAN OF CARE
Problem: Adult Inpatient Plan of Care  Goal: Plan of Care Review  8/10/2022 1811 by Kayleigh Sanchez RN  Outcome: Ongoing, Progressing  Flowsheets (Taken 8/10/2022 1811)  Plan of Care Reviewed With:   patient   spouse  8/10/2022 1810 by Kayleigh Sanchez RN  Outcome: Ongoing, Progressing  Flowsheets (Taken 8/10/2022 1810)  Plan of Care Reviewed With:   patient   spouse  Goal: Patient-Specific Goal (Individualized)  8/10/2022 1811 by Kayleigh Sanchez RN  Outcome: Ongoing, Progressing  8/10/2022 1810 by Kayleigh Sanchez RN  Outcome: Ongoing, Progressing  Flowsheets (Taken 8/10/2022 1810)  Individualized Care Needs: migraine treatment today  Patient-Specific Goals (Include Timeframe): have at least one event captured during stay     Problem: Seizure, Active Management  Goal: Absence of Seizure/Seizure-Related Injury  Outcome: Ongoing, Progressing  Intervention: Prevent Seizure-Related Injury  Flowsheets (Taken 8/10/2022 1811)  Seizure Precautions:   activity supervised   clutter-free environment maintained   emergency equipment at bedside   enclosure bed used   soft boundaries provided   side rails padded       POC reviewed with the patient/spouse and they verbalized understanding. All comments and concerns addressed. Bed locked in lowest position with bed alarm set, call light within reach. Safety precautions maintained -- seizure precautions. EEG monitoring in place. Visimobile/telemetry -- VSS, see flowsheets. See nursing notes for events this shift. Will continue to monitor for changes to POC and clinical condition.

## 2022-08-10 NOTE — PROCEDURES
EMU Report    DATE OF PROCEDURE:  8/9/22     EEG NUMBER:  [facility] -1     REFERRING PHYSICIAN: Dr. Edgar      This EEG was performed to characterize the patient's events.     ELECTROENCEPHALOGRAM REPORT     METHODOLOGY:  Electroencephalographic (EEG) recording is recorded with electrodes placed according to the International 10-20 placement system.  Thirty two (32) channels of digital signal (sampling rate of 512/sec), including T1 and T2, were simultaneously recorded from the scalp and may include EKG, EMG, and/or eye monitors.  Recording band pass was 0.1 to 512 Hz.  Digital video recording of the patient is simultaneously recorded with the EEG.  The patient is instructed to report clinical symptoms which may occur during the recording session.  EEG and video recording are stored and archived in digital format.    Activation procedures, which include photic stimulation, hyperventilation and instructing patients to perform simple tasks, are done in selected patients.   The EEG is displayed on a monitor screen and can be reviewed using different montages.  Computer assisted-analysis is employed to detect spike and electrographic seizure activity.   The entire record is submitted for computer analysis.  The entire recording is visually reviewed, and the times identified by computer analysis as being spikes or seizures are reviewed again.    Compressed spectral analysis (CSA) is also performed on the activity recorded from each individual channel.  This is displayed as a power display of frequencies from 0 to 30 Hz over time.   The CSA is reviewed looking for asymmetries in power between homologous areas of the scalp, then compared with the original EEG recording.    RE2 software was also utilized in the review of this study.  This software suite analyzes the EEG recording in multiple domains.  Coherence and rhythmicity are computed to identify EEG sections which may contain organized seizures.  Each channel  undergoes analysis to detect the presence of spike and sharp waves which have special and morphological characteristics of epileptic activity.  The routine EEG recording is converted from special into frequency domain.  This is then displayed comparing homologous areas to identify areas of significant asymmetry.  Algorithm to identify non-cortically generated artifact is used to separate artifact from the EEG.       RECORDING TIMES:  Start on August 9, 2022 at hour 15 minute 44 seconds 4  End on August 10, 2022 at hours 7 minute 0 seconds 0  The total time of EEG recording for the study was 15 hours and 14 minutes    SEIZURES RECORDED:  During this recording no events were recorded     ELECTROENCEPHALOGRAM:  Interictal:  The recording was obtained with a number of standard bipolar and referential montages during wakefulness, drowsiness and sleep.  In the alert state, the posterior background rhythm was a symmetric, well-modulated 11 Hz alpha rhythm, which reacted symmetrically to eye opening.  Activation procedures were not performed.  Intermittent left temporal mixed delta range slowing was noted.. During drowsiness, the background rhythm waxed and waned and there were periods of slowing. During stage II sleep, symmetric V waves, K complexes and sleep spindles were noted.     Ictal:  No events recorded      EKG: The EKG channeled revealed normal sinus rhythm     FINAL SUMMARY:  ELECTROENCEPHALOGRAM:  Interictal:  This is an abnormal EEG during wakefulness, drowsiness and sleep.  Intermittent left temporal focal slowing was noted.     Ictal:  During this recording no events were recorded      CLINICAL SEIZURE:  Classification:  Not applicable      CLINICAL CORRELATION:  The patient is a 57-year-old female with a history of seizures, who was maintained on eslicarbazepine 1200mg nightly, zonisamide 300mg nightly, clobazam 30mg nightly.  The patient is currently maintained on clobazam.  This is an abnormal EEG during  wakefulness, drowsiness and sleep.  The presence of focal intermittent slowing in the left temporal region suggestive focal neural dysfunction in this region.  There is no evidence of an epileptic process on this recording.  No seizures recorded during this study.

## 2022-08-10 NOTE — NURSING
EMU SEIZURE EVENT NOTE  Time event started: 0719  Duration: seconds  Describe symptoms during event and post-ictal symptoms: push button activation, pt states was an accident while rolling over in bed. Pt alert and VSS. Pt noted to have expressive aphasia and could not tell me why he was in the hospital. Pt was able to tell me when I came to his room a few minutes later, and expressed frustation that he could not tell me earlier.   Who notified: Anca with EMU team    Patient response: Neurological assessment back at baseline, VSS at this time. See flowsheets for more event information.

## 2022-08-10 NOTE — PLAN OF CARE
Problem: SLP  Goal: SLP Goal  Description: Speech Pathology Goals  To be met by 8/24/22    1. Pt will participate in ongoing assessment of reading, writing, and visuospatial skills for development of most approrpiate treatment plan   2. Pt will identify x10 items within a concrete category  3. Pt will complete sentence completion tasks with 80% accuracy given MOD verbal and visual cueing         Outcome: Ongoing, Progressing     Speech and language assessment completed. Please see note for additional details.

## 2022-08-10 NOTE — SUBJECTIVE & OBJECTIVE
Interval History: NAEON. This AM, patient resting comfortably in bed, family at bedside. No typical events since admit. RN PB this morning ~0719 for brief episode of expressive aphasia with maintained awareness. Patient endorses mild headache, offered PRN analgesics. Discussed plan of care and answered questions at bedside.    Current Facility-Administered Medications   Medication Dose Route Frequency Provider Last Rate Last Admin    acetaminophen tablet 650 mg  650 mg Oral Q4H PRN Nadia Alfonso MD        cloBAZam Tab 10 mg  10 mg Oral QHS Nadia Alfonso MD   10 mg at 08/09/22 2023    docusate sodium capsule 100 mg  100 mg Oral BID PRN Nadia Alfonso MD        EScitalopram oxalate tablet 10 mg  10 mg Oral Daily Nadia Alfonso MD   10 mg at 08/10/22 0810    ibuprofen tablet 400 mg  400 mg Oral Q6H PRN Anca Long PA-C        lorazepam injection 2 mg  2 mg Intravenous Q15 Min PRN Nadia Alfonso MD        sodium chloride 0.9% flush 10 mL  10 mL Intravenous PRN Nadia Alfonso MD         Continuous Infusions:    Review of Systems   Constitutional:  Negative for chills, diaphoresis, fatigue and fever.   HENT:  Negative for hearing loss, sore throat and trouble swallowing.    Eyes:  Negative for photophobia and visual disturbance.   Respiratory:  Negative for cough, shortness of breath and wheezing.    Cardiovascular:  Negative for chest pain and palpitations.   Gastrointestinal:  Negative for diarrhea, nausea and vomiting.   Genitourinary:  Negative for difficulty urinating, dysuria and frequency.   Musculoskeletal:  Negative for back pain, myalgias and neck pain.   Neurological:  Positive for seizures (none since admit), speech difficulty and headaches (mild). Negative for weakness.   Psychiatric/Behavioral:  Negative for agitation and behavioral problems. The patient is not nervous/anxious.    Objective:     Vital Signs (Most Recent):  Temp: 96.2 °F (35.7 °C) (08/10/22 0855)  Pulse: 61 (08/10/22 1107)  Resp: 13  (08/10/22 1102)  BP: 126/86 (08/10/22 1102)  SpO2: 96 % (08/10/22 1102) Vital Signs (24h Range):  Temp:  [96.2 °F (35.7 °C)-98.6 °F (37 °C)] 96.2 °F (35.7 °C)  Pulse:  [57-69] 61  Resp:  [11-17] 13  SpO2:  [92 %-98 %] 96 %  BP: (124-149)/() 126/86     Weight: 89.4 kg (197 lb 1.5 oz)  Body mass index is 27.49 kg/m².    Physical Exam  Vitals reviewed.   Constitutional:       General: He is not in acute distress.     Appearance: He is not diaphoretic.   HENT:      Head: Normocephalic and atraumatic.      Comments: EEG in place  Eyes:      General: No scleral icterus.        Right eye: No discharge.         Left eye: No discharge.      Extraocular Movements: Extraocular movements intact and EOM normal.      Conjunctiva/sclera: Conjunctivae normal.      Pupils: Pupils are equal, round, and reactive to light.   Cardiovascular:      Rate and Rhythm: Normal rate.   Pulmonary:      Effort: Pulmonary effort is normal. No respiratory distress.   Abdominal:      General: There is no distension.      Palpations: Abdomen is soft.      Tenderness: There is no abdominal tenderness.   Musculoskeletal:         General: No swelling, tenderness or deformity. Normal range of motion.   Skin:     General: Skin is warm and dry.   Neurological:      General: No focal deficit present.      Mental Status: He is alert and oriented to person, place, and time. Mental status is at baseline.   Psychiatric:         Mood and Affect: Mood normal.         Speech: Speech normal.         Behavior: Behavior normal.       NEUROLOGICAL EXAMINATION:     MENTAL STATUS   Oriented to person, place, and time.   Attention: normal. Concentration: normal.   Speech: speech is normal   Level of consciousness: alert    CRANIAL NERVES     CN II   Visual fields full to confrontation.     CN III, IV, VI   Pupils are equal, round, and reactive to light.  Extraocular motions are normal.     CN V   Right corneal reflex: normal  Left corneal reflex: normal    CN VII    Facial expression full, symmetric.     CN VIII   Hearing: intact    CN XI   CN XI normal.     CN XII   CN XII normal.     Significant Labs: All pertinent lab results from the past 24 hours have been reviewed.    Significant Studies: I have reviewed all pertinent imaging results/findings within the past 24 hours.

## 2022-08-10 NOTE — ASSESSMENT & PLAN NOTE
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 Edgar for event capture and characterization of breakthrough seizures and consideration of surgical evaluation. Breakthrough seizures described as staring with loss of awareness occurring ~2x/weekly. Last convulsion in 4/2022. Patient has also noted intermittent word finding difficulty and confusion. Patient has previously tried Levetiracetam (stopped d/t mood side effects), Topiramate, Lamotrigine, and Oxcarbazepine in the past without improvement in seizures.    - Continuous vEEG   - Held home Eslicarbazepine and Zonisamide on admit, Clobazam decreased to 10 mg qhs->5 mg qhs on 8/11  - AED levels sent  - PB 8/10 @0719 for brief episode of word finding difficulty with maintained awareness & @1221 for headache and staring- no EEG correlate with events; see EEG report for full details   - EEG with focal intermittent slowing in left temporal region, no epileptiform discharges, no electrographic seizures  - Activation procedures per protocol- medication adjustments as above  - IV Ativan PRN for GTC greater than 5 min - please call epilepsy on call before administering  - Seizure precautions, suction and oxygen at bedside  - Fall precautions, side rail padding in place  - Visi monitoring with continuous pulse oximetry   - Telemetry  - Placed ambulatory referral to speech therapy

## 2022-08-10 NOTE — HOSPITAL COURSE
8/9>8/10: Admit to EMU. Eslicarbazepine and Zonisamide held on admit, Clobazam decreased to 10 mg qhs. EEG with focal intermittent slowing in left temporal region, no epileptiform discharges, no electrographic seizures. PB @0719 for episode of word finding difficulty with maintained awareness and @1221 for headache and staring- no EEG correlate with events; see EEG report for full details.  8/10>8/11: No typical events. EEG unchanged, focal intermittent slowing in left temporal region, no epileptiform discharges, no electrographic seizures. Continued medication adjustments for event capture- decreased Clobazam to 5 mg qhs.  8/11>8/12: No typical events. EEG with continued left focal slowing and rare left sharps, no electrographic seizures. Clobazam held, last dose 8/11. Lexapro dose increased from 10 to 15 mg QD due to reported low energy.  8/12>8/13: PB @2144 for episode of eyelid fluttering, seeing flashing lights, numbness/tingling with no EEG correlate. EEG with left temporal interictals, no seizures.  8/13>8/14: 1 sz w/ L hemispheric onset.  8/14>8/15: EEG with left temporal focal slowing, left temporal interictals, and electroclinical seizure @0435 with left temporal onset and secondary generalization. Resume home AEDs now: Eslicarbazepine 1200 mg x1, Clobazam 30 mg x1, and Zonisamide 500 mg x1.  8/15>8/16: No further seizures after resuming home AED regimen. Discharged home in stable condition on resumed AED regimen: Eslicarbazepine 1200 mg qhs, Clobazam 30 mg qhs, and Zonisamide 500 mg qhs. Plan to increase Clobazam to 40 mg qhs in 1 week (Rx sent to outpatient pharmacy). Outpatient follow up in Epilepsy clinic with Dr. Edgar. Seizure precautions.

## 2022-08-10 NOTE — ASSESSMENT & PLAN NOTE
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 Edgar for event capture and characterization of breakthrough seizures and consideration of surgical evaluation. Breakthrough seizures described as staring and episodes of loss of awareness occurring ~2x/weekly. Last convulsion in 4/2022. Patient has also noted intermittent word finding difficulty and confusion. Patient has previously tried Levetiracetam (stopped d/t mood side effects), Topiramate, Lamotrigine, and Oxcarbazepine in the past without improvement in seizures.    - Continuous vEEG   - Held home Eslicarbazepine and Zonisamide on admit, Clobazam decreased to 10 mg qhs  - AED levels sent  - PB 8/10 @0719 for brief episode of expressive aphasia with maintained awareness & @1221 for headache and ?staring- no EEG correlate with events; see EEG report for full details   - EEG with focal intermittent slowing in left temporal region, no epileptiform discharges, no electrographic seizures  - Activation procedures per protocol- medication adjustments as above  - IV Ativan PRN for GTC greater than 5 min - please call epilepsy on call before administering  - Seizure precautions, suction and oxygen at bedside  - Fall precautions, side rail padding in place  - Visi monitoring with continuous pulse oximetry   - Telemetry  - Placed ambulatory referral to speech therapy

## 2022-08-10 NOTE — PT/OT/SLP EVAL
Speech Language Pathology Evaluation  Cognitive Communication    Patient Name:  Declan Burleson   MRN:  [id number]  Admitting Diagnosis: Staring episodes    Recommendations:     Recommendations:                General Recommendations:  Speech/language therapy  General Precautions: Standard    Communication strategies:  provide increased time to answer    History:     Past Medical History:   Diagnosis Date    Anxiety     Depression     GERD (gastroesophageal reflux disease)     Hyperlipidemia     Hypertension     Migraine     Seizures        Past Surgical History:   Procedure Laterality Date    CYST REMOVAL  March 2016/2017    skin cyst - benign    REMOVAL OF STEREO EEG LEADS (DEPTH ELECTRODES) Bilateral 1/21/2021    Procedure: REMOVAL OF STEREO EEG LEADS (DEPTH ELECTRODES);  Surgeon: Ruchi Chen MD;  Location: [facility] OR [address] [place] [address];  Service: Neurosurgery;  Laterality: Bilateral;    TONSILLECTOMY      teenage        Social History: Patient lives with his spouse    Prior Intubation HX:  None on file    Modified Barium Swallow: None on file    Occupation/hobbies/homemaking: None stated    Subjective     Pt found resting in bed with spouse at bedside upon SLP entry into room. Pt agreeable to participate in all aspects of session.     Patient goals: to improve speaking     Pain/Comfort:  · Pain Rating 1: 0/10    Respiratory Status: Room air    Objective:     Pt reported history of language deficits 2/2 known epilepsy. He underwent left mesial temporal laser ablation 3/2021 though presented to ED due to ongoing staring/unresponsive episodes. Endorsed decreased word finding and word fluency recently which has caused significant frustration. Pt is trilingual (Marshallese, Nepalese, and English) and reports decreased word finding across all languages. While he had increased success with word finding when speaking Marshallese or Nepalese upon initial diagnoses, he notes language difficulties across all languages. Pt  calm though appearing upset by communication deficits.    Cognitive Status:     Arousal/Alertness - Appropriate response to stimuli though intermittently delayed 2/2 word finding deficits   Attention - No obvious deficits observed    Orientation - AOx4   Memory - Recalled 3/4 related words during working memory tasks   Problem Solving - 1/2 Indep    Reasoning - 2/5 Indep   Sequencing - 2/2 Indep    Pragmatics:     Slightly flat affect    Expressive Language   Mildly impaired- Pt able to participate in basic conversational exchanges with help from communication partner. Intermittent periods of anomia and circumlocution observed. Endorses remembering appropriate words several minutes following a conversation. During sentence completion tasks, pt with poor ability to respond with concrete answers. He named concrete items on 5/5 trials Indep. He completed responsive naming tasks with 100% accuracy though with slowed responses and need for repetition of task directions. Given a concrete category, pt named x8 items (WNL 15-20).       Receptive Language   Pt followed single and multi-step directions on all trials Indep though required increased time for processing task instruciotns. Given a verbally-presented paragraph, pt answered comprehension questions immediately following stimulus on 4/4 trials. He completed receptive identification of concrete items on 6/6 trials. Pt would continue to benefit from ongoing assessment of higher level receptive language skills in future sessions    Motor Speech:   WFL- no dysarthria or apraxia noted    Voice:    WFL    Visual-Spatial:   TBA    Reading:    TBA     Written Expression:    TBA      Treatment: SLP spoke with pt and spouse regarding overall impressions, language deficits, recommendations for ongoing speech therapy upon D/C, and SLP POC. Pt and spouse asking a variety of appropriate questions within SLP scope which were answered to satisfaction. No additional  concerns verbalized upon SLP exit.       Assessment:     Declan Burleson is a 57 y.o. male with an SLP diagnosis of mild aphasia characterized by decreased wrod fluecny and word finding.  He would benefit from ongoing speech services at current and next level of care. SLP to continue to follow.    Goals:   Multidisciplinary Problems     SLP Goals        Problem: SLP    Goal Priority Disciplines Outcome   SLP Goal     SLP Ongoing, Progressing   Description: Speech Pathology Goals  To be met by 8/24/22    1. Pt will participate in ongoing assessment of reading, writing, and visuospatial skills for development of most approrpiate treatment plan   2. Pt will identify x10 items within a concrete category  3. Pt will complete sentence completion tasks with 80% accuracy given MOD verbal and visual cueing                              Plan:   · Patient to be seen:  3 x/week   · Plan of Care expires:  09/09/22  · Plan of Care reviewed with:  patient, spouse   · SLP Follow-Up:  Yes       Discharge recommendations:  Discharge Facility/Level of Care Needs: outpatient speech therapy   Barriers to Discharge:  None    Time Tracking:   SLP Treatment Date:   08/10/22  Speech Start Time:  0838  Speech Stop Time:  0907     Speech Total Time (min):  29 min    Billable Minutes: Eval 19  and Self Care/Home Management Training 10      08/10/2022

## 2022-08-10 NOTE — PROGRESS NOTES
[facility] - EMU ([facility])  Neurology-Epilepsy  Progress Note    Patient Name: Declan Burleson  MRN: [id number]  Admission Date: 8/9/2022  Hospital Length of Stay: 1 days  Code Status: Full Code   Attending Provider: Kaleb Edgar MD  Primary Care Physician: Juan Carlos Simmons NP   Principal Problem:Staring episodes    Subjective:     Hospital Course:   8/9>8/10: Admit to EMU. Eslicarbazepine and Zonisamide held on admit, Clobazam decreased to 10 mg qhs. EEG with focal intermittent slowing in left temporal region, no epileptiform discharges, no electrographic seizures. PB @0719 for episode of word finding difficulty with maintained awareness and @1221 for headache and ?staring- no EEG correlate with events; see EEG report for full details.      Interval History: NAEON. This AM, patient resting comfortably in bed, family at bedside. No typical events since admit. RN PB this morning ~0719 for brief episode of word finding difficulty with maintained awareness. Patient endorses mild headache, offered PRN analgesics. Discussed plan of care and answered questions at bedside.    Current Facility-Administered Medications   Medication Dose Route Frequency Provider Last Rate Last Admin    acetaminophen tablet 650 mg  650 mg Oral Q4H PRN Nadia Alfonso MD        cloBAZam Tab 10 mg  10 mg Oral QHS Nadia Alfonso MD   10 mg at 08/09/22 2023    docusate sodium capsule 100 mg  100 mg Oral BID PRN Nadia Alfonso MD        EScitalopram oxalate tablet 10 mg  10 mg Oral Daily Nadia Alfonso MD   10 mg at 08/10/22 0810    ibuprofen tablet 400 mg  400 mg Oral Q6H PRN Anca Long PA-C        lorazepam injection 2 mg  2 mg Intravenous Q15 Min PRN Nadia Alfonso MD        sodium chloride 0.9% flush 10 mL  10 mL Intravenous PRN Nadia Alfonso MD         Continuous Infusions:    Review of Systems   Constitutional:  Negative for chills, diaphoresis, fatigue and fever.   HENT:  Negative for hearing loss, sore throat and trouble  swallowing.    Eyes:  Negative for photophobia and visual disturbance.   Respiratory:  Negative for cough, shortness of breath and wheezing.    Cardiovascular:  Negative for chest pain and palpitations.   Gastrointestinal:  Negative for diarrhea, nausea and vomiting.   Genitourinary:  Negative for difficulty urinating, dysuria and frequency.   Musculoskeletal:  Negative for back pain, myalgias and neck pain.   Neurological:  Positive for seizures (none since admit), speech difficulty (chronic word finding difficulty) and headaches (mild). Negative for weakness.   Psychiatric/Behavioral:  Negative for agitation and behavioral problems. The patient is not nervous/anxious.    Objective:     Vital Signs (Most Recent):  Temp: 96.2 °F (35.7 °C) (08/10/22 0855)  Pulse: 61 (08/10/22 1107)  Resp: 13 (08/10/22 1102)  BP: 126/86 (08/10/22 1102)  SpO2: 96 % (08/10/22 1102) Vital Signs (24h Range):  Temp:  [96.2 °F (35.7 °C)-98.6 °F (37 °C)] 96.2 °F (35.7 °C)  Pulse:  [57-69] 61  Resp:  [11-17] 13  SpO2:  [92 %-98 %] 96 %  BP: (124-149)/() 126/86     Weight: 89.4 kg (197 lb 1.5 oz)  Body mass index is 27.49 kg/m².    Physical Exam  Vitals reviewed.   Constitutional:       General: He is not in acute distress.     Appearance: He is not diaphoretic.   HENT:      Head: Normocephalic and atraumatic.      Comments: EEG in place  Eyes:      General: No scleral icterus.        Right eye: No discharge.         Left eye: No discharge.      Extraocular Movements: Extraocular movements intact and EOM normal.      Conjunctiva/sclera: Conjunctivae normal.      Pupils: Pupils are equal, round, and reactive to light.   Cardiovascular:      Rate and Rhythm: Normal rate.   Pulmonary:      Effort: Pulmonary effort is normal. No respiratory distress.   Abdominal:      General: There is no distension.      Palpations: Abdomen is soft.      Tenderness: There is no abdominal tenderness.   Musculoskeletal:         General: No swelling,  tenderness or deformity. Normal range of motion.   Skin:     General: Skin is warm and dry.   Neurological:      General: No focal deficit present.      Mental Status: He is alert and oriented to person, place, and time. Mental status is at baseline.   Psychiatric:         Mood and Affect: Mood normal.         Speech: Speech normal.         Behavior: Behavior normal.       NEUROLOGICAL EXAMINATION:     MENTAL STATUS   Oriented to person, place, and time.   Attention: normal. Concentration: normal.   Speech: speech is normal   Level of consciousness: alert    CRANIAL NERVES     CN II   Visual fields full to confrontation.     CN III, IV, VI   Pupils are equal, round, and reactive to light.  Extraocular motions are normal.     CN V   Right corneal reflex: normal  Left corneal reflex: normal    CN VII   Facial expression full, symmetric.     CN VIII   Hearing: intact    CN XI   CN XI normal.     CN XII   CN XII normal.     Significant Labs: All pertinent lab results from the past 24 hours have been reviewed.    Significant Studies: I have reviewed all pertinent imaging results/findings within the past 24 hours.    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 Edgar for event capture and characterization of breakthrough seizures and consideration of surgical evaluation. Breakthrough seizures described as staring with loss of awareness occurring ~2x/weekly. Last convulsion in 4/2022. Patient has also noted intermittent word finding difficulty and confusion. Patient has previously tried Levetiracetam (stopped d/t mood side effects), Topiramate,  Lamotrigine, and Oxcarbazepine in the past without improvement in seizures.    - Continuous vEEG   - Held home Eslicarbazepine and Zonisamide on admit, Clobazam decreased to 10 mg qhs  - AED levels sent  - PB 8/10 @0719 for brief episode of word finding difficulty with maintained awareness & @1221 for headache and ?staring- no EEG correlate with events; see EEG report for full details   - EEG with focal intermittent slowing in left temporal region, no epileptiform discharges, no electrographic seizures  - Activation procedures per protocol- medication adjustments as above  - IV Ativan PRN for GTC greater than 5 min - please call epilepsy on call before administering  - Seizure precautions, suction and oxygen at bedside  - Fall precautions, side rail padding in place  - Visi monitoring with continuous pulse oximetry   - Telemetry  - Placed ambulatory referral to speech therapy     Depression with anxiety  - Chronic  - Continue home Escitalopram 10 mg daily    Hypertension  Hx of, previously on Amlodipine 5 mg daily; not currently on antiHTN regimen  - BP stable  - Continue to monitor    Migraine without status migrainosus, not intractable  Takes PRN Nurtec at home  - Analgesics PRN, supportive care    Complex partial seizures evolving to generalized tonic-clonic seizures  See Staring episodes        VTE Risk Mitigation (From admission, onward)         Ordered     IP VTE LOW RISK PATIENT  Once         08/09/22 1505     Place sequential compression device  Until discontinued         08/09/22 1505                Anca Long PA-C  Neurology-Epilepsy  [facility] ([facility])  Staff: Dr. Edgar

## 2022-08-11 PROCEDURE — 99233 PR SUBSEQUENT HOSPITAL CARE,LEVL III: ICD-10-PCS | Mod: ,,, | Performed by: PSYCHIATRY & NEUROLOGY

## 2022-08-11 PROCEDURE — 95720 PR EEG, W/VIDEO, CONT RECORD, I&R, >12<26 HRS: ICD-10-PCS | Mod: ,,, | Performed by: PSYCHIATRY & NEUROLOGY

## 2022-08-11 PROCEDURE — 25000003 PHARM REV CODE 250: Performed by: PHYSICIAN ASSISTANT

## 2022-08-11 PROCEDURE — 99233 SBSQ HOSP IP/OBS HIGH 50: CPT | Mod: ,,, | Performed by: PSYCHIATRY & NEUROLOGY

## 2022-08-11 PROCEDURE — 95714 VEEG EA 12-26 HR UNMNTR: CPT

## 2022-08-11 PROCEDURE — 95720 EEG PHY/QHP EA INCR W/VEEG: CPT | Mod: ,,, | Performed by: PSYCHIATRY & NEUROLOGY

## 2022-08-11 PROCEDURE — 11000001 HC ACUTE MED/SURG PRIVATE ROOM

## 2022-08-11 PROCEDURE — 25000003 PHARM REV CODE 250: Performed by: STUDENT IN AN ORGANIZED HEALTH CARE EDUCATION/TRAINING PROGRAM

## 2022-08-11 RX ORDER — CLOBAZAM 2.5 MG/ML
5 SUSPENSION ORAL NIGHTLY
Status: DISCONTINUED | OUTPATIENT
Start: 2022-08-11 | End: 2022-08-12

## 2022-08-11 RX ADMIN — CLOBAZAM 5 MG: 2.5 SUSPENSION ORAL at 09:08

## 2022-08-11 RX ADMIN — ESCITALOPRAM OXALATE 10 MG: 10 TABLET ORAL at 08:08

## 2022-08-11 RX ADMIN — IBUPROFEN 400 MG: 400 TABLET, FILM COATED ORAL at 01:08

## 2022-08-11 NOTE — PLAN OF CARE
Problem: Adult Inpatient Plan of Care  Goal: Plan of Care Review  Outcome: Ongoing, Progressing  Goal: Readiness for Transition of Care  Outcome: Ongoing, Progressing     Problem: Seizure, Active Management  Goal: Absence of Seizure/Seizure-Related Injury  Outcome: Ongoing, Progressing     Poc reviewed with patient and family. Patient had no events during shift. Patient's clobazam reduced to 5 mg from 10 mg. Safety measures maintained. Patient bed in low position. Bed alarm set. Patient call bell with in reach. Patient belongings with in reach. VSS. Full assessment in chart. NAEO.

## 2022-08-11 NOTE — PROCEDURES
EMU Report     DATE OF PROCEDURE:  8/10/22     EEG NUMBER:  [facility] -2     REFERRING PHYSICIAN: Dr. Edgar      This EEG was performed to characterize the patient's events.     ELECTROENCEPHALOGRAM REPORT     METHODOLOGY:  Electroencephalographic (EEG) recording is recorded with electrodes placed according to the International 10-20 placement system.  Thirty two (32) channels of digital signal (sampling rate of 512/sec), including T1 and T2, were simultaneously recorded from the scalp and may include EKG, EMG, and/or eye monitors.  Recording band pass was 0.1 to 512 Hz.  Digital video recording of the patient is simultaneously recorded with the EEG.  The patient is instructed to report clinical symptoms which may occur during the recording session.  EEG and video recording are stored and archived in digital format.    Activation procedures, which include photic stimulation, hyperventilation and instructing patients to perform simple tasks, are done in selected patients.   The EEG is displayed on a monitor screen and can be reviewed using different montages.  Computer assisted-analysis is employed to detect spike and electrographic seizure activity.   The entire record is submitted for computer analysis.  The entire recording is visually reviewed, and the times identified by computer analysis as being spikes or seizures are reviewed again.    Compressed spectral analysis (CSA) is also performed on the activity recorded from each individual channel.  This is displayed as a power display of frequencies from 0 to 30 Hz over time.   The CSA is reviewed looking for asymmetries in power between homologous areas of the scalp, then compared with the original EEG recording.    [employer] software was also utilized in the review of this study.  This software suite analyzes the EEG recording in multiple domains.  Coherence and rhythmicity are computed to identify EEG sections which may contain organized seizures.  Each channel  undergoes analysis to detect the presence of spike and sharp waves which have special and morphological characteristics of epileptic activity.  The routine EEG recording is converted from special into frequency domain.  This is then displayed comparing homologous areas to identify areas of significant asymmetry.  Algorithm to identify non-cortically generated artifact is used to separate artifact from the EEG.       RECORDING TIMES:  Start on August 10, 2022 at hour 7 minute 0 seconds 59  End on August 11, 2022 at hours 7 minute 0 seconds 0  The total time of EEG recording for the study was 23 hours and 57 minutes     EVENTS RECORDED:  During this recording 2 events were recorded.  One episode of staring and additional episode of brief word-finding difficulty was noted without EEG correlate     ELECTROENCEPHALOGRAM:  Interictal:  The recording was obtained with a number of standard bipolar and referential montages during wakefulness, drowsiness and sleep.  In the alert state, the posterior background rhythm was a symmetric, well-modulated 11 Hz alpha rhythm, which reacted symmetrically to eye opening.  Activation procedures were not performed.  Intermittent left temporal mixed delta range slowing was noted.. During drowsiness, the background rhythm waxed and waned and there were periods of slowing. During stage II sleep, symmetric V waves, K complexes and sleep spindles were noted.     Ictal:  No events recorded      EKG: The EKG channeled revealed normal sinus rhythm      FINAL SUMMARY:  ELECTROENCEPHALOGRAM:  Interictal:  This is an abnormal EEG during wakefulness, drowsiness and sleep.  Intermittent left temporal focal slowing was noted.     Ictal:  During this recording no events were recorded      CLINICAL SEIZURE:  Classification:  Not applicable      CLINICAL CORRELATION:  The patient is a 57-year-old female with a history of seizures, who was maintained on eslicarbazepine 1200mg nightly, zonisamide 300mg  nightly, clobazam 30mg nightly.  The patient is currently maintained on clobazam.  This is an abnormal EEG during wakefulness, drowsiness and sleep.  The presence of focal intermittent slowing in the left temporal region suggestive focal neural dysfunction in this region.  There is no evidence of an epileptic process on this recording.  No seizures recorded during this study.

## 2022-08-11 NOTE — PROGRESS NOTES
[facility] Schulz - EMU ([facility])  Neurology-Epilepsy  Progress Note    Patient Name: Declan Burleson  MRN: [id number]  Admission Date: 8/9/2022  Hospital Length of Stay: 2 days  Code Status: Full Code   Attending Provider: Kaleb Edgar MD  Primary Care Physician: Juan Carlos Simmons NP   Principal Problem:Staring episodes    Subjective:     Hospital Course:   8/9>8/10: Admit to EMU. Eslicarbazepine and Zonisamide held on admit, Clobazam decreased to 10 mg qhs. EEG with focal intermittent slowing in left temporal region, no epileptiform discharges, no electrographic seizures. PB @0719 for episode of word finding difficulty with maintained awareness and @1221 for headache and staring- no EEG correlate with events; see EEG report for full details.  8/10>8/11: No typical events. EEG unchanged, focal intermittent slowing in left temporal region, no epileptiform discharges, no electrographic seizures. Continued medication adjustments for event capture- decreased Clobazam to 5 mg qhs.      Interval History: NAEON. This AM, patient resting comfortably in bed with wife at bedside. Patient reports he slept well overnight and headache resolved. No typical events. Discussed plan of care and answered questions at bedside.    Current Facility-Administered Medications   Medication Dose Route Frequency Provider Last Rate Last Admin    acetaminophen tablet 650 mg  650 mg Oral Q4H PRN Nadia Alfonso MD        cloBAZam Tab 10 mg  10 mg Oral QHS Nadia Alfonso MD   10 mg at 08/10/22 2036    docusate sodium capsule 100 mg  100 mg Oral BID PRN Nadia Alfonso MD        EScitalopram oxalate tablet 10 mg  10 mg Oral Daily Nadia Alfonso MD   10 mg at 08/11/22 0812    ibuprofen tablet 400 mg  400 mg Oral Q6H PRN Anca Long PA-C   400 mg at 08/10/22 2001    lorazepam injection 2 mg  2 mg Intravenous Q15 Min PRN Nadia Alfonso MD        sodium chloride 0.9% flush 10 mL  10 mL Intravenous PRN Nadia Alfonso MD         Continuous  Infusions:    Review of Systems   Constitutional:  Negative for chills, diaphoresis and fever.   HENT:  Negative for hearing loss, sore throat and trouble swallowing.    Eyes:  Negative for photophobia and visual disturbance.   Respiratory:  Negative for cough, shortness of breath and wheezing.    Cardiovascular:  Negative for chest pain and palpitations.   Gastrointestinal:  Negative for diarrhea, nausea and vomiting.   Genitourinary:  Negative for difficulty urinating, dysuria and frequency.   Musculoskeletal:  Negative for back pain, myalgias and neck pain.   Neurological:  Positive for seizures (none since admit) and speech difficulty (chronic word finding difficulty). Negative for weakness and headaches (resolved).   Psychiatric/Behavioral:  Negative for agitation and behavioral problems. The patient is not nervous/anxious.    Objective:     Vital Signs (Most Recent):  Temp: 98 °F (36.7 °C) (08/11/22 0819)  Pulse: 74 (08/11/22 0819)  Resp: 15 (08/11/22 0819)  BP: (!) 134/95 (08/11/22 0819)  SpO2: 95 % (08/11/22 0819)   Vital Signs (24h Range):  Temp:  [97.8 °F (36.6 °C)-98.2 °F (36.8 °C)] 98 °F (36.7 °C)  Pulse:  [58-74] 74  Resp:  [12-21] 15  SpO2:  [95 %-98 %] 95 %  BP: (114-136)/(74-95) 134/95     Weight: 89.4 kg (197 lb 1.5 oz)  Body mass index is 27.49 kg/m².    Physical Exam  Vitals reviewed.   Constitutional:       General: He is not in acute distress.     Appearance: He is not diaphoretic.   HENT:      Head: Normocephalic and atraumatic.      Comments: EEG in place  Eyes:      General: No scleral icterus.        Right eye: No discharge.         Left eye: No discharge.      Extraocular Movements: Extraocular movements intact and EOM normal.      Conjunctiva/sclera: Conjunctivae normal.      Pupils: Pupils are equal, round, and reactive to light.   Cardiovascular:      Rate and Rhythm: Normal rate.   Pulmonary:      Effort: Pulmonary effort is normal. No respiratory distress.   Abdominal:      General:  There is no distension.      Palpations: Abdomen is soft.      Tenderness: There is no abdominal tenderness.   Musculoskeletal:         General: No swelling, tenderness or deformity. Normal range of motion.   Skin:     General: Skin is warm and dry.   Neurological:      General: No focal deficit present.      Mental Status: He is alert and oriented to person, place, and time. Mental status is at baseline.   Psychiatric:         Mood and Affect: Mood normal.         Speech: Speech normal.         Behavior: Behavior normal.       NEUROLOGICAL EXAMINATION:     MENTAL STATUS   Oriented to person, place, and time.   Attention: normal. Concentration: normal.   Speech: speech is normal   Level of consciousness: alert    CRANIAL NERVES     CN II   Visual fields full to confrontation.     CN III, IV, VI   Pupils are equal, round, and reactive to light.  Extraocular motions are normal.     CN V   Right corneal reflex: normal  Left corneal reflex: normal    CN VII   Facial expression full, symmetric.     CN VIII   Hearing: intact    CN XI   CN XI normal.     CN XII   CN XII normal.     Significant Labs: All pertinent lab results from the past 24 hours have been reviewed.    Significant Studies: I have reviewed all pertinent imaging results/findings within the past 24 hours.    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 Edgar for event capture and characterization of breakthrough seizures and consideration of surgical evaluation. Breakthrough seizures described as staring with loss of awareness occurring ~2x/weekly. Last convulsion in 4/2022. Patient has  also noted intermittent word finding difficulty and confusion. Patient has previously tried Levetiracetam (stopped d/t mood side effects), Topiramate, Lamotrigine, and Oxcarbazepine in the past without improvement in seizures.    - Continuous vEEG   - Held home Eslicarbazepine and Zonisamide on admit, Clobazam decreased to 10 mg qhs->5 mg qhs on 8/11  - AED levels sent  - PB 8/10 @0719 for brief episode of word finding difficulty with maintained awareness & @1221 for headache and staring- no EEG correlate with events; see EEG report for full details   - EEG with focal intermittent slowing in left temporal region, no epileptiform discharges, no electrographic seizures  - Activation procedures per protocol- medication adjustments as above  - IV Ativan PRN for GTC greater than 5 min - please call epilepsy on call before administering  - Seizure precautions, suction and oxygen at bedside  - Fall precautions, side rail padding in place  - Visi monitoring with continuous pulse oximetry   - Telemetry  - Placed ambulatory referral to speech therapy     Depression with anxiety  - Chronic  - Continue home Escitalopram 10 mg daily    Hypertension  Hx of, previously on Amlodipine 5 mg daily; not currently on antiHTN regimen  - BP stable  - Continue to monitor    Migraine without status migrainosus, not intractable  Takes PRN Nurtec at home  - Analgesics PRN, supportive care    Complex partial seizures evolving to generalized tonic-clonic seizures  See Staring episodes      VTE Risk Mitigation (From admission, onward)         Ordered     IP VTE LOW RISK PATIENT  Once         08/09/22 1505     Place sequential compression device  Until discontinued         08/09/22 1505                Anca Long PA-C  Neurology-Epilepsy  [facility] ([facility])  Staff: Dr. Edgar

## 2022-08-11 NOTE — SUBJECTIVE & OBJECTIVE
Interval History: NAEON. This AM, patient resting comfortably in bed with wife at bedside. Patient reports he slept well overnight and headache resolved. No typical events. Discussed plan of care and answered questions at bedside.    Current Facility-Administered Medications   Medication Dose Route Frequency Provider Last Rate Last Admin    acetaminophen tablet 650 mg  650 mg Oral Q4H PRN Nadia Alfonso MD        cloBAZam Tab 10 mg  10 mg Oral QHS Nadia Alfonso MD   10 mg at 08/10/22 2036    docusate sodium capsule 100 mg  100 mg Oral BID PRN Nadia Alfonso MD        EScitalopram oxalate tablet 10 mg  10 mg Oral Daily Nadia Alfonso MD   10 mg at 08/11/22 0812    ibuprofen tablet 400 mg  400 mg Oral Q6H PRN Anca Long PA-C   400 mg at 08/10/22 2001    lorazepam injection 2 mg  2 mg Intravenous Q15 Min PRN Nadia Alfonso MD        sodium chloride 0.9% flush 10 mL  10 mL Intravenous PRN Nadia Alfonso MD         Continuous Infusions:    Review of Systems   Constitutional:  Negative for chills, diaphoresis and fever.   HENT:  Negative for hearing loss, sore throat and trouble swallowing.    Eyes:  Negative for photophobia and visual disturbance.   Respiratory:  Negative for cough, shortness of breath and wheezing.    Cardiovascular:  Negative for chest pain and palpitations.   Gastrointestinal:  Negative for diarrhea, nausea and vomiting.   Genitourinary:  Negative for difficulty urinating, dysuria and frequency.   Musculoskeletal:  Negative for back pain, myalgias and neck pain.   Neurological:  Positive for seizures (none since admit) and speech difficulty (chronic word finding difficulty). Negative for weakness and headaches (resolved).   Psychiatric/Behavioral:  Negative for agitation and behavioral problems. The patient is not nervous/anxious.    Objective:     Vital Signs (Most Recent):  Temp: 98 °F (36.7 °C) (08/11/22 0819)  Pulse: 74 (08/11/22 0819)  Resp: 15 (08/11/22 0819)  BP: (!) 134/95 (08/11/22  0819)  SpO2: 95 % (08/11/22 0819)   Vital Signs (24h Range):  Temp:  [97.8 °F (36.6 °C)-98.2 °F (36.8 °C)] 98 °F (36.7 °C)  Pulse:  [58-74] 74  Resp:  [12-21] 15  SpO2:  [95 %-98 %] 95 %  BP: (114-136)/(74-95) 134/95     Weight: 89.4 kg (197 lb 1.5 oz)  Body mass index is 27.49 kg/m².    Physical Exam  Vitals reviewed.   Constitutional:       General: He is not in acute distress.     Appearance: He is not diaphoretic.   HENT:      Head: Normocephalic and atraumatic.      Comments: EEG in place  Eyes:      General: No scleral icterus.        Right eye: No discharge.         Left eye: No discharge.      Extraocular Movements: Extraocular movements intact and EOM normal.      Conjunctiva/sclera: Conjunctivae normal.      Pupils: Pupils are equal, round, and reactive to light.   Cardiovascular:      Rate and Rhythm: Normal rate.   Pulmonary:      Effort: Pulmonary effort is normal. No respiratory distress.   Abdominal:      General: There is no distension.      Palpations: Abdomen is soft.      Tenderness: There is no abdominal tenderness.   Musculoskeletal:         General: No swelling, tenderness or deformity. Normal range of motion.   Skin:     General: Skin is warm and dry.   Neurological:      General: No focal deficit present.      Mental Status: He is alert and oriented to person, place, and time. Mental status is at baseline.   Psychiatric:         Mood and Affect: Mood normal.         Speech: Speech normal.         Behavior: Behavior normal.       NEUROLOGICAL EXAMINATION:     MENTAL STATUS   Oriented to person, place, and time.   Attention: normal. Concentration: normal.   Speech: speech is normal   Level of consciousness: alert    CRANIAL NERVES     CN II   Visual fields full to confrontation.     CN III, IV, VI   Pupils are equal, round, and reactive to light.  Extraocular motions are normal.     CN V   Right corneal reflex: normal  Left corneal reflex: normal    CN VII   Facial expression full, symmetric.      CN VIII   Hearing: intact    CN XI   CN XI normal.     CN XII   CN XII normal.     Significant Labs: All pertinent lab results from the past 24 hours have been reviewed.    Significant Studies: I have reviewed all pertinent imaging results/findings within the past 24 hours.

## 2022-08-11 NOTE — PLAN OF CARE
Problem: Adult Inpatient Plan of Care  Goal: Plan of Care Review  Outcome: Ongoing, Progressing     Problem: Adult Inpatient Plan of Care  Goal: Optimal Comfort and Wellbeing  Outcome: Ongoing, Progressing     Problem: Seizure, Active Management  Goal: Absence of Seizure/Seizure-Related Injury  Outcome: Ongoing, Progressing     Patient has slept well this shift, thus far he has had no seizure activity noted. Bed in lowest position, call light in place as well as bed alarm for safety purposes. Wife at the bedside and assists him to bathroom. EEG Cap in place. Is Aox4 but has some slurred speech and dysarthria with slow response time. States having frequent headache especially when laying on his left side. VSS, flow sheets completed, see for assessments. Refusing SCD's.

## 2022-08-12 ENCOUNTER — SOCIAL WORK (OUTPATIENT)
Dept: NEUROLOGY | Facility: CLINIC | Age: 57
End: 2022-08-12
Payer: MEDICARE

## 2022-08-12 LAB — ZONISAMIDE SERPL-MCNC: 19 MCG/ML (ref 10–40)

## 2022-08-12 PROCEDURE — 63600175 PHARM REV CODE 636 W HCPCS: Performed by: STUDENT IN AN ORGANIZED HEALTH CARE EDUCATION/TRAINING PROGRAM

## 2022-08-12 PROCEDURE — 95714 VEEG EA 12-26 HR UNMNTR: CPT

## 2022-08-12 PROCEDURE — 25000003 PHARM REV CODE 250: Performed by: STUDENT IN AN ORGANIZED HEALTH CARE EDUCATION/TRAINING PROGRAM

## 2022-08-12 PROCEDURE — 94761 N-INVAS EAR/PLS OXIMETRY MLT: CPT

## 2022-08-12 PROCEDURE — 92507 TX SP LANG VOICE COMM INDIV: CPT

## 2022-08-12 PROCEDURE — 97535 SELF CARE MNGMENT TRAINING: CPT

## 2022-08-12 PROCEDURE — 11000001 HC ACUTE MED/SURG PRIVATE ROOM

## 2022-08-12 PROCEDURE — 99233 PR SUBSEQUENT HOSPITAL CARE,LEVL III: ICD-10-PCS | Mod: ,,, | Performed by: PSYCHIATRY & NEUROLOGY

## 2022-08-12 PROCEDURE — 99233 SBSQ HOSP IP/OBS HIGH 50: CPT | Mod: ,,, | Performed by: PSYCHIATRY & NEUROLOGY

## 2022-08-12 PROCEDURE — 25000003 PHARM REV CODE 250: Performed by: PHYSICIAN ASSISTANT

## 2022-08-12 RX ORDER — ACETAMINOPHEN 500 MG
1000 TABLET ORAL ONCE
Status: DISCONTINUED | OUTPATIENT
Start: 2022-08-12 | End: 2022-08-12

## 2022-08-12 RX ORDER — KETOROLAC TROMETHAMINE 30 MG/ML
30 INJECTION, SOLUTION INTRAMUSCULAR; INTRAVENOUS ONCE
Status: COMPLETED | OUTPATIENT
Start: 2022-08-12 | End: 2022-08-12

## 2022-08-12 RX ADMIN — ESCITALOPRAM OXALATE 10 MG: 10 TABLET ORAL at 08:08

## 2022-08-12 RX ADMIN — IBUPROFEN 400 MG: 400 TABLET, FILM COATED ORAL at 12:08

## 2022-08-12 RX ADMIN — KETOROLAC TROMETHAMINE 30 MG: 30 INJECTION, SOLUTION INTRAMUSCULAR at 09:08

## 2022-08-12 NOTE — PT/OT/SLP PROGRESS
Speech Language Pathology Treatment    Patient Name:  Declan Burleson   MRN:  [id number]  Admitting Diagnosis: Staring episodes    Recommendations:                 General Recommendations:  Speech/language therapy  General Precautions: Standard, fall, seizure  Communication strategies:  none    Subjective     Pt awake/alert, spouse at bedside. Pt was tired but agreeable to participate with SLP.     Pain/Comfort:  · Pain Rating 1: 0/10    Respiratory Status: Room air    Objective:     Has the patient been evaluated by SLP for swallowing?   No     Pt agreeable to participate with SLP, though endorsed poor rest over previous night. He participated in reading/writing WFL with use of reading glasses. Pt participated in clock-drawing task, demonstrating poor attention causing misplacement of hour markers. Pt able to self correct with additional clock drawing in which hour markers, spacing, and time appointed were accurate ind'ly. Given persistent fatigue, ongoing therapy tasks deferred. SLP educated pt and spouse re: role of SLP, current impressions, ongoing SLP services to address current goals to which all expressed agreement and understanding. Continue ST per POC.    Assessment:     Declan Burleson is a 57 y.o. male with an SLP diagnosis of mild Aphasia.    Goals:   Multidisciplinary Problems     SLP Goals        Problem: SLP    Goal Priority Disciplines Outcome   SLP Goal     SLP Ongoing, Progressing   Description: Speech Pathology Goals  To be met by 8/24/22    1. Pt will participate in ongoing assessment of reading, writing, and visuospatial skills for development of most approrpiate treatment plan - met  2. Pt will identify x10 items within a concrete category  3. Pt will complete sentence completion tasks with 80% accuracy given MOD verbal and visual cueing                              Plan:     · Patient to be seen:  3 x/week   · Plan of Care expires:  09/09/22  · Plan of Care reviewed with:  patient,  spouse   · SLP Follow-Up:  Yes       Discharge recommendations:  outpatient speech therapy     Time Tracking:     SLP Treatment Date:   08/12/22  Speech Start Time:  1014  Speech Stop Time:  1033     Speech Total Time (min):  19 min    Billable Minutes: Speech Therapy Individual 10 and Self Care/Home Management Training 9    08/12/2022

## 2022-08-12 NOTE — ASSESSMENT & PLAN NOTE
Chronic. Taking escitalopram 10 mg QD prior to admission. Patient reports lack of energy during the day.    - escitalopram increased to 15 mg QD starting 8/13

## 2022-08-12 NOTE — PROCEDURES
EMU Report     DATE OF PROCEDURE:  8/11/22     EEG NUMBER:  [facility] -2     REFERRING PHYSICIAN: Dr. Edgar      This EEG was performed to characterize the patient's events.     ELECTROENCEPHALOGRAM REPORT     METHODOLOGY:  Electroencephalographic (EEG) recording is recorded with electrodes placed according to the International 10-20 placement system.  Thirty two (32) channels of digital signal (sampling rate of 512/sec), including T1 and T2, were simultaneously recorded from the scalp and may include EKG, EMG, and/or eye monitors.  Recording band pass was 0.1 to 512 Hz.  Digital video recording of the patient is simultaneously recorded with the EEG.  The patient is instructed to report clinical symptoms which may occur during the recording session.  EEG and video recording are stored and archived in digital format.    Activation procedures, which include photic stimulation, hyperventilation and instructing patients to perform simple tasks, are done in selected patients.   The EEG is displayed on a monitor screen and can be reviewed using different montages.  Computer assisted-analysis is employed to detect spike and electrographic seizure activity.   The entire record is submitted for computer analysis.  The entire recording is visually reviewed, and the times identified by computer analysis as being spikes or seizures are reviewed again.    Compressed spectral analysis (CSA) is also performed on the activity recorded from each individual channel.  This is displayed as a power display of frequencies from 0 to 30 Hz over time.   The CSA is reviewed looking for asymmetries in power between homologous areas of the scalp, then compared with the original EEG recording.    [employer] software was also utilized in the review of this study.  This software suite analyzes the EEG recording in multiple domains.  Coherence and rhythmicity are computed to identify EEG sections which may contain organized seizures.  Each channel  undergoes analysis to detect the presence of spike and sharp waves which have special and morphological characteristics of epileptic activity.  The routine EEG recording is converted from special into frequency domain.  This is then displayed comparing homologous areas to identify areas of significant asymmetry.  Algorithm to identify non-cortically generated artifact is used to separate artifact from the EEG.       RECORDING TIMES:  Start on August 11, 2022 at hour 7 minute 1 seconds 13  End on August 12, 2022 at hours 7 minute 0 seconds 3  The total time of EEG recording for the study was 23 hours and 57 minutes     EVENTS RECORDED:  During this recording no  events were recorded.      ELECTROENCEPHALOGRAM:  Interictal:  The recording was obtained with a number of standard bipolar and referential montages during wakefulness, drowsiness and sleep.  In the alert state, the posterior background rhythm was a symmetric, well-modulated 11 Hz alpha rhythm, which reacted symmetrically to eye opening.  Activation procedures were not performed.  Intermittent left temporal mixed delta range slowing was noted.. During drowsiness, the background rhythm waxed and waned and there were periods of slowing. During stage II sleep, symmetric V waves, K complexes and sleep spindles were noted.   Rare left temporal f7/t3 sharps were seen.     Ictal:  No events recorded      EKG: The EKG channeled revealed normal sinus rhythm      FINAL SUMMARY:  ELECTROENCEPHALOGRAM:  Interictal:  This is an abnormal EEG during wakefulness, drowsiness and sleep.  Intermittent left temporal focal slowing was noted.   Rare left temporal f7/t3 sharps were seen.     Ictal:  During this recording no events were recorded      CLINICAL SEIZURE:  Classification:  Not applicable      CLINICAL CORRELATION:  The patient is a 57-year-old female with a history of seizures, who was maintained on eslicarbazepine 1200mg nightly, zonisamide 300mg nightly, clobazam 30mg  nightly.  The patient is currently maintained on clobazam.  This is an abnormal EEG during wakefulness, drowsiness and sleep.  The presence of focal intermittent slowing in the left temporal region suggestive focal neural dysfunction in this region.  The presence of left temporal maximum sharp waves confers an increased risk for focal seizures from this region.  This study no seizures were recorded.

## 2022-08-12 NOTE — ASSESSMENT & PLAN NOTE
Hx of, previously on Amlodipine 5 mg daily; not currently on antiHTN regimen.    - elevated 8/11>8/12, possibly due to headache  - continue to monitor

## 2022-08-12 NOTE — PLAN OF CARE
"  Problem: Adult Inpatient Plan of Care  Goal: Patient-Specific Goal (Individualized)  Outcome: Ongoing, Progressing     Problem: Adult Inpatient Plan of Care  Goal: Optimal Comfort and Wellbeing  Outcome: Ongoing, Progressing     Problem: Seizure, Active Management  Goal: Absence of Seizure/Seizure-Related Injury  Outcome: Ongoing, Progressing     Patient has slept off and on tonight, still c/o headache, feels EEG Cap is to tight and "someone" from EMU was to come and re wrap but never did. Received prn Motrin for headache and it was effective. SCD's off per pt request stated " they are hurting my legs" no edema noted, off at this time and will attempt to replace before end of shift. VSS, flow sheets completed see for assessments.   "

## 2022-08-12 NOTE — PLAN OF CARE
Problem: Adult Inpatient Plan of Care  Goal: Plan of Care Review  Outcome: Ongoing, Progressing  Goal: Readiness for Transition of Care  Outcome: Ongoing, Progressing     Problem: Seizure, Active Management  Goal: Absence of Seizure/Seizure-Related Injury  Outcome: Ongoing, Progressing     Poc reviewed with patient and family. Continue to monitor. Clobazam Dc/d on shift. Safety measures maintained. Patient bed in low position. Bed alarm set. Patient call bell with in reach. Patient belongings with in reach. VSS. Full assessment in chart. NAEO.

## 2022-08-12 NOTE — PROGRESS NOTES
[facility] - Neurosurgery ([facility])  Neurology-Epilepsy  Progress Note    Patient Name: Declan Burleson  MRN: [id number]  Admission Date: 8/9/2022  Hospital Length of Stay: 3 days  Code Status: Full Code   Attending Provider: Kaleb Edgar MD  Primary Care Physician: Juan Carlos Simmons NP   Principal Problem:Staring episodes    Subjective:     Hospital Course:   8/9>8/10: Admit to EMU. Eslicarbazepine and Zonisamide held on admit, Clobazam decreased to 10 mg qhs. EEG with focal intermittent slowing in left temporal region, no epileptiform discharges, no electrographic seizures. PB @0719 for episode of word finding difficulty with maintained awareness and @1221 for headache and staring- no EEG correlate with events; see EEG report for full details.  8/10>8/11: No typical events. EEG unchanged, focal intermittent slowing in left temporal region, no epileptiform discharges, no electrographic seizures. Continued medication adjustments for event capture- decreased Clobazam to 5 mg qhs.  8/11>8/12: No typical events. EEG with continued left focal slowing and rare left sharps, no electrographic seizures. Clobazam held, last dose 8/11. Lexapro dose increased from 10 to 15 mg QD due to reported low energy.      Interval History: No events overnight. BP in the last day up to  and , last took amlodipine about 6 months ago. Patient reports recurrence of headache after EEG lead placement that interrupted his sleep. IV toradol 30 mg ordered for headache. EEG lead wrap adjusted.    Current Facility-Administered Medications   Medication Dose Route Frequency Provider Last Rate Last Admin    acetaminophen tablet 650 mg  650 mg Oral Q4H PRN Nadia Alfonso MD        docusate sodium capsule 100 mg  100 mg Oral BID PRN Ndaia Alfonso MD        [START ON 8/13/2022] EScitalopram oxalate tablet 15 mg  15 mg Oral Daily Nadia Alfonso MD        ibuprofen tablet 400 mg  400 mg Oral Q6H PRN Anca Long PA-C   400 mg at  08/12/22 0026    lorazepam injection 2 mg  2 mg Intravenous Q15 Min PRN Nadia Alfonso MD        sodium chloride 0.9% flush 10 mL  10 mL Intravenous PRN Nadia Alfonso MD         Continuous Infusions: None    Review of Systems   Constitutional:  Negative for chills, diaphoresis and fever.   HENT:  Negative for hearing loss, sore throat and trouble swallowing.    Eyes:  Negative for photophobia and visual disturbance.   Respiratory:  Negative for cough, shortness of breath and wheezing.    Cardiovascular:  Negative for chest pain and palpitations.   Gastrointestinal:  Negative for diarrhea, nausea and vomiting.   Genitourinary:  Negative for difficulty urinating, dysuria and frequency.   Musculoskeletal:  Negative for back pain, myalgias and neck pain.   Neurological:  Positive for seizures (none since admit), speech difficulty (chronic word finding difficulty) and headaches. Negative for weakness.   Psychiatric/Behavioral:  Negative for agitation and behavioral problems. The patient is not nervous/anxious.    Objective:     Vital Signs (Most Recent):  Temp: 97.6 °F (36.4 °C) (08/12/22 0816)  Pulse: 63 (08/12/22 0715)  Resp: 15 (08/12/22 0715)  BP: (!) 135/96 (08/12/22 0715)  SpO2: (!) 93 % (08/12/22 0715)   Vital Signs (24h Range):  Temp:  [97.5 °F (36.4 °C)-98.4 °F (36.9 °C)] 97.6 °F (36.4 °C)  Pulse:  [] 63  Resp:  [13-33] 15  SpO2:  [93 %-97 %] 93 %  BP: (122-160)/() 135/96     Weight: 89.4 kg (197 lb 1.5 oz)  Body mass index is 27.49 kg/m².    Physical Exam  Vitals reviewed.   Constitutional:       General: He is not in acute distress.     Appearance: He is not diaphoretic.   HENT:      Head: Normocephalic and atraumatic.      Comments: EEG in place  Eyes:      General: No scleral icterus.        Right eye: No discharge.         Left eye: No discharge.      Extraocular Movements: Extraocular movements intact and EOM normal.      Conjunctiva/sclera: Conjunctivae normal.      Pupils: Pupils are  equal, round, and reactive to light.   Cardiovascular:      Rate and Rhythm: Normal rate.   Pulmonary:      Effort: Pulmonary effort is normal. No respiratory distress.   Abdominal:      General: There is no distension.      Palpations: Abdomen is soft.      Tenderness: There is no abdominal tenderness.   Musculoskeletal:         General: No swelling, tenderness or deformity. Normal range of motion.   Skin:     General: Skin is warm and dry.   Neurological:      General: No focal deficit present.      Mental Status: He is alert and oriented to person, place, and time. Mental status is at baseline.   Psychiatric:         Mood and Affect: Mood normal.         Speech: Speech normal.         Behavior: Behavior normal.       NEUROLOGICAL EXAMINATION:     MENTAL STATUS   Oriented to person, place, and time.   Attention: normal. Concentration: normal.   Speech: speech is normal   Level of consciousness: alert    CRANIAL NERVES     CN II   Visual fields full to confrontation.     CN III, IV, VI   Pupils are equal, round, and reactive to light.  Extraocular motions are normal.     CN V   Right corneal reflex: normal  Left corneal reflex: normal    CN VII   Facial expression full, symmetric.     CN VIII   Hearing: intact    CN XI   CN XI normal.     CN XII   CN XII normal.     Significant Labs: All pertinent lab results from the past 24 hours have been reviewed.    Significant Studies: I have reviewed all pertinent imaging results/findings within the past 24 hours.    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 Edgar for event capture and  characterization of breakthrough seizures and consideration of surgical evaluation. Breakthrough seizures described as staring with loss of awareness occurring ~2x/weekly. Last convulsion in 4/2022. Patient has also noted intermittent word finding difficulty and confusion. Patient has previously tried Levetiracetam (stopped d/t mood side effects), Topiramate, Lamotrigine, and Oxcarbazepine in the past without improvement in seizures.    - Continuous vEEG    - PB 8/10 @0719 for brief episode of word finding difficulty with maintained awareness & @1221 for headache and staring. No EEG correlate with events; see EEG report for full details    - EEG with focal intermittent slowing in left temporal region, no epileptiform discharges, no electrographic seizures  - Held home Eslicarbazepine and Zonisamide on admit  - Clobazam decreased to 10 mg qhs->5 mg qhs on 8/11->held 8/12  - zonisamide level 19, other levels pending  - Activation procedures per protocol- medication adjustments as above  - IV Ativan PRN for GTC greater than 5 min - please call epilepsy on call before administering  - Seizure precautions, suction and oxygen at bedside  - Fall precautions, side rail padding in place  - Visi monitoring with continuous pulse oximetry   - Telemetry  - Placed ambulatory referral to Speech Therapy     Depression with anxiety  Chronic. Taking escitalopram 10 mg QD prior to admission. Patient reports lack of energy during the day.    - escitalopram increased to 15 mg QD starting 8/13    Hypertension  Hx of, previously on Amlodipine 5 mg daily; not currently on antiHTN regimen.    - elevated 8/11>8/12, possibly due to headache  - continue to monitor    Migraine without status migrainosus, not intractable  Takes PRN Nurtec at home.    - Analgesics PRN, supportive care    Complex partial seizures evolving to generalized tonic-clonic seizures  See Staring episodes        VTE Risk Mitigation (From admission, onward)         Ordered      IP VTE LOW RISK PATIENT  Once         08/09/22 1505     Place sequential compression device  Until discontinued         08/09/22 1505                Nadia Alfonso MD  Neurology-Epilepsy  [facility]ara - Neurosurgery ([facility])

## 2022-08-12 NOTE — SUBJECTIVE & OBJECTIVE
Interval History: No events overnight. BP in the last day up to  and , last took amlodipine about 6 months ago. Patient reports recurrence of headache after EEG lead placement that interrupted his sleep. IV toradol 30 mg ordered for headache. EEG lead wrap adjusted.    Current Facility-Administered Medications   Medication Dose Route Frequency Provider Last Rate Last Admin    acetaminophen tablet 650 mg  650 mg Oral Q4H PRN Nadia Alfonso MD        docusate sodium capsule 100 mg  100 mg Oral BID PRN Nadia Alfonso MD        [START ON 8/13/2022] EScitalopram oxalate tablet 15 mg  15 mg Oral Daily Nadia Alfonso MD        ibuprofen tablet 400 mg  400 mg Oral Q6H PRN Anca Long PA-C   400 mg at 08/12/22 0026    lorazepam injection 2 mg  2 mg Intravenous Q15 Min PRN Nadia Alfonso MD        sodium chloride 0.9% flush 10 mL  10 mL Intravenous PRN Nadia Alfonso MD         Continuous Infusions: None    Review of Systems   Constitutional:  Negative for chills, diaphoresis and fever.   HENT:  Negative for hearing loss, sore throat and trouble swallowing.    Eyes:  Negative for photophobia and visual disturbance.   Respiratory:  Negative for cough, shortness of breath and wheezing.    Cardiovascular:  Negative for chest pain and palpitations.   Gastrointestinal:  Negative for diarrhea, nausea and vomiting.   Genitourinary:  Negative for difficulty urinating, dysuria and frequency.   Musculoskeletal:  Negative for back pain, myalgias and neck pain.   Neurological:  Positive for seizures (none since admit), speech difficulty (chronic word finding difficulty) and headaches. Negative for weakness.   Psychiatric/Behavioral:  Negative for agitation and behavioral problems. The patient is not nervous/anxious.    Objective:     Vital Signs (Most Recent):  Temp: 97.6 °F (36.4 °C) (08/12/22 0816)  Pulse: 63 (08/12/22 0715)  Resp: 15 (08/12/22 0715)  BP: (!) 135/96 (08/12/22 0715)  SpO2: (!) 93 % (08/12/22 0715)    Vital Signs (24h Range):  Temp:  [97.5 °F (36.4 °C)-98.4 °F (36.9 °C)] 97.6 °F (36.4 °C)  Pulse:  [] 63  Resp:  [13-33] 15  SpO2:  [93 %-97 %] 93 %  BP: (122-160)/() 135/96     Weight: 89.4 kg (197 lb 1.5 oz)  Body mass index is 27.49 kg/m².    Physical Exam  Vitals reviewed.   Constitutional:       General: He is not in acute distress.     Appearance: He is not diaphoretic.   HENT:      Head: Normocephalic and atraumatic.      Comments: EEG in place  Eyes:      General: No scleral icterus.        Right eye: No discharge.         Left eye: No discharge.      Extraocular Movements: Extraocular movements intact and EOM normal.      Conjunctiva/sclera: Conjunctivae normal.      Pupils: Pupils are equal, round, and reactive to light.   Cardiovascular:      Rate and Rhythm: Normal rate.   Pulmonary:      Effort: Pulmonary effort is normal. No respiratory distress.   Abdominal:      General: There is no distension.      Palpations: Abdomen is soft.      Tenderness: There is no abdominal tenderness.   Musculoskeletal:         General: No swelling, tenderness or deformity. Normal range of motion.   Skin:     General: Skin is warm and dry.   Neurological:      General: No focal deficit present.      Mental Status: He is alert and oriented to person, place, and time. Mental status is at baseline.   Psychiatric:         Mood and Affect: Mood normal.         Speech: Speech normal.         Behavior: Behavior normal.       NEUROLOGICAL EXAMINATION:     MENTAL STATUS   Oriented to person, place, and time.   Attention: normal. Concentration: normal.   Speech: speech is normal   Level of consciousness: alert    CRANIAL NERVES     CN II   Visual fields full to confrontation.     CN III, IV, VI   Pupils are equal, round, and reactive to light.  Extraocular motions are normal.     CN V   Right corneal reflex: normal  Left corneal reflex: normal    CN VII   Facial expression full, symmetric.     CN VIII   Hearing:  intact    CN XI   CN XI normal.     CN XII   CN XII normal.     Significant Labs: All pertinent lab results from the past 24 hours have been reviewed.    Significant Studies: I have reviewed all pertinent imaging results/findings within the past 24 hours.

## 2022-08-13 LAB
CLOBAZAM SERPL-MCNC: 400 NG/ML (ref 30–300)
NORCLOBAZAM SERPL-MCNC: 3620 NG/ML (ref 300–3000)

## 2022-08-13 PROCEDURE — 95720 PR EEG, W/VIDEO, CONT RECORD, I&R, >12<26 HRS: ICD-10-PCS | Mod: ,,, | Performed by: PSYCHIATRY & NEUROLOGY

## 2022-08-13 PROCEDURE — 99233 SBSQ HOSP IP/OBS HIGH 50: CPT | Mod: GC,,, | Performed by: PSYCHIATRY & NEUROLOGY

## 2022-08-13 PROCEDURE — 25000003 PHARM REV CODE 250: Performed by: STUDENT IN AN ORGANIZED HEALTH CARE EDUCATION/TRAINING PROGRAM

## 2022-08-13 PROCEDURE — 95714 VEEG EA 12-26 HR UNMNTR: CPT

## 2022-08-13 PROCEDURE — 11000001 HC ACUTE MED/SURG PRIVATE ROOM

## 2022-08-13 PROCEDURE — 95720 EEG PHY/QHP EA INCR W/VEEG: CPT | Mod: ,,, | Performed by: PSYCHIATRY & NEUROLOGY

## 2022-08-13 PROCEDURE — 25000003 PHARM REV CODE 250: Performed by: PHYSICIAN ASSISTANT

## 2022-08-13 PROCEDURE — 99233 PR SUBSEQUENT HOSPITAL CARE,LEVL III: ICD-10-PCS | Mod: GC,,, | Performed by: PSYCHIATRY & NEUROLOGY

## 2022-08-13 RX ADMIN — ACETAMINOPHEN 650 MG: 325 TABLET ORAL at 08:08

## 2022-08-13 RX ADMIN — IBUPROFEN 400 MG: 400 TABLET, FILM COATED ORAL at 11:08

## 2022-08-13 RX ADMIN — ESCITALOPRAM OXALATE 15 MG: 5 TABLET, FILM COATED ORAL at 09:08

## 2022-08-13 NOTE — PROCEDURES
Procedures     VIDEO ELECTROENCEPHALOGRAM  REPORT    DATE OF SERVICE: 8/12/22-8/13/22  EEG NUMBER: [id number]  REQUESTED BY:  Tala  LOCATION OF SERVICE: [facility] [place]    METHODOLOGY   Electroencephalographic (EEG) recording is with electrodes placed according to the International 10-20 placement system.  Thirty two (32) channels of digital signal (sampling rate of 512/sec) including T1 and T2 was simultaneously recorded from the scalp and may include  EKG, EMG, and/or eye monitors.  Recording band pass was 0.1 to 512 hz.  Digital video recording of the patient is simultaneously recorded with the EEG.  The patient is instructed report clinical symptoms which may occur during the recording session.  EEG and video recording is stored and archived in digital format.  Activation procedures which include photic stimulation, hyperventilation and instructing patients to perform simple task are done in selected patients.   The EEG is displayed on a monitor screen and can be reviewed using different montages.  Computer assisted analysis is employed to detect spike and electrographic seizure activity.   The entire record is submitted for computer analysis.  The entire recording is visually reviewed and the times identified by computer analysis as being spikes or seizures are reviewed again.  Compresses spectral analysis (CSA) is also performed on the activity recorded from each individual channel.  This is displayed as a power display of frequencies from 0 to 30 Hz over time.   The CSA is reviewed looking for asymmetries in power between homologous areas of the scalp and then compared with the original EEG recording.     [employer] software was also utilized in the review of this study.  This software suite analyzes the EEG recording in multiple domains.  Coherence and rhythmicity is computed to identify EEG sections which may contain organized seizures.  Each channel undergoes analysis to detect presence of spike and sharp waves which  have special and morphological characteristic of epileptic activity.  The routine EEG recording is converted from spacial into frequency domain.  This is then displayed comparing homologous areas to identify areas of significant asymmetry.  Algorithm to identify non-cortically generated artifact is used to separate eye movement, EMG and other artifact from the EEG.      ELECTROENCEPHALOGRAM:    RECORDING TIMES:  Start on 8/12/22 at 07:01:07  Stop on 8/13/22 at 07:00:04    A total of 23 hrs of EEG recording was obtained.    INTERICTAL:   The record shows a good  organization at rest, consisting of a 10 Hz posterior dominant rhythm with good  reactivity. There is mild bilateral beta activity. There is occasional focal theta>delta range slowing over the left temporal region with rare left temporal sharp wave discharges maximal at F7/T3.    Drowsiness is characterized by attenuation of the background, vertex waves, and bilateral theta slowing. Stage II sleep is characterized by slowing, vertex waves, and symmetric sleep spindles. Slow wave and REM sleep are recorded.    Provocative maneuvers including hyperventilation and photic stimulation were not performed.     EKG recording shows a regular rhythm.    There is one push button or clinical event at 21:44:15. The patient appears to mumble with no other behavioral change. No epileptiform discharges or electrographic seizures are seen.     IMPRESSION:  Abnormal study due to focal slowing over the left temporal region suggestive of underlying structural defect such as mass or infarct. There are focal interictal epileptiform discharges over the left temporal region consistent with the patient's known underlying focal onset seizure disorder.     Johnnie Gaston MD

## 2022-08-13 NOTE — SUBJECTIVE & OBJECTIVE
Interval History: Single clinical seizure w./ L hemispheric onset this morning    Current Facility-Administered Medications   Medication Dose Route Frequency Provider Last Rate Last Admin    acetaminophen tablet 650 mg  650 mg Oral Q4H PRN Nadia Alfonso MD        docusate sodium capsule 100 mg  100 mg Oral BID PRN Nadia Alfonso MD        EScitalopram oxalate tablet 15 mg  15 mg Oral Daily Nadia Alfonso MD   15 mg at 08/13/22 0938    ibuprofen tablet 400 mg  400 mg Oral Q6H PRN Anca Long PA-C   400 mg at 08/12/22 0026    lorazepam injection 2 mg  2 mg Intravenous Q15 Min PRN Nadia Alfonso MD        sodium chloride 0.9% flush 10 mL  10 mL Intravenous PRN Nadia Alfonso MD         Continuous Infusions:    Review of Systems  Objective:     Vital Signs (Most Recent):  Temp: 99.3 °F (37.4 °C) (08/13/22 0848)  Pulse: 96 (08/13/22 0954)  Resp: 20 (08/13/22 0954)  BP: 137/79 (08/13/22 0954)  SpO2: (!) 94 % (08/13/22 0954)   Vital Signs (24h Range):  Temp:  [96.1 °F (35.6 °C)-99.3 °F (37.4 °C)] 99.3 °F (37.4 °C)  Pulse:  [] 96  Resp:  [9-25] 20  SpO2:  [91 %-98 %] 94 %  BP: (127-241)/() 137/79     Weight: 89.4 kg (197 lb 1.5 oz)  Body mass index is 27.49 kg/m².    Physical Exam  Vitals:    08/13/22 0939 08/13/22 0942 08/13/22 0945 08/13/22 0954   BP: (!) 153/112 (!) 241/161 (!) 161/93 137/79   BP Location:       Pulse: 79 (!) 112 84 96   Resp: 17 (!) 9 16 20   Temp:       TempSrc:       SpO2: (!) 92% (!) 93% 97% (!) 94%   Weight:       Height:         General: NAD, well nourished   Eyes: no tearing, discharge, no erythema   ENT: moist mucous membranes of the oral cavity, nares patent    Neck: Supple, full range of motion  Cardiovascular: Warm and well perfused, pulses equal and symmetrical  Lungs: Normal work of breathing, normal chest wall excursions  Skin: No rash, lesions, or breakdown on exposed skin  Psychiatry: Mood and affect are appropriate   Abdomen: soft, non tender, non distended  Extremeties:  No cyanosis, clubbing or edema.    Neurological   MENTAL STATUS: Alert and oriented to person, place, and time. Attention and concentration within normal limits. Speech without dysarthria, able to name and repeat without difficulty. Recent and remote memory within normal limits   CRANIAL NERVES: Visual fields intact. PERRL. EOMI. Facial sensation intact. Face symmetrical. Hearing grossly intact. Full shoulder shrug bilaterally. Tongue protrudes midline   SENSORY: Sensation is intact to light touch throughout.    MOTOR: Normal bulk and tone. 5/5 deltoid, biceps, triceps, interosseous, hand  bilaterally. 5/5 iliopsoas, knee extension/flexion, foot dorsi/plantarflexion bilaterally.    REFLEXES: Symmetric and 1+ throughout.   CEREBELLAR/COORDINATION/GAIT: Gross motor movements smooth     Significant Labs: All pertinent lab results from the past 24 hours have been reviewed.    Significant Studies: I have reviewed all pertinent imaging results/findings within the past 24 hours.

## 2022-08-13 NOTE — PLAN OF CARE
Problem: Adult Inpatient Plan of Care  Goal: Plan of Care Review  Outcome: Ongoing, Progressing  Flowsheets (Taken 8/13/2022 0923)  Plan of Care Reviewed With:   patient   spouse  Goal: Optimal Comfort and Wellbeing  Outcome: Ongoing, Progressing     Problem: Seizure, Active Management  Goal: Absence of Seizure/Seizure-Related Injury  Outcome: Ongoing, Progressing           POC reviewed and updated with the patient/caregiver. Questions regarding POC were encouraged and addressed with the patient/caregiver.  VSS, see flow-sheets. Tele and VISI maintained. Patient is AO X 4 at this time. Fall/safety precautions maintained, no signs of injury noted during shift.  Seizure precautions maintained, one event noted during shift. See previous note for details. Patient was repositioned for comfort, bed locked in low position with side rails X 4, bed alarm refused, with call light within reach. Instructed patient to call staff for mobility, verbalized understanding.  No acute signs of distress noted at this time.

## 2022-08-13 NOTE — ASSESSMENT & PLAN NOTE
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 Edgar for event capture and characterization of breakthrough seizures and consideration of surgical evaluation. Breakthrough seizures described as staring with loss of awareness occurring ~2x/weekly. Last convulsion in 4/2022. Patient has also noted intermittent word finding difficulty and confusion. Patient has previously tried Levetiracetam (stopped d/t mood side effects), Topiramate, Lamotrigine, and Oxcarbazepine in the past without improvement in seizures.    - Continuous vEEG, continuing to monitor   - PB 8/10 @0719 for brief episode of word finding difficulty with maintained awareness & @1221 for headache and staring. No EEG correlate with events; see EEG report for full details    - EEG with focal intermittent slowing in left temporal region, no epileptiform discharges, no electrographic seizures  - Held home Eslicarbazepine and Zonisamide on admit  - Clobazam decreased to 10 mg qhs->5 mg qhs on 8/11->held 8/12  - zonisamide level 19, other levels pending  - Activation procedures per protocol- medication adjustments as above  - IV Ativan PRN for GTC greater than 5 min - please call epilepsy on call before administering  - Seizure precautions, suction and oxygen at bedside  - Fall precautions, side rail padding in place  - Visi monitoring with continuous pulse oximetry   - Telemetry  - Placed ambulatory referral to Speech Therapy    How Severe Is Your Eczema?: moderate Is This A New Presentation, Or A Follow-Up?: Follow Up Eczema

## 2022-08-13 NOTE — NURSING
"EMU SEIZURE EVENT NOTE  Time event started: 09:38     Duration: 4 minutes. Though severe shaking, jerking movement not present for entire duration- most severe in first minute of event.    Describe symptoms during event and post-ictal symptoms:  Pt event began following episode of frequent lip smacking followed by loud vocalization.  Pt seizure presented as tonic/clonic jerking, "bearing down" with cyanosis, drooling and upward eye deviation.     Who notified: Dr. Johnnie Gaston.  Arrived to bedside evaluate pt at 10:30.    Intervention: pt placed on side and suctioned appropriately. Oxygen placed via NC. Vital signs taken q5min during event and post VS taken. Standardized neurological assessment completed. Notified EMU team.    Patient response: Stayed with pt during postictal phase until pt verbally responsive and oriented which was around 09:54.   Neurological assessment back at baseline, VSS at this time. See flowsheets for more event information.        "

## 2022-08-13 NOTE — PROGRESS NOTES
[facility] - Neurosurgery ([facility])  Neurology-Epilepsy  Progress Note    Patient Name: Declan Burleson  MRN: [id number]  Admission Date: 8/9/2022  Hospital Length of Stay: 4 days  Code Status: Full Code   Attending Provider: Kaleb Edgar MD  Primary Care Physician: Juan Carlos Simmons NP   Principal Problem:Staring episodes    Subjective:     Hospital Course:   8/9>8/10: Admit to EMU. Eslicarbazepine and Zonisamide held on admit, Clobazam decreased to 10 mg qhs. EEG with focal intermittent slowing in left temporal region, no epileptiform discharges, no electrographic seizures. PB @0719 for episode of word finding difficulty with maintained awareness and @1221 for headache and staring- no EEG correlate with events; see EEG report for full details.  8/10>8/11: No typical events. EEG unchanged, focal intermittent slowing in left temporal region, no epileptiform discharges, no electrographic seizures. Continued medication adjustments for event capture- decreased Clobazam to 5 mg qhs.  8/11>8/12: No typical events. EEG with continued left focal slowing and rare left sharps, no electrographic seizures. Clobazam held, last dose 8/11. Lexapro dose increased from 10 to 15 mg QD due to reported low energy.    Interval History: Single clinical seizure w./ L hemispheric onset this morning    Current Facility-Administered Medications   Medication Dose Route Frequency Provider Last Rate Last Admin    acetaminophen tablet 650 mg  650 mg Oral Q4H PRN Nadia Alfonso MD        docusate sodium capsule 100 mg  100 mg Oral BID PRN Nadia Alfonso MD        EScitalopram oxalate tablet 15 mg  15 mg Oral Daily Nadia Alfonso MD   15 mg at 08/13/22 0938    ibuprofen tablet 400 mg  400 mg Oral Q6H PRN Anca Long PA-C   400 mg at 08/12/22 0026    lorazepam injection 2 mg  2 mg Intravenous Q15 Min PRN Nadia Alfonso MD        sodium chloride 0.9% flush 10 mL  10 mL Intravenous PRN Nadia Alfonso MD         Continuous  Infusions:    Review of Systems  Objective:     Vital Signs (Most Recent):  Temp: 99.3 °F (37.4 °C) (08/13/22 0848)  Pulse: 96 (08/13/22 0954)  Resp: 20 (08/13/22 0954)  BP: 137/79 (08/13/22 0954)  SpO2: (!) 94 % (08/13/22 0954)   Vital Signs (24h Range):  Temp:  [96.1 °F (35.6 °C)-99.3 °F (37.4 °C)] 99.3 °F (37.4 °C)  Pulse:  [] 96  Resp:  [9-25] 20  SpO2:  [91 %-98 %] 94 %  BP: (127-241)/() 137/79     Weight: 89.4 kg (197 lb 1.5 oz)  Body mass index is 27.49 kg/m².    Physical Exam  Vitals:    08/13/22 0939 08/13/22 0942 08/13/22 0945 08/13/22 0954   BP: (!) 153/112 (!) 241/161 (!) 161/93 137/79   BP Location:       Pulse: 79 (!) 112 84 96   Resp: 17 (!) 9 16 20   Temp:       TempSrc:       SpO2: (!) 92% (!) 93% 97% (!) 94%   Weight:       Height:         General: NAD, well nourished   Eyes: no tearing, discharge, no erythema   ENT: moist mucous membranes of the oral cavity, nares patent    Neck: Supple, full range of motion  Cardiovascular: Warm and well perfused, pulses equal and symmetrical  Lungs: Normal work of breathing, normal chest wall excursions  Skin: No rash, lesions, or breakdown on exposed skin  Psychiatry: Mood and affect are appropriate   Abdomen: soft, non tender, non distended  Extremeties: No cyanosis, clubbing or edema.    Neurological   MENTAL STATUS: Alert and oriented to person, place, and time. Attention and concentration within normal limits. Speech without dysarthria, able to name and repeat without difficulty. Recent and remote memory within normal limits   CRANIAL NERVES: Visual fields intact. PERRL. EOMI. Facial sensation intact. Face symmetrical. Hearing grossly intact. Full shoulder shrug bilaterally. Tongue protrudes midline   SENSORY: Sensation is intact to light touch throughout.    MOTOR: Normal bulk and tone. 5/5 deltoid, biceps, triceps, interosseous, hand  bilaterally. 5/5 iliopsoas, knee extension/flexion, foot dorsi/plantarflexion bilaterally.    REFLEXES:  Symmetric and 1+ throughout.   CEREBELLAR/COORDINATION/GAIT: Gross motor movements smooth     Significant Labs: All pertinent lab results from the past 24 hours have been reviewed.    Significant Studies: I have reviewed all pertinent imaging results/findings within the past 24 hours.    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 Edgar for event capture and characterization of breakthrough seizures and consideration of surgical evaluation. Breakthrough seizures described as staring with loss of awareness occurring ~2x/weekly. Last convulsion in 4/2022. Patient has also noted intermittent word finding difficulty and confusion. Patient has previously tried Levetiracetam (stopped d/t mood side effects), Topiramate, Lamotrigine, and Oxcarbazepine in the past without improvement in seizures.    - Continuous vEEG, continuing to monitor   - PB 8/10 @0719 for brief episode of word finding difficulty with maintained awareness & @1221 for headache and staring. No EEG correlate with events; see EEG report for full details    - EEG with focal intermittent slowing in left temporal region, no epileptiform discharges, no electrographic seizures  - Held home Eslicarbazepine and Zonisamide on admit  - Clobazam decreased to 10 mg qhs->5 mg qhs on 8/11->held 8/12  - zonisamide level 19, other levels pending  - Activation procedures per protocol- medication adjustments as above  - IV Ativan PRN for GTC greater than 5 min - please call epilepsy on call before administering  - Seizure precautions, suction and oxygen at bedside  - Fall precautions, side rail  padding in place  - Visi monitoring with continuous pulse oximetry   - Telemetry  - Placed ambulatory referral to Speech Therapy     Depression with anxiety  Chronic. Taking escitalopram 10 mg QD prior to admission. Patient reports lack of energy during the day.    - escitalopram increased to 15 mg QD starting 8/13    Hypertension  Hx of, previously on Amlodipine 5 mg daily; not currently on antiHTN regimen.    - elevated 8/11>8/12, possibly due to headache  - continue to monitor    Migraine without status migrainosus, not intractable  Takes PRN Nurtec at home.    - Analgesics PRN, supportive care    Complex partial seizures evolving to generalized tonic-clonic seizures  See Staring episodes       VTE Risk Mitigation (From admission, onward)         Ordered     IP VTE LOW RISK PATIENT  Once         08/09/22 1505     Place sequential compression device  Until discontinued         08/09/22 1505              Johnnie Gaston MD  Neurology-Epilepsy  [facility] - Neurosurgery ([facility])

## 2022-08-13 NOTE — NURSING
EMU EVENT:      At 2144, event button activated by patient's wife. Upon arriving to the bedside, patient was lying in bed, alert, and with his eyes closed. Patient's wife stated she activated event button due to the patient having hallucinations. When inquiring about hallucinations, patient stated he could not see anything. Instructed patient to open his eyes and asked him if he could see my finger. Patient replied that he could see my finger. Then instructed patient to track my finger with eyes only, patient able to follow command. Instructed the patient to follow simple commands such as: give me thumbs up, point to the window, and repeat my words (ball, car, house.) Patient able to complete all tasks. At this time, asked the patient what color where my pants, patient stated blue. Eyelid fluttering and mouth twitching noted during event. Patient stated that his face was numb and tingling was present. VSS throughout event, see flowsheets for details. When turning out the room light, patient stated he could see flashing lights for approximately 1 minute then they disappeared. Seizure precautions maintained, no signs of injury noted. Neurologically patient is at baseline and no acute signs of distress noted at this time. Instructed the patient and his wife to activate event button if patient experiences any further events or auras, verbalized understanding. Entire event last approximately 6-8 minutes. Charge nurse notified of event.

## 2022-08-14 PROCEDURE — 25000003 PHARM REV CODE 250: Performed by: PHYSICIAN ASSISTANT

## 2022-08-14 PROCEDURE — 11000001 HC ACUTE MED/SURG PRIVATE ROOM

## 2022-08-14 PROCEDURE — 25000003 PHARM REV CODE 250: Performed by: STUDENT IN AN ORGANIZED HEALTH CARE EDUCATION/TRAINING PROGRAM

## 2022-08-14 PROCEDURE — 99233 SBSQ HOSP IP/OBS HIGH 50: CPT | Mod: ,,, | Performed by: PSYCHIATRY & NEUROLOGY

## 2022-08-14 PROCEDURE — 99233 PR SUBSEQUENT HOSPITAL CARE,LEVL III: ICD-10-PCS | Mod: ,,, | Performed by: PSYCHIATRY & NEUROLOGY

## 2022-08-14 PROCEDURE — 95714 VEEG EA 12-26 HR UNMNTR: CPT

## 2022-08-14 PROCEDURE — 95720 PR EEG, W/VIDEO, CONT RECORD, I&R, >12<26 HRS: ICD-10-PCS | Mod: ,,, | Performed by: PSYCHIATRY & NEUROLOGY

## 2022-08-14 PROCEDURE — 95720 EEG PHY/QHP EA INCR W/VEEG: CPT | Mod: ,,, | Performed by: PSYCHIATRY & NEUROLOGY

## 2022-08-14 RX ORDER — DIPHENHYDRAMINE HCL 50 MG
50 CAPSULE ORAL ONCE
Status: DISCONTINUED | OUTPATIENT
Start: 2022-08-15 | End: 2022-08-15

## 2022-08-14 RX ORDER — TRAMADOL HYDROCHLORIDE 50 MG/1
50 TABLET ORAL ONCE
Status: DISCONTINUED | OUTPATIENT
Start: 2022-08-15 | End: 2022-08-15

## 2022-08-14 RX ADMIN — ESCITALOPRAM OXALATE 15 MG: 5 TABLET, FILM COATED ORAL at 08:08

## 2022-08-14 RX ADMIN — IBUPROFEN 400 MG: 400 TABLET, FILM COATED ORAL at 07:08

## 2022-08-14 NOTE — SUBJECTIVE & OBJECTIVE
Interval History: Single seizure yesterday AM    Current Facility-Administered Medications   Medication Dose Route Frequency Provider Last Rate Last Admin    acetaminophen tablet 650 mg  650 mg Oral Q4H PRN Nadia Alfonso MD   650 mg at 08/13/22 2017    [START ON 8/15/2022] diphenhydrAMINE capsule 50 mg  50 mg Oral Once Johnnie Gaston MD        docusate sodium capsule 100 mg  100 mg Oral BID PRN Nadia Alfonso MD        EScitalopram oxalate tablet 15 mg  15 mg Oral Daily Nadia Alfonso MD   15 mg at 08/14/22 0800    ibuprofen tablet 400 mg  400 mg Oral Q6H PRN Anca Long PA-C   400 mg at 08/14/22 0755    lorazepam injection 2 mg  2 mg Intravenous Q15 Min PRN Nadia Alfonso MD        sodium chloride 0.9% flush 10 mL  10 mL Intravenous PRN Nadia Alfonso MD        [START ON 8/15/2022] traMADoL tablet 50 mg  50 mg Oral Once Johnnie Gaston MD         Continuous Infusions:    Review of Systems  Objective:     Vital Signs (Most Recent):  Temp: 97.8 °F (36.6 °C) (08/14/22 0800)  Pulse: 75 (08/14/22 0800)  Resp: 17 (08/14/22 0800)  BP: 129/88 (08/14/22 0800)  SpO2: (!) 93 % (08/14/22 0800)   Vital Signs (24h Range):  Temp:  [97.7 °F (36.5 °C)-98.6 °F (37 °C)] 97.8 °F (36.6 °C)  Pulse:  [60-87] 75  Resp:  [0-18] 17  SpO2:  [92 %-97 %] 93 %  BP: (119-149)/(80-96) 129/88     Weight: 89.4 kg (197 lb 1.5 oz)  Body mass index is 27.49 kg/m².    Physical Exam    General: NAD, well nourished   Eyes: no tearing, discharge, no erythema   ENT: moist mucous membranes of the oral cavity, nares patent    Neck: Supple, full range of motion  Cardiovascular: Warm and well perfused, pulses equal and symmetrical  Lungs: Normal work of breathing, normal chest wall excursions  Skin: No rash, lesions, or breakdown on exposed skin  Psychiatry: Mood and affect are appropriate   Abdomen: soft, non tender, non distended  Extremeties: No cyanosis, clubbing or edema.     Neurological   MENTAL STATUS: Alert and oriented to person, place, and  time. Attention and concentration within normal limits. Speech without dysarthria, able to name and repeat without difficulty. Recent and remote memory within normal limits   CRANIAL NERVES: Visual fields intact. PERRL. EOMI. Facial sensation intact. Face symmetrical. Hearing grossly intact. Full shoulder shrug bilaterally. Tongue protrudes midline   SENSORY: Sensation is intact to light touch throughout.    MOTOR: Normal bulk and tone. 5/5 deltoid, biceps, triceps, interosseous, hand  bilaterally. 5/5 iliopsoas, knee extension/flexion, foot dorsi/plantarflexion bilaterally.    REFLEXES: Symmetric and 1+ throughout.   CEREBELLAR/COORDINATION/GAIT: Gross motor movements smooth          Significant Labs: All pertinent lab results from the past 24 hours have been reviewed.    Significant Studies: I have reviewed all pertinent imaging results/findings within the past 24 hours.

## 2022-08-14 NOTE — ASSESSMENT & PLAN NOTE
Ongoing SW/CM Assessment/Plan of Care Note     See SW/CM flowsheets for goals and other objective data.    Patient/Family discharge goal (s):  Goal #1: Communication facilitated  Goal #2: Establish present or future health care decison-maker  Goal #3: Discharge to other institution(s) arranged    PT Recommendation:  Recommendation for Discharge: PT WI: Sub-acute nursing home       OT Recommendation:  Recommendations for Discharge: OT WI: Sub-acute nursing home, SNF       SLP Recommendation:       Disposition:  Planned Discharge Destination: Rehabilitation/Skilled Care    Progress note:   Reviewed the pt's medical chart, collaborated with pt's treatment team.  Spoke with Nup[facility] at [facility], referral being reviewed.    Updated pt's granddaughter, Beth, with pt status and discharge plan.  No needs/concerns at this time, continue to follow for discharge planning.       Hx of, previously on Amlodipine 5 mg daily; not currently on antiHTN regimen.    - elevated 8/11>8/12, possibly due to headache  - continue to monitor

## 2022-08-14 NOTE — PROCEDURES
Procedures       VIDEO ELECTROENCEPHALOGRAM  REPORT    DATE OF SERVICE: 8/13/22-8/14/22  EEG NUMBER: [id number]  REQUESTED BY:  Tala  LOCATION OF SERVICE: [facility] [place]    METHODOLOGY   Electroencephalographic (EEG) recording is with electrodes placed according to the International 10-20 placement system.  Thirty two (32) channels of digital signal (sampling rate of 512/sec) including T1 and T2 was simultaneously recorded from the scalp and may include  EKG, EMG, and/or eye monitors.  Recording band pass was 0.1 to 512 hz.  Digital video recording of the patient is simultaneously recorded with the EEG.  The patient is instructed report clinical symptoms which may occur during the recording session.  EEG and video recording is stored and archived in digital format.  Activation procedures which include photic stimulation, hyperventilation and instructing patients to perform simple task are done in selected patients.   The EEG is displayed on a monitor screen and can be reviewed using different montages.  Computer assisted analysis is employed to detect spike and electrographic seizure activity.   The entire record is submitted for computer analysis.  The entire recording is visually reviewed and the times identified by computer analysis as being spikes or seizures are reviewed again.  Compresses spectral analysis (CSA) is also performed on the activity recorded from each individual channel.  This is displayed as a power display of frequencies from 0 to 30 Hz over time.   The CSA is reviewed looking for asymmetries in power between homologous areas of the scalp and then compared with the original EEG recording.     [employer] software was also utilized in the review of this study.  This software suite analyzes the EEG recording in multiple domains.  Coherence and rhythmicity is computed to identify EEG sections which may contain organized seizures.  Each channel undergoes analysis to detect presence of spike and sharp waves which  have special and morphological characteristic of epileptic activity.  The routine EEG recording is converted from spacial into frequency domain.  This is then displayed comparing homologous areas to identify areas of significant asymmetry.  Algorithm to identify non-cortically generated artifact is used to separate eye movement, EMG and other artifact from the EEG.      ELECTROENCEPHALOGRAM:    RECORDING TIMES:  Start on 8/13/22 at 07:01:23  Stop on 8/14/22 at 07:00:03    A total of 23 hrs of EEG recording was obtained.    INTERICTAL:   The record shows a good  organization at rest, consisting of a 10 Hz posterior dominant rhythm with good  reactivity. There is mild bilateral beta activity. There is occasional focal theta>delta range slowing over the left temporal region with rare left temporal sharp wave discharges maximal at F7/T3.    Drowsiness is characterized by attenuation of the background, vertex waves, and bilateral theta slowing. Stage II sleep is characterized by slowing, vertex waves, and symmetric sleep spindles. Slow wave and REM sleep are recorded.    Provocative maneuvers including hyperventilation and photic stimulation were not performed.     EKG recording shows a regular rhythm.    There is one electroclinical seizure at 09:37:59. The patient exhibits orofacial automatisms followed by grimacing and an ictal cry with tonic posturing and slight rightward head turn before tonic posturing and then clonic jerking with postictal somnolence. Electrographically, there is onset of sharply contoured rhythmic 5Hz slowing over the left hemisphere phase reversing at P3 (with significant myogenic artifact during this time) increasing to 7 Hz prior to obscuration of the record by myogenic artifact.       IMPRESSION:  Abnormal study due to focal slowing over the left temporal region suggestive of underlying structural defect such as mass or infarct. There are focal interictal epileptiform discharges over the left  temporal region consistent with the patient's known underlying focal onset seizure disorder. A single electroclinical seizures is captured at     Johnnie Gaston MD

## 2022-08-14 NOTE — PLAN OF CARE
Problem: Adult Inpatient Plan of Care  Goal: Plan of Care Review  Outcome: Ongoing, Progressing  Flowsheets (Taken 8/14/2022 0506)  Plan of Care Reviewed With:   patient   spouse  Goal: Optimal Comfort and Wellbeing  Outcome: Ongoing, Progressing     Problem: Seizure, Active Management  Goal: Absence of Seizure/Seizure-Related Injury  Outcome: Ongoing, Progressing       POC reviewed and updated with the patient/caregiver. Questions regarding POC were encouraged and addressed with the patient/caregiver.  VSS, see flow-sheets. Tele and VISI maintained. Patient is AO X 4 at this time. HA noted overnight, PRN Ibuprofen and Tylenol given with minimal effect. Fall/safety precautions maintained, no signs of injury noted during shift. Seizure precautions maintained, no events noted during shift. Patient was repositioned for comfort, bed locked in low position with side rails X 4, bed alarm refused, with call light within reach. Instructed patient to call staff for mobility, verbalized understanding.  No acute signs of distress noted at this time.

## 2022-08-14 NOTE — PROGRESS NOTES
Anthony Schulz - Neurosurgery ([facility])  Neurology-Epilepsy  Progress Note    Patient Name: Declan Burleson  MRN: [id number]  Admission Date: 8/9/2022  Hospital Length of Stay: 5 days  Code Status: Full Code   Attending Provider: Kaleb Edgar MD  Primary Care Physician: Juan Carlos Simmons NP   Principal Problem:Staring episodes    Subjective:     Hospital Course:   8/9>8/10: Admit to EMU. Eslicarbazepine and Zonisamide held on admit, Clobazam decreased to 10 mg qhs. EEG with focal intermittent slowing in left temporal region, no epileptiform discharges, no electrographic seizures. PB @0719 for episode of word finding difficulty with maintained awareness and @1221 for headache and staring- no EEG correlate with events; see EEG report for full details.  8/10>8/11: No typical events. EEG unchanged, focal intermittent slowing in left temporal region, no epileptiform discharges, no electrographic seizures. Continued medication adjustments for event capture- decreased Clobazam to 5 mg qhs.  8/11>8/12: No typical events. EEG with continued left focal slowing and rare left sharps, no electrographic seizures. Clobazam held, last dose 8/11. Lexapro dose increased from 10 to 15 mg QD due to reported low energy.  8/12-8/13> No events  8/13/8/14> 1 sz w/ L hemispheric onset      Interval History: Single seizure yesterday AM    Current Facility-Administered Medications   Medication Dose Route Frequency Provider Last Rate Last Admin    acetaminophen tablet 650 mg  650 mg Oral Q4H PRN Nadia Alfonso MD   650 mg at 08/13/22 2017    [START ON 8/15/2022] diphenhydrAMINE capsule 50 mg  50 mg Oral Once Johnnie Gaston MD        docusate sodium capsule 100 mg  100 mg Oral BID PRN Nadia Alfonso MD        EScitalopram oxalate tablet 15 mg  15 mg Oral Daily Nadia Alfonso MD   15 mg at 08/14/22 0800    ibuprofen tablet 400 mg  400 mg Oral Q6H PRN Anca Long PA-C   400 mg at 08/14/22 0755    lorazepam injection 2 mg  2 mg Intravenous  Q15 Min PRN Nadia Alfonso MD        sodium chloride 0.9% flush 10 mL  10 mL Intravenous PRN Nadia Alfonso MD        [START ON 8/15/2022] traMADoL tablet 50 mg  50 mg Oral Once Johnnie Gaston MD         Continuous Infusions:    Review of Systems  Objective:     Vital Signs (Most Recent):  Temp: 97.8 °F (36.6 °C) (08/14/22 0800)  Pulse: 75 (08/14/22 0800)  Resp: 17 (08/14/22 0800)  BP: 129/88 (08/14/22 0800)  SpO2: (!) 93 % (08/14/22 0800)   Vital Signs (24h Range):  Temp:  [97.7 °F (36.5 °C)-98.6 °F (37 °C)] 97.8 °F (36.6 °C)  Pulse:  [60-87] 75  Resp:  [0-18] 17  SpO2:  [92 %-97 %] 93 %  BP: (119-149)/(80-96) 129/88     Weight: 89.4 kg (197 lb 1.5 oz)  Body mass index is 27.49 kg/m².    Physical Exam    General: NAD, well nourished   Eyes: no tearing, discharge, no erythema   ENT: moist mucous membranes of the oral cavity, nares patent    Neck: Supple, full range of motion  Cardiovascular: Warm and well perfused, pulses equal and symmetrical  Lungs: Normal work of breathing, normal chest wall excursions  Skin: No rash, lesions, or breakdown on exposed skin  Psychiatry: Mood and affect are appropriate   Abdomen: soft, non tender, non distended  Extremeties: No cyanosis, clubbing or edema.     Neurological   MENTAL STATUS: Alert and oriented to person, place, and time. Attention and concentration within normal limits. Speech without dysarthria, able to name and repeat without difficulty. Recent and remote memory within normal limits   CRANIAL NERVES: Visual fields intact. PERRL. EOMI. Facial sensation intact. Face symmetrical. Hearing grossly intact. Full shoulder shrug bilaterally. Tongue protrudes midline   SENSORY: Sensation is intact to light touch throughout.    MOTOR: Normal bulk and tone. 5/5 deltoid, biceps, triceps, interosseous, hand  bilaterally. 5/5 iliopsoas, knee extension/flexion, foot dorsi/plantarflexion bilaterally.    REFLEXES: Symmetric and 1+ throughout.   CEREBELLAR/COORDINATION/GAIT:  Gross motor movements smooth          Significant Labs: All pertinent lab results from the past 24 hours have been reviewed.    Significant Studies: I have reviewed all pertinent imaging results/findings within the past 24 hours.    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 Edgar for event capture and characterization of breakthrough seizures and consideration of surgical evaluation. Breakthrough seizures described as staring with loss of awareness occurring ~2x/weekly. Last convulsion in 4/2022. Patient has also noted intermittent word finding difficulty and confusion. Patient has previously tried Levetiracetam (stopped d/t mood side effects), Topiramate, Lamotrigine, and Oxcarbazepine in the past without improvement in seizures.    - Continuous vEEG, continuing to monitor   - PB 8/10 @0719 for brief episode of word finding difficulty with maintained awareness & @1221 for headache and staring. No EEG correlate with events; see EEG report for full details    - Sz w/ L hemispheric onset on 8/13   - Skin breakdown noted 8/14, tech to adjust lead placement and glue, patient and wife state they wish to continue monitoring and are aware of skin breakdown   - sleep deprivation tonight (8/14)   - EEG with focal intermittent slowing in left temporal region, no epileptiform discharges  - Held home Eslicarbazepine and Zonisamide on admit  - Clobazam decreased to 10 mg qhs->5 mg qhs on 8/11->held 8/12  - zonisamide level 19, other levels pending  - Activation procedures per protocol- medication adjustments as above  - IV Ativan PRN for GTC greater than  5 min - please call epilepsy on call before administering  - Seizure precautions, suction and oxygen at bedside  - Fall precautions, side rail padding in place  - Visi monitoring with continuous pulse oximetry   - Telemetry  - Placed ambulatory referral to Speech Therapy     Depression with anxiety  Chronic. Taking escitalopram 10 mg QD prior to admission. Patient reports lack of energy during the day.    - escitalopram increased to 15 mg QD starting 8/13    Hypertension  Hx of, previously on Amlodipine 5 mg daily; not currently on antiHTN regimen.    - elevated 8/11>8/12, possibly due to headache  - continue to monitor    Migraine without status migrainosus, not intractable  Takes PRN Nurtec at home.    - Analgesics PRN, supportive care    Complex partial seizures evolving to generalized tonic-clonic seizures  See Staring episodes        VTE Risk Mitigation (From admission, onward)         Ordered     IP VTE LOW RISK PATIENT  Once         08/09/22 1505     Place sequential compression device  Until discontinued         08/09/22 1505                Johnnie Gaston MD  Neurology-Epilepsy  [facility] - Neurosurgery ([facility])

## 2022-08-14 NOTE — ASSESSMENT & PLAN NOTE
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 Edgar for event capture and characterization of breakthrough seizures and consideration of surgical evaluation. Breakthrough seizures described as staring with loss of awareness occurring ~2x/weekly. Last convulsion in 4/2022. Patient has also noted intermittent word finding difficulty and confusion. Patient has previously tried Levetiracetam (stopped d/t mood side effects), Topiramate, Lamotrigine, and Oxcarbazepine in the past without improvement in seizures.    - Continuous vEEG, continuing to monitor   - PB 8/10 @0719 for brief episode of word finding difficulty with maintained awareness & @1221 for headache and staring. No EEG correlate with events; see EEG report for full details    - Sz w/ L hemispheric onset on 8/13   - Skin breakdown noted 8/14, tech to adjust lead placement and glue, patient and wife state they wish to continue monitoring and are aware of skin breakdown   - sleep deprivation tonight (8/14)   - EEG with focal intermittent slowing in left temporal region, no epileptiform discharges  - Held home Eslicarbazepine and Zonisamide on admit  - Clobazam decreased to 10 mg qhs->5 mg qhs on 8/11->held 8/12  - zonisamide level 19, other levels pending  - Activation procedures per protocol- medication adjustments as above  - IV Ativan PRN for GTC greater than 5 min - please call epilepsy on call before administering  - Seizure precautions, suction and oxygen at bedside  - Fall precautions, side rail padding in place  - Visi monitoring with continuous pulse oximetry   - Telemetry  - Placed ambulatory referral to Speech Therapy

## 2022-08-15 LAB — ESLICARBAZEPINE: 17 MCG/ML

## 2022-08-15 PROCEDURE — 25000003 PHARM REV CODE 250: Performed by: PHYSICIAN ASSISTANT

## 2022-08-15 PROCEDURE — 25000003 PHARM REV CODE 250: Performed by: STUDENT IN AN ORGANIZED HEALTH CARE EDUCATION/TRAINING PROGRAM

## 2022-08-15 PROCEDURE — 95714 VEEG EA 12-26 HR UNMNTR: CPT

## 2022-08-15 PROCEDURE — 11000001 HC ACUTE MED/SURG PRIVATE ROOM

## 2022-08-15 PROCEDURE — 63600175 PHARM REV CODE 636 W HCPCS: Performed by: STUDENT IN AN ORGANIZED HEALTH CARE EDUCATION/TRAINING PROGRAM

## 2022-08-15 PROCEDURE — 95720 EEG PHY/QHP EA INCR W/VEEG: CPT | Mod: ,,, | Performed by: PSYCHIATRY & NEUROLOGY

## 2022-08-15 PROCEDURE — 99233 PR SUBSEQUENT HOSPITAL CARE,LEVL III: ICD-10-PCS | Mod: ,,, | Performed by: PSYCHIATRY & NEUROLOGY

## 2022-08-15 PROCEDURE — 95720 PR EEG, W/VIDEO, CONT RECORD, I&R, >12<26 HRS: ICD-10-PCS | Mod: ,,, | Performed by: PSYCHIATRY & NEUROLOGY

## 2022-08-15 PROCEDURE — 99233 SBSQ HOSP IP/OBS HIGH 50: CPT | Mod: ,,, | Performed by: PSYCHIATRY & NEUROLOGY

## 2022-08-15 PROCEDURE — 94761 N-INVAS EAR/PLS OXIMETRY MLT: CPT

## 2022-08-15 RX ORDER — CLOBAZAM 10 MG/1
30 TABLET ORAL ONCE
Status: COMPLETED | OUTPATIENT
Start: 2022-08-15 | End: 2022-08-15

## 2022-08-15 RX ORDER — ZONISAMIDE 100 MG/1
500 CAPSULE ORAL ONCE
Status: COMPLETED | OUTPATIENT
Start: 2022-08-15 | End: 2022-08-15

## 2022-08-15 RX ORDER — ONDANSETRON 2 MG/ML
4 INJECTION INTRAMUSCULAR; INTRAVENOUS ONCE
Status: COMPLETED | OUTPATIENT
Start: 2022-08-15 | End: 2022-08-15

## 2022-08-15 RX ORDER — KETOROLAC TROMETHAMINE 30 MG/ML
30 INJECTION, SOLUTION INTRAMUSCULAR; INTRAVENOUS ONCE
Status: COMPLETED | OUTPATIENT
Start: 2022-08-15 | End: 2022-08-15

## 2022-08-15 RX ADMIN — CLOBAZAM 30 MG: 10 TABLET ORAL at 11:08

## 2022-08-15 RX ADMIN — IBUPROFEN 400 MG: 400 TABLET, FILM COATED ORAL at 02:08

## 2022-08-15 RX ADMIN — ZONISAMIDE 500 MG: 100 CAPSULE ORAL at 11:08

## 2022-08-15 RX ADMIN — ESLICARBAZEPINE ACETATE 1200 MG: 400 TABLET ORAL at 02:08

## 2022-08-15 RX ADMIN — ESCITALOPRAM OXALATE 15 MG: 5 TABLET, FILM COATED ORAL at 08:08

## 2022-08-15 RX ADMIN — ACETAMINOPHEN 650 MG: 325 TABLET ORAL at 11:08

## 2022-08-15 RX ADMIN — ONDANSETRON 4 MG: 2 INJECTION INTRAMUSCULAR; INTRAVENOUS at 06:08

## 2022-08-15 RX ADMIN — KETOROLAC TROMETHAMINE 30 MG: 30 INJECTION, SOLUTION INTRAMUSCULAR; INTRAVENOUS at 07:08

## 2022-08-15 NOTE — PROGRESS NOTES
Anthony Schulz - EMU ([facility])  Neurology-Epilepsy  Progress Note    Patient Name: Declan Burleson  MRN: [id number]  Admission Date: 8/9/2022  Hospital Length of Stay: 6 days  Code Status: Full Code   Attending Provider: Tushar Frazier Jr., MD  Primary Care Physician: Juan Carlos Simmons NP   Principal Problem:Staring episodes    Subjective:     Hospital Course:   8/9>8/10: Admit to EMU. Eslicarbazepine and Zonisamide held on admit, Clobazam decreased to 10 mg qhs. EEG with focal intermittent slowing in left temporal region, no epileptiform discharges, no electrographic seizures. PB @0719 for episode of word finding difficulty with maintained awareness and @1221 for headache and staring- no EEG correlate with events; see EEG report for full details.  8/10>8/11: No typical events. EEG unchanged, focal intermittent slowing in left temporal region, no epileptiform discharges, no electrographic seizures. Continued medication adjustments for event capture- decreased Clobazam to 5 mg qhs.  8/11>8/12: No typical events. EEG with continued left focal slowing and rare left sharps, no electrographic seizures. Clobazam held, last dose 8/11. Lexapro dose increased from 10 to 15 mg QD due to reported low energy.  8/12>8/13: PB @2144 for episode of eyelid fluttering, seeing flashing lights, numbness/tingling with no EEG correlate. EEG with left temporal interictals, no seizures.  8/13>8/14: 1 sz w/ L hemispheric onset.  8/14>8/15: EEG with left temporal focal slowing, left temporal interictals, and electroclinical seizure @0435 with left temporal onset and secondary generalization. Resume home AEDs now: Eslicarbazepine 1200 mg x1, Clobazam 30 mg x1, and Zonisamide 500 mg x1.      Interval History: Electroclinical seizure this morning with secondary GTC. This AM, patient resting comfortably in bed with wife at bedside. Patient at baseline. He reports mild headache s/p seizure this morning, declines PRN analgesics. Continues to endorses  skin breakdown and rash d/t EEG leads. Discussed EEG findings, plan of care including resuming AEDs, and answered questions at bedside.    Current Facility-Administered Medications   Medication Dose Route Frequency Provider Last Rate Last Admin    acetaminophen tablet 650 mg  650 mg Oral Q4H PRN Nadia Alfonso MD   650 mg at 08/13/22 2017    cloBAZam Tab 30 mg  30 mg Oral Once Anca Long, PA-C        docusate sodium capsule 100 mg  100 mg Oral BID PRN Nadia Alfonso MD        EScitalopram oxalate tablet 15 mg  15 mg Oral Daily Nadia Alfonso MD   15 mg at 08/15/22 0802    eslicarbazepine tablet Tab 1,200 mg  1,200 mg Oral Once Anca Mercedes, PA-C        ibuprofen tablet 400 mg  400 mg Oral Q6H PRN Anca Long, PA-C   400 mg at 08/14/22 0755    lorazepam injection 2 mg  2 mg Intravenous Q15 Min PRN Nadia Alfonso MD        sodium chloride 0.9% flush 10 mL  10 mL Intravenous PRN Nadia Alfonso MD        zonisamide capsule 500 mg  500 mg Oral Once Anca Long, PA-C         Continuous Infusions:    Review of Systems   Constitutional:  Negative for chills, diaphoresis and fever.   HENT:  Negative for hearing loss, sore throat and trouble swallowing.    Eyes:  Negative for photophobia and visual disturbance.   Respiratory:  Negative for cough, shortness of breath and wheezing.    Cardiovascular:  Negative for chest pain and palpitations.   Gastrointestinal:  Negative for diarrhea, nausea and vomiting.   Genitourinary:  Negative for difficulty urinating, dysuria and frequency.   Musculoskeletal:  Negative for back pain, myalgias and neck pain.   Skin:  Positive for rash.        Skin breakdown d/t EEG leads   Neurological:  Positive for seizures and speech difficulty (chronic word finding difficulty). Negative for weakness and headaches.   Psychiatric/Behavioral:  Negative for agitation and behavioral problems. The patient is not nervous/anxious.    Objective:     Vital Signs (Most Recent):  Temp: 98.4 °F  (36.9 °C) (08/15/22 0804)  Pulse: 72 (08/15/22 0804)  Resp: 19 (08/15/22 0804)  BP: (!) 138/93 (08/15/22 0804)  SpO2: (!) 93 % (08/15/22 0804)   Vital Signs (24h Range):  Temp:  [98.1 °F (36.7 °C)-98.5 °F (36.9 °C)] 98.4 °F (36.9 °C)  Pulse:  [] 72  Resp:  [12-35] 19  SpO2:  [83 %-97 %] 93 %  BP: (127-219)/() 138/93     Weight: 89.4 kg (197 lb 1.5 oz)  Body mass index is 27.49 kg/m².    Physical Exam  Vitals reviewed.   Constitutional:       General: He is not in acute distress.     Appearance: He is not diaphoretic.   HENT:      Head: Normocephalic and atraumatic.      Comments: EEG in place  Eyes:      General: No scleral icterus.        Right eye: No discharge.         Left eye: No discharge.      Extraocular Movements: Extraocular movements intact and EOM normal.      Conjunctiva/sclera: Conjunctivae normal.      Pupils: Pupils are equal, round, and reactive to light.   Cardiovascular:      Rate and Rhythm: Normal rate.   Pulmonary:      Effort: Pulmonary effort is normal. No respiratory distress.   Abdominal:      General: There is no distension.      Palpations: Abdomen is soft.      Tenderness: There is no abdominal tenderness.   Musculoskeletal:         General: No swelling, tenderness or deformity. Normal range of motion.   Skin:     General: Skin is warm and dry.      Findings: Erythema (under EEG wrapping around chin) present.   Neurological:      General: No focal deficit present.      Mental Status: He is alert and oriented to person, place, and time. Mental status is at baseline.   Psychiatric:         Mood and Affect: Mood normal.         Speech: Speech normal.         Behavior: Behavior normal.       NEUROLOGICAL EXAMINATION:     MENTAL STATUS   Oriented to person, place, and time.   Attention: normal. Concentration: normal.   Speech: speech is normal   Level of consciousness: alert    CRANIAL NERVES     CN II   Visual fields full to confrontation.     CN III, IV, VI   Pupils are equal,  round, and reactive to light.  Extraocular motions are normal.     CN V   Right corneal reflex: normal  Left corneal reflex: normal    CN VII   Facial expression full, symmetric.     CN VIII   Hearing: intact    CN XI   CN XI normal.     CN XII   CN XII normal.     Significant Labs: All pertinent lab results from the past 24 hours have been reviewed.    Significant Studies: I have reviewed all pertinent imaging results/findings within the past 24 hours.    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 Edgar for event capture and characterization of breakthrough seizures and consideration of surgical evaluation. Breakthrough seizures described as staring with loss of awareness occurring ~2x/weekly. Last convulsion in 4/2022. Patient has also noted intermittent word finding difficulty and confusion. Patient has previously tried Levetiracetam (stopped d/t mood side effects), Topiramate, Lamotrigine, and Oxcarbazepine in the past without improvement in seizures.    - Continuous vEEG  - Held home Eslicarbazepine and Zonisamide on admit; Clobazam decreased to 10 mg qhs-> 5 mg qhs on 8/11-> held 8/12  - ZNS 19, /3602; ANJANA pending  - PB 8/10 @0719 for brief episode of word finding difficulty with maintained awareness & @1221 for headache and staring- no EEG correlate with events  - 2 electroclinical seizures captured 8/13 & 8/15 with left hemispheric onset; see EEG reports for details   - Skin breakdown noted 8/14, techs to adjust lead placement and glue, patient and wife state they wish to continue monitoring and are aware of skin breakdown  -  Activation procedures per protocol- medication adjustments as above  - IV Ativan PRN for GTC greater than 5 min - please call epilepsy on call before administering  - Seizure precautions, suction and oxygen at bedside  - Fall precautions, side rail padding in place  - Visi monitoring with continuous pulse oximetry   - Telemetry  - Placed ambulatory referral to Speech Therapy     Depression with anxiety  - Chronic, On Escitalopram 10 mg daily as outpatient  - Patient reports lack of energy during the day-> Escitalopram increased to 15 mg daily starting 8/13    Hypertension  Hx of, previously on Amlodipine 5 mg daily; not currently on antiHTN regimen  - BP stable   - BP noted to be elevated 8/11>8/12, possibly due to headache  - Continue to monitor  - PCP follow up as outpatient    Migraine without status migrainosus, not intractable  Takes PRN Nurtec at home  - Analgesics PRN, supportive care    Complex partial seizures evolving to generalized tonic-clonic seizures  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 Frazier

## 2022-08-15 NOTE — PLAN OF CARE
Problem: Adult Inpatient Plan of Care  Goal: Plan of Care Review  Outcome: Ongoing, Progressing  Flowsheets (Taken 8/15/2022 2409)  Plan of Care Reviewed With:   patient   spouse  Goal: Optimal Comfort and Wellbeing  Outcome: Ongoing, Progressing     Problem: Seizure, Active Management  Goal: Absence of Seizure/Seizure-Related Injury  Outcome: Ongoing, Progressing     Problem: Impaired Wound Healing  Goal: Optimal Wound Healing  Outcome: Ongoing, Progressing         POC reviewed and updated with the patient/caregiver. Questions regarding POC were encouraged and addressed with the patient/caregiver.  VSS, see flow-sheets. Tele and VISI maintained. Patient is AO X 4 at this time. Fall/safety precautions maintained, no signs of injury noted during shift. Successful sleep deprivation per provider order.  Seizure precautions maintained, one event noted during shift. See event note for details.  Patient was repositioned for comfort, bed locked in low position with side rails X 4, bed alarm refused, with call light within reach. Instructed patient to call staff for mobility, verbalized understanding.  No acute signs of distress noted at this time.

## 2022-08-15 NOTE — ASSESSMENT & PLAN NOTE
Hx of, previously on Amlodipine 5 mg daily; not currently on antiHTN regimen  - BP stable   - BP noted to be elevated 8/11>8/12, possibly due to headache  - Continue to monitor  - PCP follow up as outpatient

## 2022-08-15 NOTE — NURSING
Wound care consult placed for scalp wound per protocol. Wife expressed concern with open wound from EEG electrodes. Informed her that wound care has been consulted, verbalized understanding. No acute signs of distress noted at this time.

## 2022-08-15 NOTE — PLAN OF CARE
Problem: Adult Inpatient Plan of Care  Goal: Plan of Care Review  Outcome: Ongoing, Progressing  Flowsheets (Taken 8/15/2022 1630)  Plan of Care Reviewed With:   patient   spouse  Goal: Patient-Specific Goal (Individualized)  Outcome: Ongoing, Progressing  Flowsheets (Taken 8/15/2022 1630)  Anxieties, Fears or Concerns: none  Individualized Care Needs: none  Patient-Specific Goals (Include Timeframe): none     POC reviewed with the patient and they verbalized understanding. All comments and concerns addressed. Bed locked in lowest position, call light within reach. Safety precautions maintained. VSS, see flowsheets. No events this shift. Seizure precautions maintained. EEG, telemetry monitoring, and VISI mobile in place.

## 2022-08-15 NOTE — ASSESSMENT & PLAN NOTE
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 Edgar for event capture and characterization of breakthrough seizures and consideration of surgical evaluation. Breakthrough seizures described as staring with loss of awareness occurring ~2x/weekly. Last convulsion in 4/2022. Patient has also noted intermittent word finding difficulty and confusion. Patient has previously tried Levetiracetam (stopped d/t mood side effects), Topiramate, Lamotrigine, and Oxcarbazepine in the past without improvement in seizures.    - Continuous vEEG  - Held home Eslicarbazepine and Zonisamide on admit; Clobazam decreased to 10 mg qhs-> 5 mg qhs on 8/11-> held 8/12  - ZNS 19, /3602; ANJANA pending  - PB 8/10 @0719 for brief episode of word finding difficulty with maintained awareness & @1221 for headache and staring- no EEG correlate with events  - 2 electroclinical seizures captured 8/13 & 8/15 with left hemispheric onset; see EEG reports for details   - Skin breakdown noted 8/14, techs to adjust lead placement and glue, patient and wife state they wish to continue monitoring and are aware of skin breakdown  - Activation procedures per protocol- medication adjustments as above  - IV Ativan PRN for GTC greater than 5 min - please call epilepsy on call before administering  - Seizure precautions, suction and oxygen at bedside  - Fall precautions, side rail padding in place  - Visi monitoring with continuous pulse oximetry   - Telemetry  - Placed ambulatory referral to Speech Therapy

## 2022-08-15 NOTE — PROCEDURES
Date of service  8/14/2022-8/15/2022    Introduction  Electroencephalographic (EEG) is recorded with electrodes placed according to the International 10-20 placement system.  Thirty two (32) channels  of digital signal (sampling rate of 512/sec), including T1 and T2, were simultaneously recorded from the scalp and may include EKG, EMG, and/or eye monitors.  Recording band pass was 0.1 to 512 Hz.  Digital video recording of the patient is simultaneously recorded with the EEG.  The patient is instructed to report clinical symptoms which may occur during the recording session.  EEG and video recording are stored and archived in digital format.  Activation procedures, which include photic stimulation, hyperventilation and instructing patients to perform simple tasks, are done in selected patients.    The EEG is displayed on a monitor screen and can be reviewed using different montages.  Computer-assisted analysis is employed to detect spike and electrographic seizure activity.   The entire record is submitted for computer analysis.  The entire recording is visually reviewed, and the times identified by computer analysis as being spikes or seizures are reviewed again.      Compressed spectral analysis (CSA) is also performed on the activity recorded from each individual channel.  This is displayed as a power display of frequencies from 0 to 30 Hz over time.   The CSA is reviewed looking for asymmetries in power between homologous areas of the scalp, then compared with the original EEG recording.      [employer] software was also utilized in the review of this study. This software suite analyzes the EEG recording in multiple domains. Coherence and rhythmicity are computed to identify EEG sections which may contain organized seizures. Each channel undergoes analysis to detect the presence of spike and sharp waves which have special and morphological characteristics of epileptic activity. The routine EEG recording is converted  from special into frequency domain. This is then displayed comparing homologous areas to identify areas of significant asymmetry. Algorithm to identify non-cortically generated artifact is used to separate artifact from the EEG.    Provider - Tushar Frazier Jr. MD  RECORDING TIMES  Start on 8/14/22 at 0701   Stop on 8/15/22 at 0700  Total EEG recording time - 24 hrs     EEG FINDINGS  Waking State:  The patient's background consists of a posteriorly dominant 10 Hz alpha rhythm, which is well formed, well modulated, and abolishes with eye opening.  Anteriorly, the patient's background consists of predominantly beta range frequencies.  There is focal slowing to the theta/delta range in the left temporal region.  There are epileptiform transients, most often seen as sharp-wave discharges at F7/T3.       Sleep state:  Normal sleep architecture is seen with features of stage N2 sleep appreciated.     Activation procedures:   Hyperventilation and photic stimulation were not performed.     Seizure/Events(s) recorded -   Seizure 2:  Start on 8/15/22 at 04:35:30, stop at 04:37:29  With respect to clinical features, the patient demonstrates nonsensical speech at 04:35:34.  At 04:35:44, oral automatisms are observed.  He holds his right hand in a tonic fashion at 04:36:18.  He issues an ictal cry at 04:36:27.  He does not appear appropriately responsive/interactive with his surroundings.  Over the ensuing seconds he exhibits tonic extension of all four extremities and then demonstrates generalized tonic-clonic activity.  After the end of the seizure, the patient is seen lying unresponsive.  From an electrographic perspective, the patient develops a rhythmic theta range slowing best appreciated at F7/T3 which subsequently spreads.  There is increasing myogenic artifact as the seizure continues.  By 04:36:19, the underlying cortical signal is largely obscured.  After the termination of the seizure, diffuse low amplitude slowing  in noted.    Cardiac Monitor:   Sinus rhythm     Interpretation  This is an abnormal LTM-EEG due to the presence of focal slowing, epileptiform discharges, and a clinical seizure as described above.  The focal slowing localized to left temporal region suggests cortical dysfunction in this area.  The epileptiform discharges and seizure indicates a focal onset epilepsy with an apparent left hemispheric seizure focus, likely lying in the vicinity of the anterior portion of the left temporal lobe.

## 2022-08-15 NOTE — ASSESSMENT & PLAN NOTE
- Chronic, On Escitalopram 10 mg daily as outpatient  - Patient reports lack of energy during the day-> Escitalopram increased to 15 mg daily starting 8/13

## 2022-08-15 NOTE — PROGRESS NOTES
BRIEF EPILEPSY NOTE (Late entry):    Called by nursing at ~4:45am about a GTC seizure ~4:30am.  Patient was confused but responsive at the time per nurse.  EEG slowed post-ictal slowing at the time.    Advised to hold Benadryl/Tramadol scheduled for the morning and to monitor closely for possible additional seizure.    Jyoti Benoit MD, MICHELLE(HC), AMITA, POONAM.  Neurology-Epilepsy.  [facility].

## 2022-08-15 NOTE — SUBJECTIVE & OBJECTIVE
Interval History: Electroclinical seizure this morning with secondary GTC. This AM, patient resting comfortably in bed with wife at bedside. Patient at baseline. He reports mild headache s/p seizure this morning, declines PRN analgesics. Continues to endorses skin breakdown and rash d/t EEG leads. Discussed EEG findings, plan of care including resuming AEDs, and answered questions at bedside.    Current Facility-Administered Medications   Medication Dose Route Frequency Provider Last Rate Last Admin    acetaminophen tablet 650 mg  650 mg Oral Q4H PRN Nadia Alfonso MD   650 mg at 08/13/22 2017    cloBAZam Tab 30 mg  30 mg Oral Once Anca Long PA-C        docusate sodium capsule 100 mg  100 mg Oral BID PRN Nadia Alfonso MD        EScitalopram oxalate tablet 15 mg  15 mg Oral Daily Nadia Alfonso MD   15 mg at 08/15/22 0802    eslicarbazepine tablet Tab 1,200 mg  1,200 mg Oral Once Anca Long, PA-C        ibuprofen tablet 400 mg  400 mg Oral Q6H PRN Anca Long PA-C   400 mg at 08/14/22 0755    lorazepam injection 2 mg  2 mg Intravenous Q15 Min PRN Nadia Alfonso MD        sodium chloride 0.9% flush 10 mL  10 mL Intravenous PRN Nadia Alfonso MD        zonisamide capsule 500 mg  500 mg Oral Once Anca Long PA-C         Continuous Infusions:    Review of Systems   Constitutional:  Negative for chills, diaphoresis and fever.   HENT:  Negative for hearing loss, sore throat and trouble swallowing.    Eyes:  Negative for photophobia and visual disturbance.   Respiratory:  Negative for cough, shortness of breath and wheezing.    Cardiovascular:  Negative for chest pain and palpitations.   Gastrointestinal:  Negative for diarrhea, nausea and vomiting.   Genitourinary:  Negative for difficulty urinating, dysuria and frequency.   Musculoskeletal:  Negative for back pain, myalgias and neck pain.   Skin:  Positive for rash.        Skin breakdown d/t EEG leads   Neurological:  Positive for seizures and speech  difficulty (chronic word finding difficulty). Negative for weakness and headaches.   Psychiatric/Behavioral:  Negative for agitation and behavioral problems. The patient is not nervous/anxious.    Objective:     Vital Signs (Most Recent):  Temp: 98.4 °F (36.9 °C) (08/15/22 0804)  Pulse: 72 (08/15/22 0804)  Resp: 19 (08/15/22 0804)  BP: (!) 138/93 (08/15/22 0804)  SpO2: (!) 93 % (08/15/22 0804)   Vital Signs (24h Range):  Temp:  [98.1 °F (36.7 °C)-98.5 °F (36.9 °C)] 98.4 °F (36.9 °C)  Pulse:  [] 72  Resp:  [12-35] 19  SpO2:  [83 %-97 %] 93 %  BP: (127-219)/() 138/93     Weight: 89.4 kg (197 lb 1.5 oz)  Body mass index is 27.49 kg/m².    Physical Exam  Vitals reviewed.   Constitutional:       General: He is not in acute distress.     Appearance: He is not diaphoretic.   HENT:      Head: Normocephalic and atraumatic.      Comments: EEG in place  Eyes:      General: No scleral icterus.        Right eye: No discharge.         Left eye: No discharge.      Extraocular Movements: Extraocular movements intact and EOM normal.      Conjunctiva/sclera: Conjunctivae normal.      Pupils: Pupils are equal, round, and reactive to light.   Cardiovascular:      Rate and Rhythm: Normal rate.   Pulmonary:      Effort: Pulmonary effort is normal. No respiratory distress.   Abdominal:      General: There is no distension.      Palpations: Abdomen is soft.      Tenderness: There is no abdominal tenderness.   Musculoskeletal:         General: No swelling, tenderness or deformity. Normal range of motion.   Skin:     General: Skin is warm and dry.      Findings: Erythema (under EEG wrapping around chin) present.   Neurological:      General: No focal deficit present.      Mental Status: He is alert and oriented to person, place, and time. Mental status is at baseline.   Psychiatric:         Mood and Affect: Mood normal.         Speech: Speech normal.         Behavior: Behavior normal.       NEUROLOGICAL EXAMINATION:     MENTAL  STATUS   Oriented to person, place, and time.   Attention: normal. Concentration: normal.   Speech: speech is normal   Level of consciousness: alert    CRANIAL NERVES     CN II   Visual fields full to confrontation.     CN III, IV, VI   Pupils are equal, round, and reactive to light.  Extraocular motions are normal.     CN V   Right corneal reflex: normal  Left corneal reflex: normal    CN VII   Facial expression full, symmetric.     CN VIII   Hearing: intact    CN XI   CN XI normal.     CN XII   CN XII normal.     Significant Labs: All pertinent lab results from the past 24 hours have been reviewed.    Significant Studies: I have reviewed all pertinent imaging results/findings within the past 24 hours.

## 2022-08-16 VITALS
BODY MASS INDEX: 27.59 KG/M2 | WEIGHT: 197.06 LBS | OXYGEN SATURATION: 96 % | SYSTOLIC BLOOD PRESSURE: 131 MMHG | HEIGHT: 71 IN | HEART RATE: 65 BPM | TEMPERATURE: 99 F | RESPIRATION RATE: 15 BRPM | DIASTOLIC BLOOD PRESSURE: 85 MMHG

## 2022-08-16 PROBLEM — R40.4 STARING EPISODES: Status: RESOLVED | Noted: 2022-08-09 | Resolved: 2022-08-16

## 2022-08-16 LAB
PYRIDOXAL SERPL-MCNC: 39 UG/L (ref 5–50)
VIT B1 BLD-MCNC: 95 UG/L (ref 38–122)

## 2022-08-16 PROCEDURE — 25000003 PHARM REV CODE 250: Performed by: STUDENT IN AN ORGANIZED HEALTH CARE EDUCATION/TRAINING PROGRAM

## 2022-08-16 PROCEDURE — 1111F DSCHRG MED/CURRENT MED MERGE: CPT | Mod: CPTII,,, | Performed by: PSYCHIATRY & NEUROLOGY

## 2022-08-16 PROCEDURE — 1111F PR DISCHARGE MEDS RECONCILED W/ CURRENT OUTPATIENT MED LIST: ICD-10-PCS | Mod: CPTII,,, | Performed by: PSYCHIATRY & NEUROLOGY

## 2022-08-16 PROCEDURE — 99239 HOSP IP/OBS DSCHRG MGMT >30: CPT | Mod: ,,, | Performed by: PSYCHIATRY & NEUROLOGY

## 2022-08-16 PROCEDURE — 99239 PR HOSPITAL DISCHARGE DAY,>30 MIN: ICD-10-PCS | Mod: ,,, | Performed by: PSYCHIATRY & NEUROLOGY

## 2022-08-16 PROCEDURE — 94761 N-INVAS EAR/PLS OXIMETRY MLT: CPT

## 2022-08-16 RX ORDER — CLOBAZAM 20 MG/1
40 TABLET ORAL NIGHTLY
Qty: 60 TABLET | Refills: 3 | Status: SHIPPED | OUTPATIENT
Start: 2022-08-24 | End: 2023-03-24 | Stop reason: SDUPTHER

## 2022-08-16 RX ORDER — CLOBAZAM 20 MG/1
40 TABLET ORAL 2 TIMES DAILY
Qty: 120 TABLET | Refills: 3 | Status: SHIPPED | OUTPATIENT
Start: 2022-08-24 | End: 2022-08-16 | Stop reason: SDUPTHER

## 2022-08-16 RX ADMIN — ESCITALOPRAM OXALATE 15 MG: 5 TABLET, FILM COATED ORAL at 08:08

## 2022-08-16 NOTE — PLAN OF CARE
Problem: Adult Inpatient Plan of Care  Goal: Plan of Care Review  Outcome: Ongoing, Progressing  Flowsheets (Taken 8/16/2022 0872)  Plan of Care Reviewed With:   patient   spouse  Goal: Optimal Comfort and Wellbeing  Outcome: Ongoing, Progressing     Problem: Seizure, Active Management  Goal: Absence of Seizure/Seizure-Related Injury  Outcome: Ongoing, Progressing     Problem: Impaired Wound Healing  Goal: Optimal Wound Healing  Outcome: Ongoing, Progressing         POC reviewed and updated with the patient/caregiver. Questions regarding POC were encouraged and addressed with the patient/caregiver.  VSS, see flow-sheets. Tele and VISI maintained. Patient is AO X 4 at this time. Toradol given x 1 for head pain-effective, see MAR for details. Fall/safety precautions maintained, no signs of injury noted during shift. Seizure precautions maintained, no events noted during shift. Patient was repositioned for comfort, bed locked in low position with side rails X 4, bed alarm refused, with call light within reach. Instructed patient to call staff for mobility, verbalized understanding.  No acute signs of distress noted at this time.

## 2022-08-16 NOTE — ASSESSMENT & PLAN NOTE
Hx of, previously on Amlodipine 5 mg daily; not currently on antiHTN regimen  - BP stable during admission  - BP noted to be elevated 8/11>8/12, possibly due to headache  - PCP follow up as outpatient

## 2022-08-16 NOTE — ASSESSMENT & PLAN NOTE
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 Edgar for event capture and characterization of breakthrough seizures and consideration of surgical evaluation. Breakthrough seizures described as staring with loss of awareness occurring ~2x/weekly. Last convulsion in 4/2022. Patient has also noted intermittent word finding difficulty and confusion.     - Held home Eslicarbazepine and Zonisamide on admit; Clobazam decreased to 10 mg qhs on admit->5 mg qhs on 8/11->held 8/12  - ZNS 19, /3602, ANJANA 17  - PB 8/10 @0719 for brief episode of word finding difficulty with maintained awareness & @1221 for headache and staring- no EEG correlate with events  - 2 electroclinical seizures captured 8/13 & 8/15 with left hemispheric onset; see EEG report for details  - Discharged home in stable condition on resumed AED regimen: Eslicarbazepine 1200 mg qhs, Clobazam 30 mg qhs, and Zonisamide 500 mg qhs  - Plan to increase Clobazam to 40 mg qhs in 1 week (Rx sent to outpatient pharmacy)  - Outpatient follow up in Epilepsy clinic with Dr. Edgar  - Seizure precautions

## 2022-08-16 NOTE — ASSESSMENT & PLAN NOTE
- Chronic  - Continue home Escitalopram 10 mg daily   - Discussed with patient and wife who plan to monitor symptoms of anxiety after increasing Clobazam dose in 1 week and will follow up with Dr. Saldana to decide if they want to increase Escitalopram afterwards

## 2022-08-16 NOTE — DISCHARGE INSTRUCTIONS
Resume home AED regimen on discharge: Eslicarbazepine (Aptiom) 1200 mg nightly, Clobazam (Onfi) 30 mg nightly, and Zonisamide (Zonegran) 500 mg qhs.    Starting August 24th, increase Clobazam (Onfi) to 40 mg nightly. Prescription sent to your outpatient pharmacy. Continue taking Eslicarbazepine (Aptiom) 1200 mg nightly and Zonisamide (Zonegran) 500 mg qhs.      Per [place] Law, patient must refrain from driving for 6 months after having a seizure, and must be cleared by a neurologist to legally resume driving. Physician team not required to report pt to DMV. Additionally, advised patient to avoid bathing or swimming alone, climbing ladders or standing near precipice where one could fall, operating heavy machinery and to not supervise children or engage in other activities where losing consciousness or motor control would risk harm to self or others.

## 2022-08-16 NOTE — CONSULTS
Upon arrival to patient room, patient found to be discharge. Spoke to charge nurse who stated that she evaluate scalp after taking of EEG and sent patient home with rx for neosporin. Sign off on consult.

## 2022-08-16 NOTE — PROGRESS NOTES
Epilepsy  met with patient and wife in his room in the EMU.     Patient laying in his bed and accompanied by his wife Trista.     They report they are waiting for the patient to have some seizures or events in order to get additional information from the neurologist.     Patient's wife reports that she herself recently quit her job and that is a source of stress, but she is focused on Declan at this time.     They are motivated in order to take the next steps in regards to epilepsy treatment.         Benny Cid, EPHRAIMW

## 2022-08-16 NOTE — PROGRESS NOTES
Unable to evaluate scalp wounds d/t EEG monitor and lead in place for testing.  Spoke with RN.  Will see patient tomorrow once monitor is removed.

## 2022-08-16 NOTE — PLAN OF CARE
Problem: Adult Inpatient Plan of Care  Goal: Plan of Care Review  Outcome: Met  Goal: Patient-Specific Goal (Individualized)  Outcome: Met  Goal: Absence of Hospital-Acquired Illness or Injury  Outcome: Met  Goal: Optimal Comfort and Wellbeing  Outcome: Met  Goal: Readiness for Transition of Care  Outcome: Met     Problem: SLP  Goal: SLP Goal  Description: Speech Pathology Goals  To be met by 8/24/22    1. Pt will participate in ongoing assessment of reading, writing, and visuospatial skills for development of most approrpiate treatment plan - met  2. Pt will identify x10 items within a concrete category  3. Pt will complete sentence completion tasks with 80% accuracy given MOD verbal and visual cueing             Outcome: Met     Problem: Seizure, Active Management  Goal: Absence of Seizure/Seizure-Related Injury  Outcome: Met     Problem: Impaired Wound Healing  Goal: Optimal Wound Healing  Outcome: Met         Discharge paperwork and POC reviewed with the patient and they verbalized understanding. All comments and concerns addressed. Bed locked in lowest position with bed alarm set, call light within reach. Safety precautions maintained. VSS, see flowsheets. No events this shift.

## 2022-08-16 NOTE — DISCHARGE SUMMARY
[facility] Schulz - EMU ([facility])  Neurology-Epilepsy  Discharge Summary      Patient Name: Declan Burleson  MRN: [id number]  Admission Date: 8/9/2022  Hospital Length of Stay: 7 days  Discharge Date and Time: 8/16/2022  Attending Physician: Tushar Frazier Jr., MD   Discharging Provider: Anca Long PA-C  Primary Care Physician: Juan Carlos Simmons NP    HPI:   Declan Burleson is a 57 year old man with history of seizures s/p left mesial temporal laser ablation 3/2021 admitted to EMU for evaluation of continued staring/unresponsive episodes postop while on medication. Patient is interested in pursuing surgical pathway again. Began having seizures at age 48.    Event type 1  No preceding aura/prodrome. LOC and convulsions with rhythmic shaking of all extremities. Confused afterward. Last occurred 4/2022. Have become less intense since surgery, has not had seizures from sleep since.    Event type 2  Staring and unresponsive. [place]. Frequency initially improved after surgery, since then has been increasing. Occurs twice per week, last on 8/7. Had similar episodes prior to surgery.    Since surgery, has noticed memory issues--forgetting to run the washing machine after putting clothes in, difficulty using , does not recall conversation from earlier same day. Occasionally has difficulty recalling names, including his children's names. Also has noticed less motivation to do things on some days.    Current AEDs:  - eslicarbazepine 1200 mg QHS  - zonisamide 500 mg QHS - has been gradually increasing from 300 to 600 mg since last clinic visit   - clobazam 30 mg QHS   Has noticed significant daytime sleepiness on increased zonisamide dose. Reports adherence, last dose 8/8 evening.    Prior medications and SE/reason for stopping:  - topiramate - continued seizures  - lamotrigine - continued seizures  - Keppra - anger, mood disturbance  - oxcarbazepine - continued seizures    Prior seizure evaluation:  - ambulatory  EEG 6/1/22 - No epileptiform discharges, periodic discharges, lateralized rhythmic delta activity or electrographic seizures were seen. There are 18 event button marks with no associated EEG change.   - sEEG 1/21/21 - 2 seizures (subclinical) were captured. Onset of both appear to be from left amygdala and hippocampus without generalization. Abundant epileptiform activity is also seen in the left amygdala and hippocampus, frequent to abundant epileptiform discharges in the right pre-motor anterior cingulate region, and frequent epileptiform activity in the left superior frontal SMA region.  - MRI Brain w/wo contrast 5/23/22 - Focal postablation encephalomalacia of the medial left temporal lobe  - MRI Brain Stealth 2/11/21 - no definite intracranial enhancing mass lesion  - fMRI 10/26/20 - No epileptogenic lesions identified. 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      * No surgery found *     Indwelling Lines/Drains at time of discharge:   Lines/Drains/Airways     None               Hospital Course:   8/9>8/10: Admit to EMU. Eslicarbazepine and Zonisamide held on admit, Clobazam decreased to 10 mg qhs. EEG with focal intermittent slowing in left temporal region, no epileptiform discharges, no electrographic seizures. PB @0719 for episode of word finding difficulty with maintained awareness and @1221 for headache and staring- no EEG correlate with events; see EEG report for full details.  8/10>8/11: No typical events. EEG unchanged, focal intermittent slowing in left temporal region, no epileptiform discharges, no electrographic seizures. Continued medication adjustments for event capture- decreased Clobazam to 5 mg qhs.  8/11>8/12: No typical events. EEG with continued left focal slowing and rare left sharps, no electrographic seizures. Clobazam held, last dose 8/11. Lexapro dose increased from 10 to 15 mg QD due to reported low energy.  8/12>8/13: PB @2144 for episode of eyelid fluttering,  seeing flashing lights, numbness/tingling with no EEG correlate. EEG with left temporal interictals, no seizures.  8/13>8/14: 1 sz w/ L hemispheric onset.  8/14>8/15: EEG with left temporal focal slowing, left temporal interictals, and electroclinical seizure @0435 with left temporal onset and secondary generalization. Resume home AEDs now: Eslicarbazepine 1200 mg x1, Clobazam 30 mg x1, and Zonisamide 500 mg x1.  8/15>8/16: No further seizures after resuming home AED regimen. Discharged home in stable condition on resumed AED regimen: Eslicarbazepine 1200 mg qhs, Clobazam 30 mg qhs, and Zonisamide 500 mg qhs. Plan to increase Clobazam to 40 mg qhs in 1 week (Rx sent to outpatient pharmacy). Outpatient follow up in Epilepsy clinic with Dr. Edgar. Seizure precautions.      Goals of Care Treatment Preferences:  Code Status: Full Code      Consults:   Consults (From admission, onward)        Status Ordering Provider     Inpatient consult to Midline team  Once        Provider:  (Not yet assigned)    TATIANA Hirsch          Significant Labs: All pertinent lab results from the past 24 hours have been reviewed.    Significant Studies: I have reviewed all pertinent imaging results/findings within the past 24 hours.    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 Edgar for event capture and characterization of breakthrough seizures and consideration of surgical evaluation. Breakthrough seizures described as staring with loss of awareness occurring ~2x/weekly. Last convulsion in 4/2022. Patient has also noted intermittent word finding difficulty and confusion.     - Held home Eslicarbazepine and Zonisamide on admit; Clobazam decreased to 10 mg qhs on admit->5 mg qhs on 8/11->held 8/12  - ZNS 19, /3602, ANJANA 17  - PB 8/10 @0719 for brief episode of word finding difficulty with maintained awareness & @1221 for headache and staring- no EEG correlate with events  - 2 electroclinical seizures captured 8/13 & 8/15 with left hemispheric onset; see EEG report for details  - Discharged home in stable condition on resumed AED regimen: Eslicarbazepine 1200 mg qhs, Clobazam 30 mg qhs, and Zonisamide 500 mg qhs  - Plan to increase Clobazam to 40 mg qhs in 1 week (Rx sent to outpatient pharmacy)  - Outpatient follow up in Epilepsy clinic with Dr. Edgar  - Seizure precautions    Depression with anxiety  - Chronic  - Continue home Escitalopram 10 mg daily   - Discussed with patient and wife who plan to monitor symptoms of anxiety after increasing Clobazam dose in 1 week and will follow up with Dr. Saldana to decide if they want to increase Escitalopram afterwards    Hypertension  Hx of, previously on Amlodipine 5 mg daily; not currently on antiHTN regimen  - BP stable during admission  - BP noted to be elevated 8/11>8/12, possibly due to headache  - PCP follow up as outpatient    Migraine without status migrainosus, not intractable  Takes PRN Nurtec at home  - Analgesics PRN, supportive care      Discharged  Condition: stable    Disposition: Home or Self Care    Patient Instructions:      Ambulatory referral/consult to Speech Therapy   Standing Status: Future   Referral Priority: Routine Referral Type: Speech Therapy   Referral Reason: Specialty Services Required   Requested Specialty: Speech Pathology   Number of Visits Requested: 1         Patient Instructions       Resume home AED regimen on discharge: Eslicarbazepine (Aptiom) 1200 mg nightly, Clobazam (Onfi) 30 mg nightly, and Zonisamide (Zonegran) 500 mg qhs.    Starting August 24th, increase Clobazam (Onfi) to 40 mg nightly. Prescription sent to your outpatient pharmacy. Continue taking Eslicarbazepine (Aptiom) 1200 mg nightly and Zonisamide (Zonegran) 500 mg qhs.      Per [place] Law, patient must refrain from driving for 6 months after having a seizure, and must be cleared by a neurologist to legally resume driving. Physician team not required to report pt to DMV. Additionally, advised patient to avoid bathing or swimming alone, climbing ladders or standing near precipice where one could fall, operating heavy machinery and to not supervise children or engage in other activities where losing consciousness or motor control would risk harm to self or others.        Medications:  Reconciled Home Medications:      Medication List      CHANGE how you take these medications    * cloBAZam 10 mg Tab  Commonly known as: ONFI  Take 1 tablet (10mg) by mouth once nightly.  What changed: Another medication with the same name was added. Make sure you understand how and when to take each.     * cloBAZam 20 mg Tab  Commonly known as: ONFI  TAKE 1 TABLET BY MOUTH ONCE DAILY EVERY EVENING  What changed: Another medication with the same name was added. Make sure you understand how and when to take each.     * cloBAZam 20 mg Tab  Commonly known as: ONFI  Take 2 tablets (40 mg total) by mouth every evening.  Start taking on: August 24, 2022  What changed: You were already taking a  medication with the same name, and this prescription was added. Make sure you understand how and when to take each.         * This list has 3 medication(s) that are the same as other medications prescribed for you. Read the directions carefully, and ask your doctor or other care provider to review them with you.            CONTINUE taking these medications    amLODIPine 5 MG tablet  Commonly known as: NORVASC  Take 1 tablet (5 mg total) by mouth once daily.     ANTACID ORAL  Take by mouth as needed. Acid reflux     aspirin 81 MG EC tablet  Commonly known as: ECOTRIN  Take 1 tablet (81 mg total) by mouth once daily.     EScitalopram oxalate 10 MG tablet  Commonly known as: LEXAPRO  Take 1 tablet (10 mg total) by mouth once daily.     * eslicarbazepine 800 mg Tab  Commonly known as: APTIOM  Take 800 mg by mouth once daily.     * eslicarbazepine 400 mg Tab tablet  Commonly known as: APTIOM  Take 1 tablet (400 mg total) by mouth once daily.     NAYZILAM 5 mg/spray (0.1 mL) Spry  Generic drug: midazolam  Instill 1 spray into the nostril for seizure and may repeat in 10 minutes if needed; not more than 1 unit per week per MD     NURTEC 75 mg odt  Generic drug: rimegepant  Take 75 mg by mouth.     ondansetron 8 MG Tbdl  Commonly known as: ZOFRAN-ODT  Take 8 mg by mouth every 8 (eight) hours as needed. nausea     oxyCODONE 5 MG immediate release tablet  Commonly known as: ROXICODONE  Take 1 tablet (5 mg total) by mouth every 6 (six) hours as needed for Pain.     rizatriptan 10 MG disintegrating tablet  Commonly known as: MAXALT-MLT  Take 10 mg by mouth as needed. No more than 3 doses / week     * zonisamide 100 MG Cap  Commonly known as: ZONEGRAN  TAKE 3 CAPSULES BY MOUTH ONCE DAILY     * zonisamide 100 MG Cap  Commonly known as: ZONEGRAN  Take 6 capsules (600 mg total) by mouth every evening.         * This list has 4 medication(s) that are the same as other medications prescribed for you. Read the directions carefully,  and ask your doctor or other care provider to review them with you.              Time spent on the discharge of patient: 60 minutes    Anca Long PA-C  Neurology-Epilepsy  [facility] ([facility])  Staff: Dr. Frazier

## 2022-08-16 NOTE — PLAN OF CARE
[facility] - Neurosurgery (Hospital)  Discharge Final Note    Primary Care Provider: Juan Carlos Simmons NP  Expected Discharge Date: 8/16/2022  Patient medically ready for discharge to home today.  Ambulatory referral for SLP was placed.     Final Discharge Note (most recent)     Final Note - 08/16/22 1201        Final Note    Assessment Type Final Discharge Note     Anticipated Discharge Disposition Home or Self Care     Hospital Resources/Appts/Education Provided Provided patient/caregiver with written discharge plan information        Post-Acute Status    Post-Acute Authorization Other     Other Status Awaiting f/u Appts     Discharge Delays None known at this time               Important Message from Medicare  Important Message from Medicare regarding Discharge Appeal Rights: Explained to patient/caregiver, Given to patient/caregiver, Signed/date by patient/caregiver   Date IMM was signed: 08/16/22  Time IMM was signed: 1014  Referral Info (most recent)     Referral Info    No documentation.                 Future Appointments   Date Time Provider Department Center   8/29/2022  1:40 PM Kaleb Edgar MD [facility]  Case Management  Ext: [phone]  08/16/2022

## 2022-08-17 ENCOUNTER — PATIENT MESSAGE (OUTPATIENT)
Dept: NEUROLOGY | Facility: CLINIC | Age: 57
End: 2022-08-17
Payer: MEDICARE

## 2022-08-18 ENCOUNTER — TELEPHONE (OUTPATIENT)
Dept: NEUROLOGY | Facility: CLINIC | Age: 57
End: 2022-08-18
Payer: MEDICARE

## 2022-08-18 NOTE — PROCEDURES
Date of service  8/15/2022-8/16/2022    Introduction  Electroencephalographic (EEG) is recorded with electrodes placed according to the International 10-20 placement system.  Thirty two (32) channels  of digital signal (sampling rate of 512/sec), including T1 and T2, were simultaneously recorded from the scalp and may include EKG, EMG, and/or eye monitors.  Recording band pass was 0.1 to 512 Hz.  Digital video recording of the patient is simultaneously recorded with the EEG.  The patient is instructed to report clinical symptoms which may occur during the recording session.  EEG and video recording are stored and archived in digital format.  Activation procedures, which include photic stimulation, hyperventilation and instructing patients to perform simple tasks, are done in selected patients.    The EEG is displayed on a monitor screen and can be reviewed using different montages.  Computer-assisted analysis is employed to detect spike and electrographic seizure activity.   The entire record is submitted for computer analysis.  The entire recording is visually reviewed, and the times identified by computer analysis as being spikes or seizures are reviewed again.      Compressed spectral analysis (CSA) is also performed on the activity recorded from each individual channel.  This is displayed as a power display of frequencies from 0 to 30 Hz over time.   The CSA is reviewed looking for asymmetries in power between homologous areas of the scalp, then compared with the original EEG recording.      [employer] software was also utilized in the review of this study. This software suite analyzes the EEG recording in multiple domains. Coherence and rhythmicity are computed to identify EEG sections which may contain organized seizures. Each channel undergoes analysis to detect the presence of spike and sharp waves which have special and morphological characteristics of epileptic activity. The routine EEG recording is converted  from special into frequency domain. This is then displayed comparing homologous areas to identify areas of significant asymmetry. Algorithm to identify non-cortically generated artifact is used to separate artifact from the EEG.    Provider - Tushar Frazier Jr. MD  RECORDING TIMES  Start on 8/15/22 at 10:01   Stop on 8/16/22 at 09:13  Total EEG recording time - 22 hrs     EEG FINDINGS  Waking State:  The patient's background consists of a posteriorly dominant 10 Hz alpha rhythm, which is well formed, well modulated, and abolishes with eye opening.  Anteriorly, the patient's background consists of predominantly beta range frequencies.  There is no focal slowing.  There are epileptiform transients, typically seen as sharp-wave discharges at F7/T3.  No electrographic seizures are seen.     Sleep state:  Normal sleep architecture is seen with features of stage N2 sleep appreciated.     Activation procedures:   Hyperventilation and photic stimulation were not performed.     Seizure/Events(s) recorded -   None    Cardiac Monitor:   Sinus rhythm     Interpretation  This is an abnormal LTM-EEG study due to the presence of epileptiform transients as described above.  The presence of epileptiform discharges indicates the presence of cortical irritability which appears most pronounced in the left anterior quadrant.    Comprehensive Summary  This represents the final portion of an LTM-EEG study which includes approximately 157 hours of EEG/video recordings.  Taken as a whole, it represents an abnormal video EEG due to the presence of epileptiform transients and 2 clinical seizures as discussed previously.  These findings indicate the presence of a focal epilepsy with a seizure focus likely in the vicinity of the left anterior temporal lobe.

## 2022-08-18 NOTE — TELEPHONE ENCOUNTER
"EMU admission notes and labs faxed to Dr. Saldana's office in 2 parts, to [phone]    Your fax has been successfully sent to [phone] at [phone].  ------------------------------------------------------------  From: [id number]  ------------------------------------------------------------  8/18/2022 2:24:29 PM Transmission Record   Sent to [phone] with remote ID "[facility]"   Result: (0/339;0/0) Success   Page record: 1 - 32   Elapsed time: 10:59 on channel 45      Your fax has been successfully sent to [phone] at [phone].  ------------------------------------------------------------  From: [id number]  ------------------------------------------------------------  8/18/2022 2:22:31 PM Transmission Record   Sent to [phone] with remote ID "[facility]"   Result: (0/339;0/0) Success   Page record: 1 - 50   Elapsed time: 16:49 on channel 30      "

## 2022-08-23 ENCOUNTER — CLINICAL SUPPORT (OUTPATIENT)
Dept: REHABILITATION | Facility: HOSPITAL | Age: 57
End: 2022-08-23
Payer: MEDICARE

## 2022-08-23 DIAGNOSIS — F06.70 MILD NEUROCOGNITIVE DISORDER DUE TO ANOTHER MEDICAL CONDITION: ICD-10-CM

## 2022-08-23 DIAGNOSIS — R47.01 APHASIA DETERMINED BY EXAMINATION: Primary | ICD-10-CM

## 2022-08-23 PROCEDURE — 92523 SPEECH SOUND LANG COMPREHEN: CPT

## 2022-08-24 PROBLEM — R47.01 APHASIA DETERMINED BY EXAMINATION: Status: ACTIVE | Noted: 2022-08-24

## 2022-08-24 NOTE — PLAN OF CARE
"[facility]  Speech Therapy Evaluation -Neurological Rehabilitation    Date: 8/23/2022     Name: Declan Burleson   MRN: [id number]    Therapy Diagnosis: Aphasia R 47.01 (ICD-10)  Physician: Anca Long PA-C  Physician Orders: [id number] - AMB REFERRAL/CONSULT TO SPEECH THERAPY  Medical Diagnosis: G31.84 (ICD-10-CM) - Mild neurocognitive disorder due to another medical condition    Visit # / Visits Authorized:  1 / 1   Date of Evaluation:  8/23/2022   Insurance Authorization Period: pending   Plan of Care Certification:    8/31/2022 to 12/21/2022      Time In:13:40  Time Out: 14:55  Procedure Min.        Speech Language Evaluation   75     Precautions: Standard  Subjective   Date of Onset: March 2021  History of Current Condition:   Patient reports trouble with word finding, repetitive patterns of language, and memory issues. These problems are consistent across all 3 languages that he speaks.   Past Medical History: Declan Burleson  has a past medical history of Anxiety, Depression, GERD (gastroesophageal reflux disease), Hyperlipidemia, Hypertension, Migraine, and Seizures.  Declan Burleson  has a past surgical history that includes Cyst Removal (March 2016/2017); Tonsillectomy; and Removal of stereo EEG leads (depth electrodes) (Bilateral, 1/21/2021).  Medical Hx and Allergies: Declan has a current medication list which includes the following prescription(s): amlodipine, aspirin, clobazam, clobazam, [START ON 8/24/2022] clobazam, escitalopram oxalate, eslicarbazepine, eslicarbazepine, mag hydrox/aluminum hyd/simeth, nayzilam, ondansetron, oxycodone, nurtec, rizatriptan, zonisamide, and zonisamide.   Review of patient's allergies indicates:   Allergen Reactions    Losartan Rash     Prior Therapy:  During acute care stay August 2022   Social History:  Patient lives with their spouse  Pain: 0/10  Pain Location / Description: N/A  Patient's Therapy Goals:  "I want to be able to " "speak,  to say whatever I want to say in Thai, Sammarinese, or English."   Objective   Formal Assessment:    1. [place] Diagnostic Aphasia Examination (BDAE)   · Impairments:   · Mildly impaired complex auditory comprehension   · Mildly impaired repetition of complex sentences   · Moderately severe impaired picture identification naming   · Circumlocution   · Paraphasias:   · Verbal, neologistic, phonological, and multi-word   · moderately impaired word identification   · Functionally Intact:    · Following commands   · automatic sequences   · Single word repetition   · responsive naming   · Basic symbol recognition   · Number matching   · Oral reading   · Writing expression     Treatment   Total Treatment Time Separate from Evaluation: not applicable   no treatment performed secondary to time to complete evaluation.    Education: Plan of Care and role of SLP in care were discussed with patient. Patient and family members expressed understanding.     Home Program: not yet established   Assessment     Declan presents to [facility] status post medical diagnosis of Mild neurocognitive disorder due to another medical condition.  Demonstrates impairments including limitations as described in the problem list. 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 Positive prognostic factors include patient motivation and family support. Negative prognostic factors include time passed since initial onset. Barriers to therapy include distance patient will have to travel for therapy. This was disucssed with patient and patient's wife and both verbalized that patient will be able to attend speech therapy 2x/week without issue.  Patient will benefit from skilled, outpatient " neurological rehabilitation speech therapy.    Rehab Potential: good  Patient's spiritual, cultural, and educational needs considered and patient agreeable to plan of care and goals.    Short Term Goals (8 weeks):   1. The patient will produce a minimum of 4 different features, when presented with a word using semantic feature analysis (SFA), given verbal prompts, with 75% accuracy across 2 consecutive sessions.     2. The patient will utilize trained word finding strategies (SFA, circumlocution, etc.) during a 2 minute description of a recent event with 80% accuracy given min cues over 2 consecutive sessions.     3. The patient will complete complex auditory comprehension task with 80% accuracy given min cues across 2 consecutive sessions.     4. The patient will describe visual scenes using 3 or more sentences at 80% accuracy given min verbal and phonemic cues across 2 consecutive sessions.     5. The patient will complete ongoing dynamic assessment.      Long Term Goal (16 weeks):   The patient will increase his expressive language skills to a functional level in order to effectively communicate with all communication partners across all communicative contexts.     Plan     Plan of Care Certification Period: 8/23/2022 to 12/21/2022    Recommended Treatment Plan:  Patient will participate in the [facility] rehabilitation program for speech therapy 2 times per week for 16 weeks to address his Communication deficits, to educate patient and their family, and to participate in a home exercise program.    Therapist's Name:   Marnie Desai CCC-SLP   8/23/2022     I CERTIFY THE NEED FOR THESE SERVICES FURNISHED UNDER THIS PLAN OF TREATMENT AND WHILE UNDER MY CARE    Physician Name: _______________________________    Physician Signature: ____________________________

## 2022-08-29 ENCOUNTER — OFFICE VISIT (OUTPATIENT)
Dept: NEUROLOGY | Facility: CLINIC | Age: 57
End: 2022-08-29
Payer: MEDICARE

## 2022-08-29 VITALS
HEIGHT: 71 IN | WEIGHT: 195.13 LBS | DIASTOLIC BLOOD PRESSURE: 75 MMHG | HEART RATE: 73 BPM | BODY MASS INDEX: 27.32 KG/M2 | SYSTOLIC BLOOD PRESSURE: 114 MMHG

## 2022-08-29 DIAGNOSIS — G40.209 COMPLEX PARTIAL SEIZURES EVOLVING TO GENERALIZED TONIC-CLONIC SEIZURES: Primary | Chronic | ICD-10-CM

## 2022-08-29 PROCEDURE — 3008F PR BODY MASS INDEX (BMI) DOCUMENTED: ICD-10-PCS | Mod: CPTII,S$GLB,, | Performed by: PSYCHIATRY & NEUROLOGY

## 2022-08-29 PROCEDURE — 3078F PR MOST RECENT DIASTOLIC BLOOD PRESSURE < 80 MM HG: ICD-10-PCS | Mod: CPTII,S$GLB,, | Performed by: PSYCHIATRY & NEUROLOGY

## 2022-08-29 PROCEDURE — 3074F PR MOST RECENT SYSTOLIC BLOOD PRESSURE < 130 MM HG: ICD-10-PCS | Mod: CPTII,S$GLB,, | Performed by: PSYCHIATRY & NEUROLOGY

## 2022-08-29 PROCEDURE — 99215 OFFICE O/P EST HI 40 MIN: CPT | Mod: S$GLB,,, | Performed by: PSYCHIATRY & NEUROLOGY

## 2022-08-29 PROCEDURE — 99999 PR PBB SHADOW E&M-EST. PATIENT-LVL III: ICD-10-PCS | Mod: PBBFAC,,, | Performed by: PSYCHIATRY & NEUROLOGY

## 2022-08-29 PROCEDURE — 3074F SYST BP LT 130 MM HG: CPT | Mod: CPTII,S$GLB,, | Performed by: PSYCHIATRY & NEUROLOGY

## 2022-08-29 PROCEDURE — 1159F MED LIST DOCD IN RCRD: CPT | Mod: CPTII,S$GLB,, | Performed by: PSYCHIATRY & NEUROLOGY

## 2022-08-29 PROCEDURE — 1111F PR DISCHARGE MEDS RECONCILED W/ CURRENT OUTPATIENT MED LIST: ICD-10-PCS | Mod: CPTII,S$GLB,, | Performed by: PSYCHIATRY & NEUROLOGY

## 2022-08-29 PROCEDURE — 3008F BODY MASS INDEX DOCD: CPT | Mod: CPTII,S$GLB,, | Performed by: PSYCHIATRY & NEUROLOGY

## 2022-08-29 PROCEDURE — 1111F DSCHRG MED/CURRENT MED MERGE: CPT | Mod: CPTII,S$GLB,, | Performed by: PSYCHIATRY & NEUROLOGY

## 2022-08-29 PROCEDURE — 99999 PR PBB SHADOW E&M-EST. PATIENT-LVL III: CPT | Mod: PBBFAC,,, | Performed by: PSYCHIATRY & NEUROLOGY

## 2022-08-29 PROCEDURE — 99215 PR OFFICE/OUTPT VISIT, EST, LEVL V, 40-54 MIN: ICD-10-PCS | Mod: S$GLB,,, | Performed by: PSYCHIATRY & NEUROLOGY

## 2022-08-29 PROCEDURE — 1159F PR MEDICATION LIST DOCUMENTED IN MEDICAL RECORD: ICD-10-PCS | Mod: CPTII,S$GLB,, | Performed by: PSYCHIATRY & NEUROLOGY

## 2022-08-29 PROCEDURE — 3078F DIAST BP <80 MM HG: CPT | Mod: CPTII,S$GLB,, | Performed by: PSYCHIATRY & NEUROLOGY

## 2022-08-29 NOTE — PROGRESS NOTES
No further sz since discharge     Admission Date: 8/9/2022  Hospital Length of Stay: 7 days  Discharge Date and Time: 8/16/2022  HPI:   Declan Burleson is a 57 year old man with history of seizures s/p left mesial temporal laser ablation 3/2021 admitted to EMU for evaluation of continued staring/unresponsive episodes postop while on medication. Patient is interested in pursuing surgical pathway again. Began having seizures at age 48.     Event type 1  No preceding aura/prodrome. LOC and convulsions with rhythmic shaking of all extremities. Confused afterward. Last occurred 4/2022. Have become less intense since surgery, has not had seizures from sleep since.     Event type 2  Staring and unresponsive. [place]. Frequency initially improved after surgery, since then has been increasing. Occurs twice per week, last on 8/7. Had similar episodes prior to surgery.     Since surgery, has noticed memory issues--forgetting to run the washing machine after putting clothes in, difficulty using , does not recall conversation from earlier same day. Occasionally has difficulty recalling names, including his children's names. Also has noticed less motivation to do things on some days.     Current AEDs:  - eslicarbazepine 1200 mg QHS  - zonisamide 500 mg QHS - has been gradually increasing from 300 to 600 mg since last clinic visit   - clobazam 30 mg QHS   Has noticed significant daytime sleepiness on increased zonisamide dose. Reports adherence, last dose 8/8 evening.     Prior medications and SE/reason for stopping:  - topiramate - continued seizures  - lamotrigine - continued seizures  - Keppra - anger, mood disturbance  - oxcarbazepine - continued seizures     Prior seizure evaluation:  - ambulatory EEG 6/1/22 - No epileptiform discharges, periodic discharges, lateralized rhythmic delta activity or electrographic seizures were seen. There are 18 event button marks with no associated EEG change.   - sEEG 1/21/21 -  2 seizures (subclinical) were captured. Onset of both appear to be from left amygdala and hippocampus without generalization. Abundant epileptiform activity is also seen in the left amygdala and hippocampus, frequent to abundant epileptiform discharges in the right pre-motor anterior cingulate region, and frequent epileptiform activity in the left superior frontal SMA region.  - MRI Brain w/wo contrast 5/23/22 - Focal postablation encephalomalacia of the medial left temporal lobe  - MRI Brain Stealth 2/11/21 - no definite intracranial enhancing mass lesion  - fMRI 10/26/20 - No epileptogenic lesions identified. 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        Hospital Course:   8/9>8/10: Admit to EMU. Eslicarbazepine and Zonisamide held on admit, Clobazam decreased to 10 mg qhs. EEG with focal intermittent slowing in left temporal region, no epileptiform discharges, no electrographic seizures. PB @0719 for episode of word finding difficulty with maintained awareness and @1221 for headache and staring- no EEG correlate with events; see EEG report for full details.  8/10>8/11: No typical events. EEG unchanged, focal intermittent slowing in left temporal region, no epileptiform discharges, no electrographic seizures. Continued medication adjustments for event capture- decreased Clobazam to 5 mg qhs.  8/11>8/12: No typical events. EEG with continued left focal slowing and rare left sharps, no electrographic seizures. Clobazam held, last dose 8/11. Lexapro dose increased from 10 to 15 mg QD due to reported low energy.  8/12>8/13: PB @2144 for episode of eyelid fluttering, seeing flashing lights, numbness/tingling with no EEG correlate. EEG with left temporal interictals, no seizures.  8/13>8/14: 1 sz w/ L hemispheric onset.  8/14>8/15: EEG with left temporal focal slowing, left temporal interictals, and electroclinical seizure @0435 with left temporal onset and secondary generalization. Resume home  AEDs now: Eslicarbazepine 1200 mg x1, Clobazam 30 mg x1, and Zonisamide 500 mg x1.  8/15>8/16: No further seizures after resuming home AED regimen. Discharged home in stable condition on resumed AED regimen: Eslicarbazepine 1200 mg qhs, Clobazam 30 mg qhs, and Zonisamide 500 mg qhs. Plan to increase Clobazam to 40 mg qhs in 1 week (Rx sent to outpatient pharmacy). Outpatient follow up in Epilepsy clinic with Dr. Edgar. Seizure precautions.       Interval Events/ROS 7/25/2022:  Recent Labs   Lab 05/26/20  1733 05/26/20  1734 07/22/21  1132 05/23/22  1508 08/09/22  1640   Lacosamide 4.0  --   --   --   --    Clobazam  --   --  338.0 H 261.0 400.0 H   Desmethylclobazam  --   --  2670.0 2550.0 3620.0 H   Zonisamide  --  6.7 L 12 11 19   Eslicarbazepine  --  12.0 19.3 16.8 17.0      - accompanied by wife who also contributes to the history   - current ASM regimen: eslicarbazepine 1200mg nightly, zonisamide 300mg nightly, clobazam 30mg nightly   - current sz frequency is around 1-2 focal events per week, last event was yesterday 7/24/2022 (described as staring, decreased responsiveness)  - last GTC event was 04/2022  - feels seizure frequency improved for a while s/p mesial temporal ablation 03/2021 but has gradually been worsening again  - memory complaints  - interested in what surgical options are still available to him for his epilepsy, expresses disappointment that he is still having frequent seizures and he thought he would be able to decrease his medications but instead we are increasing medication   - migraines around 3 days per month, sometimes last 3 days at a time, not relieved w/ OTC medications   - Otherwise, no fever, no cold symptoms, no changes in vision, no new weakness, no chest pain, no shortness of breath, no nausea, no vomiting, no diarrhea, no constipation, no problems walking    Interval Events/ROS 5/23/2022:  - L TLE s/p ablation on 3/1/21  - current Health system regimen: eslicarbazepine 1200mg nightly,  "zonisamide 300mg nightly, clobazam 20mg nightly, and nayzilam PRN  - been following w/ Dr. Saldana (neuro in [place]), who recently referred patient back to [facility] since patient is still having seizures after surgery     - last seizure was 4/22/2022, also had 2 seizures 03/2022 (makes grunting/growling noise, shaking 30-60 seconds, postictal unable to respond, confused, returns to baseline in around 5-10 minutes)  - feels he is cognitively declining, word finding issues  - sleeps a lot, reports some days he "feels completely lazy", denies low mood/feeling depressed   - missed neuropsych appointment, needs to reschedule   - still having frequent migraines, doesn't feel like nurtec is helping   - Otherwise, no fever, no cold symptoms, no changes in vision, no new weakness, no chest pain, no shortness of breath, no nausea, no vomiting, no diarrhea, no constipation, no tingling/numbness, no problems walking, no reported mood issues      Interval events 9/30/2021:   Pt of Dr. Benoit   He presents with wife   Not driving   Not working   No sz   Trouble word finding (this is pronounced in English)   No falls   3 nights/week - per wife has growling sound     Severe L occipital HA with intoleranace to mvmnt   1/month     Also has mild HA 3/week      Current AEDs: compliant  1) Eslicarbazepine 1200 mg q day  2) Zonisamide 300 mg q day   3) Clobazam 20 mg qHS  4) Nayzilam prn   Also takes Lexapro 10 mg QHS      Admission Date: 3/1/2021  Hospital Length of Stay: 0 days  Discharge Date and Time: 3/4/2021  2:57 PM     HPI:   Patient is a 55 y/o male with intractable epilepsy who is s/p bilateral sEEG presents today for elective left laser ablation amygdalohippocampectomy. All questions answered and patient wishes to proceed with surgery.    Patient originally had presented to Dr. Chen to discuss elective options of his intractable epilepsy. His seizures began when he was 48 years old. He can recall no inciting " "event. He has 3 semiologies: "full blown" generalized events, staring episodes, and "mild" seizures, which are characterized as generalized events of shorter duration without incontinence and a shorter post-ictal period. He and his wife estimate that he persists in having about 2 events/month.    He has failed multiple AEDs, including topiramate, lamotrigine, levetiracetam, and oxcarbazepine. He is currently taking eslicarbazepine, zonisamide, and clobazam. He had EMU monitoring in May-June of this year, which suggests left-sided activity. He had 3T MRI in June (personally reviewed) that was non-lesional; he also had PET at that time suggesting possible left-sided activity. He had neuropsychological testing on 8/10/20  Hospital Course: 3/1: POD 0. NCC overnight  3/2: POD1 from L laser amygdalophippocampectomy. Recommend wife at bedside as much as nursing policies will allow. OK for TTF (9th floor only) 24 hours postop if otherwise medically stable/appropriate. Wife reports they have home Aptiom.  3/3: POD 2. Patient with headache behind bilateral eyes. Patient also with episode of emesis today when sitting up. He is otherwise neurologically intact. CTH ordered to evaluate and was stable. Patient has not gotten up with PT/OT yet, it is ordered for tomorrow. Will keep patient overnight and reevaluate with PT/OT tomorrow regarding dispo recommendations.   3/4: Patient much improved from yesterday. Headache and N/V resolved. Will continue decadron 4 mg q6h x 48 hours. Continue 1L fluid restriction for SIADH, will obtain BMP on 3/8 outpatient to reevaluate. Therapy recommending home with outpatient PT. Patient and his wife are requesting Home Health. Medically stable to discharge home.      Pt reports no auras or seizures since ablation   Sleep - ok   Diet - ok      Migraine Headache   Onset - 12 yrs   F/h migraine   Hz - rare   Tylenol 500 mg effective      HA Hz worse in last 10 yrs   Hz 2-4/week   No change in HA " after SEEG   Meds tried - tylenol 1000 mg - mild relief   Since April Worsening:    Word finding difficulty   attention span shorter      Exam:  Physical Exam  Constitutional:       Appearance: Normal appearance.   HENT:      Head: Normocephalic and atraumatic.      Right Ear: External ear normal.      Left Ear: External ear normal.      Nose: Nose normal.      Mouth/Throat:      Mouth: Mucous membranes are moist.   Eyes:      Extraocular Movements: Extraocular movements intact.      Conjunctiva/sclera: Conjunctivae normal.      Pupils: Pupils are equal, round, and reactive to light.   Cardiovascular:      Rate and Rhythm: Normal rate.      Pulses: Normal pulses.   Pulmonary:      Effort: Pulmonary effort is normal.   Musculoskeletal:         General: Normal range of motion.      Cervical back: Normal range of motion.   Skin:     General: Skin is warm and dry.   Neurological:      General: No focal deficit present.      Mental Status: He is alert.   Psychiatric:         Mood and Affect: Mood normal.         Behavior: Behavior normal.         Thought Content: Thought content normal.         Judgment: Judgment normal.     Flat affect   Masked facies   Slow gait   Slow turns   No retropulsion   Decreased arm swing   No tremor      Impression:   L TLE s/p ablation on 3/1/21   Neuropsych effects of ablation? - Word finding difficulty; attention span shorter; irritability    Migraine   Brain atrophy     Plan:   - continue eslicarbazepine 1200mg qhs  - cont zonisamide to 500mg qhs  - continue clobazam to 40mg qhs   - nayzilam PRN for seizure cluster     May need MRI brain with stealth     Options - laser ablation extention, CM RNS, increase meds   Total Time: 65 minutes w/ 50% spent face to face counseling, discussing risks, coordinating care.     Neuropsych referral also to evaluate patient's memory complaints.    - Advised patient to follow up with neurologist Dr. Saldana for continued management of migraines, advised  inquiring about migraine prevention options, patient has appointment scheduled for 8/08/2022

## 2022-08-30 ENCOUNTER — PATIENT MESSAGE (OUTPATIENT)
Dept: NEUROLOGY | Facility: CLINIC | Age: 57
End: 2022-08-30
Payer: MEDICARE

## 2022-08-30 NOTE — PROGRESS NOTES
[facility]  Speech Therapy Treatment Note- Neurological Rehabilitation  Date: 8/31/2022     Name: Declan Burleson   MRN: [id number]   Therapy Diagnosis: Aphasia R 47.01 (ICD-10); Cognitive Communication deficit R 41.841 (ICD-10)   Physician: Anca Long PA-C  Physician Orders: [id number] - AMB REFERRAL/CONSULT TO SPEECH THERAPY  Medical Diagnosis: G31.84 (ICD-10-CM) - Mild neurocognitive disorder due to another medical condition    Visit #/ Visits Authorized: 1/ 8  Date of Evaluation:  8/23/2022  Insurance Authorization Period: 9/28/2022  Plan of Care Expiration Date:    12/21/2022  Extended Plan of Care:  N/A   Progress Note: 9/28/2022   Visits Cancelled: 0  Visits No Show: 0    Time In:  1345  Time Out:  1445  Total Billable Time: 60 minutes      Precautions: Standard  Subjective:   Patient reports: slight headache but overall ok.    He was compliant to home exercise program. No HEP assigned prior to this session.   Response to previous treatment: N/A   Pain Scale:  3/10 on a Visual Analog Scale currently.   Pain Location: forehead   Objective:   TIMED  Procedure Min.   Cognitive Therapeutic Interventions, first 15 minutes CPT 92695  15   Cognitive Therapeutic Interventions, each additional 15 minutes CPT 11795  45         UNTIMED  Procedure Min.   N/A  N/A   N/A  N/A   Total Timed Units: 4  Total Untimed Units: 0  Charges Billed/Number of units: 4    Short Term Goals: (8 weeks) Current Progress: 8/31/2022   1. The patient will produce a minimum of 4 different features, when presented with a word using semantic feature analysis (SFA), given verbal prompts, with 75% accuracy across 2 consecutive sessions.     Progressing/ Not Met 8/31/2022   Not formally targeted this date     2. The patient will utilize trained word finding strategies (SFA, circumlocution, etc.) during a 2 minute description of a recent event with 80% accuracy given min cues over 2 consecutive sessions.      Progressing/ Not  Met 8/31/2022   Not formally targeted this date     3. The patient will complete complex auditory comprehension task (I.e. recall items after 60 second delay, recall key details from a narrative) with 80% accuracy given min cues across 2 consecutive sessions.     Progressing/ Not Met 8/31/2022   58% accuracy INDP     4. The patient will describe visual scenes using 3 or more sentences at 80% accuracy given min verbal and phonemic cues across 2 consecutive sessions.   Progressing/ Not Met 8/31/2022   Not formally targeted this date     5.  The patient will complete ongoing dynamic assessment.      Progressing/ Not Met 8/31/2022   Patient completed formal testing via the Repeated Battery for the Assessment of Neuropsychological Status (RBANS) Update     6. Patient will recall key details from a visually or auditorally presented narrative with 80% accuracy given MIN cues across 2 consecutive sessions.     Progressing/ Not Met 8/31/2022   Auditory Presentation: 54% accuracy INDP     7. Patient will complete semantic fluency tasks (I.e. convergent or divergent naming) with 80% accuracy given MIN cues across 2 consecutive sessions.     Progressing/ Not Met 8/31/2022   Divergent naming task: 30% accuracy INDP       Patient Education/Response:   Patient and spouse were educated on results of initial evaluation, SLP diagnoses, and plan of care. Both verbalized understanding.     Home program established: yes-Patient instructed to make a list of 5 novel words (nouns) per day and with each of the 5 words to come up with or write down 3 descriptors.  Patient is to complete this exercise each day until next scheduled session.   Exercises were reviewed and Declan demonstrated good  understanding of the education provided.     Assessment:   Declan is progressing well towards his goals. All current goals remain appropriate. Goals to be updated as necessary. STGs 6 and 7 added this date. RBANS administered and revealed:  immediate memory (list learning and story memory) - mod impairment, delayed memory (list recall and story recall) - severe impairment, language (naming and semantic fluency) - mod impairment.     Patient prognosis is Good. Patient will continue to benefit from skilled outpatient speech and language therapy to address the deficits listed in the problem list on initial evaluation, provide patient/family education and to maximize patient's level of independence in the home and community environment.   Medical necessity is demonstrated by the following IMPAIRMENTS:  word finding, slow and halting speech, comprehension, difficulty with sentence structure, and slow speech initiation.   Barriers to Therapy: none identified  Patient's spiritual, cultural and educational needs considered and patient agreeable to plan of care and goals.   Plan:   Continue Plan of Care with focus on improvement of expressive language and cognitive communication skills.     Marnie Desai CCC-SLP   8/31/2022

## 2022-08-31 ENCOUNTER — CLINICAL SUPPORT (OUTPATIENT)
Dept: REHABILITATION | Facility: HOSPITAL | Age: 57
End: 2022-08-31
Payer: MEDICARE

## 2022-08-31 DIAGNOSIS — R41.841 COGNITIVE COMMUNICATION DEFICIT: ICD-10-CM

## 2022-08-31 DIAGNOSIS — R47.01 APHASIA DETERMINED BY EXAMINATION: Primary | ICD-10-CM

## 2022-08-31 PROCEDURE — 97129 THER IVNTJ 1ST 15 MIN: CPT

## 2022-08-31 PROCEDURE — 97130 THER IVNTJ EA ADDL 15 MIN: CPT

## 2022-09-02 ENCOUNTER — PATIENT OUTREACH (OUTPATIENT)
Dept: NEUROLOGY | Facility: CLINIC | Age: 57
End: 2022-09-02
Payer: MEDICARE

## 2022-09-02 ENCOUNTER — TELEPHONE (OUTPATIENT)
Dept: NEUROLOGY | Facility: CLINIC | Age: 57
End: 2022-09-02
Payer: MEDICARE

## 2022-09-02 DIAGNOSIS — G40.209 COMPLEX PARTIAL SEIZURES EVOLVING TO GENERALIZED TONIC-CLONIC SEIZURES: Primary | Chronic | ICD-10-CM

## 2022-09-02 DIAGNOSIS — G40.109 TEMPORAL LOBE EPILEPSY: Primary | ICD-10-CM

## 2022-09-02 PROCEDURE — 99358 PR PROLONGED SERV,NO CONTACT,1ST HR: ICD-10-PCS | Mod: S$GLB,,, | Performed by: PSYCHIATRY & NEUROLOGY

## 2022-09-02 PROCEDURE — 99358 PROLONG SERVICE W/O CONTACT: CPT | Mod: S$GLB,,, | Performed by: PSYCHIATRY & NEUROLOGY

## 2022-09-02 NOTE — PROGRESS NOTES
Epilepsy Surgery Conference -  Total time 60 minutes     Date  9/2/22   MRN [id number]   Name  Declan Burleson   Gender  M   Age  58 yo   Allergies Losartan   Physical Exam RH   BMI  27.21   PMH  Migraine x childhood    AEDs EsliCBZ 1200mg q day; Zonisamide 600mg q day; Clobazam 40mg q day   Failed AEDs Topiramate, Lamotrigine, Levetiracetam, Oxcarbazepine   Compliance  Good    Age of onset  48 yrs (2013)      Semiology #1 'full blown' GTC (1st episode)   Semiology #2 Staring episodes (onset few weeks after 1st sz)   Semiology #3 'mild' sz - GTC sz of shorter duration and w/o bladder incontinence and shorter post-ictal period   Work up   Paraneoplastic panel   n/a   EMU  5/ 26 - 6/6/2020 (12 days)   # seizures recorded  3   Interictal   Left fronto-temporal    Ictal onset  Poor localization; lateralized to Left   Semiology  See video   EMU  8/9 - 8/16 (7 days)   # seizures recorded  3   Interictal   left focal slowing and rare left sharps   Ictal onset  Localization L temp / par   Semiology  nonsensical speech  -> oral automatisms -> holds his right hand in a tonic fashion ->  ictal cry -> generalized tonic-clonic activity.  After the end of the seizure, the patient is seen lying unresponsive.  orofacial automatisms followed by grimacing and an ictal cry with tonic posturing and slight rightward head turn before tonic posturing and then clonic jerking with postictal somnolence   Family support    > 10 years; lives with wife and 17yo daughter and 2 dogs   How would a successful surgery change your life?  Decrease AEDs       Summary/Impression:   56yo RH male, college graduate, prior  with good social support  -  onset of seizures (with GTC sz) at age 48 yrs; also has staring episodes  Medically refractory - 'failed' 4-5 AEDs; however, sz-free on current AED regimen since discharged from EMU  MRI Brain: non-lesional; PET: possibly lateralizes to left  EMU x2 : lateralizes to left; not  localized  SEEG:   SUMMARY OF FINDINGS: INTERICTAL  Abundant spikes-polyspikes and waves were seen in L hippo 1-5 and L amygd 1-5, at times in short runs  Frequent spikes were also noted in L Gar Fr Sma 7-10  Occasional to frequent sharp waves are also noted on the right: R P An Ci 18-21  Occasional spikes were seen in L Orbi Fr 3-7  SUMMARY OF FINDINGS: ICTAL  Seizures # 1-4, 7-8: from left amygdala and hippocampus  Seizure # 5: from right pre-motor ant cing   Seizure # 6: from right pre-motor ant cing and left sup frontal SMA simultaneously    L TLE s/p ablation on 3/1/21    More tests:   NP  MRI epilepsy protocol     Plan:    Patient is currently on 3 AEDs and reluctant to increase the dosages 2/2 having felt great while off medications in the EMU. Dr. ALEK Edgar presented the question of a 2nd ablation? MRI from an outside facility performed 6/2022 was reviewed by Dr. Hannah , however this was poor imaging. Dr. PAUL Chen feels a DBS vs. RNS is possibly a better path w/ DBS being more tolerable for the patient and questions if MP could handle the RNS pain post op. Jean Carmona and Dr. Morel both agree with neuro modulation vs ablation. Dr. ALEK Edgar asked Dr. PAUL Chen if the patient prefers ablation would she be open to this option. Dr. PAUL Chen responded that the following must be done before making this decision: Repeat NP Testing, Appointment with her to discuss options and expectations, and a repeat MRI, Epilepsy Protocol w/ and w/out contrast.

## 2022-09-07 ENCOUNTER — CLINICAL SUPPORT (OUTPATIENT)
Dept: REHABILITATION | Facility: HOSPITAL | Age: 57
End: 2022-09-07
Payer: MEDICARE

## 2022-09-07 DIAGNOSIS — R47.01 APHASIA DETERMINED BY EXAMINATION: Primary | ICD-10-CM

## 2022-09-07 DIAGNOSIS — R41.841 COGNITIVE COMMUNICATION DEFICIT: ICD-10-CM

## 2022-09-07 PROCEDURE — 92507 TX SP LANG VOICE COMM INDIV: CPT

## 2022-09-07 PROCEDURE — 97130 THER IVNTJ EA ADDL 15 MIN: CPT

## 2022-09-07 PROCEDURE — 97129 THER IVNTJ 1ST 15 MIN: CPT | Mod: GN

## 2022-09-07 NOTE — PROGRESS NOTES
[facility]  Speech Therapy Treatment Note- Neurological Rehabilitation  Date: 9/7/2022     Name: Declan Burleson   MRN: [id number]   Therapy Diagnosis: Aphasia R 47.01 (ICD-10); Cognitive Communication deficit R 41.841 (ICD-10)   Physician: Anca Long PA-C  Physician Orders: [id number] - AMB REFERRAL/CONSULT TO SPEECH THERAPY  Medical Diagnosis: G31.84 (ICD-10-CM) - Mild neurocognitive disorder due to another medical condition    Visit #/ Visits Authorized: 2/ 8  Date of Evaluation:  8/23/2022  Insurance Authorization Period: 9/28/2022  Plan of Care Expiration Date:    12/21/2022  Extended Plan of Care:  N/A   Progress Note: 9/28/2022   Visits Cancelled: 0  Visits No Show: 0    Time In:  1355  Time Out:  1445  Total Billable Time: 50 minutes      Precautions: Standard  Subjective:   Patient reports: Feeling well overall.   He was compliant to home exercise program.  Response to previous treatment: Good  Pain Scale:  0/10 on a Visual Analog Scale currently.   Pain Location: N/A  Objective:   TIMED  Procedure Min.   Cognitive Therapeutic Interventions, first 15 minutes CPT 72261   15    Therapeutic INTVTN, COGN Fucntion, EACH ADDL 15 MIN - ST CPT 13381  35         UNTIMED  Procedure Min.   N/A   N/A   N/A  N/A   Total Timed Units: 3  Total Untimed Units: 0  Charges Billed/Number of units: 3    Short Term Goals: (8 weeks) Current Progress: 9/7/2022   1. The patient will produce a minimum of 4 different features, when presented with a word using semantic feature analysis (SFA), given MIN verbal prompts, with 75% accuracy across 2 consecutive sessions.     Progressing/ Not Met 9/7/2022   85% accuracy given MOD verbal prompts     75% accuracy given MIN verbal prompts     45% accuracy INDP     2. The patient will utilize trained word finding strategies (SFA, circumlocution, etc.) during a 2 minute description of a recent event with 80% accuracy given min cues over 2 consecutive sessions.       Progressing/ Not Met 9/7/2022   Not formally targeted this date    3. The patient will complete complex auditory comprehension task (I.e. recall items after 60 second delay, recall key details from a narrative) with 80% accuracy given min cues across 2 consecutive sessions.     Progressing/ Not Met 9/7/2022   Recall key details from narrative:   71% accuracy given MOD cues   64% accuracy given MIN cues   43% accuracy INDP    4. The patient will describe visual scenes using 3 or more sentences at 80% accuracy given min verbal and phonemic cues across 2 consecutive sessions.   Progressing/ Not Met 9/7/2022   60% accuracy given MOD verbal cues     5.  The patient will complete ongoing dynamic assessment.      Progressing/ Not Met 9/7/2022   Not formally targeted this date   6. Patient will recall key details from a visually or auditorally presented narrative with 80% accuracy given MIN cues across 2 consecutive sessions.     Progressing/ Not Met 9/7/2022   Visual Presentation: 70% accuracy given MOD cues   7. Patient will complete semantic fluency tasks (I.e. convergent or divergent naming) with 80% accuracy given MIN cues across 2 consecutive sessions.     Progressing/ Not Met 9/7/2022   Divergent naming task: 50% accuracy given MAX cues      Patient Education/Response:     Home program established: yes-Patient instructed to complete SFA task with provided chart.  Patient is to complete this exercise each day until next scheduled session.   Exercises were reviewed and Declan demonstrated good  understanding of the education provided.     Assessment:   Declan is progressing well towards his goals. All current goals remain appropriate. Goals to be updated as necessary.   Patient prognosis is Good. Patient will continue to benefit from skilled outpatient speech and language therapy to address the deficits listed in the problem list on initial evaluation, provide patient/family education and to maximize patient's  level of independence in the home and community environment.   Medical necessity is demonstrated by the following IMPAIRMENTS:  word finding, slow and halting speech, comprehension, memory, difficulty with sentence structure, and slow speech initiation.   Barriers to Therapy: none identified  Patient's spiritual, cultural and educational needs considered and patient agreeable to plan of care and goals.   Plan:   Continue Plan of Care with focus on improvement of expressive language and cognitive communication skills.     Marnie Desai CCC-SLP   9/7/2022

## 2022-09-09 NOTE — PROGRESS NOTES
[facility]  Speech Therapy Treatment Note- Neurological Rehabilitation  Date: 9/12/2022     Name: Declan Burleson   MRN: [id number]   Therapy Diagnosis: Aphasia R 47.01 (ICD-10); Cognitive Communication deficit R 41.841 (ICD-10)   Physician: Anca Long PA-C  Physician Orders: [id number] - AMB REFERRAL/CONSULT TO SPEECH THERAPY  Medical Diagnosis: G31.84 (ICD-10-CM) - Mild neurocognitive disorder due to another medical condition    Visit #/ Visits Authorized: 3/ 8  Date of Evaluation:  8/23/2022  Insurance Authorization Period: 9/28/2022  Plan of Care Expiration Date:    12/21/2022  Extended Plan of Care:  N/A   Progress Note: 9/28/2022   Visits Cancelled: 0  Visits No Show: 0    Time In:  1355  Time Out:  1445  Total Billable Time: 50 minutes      Precautions: Standard  Subjective:   Patient reports: Increased fatigue around mid-day. Patient reports taking 3-4 hour naps daily.   He was partially compliant to home exercise program.  Response to previous treatment: Good  Pain Scale:   0/10 on a Visual Analog Scale currently.   Pain Location: N/A   Objective:   TIMED  Procedure Min.   Cognitive Therapeutic Interventions, first 15 minutes CPT 64238   15   Therapeutic INTVTN, COGN Fucntion, EACH ADDL 15 MIN - ST CPT 13946  35         UNTIMED  Procedure Min.   N/A N/A   N/A  N/A   Total Timed Units: 3  Total Untimed Units: 0  Charges Billed/Number of units: 3    Short Term Goals: (8 weeks) Current Progress 9/12/22 Current Progress 9/14/2022   1. The patient will produce a minimum of 4 different features, when presented with a word using semantic feature analysis (SFA), given MIN verbal prompts, with 75% accuracy across 2 consecutive sessions.     Progressing/ Not Met 9/12/2022   iPad unusual objects:   83% accuracy given MOD cues     50% accuracy given MIN cues     25% accuracy INDP     2. The patient will utilize trained word finding strategies (SFA, circumlocution, etc.) during a 2 minute  description of a recent event with 80% accuracy given min cues over 2 consecutive sessions.      Progressing/ Not Met 2022   Description of party over the weekend:     70% accuracy given MOD cues     3. The patient will complete complex auditory comprehension task (I.e. recall items after 60 second delay, recall key details from a narrative) with 80% accuracy given min cues across 2 consecutive sessions.     Progressing/ Not Met 2022   Recall 3 items after a 30 second delay:     70% accuracy given MOD cues     40% accuracy INDP     4. The patient will describe visual scenes using 3 or more sentences at 80% accuracy given min verbal and phonemic cues across 2 consecutive sessions.   Progressing/ Not Met 2022   Not formally targeted this date     5.  The patient will complete ongoing dynamic assessment.      Progressing/ Not Met 2022   Not formally targeted this date     6. Patient will recall key details from a visually or auditorally presented narrative with 80% accuracy given MIN cues across 2 consecutive sessions.     Progressing/ Not Met 2022   Not formally targeted this date     7. Patient will complete semantic fluency tasks (I.e. convergent or divergent naming) with 80% accuracy given MIN cues across 2 consecutive sessions.     Progressing/ Not Met 2022   Divergent namin% accuracy given MOD cues     76% accuracy given MIN cues      47% accuracy INDP      Patient Education/Response:     Home program established: yes- Patient instructed to complete meal planning cognitive task with provided chart.   Exercises were reviewed and Declan demonstrated good  understanding of the education provided.     Assessment:   Declan is progressing well towards his goals. All current goals remain appropriate. Goals to be updated as necessary.   Patient prognosis is Good. Patient will continue to benefit from skilled outpatient speech and language therapy to address the deficits listed in  the problem list on initial evaluation, provide patient/family education and to maximize patient's level of independence in the home and community environment.   Medical necessity is demonstrated by the following IMPAIRMENTS:  word finding, slow and halting speech, comprehension, memory, difficulty with sentence structure, and slow speech initiation.   Barriers to Therapy: none identified  Patient's spiritual, cultural and educational needs considered and patient agreeable to plan of care and goals.   Plan:   Continue Plan of Care with focus on improvement of expressive language and cognitive communication skills.     Marnie Desai CCC-SLP   9/12/2022

## 2022-09-12 ENCOUNTER — CLINICAL SUPPORT (OUTPATIENT)
Dept: REHABILITATION | Facility: HOSPITAL | Age: 57
End: 2022-09-12
Payer: MEDICARE

## 2022-09-12 DIAGNOSIS — R47.01 APHASIA DETERMINED BY EXAMINATION: Primary | ICD-10-CM

## 2022-09-12 DIAGNOSIS — R41.841 COGNITIVE COMMUNICATION DEFICIT: ICD-10-CM

## 2022-09-12 PROCEDURE — 92507 TX SP LANG VOICE COMM INDIV: CPT

## 2022-09-12 PROCEDURE — 97130 THER IVNTJ EA ADDL 15 MIN: CPT

## 2022-09-12 PROCEDURE — 97129 THER IVNTJ 1ST 15 MIN: CPT

## 2022-09-13 NOTE — PROGRESS NOTES
"[facility]  Speech Therapy Treatment Note- Neurological Rehabilitation  Date: 9/14/2022     Name: Declan Burleson   MRN: [id number]   Therapy Diagnosis: Aphasia R 47.01 (ICD-10); Cognitive Communication deficit R 41.841 (ICD-10)   Physician: Anca Long PA-C  Physician Orders: [id number] - AMB REFERRAL/CONSULT TO SPEECH THERAPY  Medical Diagnosis: G31.84 (ICD-10-CM) - Mild neurocognitive disorder due to another medical condition    Visit #/ Visits Authorized: 4/ 8  Date of Evaluation:  8/23/2022  Insurance Authorization Period: 9/28/2022  Plan of Care Expiration Date:    12/21/2022  Extended Plan of Care:  N/A   Progress Note: 9/28/2022   Visits Cancelled: 0  Visits No Show: 0    Time In:  1400  Time Out:  1455  Total Billable Time: 55 minutes      Precautions: Standard  Subjective:   Patient reports: Feeling "so so" and a bit sleepy.   He was compliant to home exercise program.  Response to previous treatment: Good  Pain Scale:   0/10 on a Visual Analog Scale currently.   Pain Location: N/A   Objective:   TIMED  Procedure Min.   Cognitive Therapeutic Interventions, first 15 minutes CPT 87601   15   Therapeutic INTVTN, COGN Fucntion, EACH ADDL 15 MIN - ST CPT 94169  35         UNTIMED  Procedure Min.   N/A  N/A   N/A  N/A   Total Timed Units: 3  Total Untimed Units: 0  Charges Billed/Number of units: 3    Short Term Goals: (8 weeks) Current Progress 9/12/22 Current Progress 9/14/2022   1. The patient will produce a minimum of 4 different features, when presented with a word using semantic feature analysis (SFA), given MIN verbal prompts, with 75% accuracy across 2 consecutive sessions.     Progressing/ Not Met 9/14/2022   iPad unusual objects:   83% accuracy given MOD cues     50% accuracy given MIN cues     25% accuracy INDP  Not formally targeted this date   2. The patient will utilize trained word finding strategies (SFA, circumlocution, etc.) during a 2 minute description of a recent event " with 80% accuracy given min cues over 2 consecutive sessions.      Progressing/ Not Met 2022   Description of party over the weekend:     70% accuracy given MOD cues  Not formally targeted this date   3. The patient will complete complex auditory comprehension task (I.e. recall items after 60 second delay, recall key details from a narrative) with 80% accuracy given min cues across 2 consecutive sessions.     Progressing/ Not Met 2022   Recall 3 items after a 30 second delay:     70% accuracy given MOD cues     40% accuracy INDP  Not formally targeted this date    4. The patient will describe visual scenes using 3 or more sentences at 80% accuracy given min verbal and phonemic cues across 2 consecutive sessions.   Progressing/ Not Met 2022   Not formally targeted this date  Not formally targeted this date    5.  The patient will complete ongoing dynamic assessment.      Progressing/ Not Met 2022   Not formally targeted this date  Patient completed checklist task to identify functional skills/activities to improve during therapy    6. Patient will recall key details from a visually or auditorally presented narrative with 80% accuracy given MIN cues across 2 consecutive sessions.     Progressing/ Not Met 2022   Not formally targeted this date  Visual presentation: 60% accuracy given MOD cues    7. Patient will complete semantic fluency tasks (I.e. convergent or divergent naming) with 80% accuracy given MIN cues across 2 consecutive sessions.     Progressing/ Not Met 2022   Divergent namin% accuracy given MOD cues     76% accuracy given MIN cues      47% accuracy INDP Not formally targeted this date    8. Patient will completed executive function (I.e.read/understand mail, respond appropriately to mail) task with 80% accuracy given MIN cues.         Progressing/ Not Met 2022 N/A 60% accuracy given MOD cues    9. Patient will complete complex problem solving task with 90%  accuracy given MIN cues.      Progressing/ Not Met 9/14/2022 N/A 70% accuracy given MOD cues    10. Patient will complete complex memory task (I.e. remembering names, dates, events, item locations, details of conversations) with 80% accuracy given MIN cues.      Progressing/ Not Met 9/14/2022 N/A Not formally targeted this date      Patient Education/Response:     Home program established: yes- Patient instructed to add to/specify meal planning cognitive task with provided materials.   Exercises were reviewed and Declan demonstrated good  understanding of the education provided.     Assessment:   Declan is progressing well towards his goals. All current goals remain appropriate. Goals to be updated as necessary. Goals 8, 9, and 10 added this date following informal assessment.   Patient prognosis is Good. Patient will continue to benefit from skilled outpatient speech and language therapy to address the deficits listed in the problem list on initial evaluation, provide patient/family education, and to maximize patient's level of independence in the home and community environment.   Medical necessity is demonstrated by the following IMPAIRMENTS:  word finding, slow and halting speech, comprehension, executive function, problem solving, memory, difficulty with sentence structure, and slow speech initiation.   Barriers to Therapy: none identified  Patient's spiritual, cultural and educational needs considered and patient agreeable to plan of care and goals.   Plan:   Continue Plan of Care with focus on improvement of expressive language and cognitive communication skills.     Marnie Desai CCC-SLP   9/14/2022

## 2022-09-14 ENCOUNTER — CLINICAL SUPPORT (OUTPATIENT)
Dept: REHABILITATION | Facility: HOSPITAL | Age: 57
End: 2022-09-14
Payer: MEDICARE

## 2022-09-14 DIAGNOSIS — R41.841 COGNITIVE COMMUNICATION DEFICIT: ICD-10-CM

## 2022-09-14 DIAGNOSIS — R47.01 APHASIA DETERMINED BY EXAMINATION: Primary | ICD-10-CM

## 2022-09-14 PROCEDURE — 97130 THER IVNTJ EA ADDL 15 MIN: CPT | Mod: GN

## 2022-09-14 PROCEDURE — 97129 THER IVNTJ 1ST 15 MIN: CPT

## 2022-09-14 PROCEDURE — 92507 TX SP LANG VOICE COMM INDIV: CPT

## 2022-09-21 ENCOUNTER — CLINICAL SUPPORT (OUTPATIENT)
Dept: REHABILITATION | Facility: HOSPITAL | Age: 57
End: 2022-09-21
Payer: MEDICARE

## 2022-09-21 DIAGNOSIS — R47.01 APHASIA DETERMINED BY EXAMINATION: Primary | ICD-10-CM

## 2022-09-21 DIAGNOSIS — R41.841 COGNITIVE COMMUNICATION DEFICIT: ICD-10-CM

## 2022-09-21 PROCEDURE — 92507 TX SP LANG VOICE COMM INDIV: CPT

## 2022-09-21 PROCEDURE — 97130 THER IVNTJ EA ADDL 15 MIN: CPT | Mod: GN

## 2022-09-21 PROCEDURE — 97129 THER IVNTJ 1ST 15 MIN: CPT

## 2022-09-21 NOTE — PROGRESS NOTES
[facility]  Speech Therapy Treatment Note- Neurological Rehabilitation  Date: 9/21/2022     Name: Declan Burleson   MRN: [id number]   Therapy Diagnosis: Aphasia R 47.01 (ICD-10); Cognitive Communication deficit R 41.841 (ICD-10)   Physician: Anca Long PA-C  Physician Orders: [id number] - AMB REFERRAL/CONSULT TO SPEECH THERAPY  Medical Diagnosis: G31.84 (ICD-10-CM) - Mild neurocognitive disorder due to another medical condition    Visit #/ Visits Authorized: 5/ 8  Date of Evaluation:  8/23/2022  Insurance Authorization Period: 9/28/2022  Plan of Care Expiration Date:    12/21/2022  Extended Plan of Care:  N/A   Progress Note: 9/28/2022   Visits Cancelled: 0  Visits No Show: 0    Time In:  13:57  Time Out:  14:45  Total Billable Time: 48 minutes      Precautions: Standard  Subjective:   Patient reports: feeling overall pretty good  He was compliant to home exercise program.  Response to previous treatment: Good  Pain Scale:   0/10 on a Visual Analog Scale currently.   Pain Location: N/A   Objective:   TIMED  Procedure Min.   Cognitive Therapeutic Interventions, first 15 minutes CPT 50285   15   Therapeutic INTVTN, COGN Fucntion, EACH ADDL 15 MIN - ST CPT 06516  33         UNTIMED  Procedure Min.   N/A  N/A   N/A  N/A   Total Timed Units: 3  Total Untimed Units: 0  Charges Billed/Number of units: 3    Short Term Goals: (8 weeks) Current Progress 9/12/22 Current Progress 9/14/2022 Current Progress  9/21/22   1. The patient will produce a minimum of 4 different features, when presented with a word using semantic feature analysis (SFA), given MIN verbal prompts, with 75% accuracy across 2 consecutive sessions.     Progressing/ Not Met 9/21/2022   iPad unusual objects:   83% accuracy given MOD cues     50% accuracy given MIN cues     25% accuracy INDP  Not formally targeted this date 70% accuracy given MOD cues     60% accuracy given MIN cues        2. The patient will utilize trained word finding  strategies (SFA, circumlocution, etc.) during a 2 minute description of a recent event with 80% accuracy given min cues over 2 consecutive sessions.      Progressing/ Not Met 2022   Description of party over the weekend:     70% accuracy given MOD cues  Not formally targeted this date Not formally targeted this date    3. The patient will complete complex auditory comprehension task (I.e. recall items after 60 second delay, recall key details from a narrative) with 80% accuracy given min cues across 2 consecutive sessions.     Progressing/ Not Met 2022   Recall 3 items after a 30 second delay:     70% accuracy given MOD cues     40% accuracy INDP  Not formally targeted this date  Not formally targeted this date.    4. The patient will describe visual scenes using 3 or more sentences at 80% accuracy given min verbal and phonemic cues across 2 consecutive sessions.   Progressing/ Not Met 2022   Not formally targeted this date  Not formally targeted this date  65% accuracy given MIN cues   5.  The patient will complete ongoing dynamic assessment.      Progressing/ Not Met 2022   Not formally targeted this date  Patient completed checklist task to identify functional skills/activities to improve during therapy  Not formally targeted this date    6. Patient will recall key details from a visually or auditorally presented narrative with 80% accuracy given MIN cues across 2 consecutive sessions.     Progressing/ Not Met 2022   Not formally targeted this date  Visual presentation: 60% accuracy given MOD cues  Visual presentation:   75% accuracy given MIN cues    7. Patient will complete semantic fluency tasks (I.e. convergent or divergent naming) with 80% accuracy given MIN cues across 2 consecutive sessions.     Progressing/ Not Met 2022   Divergent namin% accuracy given MOD cues     76% accuracy given MIN cues      47% accuracy INDP Not formally targeted this date  Semantic fluency:  80% accuracy given MIN cues    Phonemic fluency: 25% accuracy given MAX cues   8. Patient will complete executive function (I.e.read/understand mail, respond appropriately to mail) task with 80% accuracy given MIN cues.         Progressing/ Not Met 9/14/2022 N/A 60% accuracy given MOD cues  Not formally targeted this date.    9. Patient will complete complex problem solving task with 90% accuracy given MIN cues.      Progressing/ Not Met 9/14/2022 N/A 70% accuracy given MOD cues  Not formally targeted this date   10. Patient will complete complex memory task (I.e. remembering names, dates, events, item locations, details of conversations) with 80% accuracy given MIN cues.      Progressing/ Not Met 9/14/2022 N/A Not formally targeted this date  Location of items after delay: 80% accuracy given MIN cues      Patient Education/Response:     Home program established: yes- Patient instructed to complete phonemic fluency task prior to next session.   Exercises were reviewed and Declan demonstrated good  understanding of the education provided.     Assessment:   Declan is progressing well towards his goals. All current goals remain appropriate. Goals to be updated as necessary. Patient prognosis is Good. Patient will continue to benefit from skilled outpatient speech and language therapy to address the deficits listed in the problem list on initial evaluation, provide patient/family education, and to maximize patient's level of independence in the home and community environment.   Medical necessity is demonstrated by the following IMPAIRMENTS:  word finding, slow and halting speech, comprehension, executive function, problem solving, memory, difficulty with sentence structure, and slow speech initiation.   Barriers to Therapy: none identified  Patient's spiritual, cultural and educational needs considered and patient agreeable to plan of care and goals.   Plan:   Continue Plan of Care with focus on improvement of  expressive language and cognitive communication skills.     Marnie Desai CCC-SLP   9/21/2022

## 2022-09-23 NOTE — PROGRESS NOTES
[facility]  Speech Therapy Treatment Note- Neurological Rehabilitation  Date: 9/26/2022     Name: Declan Burleson   MRN: [id number]   Therapy Diagnosis: Aphasia R 47.01 (ICD-10); Cognitive Communication deficit R 41.841 (ICD-10)   Physician: Anca Long PA-C  Physician Orders: [id number] - AMB REFERRAL/CONSULT TO SPEECH THERAPY  Medical Diagnosis: G31.84 (ICD-10-CM) - Mild neurocognitive disorder due to another medical condition    Visit #/ Visits Authorized: 6/ 8  Date of Evaluation:  8/23/2022  Insurance Authorization Period: 9/28/2022  Plan of Care Expiration Date:    12/21/2022  Extended Plan of Care:  N/A   Progress Note: 9/28/2022   Visits Cancelled: 2  Visits No Show: 0    Time In:  1400  Time Out:  1445  Total Billable Time: 45 minutes      Precautions: Standard  Subjective:   Patient reports: feeling well and agreeable to outlined therapy tasks.   He was compliant to home exercise program.  Response to previous treatment: Good  Pain Scale:   0/10 on a Visual Analog Scale currently.   Pain Location: N/A   Objective:   TIMED  Procedure Min.   Cognitive Therapeutic Interventions, first 15 minutes CPT 08665   15   Therapeutic INTVTN, COGN Fucntion, EACH ADDL 15 MIN - ST CPT 70444  30         UNTIMED  Procedure Min.   N/A  N/A   N/A  N/A   Total Timed Units: 3  Total Untimed Units: 0  Charges Billed/Number of units: 3    Short Term Goals: (8 weeks) Current Progress 9/26/22 Current Progress 9/28/2022   1. The patient will produce a minimum of 4 different features, when presented with a word using semantic feature analysis (SFA), given MIN verbal prompts, with 75% accuracy across 2 consecutive sessions.     Progressing/ Not Met 9/26/2022   85% accuracy MIN cues      69% accuracy INDP     2. The patient will utilize trained word finding strategies (SFA, circumlocution, etc.) during a 2 minute description of a recent event with 80% accuracy given min cues over 2 consecutive sessions.       Progressing/ Not Met 9/26/2022   80% accuracy given MIN cues     3. The patient will complete complex auditory comprehension task (I.e. recall items after 60 second delay, recall key details from a narrative) with 80% accuracy given min cues across 2 consecutive sessions.     Progressing/ Not Met 9/26/2022   90% accuracy given MOD cues     4. The patient will describe visual scenes using 3 or more sentences at 80% accuracy given min verbal and phonemic cues across 2 consecutive sessions.   Progressing/ Not Met 9/26/2022   72% accuracy given MIN cues      38% accuracy INDP    5.  The patient will complete ongoing dynamic assessment.      Progressing/ Not Met 9/26/2022   Not formally assessed    6. Patient will recall key details from a visually or auditorally presented narrative with 80% accuracy given MIN cues across 2 consecutive sessions.     Progressing/ Not Met 9/26/2022   Not formally targeted     7. Patient will complete semantic fluency tasks (I.e. convergent or divergent naming) with 80% accuracy given MIN cues across 2 consecutive sessions.     Progressing/ Not Met 9/26/2022   85% accuracy given MOD cues   *verbal perseveration present    8. Patient will complete executive function (I.e.read/understand mail, respond appropriately to mail) task with 80% accuracy given MIN cues.         Progressing/ Not Met 9/14/2022 Not formally targeted this date    9. Patient will complete complex problem solving task with 90% accuracy given MIN cues.      Progressing/ Not Met 9/14/2022 Not formally targeted this date    10. Patient will complete complex memory task (I.e. remembering names, dates, events, item locations, details of conversations) with 80% accuracy given MIN cues.      Progressing/ Not Met 9/14/2022 70% accuracy given MOD cues       Patient Education/Response:     Home program established: yes-  patient instructed to continue prior established exercises  Exercises were reviewed and Declan demonstrated  good  understanding of the education provided.     Assessment:   Declan is progressing well towards his goals. All current goals remain appropriate. Goals to be updated as necessary. Patient prognosis is Good. Patient will continue to benefit from skilled outpatient speech and language therapy to address the deficits listed in the problem list on initial evaluation, provide patient/family education, and to maximize patient's level of independence in the home and community environment.   Medical necessity is demonstrated by the following IMPAIRMENTS:  word finding, slow and halting speech, comprehension, executive function, problem solving, memory, difficulty with sentence structure, and slow speech initiation.   Barriers to Therapy: none identified  Patient's spiritual, cultural and educational needs considered and patient agreeable to plan of care and goals.   Plan:   Continue Plan of Care with focus on improvement of expressive language and cognitive communication skills.     Marnie Desai CCC-SLP   9/26/2022

## 2022-09-26 ENCOUNTER — CLINICAL SUPPORT (OUTPATIENT)
Dept: REHABILITATION | Facility: HOSPITAL | Age: 57
End: 2022-09-26
Payer: MEDICARE

## 2022-09-26 DIAGNOSIS — R41.841 COGNITIVE COMMUNICATION DEFICIT: ICD-10-CM

## 2022-09-26 DIAGNOSIS — R47.01 APHASIA DETERMINED BY EXAMINATION: Primary | ICD-10-CM

## 2022-09-26 PROCEDURE — 97129 THER IVNTJ 1ST 15 MIN: CPT | Mod: GN

## 2022-09-26 PROCEDURE — 97130 THER IVNTJ EA ADDL 15 MIN: CPT

## 2022-09-26 PROCEDURE — 92507 TX SP LANG VOICE COMM INDIV: CPT

## 2022-10-04 NOTE — PROGRESS NOTES
[facility]  Speech Therapy Progress Note - Neurological Rehabilitation  Date: 10/5/2022     Name: Declan Burleson   MRN: [id number]   Therapy Diagnosis: Aphasia R 47.01 (ICD-10); Cognitive Communication deficit R 41.841 (ICD-10)   Physician: Anca Long PA-C  Physician Orders: [id number] - AMB REFERRAL/CONSULT TO SPEECH THERAPY  Medical Diagnosis: G31.84 (ICD-10-CM) - Mild neurocognitive disorder due to another medical condition    Visit #/ Visits Authorized: 7/ 8  Date of Evaluation:  8/23/2022  Insurance Authorization Period: 12/28/2022  Plan of Care Expiration Date:    12/21/2022  Extended Plan of Care:  N/A   Progress Note: 10/5/2022   Visits Cancelled: 4  Visits No Show: 0    Time In:  1400  Time Out:  1445  Total Billable Time: 45 minutes      Precautions: Standard  Subjective:   Patient reports: feeling well and agreeable to outlined therapy tasks.   He was compliant to home exercise program.  Response to previous treatment: Good  Objective:   TIMED  Procedure Min.   Cognitive Therapeutic Interventions, first 15 minutes CPT 40708   15   Therapeutic INTVTN, COGN Fucntion, EACH ADDL 15 MIN - ST CPT 40344  30         UNTIMED  Procedure Min.   N/A  N/A   N/A  N/A   Total Timed Units: 3  Total Untimed Units: 0  Charges Billed/Number of units: 3    Short Term Goals: (8 weeks) Current Progress 9/26/22 Current Progress 10/5/2022   1. The patient will produce a minimum of 4 different features, when presented with a word using semantic feature analysis (SFA), given MIN verbal prompts, with 75% accuracy across 2 consecutive sessions.     Progressing/ Not Met 10/5/2022   85% accuracy MIN cues      69% accuracy INDP  78% accuracy MIN cues     67% accuracy INDP   2. The patient will utilize trained word finding strategies (SFA, circumlocution, etc.) during a 2 minute description of a recent event with 80% accuracy given min cues over 2 consecutive sessions.       Met 10/5/2022   80% accuracy given MIN  cues  80% accuracy given MIN cues - GOAL MET    3. The patient will complete complex auditory comprehension task (I.e. recall items after 60 second delay, recall key details from a narrative) with 80% accuracy given min cues across 2 consecutive sessions.     Progressing/ Not Met 10/5/2022   90% accuracy given MOD cues  70% accuracy given MAX cues     59% accuracy given MIN cues     37% accuracy INDP    4. The patient will describe visual scenes using 3 or more sentences at 80% accuracy given min verbal and phonemic cues across 2 consecutive sessions.   Progressing/ Not Met 10/5/2022   72% accuracy given MIN cues      38% accuracy INDP 73% accuracy given MIN cues    5.  The patient will complete ongoing dynamic assessment.      Progressing/ Not Met 10/5/2022   Not formally assessed No formal assessment this date    6. Patient will recall key details from a visually or auditorally presented narrative with 80% accuracy given MIN cues across 2 consecutive sessions.     Progressing/ Not Met 10/5/2022   Not formally targeted  60% accuracy given MIN cues    7. Patient will complete semantic fluency tasks (I.e. convergent or divergent naming) with 80% accuracy given MIN cues across 2 consecutive sessions.     Progressing/ Not Met 10/5/2022   85% accuracy given MOD cues   *verbal perseveration present Not formally targeted    8. Patient will complete executive function (I.e.read/understand mail, respond appropriately to mail) task with 80% accuracy given MIN cues.         Progressing/ Not Met 9/14/2022 Not formally targeted this date Not formally targeted    9. Patient will complete complex problem solving task with 90% accuracy given MIN cues.      Progressing/ Not Met 9/14/2022 Not formally targeted this date Not formally targeted    10. Patient will complete complex memory task (I.e. remembering names, dates, events, item locations, details of conversations) with 80% accuracy given MIN cues.      Progressing/ Not Met  9/14/2022 70% accuracy given MOD cues  60% accuracy given MIN cues      Patient Education/Response:     Home program established: yes-  patient instructed  to continue previously assigned task as well as gather mail for next week's session  Exercises were reviewed and Declan demonstrated good  understanding of the education provided.     Assessment:   Declan is progressing well towards his goals. Short Term Goal 2 met this date. Patient continues to make consistent progress across all goals. Goals to be updated next session.    Patient prognosis is Good. Patient will continue to benefit from skilled outpatient speech and language therapy to address the deficits listed in the problem list on initial evaluation, provide patient/family education, and to maximize patient's level of independence in the home and community environment.   Medical necessity is demonstrated by the following IMPAIRMENTS:  word finding, slow and halting speech, comprehension, executive function, problem solving, memory, difficulty with sentence structure, and slow speech initiation.   Barriers to Therapy: none identified  Patient's spiritual, cultural and educational needs considered and patient agreeable to plan of care and goals.     Progress:   Plan:   Continue Plan of Care with focus on improvement of expressive language and cognitive communication skills.     Marnie Desai CCC-SLP   10/5/2022

## 2022-10-05 ENCOUNTER — CLINICAL SUPPORT (OUTPATIENT)
Dept: REHABILITATION | Facility: HOSPITAL | Age: 57
End: 2022-10-05
Payer: MEDICARE

## 2022-10-05 ENCOUNTER — PATIENT MESSAGE (OUTPATIENT)
Dept: REHABILITATION | Facility: HOSPITAL | Age: 57
End: 2022-10-05

## 2022-10-05 DIAGNOSIS — R47.01 APHASIA DETERMINED BY EXAMINATION: ICD-10-CM

## 2022-10-05 DIAGNOSIS — F06.70 MILD NEUROCOGNITIVE DISORDER DUE TO ANOTHER MEDICAL CONDITION: Primary | ICD-10-CM

## 2022-10-05 PROCEDURE — 97129 THER IVNTJ 1ST 15 MIN: CPT | Mod: GN

## 2022-10-05 PROCEDURE — 97130 THER IVNTJ EA ADDL 15 MIN: CPT

## 2022-10-05 PROCEDURE — 92507 TX SP LANG VOICE COMM INDIV: CPT

## 2022-10-07 ENCOUNTER — HOSPITAL ENCOUNTER (OUTPATIENT)
Dept: RADIOLOGY | Facility: HOSPITAL | Age: 57
Discharge: HOME | End: 2022-10-07
Attending: PSYCHIATRY & NEUROLOGY
Payer: MEDICARE

## 2022-10-07 DIAGNOSIS — G40.109 TEMPORAL LOBE EPILEPSY: ICD-10-CM

## 2022-10-07 PROCEDURE — 25500020 PHARM REV CODE 255: Performed by: PSYCHIATRY & NEUROLOGY

## 2022-10-07 PROCEDURE — 70551 MRI BRAIN STEM W/O DYE: CPT | Mod: TC

## 2022-10-07 PROCEDURE — A9585 GADOBUTROL INJECTION: HCPCS | Performed by: PSYCHIATRY & NEUROLOGY

## 2022-10-07 PROCEDURE — 70551 MRI BRAIN STEM W/O DYE: CPT | Mod: 26,,, | Performed by: RADIOLOGY

## 2022-10-07 PROCEDURE — 70551 MRI BRAIN EPILEPSY WITHOUT CONTRAST: ICD-10-PCS | Mod: 26,,, | Performed by: RADIOLOGY

## 2022-10-07 RX ORDER — GADOBUTROL 604.72 MG/ML
10 INJECTION INTRAVENOUS
Status: COMPLETED | OUTPATIENT
Start: 2022-10-07 | End: 2022-10-07

## 2022-10-07 RX ADMIN — GADOBUTROL 10 ML: 604.72 INJECTION INTRAVENOUS at 03:10

## 2022-10-10 ENCOUNTER — CLINICAL SUPPORT (OUTPATIENT)
Dept: REHABILITATION | Facility: HOSPITAL | Age: 57
End: 2022-10-10
Payer: MEDICARE

## 2022-10-10 DIAGNOSIS — R41.841 COGNITIVE COMMUNICATION DEFICIT: Primary | ICD-10-CM

## 2022-10-10 DIAGNOSIS — R47.01 APHASIA DETERMINED BY EXAMINATION: ICD-10-CM

## 2022-10-10 PROCEDURE — 97130 THER IVNTJ EA ADDL 15 MIN: CPT | Mod: GN

## 2022-10-10 PROCEDURE — 97129 THER IVNTJ 1ST 15 MIN: CPT

## 2022-10-10 PROCEDURE — 92507 TX SP LANG VOICE COMM INDIV: CPT

## 2022-10-10 NOTE — PROGRESS NOTES
[facility]  Speech Therapy Treatment Note - Neurological Rehabilitation  Date: 10/10/2022     Name: Declan Burleson   MRN: [id number]   Therapy Diagnosis: Aphasia R 47.01 (ICD-10); Cognitive Communication deficit R 41.841 (ICD-10)   Physician: Anca Long PA-C  Physician Orders: [id number] - AMB REFERRAL/CONSULT TO SPEECH THERAPY  Medical Diagnosis: G31.84 (ICD-10-CM) - Mild neurocognitive disorder due to another medical condition    Visit #/ Visits Authorized: 8/ 30  Date of Evaluation:  8/23/2022  Insurance Authorization Period: 12/28/2022  Plan of Care Expiration Date:    12/21/2022  Extended Plan of Care:  N/A   Progress Note: 11/14/2022   Visits Cancelled: 4  Visits No Show: 0    Time In:  1350  Time Out:  1435  Total Billable Time: 45 minutes      Precautions: Standard  Subjective:   Patient reports: feeling well and agreeable to outlined therapy tasks.   He was partially compliant to home exercise program.  Response to previous treatment: Good  Objective:   TIMED  Procedure Min.   Cognitive Therapeutic Interventions, first 15 minutes CPT 63951   15   Therapeutic INTVTN, COGN Fucntion, EACH ADDL 15 MIN - ST CPT 43718  30         UNTIMED  Procedure Min.   N/A  N/A   N/A  N/A   Total Timed Units: 3  Total Untimed Units: 0  Charges Billed/Number of units: 3    Short Term Goals: (8 weeks) Current Progress 10/10/22 Current Progress 10/12/2022   1. The patient will produce a minimum of 4 different features, when presented with a word using semantic feature analysis (SFA), given MIN verbal prompts, with 75% accuracy across 2 consecutive sessions.     Progressing/ Not Met 10/10/2022     81% accuracy given MAX cues     79% accuracy given MOD cues     60% accuracy given MAX cues     3. The patient will complete complex auditory comprehension task (I.e. recall items after 60 second delay, recall key details from a narrative) with 80% accuracy given min cues across 2 consecutive sessions.      Progressing/ Not Met 10/10/2022     50% accuracy given MAX cues     33% accuracy INDP     4. The patient will describe visual scenes using 3 or more sentences at 80% accuracy given min verbal and phonemic cues across 2 consecutive sessions.   Progressing/ Not Met 10/10/2022   70% accuracy given MIN cues     5.  The patient will complete ongoing dynamic assessment.      Progressing/ Not Met 10/10/2022   Not formally assessed    6. Patient will recall key details from a visually or auditorally presented narrative with 80% accuracy given MIN cues across 2 consecutive sessions.     Progressing/ Not Met 10/10/2022     50% accuracy given MAX cues       25% accuracy INDP     7. Patient will complete semantic fluency tasks (I.e. convergent or divergent naming) with 80% accuracy given MIN cues across 2 consecutive sessions.     Progressing/ Not Met 10/10/2022   Not formally targeted this date (targeted in HEP)     8. Patient will complete executive function (I.e.read/understand mail, respond appropriately to mail) task with 80% accuracy given MIN cues.         Progressing/ Not Met 9/14/2022   80% accuracy given MAX cues     70% accuracy given MIN cues     50% accuracy INDP     9. Patient will complete complex problem solving task with 90% accuracy given MIN cues.      Progressing/ Not Met 9/14/2022 Not formally targeted this date     10. Patient will complete complex memory task (I.e. remembering names, dates, events, item locations, details of conversations) with 80% accuracy given MIN cues.      Progressing/ Not Met 9/14/2022   60% accuracy given MAX cues       Patient Education/Response:     Home program established: yes-  patient instructed  to continue previously assigned task as well as gather mail for next week's session   Exercises were reviewed and Declan demonstrated good  understanding of the education provided.     Assessment:   Declan is progressing well towards his goals. Short Term Goal 2 dismissed  this date (goal met during previous session). Patient with slight decrease in accuracy as well as increased need for high level cueing this date.   Patient prognosis is Good. Patient will continue to benefit from skilled outpatient speech and language therapy to address the deficits listed in the problem list on initial evaluation, provide patient/family education, and to maximize patient's level of independence in the home and community environment.   Medical necessity is demonstrated by the following IMPAIRMENTS:  word finding, slow and halting speech, comprehension, executive function, problem solving, memory, difficulty with sentence structure, and slow speech initiation.   Barriers to Therapy: none identified  Patient's spiritual, cultural and educational needs considered and patient agreeable to plan of care and goals.     Progress:   Plan:   Continue Plan of Care with focus on improvement of expressive language and cognitive communication skills.     Marnie Desai CCC-SLP   10/10/2022

## 2022-10-11 NOTE — PROGRESS NOTES
[facility]  Speech Therapy Treatment Note - Neurological Rehabilitation  Date: 10/12/2022     Name: Declan Burleson   MRN: [id number]   Therapy Diagnosis: Aphasia R 47.01 (ICD-10); Cognitive Communication deficit R 41.841 (ICD-10)   Physician: Anca Long PA-C  Physician Orders: [id number] - AMB REFERRAL/CONSULT TO SPEECH THERAPY  Medical Diagnosis: G31.84 (ICD-10-CM) - Mild neurocognitive disorder due to another medical condition    Visit #/ Visits Authorized: 9/ 30  Date of Evaluation:  8/23/2022  Insurance Authorization Period: 12/28/2022  Plan of Care Expiration Date:    12/21/2022  Extended Plan of Care:  N/A   Progress Note: 11/14/2022   Visits Cancelled: 4  Visits No Show: 0    Time In:  1350  Time Out:  1430  Total Billable Time: 40 minutes      Precautions: Standard  Subjective:   Patient reports: feeling well and agreeable to outlined therapy tasks.   He was compliant to home exercise program.  Response to previous treatment: Good  Objective:   TIMED  Procedure Min.   Cognitive Therapeutic Interventions, first 15 minutes CPT 46165   15   Therapeutic INTVTN, COGN Fucntion, EACH ADDL 15 MIN - ST CPT 76328  25         UNTIMED  Procedure Min.   N/A  N/A   N/A  N/A   Total Timed Units: 3  Total Untimed Units: 0  Charges Billed/Number of units: 3    Short Term Goals: (8 weeks) Current Progress 10/10/22 Current Progress 10/12/2022   1. The patient will produce a minimum of 4 different features, when presented with a word using semantic feature analysis (SFA), given MIN verbal prompts, with 75% accuracy across 2 consecutive sessions.     Progressing/ Not Met 10/12/2022     81% accuracy given MAX cues     79% accuracy given MOD cues     60% accuracy given MAX cues  Not formally targeted this date   3. The patient will complete complex auditory comprehension task (I.e. recall items after 60 second delay, recall key details from a narrative) with 80% accuracy given min cues across 2 consecutive  sessions.     Progressing/ Not Met 10/12/2022     50% accuracy given MAX cues     33% accuracy INDP  Recall key details from narrative: 60% accuracy given MOD cues    4. The patient will describe visual scenes using 3 or more sentences at 80% accuracy given min verbal and phonemic cues across 2 consecutive sessions.   Progressing/ Not Met 10/12/2022   70% accuracy given MIN cues  Not formally targeted   5.  The patient will complete ongoing dynamic assessment.      Progressing/ Not Met 10/12/2022   Not formally assessed Not formally assessed   6. Patient will recall key details from a visually or auditorally presented narrative with 80% accuracy given MIN cues across 2 consecutive sessions.     Progressing/ Not Met 10/12/2022     50% accuracy given MAX cues       25% accuracy INDP  60% accuracy given MOD cues    7. Patient will complete semantic fluency tasks (I.e. convergent or divergent naming) with 80% accuracy given MIN cues across 2 consecutive sessions.     Progressing/ Not Met 10/12/2022   Not formally targeted this date (targeted in HEP)  Not formally targeted this date (targeted in HEP)    8. Patient will complete executive function (I.e.read/understand mail, respond appropriately to mail) task with 80% accuracy given MIN cues.         Progressing/ Not Met 9/14/2022   80% accuracy given MAX cues     70% accuracy given MIN cues     50% accuracy INDP  60% accuracy MAX cues      9. Patient will complete complex problem solving task with 90% accuracy given MIN cues.      Progressing/ Not Met 9/14/2022 Not formally targeted this date  60% accuracy MAX cues   10. Patient will complete complex memory task (I.e. remembering names, dates, events, item locations, details of conversations) with 80% accuracy given MIN cues.      Progressing/ Not Met 9/14/2022   60% accuracy given MAX cues  65% accuracy given MAX cues      Patient Education/Response:     Home program established: yes-  patient instructed  to continue  previously assigned task as well as gather mail for next week's session.  Exercises were reviewed and Declan demonstrated good  understanding of the education provided.     Assessment:   Declan is progressing well towards his goals. All current goal remain appropriate. Goals to be updated as necessary.   Patient prognosis is Good. Patient will continue to benefit from skilled outpatient speech and language therapy to address the deficits listed in the problem list on initial evaluation, provide patient/family education, and to maximize patient's level of independence in the home and community environment.   Medical necessity is demonstrated by the following IMPAIRMENTS:  word finding, slow and halting speech, comprehension, executive function, problem solving, memory, difficulty with sentence structure, and slow speech initiation.   Barriers to Therapy: none identified  Patient's spiritual, cultural and educational needs considered and patient agreeable to plan of care and goals.     Progress:   Plan:   Continue Plan of Care with focus on improvement of expressive language and cognitive communication skills.     Marnie Desai CCC-SLP   10/12/2022

## 2022-10-12 ENCOUNTER — CLINICAL SUPPORT (OUTPATIENT)
Dept: REHABILITATION | Facility: HOSPITAL | Age: 57
End: 2022-10-12
Payer: MEDICARE

## 2022-10-12 DIAGNOSIS — R41.841 COGNITIVE COMMUNICATION DEFICIT: Primary | ICD-10-CM

## 2022-10-12 DIAGNOSIS — R47.01 APHASIA DETERMINED BY EXAMINATION: ICD-10-CM

## 2022-10-12 PROCEDURE — 97129 THER IVNTJ 1ST 15 MIN: CPT

## 2022-10-12 PROCEDURE — 97130 THER IVNTJ EA ADDL 15 MIN: CPT

## 2022-10-12 PROCEDURE — 92507 TX SP LANG VOICE COMM INDIV: CPT

## 2022-10-13 ENCOUNTER — PATIENT MESSAGE (OUTPATIENT)
Dept: NEUROLOGY | Facility: CLINIC | Age: 57
End: 2022-10-13
Payer: MEDICARE

## 2022-10-13 NOTE — PROGRESS NOTES
[facility]  Speech Therapy Treatment Note - Neurological Rehabilitation  Date: 10/17/2022     Name: Declan Burleson   MRN: [id number]   Therapy Diagnosis: Aphasia R 47.01 (ICD-10); Cognitive Communication deficit R 41.841 (ICD-10)   Physician: Anca Long PA-C  Physician Orders: [id number] - AMB REFERRAL/CONSULT TO SPEECH THERAPY  Medical Diagnosis: G31.84 (ICD-10-CM) - Mild neurocognitive disorder due to another medical condition    Visit #/ Visits Authorized: 10/ 30  Date of Evaluation:  8/23/2022  Insurance Authorization Period: 12/28/2022  Plan of Care Expiration Date:    12/21/2022  Extended Plan of Care:  N/A   Progress Note: 11/14/2022   Visits Cancelled: 4  Visits No Show: 0    Time In:  1335  Time Out:  1420  Total Billable Time: 45 minutes      Precautions: Standard  Subjective:   Patient reports: feeling well and agreeable to outlined therapy tasks.  He was compliant to home exercise program.  Response to previous treatment: Good  Objective:   TIMED  Procedure Min.   Cognitive Therapeutic Interventions, first 15 minutes CPT 95727   15   Therapeutic INTVTN, COGN Fucntion, EACH ADDL 15 MIN - ST CPT 53601  30         UNTIMED  Procedure Min.   N/A  N/A   N/A  N/A   Total Timed Units: 3  Total Untimed Units: 0  Charges Billed/Number of units: 3    Short Term Goals: (8 weeks) Current Progress 10/17/22 Current Progress 10/19/2022   1. The patient will produce a minimum of 4 different features, when presented with a word using semantic feature analysis (SFA), given MIN verbal prompts, with 75% accuracy across 2 consecutive sessions.     Progressing/ Not Met 10/17/2022   Not formally targeted this date    3. The patient will complete complex auditory comprehension task (I.e. recall items after 60 second delay, recall key details from a narrative) with 80% accuracy given min cues across 2 consecutive sessions.     Progressing/ Not Met 10/17/2022   Not formally targeted this date     4. The  patient will describe visual scenes using 3 or more sentences at 80% accuracy given min verbal and phonemic cues across 2 consecutive sessions.   Progressing/ Not Met 10/17/2022   70% accuracy given MOD cues     5.  The patient will complete ongoing dynamic assessment.          Progressing/ Not Met 10/17/2022   Not formally assessed  Not formally assessed   6. Patient will recall key details from a visually or auditorally presented narrative with 80% accuracy given MIN cues across 2 consecutive sessions.                 Progressing/ Not Met 10/17/2022   Visual presentation:   64% accuracy given MAX cues      36% accuracy given MIN cues      21% accuracy INDP      7. Patient will complete semantic or phonemic fluency tasks with 80% accuracy given MIN cues across 2 consecutive sessions.                         Progressing/ Not Met 10/17/2022   Phonemic:   J: 73% accuracy INDP  K: 40% accuracy INDP   O: 100% accuracy INDP   Q: 50% accuracy given MIN cues; 33% accuracy INDP   Average: 59% accuracy   Semantic: 80% accuracy INP     8. Patient will complete executive function (I.e.read/understand mail, respond appropriately to mail) task with 80% accuracy given MIN cues.         Progressing/ Not Met 10/17/2022 80% accuracy given MAX cues     40% accuracy INDP    9. Patient will complete complex problem solving task with 90% accuracy given MIN cues.            Progressing/ Not Met 10/17/2022 64% accuracy given MAX cues    36% accuracy given MIN cues     21% accuracy INDP    10. Patient will complete complex memory task (I.e. remembering names, dates, events, item locations, details of conversations) with 80% accuracy given MIN cues.        Progressing/ Not Met 10/17/2022 64% accuracy given MOD cues    36% accuracy given MIN cues     21% accuracy INDP      Patient Education/Response:     Home program established: yes-  patient instructed  to continue previously assigned task.   Exercises were reviewed and Declan  demonstrated good  understanding of the education provided.     Assessment:   Declan is progressing well towards his goals. All current goal remain appropriate. Goals to be updated as necessary. Short term goal #7 updated this date to better fit patient's needs.   Patient prognosis is Good. Patient will continue to benefit from skilled outpatient speech and language therapy to address the deficits listed in the problem list on initial evaluation, provide patient/family education, and to maximize patient's level of independence in the home and community environment.   Medical necessity is demonstrated by the following IMPAIRMENTS:  word finding, slow and halting speech, comprehension, executive function, problem solving, memory, difficulty with sentence structure, and slow speech initiation.   Barriers to Therapy: none identified  Patient's spiritual, cultural and educational needs considered and patient agreeable to plan of care and goals.     Progress:   Plan:   Continue Plan of Care with focus on improvement of expressive language and cognitive communication skills.     Marnie Desai CCC-SLP   10/17/2022

## 2022-10-17 ENCOUNTER — CLINICAL SUPPORT (OUTPATIENT)
Dept: REHABILITATION | Facility: HOSPITAL | Age: 57
End: 2022-10-17
Payer: MEDICARE

## 2022-10-17 DIAGNOSIS — R47.01 APHASIA DETERMINED BY EXAMINATION: Primary | ICD-10-CM

## 2022-10-17 DIAGNOSIS — R41.841 COGNITIVE COMMUNICATION DEFICIT: ICD-10-CM

## 2022-10-17 PROCEDURE — 97130 THER IVNTJ EA ADDL 15 MIN: CPT | Mod: GN

## 2022-10-17 PROCEDURE — 92507 TX SP LANG VOICE COMM INDIV: CPT

## 2022-10-17 PROCEDURE — 97129 THER IVNTJ 1ST 15 MIN: CPT

## 2022-10-18 NOTE — PROGRESS NOTES
[facility]  Speech Therapy Treatment Note - Neurological Rehabilitation  Date: 10/19/2022     Name: Declan Burleson   MRN: [id number]   Therapy Diagnosis: Aphasia R 47.01 (ICD-10); Cognitive Communication deficit R 41.841 (ICD-10)   Physician: Anca Long PA-C  Physician Orders: [id number] - AMB REFERRAL/CONSULT TO SPEECH THERAPY  Medical Diagnosis: G31.84 (ICD-10-CM) - Mild neurocognitive disorder due to another medical condition    Visit #/ Visits Authorized: 11/ 30  Date of Evaluation:  8/23/2022  Insurance Authorization Period: 12/28/2022  Plan of Care Expiration Date:    12/21/2022  Extended Plan of Care:  N/A   Progress Note: 11/14/2022   Visits Cancelled: 4  Visits No Show: 0    Time In:  1345  Time Out:  1430  Total Billable Time: 45 minutes      Precautions: Standard  Subjective:   Patient reports: mild arm soreness 2/2 covid booster shot but feeling well otherwise and agreeable to outlined therapy tasks.  He was compliant to home exercise program.  Response to previous treatment: Good  Objective:   TIMED  Procedure Min.   Cognitive Therapeutic Interventions, first 15 minutes CPT 73459  0   Therapeutic INTVTN, COGN Fucntion, EACH ADDL 15 MIN - ST CPT 71014 0                                                 UNTIMED  Procedure Min.   Speech/Lang Tx/Individual CPT 49953 45   N/A  N/A   Total Timed Units: 0  Total Untimed Units: 1  Charges Billed/Number of units: 1      Short Term Goals: (8 weeks) Current Progress 10/17/22 Current Progress 10/19/2022   1. The patient will produce a minimum of 4 different features, when presented with a word using semantic feature analysis (SFA), given MIN verbal prompts, with 75% accuracy across 2 consecutive sessions.     Progressing/ Not Met 10/19/2022   Not formally targeted this date 80% accuracy given MAX cues      71% accuracy given MIN cues      36% accuracy INDP    3. The patient will complete complex auditory comprehension task (I.e. recall items  after 60 second delay, recall key details from a narrative) with 80% accuracy given min cues across 2 consecutive sessions.     Progressing/ Not Met 10/19/2022   Not formally targeted this date  Recall 4 words after 30 sec delay:      85% accuracy given MAX cues     62% accuracy given MIN cues     46% accuracy INDP    4. The patient will describe visual scenes using 3 or more sentences at 80% accuracy given min verbal and phonemic cues across 2 consecutive sessions.   Progressing/ Not Met 10/19/2022   70% accuracy given MOD cues  82% accuracy given MOD cues      64% accuracy INDP    5.  The patient will complete ongoing dynamic assessment.          Progressing/ Not Met 10/19/2022   Not formally assessed  Not formally assessed   6. Patient will recall key details from a visually or auditorally presented narrative with 80% accuracy given MIN cues across 2 consecutive sessions.                 Progressing/ Not Met 10/19/2022   Visual presentation:   64% accuracy given MAX cues      36% accuracy given MIN cues      21% accuracy INDP   Not targeted    7. Patient will complete semantic or phonemic fluency tasks with 80% accuracy given MIN cues across 2 consecutive sessions.                         Progressing/ Not Met 10/19/2022   Phonemic:   J: 73% accuracy INDP  K: 40% accuracy INDP   O: 100% accuracy INDP   Q: 50% accuracy given MIN cues; 33% accuracy INDP   Average: 59% accuracy   Semantic: 80% accuracy INP  Phonemic:      Verbal K: 60% accuracy given MAX cues; 50% accuracy INDP    Verbal Q: 75% accuracy given MAX cues; 50% accuracy INDP    Written K: 50% accuracy INDP      Written Q: 80% accuracy MOD cues; 60% accuracy INDP   8. Patient will complete executive function (I.e.read/understand mail, respond appropriately to mail) task with 80% accuracy given MIN cues.         Progressing/ Not Met 10/17/2022 80% accuracy given MAX cues     40% accuracy INDP Not targeted    9. Patient will complete complex problem solving  task with 90% accuracy given MIN cues.            Progressing/ Not Met 10/17/2022 64% accuracy given MAX cues    36% accuracy given MIN cues     21% accuracy INDP Not targeted    10. Patient will complete complex memory task (I.e. remembering names, dates, events, item locations, details of conversations) with 80% accuracy given MIN cues.        Progressing/ Not Met 10/17/2022 64% accuracy given MOD cues    36% accuracy given MIN cues     21% accuracy INDP Not targeted      Patient Education/Response:     Home program established: yes-  patient instructed  to continue previously assigned task.   Exercises were reviewed and Declan demonstrated good  understanding of the education provided.     Assessment:   Declan is progressing well towards his goals. All current goal remain appropriate. Goals to be updated as necessary.   Patient prognosis is Good. Patient will continue to benefit from skilled outpatient speech and language therapy to address the deficits listed in the problem list on initial evaluation, provide patient/family education, and to maximize patient's level of independence in the home and community environment.   Medical necessity is demonstrated by the following IMPAIRMENTS:  word finding, slow and halting speech, comprehension, executive function, problem solving, memory, difficulty with sentence structure, and slow speech initiation.   Barriers to Therapy: none identified  Patient's spiritual, cultural and educational needs considered and patient agreeable to plan of care and goals.    Plan:   Continue Plan of Care with focus on improvement of expressive language and cognitive communication skills.     Marnie Desai, LIS-SLP   10/19/2022

## 2022-10-19 ENCOUNTER — CLINICAL SUPPORT (OUTPATIENT)
Dept: REHABILITATION | Facility: HOSPITAL | Age: 57
End: 2022-10-19
Payer: MEDICARE

## 2022-10-19 DIAGNOSIS — R41.841 COGNITIVE COMMUNICATION DEFICIT: Primary | ICD-10-CM

## 2022-10-19 DIAGNOSIS — R47.01 APHASIA DETERMINED BY EXAMINATION: ICD-10-CM

## 2022-10-19 PROCEDURE — 92507 TX SP LANG VOICE COMM INDIV: CPT

## 2022-10-21 NOTE — PROGRESS NOTES
[facility]  Speech Therapy Treatment Note - Neurological Rehabilitation  Date: 10/24/2022     Name: Declan Burleson   MRN: [id number]   Therapy Diagnosis: Aphasia R 47.01 (ICD-10); Cognitive Communication deficit R 41.841 (ICD-10)   Physician: Anca Long PA-C  Physician Orders: [id number] - AMB REFERRAL/CONSULT TO SPEECH THERAPY  Medical Diagnosis: G31.84 (ICD-10-CM) - Mild neurocognitive disorder due to another medical condition    Visit #/ Visits Authorized: 12/ 30  Date of Evaluation:  8/23/2022  Insurance Authorization Period: 12/28/2022  Plan of Care Expiration Date:    12/21/2022  Extended Plan of Care:  N/A   Progress Note: 11/14/2022   Visits Cancelled: 4  Visits No Show: 0    Time In:  1345  Time Out:  1430  Total Billable Time: 45 minutes      Precautions: Standard  Subjective:   Patient reports: feeling well and agreeable to all outlined therapy tasks.   He was compliant to home exercise program.  Response to previous treatment: Good  Objective:   TIMED  Procedure Min.   Cognitive Therapeutic Interventions, first 15 minutes CPT 55624  0   Therapeutic INTVTN, COGN Fucntion, EACH ADDL 15 MIN - ST CPT 23746 0                                                 UNTIMED  Procedure Min.   Speech/Lang Tx/Individual CPT 59389 45   N/A  N/A   Total Timed Units: 0  Total Untimed Units: 1  Charges Billed/Number of units: 1      Short Term Goals: (8 weeks) Current Progress 10/24/2022 Current Progress 10/26/2022   1. The patient will produce a minimum of 4 different features, when presented with a word using semantic feature analysis (SFA), given MIN verbal prompts, with 75% accuracy across 2 consecutive sessions.     Progressing/ Not Met 10/24/2022     75% accuracy given MIN cues      38% accuracy INDP     3. The patient will complete complex auditory comprehension task (I.e. recall items after 60 second delay, recall key details from a narrative) with 80% accuracy given min cues across 2  consecutive sessions.     Progressing/ Not Met 10/24/2022     79% accuracy given MOD cues     64% accuracy INDP    4. The patient will describe visual scenes using 3 or more sentences at 80% accuracy given min verbal and phonemic cues across 2 consecutive sessions.   Progressing/ Not Met 10/24/2022     83% accuracy given MOD cues     50% accuracy given MIN cues    5.  The patient will complete ongoing dynamic assessment.          Progressing/ Not Met 10/24/2022     Not formally targeted this date     6. Patient will recall key details from a visually or auditorally presented narrative with 80% accuracy given MIN cues across 2 consecutive sessions.                 Progressing/ Not Met 10/24/2022     79% accuracy given MOD cues     64% accuracy INDP    7. Patient will complete semantic or phonemic fluency tasks with 80% accuracy given MIN cues across 2 consecutive sessions.                         Progressing/ Not Met 10/24/2022     77% accuracy given MAX cues     54% accuracy given MIN-MOD cues     31% accuracy INDP    8. Patient will complete executive function (I.e.read/understand mail, respond appropriately to mail) task with 80% accuracy given MIN cues.         Progressing/ Not Met 10/24/2022 65% accuracy INDP    9. Patient will complete complex problem solving task with 90% accuracy given MIN cues.            Progressing/ Not Met 10/24/2022 60% accuracy given MIN cues     10. Patient will complete complex memory task (I.e. remembering names, dates, events, item locations, details of conversations) with 80% accuracy given MIN cues.        Progressing/ Not Met 10/24/2022 Not formally targeted this daten      Patient Education/Response:     Home program established: yes-  patient instructed  to continue previously assigned task.   Exercises were reviewed and Declan demonstrated good  understanding of the education provided.     Assessment:   Declan is progressing well towards his goals. All current goal remain  appropriate. Goals to be updated as necessary.   Patient prognosis is Good. Patient will continue to benefit from skilled outpatient speech and language therapy to address the deficits listed in the problem list on initial evaluation, provide patient/family education, and to maximize patient's level of independence in the home and community environment.   Medical necessity is demonstrated by the following IMPAIRMENTS:  word finding, slow and halting speech, comprehension, executive function, problem solving, memory, difficulty with sentence structure, and slow speech initiation.   Barriers to Therapy: none identified  Patient's spiritual, cultural and educational needs considered and patient agreeable to plan of care and goals.    Plan:   Continue Plan of Care with focus on improvement of expressive language and cognitive communication skills.     Marnie Desai CCC-SLP   10/24/2022

## 2022-10-24 ENCOUNTER — PATIENT MESSAGE (OUTPATIENT)
Dept: REHABILITATION | Facility: HOSPITAL | Age: 57
End: 2022-10-24

## 2022-10-24 ENCOUNTER — CLINICAL SUPPORT (OUTPATIENT)
Dept: REHABILITATION | Facility: HOSPITAL | Age: 57
End: 2022-10-24
Payer: MEDICARE

## 2022-10-24 DIAGNOSIS — R41.841 COGNITIVE COMMUNICATION DEFICIT: ICD-10-CM

## 2022-10-24 DIAGNOSIS — R47.01 APHASIA DETERMINED BY EXAMINATION: Primary | ICD-10-CM

## 2022-10-24 PROCEDURE — 92507 TX SP LANG VOICE COMM INDIV: CPT

## 2022-10-26 ENCOUNTER — CLINICAL SUPPORT (OUTPATIENT)
Dept: REHABILITATION | Facility: HOSPITAL | Age: 57
End: 2022-10-26
Payer: MEDICARE

## 2022-10-26 DIAGNOSIS — R41.841 COGNITIVE COMMUNICATION DEFICIT: ICD-10-CM

## 2022-10-26 DIAGNOSIS — R47.01 APHASIA DETERMINED BY EXAMINATION: Primary | ICD-10-CM

## 2022-10-26 PROCEDURE — 92507 TX SP LANG VOICE COMM INDIV: CPT

## 2022-10-26 NOTE — PROGRESS NOTES
[facility]  Speech Therapy Treatment Note - Neurological Rehabilitation  Date: 10/26/2022     Name: Declan Burleson   MRN: [id number]   Therapy Diagnosis: Aphasia R 47.01 (ICD-10); Cognitive Communication deficit R 41.841 (ICD-10)   Physician: Anca Long PA-C  Physician Orders: [id number] - AMB REFERRAL/CONSULT TO SPEECH THERAPY  Medical Diagnosis: G31.84 (ICD-10-CM) - Mild neurocognitive disorder due to another medical condition    Visit #/ Visits Authorized: 13/ 30  Date of Evaluation:  8/23/2022  Insurance Authorization Period: 12/28/2022  Plan of Care Expiration Date:    12/21/2022  Extended Plan of Care:  N/A   Progress Note: 11/14/2022   Visits Cancelled: 4  Visits No Show: 0    Time In:  1343  Time Out:  1428  Total Billable Time: 45 minutes      Precautions: Standard  Subjective:   Patient reports: feeling well and agreeable to all outlined therapy tasks.   He was compliant to home exercise program.  Response to previous treatment: Good  Objective:   TIMED  Procedure Min.   Cognitive Therapeutic Interventions, first 15 minutes CPT 89421  0   Therapeutic INTVTN, COGN Fucntion, EACH ADDL 15 MIN - ST CPT 62823 0                                                 UNTIMED  Procedure Min.   Speech/Lang Tx/Individual CPT 23624 45   N/A  N/A   Total Timed Units: 0  Total Untimed Units: 1  Charges Billed/Number of units: 1      Short Term Goals: (8 weeks) Current Progress 10/24/2022 Current Progress 10/26/2022   1. The patient will produce a minimum of 4 different features, when presented with a word using semantic feature analysis (SFA), given MIN verbal prompts, with 75% accuracy across 2 consecutive sessions.     Progressing/ Not Met 10/26/2022     75% accuracy given MIN cues      38% accuracy INDP  80% accuracy given MOD cues   3. The patient will complete complex auditory comprehension task (I.e. recall items after 60 second delay, recall key details from a narrative) with 80% accuracy given  min cues across 2 consecutive sessions.     Progressing/ Not Met 10/26/2022     79% accuracy given MOD cues     64% accuracy INDP 75% accuracy given MIN cues        4. The patient will describe visual scenes using 3 or more sentences at 80% accuracy given min verbal and phonemic cues across 2 consecutive sessions.   Progressing/ Not Met 10/26/2022     83% accuracy given MOD cues     50% accuracy given MIN cues   60% accuracy given MIN cues    5.  The patient will complete ongoing dynamic assessment.          Progressing/ Not Met 10/26/2022     Not formally targeted this date  Not formally targeted this date   6. Patient will recall key details from a visually or auditorally presented narrative with 80% accuracy given MIN cues across 2 consecutive sessions.                 Progressing/ Not Met 10/26/2022     79% accuracy given MOD cues     64% accuracy INDP 75% accuracy given MIN cues    7. Patient will complete semantic or phonemic fluency tasks with 80% accuracy given MIN cues across 2 consecutive sessions.                         Progressing/ Not Met 10/26/2022     77% accuracy given MAX cues     54% accuracy given MIN-MOD cues     31% accuracy INDP    60% accuracy given MOD-MAX cues    8. Patient will complete executive function (I.e.read/understand mail, respond appropriately to mail) task with 80% accuracy given MIN cues.         Progressing/ Not Met 10/24/2022 65% accuracy INDP   70% accuracy INDP    9. Patient will complete complex problem solving task with 90% accuracy given MIN cues.            Progressing/ Not Met 10/24/2022 60% accuracy given MIN cues  70% accuracy given MOD cues    10. Patient will complete complex memory task (I.e. remembering names, dates, events, item locations, details of conversations) with 80% accuracy given MIN cues.        Progressing/ Not Met 10/24/2022 Not formally targeted this daten 65% accuracy given MOD cues      Patient Education/Response:     Home program established:  yes-  patient instructed  to continue previously assigned task.   Exercises were reviewed and Declan demonstrated good  understanding of the education provided.     Assessment:   Declan is progressing well towards his goals. All current goal remain appropriate. Goals to be updated as necessary.   Patient prognosis is Good. Patient will continue to benefit from skilled outpatient speech and language therapy to address the deficits listed in the problem list on initial evaluation, provide patient/family education, and to maximize patient's level of independence in the home and community environment.   Medical necessity is demonstrated by the following IMPAIRMENTS:  word finding, slow and halting speech, comprehension, executive function, problem solving, memory, difficulty with sentence structure, and slow speech initiation.   Barriers to Therapy: none identified  Patient's spiritual, cultural and educational needs considered and patient agreeable to plan of care and goals.    Plan:   Continue Plan of Care with focus on improvement of expressive language and cognitive communication skills.     Marnie Desai CCC-SLP   10/26/2022

## 2022-10-28 ENCOUNTER — PATIENT MESSAGE (OUTPATIENT)
Dept: NEUROLOGY | Facility: CLINIC | Age: 57
End: 2022-10-28
Payer: MEDICARE

## 2022-10-31 ENCOUNTER — CLINICAL SUPPORT (OUTPATIENT)
Dept: REHABILITATION | Facility: HOSPITAL | Age: 57
End: 2022-10-31
Payer: MEDICARE

## 2022-10-31 DIAGNOSIS — R41.841 COGNITIVE COMMUNICATION DEFICIT: ICD-10-CM

## 2022-10-31 DIAGNOSIS — R47.01 APHASIA DETERMINED BY EXAMINATION: Primary | ICD-10-CM

## 2022-10-31 PROCEDURE — 92507 TX SP LANG VOICE COMM INDIV: CPT

## 2022-10-31 NOTE — PROGRESS NOTES
[facility]  Speech Therapy Treatment Note - Neurological Rehabilitation  Date: 10/31/2022     Name: Declan Burleosn   MRN: [id number]   Therapy Diagnosis: Aphasia R 47.01 (ICD-10); Cognitive Communication deficit R 41.841 (ICD-10)   Physician: Anca Long PA-C  Physician Orders: [id number] - AMB REFERRAL/CONSULT TO SPEECH THERAPY  Medical Diagnosis: G31.84 (ICD-10-CM) - Mild neurocognitive disorder due to another medical condition    Visit #/ Visits Authorized: 14/ 30  Date of Evaluation:  8/23/2022  Insurance Authorization Period: 12/28/2022  Plan of Care Expiration Date:    12/21/2022  Extended Plan of Care:  N/A   Progress Note: 11/14/2022   Visits Cancelled: 4  Visits No Show: 0    Time In:  1340  Time Out:  1430  Total Billable Time: 50 minutes      Precautions: Standard  Subjective:   Patient reports: feeling well and agreeable to all outlined therapy tasks.   He was compliant to home exercise program.  Response to previous treatment: Good  Objective:   TIMED  Procedure Min.   Cognitive Therapeutic Interventions, first 15 minutes CPT 52755  0   Therapeutic INTVTN, COGN Fucntion, EACH ADDL 15 MIN - ST CPT 54855 0                                                 UNTIMED  Procedure Min.   Speech/Lang Tx/Individual CPT 27559 50   N/A  N/A   Total Timed Units: 0  Total Untimed Units: 1  Charges Billed/Number of units: 1      Short Term Goals: (8 weeks) Current Progress 10/31/2022 Current Progress 11/2/2022   1. The patient will produce a minimum of 4 different features, when presented with a word using semantic feature analysis (SFA), given MIN verbal prompts, with 75% accuracy across 2 consecutive sessions.     Progressing/ Not Met 10/31/2022   77% accuracy given MAX cues     54% accuracy given MIN cues     23% accuracy INDP     3. The patient will complete complex auditory comprehension task (I.e. recall items after 60 second delay, recall key details from a narrative) with 80% accuracy given  min cues across 2 consecutive sessions.     Progressing/ Not Met 10/31/2022     70% accuracy given MAX cues     50% accuracy INDP    4. The patient will describe visual scenes using 3 or more sentences at 80% accuracy given min verbal and phonemic cues across 2 consecutive sessions.   Progressing/ Not Met 10/31/2022    88% accuracy given MIN cues     56% accuracy INDP    5.  The patient will complete ongoing dynamic assessment.          Progressing/ Not Met 10/31/2022   NT     6. Patient will recall key details from a visually or auditorally presented narrative with 80% accuracy given MIN cues across 2 consecutive sessions.                 Progressing/ Not Met 10/31/2022     70% accuracy given MAX cues     50% accuracy INDP    7. Patient will complete semantic or phonemic fluency tasks with 80% accuracy given MIN cues across 2 consecutive sessions.                         Progressing/ Not Met 10/31/2022   79% accuracy given MAX cues     71% accuracy given MIN cues     64% accuracy INDP     8. Patient will complete executive function (I.e.read/understand mail, respond appropriately to mail) task with 80% accuracy given MIN cues.             Progressing/ Not Met 10/31/2022  56% accuracy given MAX cues     38% accuracy given MIN cues     13% accuracy INDP     9. Patient will complete complex problem solving task with 90% accuracy given MIN cues.            Progressing/ Not Met 10/31/2022 56% accuracy given MAX cues     38% accuracy given MIN cues     13% accuracy INDP     10. Patient will complete complex memory task (I.e. remembering names, dates, events, item locations, details of conversations) with 80% accuracy given MIN cues.        Progressing/ Not Met 10/31/2022 NT      Patient Education/Response:     Home program established: yes-  patient instructed  to continue previously assigned task as well as provided executive function worksheet.   Exercises were reviewed and Declan demonstrated good  understanding of  the education provided.     Assessment:   Declan is progressing well towards his goals. All current goal remain appropriate. Goals to be updated as necessary.   Patient prognosis is Good. Patient will continue to benefit from skilled outpatient speech and language therapy to address the deficits listed in the problem list on initial evaluation, provide patient/family education, and to maximize patient's level of independence in the home and community environment.   Medical necessity is demonstrated by the following IMPAIRMENTS:  word finding, slow and halting speech, comprehension, executive function, problem solving, memory, difficulty with sentence structure, and slow speech initiation.   Barriers to Therapy: none identified  Patient's spiritual, cultural and educational needs considered and patient agreeable to plan of care and goals.    Plan:   Continue Plan of Care with focus on improvement of expressive language and cognitive communication skills.     Marnie Desai CCC-SLP   10/31/2022

## 2022-11-07 ENCOUNTER — TELEPHONE (OUTPATIENT)
Dept: NEUROLOGY | Facility: CLINIC | Age: 57
End: 2022-11-07
Payer: MEDICARE

## 2022-11-08 ENCOUNTER — PATIENT MESSAGE (OUTPATIENT)
Dept: NEUROLOGY | Facility: CLINIC | Age: 57
End: 2022-11-08
Payer: MEDICARE

## 2022-11-09 ENCOUNTER — CLINICAL SUPPORT (OUTPATIENT)
Dept: REHABILITATION | Facility: HOSPITAL | Age: 57
End: 2022-11-09
Payer: MEDICARE

## 2022-11-09 DIAGNOSIS — R47.01 APHASIA DETERMINED BY EXAMINATION: Primary | ICD-10-CM

## 2022-11-09 DIAGNOSIS — R41.841 COGNITIVE COMMUNICATION DEFICIT: ICD-10-CM

## 2022-11-09 PROCEDURE — 92507 TX SP LANG VOICE COMM INDIV: CPT

## 2022-11-09 NOTE — PROGRESS NOTES
[facility]  Speech Therapy Treatment Note - Neurological Rehabilitation  Date: 11/9/2022     Name: Declan Burleson   MRN: [id number]   Therapy Diagnosis: Aphasia R 47.01 (ICD-10); Cognitive Communication deficit R 41.841 (ICD-10)   Physician: Anca Long PA-C  Physician Orders: [id number] - AMB REFERRAL/CONSULT TO SPEECH THERAPY  Medical Diagnosis: G31.84 (ICD-10-CM) - Mild neurocognitive disorder due to another medical condition    Visit #/ Visits Authorized: 15/ 30  Date of Evaluation:  8/23/2022  Insurance Authorization Period: 12/28/2022  Plan of Care Expiration Date:    12/21/2022  Extended Plan of Care:  N/A   Progress Note: 11/14/2022   Visits Cancelled: 5  Visits No Show: 0    Time In:  1342  Time Out:  1430  Total Billable Time: 48 minutes      Precautions: Standard  Subjective:   Patient reports: feeling tired but agreeable to all outlined therapy tasks.   He was compliant to home exercise program.  Response to previous treatment: Good  Objective:   TIMED  Procedure Min.   Cognitive Therapeutic Interventions, first 15 minutes CPT 87036  0   Therapeutic INTVTN, COGN Fucntion, EACH ADDL 15 MIN - ST CPT 81639 0                                                 UNTIMED  Procedure Min.   Speech/Lang Tx/Individual CPT 22044 48    N/A  N/A   Total Timed Units: 0  Total Untimed Units: 1  Charges Billed/Number of units: 1      Short Term Goals: (8 weeks) Current Progress 10/31/2022 Current Progress 11/9/2022   1. The patient will produce a minimum of 4 different features, when presented with a word using semantic feature analysis (SFA), given MIN verbal prompts, with 75% accuracy across 2 consecutive sessions.     Progressing/ Not Met 11/9/2022   77% accuracy given MAX cues     54% accuracy given MIN cues     23% accuracy INDP    90% accuracy given MAX cues     73% accuracy given MIN cues     45% accuracy INDP    3. The patient will complete complex auditory comprehension task (I.e. recall items  after 60 second delay, recall key details from a narrative) with 80% accuracy given min cues across 2 consecutive sessions.     Progressing/ Not Met 11/9/2022     70% accuracy given MAX cues     50% accuracy INDP NT   4. The patient will describe visual scenes using 3 or more sentences at 80% accuracy given min verbal and phonemic cues across 2 consecutive sessions.   Progressing/ Not Met 11/9/2022    88% accuracy given MIN cues     56% accuracy INDP NT   5.  The patient will complete ongoing dynamic assessment.          Progressing/ Not Met 11/9/2022   NT  NT   6. Patient will recall key details from a visually or auditorally presented narrative with 80% accuracy given MIN cues across 2 consecutive sessions.                 Progressing/ Not Met 11/9/2022     70% accuracy given MAX cues     50% accuracy INDP NT   7. Patient will complete semantic or phonemic fluency tasks with 80% accuracy given MIN cues across 2 consecutive sessions.                         Progressing/ Not Met 11/9/2022   79% accuracy given MAX cues     71% accuracy given MIN cues     64% accuracy INDP  NT   8. Patient will complete executive function (I.e.read/understand mail, respond appropriately to mail) task with 80% accuracy given MIN cues.             Progressing/ Not Met 11/9/2022  56% accuracy given MAX cues     38% accuracy given MIN cues     13% accuracy INDP    75% accuracy given MAX cues     38% accuracy INDP   9. Patient will complete complex problem solving task with 90% accuracy given MIN cues.            Progressing/ Not Met 11/9/2022 56% accuracy given MAX cues     38% accuracy given MIN cues     13% accuracy INDP  80% accuracy given MIN cues    10. Patient will complete complex memory task (I.e. remembering names, dates, events, item locations, details of conversations) with 80% accuracy given MIN cues.        Progressing/ Not Met 11/9/2022 NT NT     Patient Education/Response:     Home program established: yes-  patient  instructed  to continue previously assigned task.   Exercises were reviewed and Declan demonstrated good  understanding of the education provided.     Assessment:   Declan is progressing well towards his goals. All current goal remain appropriate. Goals to be updated as necessary.   Patient prognosis is Good. Patient will continue to benefit from skilled outpatient speech and language therapy to address the deficits listed in the problem list on initial evaluation, provide patient/family education, and to maximize patient's level of independence in the home and community environment.   Medical necessity is demonstrated by the following IMPAIRMENTS:  word finding, slow and halting speech, comprehension, executive function, problem solving, memory, difficulty with sentence structure, and slow speech initiation.   Barriers to Therapy: none identified  Patient's spiritual, cultural and educational needs considered and patient agreeable to plan of care and goals.    Plan:   Continue Plan of Care with focus on improvement of expressive language and cognitive communication skills.     Marnie Desai CCC-SLP   11/9/2022

## 2022-11-11 NOTE — PROGRESS NOTES
"[facility]  Speech Therapy Progress Note - Neurological Rehabilitation  Date: 11/14/2022     Name: Declan Burleson   MRN: [id number]   Therapy Diagnosis: Aphasia R 47.01 (ICD-10); Cognitive Communication deficit R 41.841 (ICD-10)   Physician: Anca Long PA-C  Physician Orders: [id number] - AMB REFERRAL/CONSULT TO SPEECH THERAPY  Medical Diagnosis: G31.84 (ICD-10-CM) - Mild neurocognitive disorder due to another medical condition    Visit #/ Visits Authorized: 16/ 30  Date of Evaluation:  8/23/2022  Insurance Authorization Period: 12/28/2022  Plan of Care Expiration Date:    12/21/2022  Extended Plan of Care:  N/A   Progress Note: 11/14/2022   Visits Cancelled: 5  Visits No Show: 0    Time In:  1343  Time Out:  1430  Total Billable Time: 47 minutes      Precautions: Standard  Subjective:   Patient reports:  feeling "ok" agreeable to all outlined therapy tasks.   He was compliant to home exercise program.  Response to previous treatment: Good  Objective:   TIMED  Procedure Min.   Cognitive Therapeutic Interventions, first 15 minutes CPT 73335  0   Therapeutic INTVTN, COGN Fucntion, EACH ADDL 15 MIN - ST CPT 29799 0                                                 UNTIMED  Procedure Min.   Speech/Lang Tx/Individual CPT 77278 47   N/A  N/A   Total Timed Units: 0  Total Untimed Units: 1  Charges Billed/Number of units: 1      Short Term Goals: (8 weeks) Current Progress 11/14/2022 Current Progress 11/16/2022   1. The patient will produce a minimum of 4 different features, when presented with a word using semantic feature analysis (SFA), given MIN verbal prompts, with 75% accuracy across 2 consecutive sessions.     Progressing/ Not Met 11/14/2022   86% accuracy given MAX cues      71% accuracy give MIN cues      43% accuracy INDP    3. The patient will complete complex auditory comprehension task (I.e. recall items after 60 second delay, recall key details from a narrative) with 80% accuracy given " min cues across 2 consecutive sessions.         Progressing/ Not Met 11/14/2022     83% accuracy given MAX cues      67% accuracy given MIN cues     33% accuracy INDP     4. The patient will describe visual scenes using 3 or more sentences at 80% accuracy given min verbal and phonemic cues across 2 consecutive sessions.   Progressing/ Not Met 11/14/2022   75% accuracy given MOD cues     5.  The patient will complete ongoing dynamic assessment.          Progressing/ Not Met 11/14/2022   NT    6. Patient will recall key details from a visually or auditorally presented narrative with 80% accuracy given MIN cues across 2 consecutive sessions.                 Progressing/ Not Met 11/14/2022     35% accuracy given MAX cues     7. Patient will complete semantic fluency tasks with 80% accuracy given MIN cues across 2 consecutive sessions.                         Progressing/ Not Met 11/14/2022     88% accuracy give MOD cues     75% accuracy INDP    8. Patient will complete executive function (I.e.read/understand mail, respond appropriately to mail) task with 80% accuracy given MIN cues.             Progressing/ Not Met 11/9/2022 75% accuracy given MIN perez     9. Patient will complete complex problem solving task with 90% accuracy given MIN cues.            Progressing/ Not Met 11/9/2022  75% accuracy given MIN cues     10. Patient will complete complex memory task (I.e. remembering names, dates, events, item locations, details of conversations) with 80% accuracy given MIN cues.        Progressing/ Not Met 11/9/2022 33% accuracy given MAX cues       Patient Education/Response:     Home program established: yes-  patient instructed  to complete semantic fluency task (worksheet provided.)   Exercises were reviewed and Declan demonstrated good  understanding of the education provided.     Assessment:   Declan is progressing well towards his goals. All current goal remain appropriate. Goals to be updated as necessary.        Progress Note: Patient met phonemic fluency goal via HEP this date. Short term goal 7 updated to reflect this achievement. Patient is close to meeting short term goal 1. Patient continues to present with steady progress toward all short term and long term goals.     Patient prognosis is Good. Patient will continue to benefit from skilled outpatient speech and language therapy to address the deficits listed in the problem list on initial evaluation, provide patient/family education, and to maximize patient's level of independence in the home and community environment.     Medical necessity is demonstrated by the following IMPAIRMENTS:  word finding, slow and halting speech, comprehension, executive function, problem solving, memory, difficulty with sentence structure, and slow speech initiation.   Barriers to Therapy: none identified  Patient's spiritual, cultural and educational needs considered and patient agreeable to plan of care and goals.    Plan:   Continue Plan of Care with focus on improvement of expressive language and cognitive communication skills.     Marnie Desai CCC-SLP   11/14/2022

## 2022-11-14 ENCOUNTER — CLINICAL SUPPORT (OUTPATIENT)
Dept: REHABILITATION | Facility: HOSPITAL | Age: 57
End: 2022-11-14
Payer: MEDICARE

## 2022-11-14 DIAGNOSIS — R47.01 APHASIA DETERMINED BY EXAMINATION: ICD-10-CM

## 2022-11-14 DIAGNOSIS — R41.841 COGNITIVE COMMUNICATION DEFICIT: Primary | ICD-10-CM

## 2022-11-14 PROCEDURE — 92507 TX SP LANG VOICE COMM INDIV: CPT

## 2022-11-21 ENCOUNTER — CLINICAL SUPPORT (OUTPATIENT)
Dept: REHABILITATION | Facility: HOSPITAL | Age: 57
End: 2022-11-21
Payer: MEDICARE

## 2022-11-21 DIAGNOSIS — R47.01 APHASIA DETERMINED BY EXAMINATION: Primary | ICD-10-CM

## 2022-11-21 DIAGNOSIS — R41.841 COGNITIVE COMMUNICATION DEFICIT: ICD-10-CM

## 2022-11-21 PROCEDURE — 92507 TX SP LANG VOICE COMM INDIV: CPT

## 2022-11-21 NOTE — PROGRESS NOTES
[facility]  Speech Therapy Treatment Note - Neurological Rehabilitation  Date: 11/21/2022     Name: Declan Burleson   MRN: [id number]   Therapy Diagnosis: Aphasia R 47.01 (ICD-10); Cognitive Communication deficit R 41.841 (ICD-10)   Physician: Anca Long PA-C  Physician Orders: [id number] - AMB REFERRAL/CONSULT TO SPEECH THERAPY  Medical Diagnosis: G31.84 (ICD-10-CM) - Mild neurocognitive disorder due to another medical condition    Visit #/ Visits Authorized: 17/ 30  Date of Evaluation:  8/23/2022  Insurance Authorization Period: 12/28/2022  Plan of Care Expiration Date:    12/21/2022  Extended Plan of Care:  N/A   Progress Note: 12/14/2022   Visits Cancelled: 6  Visits No Show: 0    Time In:  1351  Time Out:  1430  Total Billable Time: 39 minutes      Precautions: Standard  Subjective:   Patient reports:  feeling well and agreeable to all outlined therapy tasks.   He was compliant to home exercise program.  Response to previous treatment: Good  Objective:   TIMED  Procedure Min.   Cognitive Therapeutic Interventions, first 15 minutes CPT 48543  0   Therapeutic INTVTN, COGN Fucntion, EACH ADDL 15 MIN - ST CPT 74939 0                                                 UNTIMED  Procedure Min.   Speech/Lang Tx/Individual CPT 10854 39   N/A  N/A   Total Timed Units: 0  Total Untimed Units: 1  Charges Billed/Number of units: 1      Short Term Goals: (8 weeks) Current Progress 11/14/2022 Current Progress 11/21/2022   1. The patient will produce a minimum of 4 different features, when presented with a word using semantic feature analysis (SFA), given MIN verbal prompts, with 75% accuracy across 2 consecutive sessions.  86% accuracy given MAX cues      71% accuracy give MIN cues      43% accuracy INDP 75% accuracy given MOD cues    3. The patient will complete complex auditory comprehension task (I.e. recall items after 60 second delay, recall key details from a narrative) with 80% accuracy given min  cues across 2 consecutive sessions.    83% accuracy given MAX cues      67% accuracy given MIN cues     33% accuracy INDP    80% accuracy given MIN cues     60% accuracy INDP   4. The patient will describe visual scenes using 3 or more sentences at 80% accuracy given min verbal and phonemic cues across 2 consecutive sessions.  75% accuracy given MOD cues  75% accuracy given MOD cues    5.  The patient will complete ongoing dynamic assessment.   NT NT   6. Patient will recall key details from a visually or auditorally presented narrative with 80% accuracy given MIN cues across 2 consecutive sessions.    35% accuracy given MAX cues  80% accuracy given MIN cues    7. Patient will complete semantic fluency tasks with 80% accuracy given MIN cues across 2 consecutive sessions.  88% accuracy give MOD cues     75% accuracy INDP 89% accuracy given MOD cues     67% accuracy INDP   8. Patient will complete executive function (I.e.read/understand mail, respond appropriately to mail) task with 80% accuracy given MIN cues.   75% accuracy given MIN perez  NT   9. Patient will complete complex problem solving task with 90% accuracy given MIN cues.    75% accuracy given MIN cues  NT   10. Patient will complete complex memory task (I.e. remembering names, dates, events, item locations, details of conversations) with 80% accuracy given MIN cues 33% accuracy given MAX cues  NT     Patient Education/Response:     Home program established: yes-  patient instructed   to complete semantic fluency task (worksheet provided.)   Exercises were reviewed and Declan demonstrated good  understanding of the education provided.     Assessment:   Dcelan is progressing well towards his goals. All current goal remain appropriate. Goals to be updated as necessary.       Patient prognosis is Good. Patient will continue to benefit from skilled outpatient speech and language therapy to address the deficits listed in the problem list on initial  evaluation, provide patient/family education, and to maximize patient's level of independence in the home and community environment.     Medical necessity is demonstrated by the following IMPAIRMENTS:  word finding, slow and halting speech, comprehension, executive function, problem solving, memory, difficulty with sentence structure, and slow speech initiation.   Barriers to Therapy: none identified  Patient's spiritual, cultural and educational needs considered and patient agreeable to plan of care and goals.    Plan:   Continue Plan of Care with focus on improvement of expressive language and cognitive communication skills.     Marnie Desai CCC-SLP   11/21/2022

## 2022-11-23 ENCOUNTER — CLINICAL SUPPORT (OUTPATIENT)
Dept: REHABILITATION | Facility: HOSPITAL | Age: 57
End: 2022-11-23
Payer: MEDICARE

## 2022-11-23 DIAGNOSIS — R47.01 APHASIA DETERMINED BY EXAMINATION: Primary | ICD-10-CM

## 2022-11-23 DIAGNOSIS — R41.841 COGNITIVE COMMUNICATION DEFICIT: ICD-10-CM

## 2022-11-23 PROCEDURE — 92507 TX SP LANG VOICE COMM INDIV: CPT

## 2022-11-23 NOTE — PROGRESS NOTES
"[facility]  Speech Therapy Treatment Note - Neurological Rehabilitation  Date: 11/23/2022     Name: Declan Burleson   MRN: [id number]   Therapy Diagnosis: Aphasia R 47.01 (ICD-10); Cognitive Communication deficit R 41.841 (ICD-10)   Physician: Anca Gsaton PA-C  Physician Orders: [id number] - AMB REFERRAL/CONSULT TO SPEECH THERAPY  Medical Diagnosis: G31.84 (ICD-10-CM) - Mild neurocognitive disorder due to another medical condition    Visit #/ Visits Authorized: 18/ 30  Date of Evaluation:  8/23/2022  Insurance Authorization Period: 12/28/2022  Plan of Care Expiration Date:    12/21/2022  Extended Plan of Care:  N/A   Progress Note: 12/14/2022   Visits Cancelled: 6  Visits No Show: 0    Time In:  1350  Time Out:  1435  Total Billable Time: 45 minutes      Precautions: Standard  Subjective:   Patient reports:  feeling "sleepy" but agreeable to all outlined therapy tasks.   He was compliant to home exercise program.  Response to previous treatment: Good  Objective:   TIMED  Procedure Min.   Cognitive Therapeutic Interventions, first 15 minutes CPT 18963  0   Therapeutic INTVTN, COGN Fucntion, EACH ADDL 15 MIN - ST CPT 49042 0                                                 UNTIMED  Procedure Min.   Speech/Lang Tx/Individual CPT 59134 45   N/A  N/A   Total Timed Units: 0  Total Untimed Units: 1  Charges Billed/Number of units: 1      Short Term Goals: (8 weeks) Current Progress 11/14/2022 Current Progress 11/21/2022 Current Progress  11/23/2022   1. The patient will produce a minimum of 4 different features, when presented with a word using semantic feature analysis (SFA), given MIN verbal prompts, with 75% accuracy across 2 consecutive sessions.  86% accuracy given MAX cues      71% accuracy give MIN cues      43% accuracy INDP 75% accuracy given MOD cues  70% accuracy given MAX cues     58% accuracy given MIN cues     42% accuracy INDP   3. The patient will complete complex auditory comprehension " task (I.e. recall items after 60 second delay, recall key details from a narrative) with 80% accuracy given min cues across 2 consecutive sessions.    83% accuracy given MAX cues      67% accuracy given MIN cues     33% accuracy INDP    80% accuracy given MIN cues     60% accuracy INDP NT   4. The patient will describe visual scenes using 3 or more sentences at 80% accuracy given min verbal and phonemic cues across 2 consecutive sessions.  75% accuracy given MOD cues  75% accuracy given MOD cues  NT   5.  The patient will complete ongoing dynamic assessment.   NT NT NT   6. Patient will recall key details from a visually or auditorally presented narrative with 80% accuracy given MIN cues across 2 consecutive sessions.    35% accuracy given MAX cues  80% accuracy given MIN cues  NT   7. Patient will complete semantic fluency tasks with 80% accuracy given MIN cues across 2 consecutive sessions.  88% accuracy give MOD cues     75% accuracy INDP 89% accuracy given MOD cues     67% accuracy INDP 76% accuracy given MIN cues     71% accuracy INDP    8. Patient will complete executive function (I.e.read/understand mail, respond appropriately to mail) task with 80% accuracy given MIN cues.   75% accuracy given MIN perez  NT NT    9. Patient will complete complex problem solving task with 90% accuracy given MIN cues.    75% accuracy given MIN cues  NT NT    10. Patient will complete complex memory task (I.e. remembering names, dates, events, item locations, details of conversations) with 80% accuracy given MIN cues 33% accuracy given MAX cues  NT NT      Patient Education/Response:     Home program established: yes-  patient instructed   to complete semantic fluency task (worksheet provided).   Exercises were reviewed and Declan demonstrated good  understanding of the education provided.     Assessment:   Declan is progressing well towards his goals. All current goal remain appropriate. Goals to be updated as necessary.        Patient requiring much more high level cueing this date and worked at a significantly slower pace, likely 2/2 reported fatigue.     Patient prognosis is Good. Patient will continue to benefit from skilled outpatient speech and language therapy to address the deficits listed in the problem list on initial evaluation, provide patient/family education, and to maximize patient's level of independence in the home and community environment.     Medical necessity is demonstrated by the following IMPAIRMENTS:  word finding, slow and halting speech, comprehension, executive function, problem solving, memory, difficulty with sentence structure, and slow speech initiation.   Barriers to Therapy: none identified  Patient's spiritual, cultural and educational needs considered and patient agreeable to plan of care and goals.    Plan:   Continue Plan of Care with focus on improvement of expressive language and cognitive communication skills.     Marnie Desai CCC-SLP   11/23/2022

## 2022-11-29 ENCOUNTER — OFFICE VISIT (OUTPATIENT)
Dept: NEUROLOGY | Facility: CLINIC | Age: 57
End: 2022-11-29
Payer: MEDICARE

## 2022-11-29 ENCOUNTER — TELEPHONE (OUTPATIENT)
Dept: NEUROSURGERY | Facility: CLINIC | Age: 57
End: 2022-11-29
Payer: MEDICARE

## 2022-11-29 VITALS
HEIGHT: 71 IN | BODY MASS INDEX: 27.46 KG/M2 | WEIGHT: 196.13 LBS | DIASTOLIC BLOOD PRESSURE: 78 MMHG | HEART RATE: 58 BPM | SYSTOLIC BLOOD PRESSURE: 122 MMHG

## 2022-11-29 DIAGNOSIS — G40.109 TEMPORAL LOBE EPILEPSY: ICD-10-CM

## 2022-11-29 DIAGNOSIS — G40.209 COMPLEX PARTIAL SEIZURES EVOLVING TO GENERALIZED TONIC-CLONIC SEIZURES: Primary | ICD-10-CM

## 2022-11-29 PROCEDURE — 99999 PR PBB SHADOW E&M-EST. PATIENT-LVL IV: ICD-10-PCS | Mod: PBBFAC,,,

## 2022-11-29 PROCEDURE — 3008F BODY MASS INDEX DOCD: CPT | Mod: CPTII,S$GLB,,

## 2022-11-29 PROCEDURE — 3074F PR MOST RECENT SYSTOLIC BLOOD PRESSURE < 130 MM HG: ICD-10-PCS | Mod: CPTII,S$GLB,,

## 2022-11-29 PROCEDURE — 99417 PROLNG OP E/M EACH 15 MIN: CPT | Mod: S$GLB,,,

## 2022-11-29 PROCEDURE — 99417 PR PROLONGED SVC, OUTPT, W/WO DIRECT PT CONTACT,  EA ADDTL 15 MIN: ICD-10-PCS | Mod: S$GLB,,,

## 2022-11-29 PROCEDURE — 99215 OFFICE O/P EST HI 40 MIN: CPT | Mod: S$GLB,,,

## 2022-11-29 PROCEDURE — 99999 PR PBB SHADOW E&M-EST. PATIENT-LVL IV: CPT | Mod: PBBFAC,,,

## 2022-11-29 PROCEDURE — 3078F PR MOST RECENT DIASTOLIC BLOOD PRESSURE < 80 MM HG: ICD-10-PCS | Mod: CPTII,S$GLB,,

## 2022-11-29 PROCEDURE — 3008F PR BODY MASS INDEX (BMI) DOCUMENTED: ICD-10-PCS | Mod: CPTII,S$GLB,,

## 2022-11-29 PROCEDURE — 1159F PR MEDICATION LIST DOCUMENTED IN MEDICAL RECORD: ICD-10-PCS | Mod: CPTII,S$GLB,,

## 2022-11-29 PROCEDURE — 3074F SYST BP LT 130 MM HG: CPT | Mod: CPTII,S$GLB,,

## 2022-11-29 PROCEDURE — 99215 PR OFFICE/OUTPT VISIT, EST, LEVL V, 40-54 MIN: ICD-10-PCS | Mod: S$GLB,,,

## 2022-11-29 PROCEDURE — 1159F MED LIST DOCD IN RCRD: CPT | Mod: CPTII,S$GLB,,

## 2022-11-29 PROCEDURE — 3078F DIAST BP <80 MM HG: CPT | Mod: CPTII,S$GLB,,

## 2022-11-29 NOTE — PATIENT INSTRUCTIONS
Here is the plan we discussed today:  - we answered your RNS questions  - you should meet with neurosurgeon Dr. Chen to further discuss risks and benefits, they will call you to schedule this appointment  - we will also need to repeat your neuropsychology testing, I have placed a referral for an evaluation by our neurocognitive team. Please call [phone] during normal working hours and ask for Marie Fields in order to schedule this.   - please continue the same medications at the same dose   - reach out over the portal with any questions or concerns

## 2022-11-29 NOTE — PROGRESS NOTES
CC: Epilepsy    Interval Events/ROS 11/29/2022:    Accompanied by wife who also contributes to the history.     Current ASM/SEs: eslicarbazepine 1200mg qhs, zonisamide to 500mg qhs, clobazam to 40mg qhs; SE lethargy  Breakthrough seizures/events: last seizure was 4/2022  Driving: no  Sleep: feels he sleeps too much  Mood: good; denies depression    Interested in surgery, particularly RNS in hopes of potentially decreasing the amount of medication he is taking. Otherwise, no fever, no cold symptoms, no headache, no changes in vision, no new weakness, no chest pain, no shortness of breath, no nausea, no vomiting, no diarrhea, no constipation, no tingling/numbness, no problems walking.    Recent Labs   Lab 05/26/20  1733 05/26/20  1734 07/22/21  1132 05/23/22  1508 08/09/22  1640   Lacosamide 4.0  --   --   --   --    Clobazam  --   --  338.0 H 261.0 400.0 H   Desmethylclobazam  --   --  2670.0 2550.0 3620.0 H   Zonisamide  --  6.7 L 12 11 19   Eslicarbazepine  --  12.0 19.3 16.8 17.0        No further sz since discharge     Admission Date: 8/9/2022  Hospital Length of Stay: 7 days  Discharge Date and Time: 8/16/2022  HPI:   Declan Burleson is a 57 year old man with history of seizures s/p left mesial temporal laser ablation 3/2021 admitted to EMU for evaluation of continued staring/unresponsive episodes postop while on medication. Patient is interested in pursuing surgical pathway again. Began having seizures at age 48.     Event type 1  No preceding aura/prodrome. LOC and convulsions with rhythmic shaking of all extremities. Confused afterward. Last occurred 4/2022. Have become less intense since surgery, has not had seizures from sleep since.     Event type 2  Staring and unresponsive. [place]. Frequency initially improved after surgery, since then has been increasing. Occurs twice per week, last on 8/7. Had similar episodes prior to surgery.     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 Occasionally has difficulty recalling names, including his children's names. Also has noticed less motivation to do things on some days.     Current AEDs:  - eslicarbazepine 1200 mg QHS  - zonisamide 500 mg QHS - has been gradually increasing from 300 to 600 mg since last clinic visit   - clobazam 30 mg QHS   Has noticed significant daytime sleepiness on increased zonisamide dose. Reports adherence, last dose 8/8 evening.     Prior medications and SE/reason for stopping:  - topiramate - continued seizures  - lamotrigine - continued seizures  - Keppra - anger, mood disturbance  - oxcarbazepine - continued seizures     Prior seizure evaluation:  - ambulatory EEG 6/1/22 - No epileptiform discharges, periodic discharges, lateralized rhythmic delta activity or electrographic seizures were seen. There are 18 event button marks with no associated EEG change.   - sEEG 1/21/21 - 2 seizures (subclinical) were captured. Onset of both appear to be from left amygdala and hippocampus without generalization. Abundant epileptiform activity is also seen in the left amygdala and hippocampus, frequent to abundant epileptiform discharges in the right pre-motor anterior cingulate region, and frequent epileptiform activity in the left superior frontal SMA region.  - MRI Brain w/wo contrast 5/23/22 - Focal postablation encephalomalacia of the medial left temporal lobe  - MRI Brain Stealth 2/11/21 - no definite intracranial enhancing mass lesion  - fMRI 10/26/20 - No epileptogenic lesions identified. 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        Hospital Course:   8/9>8/10: Admit to EMU. Eslicarbazepine and Zonisamide held on admit, Clobazam decreased to 10 mg qhs. EEG with focal intermittent slowing in left temporal region, no epileptiform discharges, no electrographic seizures. PB @6135 for  episode of word finding difficulty with maintained awareness and @1221 for headache and staring- no EEG correlate with events; see EEG report for full details.  8/10>8/11: No typical events. EEG unchanged, focal intermittent slowing in left temporal region, no epileptiform discharges, no electrographic seizures. Continued medication adjustments for event capture- decreased Clobazam to 5 mg qhs.  8/11>8/12: No typical events. EEG with continued left focal slowing and rare left sharps, no electrographic seizures. Clobazam held, last dose 8/11. Lexapro dose increased from 10 to 15 mg QD due to reported low energy.  8/12>8/13: PB @2144 for episode of eyelid fluttering, seeing flashing lights, numbness/tingling with no EEG correlate. EEG with left temporal interictals, no seizures.  8/13>8/14: 1 sz w/ L hemispheric onset.  8/14>8/15: EEG with left temporal focal slowing, left temporal interictals, and electroclinical seizure @0435 with left temporal onset and secondary generalization. Resume home AEDs now: Eslicarbazepine 1200 mg x1, Clobazam 30 mg x1, and Zonisamide 500 mg x1.  8/15>8/16: No further seizures after resuming home AED regimen. Discharged home in stable condition on resumed AED regimen: Eslicarbazepine 1200 mg qhs, Clobazam 30 mg qhs, and Zonisamide 500 mg qhs. Plan to increase Clobazam to 40 mg qhs in 1 week (Rx sent to outpatient pharmacy). Outpatient follow up in Epilepsy clinic with Dr. Edgar. Seizure precautions.       Interval Events/ROS 7/25/2022:  Recent Labs   Lab 05/26/20  1733 05/26/20  1734 07/22/21  1132 05/23/22  1508 08/09/22  1640   Lacosamide 4.0  --   --   --   --    Clobazam  --   --  338.0 H 261.0 400.0 H   Desmethylclobazam  --   --  2670.0 2550.0 3620.0 H   Zonisamide  --  6.7 L 12 11 19   Eslicarbazepine  --  12.0 19.3 16.8 17.0      - accompanied by wife who also contributes to the history   - current ASM regimen: eslicarbazepine 1200mg nightly, zonisamide 300mg nightly, clobazam 30mg  "nightly   - current sz frequency is around 1-2 focal events per week, last event was yesterday 7/24/2022 (described as staring, decreased responsiveness)  - last GTC event was 04/2022  - feels seizure frequency improved for a while s/p mesial temporal ablation 03/2021 but has gradually been worsening again  - memory complaints  - interested in what surgical options are still available to him for his epilepsy, expresses disappointment that he is still having frequent seizures and he thought he would be able to decrease his medications but instead we are increasing medication   - migraines around 3 days per month, sometimes last 3 days at a time, not relieved w/ OTC medications   - Otherwise, no fever, no cold symptoms, no changes in vision, no new weakness, no chest pain, no shortness of breath, no nausea, no vomiting, no diarrhea, no constipation, no problems walking    Interval Events/ROS 5/23/2022:  - L TLE s/p ablation on 3/1/21  - current ASM regimen: eslicarbazepine 1200mg nightly, zonisamide 300mg nightly, clobazam 20mg nightly, and nayzilam PRN  - been following w/ Dr. Saldana (neuro in [place]), who recently referred patient back to [facility] since patient is still having seizures after surgery     - last seizure was 4/22/2022, also had 2 seizures 03/2022 (makes grunting/growling noise, shaking 30-60 seconds, postictal unable to respond, confused, returns to baseline in around 5-10 minutes)  - feels he is cognitively declining, word finding issues  - sleeps a lot, reports some days he "feels completely lazy", denies low mood/feeling depressed   - missed neuropsych appointment, needs to reschedule   - still having frequent migraines, doesn't feel like nurtec is helping   - Otherwise, no fever, no cold symptoms, no changes in vision, no new weakness, no chest pain, no shortness of breath, no nausea, no vomiting, no diarrhea, no constipation, no tingling/numbness, no problems walking, no reported " "mood issues      Interval events 9/30/2021:   Pt of Dr. Benoit   He presents with wife   Not driving   Not working   No sz   Trouble word finding (this is pronounced in English)   No falls   3 nights/week - per wife has growling sound     Severe L occipital HA with intoleranace to mvmnt   1/month     Also has mild HA 3/week      Current AEDs: compliant  1) Eslicarbazepine 1200 mg q day  2) Zonisamide 300 mg q day   3) Clobazam 20 mg qHS  4) Nayzilam prn   Also takes Lexapro 10 mg QHS      Admission Date: 3/1/2021  Hospital Length of Stay: 0 days  Discharge Date and Time: 3/4/2021  2:57 PM     HPI:   Patient is a 57 y/o male with intractable epilepsy who is s/p bilateral sEEG presents today for elective left laser ablation amygdalohippocampectomy. All questions answered and patient wishes to proceed with surgery.    Patient originally had presented to Dr. Chen to discuss elective options of his intractable epilepsy. His seizures began when he was 48 years old. He can recall no inciting event. He has 3 semiologies: "full blown" generalized events, staring episodes, and "mild" seizures, which are characterized as generalized events of shorter duration without incontinence and a shorter post-ictal period. He and his wife estimate that he persists in having about 2 events/month.    He has failed multiple AEDs, including topiramate, lamotrigine, levetiracetam, and oxcarbazepine. He is currently taking eslicarbazepine, zonisamide, and clobazam. He had EMU monitoring in May-June of this year, which suggests left-sided activity. He had 3T MRI in June (personally reviewed) that was non-lesional; he also had PET at that time suggesting possible left-sided activity. He had neuropsychological testing on 8/10/20  Hospital Course: 3/1: POD 0. NCC overnight  3/2: POD1 from L laser amygdalophippocampectomy. Recommend wife at bedside as much as nursing policies will allow. OK for TTF (9th floor only) 24 hours postop if otherwise " medically stable/appropriate. Wife reports they have home Aptiom.  3/3: POD 2. Patient with headache behind bilateral eyes. Patient also with episode of emesis today when sitting up. He is otherwise neurologically intact. CTH ordered to evaluate and was stable. Patient has not gotten up with PT/OT yet, it is ordered for tomorrow. Will keep patient overnight and reevaluate with PT/OT tomorrow regarding dispo recommendations.   3/4: Patient much improved from yesterday. Headache and N/V resolved. Will continue decadron 4 mg q6h x 48 hours. Continue 1L fluid restriction for SIADH, will obtain BMP on 3/8 outpatient to reevaluate. Therapy recommending home with outpatient PT. Patient and his wife are requesting Home Health. Medically stable to discharge home.      Pt reports no auras or seizures since ablation   Sleep - ok   Diet - ok      Migraine Headache   Onset - 12 yrs   F/h migraine   Hz - rare   Tylenol 500 mg effective      HA Hz worse in last 10 yrs   Hz 2-4/week   No change in HA after SEEG   Meds tried - tylenol 1000 mg - mild relief   Since April Worsening:    Word finding difficulty   attention span shorter      Exam:  Physical Exam  Constitutional:       Appearance: Normal appearance.   HENT:      Head: Normocephalic and atraumatic.      Right Ear: External ear normal.      Left Ear: External ear normal.      Nose: Nose normal.      Mouth/Throat:      Mouth: Mucous membranes are moist.   Eyes:      Extraocular Movements: Extraocular movements intact.      Conjunctiva/sclera: Conjunctivae normal.      Pupils: Pupils are equal, round, and reactive to light.   Cardiovascular:      Rate and Rhythm: Normal rate.      Pulses: Normal pulses.   Pulmonary:      Effort: Pulmonary effort is normal.   Musculoskeletal:         General: Normal range of motion.      Cervical back: Normal range of motion.   Skin:     General: Skin is warm and dry.   Neurological:      General: No focal deficit present.      Mental Status:  He is alert.   Psychiatric:         Mood and Affect: Mood normal.         Behavior: Behavior normal.         Thought Content: Thought content normal.         Judgment: Judgment normal.     Flat affect   Masked facies   Slow gait   Slow turns   No retropulsion   Decreased arm swing   No tremor      Impression:   L TLE s/p ablation on 3/1/21   Neuropsych effects of ablation? - Word finding difficulty; attention span shorter; irritability    Migraine   Brain atrophy     Plan:   - continue eslicarbazepine 1200mg qhs  - continue zonisamide to 500mg qhs  - continue clobazam to 40mg qhs   - nayzilam PRN for seizure cluster   - Interested in another surgery, options - laser ablation extention, CM RNS, increase meds; next steps: repeat neuropsych eval, meet w/ neurosurgery    - continues to follow with neurologist Dr. Saldana for management of migraines  - follow up after neuropsych and neurosurgery   - advised to reach out over the portal with any questions or concerns    Patient and wife are comfortable with plan and verbalize agreement. All questions and concerns are addressed at this time.     Wilma Mckinney PA-C   Neurology-Epilepsy  [facility]    Collaborating physician, Dr. Kaleb Edgar, was available during today's encounter.     I spent approximately 75 minutes on the day of this encounter preparing to see the patient, obtaining and reviewing history and results, performing a medically appropriate exam, counseling and educating the patient/family/caregiver, documenting clinical information, coordinating care, and ordering medications, tests, procedures, and referrals.

## 2022-12-02 PROCEDURE — 99358 PR PROLONGED SERV,NO CONTACT,1ST HR: ICD-10-PCS | Mod: S$GLB,,, | Performed by: PSYCHIATRY & NEUROLOGY

## 2022-12-02 PROCEDURE — 99358 PROLONG SERVICE W/O CONTACT: CPT | Mod: S$GLB,,, | Performed by: PSYCHIATRY & NEUROLOGY

## 2022-12-05 ENCOUNTER — PATIENT MESSAGE (OUTPATIENT)
Dept: NEUROLOGY | Facility: CLINIC | Age: 57
End: 2022-12-05
Payer: MEDICARE

## 2022-12-05 ENCOUNTER — CLINICAL SUPPORT (OUTPATIENT)
Dept: REHABILITATION | Facility: HOSPITAL | Age: 57
End: 2022-12-05
Payer: MEDICARE

## 2022-12-05 DIAGNOSIS — R41.841 COGNITIVE COMMUNICATION DEFICIT: Primary | ICD-10-CM

## 2022-12-05 DIAGNOSIS — R47.01 APHASIA DETERMINED BY EXAMINATION: ICD-10-CM

## 2022-12-05 PROCEDURE — 92507 TX SP LANG VOICE COMM INDIV: CPT

## 2022-12-05 NOTE — TELEPHONE ENCOUNTER
Will this work insurance wise? It looks like Nadia is available on Thursday, 12/15. His appt with Ruchi Gustavo is at 8 AM that morning and he could just come straight to us from there, if it works. AB

## 2022-12-06 ENCOUNTER — PATIENT MESSAGE (OUTPATIENT)
Dept: NEUROLOGY | Facility: CLINIC | Age: 57
End: 2022-12-06
Payer: MEDICARE

## 2022-12-07 ENCOUNTER — CLINICAL SUPPORT (OUTPATIENT)
Dept: REHABILITATION | Facility: HOSPITAL | Age: 57
End: 2022-12-07
Payer: MEDICARE

## 2022-12-07 ENCOUNTER — PATIENT MESSAGE (OUTPATIENT)
Dept: NEUROSURGERY | Facility: CLINIC | Age: 57
End: 2022-12-07
Payer: MEDICARE

## 2022-12-07 ENCOUNTER — PATIENT MESSAGE (OUTPATIENT)
Dept: NEUROLOGY | Facility: CLINIC | Age: 57
End: 2022-12-07
Payer: MEDICARE

## 2022-12-07 DIAGNOSIS — R47.01 APHASIA DETERMINED BY EXAMINATION: Primary | ICD-10-CM

## 2022-12-07 DIAGNOSIS — R41.841 COGNITIVE COMMUNICATION DEFICIT: ICD-10-CM

## 2022-12-07 PROCEDURE — 92507 TX SP LANG VOICE COMM INDIV: CPT

## 2022-12-07 NOTE — PROGRESS NOTES
[facility]  Speech Therapy Treatment Note - Neurological Rehabilitation  Date: 12/7/2022     Name: Declan Burleson   MRN: [id number]   Therapy Diagnosis: Aphasia R 47.01 (ICD-10); Cognitive Communication deficit R 41.841 (ICD-10)   Physician: Anca Gaston PA-C  Physician Orders: [id number] - AMB REFERRAL/CONSULT TO SPEECH THERAPY  Medical Diagnosis: G31.84 (ICD-10-CM) - Mild neurocognitive disorder due to another medical condition    Visit #/ Visits Authorized: 20/ 30  Date of Evaluation:  8/23/2022  Insurance Authorization Period: 12/28/2022  Plan of Care Expiration Date:    12/21/2022  Extended Plan of Care:  N/A   Progress Note: 12/14/2022   Visits Cancelled: 7  Visits No Show: 0    Time In:  1330  Time Out:  1415  Total Billable Time: 45 minutes      Precautions: Standard  Subjective:   Patient reports:  feeling well and agreeable to all outlined therapy tasks.   He was not compliant to home exercise program.  Response to previous treatment: Good  Objective:   TIMED  Procedure Min.   Cognitive Therapeutic Interventions, first 15 minutes CPT 90466  0   Therapeutic INTVTN, COGN Fucntion, EACH ADDL 15 MIN - ST CPT 35300 0                                                 UNTIMED  Procedure Min.   Speech/Lang Tx/Individual CPT 49874 45   N/A  N/A   Total Timed Units: 0  Total Untimed Units: 1  Charges Billed/Number of units: 1      Short Term Goals: (8 weeks) Current Progress   12/5/2022 Current Progress 12/7/2022   1. The patient will produce a minimum of 4 different features, when presented with a word using semantic feature analysis (SFA), given MIN verbal prompts, with 75% accuracy across 2 consecutive sessions.  71% accuracy given MAX cues      30% accuracy given MIN cues      15% accuracy INDP  70% accuracy given MAX cues      63% accuracy given MIN cues      52% accuracy INDP   3. The patient will complete complex auditory comprehension task (I.e. recall items after 60 second delay, recall  key details from a narrative) with 80% accuracy given min cues across 2 consecutive sessions.   NT  83% accuracy given MAX cues      75% accuracy given MIN cues      58% accuracy INDP   4. The patient will describe visual scenes using 3 or more sentences at 80% accuracy given min verbal and phonemic cues across 2 consecutive sessions.  NT 50% accuracy given MIN cues      33% accuracy INDP    5.  The patient will complete ongoing dynamic assessment.   NT  NT   6. Patient will recall key details from a visually or auditorally presented narrative with 80% accuracy given MIN cues across 2 consecutive sessions.  NT   60% accuracy given MAX cues     40% accuracy given MIN cues     27% accuracy INDP   7. Patient will complete semantic fluency tasks with 80% accuracy given MIN cues across 2 consecutive sessions.  81% accuracy given MAX cues     75% accuracy given MIN cues      70% accuracy INDP  NT    8. Patient will complete executive function (I.e.read/understand mail, respond appropriately to mail) task with 80% accuracy given MIN cues.   80% accuracy given MIN cues  83% accuracy given MAX cues     50% accuracy INDP    9. Patient will complete complex problem solving task with 90% accuracy given MIN cues.   80% accuracy given MIN cues  83% accuracy given MAX cues     50% accuracy INDP    10. Patient will complete complex memory task (I.e. remembering names, dates, events, item locations, details of conversations) with 80% accuracy given MIN cues 80% accuracy given MIN cues      75% accuracy INDP  83% accuracy given MAX cues     75% accuracy given MIN cues      58% accuracy INDP       Patient Education/Response:     Home program established: yes-  patient instructed    to complete semantic fluency task (worksheet provided).   Exercises were reviewed and Declan demonstrated good  understanding of the education provided.     Assessment:   Declan is progressing well towards his goals. All current goal remain appropriate.  Goals to be updated as necessary.       Patient prognosis is Good. Patient will continue to benefit from skilled outpatient speech and language therapy to address the deficits listed in the problem list on initial evaluation, provide patient/family education, and to maximize patient's level of independence in the home and community environment.     Medical necessity is demonstrated by the following IMPAIRMENTS:  word finding, slow and halting speech, comprehension, executive function, problem solving, memory, difficulty with sentence structure, and slow speech initiation.   Barriers to Therapy: none identified  Patient's spiritual, cultural and educational needs considered and patient agreeable to plan of care and goals.    Plan:   Continue Plan of Care with focus on improvement of expressive language and cognitive communication skills.     Marnie Desai CCC-SLP   12/7/2022

## 2022-12-11 ENCOUNTER — PATIENT OUTREACH (OUTPATIENT)
Dept: NEUROLOGY | Facility: CLINIC | Age: 57
End: 2022-12-11
Payer: MEDICARE

## 2022-12-11 DIAGNOSIS — G40.209 COMPLEX PARTIAL SEIZURES EVOLVING TO GENERALIZED TONIC-CLONIC SEIZURES: Primary | Chronic | ICD-10-CM

## 2022-12-11 NOTE — PROGRESS NOTES
Epilepsy Surgery Conference -  Date  12/2/22   MRN [id number]   Name  Declan Burleson   Gender  M   Age  56 yo   Allergies Losartan   Physical Exam RH   BMI  27.21   PMH  Migraine x childhood   L TLE s/p ablation on 3/1/21   AEDs EsliCBZ 1200mg q day; Zonisamide 500mg q day; Clobazam 40mg q day   Failed AEDs Topiramate, Lamotrigine, Levetiracetam, Oxcarbazepine   Compliance  Good    Age of onset  48 yrs (2013)      Semiology #1 'full blown' GTC (1st episode)   Semiology #2 Staring episodes (onset few weeks after 1st sz)   Semiology #3 'mild' sz - GTC sz of shorter duration and w/o bladder incontinence and shorter post-ictal period   Sz Hz  Last sz  April 2022   Work up   Paraneoplastic panel   n/a   EMU  5/ 26 - 6/6/2020 (12 days)   # seizures recorded  3   Interictal   Left fronto-temporal    Ictal onset  Poor localization; lateralized to Left   Semiology  See video   EMU  8/9 - 8/16 (7 days)   # seizures recorded  3   Interictal   left focal slowing and rare left sharps   Ictal onset  Localization L temp / par   Semiology  nonsensical speech  -> oral automatisms -> holds his right hand in a tonic fashion ->  ictal cry -> generalized tonic-clonic activity.  After the end of the seizure, the patient is seen lying unresponsive.  orofacial automatisms followed by grimacing and an ictal cry with tonic posturing and slight rightward head turn before tonic posturing and then clonic jerking with postictal somnolence   MRI  10/7/22 - Stable appearance of the brain status post left medial temporal lobe ablation.     Family support    > 10 years; lives with wife and 17yo daughter and 2 dogs   How would a successful surgery change your life?  Decrease AEDs       Summary/Impression:   56yo RH male, college graduate, prior  with good social support  -  onset of seizures (with GTC sz) at age 48 yrs; also has staring episodes  Medically refractory - 'failed' 4-5 AEDs; however, sz-free on current AED regimen  since discharged from EMU  MRI Brain: non-lesional; PET: possibly lateralizes to left  EMU x2 : lateralizes to left; not localized  SEEG:   SUMMARY OF FINDINGS: INTERICTAL  Abundant spikes-polyspikes and waves were seen in L hippo 1-5 and L amygd 1-5, at times in short runs  Frequent spikes were also noted in L Gar Fr Sma 7-10  Occasional to frequent sharp waves are also noted on the right: R P An Ci 18-21  Occasional spikes were seen in L Orbi Fr 3-7  SUMMARY OF FINDINGS: ICTAL  Seizures # 1-4, 7-8: from left amygdala and hippocampus  Seizure # 5: from right pre-motor ant cing   Seizure # 6: from right pre-motor ant cing and left sup frontal SMA simultaneously    L TLE s/p ablation on 3/1/21    More tests:   NP    Plan:    Would avoid further resection or ablation given current neuropsych deficits following prior ablation.   Based on Neuropsych testing results (need clarity if he has post surgery complications vs profressive neurological condition) will consider:   1, repeat SEEG - (h/o prematurely removed electrodes), then plan RNS locations in L, R cortical, CM or ATN  2, DBS

## 2022-12-12 ENCOUNTER — CLINICAL SUPPORT (OUTPATIENT)
Dept: REHABILITATION | Facility: HOSPITAL | Age: 57
End: 2022-12-12
Payer: MEDICARE

## 2022-12-12 DIAGNOSIS — R41.841 COGNITIVE COMMUNICATION DEFICIT: ICD-10-CM

## 2022-12-12 DIAGNOSIS — R47.01 APHASIA DETERMINED BY EXAMINATION: Primary | ICD-10-CM

## 2022-12-12 PROCEDURE — 92507 TX SP LANG VOICE COMM INDIV: CPT

## 2022-12-12 NOTE — PROGRESS NOTES
[facility]  Speech Therapy Treatment Note - Neurological Rehabilitation  Date: 12/12/2022     Name: Declan Burleson   MRN: [id number]   Therapy Diagnosis: Aphasia R 47.01 (ICD-10); Cognitive Communication deficit R 41.841 (ICD-10)   Physician: Anca Gaston PA-C  Physician Orders: [id number] - AMB REFERRAL/CONSULT TO SPEECH THERAPY  Medical Diagnosis: G31.84 (ICD-10-CM) - Mild neurocognitive disorder due to another medical condition    Visit #/ Visits Authorized: 21/ 30  Date of Evaluation:  8/23/2022  Insurance Authorization Period: 12/28/2022  Plan of Care Expiration Date:    12/21/2022  Extended Plan of Care:  N/A   Progress Note: 12/14/2022   Visits Cancelled: 7  Visits No Show: 0    Time In:  1345  Time Out:  1430  Total Billable Time: 45 minutes      Precautions: Standard  Subjective:   Patient reports:  feeling well and agreeable to all outlined therapy tasks. Patient reported illness last week and this weekend. Patient reports no fever and no symptoms for over 24 hours.   He was not compliant to home exercise program.  Response to previous treatment: Good  Objective:   TIMED  Procedure Min.   Cognitive Therapeutic Interventions, first 15 minutes CPT 16304  0   Therapeutic INTVTN, COGN Fucntion, EACH ADDL 15 MIN - ST CPT 62042 0                                                 UNTIMED  Procedure Min.   Speech/Lang Tx/Individual CPT 26932 45   N/A  N/A   Total Timed Units: 0  Total Untimed Units: 1  Charges Billed/Number of units: 1      Short Term Goals: (8 weeks) Current Progress   12/5/2022 Current Progress 12/7/2022 Current Progress 12/12/2022    1. The patient will produce a minimum of 4 different features, when presented with a word using semantic feature analysis (SFA), given MIN verbal prompts, with 75% accuracy across 2 consecutive sessions.  71% accuracy given MAX cues      30% accuracy given MIN cues      15% accuracy INDP  70% accuracy given MAX cues      63% accuracy given MIN  cues      52% accuracy INDP 66% accuracy given MAX cues      58% accuracy given MOD cues      40% accuracy INDP    3. The patient will complete complex auditory comprehension task (I.e. recall items after 60 second delay, recall key details from a narrative) with 80% accuracy given min cues across 2 consecutive sessions.   NT  83% accuracy given MAX cues      75% accuracy given MIN cues      58% accuracy INDP 82% accuracy given MOD-MAX cues     68% accuracy given MIN cues      59% accuracy INDP    4. The patient will describe visual scenes using 3 or more sentences at 80% accuracy given min verbal and phonemic cues across 2 consecutive sessions.  NT 50% accuracy given MIN cues      33% accuracy INDP  NT        5.  The patient will complete ongoing dynamic assessment.   NT  NT NT    6. Patient will recall key details from a visually or auditorally presented narrative with 80% accuracy given MIN cues across 2 consecutive sessions.  NT   60% accuracy given MAX cues     40% accuracy given MIN cues     27% accuracy INDP   90% accuracy given MAX cues     82% accuracy INDP      7. Patient will complete semantic fluency tasks with 80% accuracy given MIN cues across 2 consecutive sessions.  81% accuracy given MAX cues     75% accuracy given MIN cues      70% accuracy INDP  NT  NT    8. Patient will complete executive function (I.e.read/understand mail, respond appropriately to mail) task with 80% accuracy given MIN cues.   80% accuracy given MIN cues  83% accuracy given MAX cues     50% accuracy INDP  NT    9. Patient will complete complex problem solving task with 90% accuracy given MIN cues.   80% accuracy given MIN cues  83% accuracy given MAX cues     50% accuracy INDP  NT    10. Patient will complete complex memory task (I.e. remembering names, dates, events, item locations, details of conversations) with 80% accuracy given MIN cues 80% accuracy given MIN cues      75% accuracy INDP  83% accuracy given MAX cues     75%  accuracy given MIN cues      58% accuracy INDP   NT      Patient Education/Response:     Home program established: yes-  patient instructed   to complete semantic fluency task (worksheet provided).   Exercises were reviewed and Declan demonstrated good  understanding of the education provided.     Assessment:   Declan is progressing well towards his goals. All current goal remain appropriate. Goals to be updated as necessary.       Patient prognosis is Good. Patient will continue to benefit from skilled outpatient speech and language therapy to address the deficits listed in the problem list on initial evaluation, provide patient/family education, and to maximize patient's level of independence in the home and community environment.     Medical necessity is demonstrated by the following IMPAIRMENTS:  word finding, slow and halting speech, comprehension, executive function, problem solving, memory, difficulty with sentence structure, and slow speech initiation.   Barriers to Therapy: none identified  Patient's spiritual, cultural and educational needs considered and patient agreeable to plan of care and goals.    Plan:   Continue Plan of Care with focus on improvement of expressive language and cognitive communication skills.     Marnie Desai CCC-SLP   12/12/2022

## 2022-12-15 ENCOUNTER — OFFICE VISIT (OUTPATIENT)
Dept: NEUROSURGERY | Facility: CLINIC | Age: 57
End: 2022-12-15
Payer: MEDICARE

## 2022-12-15 ENCOUNTER — OFFICE VISIT (OUTPATIENT)
Dept: NEUROLOGY | Facility: CLINIC | Age: 57
End: 2022-12-15
Payer: MEDICARE

## 2022-12-15 VITALS
SYSTOLIC BLOOD PRESSURE: 138 MMHG | BODY MASS INDEX: 27.23 KG/M2 | WEIGHT: 195.19 LBS | DIASTOLIC BLOOD PRESSURE: 96 MMHG

## 2022-12-15 DIAGNOSIS — G40.209 COMPLEX PARTIAL SEIZURES EVOLVING TO GENERALIZED TONIC-CLONIC SEIZURES: ICD-10-CM

## 2022-12-15 DIAGNOSIS — R41.9 COGNITIVE COMPLAINTS: Primary | ICD-10-CM

## 2022-12-15 PROCEDURE — 3008F PR BODY MASS INDEX (BMI) DOCUMENTED: ICD-10-PCS | Mod: CPTII,S$GLB,, | Performed by: NEUROLOGICAL SURGERY

## 2022-12-15 PROCEDURE — 99499 UNLISTED E&M SERVICE: CPT | Mod: 95,S$GLB,, | Performed by: CLINICAL NEUROPSYCHOLOGIST

## 2022-12-15 PROCEDURE — 3080F DIAST BP >= 90 MM HG: CPT | Mod: CPTII,S$GLB,, | Performed by: NEUROLOGICAL SURGERY

## 2022-12-15 PROCEDURE — 99999 PR PBB SHADOW E&M-EST. PATIENT-LVL III: CPT | Mod: PBBFAC,,, | Performed by: NEUROLOGICAL SURGERY

## 2022-12-15 PROCEDURE — 99215 OFFICE O/P EST HI 40 MIN: CPT | Mod: S$GLB,,, | Performed by: NEUROLOGICAL SURGERY

## 2022-12-15 PROCEDURE — 99999 PR PBB SHADOW E&M-EST. PATIENT-LVL I: ICD-10-PCS | Mod: PBBFAC,,, | Performed by: CLINICAL NEUROPSYCHOLOGIST

## 2022-12-15 PROCEDURE — 1159F MED LIST DOCD IN RCRD: CPT | Mod: CPTII,S$GLB,, | Performed by: NEUROLOGICAL SURGERY

## 2022-12-15 PROCEDURE — 99215 PR OFFICE/OUTPT VISIT, EST, LEVL V, 40-54 MIN: ICD-10-PCS | Mod: S$GLB,,, | Performed by: NEUROLOGICAL SURGERY

## 2022-12-15 PROCEDURE — 99499 NO LOS: ICD-10-PCS | Mod: 95,S$GLB,, | Performed by: CLINICAL NEUROPSYCHOLOGIST

## 2022-12-15 PROCEDURE — 1159F PR MEDICATION LIST DOCUMENTED IN MEDICAL RECORD: ICD-10-PCS | Mod: CPTII,S$GLB,, | Performed by: NEUROLOGICAL SURGERY

## 2022-12-15 PROCEDURE — 99999 PR PBB SHADOW E&M-EST. PATIENT-LVL I: CPT | Mod: PBBFAC,,, | Performed by: CLINICAL NEUROPSYCHOLOGIST

## 2022-12-15 PROCEDURE — 99999 PR PBB SHADOW E&M-EST. PATIENT-LVL III: ICD-10-PCS | Mod: PBBFAC,,, | Performed by: NEUROLOGICAL SURGERY

## 2022-12-15 PROCEDURE — 3075F PR MOST RECENT SYSTOLIC BLOOD PRESS GE 130-139MM HG: ICD-10-PCS | Mod: CPTII,S$GLB,, | Performed by: NEUROLOGICAL SURGERY

## 2022-12-15 PROCEDURE — 3080F PR MOST RECENT DIASTOLIC BLOOD PRESSURE >= 90 MM HG: ICD-10-PCS | Mod: CPTII,S$GLB,, | Performed by: NEUROLOGICAL SURGERY

## 2022-12-15 PROCEDURE — 3075F SYST BP GE 130 - 139MM HG: CPT | Mod: CPTII,S$GLB,, | Performed by: NEUROLOGICAL SURGERY

## 2022-12-15 PROCEDURE — 3008F BODY MASS INDEX DOCD: CPT | Mod: CPTII,S$GLB,, | Performed by: NEUROLOGICAL SURGERY

## 2022-12-15 NOTE — PROGRESS NOTES
NEUROPSYCHOLOGICAL EVALUATION-TESTING ONLY APPOINTMENT     Referring Provider: Wilma Mckinney PA-C  Medical Necessity: Evaluate cognitive and emotional functioning, participate in treatment planning/management, and provide supportive therapy in the setting of epilepsy to help determine candidacy for surgical intervention.     Date Conducted: 12/15/2022   Referral Diagnoses: G40.209 (ICD-10-CM) - Complex partial seizures evolving to generalized tonic-clonic seizures  Billing: Will be billed in conjunction with 12/16/2022 note.  Consent: The patient expressed an understanding of the purpose of the evaluation and consented to all procedures. He additionally provided consent to speak with his wife. We discussed the limits of confidentiality and discussed an emergency plan.     PROCEDURES/TESTS ADMINISTERED: In addition to performing a review of pertinent medical records, reviewing limits to confidentiality, conducting a clinical interview, and explaining procedures, the following measures were administered by VALENTE Shultz, a trained psychometrician/psychometrist under the direct supervision of Dr. Horton: MSVT; Test of Premorbid Functioning (TOPF); Wechsler Adult Intelligence Scale, Fourth Edition (WAIS-IV) [Block Design, Matrix Reasoning, Similarities, Vocabulary, Digit Span, Arithmetic, Symbol Search, and Coding subtests]; Wechsler Memory Scale, Fourth Edition (WMS-IV) [Logical Memory subtest]; Jeffrey Auditory Verbal Learning Test (RAVLT); ACS Social Cognition (ACS) [Faces subtest]; Brief Visuospatial Memory Test-Revised (BVMT-R, form 1); Neuropsychological Assessment Battery (NAB) [Naming & Auditory Comprehension subtests, form 1]; Auditory Naming Test (ANT); Verbal fluency tests (FAS & animal naming; Sharron et al., 2004 norms); Jeffrey Complex Figure Test (RCFT) [copy only]; Trail Making Test, parts A and B (Sharron et al., 2004 norms); Wisconsin Card Sorting Test (WCST-128); Stroop Color Word Test (Huffman  version); Grooved Pegboard Test (GPT, Sharron et al., 2004 norms); Cristina Depression Inventory-Second Edition (BDI-2); State Trait Anxiety Inventory-Short Form (STAI-SF); ); and Quality of Life in Epilepsy - 31 Items (QOLIE-31). Manual norms were used unless otherwise indicated.      For full report, please see 12/16/2022 note.          Gwen Horton, PhD  Licensed Clinical Neuropsychologist  [facility] - Department of Neurology

## 2022-12-15 NOTE — PROGRESS NOTES
"Neurosurgery  Follow-up     SUBJECTIVE:     Chief Complaint: intractable epilepsy      History of Present Illness:  Declan Burleson is a 57 y.o. right-handed male referred by Dr. Benoit (originally by Dr. Saldana of [place]) for consideration of surgical therapy for intractable epilepsy. The patient was formerly employed as a . He is accompanied by his wife Trista today, who helps to provide the history. His seizures began when he was 48 years old. He can recall no inciting event. He has 3 semiologies: "full blown" generalized events, staring episodes, and "mild" seizures, which are characterized as generalized events of shorter duration without incontinence and a shorter post-ictal period. He and his wife estimate that he persists in having about 2 events/month.     He has failed multiple AEDs, including topiramate, lamotrigine, levetiracetam, and oxcarbazepine. He is currently taking eslicarbazepine, zonisamide, and clobazam. He had EMU monitoring in May-June of this year, which suggests left-sided activity. He had 3T MRI in June (personally reviewed) that was non-lesional; he also had PET at that time suggesting possible left-sided activity. He had neuropsychological testing on 8/10/20; summary findings included below. He lives with his wife and 16-year-old daughter, who provide excellent social support.     He takes ASA 81, which will need to be held for one week prior to surgery.     As of 12/22/20, the patient reports he has had no significant change. He is hoping to be seen today for pre-operative optimization. He is out of Aptiom, which his wife is working to re-obtain for him.      As of 2/9/21, the patient underwent bilateral sEEG monitoring on 1/11/21. He self-removed several of the leads on 1/21/21 and was taken to the OR for removal of the remaining electrodes. Fortunately, there was no significant intracranial hemorrhage associated. Since being discharged home, the patient " reports that he is overall doing well. He has had no fever, chills, or drainage from the incisions. He has had some headache. He also feels that he has been sleeping more than before the procedure. He persists in having bad memory.     As of 4/13/21, the patient underwent left laser amygdalohippocampectomy on 3/1/21. Overall, the has done well since then. He and his wife report that he has been seizure free. He persists in having some days that are better than others; he is more irritable and tired than before surgery at times. This is inconsistent, and some days he is at his preoperative baseline. He has been compliant with his AEDs; he is taking 1200 mg of aptiom daily.    They deny any fever, chills, or drainage from the incision, though there was a scab that was removed when he got a haircut recently. He is also experiencing headache that radiates from the site of his incision forward. This improves with Maxalt.     As of 7/6/21, the patient reports that he is having headache from the base of the neck half-way up the head. It feels like a small drum that comes and goes. He had to turn off the lights, lay down, and take over-the-counter medication (Tylenol, at times) last week. He also became more pale with these episodes. It is made worse by lying on the left side in bed.  He is now at his normal baseline. He also reports chest pain; he remains hyponatremic. He is currently out of the anxiety and blood pressure medications for at least 4 days.     He has been seizure free since the procedure. Trista reports that the incision is well healed. No fever/chills.     He is more irritable than before surgery and has some memory issues.     As of 7/22/21, the patient returns in follow up. He still has headache and mood swings at times. He has not had any seizures. He and his wife both feel that his speech and conversation and improving. He remains highly distractable. He and his wife endorse that his grandfather had  "migraines.     He will need to re-establish with a new epileptologist since Dr. Benoit is moving to [place].     As of 9/30/21, the patient reports he has remained seizure-free. He continues to have significant headaches, particularly over the left posterior aspect of the head, radiating over to the right side. His wife endorses that he has bradykinesia and decreased arm swing when walking. Jean Edgar and Karen are concerned he may have Parkinson disease.     As of 12/15/22, the patient returns with his wife, Trista. They are frustrated that he is "like a zombie" because of the high doses of AEDs. He has been seizure-free since April, but when he decreased the meds back in April, he had 3 events within 24 hours.     Mood swings are also an issue; he gets irritated more readily than he did in the past. He is seeing a speech therapist in [facility]; he is making progress WRT conversational communication and memory.     They now have 2 grandchildren.     Goals of surgery: to be free of seizures and reduce medications.     They would like to undergo repeat sEEG since the first was cut short to see if he may be a candidate for further epilepsy surgery.       Review of patient's allergies indicates:   Allergen Reactions    Losartan Rash       Current Outpatient Medications   Medication Sig Dispense Refill    amLODIPine (NORVASC) 5 MG tablet Take 1 tablet (5 mg total) by mouth once daily. 30 tablet 11    aspirin (ECOTRIN) 81 MG EC tablet Take 1 tablet (81 mg total) by mouth once daily. 360 tablet 0    cloBAZam (ONFI) 20 mg Tab Take 2 tablets (40 mg total) by mouth every evening. 60 tablet 3    EScitalopram oxalate (LEXAPRO) 10 MG tablet Take 1 tablet (10 mg total) by mouth once daily. 30 tablet 11    eslicarbazepine (APTIOM) 400 mg Tab tablet Take 1 tablet (400 mg total) by mouth once daily. 30 tablet 5    eslicarbazepine (APTIOM) 800 mg Tab Take 800 mg by mouth once daily. 30 tablet 5    mag hydrox/aluminum " hyd/simeth (ANTACID ORAL) Take by mouth as needed. Acid reflux      midazolam (NAYZILAM) 5 mg/spray (0.1 mL) Spry Instill 1 spray into the nostril for seizure and may repeat in 10 minutes if needed; not more than 1 unit per week per MD 4 each 1    ondansetron (ZOFRAN-ODT) 8 MG TbDL Take 8 mg by mouth every 8 (eight) hours as needed. nausea      oxyCODONE (ROXICODONE) 5 MG immediate release tablet Take 1 tablet (5 mg total) by mouth every 6 (six) hours as needed for Pain. 30 tablet 0    rimegepant (NURTEC) 75 mg odt Take 75 mg by mouth.      rizatriptan (MAXALT-MLT) 10 MG disintegrating tablet Take 10 mg by mouth as needed. No more than 3 doses / week      zonisamide (ZONEGRAN) 100 MG Cap Take 6 capsules (600 mg total) by mouth every evening. (Patient taking differently: Take 500 mg by mouth every evening.) 540 capsule 3     No current facility-administered medications for this visit.       Past Medical History:   Diagnosis Date    Anxiety     Depression     GERD (gastroesophageal reflux disease)     Hyperlipidemia     Hypertension     Migraine     Seizures      Past Surgical History:   Procedure Laterality Date    CYST REMOVAL      skin cyst - benign    REMOVAL OF STEREO EEG LEADS (DEPTH ELECTRODES) Bilateral 2021    Procedure: REMOVAL OF STEREO EEG LEADS (DEPTH ELECTRODES);  Surgeon: Ruchi Chen MD;  Location: [address];  Service: Neurosurgery;  Laterality: Bilateral;    TONSILLECTOMY      teenage      Family History       Problem Relation (Age of Onset)    Heart disease Maternal Uncle (50)    Migraines Paternal Grandfather          Social History     Socioeconomic History    Marital status:      Spouse name: Reilly    Years of education: 2 year college    Highest education level: Associate degree: occupational, technical, or vocational program   Tobacco Use    Smoking status: Former     Types: Cigarettes     Quit date:      Years since quittin.9    Smokeless tobacco:  Never   Substance and Sexual Activity    Alcohol use: Yes     Comment: one drink once a week    Drug use: Never    Sexual activity: Yes     Partners: Female     Social Determinants of Health     Financial Resource Strain: Medium Risk    Difficulty of Paying Living Expenses: Somewhat hard   Food Insecurity: No Food Insecurity    Worried About Running Out of Food in the Last Year: Never true    Ran Out of Food in the Last Year: Never true   Transportation Needs: No Transportation Needs    Lack of Transportation (Medical): No    Lack of Transportation (Non-Medical): No   Physical Activity: Unknown    Days of Exercise per Week: Patient refused    Minutes of Exercise per Session: 0 min   Stress: No Stress Concern Present    Feeling of Stress : Only a little   Social Connections: Unknown    Frequency of Communication with Friends and Family: More than three times a week    Frequency of Social Gatherings with Friends and Family: More than three times a week    Active Member of Clubs or Organizations: No    Attends Club or Organization Meetings: Never    Marital Status:    Housing Stability: Low Risk     Unable to Pay for Housing in the Last Year: No    Number of Places Lived in the Last Year: 1    Unstable Housing in the Last Year: No       Review of Systems   Constitutional:  Positive for diaphoresis. Negative for fever.   HENT:  Positive for nosebleeds.    Eyes:  Negative for visual disturbance.   Respiratory:  Negative for shortness of breath.    Cardiovascular:  Negative for chest pain.   Gastrointestinal:  Negative for vomiting.   Endocrine: Negative for cold intolerance.   Genitourinary:  Negative for difficulty urinating.   Musculoskeletal:  Negative for back pain.   Skin:  Negative for color change.   Neurological:  Negative for seizures.   Hematological:  Does not bruise/bleed easily.   Psychiatric/Behavioral:  The patient is nervous/anxious.      OBJECTIVE:     Vital Signs  BP: (!) 138/96  Pain Score: 0-No  pain  Weight: 88.6 kg (195 lb 3.5 oz)  Body mass index is 27.23 kg/m².      Physical Exam:    Constitutional: He appears well-developed and well-nourished. No distress.     Eyes: Pupils are equal, round, and reactive to light. EOM are normal.     Cardiovascular: No edema.     Abdominal: Soft.     Skin: Skin displays no rash on extremities. Skin displays no lesions on extremities.   Mostly bald     Psych/Behavior: He is alert. He is oriented to person, place, and time.     Musculoskeletal: Gait is normal.        Right Upper Extremities: Muscle strength is 5/5.        Left Upper Extremities: Muscle strength is 5/5.       Right Lower Extremities: Muscle strength is 5/5.        Left Lower Extremities: Muscle strength is 5/5.     Neurological:        Coordination: He has normal finger to nose coordination.        DTRs: DTRs are DTRS NORMAL AND SYMMETRICnormal and symmetric.        Cranial nerves:   Face grossly symmetric   Shoulder shrug equal bilaterally  Pulmonary: symmetric expansion     Incisions: well healed     Diagnostic Results:  MRI brain personally reviewed   Piriform cortex appears intact     ASSESSMENT/PLAN:     Declan Burleson is a 57 y.o. male with intractable epilepsy who presents s/p bilateral sEEG and L laser amygdalohippocampectomy (3/1/21).     Overall, he is doing well, but he and his wife are increasingly frustrated with the side effects of the AEDs that he is taking. They are very interested in repeat sEEG to see whether he may benefit from further intracranial epilepsy surgery.     He is repeating neuropsychology examination today. He will need to be discussed further in interdisciplinary conference pending the results of the neuropsychological exam. I gave them a tentative date of February 27, pending the result of that discussion.     His conversation is significantly better, but he still has some word finding-difficulty. He endorses remembering the words in Estonian but not English.  People's names are also tricky.     If he is a candidate for surgery, he will need updated MRI stealth with contrast/with CM targeting protocol. He will also need medical optimization. I would like to see him back (virtually or via telephone is ok) prior to surgery to discuss consents.     I have encouraged him to contact the clinic in interim with any questions, concerns, or adverse clinical change.     I spent over an hour on this encounter, more than half in direct consultation.

## 2022-12-16 ENCOUNTER — OFFICE VISIT (OUTPATIENT)
Dept: NEUROLOGY | Facility: CLINIC | Age: 57
End: 2022-12-16
Payer: MEDICARE

## 2022-12-16 DIAGNOSIS — F43.22 ADJUSTMENT DISORDER WITH ANXIOUS MOOD: ICD-10-CM

## 2022-12-16 DIAGNOSIS — G31.84 MILD NEUROCOGNITIVE DISORDER: Primary | ICD-10-CM

## 2022-12-16 PROCEDURE — 96138 PR PSYCH/NEUROPSYCH TEST ADMIN/SCORING, BY TECH, 2+ TESTS, 1ST 30 MIN: ICD-10-PCS | Mod: FQ,95,, | Performed by: CLINICAL NEUROPSYCHOLOGIST

## 2022-12-16 PROCEDURE — 99499 NO LOS: ICD-10-PCS | Mod: 95,,, | Performed by: CLINICAL NEUROPSYCHOLOGIST

## 2022-12-16 PROCEDURE — 96132 NRPSYC TST EVAL PHYS/QHP 1ST: CPT | Mod: FQ,95,, | Performed by: CLINICAL NEUROPSYCHOLOGIST

## 2022-12-16 PROCEDURE — 96133 NRPSYC TST EVAL PHYS/QHP EA: CPT | Mod: FQ,95,, | Performed by: CLINICAL NEUROPSYCHOLOGIST

## 2022-12-16 PROCEDURE — 96132 PR NEUROPSYCHOLOGIC TEST EVAL SVCS, 1ST HR: ICD-10-PCS | Mod: FQ,95,, | Performed by: CLINICAL NEUROPSYCHOLOGIST

## 2022-12-16 PROCEDURE — 96116 PR NEUROBEHAVIORAL STATUS EXAM BY PSYCH/PHYS: ICD-10-PCS | Mod: FQ,95,, | Performed by: CLINICAL NEUROPSYCHOLOGIST

## 2022-12-16 PROCEDURE — 96116 NUBHVL XM PHYS/QHP 1ST HR: CPT | Mod: FQ,95,, | Performed by: CLINICAL NEUROPSYCHOLOGIST

## 2022-12-16 PROCEDURE — 96139 PR PSYCH/NEUROPSYCH TEST ADMIN/SCORING, BY TECH, 2+ TESTS, EA ADDTL 30 MIN: ICD-10-PCS | Mod: FQ,95,, | Performed by: CLINICAL NEUROPSYCHOLOGIST

## 2022-12-16 PROCEDURE — 96133 PR NEUROPSYCHOLOGIC TEST EVAL SVCS, EA ADDTL HR: ICD-10-PCS | Mod: FQ,95,, | Performed by: CLINICAL NEUROPSYCHOLOGIST

## 2022-12-16 PROCEDURE — 96138 PSYCL/NRPSYC TECH 1ST: CPT | Mod: FQ,95,, | Performed by: CLINICAL NEUROPSYCHOLOGIST

## 2022-12-16 PROCEDURE — 96139 PSYCL/NRPSYC TST TECH EA: CPT | Mod: FQ,95,, | Performed by: CLINICAL NEUROPSYCHOLOGIST

## 2022-12-16 PROCEDURE — 99499 UNLISTED E&M SERVICE: CPT | Mod: 95,,, | Performed by: CLINICAL NEUROPSYCHOLOGIST

## 2022-12-16 NOTE — PROGRESS NOTES
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     midazolam (NAYZILAM) 5 mg/spray (0.1 mL) Spry Instill 1 spray into the nostril for seizure and may repeat in 10 minutes if needed; not more than 1 unit per week per MD    NURTEC 75 mg, Oral    ondansetron (ZOFRAN-ODT) 8 mg, Oral, Every 8 hours PRN, nausea     oxyCODONE (ROXICODONE) 5 mg, Oral, Every 6 hours PRN    rizatriptan (MAXALT-MLT) 10 mg, Oral, As needed (PRN), No more than 3 doses / week    zonisamide (ZONEGRAN) 600 mg, Oral, Nightly     Psychiatric History   Prior Diagnoses: adjustment disorder with mixed anxiety and depression   History of Trauma/Abuse: no  History of Suicide Attempts: no  Current Ideation, Intention, or Plan: no  Homicidal Ideation: no   Medication(s): no  Hospitalization(s): no  Psychotherapy/Counseling: no  Other: no         Substance Use History     Social History     Tobacco Use    Smoking status: Former     Types: Cigarettes     Quit date:      Years since quittin.0    Smokeless tobacco: Never   Substance and Sexual Activity    Alcohol use: Yes     Comment: one drink once a week    Drug use: Never    Sexual activity: Yes     Partners: Female     History of abuse/overuse: no    Family Neurological & Psychiatric History       Family History   Problem Relation Age of Onset    Heart disease Maternal Uncle 50    Migraines Paternal Grandfather   "    Neurologic: Paternal grandfather had migraines.   Psychiatric: Negative for heritable risk factors.    Development  Education   Prenatal and  development: wnl  Developmental milestones: wnl  Language Acquisition: Hungarian first language, Marshallese second, and English third. Taught him Guyanese English in [place] so it was a little different when he got to [place] to learn. Began using English regularly at the age of 14 years. Classes were taught in English. Has primarily used English in his adult life; speaks with brother in Hungarian, though they switch back and forth to English.   Level Attained: Finished HS in  and 2 years of college - mechanical/automotive.   Learning/Attention/Behavior Difficulties: None. When little, when he would try to read a book, he would start to fall asleep. Learning was quicker if he listened to the teacher rather than reading.   Repeated Grade(s): no  Typical Grades: B/C student        Occupation  Social   [employer]: no  Occupational Status: Disabled  Primary Occupation:   Family Status:  with three step-children. 2 grandchildren  Support System: Good - wife and kids   Hobbies/Activities: spend time with family   Current Living Situation: Lives with wife, one daughter, and two dogs     OBJECTIVE:     MENTAL STATUS AND OBSERVATIONS:   Appearance: Appropriate to setting   Alertness: Appeared lethargic/somnolent. Was easily distractible and required frequent prompts/redirection back to the task at hand   Orientation:   O x 4    Gait:  Independent   Psychomotor:  Unremarkable   Handedness:  Right   Vision & Hearing:  Adequate for session   Speech/language: Normal in rate, rhythm, tone, and volume. Rate of speech was slow and he had a stammer in his speech. He had significant word finding issues and had difficulty getting words out. Comprehension was normal.   Mood/Affect:  The patients stated mood was "okay." Affect was congruent with stated mood during " the clinical interview. He appeared anxious on the day of testing and required some additional reassurance from the examiner.     Interpersonal Behavior:  Rapport was quickly and easily established    Suicidality/Homicidality: Denied   Hallucinations/Delusions:  None evidenced or endorsed   Thought Content: Logical   Though Processes: Goal-directed but significantly slowed   Insight & Judgment:  Appropriate   Participation in Interview:  Full + collateral     PROCEDURES/TESTS ADMINISTERED: In addition to performing a review of pertinent medical records, reviewing limits to confidentiality, conducting a clinical interview, and explaining procedures, the following measures were administered by VALENTE Shultz, a trained psychometrician/psychometrist under the direct supervision of Dr. Horton: MSVT; Test of Premorbid Functioning (TOPF); Wechsler Adult Intelligence Scale, Fourth Edition (WAIS-IV) [Block Design, Matrix Reasoning, Similarities, Vocabulary, Digit Span, Arithmetic, Symbol Search, and Coding subtests]; Wechsler Memory Scale, Fourth Edition (WMS-IV) [Logical Memory subtest]; Jeffrey Auditory Verbal Learning Test (RAVLT); ACS Social Cognition (ACS) [Faces subtest]; Brief Visuospatial Memory Test-Revised (BVMT-R, form 1); Neuropsychological Assessment Battery (NAB) [Naming & Auditory Comprehension subtests, form 1]; Auditory Naming Test (ANT); Verbal fluency tests (FAS & animal naming; Sharron et al., 2004 norms); Jeffrey Complex Figure Test (RCFT) [copy only]; Trail Making Test, parts A and B (Sharron et al., 2004 norms); [place] Card Sorting Test (WCST-128); Stroop Color Word Test ([place] version); Grooved Pegboard Test (GPT, Sharron et al., 2004 norms); Cristina Depression Inventory-Second Edition (BDI-2); State Trait Anxiety Inventory-Short Form (STAI-SF); ); and Quality of Life in Epilepsy - 31 Items (QOLIE-31). Manual norms were used unless otherwise indicated.      TEST TAKING BEHAVIOR AND VALIDITY: During  "testing, Mr. Burleson appeared quite lethargic. He often looked up and stared when he was thinking or held his head and closed his eyes. He tapped his fingers on the table. When he was unsure of a response, he did not give up, but rather wanted to be given more time to think. Additional time was provided, however, he needed to be prompted to continue moving on to the next task at hand. He had trouble pulling up words on a semantic fluency test and repeated some of his responses on a letter verbal fluency task. While working on an auditory naming task, he commented that he was able to think of the words in Beninese but was struggling to pull up the words in English. On a picture naming task, he struggled to pull up the words and made paraphasic errors. When asked to make a copy of a complex geometric figure drawing, he mirza slowly and erased often. He mirza the outline of the figure first but reached the time discontinuation rule prior to being able to draw in many of the details. He was able to talk about the elements that he did not draw in. He began a speeded digit symbol matching task on the wrong side of the paper and wrote the same response a few times in a row. He commented that he was "lost" while working on a mental arithmetic task. Overall, he worked at a slow pace and appeared frustrated by the testing process. Scores on stand-alone and embedded performance validity measures were variable, with several falling below cutoffs. The current results, therefore, may underestimate the patient's current functioning and are therefore interpreted with caution.    TEST RESULTS   2022 Evaluation  2020 Evaluation      Raw Score Type of Standardized Score Standardized Score Percentile/CP Descriptor Standardized Score   MSVT IR 85 - - - - 100   MSVT DR 95 - - - - 90   MSVT Cons 80 - - - - 90   MSVT PA 60 - - - -    MSVT FR 30 - - - -    ACS LM II Rec 17 - - - - 21   ACS RDS 5 - - - - 6   PREMORBID FUNCTIONING Raw Score Type " of Standardized Score Standardized Score Percentile/CP Descriptor Standardized Score   TOPF simple dem. eFSIQ -  61 Average 104   TOPF pred. eFSIQ - SS 82 12 Low Average 89   TOPF simple + pred. eFSIQ - SS 92 30 Average 96   INTELLECTUAL FUNCTIONING Raw Score Type of Standardized Score Standardized Score Percentile/CP Descriptor Standardized Score   WAIS-IV          VCI - SS 70 2 Below Average 70   LARRY - SS 82 12 Low Average 77   WMI - SS 71 3 Below Average 80   PSI - SS 76 5 Below Average 84   FSIQ - SS 71 3 Below Average 72   GAI - ss 74 4 Below Average 71   Similarities 14 ss 5 5 Below Average 5   Vocabulary 15 ss 4 2 Below Average 4   Block Design 28 ss 8 25 Average 7   Matrix Reasoning 9 ss 6 9 Low Average 5   Digit Span 15 ss 4 2 Below Average 6         DS Forward 6 ss 5 5 Below Average 5         DS Backward 5 ss 6 9 Low Average 7         DS Sequence 4 ss 5 5 Below Average 7         Longest Digit Forward 5 - - - - -         Longest Digit Backward 3 - - - - -         Longest Digit Sequence 3 - - - - -   Arithmetic 9 ss 6 9 Low Average 7   Symbol Search 20 ss 7 16 Low Average 6   Coding 29 ss 4 2 Below Average 8   LANGUAGE FUNCTIONING Raw Score Type of Standardized Score Standardized Score Percentile/CP Descriptor Standardized Score   WAIS-IV VCI - SS 70 2 Below Average 70   WAIS-IV Similarities 14 ss 5 5 Below Average 5   WAIS-IV Vocabulary 15 ss 4 2 Below Average 4   TOPF Word Reading 22 SS 81 10 Low Average 86   NAB Auditory Comprehension 77 Tscore 19 <0.1 Exceptionally Low  19   NAB Naming 20 Tscore 19 <0.1 Exceptionally Low  19   FAS 33 Tscore 41 18 Low Average 38   Animal Naming 7 Tscore 15 <0.1 Exceptionally Low 32   Auditory Naming RT 2.53 zscore -3.33 0 Exceptionally Low     Auditory Naming TC 37 zscore -6.49 0 Exceptionally Low    Auditory Naming TOT 17 zscore -3.76 0 Exceptionally Low    VISUOSPATIAL FUNCTIONING Raw Score Type of Standardized Score Standardized Score Percentile/CP Descriptor  Standardized Score   WAIS-IV LARRY - SS 82 12 Low Average 77   WAIS-IV Block Design 28 ss 8 25 Average 7   WAIS-IV Matrix Reasoning 9 ss 6 9 Low Average 5   RCFT Copy 24.5 - - <1 Exceptionally Low <1%ile   RCFT Time to Copy 600 - - <1 Exceptionally Low 11-16%ile    BVMT-R Copy 11/12 - - - - 11/12   LEARNING & MEMORY Raw Score Type of Standardized Score Standardized Score Percentile/CP Descriptor Standardized Score   RAVLT          Trial 1 4 Tscore 41 18 Low Average 55   Trial 2 6 Tscore 42 21 Low Average 34   Trial 3 6 Tscore 33 4 Below Average 37   Trial 4 7 Tscore 37 9 Low Average 32   Trial 5 5 Tscore 22 0.3 Exceptionally Low  35   Trials 1-5 Total 28 Tscore 32 4 Below Average 38   List B 2 Tscore 34 5 Below Average 36   Trial 6 (short delay) 3 Tscore 30 2 Below Average 41   30-min Recall (long delay) 3 Tscore 34 5 Below Average 22   Recognition Hits 4 Tscore - - - 38   Recognition FP Errors 3 Tscore - - - 30   Recognition Percentage Correct - Tscore 24 0.5 Exceptionally Low  33   WMS-IV Subtests          LM I 14 ss 5 5 Below Average 11   LM II 7 ss 4 2 Below Average 9   LM Recognition 17 - - <2 Exceptionally Low -   BVMT-R          IR 13 Tscore 32 4 Below Average 26   DR 5 Tscore 33 4 Below Average 28   Discrimination Index 6 - - >16 WNL -   ACS Social Cognition          Faces I 48 ss 2 0.4 Exceptionally Low  3   Faces II 10 ss 1 0.1 Exceptionally Low  1   Faces Content 23 ss 1 0.1 Exceptionally Low  3   Faces Spatial 35 ss 6 9 Low Average 5   ATTENTION/WORKING MEMORY Raw Score Type of Standardized Score Standardized Score Percentile/CP Descriptor Standardized Score   WAIS-IV WMI - SS 71 3 Below Average 80   WAIS-IV Digit Span 15 ss 4 2 Below Average 6         DS Forward 6 ss 5 5 Below Average 5         DS Backward 5 ss 6 9 Low Average 7         DS Sequence 4 ss 5 5 Below Average 7         Longest Digit Forward 5 - - - - -         Longest Digit Backward 3 - - - - -         Longest Digit Sequence 3 - - - - -    WAIS-IV Arithmetic 9 ss 6 9 Low Average 7   MENTAL PROCESSING SPEED Raw Score Type of Standardized Score Standardized Score Percentile/CP Descriptor Standardized Score   WAIS-IV PSI - SS 76 5 Below Average 84   WAIS-IV Symbol Search 20 ss 7 16 Low Average 6   WAIS-IV Coding 29 ss 4 2 Below Average 8   TMT A  57 ss 30 2 Below Average 43   TMT A errors 0 - - - - -   Stroop: Word Reading 61 Tscore 21 0.2 Exceptionally Low  29   Stroop: Color Naming 43 Tscore 23 0.3 Exceptionally Low  33   EXECUTIVE FUNCTIONING Raw Score Type of Standardized Score Standardized Score Percentile/CP Descriptor Standardized Score   TMT B 209 ss 28 1 Exceptionally Low  37   TMT B errors 1 - - - - 1 (raw)   Stroop: C/W 21 Tscore 34 5 Below Average 22   Stroop: Interference -4 Tscore 46 34 Average 30   WCST-128          Total Correct 40   - -    Total Errors 88 SS 60 0.4 Exceptionally Low  68   Perseverative Resp. 111 SS <55 <1 Exceptionally Low 71   Perseverative Err. 84 SS <55 <1 Exceptionally Low 69   Nonperseverative Err. 4  77 High Average 75   Concept. Level Response 17 SS 62 0.5 Exceptionally Low  69   Categories Completed 1 - - 2-5 Below Average -   FMS 0 - - >16 WNL -   Learning to Learn N/A - - - N/A -   WAIS-IV Similarities 14 ss 5 5 Below Average 5   WAIS-IV Matrix Reasoning 9 ss 6 9 Low Average 5   FRONTOMOTOR  Raw Score Type of Standardized Score Standardized Score Percentile/CP Descriptor Standardized Score   GPT DH (Right) 88 Tscore 40 16 Low Average 39   GPD  Tscore 36 8 Below Average 39   MOOD & PERSONALITY Raw Score Type of Standardized Score Standardized Score Percentile/CP Descriptor Standardized Score   BDI-2 4 - - - Minimal GDS=14 (raw)   STAISF:State 17 - - 58 Average 87%ile   STAISF:Trait 24 - - 92 Above Average 87%ile   QOLIE-31          Seizure Worry 4 Tscore 29 2 Clinically Significant 30   Overall Quality of Life 95 Tscore 65 94 WNL 56   Emotional Well-Being 64 Tscore 48 43 WNL 48   Energy/Fatigue 75  Tscore 59 82 WNL 55   Cognitive 22.22 Tscore 33 5 Moderate 41   Medication Effects 0.00 Tscore 32 3 Moderate 32   Social Function 30 Tscore 36 8 Moderate 35   Overall Score - Tscore 39 13 Moderate 39   ss = scaled score (mean = 10, SD = 3); SS = standard score (mean = 100, SD = 15); Tscore mean = 50, SD = 10; zscore (mean = 0.00, SD = 1)  It is important to note that scores/percentiles should only be interpreted by a neuropsychologist. It is common for healthy individuals to have 1-3 isolated low/unusual scores that are not indicative of any significant cognitive dysfunction.        BILLING  Code Description Minutes Units   26373 Psychiatric Interview 0    88987 Nubhvl xm phys/qhp 1st hr 50 1   04958 Nubhvl xm phy/qhp ea addl hr 0    45732 Psycl tst eval phys/qhp 1st 0    89098 Psycl tst eval phys/qhp ea 0    20748 Nrpsyc tst eval phys/qhp 1st 60 1   88544 Nrpsyc tst eval phys/qhp ea 157 3     Referral review/test selection 30      Tech consult/test review/modifications 10      Patient limitation management 0      Patient behavior management 0      Patient symptom monitoring 0      Record Review/Integration/Report Generation 146      Face-to-Face interpretive Feedback 31    64831 Psycl/nrpsyc tst phy/qhp 1st 0    08446 Psycl/nrpsyc tst phy/qhp ea 0    54315 Psycl/nrpsyc tech 1st 30 1   21259 Psycl/nrpsyc tst tech ea 345 11          This service was not originating from a related E/M service provided within the previous 7 days nor will  to an E/M service or procedure within the next 24 hours or my soonest available appointment.  Prevailing standard of care was able to be met in this audio-only visit.

## 2022-12-20 NOTE — PROGRESS NOTES
[facility]  Speech Therapy Progress Note - Neurological Rehabilitation     Date: 12/21/2022     Name: Declan Burleson   MRN: [id number]   Therapy Diagnosis: Aphasia R 47.01 (ICD-10); Cognitive Communication deficit R 41.841 (ICD-10)   Physician: Anca Gaston PA-C  Physician Orders: [id number] - AMB REFERRAL/CONSULT TO SPEECH THERAPY  Medical Diagnosis: G31.84 (ICD-10-CM) - Mild neurocognitive disorder due to another medical condition    Visit #/ Visits Authorized: 22/ 30  Date of Evaluation:  8/23/2022  Insurance Authorization Period: 12/28/2022  Plan of Care Expiration Date:    12/21/2022  Extended Plan of Care:  N/A   Progress Note: 12/20/2022   Visits Cancelled: 7  Visits No Show: 0    Time In:  1337  Time Out:  1422  Total Billable Time: 45 minutes      Precautions: Standard  Subjective:   Patient reports:  feeling well and agreeable to all outlined therapy tasks. Patient reported illness starting on Sunday and ending yesterday. Patient reports no symptoms this date.   He was not compliant to home exercise program.  Response to previous treatment: Good  Objective:   TIMED  Procedure Min.   Cognitive Therapeutic Interventions, first 15 minutes CPT 10661  0   Therapeutic INTVTN, COGN Fucntion, EACH ADDL 15 MIN - ST CPT 50830 0                                                 UNTIMED  Procedure Min.   Speech/Lang Tx/Individual CPT 35597 45   N/A  N/A   Total Timed Units: 0  Total Untimed Units: 1  Charges Billed/Number of units: 1      Short Term Goals: (8 weeks) Current Progress   12/5/2022 Current Progress 12/7/2022 Current Progress 12/12/2022 Current Progress 12/20/2022   1. The patient will produce a minimum of 4 different features, when presented with a word using semantic feature analysis (SFA), given MIN verbal prompts, with 75% accuracy across 2 consecutive sessions.  71% accuracy given MAX cues      30% accuracy given MIN cues      15% accuracy INDP  70% accuracy given MAX cues      63%  accuracy given MIN cues      52% accuracy INDP 66% accuracy given MAX cues      58% accuracy given MOD cues      40% accuracy INDP 65% accuracy given MAX cues     55% accuracy given MOD Cues     40% accuracy INDP   3. The patient will complete complex auditory comprehension task (I.e. recall items after 60 second delay, recall key details from a narrative) with 80% accuracy given min cues across 2 consecutive sessions.   NT  83% accuracy given MAX cues      75% accuracy given MIN cues      58% accuracy INDP 82% accuracy given MOD-MAX cues     68% accuracy given MIN cues      59% accuracy INDP 60% accuracy INDP    4. The patient will describe visual scenes using 3 or more sentences at 80% accuracy given min verbal and phonemic cues across 2 consecutive sessions.  NT 50% accuracy given MIN cues      33% accuracy INDP  NT     NT    5.  The patient will complete ongoing dynamic assessment.   NT  NT NT NT    6. Patient will recall key details from a visually or auditorally presented narrative with 80% accuracy given MIN cues across 2 consecutive sessions.  NT   60% accuracy given MAX cues     40% accuracy given MIN cues     27% accuracy INDP   90% accuracy given MAX cues     82% accuracy INDP   NT    7. Patient will complete semantic fluency tasks with 80% accuracy given MIN cues across 2 consecutive sessions.  81% accuracy given MAX cues     75% accuracy given MIN cues      70% accuracy INDP  NT  NT NT    8. Patient will complete executive function (I.e.read/understand mail, respond appropriately to mail) task with 80% accuracy given MIN cues.   80% accuracy given MIN cues  83% accuracy given MAX cues     50% accuracy INDP  NT NT    9. Patient will complete complex problem solving task with 90% accuracy given MIN cues.   80% accuracy given MIN cues  83% accuracy given MAX cues     50% accuracy INDP  NT NT    10. Patient will complete complex memory task (I.e. remembering names, dates, events, item locations, details of  conversations) with 80% accuracy given MIN cues 80% accuracy given MIN cues      75% accuracy INDP  83% accuracy given MAX cues     75% accuracy given MIN cues      58% accuracy INDP   NT NT      Patient Education/Response:     Home program established: yes-  patient instructed   to complete semantic fluency task (worksheet provided).   Exercises were reviewed and Declan demonstrated good  understanding of the education provided.     Assessment:   Declan is progressing well towards his goals. All current goal remain appropriate. Goals to be updated as necessary.       Progress Note: Patient continues to make incremental progress toward goals. Of note, patient has reported recent illness over the past 2 sessions and has presented with decreased or stagnated performance on these dates.      Patient prognosis is Good. Patient will continue to benefit from skilled outpatient speech and language therapy to address the deficits listed in the problem list on initial evaluation, provide patient/family education, and to maximize patient's level of independence in the home and community environment.     Medical necessity is demonstrated by the following IMPAIRMENTS:  word finding, slow and halting speech, comprehension, executive function, problem solving, memory, difficulty with sentence structure, and slow speech initiation.   Barriers to Therapy: none identified  Patient's spiritual, cultural and educational needs considered and patient agreeable to plan of care and goals.    Plan:   Continue Plan of Care with focus on improvement of expressive language and cognitive communication skills.     Marnie Desai CCC-SLP   12/21/2022        I certify the need for the plan of care extension through 1/30/2023.

## 2022-12-21 ENCOUNTER — CLINICAL SUPPORT (OUTPATIENT)
Dept: REHABILITATION | Facility: HOSPITAL | Age: 57
End: 2022-12-21
Payer: MEDICARE

## 2022-12-21 DIAGNOSIS — R47.01 APHASIA DETERMINED BY EXAMINATION: Primary | ICD-10-CM

## 2022-12-21 DIAGNOSIS — R41.841 COGNITIVE COMMUNICATION DEFICIT: ICD-10-CM

## 2022-12-21 PROCEDURE — 92507 TX SP LANG VOICE COMM INDIV: CPT

## 2022-12-30 ENCOUNTER — TELEPHONE (OUTPATIENT)
Dept: NEUROSURGERY | Facility: CLINIC | Age: 57
End: 2022-12-30
Payer: MEDICARE

## 2022-12-30 DIAGNOSIS — Z01.818 PRE-OP TESTING: Primary | ICD-10-CM

## 2022-12-30 DIAGNOSIS — G40.209 COMPLEX PARTIAL SEIZURES EVOLVING TO GENERALIZED TONIC-CLONIC SEIZURES: ICD-10-CM

## 2023-01-04 ENCOUNTER — CLINICAL SUPPORT (OUTPATIENT)
Dept: REHABILITATION | Facility: HOSPITAL | Age: 58
End: 2023-01-04
Payer: MEDICARE

## 2023-01-04 DIAGNOSIS — R47.01 APHASIA DETERMINED BY EXAMINATION: Primary | ICD-10-CM

## 2023-01-04 DIAGNOSIS — R41.841 COGNITIVE COMMUNICATION DEFICIT: ICD-10-CM

## 2023-01-04 PROCEDURE — 92507 TX SP LANG VOICE COMM INDIV: CPT

## 2023-01-04 NOTE — PROGRESS NOTES
[facility]  Speech Therapy Treatment Note - Neurological Rehabilitation     Date: 1/4/2023     Name: Declan Burleson   MRN: [id number]   Therapy Diagnosis: Aphasia R 47.01 (ICD-10); Cognitive Communication deficit R 41.841 (ICD-10)   Physician: Anca Gaston PA-C  Physician Orders: [id number] - AMB REFERRAL/CONSULT TO SPEECH THERAPY  Medical Diagnosis: G31.84 (ICD-10-CM) - Mild neurocognitive disorder due to another medical condition    Visit #/ Visits Authorized: 23/ 30  Date of Evaluation:  8/23/2022  Insurance Authorization Period: 12/28/2022  Plan of Care Expiration Date:    12/21/2022  Extended Plan of Care:   1/30/2023  Progress Note: 1/23/2022  Visits Cancelled: 7  Visits No Show: 0    Time In:  1343  Time Out:  1427  Total Billable Time: 45 minutes      Precautions: Standard  Subjective:   Patient reports:  feeling well and agreeable to all outlined therapy tasks. Patient reports numbness and weakness starting the day after [place] and ending this morning. Patient reported no fever, chills, or headache. SLP urged patient to seek medical attention immediately if symptoms return.    He was  compliant to home exercise program.  Response to previous treatment: Good  Objective:   TIMED  Procedure Min.   Cognitive Therapeutic Interventions, first 15 minutes CPT 46586  0   Therapeutic INTVTN, COGN Fucntion, EACH ADDL 15 MIN - ST CPT 39946 0                                                 UNTIMED  Procedure Min.   Speech/Lang Tx/Individual CPT 88876 45   N/A  N/A   Total Timed Units: 0  Total Untimed Units: 1  Charges Billed/Number of units: 1      Short Term Goals: (8 weeks) Current Progress   1/4/2023   1. The patient will produce a minimum of 4 different features, when presented with a word using semantic feature analysis (SFA), given MIN verbal prompts, with 75% accuracy across 2 consecutive sessions.  60% accuracy given MAX cues    3. The patient will complete complex auditory comprehension  task (I.e. recall items after 60 second delay, recall key details from a narrative) with 80% accuracy given min cues across 2 consecutive sessions.   NT    4. The patient will describe visual scenes using 3 or more sentences at 80% accuracy given min verbal and phonemic cues across 2 consecutive sessions.  NT    5.  The patient will complete ongoing dynamic assessment.   NT    6. Patient will recall key details from a visually or auditorally presented narrative with 80% accuracy given MIN cues across 2 consecutive sessions.  NT    7. Patient will complete semantic fluency tasks with 80% accuracy given MIN cues across 2 consecutive sessions.  70% accuracy given MOD cues    8. Patient will complete executive function task with 80% accuracy given MIN cues.   75% accuracy given MIN cues    9. Patient will complete complex problem solving task with 90% accuracy given MIN cues.   75% accuracy given MIN cues    10. Patient will complete complex memory task (I.e. remembering names, dates, events, item locations, details of conversations) with 80% accuracy given MIN cues 60% accuracy given MAX cues      Patient Education/Response:     Home program established: yes-  patient instructed   to complete reading comprehension task (worksheet provided).   Exercises were reviewed and Declan demonstrated good  understanding of the education provided.     Assessment:   Declan is progressing well towards his goals. All current goal remain appropriate. Goals to be updated as necessary.       Patient prognosis is Good. Patient will continue to benefit from skilled outpatient speech and language therapy to address the deficits listed in the problem list on initial evaluation, provide patient/family education, and to maximize patient's level of independence in the home and community environment.     Medical necessity is demonstrated by the following IMPAIRMENTS:  word finding, slow and halting speech, comprehension, executive function,  problem solving, memory, difficulty with sentence structure, and slow speech initiation.   Barriers to Therapy: none identified  Patient's spiritual, cultural and educational needs considered and patient agreeable to plan of care and goals.    Plan:   Continue Plan of Care with focus on improvement of expressive language and cognitive communication skills.     Marnie Desai CCC-SLP   1/4/2023                                               never

## 2023-01-05 ENCOUNTER — PATIENT MESSAGE (OUTPATIENT)
Dept: NEUROLOGY | Facility: CLINIC | Age: 58
End: 2023-01-05
Payer: MEDICARE

## 2023-01-06 ENCOUNTER — PATIENT MESSAGE (OUTPATIENT)
Dept: NEUROSURGERY | Facility: CLINIC | Age: 58
End: 2023-01-06
Payer: MEDICARE

## 2023-01-09 ENCOUNTER — CLINICAL SUPPORT (OUTPATIENT)
Dept: REHABILITATION | Facility: HOSPITAL | Age: 58
End: 2023-01-09
Payer: MEDICARE

## 2023-01-09 DIAGNOSIS — R41.841 COGNITIVE COMMUNICATION DEFICIT: ICD-10-CM

## 2023-01-09 DIAGNOSIS — R47.01 APHASIA DETERMINED BY EXAMINATION: Primary | ICD-10-CM

## 2023-01-09 DIAGNOSIS — F06.70 MILD NEUROCOGNITIVE DISORDER DUE TO ANOTHER MEDICAL CONDITION: ICD-10-CM

## 2023-01-09 PROCEDURE — 92507 TX SP LANG VOICE COMM INDIV: CPT

## 2023-01-09 NOTE — PROGRESS NOTES
[facility]  Speech Therapy Treatment Note - Neurological Rehabilitation     Date: 1/9/2023     Name: Declan Burleson   MRN: [id number]   Therapy Diagnosis: Aphasia R 47.01 (ICD-10); Cognitive Communication deficit R 41.841 (ICD-10)   Physician: Anca Gaston PA-C  Physician Orders: [id number] - AMB REFERRAL/CONSULT TO SPEECH THERAPY  Medical Diagnosis: G31.84 (ICD-10-CM) - Mild neurocognitive disorder due to another medical condition    Visit #/ Visits Authorized: 24/ 30  Date of Evaluation:  8/23/2022  Insurance Authorization Period: 12/28/2022  Plan of Care Expiration Date:    12/21/2022  Extended Plan of Care:   1/30/2023  Progress Note: 1/23/2022  Visits Cancelled: 7  Visits No Show: 0    Time In:  1345  Time Out:  1430  Total Billable Time: 45 minutes      Precautions: Standard  Subjective:   Patient reports:  feeling  well and agreeable to all outlined therapy tasks. Patient reports headache beginning last Thursday and improving today. Patient reports taking 500 mg tylenol and aspirin but neither medications helped. SLP recommended patient contact his PCP to report his symptoms and if severe headache returns, to seek immediate medical attention.   He was  compliant to home exercise program.  Response to previous treatment: Good  Objective:   TIMED  Procedure Min.   Cognitive Therapeutic Interventions, first 15 minutes CPT 71183  0   Therapeutic INTVTN, COGN Fucntion, EACH ADDL 15 MIN - ST CPT 87704 0                                                 UNTIMED  Procedure Min.   Speech/Lang Tx/Individual CPT 95893 45   N/A  N/A   Total Timed Units: 0  Total Untimed Units: 1  Charges Billed/Number of units: 1      Short Term Goals: (8 weeks) Current Progress   1/4/2023 Current Progress 1/9/2023   1. The patient will produce a minimum of 4 different features, when presented with a word using semantic feature analysis (SFA), given MIN verbal prompts, with 75% accuracy across 2 consecutive sessions.   60% accuracy given MAX cues  70% accuracy given MOD cues    3. The patient will complete complex auditory comprehension task (I.e. recall items after 60 second delay, recall key details from a narrative) with 80% accuracy given min cues across 2 consecutive sessions.   NT  65% accuracy given MAX cues    4. The patient will describe visual scenes using 3 or more sentences at 80% accuracy given min verbal and phonemic cues across 2 consecutive sessions.  NT  NT   5.  The patient will complete ongoing dynamic assessment.   NT  NT    6. Patient will recall key details from a visually or auditorally presented narrative with 80% accuracy given MIN cues across 2 consecutive sessions.  NT  70% accuracy given MIN cues    7. Patient will complete semantic fluency tasks with 80% accuracy given MIN cues across 2 consecutive sessions.  70% accuracy given MOD cues  75% accuracy INDP    8. Patient will complete executive function task with 80% accuracy given MIN cues.   75% accuracy given MIN cues    80% accuracy given MAX cues    15% accuracy INDP   9. Patient will complete complex problem solving task with 90% accuracy given MIN cues.   75% accuracy given MIN cues  56% accuracy INDP   10. Patient will complete complex memory task (I.e. remembering names, dates, events, item locations, details of conversations) with 80% accuracy given MIN cues 60% accuracy given MAX cues  60% accuracy given MAX cues      Patient Education/Response:     Home program established: yes-  patient instructed   to complete reading comprehension task (worksheet provided).   Exercises were reviewed and Declan demonstrated good  understanding of the education provided.     Assessment:   Declan is progressing well towards his goals. All current goal remain appropriate. Goals to be updated as necessary.       Patient prognosis is Good. Patient will continue to benefit from skilled outpatient speech and language therapy to address the deficits listed in  the problem list on initial evaluation, provide patient/family education, and to maximize patient's level of independence in the home and community environment.     Medical necessity is demonstrated by the following IMPAIRMENTS:  word finding, slow and halting speech, comprehension, executive function, problem solving, memory, difficulty with sentence structure, and slow speech initiation.   Barriers to Therapy: none identified  Patient's spiritual, cultural and educational needs considered and patient agreeable to plan of care and goals.    Plan:   Continue Plan of Care with focus on improvement of expressive language and cognitive communication skills.     Marnie Desai CCC-SLP   1/9/2023

## 2023-01-11 ENCOUNTER — OFFICE VISIT (OUTPATIENT)
Dept: NEUROLOGY | Facility: CLINIC | Age: 58
End: 2023-01-11
Payer: MEDICARE

## 2023-01-11 DIAGNOSIS — G31.84 MILD NEUROCOGNITIVE DISORDER: Primary | ICD-10-CM

## 2023-01-11 DIAGNOSIS — F43.22 ADJUSTMENT DISORDER WITH ANXIOUS MOOD: ICD-10-CM

## 2023-01-11 PROBLEM — F02.80 MAJOR NEUROCOGNITIVE DISORDER DUE TO MULTIPLE ETIOLOGIES: Status: ACTIVE | Noted: 2020-07-22

## 2023-01-11 PROCEDURE — 99499 NO LOS: ICD-10-PCS | Mod: 95,,, | Performed by: CLINICAL NEUROPSYCHOLOGIST

## 2023-01-11 PROCEDURE — 99499 UNLISTED E&M SERVICE: CPT | Mod: 95,,, | Performed by: CLINICAL NEUROPSYCHOLOGIST

## 2023-01-12 NOTE — PROGRESS NOTES
NEUROPSYCHOLOGICAL EVALUATION FEEDBACK    TELEMEDICINE DETAILS:   Established Patient - Audio Only Telehealth Visit   The patient location is: [place]  The chief complaint leading to consultation is: feedback regarding neuropsychological test results  Visit type: Virtual visit with audio only (telephone)  Total time spent with patient: 40 minutes   The reason for the audio only service rather than synchronous audio and video virtual visit was related to technical difficulties or patient preference/necessity.   Each patient to whom I provide medical services by telemedicine is:  (1) informed of the relationship between the physician and patient and the respective role of any other health care provider with respect to management of the patient; and (2) notified that they may decline to receive medical services by telemedicine and may withdraw from such care at any time. Patient verbally consented to receive this service via voice-only telephone call.     Declan Burleson attended a feedback session today and was accompanied by his wife. We discussed the results of the neuropsychological evaluation and I gave time to discuss questions and concerns. For full evaluation details, please see the note from this provider dated 12/16/2022. A copy of the report was provided via [employer].     Problem List Items Addressed This Visit          Neuro    Mild neurocognitive disorder - Primary       Psychiatric    Adjustment disorder with anxious mood         Gwen Horton, PhD  Licensed Clinical Neuropsychologist  [facility] - Department of Neurology                        This service was not originating from a related E/M service provided within the previous 7 days nor will  to an E/M service or procedure within the next 24 hours or my soonest available appointment.  Prevailing standard of care was able to be met in this audio-only visit.

## 2023-01-18 ENCOUNTER — CLINICAL SUPPORT (OUTPATIENT)
Dept: REHABILITATION | Facility: HOSPITAL | Age: 58
End: 2023-01-18
Payer: MEDICARE

## 2023-01-18 DIAGNOSIS — R47.01 APHASIA DETERMINED BY EXAMINATION: Primary | ICD-10-CM

## 2023-01-18 DIAGNOSIS — R41.841 COGNITIVE COMMUNICATION DEFICIT: ICD-10-CM

## 2023-01-18 PROCEDURE — 92507 TX SP LANG VOICE COMM INDIV: CPT

## 2023-01-18 NOTE — PROGRESS NOTES
[facility]  Speech Therapy Treatment Note - Neurological Rehabilitation     Date: 1/18/2023     Name: Declan Burleson   MRN: [id number]   Therapy Diagnosis: Aphasia R 47.01 (ICD-10); Cognitive Communication deficit R 41.841 (ICD-10)   Physician: Anca Gaston PA-C  Physician Orders: [id number] - AMB REFERRAL/CONSULT TO SPEECH THERAPY  Medical Diagnosis: G31.84 (ICD-10-CM) - Mild neurocognitive disorder due to another medical condition    Visit #/ Visits Authorized: 25/ 30  Date of Evaluation:  8/23/2022  Insurance Authorization Period: 12/28/2022  Plan of Care Expiration Date:    12/21/2022  Extended Plan of Care:   1/30/2023  Progress Note: 1/23/2022  Visits Cancelled: 7  Visits No Show: 1    Time In:  1345  Time Out:  1430  Total Billable Time: 45 minutes      Precautions: Standard  Subjective:   Patient reports: feeling well and agreeable to all outlined therapy tasks.  Patient's wife reports that neuropsych reports that patient still has seizure activity in his brain, causing cognitive decline.   He was  compliant to home exercise program.  Response to previous treatment: Good  Objective:   TIMED  Procedure Min.   Cognitive Therapeutic Interventions, first 15 minutes CPT 55043  0   Therapeutic INTVTN, COGN Fucntion, EACH ADDL 15 MIN - ST CPT 36323 0                                                 UNTIMED  Procedure Min.   Speech/Lang Tx/Individual CPT 83344 45   N/A  N/A   Total Timed Units: 0  Total Untimed Units: 1  Charges Billed/Number of units: 1      Short Term Goals: (8 weeks) Current Progress   1/4/2023 Current Progress 1/9/2023 Current Progress 1/18/2023   1. The patient will produce a minimum of 4 different features, when presented with a word using semantic feature analysis (SFA), given MIN verbal prompts, with 75% accuracy across 2 consecutive sessions.  60% accuracy given MAX cues  70% accuracy given MOD cues  Objects in room:   71% accuracy given MAX cues     59% accuracy given  MIN cues     25% accuracy INDP   3. The patient will complete complex auditory comprehension task (I.e. recall items after 60 second delay, recall key details from a narrative) with 80% accuracy given min cues across 2 consecutive sessions.   NT  65% accuracy given MAX cues  Word flash cards: I80% accuracy given MIN cues    4. The patient will describe visual scenes using 3 or more sentences at 80% accuracy given min verbal and phonemic cues across 2 consecutive sessions.  NT  NT Picture flash cards: 80% accuracy given MIN cues     5.  The patient will complete ongoing dynamic assessment.   NT  NT  NT   6. Patient will recall key details from a visually or auditorally presented narrative with 80% accuracy given MIN cues across 2 consecutive sessions.  NT  70% accuracy given MIN cues  Written narrative:75% accuracy given MIN cues   7. Patient will complete semantic fluency tasks with 80% accuracy given MIN cues across 2 consecutive sessions.  70% accuracy given MOD cues  75% accuracy INDP  NT   8. Patient will complete executive function task with 80% accuracy given MIN cues.   75% accuracy given MIN cues    80% accuracy given MAX cues    15% accuracy INDP Executive function worksheet: assigned for homework   9. Patient will complete complex problem solving task with 90% accuracy given MIN cues.   75% accuracy given MIN cues  56% accuracy INDP Executive fxn worksheet: assigned for homework    10. Patient will complete complex memory task (I.e. remembering names, dates, events, item locations, details of conversations) with 80% accuracy given MIN cues 60% accuracy given MAX cues  60% accuracy given MAX cues  Summarizing reading passage immediately after readin% accuracy given MIN cues      Patient Education/Response:     Home program established: yes-  patient instructed   to complete reading/executive function task (worksheet provided.)   Exercises were reviewed and Declan demonstrated good  understanding of  the education provided.     Assessment:   Declan is progressing well towards his goals. All current goal remain appropriate. Goals to be updated as necessary.       Patient prognosis is Good. Patient will continue to benefit from skilled outpatient speech and language therapy to address the deficits listed in the problem list on initial evaluation, provide patient/family education, and to maximize patient's level of independence in the home and community environment.     Medical necessity is demonstrated by the following IMPAIRMENTS:  word finding, slow and halting speech, comprehension, executive function, problem solving, memory, difficulty with sentence structure, and slow speech initiation.   Barriers to Therapy: none identified  Patient's spiritual, cultural and educational needs considered and patient agreeable to plan of care and goals.    Plan:   Continue Plan of Care with focus on improvement of expressive language and cognitive communication skills.     Marnie Desai CCC-SLP   1/18/2023

## 2023-01-19 ENCOUNTER — PATIENT OUTREACH (OUTPATIENT)
Dept: NEUROLOGY | Facility: CLINIC | Age: 58
End: 2023-01-19
Payer: MEDICARE

## 2023-01-19 DIAGNOSIS — G40.209 COMPLEX PARTIAL SEIZURES EVOLVING TO GENERALIZED TONIC-CLONIC SEIZURES: Primary | Chronic | ICD-10-CM

## 2023-01-19 NOTE — PROGRESS NOTES
Prior data including EMU, SEEG data was reviewed. SEEG scheme hypothesis was generated.   Total time: 100 minutes

## 2023-01-20 ENCOUNTER — TELEPHONE (OUTPATIENT)
Dept: NEUROLOGY | Facility: CLINIC | Age: 58
End: 2023-01-20
Payer: MEDICARE

## 2023-01-20 ENCOUNTER — PATIENT MESSAGE (OUTPATIENT)
Dept: NEUROSURGERY | Facility: CLINIC | Age: 58
End: 2023-01-20
Payer: MEDICARE

## 2023-01-20 DIAGNOSIS — R41.3 COMPLAINTS OF MEMORY DISTURBANCE: Primary | ICD-10-CM

## 2023-01-20 DIAGNOSIS — F06.70 MILD NEUROCOGNITIVE DISORDER DUE TO ANOTHER MEDICAL CONDITION: ICD-10-CM

## 2023-01-20 DIAGNOSIS — G40.109 TEMPORAL LOBE EPILEPSY: Primary | ICD-10-CM

## 2023-01-20 DIAGNOSIS — R56.9 SEIZURE: ICD-10-CM

## 2023-01-23 ENCOUNTER — PATIENT MESSAGE (OUTPATIENT)
Dept: NEUROLOGY | Facility: CLINIC | Age: 58
End: 2023-01-23
Payer: MEDICARE

## 2023-01-23 NOTE — TELEPHONE ENCOUNTER
Sarahing in Dr. Edgar since he is this patient's primary neurologist.  We have initiated a system with primary and secondary neurologists for surgical patients.

## 2023-01-24 ENCOUNTER — TELEPHONE (OUTPATIENT)
Dept: NEUROLOGY | Facility: CLINIC | Age: 58
End: 2023-01-24
Payer: MEDICARE

## 2023-01-25 ENCOUNTER — PATIENT MESSAGE (OUTPATIENT)
Dept: NEUROLOGY | Facility: CLINIC | Age: 58
End: 2023-01-25
Payer: MEDICARE

## 2023-01-25 DIAGNOSIS — F06.70 MILD NEUROCOGNITIVE DISORDER DUE TO ANOTHER MEDICAL CONDITION: Primary | ICD-10-CM

## 2023-01-25 NOTE — TELEPHONE ENCOUNTER
----- Message from Bita Mcpherson RN sent at 1/24/2023  1:24 PM CST -----  Hi there,    I know we reached out last week about getting this patient scheduled with Dr. Andrew and Dr. Andrew was involved with fitting him in somewhere. I don't see an appointment scheduled. Can someone update me and let me know the plan as I will be asked by neurosurgery this Friday in our next meeting ;) much appreciated!     Thanks!  Bita

## 2023-01-26 ENCOUNTER — TELEPHONE (OUTPATIENT)
Dept: NEUROLOGY | Facility: CLINIC | Age: 58
End: 2023-01-26
Payer: MEDICARE

## 2023-01-26 NOTE — TELEPHONE ENCOUNTER
Spoke with patient's wife, Trista, per request of Dr. Edgar and Elo. I relayed information that the surgical group as well as the epileptology group wants a repeat PET scan to compare to the previous one from 6/5/2020. Trista expressed she has been expecting a phone call from an MD as she was told a DOCTOR would call her and explain exactly what is going on and she feels she is being sidelined a bit. I agreed to message  this information as well as send her a copy of the PET Appointment letter, scheduled next Friday, 2/3/23.

## 2023-01-27 ENCOUNTER — TELEPHONE (OUTPATIENT)
Dept: NEUROLOGY | Facility: CLINIC | Age: 58
End: 2023-01-27
Payer: MEDICARE

## 2023-01-27 ENCOUNTER — PATIENT MESSAGE (OUTPATIENT)
Dept: NEUROLOGY | Facility: CLINIC | Age: 58
End: 2023-01-27

## 2023-01-27 NOTE — PROGRESS NOTES
[facility]  Speech Therapy Progress Note - Neurological Rehabilitation     Date: 1/30/2023     Name: Declan Burleson   MRN: [id number]   Therapy Diagnosis: Aphasia R 47.01 (ICD-10); Cognitive Communication deficit R 41.841 (ICD-10)   Physician: Anca Gaston PA-C  Physician Orders: [id number] - AMB REFERRAL/CONSULT TO SPEECH THERAPY  Medical Diagnosis: G31.84 (ICD-10-CM) - Mild neurocognitive disorder due to another medical condition    Visit #/ Visits Authorized: 26/ 30  Date of Evaluation:  8/23/2022  Insurance Authorization Period: 12/28/2022  Plan of Care Expiration Date:    12/21/2022  Extended Plan of Care:   1/30/2023, 5/23/2023 *pending   Progress Note: 1/30/2022 - complete  Visits Cancelled: 8  Visits No Show: 1    Time In:  1352  Time Out:  1432  Total Billable Time: 42 minutes      Precautions: Standard  Subjective:   Patient reports: feeling agreeable to all outlined therapy tasks.     He was  compliant to home exercise program.  Response to previous treatment: Good  Objective:   TIMED  Procedure Min.   Cognitive Therapeutic Interventions, first 15 minutes CPT 26531  0   Therapeutic INTVTN, COGN Fucntion, EACH ADDL 15 MIN - ST CPT 01237 0                                                 UNTIMED  Procedure Min.   Speech/Lang Tx/Individual CPT 28294 42   N/A  N/A   Total Timed Units: 0  Total Untimed Units: 1  Charges Billed/Number of units: 1      Short Term Goals: (8 weeks) Current Progress   1/4/2023 Current Progress 1/9/2023 Current Progress 1/18/2023 Current Progress 1/30/2023   1. The patient will produce a minimum of 4 different features, when presented with a word using semantic feature analysis (SFA), given MIN verbal prompts, with 75% accuracy across 2 consecutive sessions.  60% accuracy given MAX cues  70% accuracy given MOD cues  Objects in room:   71% accuracy given MAX cues     59% accuracy given MIN cues     25% accuracy INDP NT    3. The patient will complete complex  auditory comprehension task (I.e. recall items after 60 second delay, recall key details from a narrative) with 80% accuracy given min cues across 2 consecutive sessions.   NT  65% accuracy given MAX cues  Word flash cards: 80% accuracy given MIN cues  NT   4. The patient will describe visual scenes using 3 or more sentences at 80% accuracy given min verbal and phonemic cues across 2 consecutive sessions.  NT  NT Picture flash cards: 80% accuracy given MIN cues   NT   5.  The patient will complete ongoing dynamic assessment.   NT  NT  NT NT   6. Patient will recall key details from a visually or auditorally presented narrative with 80% accuracy given MIN cues across 2 consecutive sessions.  NT  70% accuracy given MIN cues  Written narrative:75% accuracy given MIN cues NT   7. Patient will complete semantic fluency tasks with 80% accuracy given MIN cues across 2 consecutive sessions.  70% accuracy given MOD cues  75% accuracy INDP  NT NT   8. Patient will complete executive function task with 80% accuracy given MIN cues.   75% accuracy given MIN cues    80% accuracy given MAX cues    15% accuracy INDP Executive function worksheet: assigned for homework Homework assignment: patient identified a functional goal and organized action plan with 70% accuracy given MAX cues.    9. Patient will complete complex problem solving task with 90% accuracy given MIN cues.   75% accuracy given MIN cues  56% accuracy INDP Executive fxn worksheet: assigned for homework  Homework assignment: patient worked through possible obstacles to his functional goals with 75% accuracy given MOD cues    10. Patient will complete complex memory task (I.e. remembering names, dates, events, item locations, details of conversations) with 80% accuracy given MIN cues 60% accuracy given MAX cues  60% accuracy given MAX cues  Summarizing reading passage immediately after readin% accuracy given MIN cues  Remembering details from recent neuro appt:  patient recalled details with 70% accuracy given MAX assist from his wife.      Patient Education/Response:     Home program established: yes-  patient instructed  to complete portions of functional goal worksheet as well as practice SFA during instances of anomia.   Exercises were reviewed and Declan demonstrated good  understanding of the education provided.     Assessment:   Declan is progressing well towards his goals. All current goal remain appropriate. Goals to be updated as necessary.        Progress Note: SLP, patient, and patient's discussed patient's progress thus far, functional goals for the future, upcoming neurology appointments, and extending the current plan of care. Patient has made gradual progress across all short term goals during this plan of care. SLP, patient, and patient's wife agree that patient will benefit from continued skilled speech therapy services.     Patient prognosis is Good. Patient will continue to benefit from skilled outpatient speech and language therapy to address the deficits listed in the problem list on initial evaluation, provide patient/family education, and to maximize patient's level of independence in the home and community environment.     Medical necessity is demonstrated by the following IMPAIRMENTS:  word finding, slow and halting speech, comprehension, executive function, problem solving, memory, difficulty with sentence structure, and slow speech initiation.   Barriers to Therapy: none identified  Patient's spiritual, cultural and educational needs considered and patient agreeable to plan of care and goals.    Plan:   Extend Plan of Care with focus on improvement of expressive language and cognitive communication skills.     Marnie Desai CCC-SLP   1/30/2023      Plan of Care Extension:  I certify the need to extend the plan of care from 1/31/2023 - 5/23/23023.      Physician's Signature: _________________________       Date:  _______________________

## 2023-01-28 ENCOUNTER — LAB VISIT (OUTPATIENT)
Dept: LAB | Facility: HOSPITAL | Age: 58
End: 2023-01-28
Attending: PSYCHIATRY & NEUROLOGY
Payer: MEDICARE

## 2023-01-28 DIAGNOSIS — F06.70 MILD NEUROCOGNITIVE DISORDER DUE TO ANOTHER MEDICAL CONDITION: ICD-10-CM

## 2023-01-28 PROCEDURE — 36415 COLL VENOUS BLD VENIPUNCTURE: CPT | Performed by: PSYCHIATRY & NEUROLOGY

## 2023-01-28 PROCEDURE — 0361U NEURFLMNT LT CHN DIG IA QUAN: CPT | Performed by: PSYCHIATRY & NEUROLOGY

## 2023-01-30 ENCOUNTER — PATIENT MESSAGE (OUTPATIENT)
Dept: NEUROLOGY | Facility: CLINIC | Age: 58
End: 2023-01-30
Payer: MEDICARE

## 2023-01-30 ENCOUNTER — TELEPHONE (OUTPATIENT)
Dept: NEUROLOGY | Facility: CLINIC | Age: 58
End: 2023-01-30
Payer: MEDICARE

## 2023-01-30 ENCOUNTER — CLINICAL SUPPORT (OUTPATIENT)
Dept: REHABILITATION | Facility: HOSPITAL | Age: 58
End: 2023-01-30
Payer: MEDICARE

## 2023-01-30 DIAGNOSIS — R41.841 COGNITIVE COMMUNICATION DEFICIT: ICD-10-CM

## 2023-01-30 DIAGNOSIS — G31.84 MCI (MILD COGNITIVE IMPAIRMENT): Primary | ICD-10-CM

## 2023-01-30 DIAGNOSIS — R47.01 APHASIA DETERMINED BY EXAMINATION: Primary | ICD-10-CM

## 2023-01-30 DIAGNOSIS — R41.841 COGNITIVE COMMUNICATION DEFICIT: Primary | ICD-10-CM

## 2023-01-30 PROCEDURE — 92507 TX SP LANG VOICE COMM INDIV: CPT

## 2023-01-30 NOTE — TELEPHONE ENCOUNTER
Spoke with wife Trista to confirm new availability with Dr. Andrew for new patient intake. We confirmed 2/2/23 at 10:30 AM. We discussed in brief Dr. Andrew's specialty and goals for this visit.

## 2023-01-31 ENCOUNTER — PATIENT MESSAGE (OUTPATIENT)
Dept: NEUROSURGERY | Facility: CLINIC | Age: 58
End: 2023-01-31
Payer: MEDICARE

## 2023-01-31 ENCOUNTER — PATIENT OUTREACH (OUTPATIENT)
Dept: NEUROLOGY | Facility: CLINIC | Age: 58
End: 2023-01-31
Payer: MEDICARE

## 2023-01-31 DIAGNOSIS — G40.209 COMPLEX PARTIAL SEIZURES EVOLVING TO GENERALIZED TONIC-CLONIC SEIZURES: Primary | Chronic | ICD-10-CM

## 2023-01-31 NOTE — PROGRESS NOTES
1/27/23  I spoke with the patient and wife today. We discussed our groups' impression and the next steps in detail.   Total time of phone consultation - 30 minutes    Total time for case review - 90 min     Epilepsy:   The patient has a history of focal epilepsy and underwent phase 2 monitoring with SEEG.  During this study we recorded 8 seizures.  Unfortunately he removed the electrodes prematurely.  The seizure onset was identified as amygdala and hippocampus with early spread to the mesial prefrontal cortex and orbital frontal cortex.  Other areas of interictal activity without involvement during electrographic seizures included superior frontal gyrus, pre motor cortex and superior parietal lobule.  Based on this evaluation patient underwent thermal laser ablation of the mesial structures in the left temporal lobe.  This did not yield seizure freedom and the patient continued to have breakthrough seizures.  He was evaluated in the epilepsy monitoring unit during which interictal activity was noted in the left temporal region and 2 electrographic seizures emanating from the left temporal region were noted.  We discussed that it may be meaningful to repeat phase 2 evaluation to identify a seizure onset zone that may be amenable to surgical intervention.  The current hypothesis suggest residual amygdala tissue versus amygdala complex including entorhinal cortex as the seizure onset zone.  Interventions include possible laser ablation, RNS, focus resection of the left temporal lobe.  The risks of these procedures were also discussed in detail including impact on cognition memory and language. Patient is eager to proceed with surgical evaluation and intervention with hopes to achieve seizure reduction and possible reduction in medication use; and possible seizure freedom.    Cognition:   During his ongoing evaluations we have identified a decline in overall cognition & performance on neuropsych evaluations.   Specifically there is a decline in verbal learning, language functions, semantic verbal fluency and executive functioning.  The etiology of this remains unclear.  Certainly antiseizure medications and epilepsy may be contributing to this.  It is prudent to investigate for an underlying neurodegenerative process.  Therefore we will proceed with further testing.  The nature of these testing, indications and risks were explained to the patient and his wife in detail.  At this point no clear evidence supporting a progressive neurodegenerative disease like Alzheimer's disease or semantic dementia or FTD were identified.   Of concerns are episodes of transient inattention and confusion that were identified both during the phase 2 evaluation and during the recent EMU evaluation.  The etiology of this is unclear.  Also patient had notable fatigue during the neuro cognitive testing which may have impacted the performance.  The next steps include  Blood test- Neurofilament Light Chain  Blood test- Alzheimer's disease predictive panel  Lumbar puncture and CSF evaluation - total tau and related assays (May consider adding panels - biomarker for AD)  Brain PET to compare abnormality noted on prior study  MRI stealth with demyelination sequences  Evaluation by Dr. Andrew   At this point the patient and his wife requested to keep the February 27 date for phase 2 evaluation.  We will plan to complete the above testing along with a group discussion of the findings.  If there are no concerns from a cognitive health standpoint and no other contraindications are identified by the group members we will proceed with planned phase 2 evaluation.  Otherwise we will address the cognitive component prior to proceeding with epilepsy surgery.  I answered all their questions to their satisfaction.    Kaleb Edgar M.D.

## 2023-02-01 ENCOUNTER — HOSPITAL ENCOUNTER (OUTPATIENT)
Dept: RADIOLOGY | Facility: HOSPITAL | Age: 58
Discharge: HOME | End: 2023-02-01
Attending: PSYCHIATRY & NEUROLOGY
Payer: MEDICARE

## 2023-02-01 ENCOUNTER — HOSPITAL ENCOUNTER (OUTPATIENT)
Dept: RADIOLOGY | Facility: HOSPITAL | Age: 58
Discharge: HOME | End: 2023-02-01
Attending: NEUROLOGICAL SURGERY
Payer: MEDICARE

## 2023-02-01 ENCOUNTER — OUTPATIENT CASE MANAGEMENT (OUTPATIENT)
Dept: NEUROLOGY | Facility: CLINIC | Age: 58
End: 2023-02-01
Payer: MEDICARE

## 2023-02-01 DIAGNOSIS — F06.70 MILD NEUROCOGNITIVE DISORDER DUE TO ANOTHER MEDICAL CONDITION: ICD-10-CM

## 2023-02-01 DIAGNOSIS — Z01.818 PRE-OP TESTING: Primary | ICD-10-CM

## 2023-02-01 DIAGNOSIS — R46.89 COGNITIVE AND BEHAVIORAL CHANGES: Primary | ICD-10-CM

## 2023-02-01 DIAGNOSIS — G40.209 COMPLEX PARTIAL SEIZURES EVOLVING TO GENERALIZED TONIC-CLONIC SEIZURES: ICD-10-CM

## 2023-02-01 DIAGNOSIS — F06.70 MILD NEUROCOGNITIVE DISORDER DUE TO ANOTHER MEDICAL CONDITION: Primary | ICD-10-CM

## 2023-02-01 DIAGNOSIS — R41.89 COGNITIVE AND BEHAVIORAL CHANGES: Primary | ICD-10-CM

## 2023-02-01 DIAGNOSIS — Z01.818 PRE-OP TESTING: ICD-10-CM

## 2023-02-01 PROCEDURE — 70553 MRI BRAIN DEMYELINATING W/ WO CONTRAST: ICD-10-PCS | Mod: 26,,, | Performed by: RADIOLOGY

## 2023-02-01 PROCEDURE — A9585 GADOBUTROL INJECTION: HCPCS | Performed by: PSYCHIATRY & NEUROLOGY

## 2023-02-01 PROCEDURE — 70552 MRI BRAIN STEM W/DYE: CPT | Mod: 26,,, | Performed by: RADIOLOGY

## 2023-02-01 PROCEDURE — 70552 MRI BRAIN STEM W/DYE: CPT | Mod: TC

## 2023-02-01 PROCEDURE — 70552 MRI BRAIN STEALTH W/O FIDUCIALS WITH CONTRAST: ICD-10-PCS | Mod: 26,,, | Performed by: RADIOLOGY

## 2023-02-01 PROCEDURE — 25500020 PHARM REV CODE 255: Performed by: PSYCHIATRY & NEUROLOGY

## 2023-02-01 PROCEDURE — 70553 MRI BRAIN STEM W/O & W/DYE: CPT | Mod: 26,,, | Performed by: RADIOLOGY

## 2023-02-01 PROCEDURE — 70553 MRI BRAIN STEM W/O & W/DYE: CPT | Mod: TC

## 2023-02-01 RX ORDER — GADOBUTROL 604.72 MG/ML
10 INJECTION INTRAVENOUS
Status: COMPLETED | OUTPATIENT
Start: 2023-02-01 | End: 2023-02-01

## 2023-02-01 RX ADMIN — GADOBUTROL 10 ML: 604.72 INJECTION INTRAVENOUS at 03:02

## 2023-02-02 ENCOUNTER — TELEPHONE (OUTPATIENT)
Dept: NEUROLOGY | Facility: CLINIC | Age: 58
End: 2023-02-02
Payer: MEDICARE

## 2023-02-02 ENCOUNTER — OFFICE VISIT (OUTPATIENT)
Dept: NEUROLOGY | Facility: CLINIC | Age: 58
End: 2023-02-02
Payer: MEDICARE

## 2023-02-02 VITALS
BODY MASS INDEX: 26.78 KG/M2 | DIASTOLIC BLOOD PRESSURE: 77 MMHG | HEART RATE: 65 BPM | SYSTOLIC BLOOD PRESSURE: 116 MMHG | WEIGHT: 192 LBS

## 2023-02-02 DIAGNOSIS — T88.7XXA MEDICATION SIDE EFFECT: ICD-10-CM

## 2023-02-02 DIAGNOSIS — G40.211 PARTIAL SYMPTOMATIC EPILEPSY WITH COMPLEX PARTIAL SEIZURES, INTRACTABLE, WITH STATUS EPILEPTICUS: ICD-10-CM

## 2023-02-02 DIAGNOSIS — S06.9X0S UNSPECIFIED INTRACRANIAL INJURY WITHOUT LOSS OF CONSCIOUSNESS, SEQUELA: ICD-10-CM

## 2023-02-02 DIAGNOSIS — G31.9 BRAIN ATROPHY: ICD-10-CM

## 2023-02-02 DIAGNOSIS — R41.89: ICD-10-CM

## 2023-02-02 DIAGNOSIS — F90.0 ATTENTION DEFICIT HYPERACTIVITY DISORDER (ADHD), PREDOMINANTLY INATTENTIVE TYPE: ICD-10-CM

## 2023-02-02 DIAGNOSIS — G47.00 INSOMNIA, UNSPECIFIED TYPE: ICD-10-CM

## 2023-02-02 DIAGNOSIS — G47.30 SLEEP APNEA, UNSPECIFIED TYPE: ICD-10-CM

## 2023-02-02 DIAGNOSIS — G31.84 MCI (MILD COGNITIVE IMPAIRMENT): Primary | ICD-10-CM

## 2023-02-02 PROBLEM — F02.80 DEMENTIA IN OTHER DISEASES CLASSIFIED ELSEWHERE, UNSPECIFIED SEVERITY, WITHOUT BEHAVIORAL DISTURBANCE, PSYCHOTIC DISTURBANCE, MOOD DISTURBANCE, AND ANXIETY: Status: ACTIVE | Noted: 2023-02-02

## 2023-02-02 LAB — NEUROFILAMENT LIGHT CHAIN, PLASMA: 19.1 PG/ML

## 2023-02-02 PROCEDURE — 96116 PR NEUROBEHAVIORAL STATUS EXAM BY PSYCH/PHYS: ICD-10-PCS | Mod: 59,S$GLB,, | Performed by: PSYCHIATRY & NEUROLOGY

## 2023-02-02 PROCEDURE — 3008F BODY MASS INDEX DOCD: CPT | Mod: CPTII,S$GLB,, | Performed by: PSYCHIATRY & NEUROLOGY

## 2023-02-02 PROCEDURE — 99417 PROLNG OP E/M EACH 15 MIN: CPT | Mod: S$GLB,,, | Performed by: PSYCHIATRY & NEUROLOGY

## 2023-02-02 PROCEDURE — 96132 PR NEUROPSYCHOLOGIC TEST EVAL SVCS, 1ST HR: ICD-10-PCS | Mod: 59,S$GLB,, | Performed by: PSYCHIATRY & NEUROLOGY

## 2023-02-02 PROCEDURE — 99215 OFFICE O/P EST HI 40 MIN: CPT | Mod: S$GLB,,, | Performed by: PSYCHIATRY & NEUROLOGY

## 2023-02-02 PROCEDURE — 96132 NRPSYC TST EVAL PHYS/QHP 1ST: CPT | Mod: 59,S$GLB,, | Performed by: PSYCHIATRY & NEUROLOGY

## 2023-02-02 PROCEDURE — 1160F PR REVIEW ALL MEDS BY PRESCRIBER/CLIN PHARMACIST DOCUMENTED: ICD-10-PCS | Mod: CPTII,S$GLB,, | Performed by: PSYCHIATRY & NEUROLOGY

## 2023-02-02 PROCEDURE — 99999 PR PBB SHADOW E&M-EST. PATIENT-LVL IV: ICD-10-PCS | Mod: PBBFAC,,, | Performed by: PSYCHIATRY & NEUROLOGY

## 2023-02-02 PROCEDURE — 96116 NUBHVL XM PHYS/QHP 1ST HR: CPT | Mod: 59,S$GLB,, | Performed by: PSYCHIATRY & NEUROLOGY

## 2023-02-02 PROCEDURE — 3008F PR BODY MASS INDEX (BMI) DOCUMENTED: ICD-10-PCS | Mod: CPTII,S$GLB,, | Performed by: PSYCHIATRY & NEUROLOGY

## 2023-02-02 PROCEDURE — 99215 PR OFFICE/OUTPT VISIT, EST, LEVL V, 40-54 MIN: ICD-10-PCS | Mod: S$GLB,,, | Performed by: PSYCHIATRY & NEUROLOGY

## 2023-02-02 PROCEDURE — 1159F MED LIST DOCD IN RCRD: CPT | Mod: CPTII,S$GLB,, | Performed by: PSYCHIATRY & NEUROLOGY

## 2023-02-02 PROCEDURE — 3078F DIAST BP <80 MM HG: CPT | Mod: CPTII,S$GLB,, | Performed by: PSYCHIATRY & NEUROLOGY

## 2023-02-02 PROCEDURE — 99417 PR PROLONGED SVC, OUTPT, W/WO DIRECT PT CONTACT,  EA ADDTL 15 MIN: ICD-10-PCS | Mod: S$GLB,,, | Performed by: PSYCHIATRY & NEUROLOGY

## 2023-02-02 PROCEDURE — 3078F PR MOST RECENT DIASTOLIC BLOOD PRESSURE < 80 MM HG: ICD-10-PCS | Mod: CPTII,S$GLB,, | Performed by: PSYCHIATRY & NEUROLOGY

## 2023-02-02 PROCEDURE — 99999 PR PBB SHADOW E&M-EST. PATIENT-LVL IV: CPT | Mod: PBBFAC,,, | Performed by: PSYCHIATRY & NEUROLOGY

## 2023-02-02 PROCEDURE — 1159F PR MEDICATION LIST DOCUMENTED IN MEDICAL RECORD: ICD-10-PCS | Mod: CPTII,S$GLB,, | Performed by: PSYCHIATRY & NEUROLOGY

## 2023-02-02 PROCEDURE — 1160F RVW MEDS BY RX/DR IN RCRD: CPT | Mod: CPTII,S$GLB,, | Performed by: PSYCHIATRY & NEUROLOGY

## 2023-02-02 PROCEDURE — 3074F SYST BP LT 130 MM HG: CPT | Mod: CPTII,S$GLB,, | Performed by: PSYCHIATRY & NEUROLOGY

## 2023-02-02 PROCEDURE — 3074F PR MOST RECENT SYSTOLIC BLOOD PRESSURE < 130 MM HG: ICD-10-PCS | Mod: CPTII,S$GLB,, | Performed by: PSYCHIATRY & NEUROLOGY

## 2023-02-02 NOTE — TELEPHONE ENCOUNTER
Spoke with Dani to confirm this morning's appointment and to follow up on the question of a later appt. I offered a later time the following week that had opened up, but Declan had just left the house and wanted to proceed.

## 2023-02-02 NOTE — PROGRESS NOTES
[facility]  Brain Health and Cognitive Disorders Program    PATIENT: Declan Burleson  DATE: 02/01/2023  MRN: [id number]  PRIMARY PROVIDER: Juan Carlos Simmons NP    Future Appointments   Date Time Provider Department Center   2/2/2023 10:30 AM Stiven Andrew MD [facility] [id number] [facility]   2/3/2023  7:30 AM [facility] PET CT LIMIT 500 LBS [facility] PET CT [facility]   2/8/2023  1:45 PM Marnie Lemaitre, CCC-SLP [facility]   2/13/2023  1:45 PM Marnie Lemaitre, CCC-SLP [facility]   2/15/2023  1:45 PM Marnie Lemaitre, CCC-SLP [facility]   2/20/2023  1:45 PM Marnie Lemaitre, CCC-SLP [facility]   2/22/2023  1:45 PM Marnie Lemaitre, CCC-SLP [facility]   2/27/2023  1:45 PM Marnie Lemaitre, CCC-SLP [facility]   3/1/2023  1:45 PM Marnie Lemaitre, CCC-SLP [facility]   3/6/2023  1:45 PM Marnie Lemaitre, CCC-SLP [facility]   3/8/2023  1:45 PM Marnie Lemaitre, CCC-SLP [facility]   3/13/2023  1:45 PM Marnie Lemaitre, CCC-SLP [facility]   3/15/2023  1:45 PM Marnie Lemaitre, CCC-SLP [facility]   3/20/2023  1:45 PM Marnie Lemaitre, CCC-SLP [facility]   3/22/2023  1:45 PM Marnie Lemaitre, CCC-SLP [facility]   3/27/2023  1:45 PM Marnie Lemaitre, CCC-SLP [facility]   3/29/2023  1:45 PM [facility]   4/3/2023  1:45 PM Marnie Desai [facility]   4/5/2023  1:45 PM Marnie Desai [facility]   4/10/2023  1:45 PM Marnie Desai [facility]   4/12/2023  1:45 PM Marnie Desai [facility]   4/17/2023  1:45 PM Marnie Desai [facility]           I reviewed old records and/or communicated with other professionals or the patient's family from 07:17 until 07:53 PM on 02/01/2023. This is directly related to a face-to-face visit  "encounter with the patient (Evaluation and Management service) conducted on 2023.    I reviewed the following documentation for a total of 36 minutes.  CPT codes for billing for prolonged evaluation and management service (non-face-to-face review of records or communications with patient's family or other medical professionals):  14245  Old [facility] EMR records I reviewed include:     Relevant Background/Context  Known Relevant Family history:  Mother - alive 81  Father -  at age   Migraines: Father, Paternal grandfather  Sister - alive in [place]  Neurocognitive Disorder:  Father - "he was in diapers in hospital"  Movement Disorder:  The patient/family denies a history of PD, PDD, tremor.  Motorneuron Disorder:  The patient/family denies a history of ALS, MND, PLS.  Developmental Disorder:  The patient/family denies a history of Dyslexia, ADHD, ASD.  Psychiatric Disorder:  The patient/family denies a history of MDD, BD, DA, Schizophrenia.  Known Relevant Genetics:  There is no known relevant genetic testing available.  Developmental Milestones:  The patient/family report no known birth complications or early life problems. The patient met all developmental milestones.  Education/Learning Capacity:  The patient/family report no signs or symptoms suggestive of developmental learning disorder.  HS Typical Grades: C+/B-  AA. + 2 years/mechanical/automotive.  Estimated Educational Experience: 14 years of formal education.  Social History:  Malay first language, Venezuelan second, and English third. Taught him Swiss English in [place] so it was a little different when [place] got to [place] to learn. Began using English regularly at the age of 14 years. Classes were taught in English. Has primarily used English in his adult life; speaks with brother in Malay, though they switch back and forth to English.  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  Retired/Quit: 2019     Neurocognitive Disorder Features  Onset/Duration:  Jan 2012 (~11-year)  First Symptom:  Executive impairment  Progression:  Gradually Progressive  Clinical Course:  Electronic Medical Record (Approximately 2013)  Type: Chart Review. Seizure hx from the patient and his wife:. - onset of seizures x 2013. 1st episode was with GTC sz. Per patient: he went to bed and has no further recollection until waking up in the hospital. Per wife: she heard a loud noise,noted that he became stiff with all extremities extended and was unresponsive; eye were closed and he started to have generalized shaking. - EMS was called and by the time they arrived the patient was confused and combative; seizure was associated with tongue bite and bladder incontinence. - the patient remained confused for a prolonged period of time. - the patient was admitted overnight and was evaluated - all tests were reportedly normal. - was seen by a Neurologist within a week: however, the patient had at least 2-3 more similar sz within that period, and was started on AED.  Neuropsychologist (07/22/2020)  Type: Chart Review. Onset & course of difficulty: Thinking changes ever since he had his first seizure 8 years ago. While the patient believes these changes have remained stable, his wife believes they have worsened over time. Fluctuations: None. Severity of changes: Moderate to severe per his wife. Examples:. Attention/Working Memory/Executive Functioning: No problems per the patient. his wife says more distracted than before. Multitasking gotten harder and forgetting to finish some tasks. Processing Speed: No problems per the patient. Speed of thinking much slower than before per his wife. Language:  Word-finding difficulty. Taking a while to come up with names. Tip of the tongue. Name will come back to him an hour or so later. Knows what he wants to say, but problems pulling up the words and then staggering his words/speech, like trying to find the right words to say. Visuospatial: No problems. Learning & Memory: Forgets to turn laundry on. Can recall things from the past, but struggles with day to day memory. Can't always recall names of people. Misplacing items. Forgetting what his wife tells him and when she repeats information, it's as if it's the first time he is hearing it. Will sometimes hear voices out there but they aren't there or see something but it's not there. Doesn't happen every often. 1 x monthly. People and things from the past. 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 Not when sitting but when outside or inside doing some work and then it's like it's hearing someone is outside. Lasts about a minute and then goes away. Sometimes will see his dad or grandparents. 1 x every few months. Ever since his dad passed away in 2010. These things just started to happen about a year ago or so. Doesn't scare him. Friendly and giving him advice. Be a good boy and take care of the family.  Neurologist (02/15/2022)  Type: Chart Review. his wife says Dr Edgar had him walk at his last appt and thought he had parkinson's based on his movement that day. He is established in Dr. Ambriz's office (neuro in [place]). They went to see him after this to get his opinion. He did a skin biopsy for Parkinson's disease and it came back as negative. They came here today because Dr. Edgar made the appt. However, they are no longer worried about Parkinson's disease. They are worried about his memory since he had epilepsy surgery March, 2021. his wife feels his memory has been going down the past 6 mos. She  "will have conversation with him but he does not know what she is talking about when the subject comes back up. He notes that "pronunciation but that is not the right word, like something is not clicking. ". He will know the word but cannot pronounce it. He can tell the word is right there but just doesn't come out, gets him frustrated. He is fluent in Ethiopian, Sao Tomean and English. He is trying to figure out why he cannot speak as fluently in English as he once did. He can come with the Ethiopian or Sao Tomean word but not the English word. 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 He will put the water in but forget to turn it on. He will come back 5 min later and realize what he did.  Neurologist (05/23/2022)  Type: Chart Review. The patient is a 55 y/o male with intractable epilepsy who is s/p bilateral sEEG presents today for elective left laser ablation amygdalohippocampectomy. All questions answered and the patient wishes to proceed with surgery.  Patient originally had presented to Dr. Chen to discuss elective options of his intractable epilepsy.  His seizures began when he was 48 years old. He can recall no inciting event. He has 3 semiologies: "full blown" generalized events, staring episodes, and "mild" seizures, which are characterized as generalized events of shorter duration without incontinence and a shorter post-ictal period. He and his wife estimate that he persists in having about 2 events/month.  He has failed multiple AEDs, including topiramate, lamotrigine, levetiracetam, and oxcarbazepine. He is currently taking eslicarbazepine, zonisamide, and clobazam. He had EMU monitoring in May-June of this year, which suggests left-sided activity. He had 3T MRI in June (personally reviewed) that was non-lesional; he also had PET at that time suggesting possible left-sided activity. He had neuropsychological testing on 8/10/20.  Neurologist (07/25/2022)  Type: Chart Review. The patient is a 55 y/o male with " "intractable epilepsy who is s/p bilateral sEEG presents today for elective left laser ablation amygdalohippocampectomy. All questions answered and the patient wishes to proceed with surgery.  Patient originally had presented to Dr. Chen to discuss elective options of his intractable epilepsy.  His seizures began when he was 48 years old. He can recall no inciting event. He has 3 semiologies: "full blown" generalized events, staring episodes, and "mild" seizures, which are characterized as generalized events of shorter duration without incontinence and a shorter post-ictal period. He and his wife estimate that he persists in having about 2 events/month.  He has failed multiple AEDs, including topiramate, lamotrigine, levetiracetam, and oxcarbazepine. He is currently taking eslicarbazepine, zonisamide, and clobazam. He had EMU monitoring in May-June of this year, which suggests left-sided activity. He had 3T MRI in June (personally reviewed) that was non-lesional; he also had PET at that time suggesting possible left-sided activity. He had neuropsychological testing on 8/10/20.  Electronic Medical Record (12/15/2022)  Type: Chart Review. The patient returns with his wife, Trista. They are frustrated that he is "like a zombie" because of the high doses of AEDs. He has been seizure-free since April, but when he decreased the meds back in April, he had 3 events within 24 hours.  Neuropsychologist (12/16/2022)  Type: Chart Review. Néstor Burleson is a 57 y. O.  right-handed male referred by Dr. Benoit (originally by Dr. Saldana of [place]) for consideration of surgical therapy for intractable epilepsy. The patient was formerly employed as a . He is accompanied by his wife Trista today, who helps to provide the history. His seizures began when he was 48 years old. He can recall no inciting event. He has 3 semiologies: "full blown" generalized events, staring episodes, and "mild" seizures, which are " characterized as generalized events of shorter duration without incontinence and a shorter post-ictal period. He and his wife estimate that he persists in having about 2 events/month. He has failed multiple AEDs, including topiramate, lamotrigine, levetiracetam, and oxcarbazepine. He is currently taking eslicarbazepine, zonisamide, and clobazam. He had EMU monitoring in May-June of this year, which suggests left-sided activity. He had 3T MRI in June (personally reviewed) that was non-lesional; he also had PET at that time suggesting possible left-sided activity. He had neuropsychological testing on 8/10/20; summary findings included below. He lives with his wife and 16-year-old daughter, who provide excellent social support. While his scores are more consistent with Major Neurocognitive Disorder/dementia, he has improved in his management of his medications since undergoing surgery in 2021. Furthermore, etiology is considered multifactorial with seizures, impact of L laser amygdalohippocampectomy, significant medication side effects, and lethargy all considered contributory. His reduced performance validity measures indicate that effort issues may also be contributory. As such, I will leave his diagnosis as is for the time being.     Current Presentation  Recent/Interim History:  Born and raised in [facility] [place] in [place] environment  10-12 years - migraines, could not tolerate cold substances  inattention possible developmental ADHD. [place] to [place] in [place] - worked as  in [place]  [employer] jobs and  between 1980-2020s  2010:: Ever since his dad passed away in 2010. These things just started to happen about a year ago or so. Doesn't scare him. 2012 :: [facility]  2013 :: seizures  2018 - first seizure in AM  [id number] - increasing seizure frequency despite multiple trials of medication  2020 :: [facility]  2021 :: L TLE s/p ablation on 3/1/21  2022 :: zombie like while on high dose AED  2022 :: worsening memory s/p L TLE  s/p ablation.  Unresolved Concern(s) reported by patient/family:  Neurodegeneration vs post-op complications          Neuroimaging:    Brain Fluorodeoxyglucose-positron emission tomography on 6/5/2020  Formal interpretation by Radiology:  No visualized area of abnormal activity to suggest an interictal epileptogenic focus. Multiple regions of decreased uptake as per [facility] quantitative analysis as above. (Quantitative analysis with [facility] demonstrates multiple regions of decreased uptake, most prominent within the left rolandic operculum with a Z-score of -3.3)  Independently reviewed radiological imaging by Stiven Marie MD. MPH. Behavioral Neurologist  Impression: : decreased uptake, most prominent within the left rolandic operculum    MRI brain/head with and without contrast on 6/5/2020  Formal interpretation by Radiology:  Continued few scattered punctate size foci of T2 FLAIR signal hyperintensity supratentorial white matter most concentrated in the right frontal lobe which are nonspecific and of doubtful clinical significance. There is no evidence for acute infarction or enhancing lesion. Otherwise unremarkable MRI brain as detailed above.  Independently reviewed radiological imaging by Stiven Marie MD. MPH. Behavioral Neurologist  T1: mild anterior cingulate focal sulcal widening with hypoplasia/ developmental variant of the orbital frontal prefrontal cortices. mild sulcal widening of the right anterior temporal cortex specifically the superior temporal gyrus. Otherwise normal volume. 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 Intact corpus callosum with normal volume and ratio. Thinning and tapering the midbrain with intact volume and ratio midbrain pontine ratio 10.0/17.9. intact subcortical nuclei with good volume and mass with relatively large corpus callosum transcortical fiber bundle. Likely normal variant  T2/FLAIR: very mild subcortical white matter changes with right  greater than left periventricular capping with mild wallerian changes in the right insula nearly juxtacortical possible regional damage or degeneration of the right anterior insular inferior prefrontal cortex.  DWI/ADC: No Significant DWI hyperintensities/hypointensities. No ADC correlation.  SWI/GRE: No Significant hypointensities to suggest cortical/subcortical hemosiderin deposition.  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 Unclear if degenerative or vascular.    MRI brain/head without contrast on 2/1/2023  Formal interpretation by Radiology:  No significant change from prior allowing for differences in technique. Stable encephalomalacia left medial temporal lobe compatible with prior laser ablation No evidence for new signal abnormality or enhancement. Ventricles stable without hydrocephalus. Additional thin section imaging for intraoperative navigation/preoperative planning purposes  Independently reviewed radiological imaging by Stiven Marie MD. MPH. Behavioral Neurologist  T1: Significant postoperative changes. Compared to 2020, mild interval worsening anterior cingulate ventral medial prefrontal cortical atrophy. Unclear if posttraumatic/ iatrogenic or degenerative nature. Relative sparing of the posterior cortices corpus callosum and brainstem. Compared to 2020 interval worsening left anterior temporal regional atrophy. Significant medial temporal cortical atrophy status post ablation with additional superior and ventral anterior temporal sulcal widening suggesting regional atrophy. interval worsening left anterior insular regional atrophy with sulcal widening. Again unclear degenerative or posttraumatic. No significant dorsal  lateral or orbital prefrontal cortex regional changes compared to 2020.  T2/FLAIR: Significant postoperative changes. gliosis the bilateral hippocampi with left hippocampal sclerosis and degeneration postoperatively. multifocal iatrogenic cortical hyperintensities consistent with postoperative changes. Atypical lateral juxtacortical hyperintensity in the middle temporal gyrus. Linear subcortical hyperintensities appreciated in the high dorsal right prefrontal cortex and mid left prefrontal cortex with regional atrophy associated. Gliotic changes of the left hippocampus and parahippocampal structures appear to have mild wallerian degeneration to the left anterior insula  DWI/ADC: No Significant DWI hyperintensities/hypointensities. No ADC correlation.  SWI/GRE: No Significant hypointensities to suggest cortical/subcortical hemosiderin deposition.  Impression: : compared to 2020, interval worsening left anterior temporal medial and lateral cortical atrophy with multifocal linear hyperintensities consistent with postoperative changes. Gliosis with wallerian degeneration multifocal in the anterior medial and lateral temporal lobes. 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 MPH.    Brain Health and Cognitive Disorders Program  [facility]

## 2023-02-02 NOTE — PROGRESS NOTES
"[facility]     PATIENT: Declan Burleson  VISIT DATE: 2023  MRN: [id number]  PRIMARY PROVIDER: Juan Carlos Simmons NP  : 1965     Chief complaint: Progressive Cognitive Impairment     History of present illness:      The patient is a 57-year-old right-handed male who presents today to the [facility] due to concerns related to Progressive Cognitive Impairment.  The patient is accompanied by the father in law who participates in providing history.  Additional information is obtained by reviewing available medical records.     Relevant Background/Context  Known Relevant Family history:  Mother - alive 81  Father -  at age   Migraines: Father, Paternal grandfather  Sister - alive in [place]  Neurocognitive Disorder:  Father - "he was in diapers in hospital"  Movement Disorder:  The patient/family denies a history of PD, PDD, tremor.  Motorneuron Disorder:  The patient/family denies a history of ALS, MND, PLS.  Developmental Disorder:  The patient/family denies a history of Dyslexia, ADHD, ASD.  Psychiatric Disorder:  The patient/family denies a history of MDD, BD, DA, Schizophrenia.  Known Relevant Genetics:  There is no known relevant genetic testing available.  Developmental Milestones:  The patient/family report no known birth complications or early life problems. The patient met all developmental milestones.  Education/Learning Capacity:  The patient/family report no signs or symptoms suggestive of developmental learning disorder.  HS Typical Grades: C+/B-  AA. + 2 years/mechanical/automotive.  Estimated Educational Experience: 14 years of formal education.  Social History:  Born and raised in [place] [facility] in urban environment Armenian first language, Italian second. English third. Taught him Faroese English in [place] [facility] so it was a little different when [place] got [place] to learn. Began using English " regularly at the age of 14 years. Classes were taught in English. Has primarily used English in his adult life; speaks with brother in Mosotho, though they switch back and forth to English.  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  Retired/Quit: 2019     Neurocognitive Disorder Features  Onset/Duration:  Jan 2012 (~11-year)  First Symptom:  Executive impairment  Progression:  Gradually Progressive  Clinical Course:  Electronic Medical Record (Approximately 2013)  Type: Chart Review. Seizure hx from the patient and his wife:. - onset of seizures x 2013. 1st episode was with GTC sz. Per patient: he went to bed and has no further recollection until waking up in the hospital. Per wife: she heard a loud noise,noted that he became stiff with all extremities extended and was unresponsive; eye were closed and he started to have generalized shaking. - EMS was called and by the time they arrived the patient was confused and combative; seizure was associated with tongue bite and bladder incontinence. - the patient remained confused for a prolonged period of time. - the patient was admitted overnight and was evaluated - all tests were reportedly normal. - was seen by a Neurologist within a week: however, the patient had at least 2-3 more similar sz within that period, and was started on AED.  Neuropsychologist (07/22/2020)  Type: Chart Review. Onset & course of difficulty: Thinking changes ever since he had his first seizure 8 years ago. While the patient believes these changes have remained stable, his wife believes they have worsened over time. Fluctuations: None. Severity of changes: Moderate to severe per his wife. Examples:. Attention/Working  Memory/Executive Functioning: No problems per the patient. his wife says more distracted than before. Multitasking gotten harder and forgetting to finish some tasks. Processing Speed: No problems per the patient. Speed of thinking much slower than before per his wife. Language: Word-finding difficulty. Taking a while to come up with names. Tip of the tongue. Name will come back to him an hour or so later. Knows what he wants to say, but problems pulling up the words and then staggering his words/speech, like trying to find the right words to say. Visuospatial: No problems. Learning & Memory: Forgets to turn laundry on. Can recall things from the past, but struggles with day to day memory. Can't always recall names of people. Misplacing items. Forgetting what his wife tells him and when she repeats information, it's as if it's the first time he is hearing it. Will sometimes hear voices out there but they aren't there or see something but it's not there. Doesn't happen every often. 1 x monthly. People and things from the past. 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 Not when sitting but when outside or inside doing some work and then it's like it's hearing someone is outside. Lasts about a minute and then goes away. Sometimes will see his dad or grandparents. 1 x every few months. Ever since his dad passed away in 2010. These things just started to happen about a year ago or so. Doesn't scare him. Friendly and giving him advice. Be a good boy and take care of the family.  Neurologist (02/15/2022)  Type: Chart Review. his wife says Dr Edgar had him walk at his last appt and thought he had parkinson's based on his movement that day. He is established in Dr. Ambriz's office (neuro in [place]). They went to see him after this to get his opinion. He did a skin biopsy for Parkinson's disease and it came  "back as negative. They came here today because Dr. Edgar made the appt. However, they are no longer worried about Parkinson's disease. They are worried about his memory since he had epilepsy surgery March, 2021. his wife feels his memory has been going down the past 6 mos. She will have conversation with him but he does not know what she is talking about when the subject comes back up. He notes that "pronunciation but that is not the right word, like something is not clicking. ". He will know the word but cannot pronounce it. He can tell the word is right there but just doesn't come out, gets him frustrated. He is fluent in Hebrew, Afghan and English. He is trying to figure out why he cannot speak as fluently in English as he once did. He can come with the Hebrew or Afghan word but not the English word. 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 He will put the water in but forget to turn it on. He will come back 5 min later and realize what he did.  Neurologist (05/23/2022)  Type: Chart Review. The patient is a 55 y/o male with intractable epilepsy who is s/p bilateral sEEG presents today for elective left laser ablation amygdalohippocampectomy. All questions answered and the patient wishes to proceed with surgery.  Patient originally had presented to Dr. Chen to discuss elective options of his intractable epilepsy.  His seizures began when he was 48 years old. He can recall no inciting event. He has 3 semiologies: "full blown" generalized events, staring episodes, and "mild" seizures, which are characterized as generalized events of shorter duration without incontinence and a shorter post-ictal period. He and his wife estimate that he persists in having about 2 events/month.  He has failed multiple AEDs, including topiramate, lamotrigine, levetiracetam, and oxcarbazepine. He is currently taking eslicarbazepine, zonisamide, and clobazam. He had EMU monitoring in May-June of this year, which suggests " "left-sided activity. He had 3T MRI in June (personally reviewed) that was non-lesional; he also had PET at that time suggesting possible left-sided activity. He had neuropsychological testing on 8/10/20.  Neurologist (07/25/2022)  Type: Chart Review. The patient is a 55 y/o male with intractable epilepsy who is s/p bilateral sEEG presents today for elective left laser ablation amygdalohippocampectomy. All questions answered and the patient wishes to proceed with surgery.  Patient originally had presented to Dr. Chen to discuss elective options of his intractable epilepsy.  His seizures began when he was 48 years old. He can recall no inciting event. He has 3 semiologies: "full blown" generalized events, staring episodes, and "mild" seizures, which are characterized as generalized events of shorter duration without incontinence and a shorter post-ictal period. He and his wife estimate that he persists in having about 2 events/month.  He has failed multiple AEDs, including topiramate, lamotrigine, levetiracetam, and oxcarbazepine. He is currently taking eslicarbazepine, zonisamide, and clobazam. He had EMU monitoring in May-June of this year, which suggests left-sided activity. He had 3T MRI in June (personally reviewed) that was non-lesional; he also had PET at that time suggesting possible left-sided activity. He had neuropsychological testing on 8/10/20.  Electronic Medical Record (12/15/2022)  Type: Chart Review. The patient returns with his wife, Trista. They are frustrated that he is "like a zombie" because of the high doses of AEDs. He has been seizure-free since April, but when he decreased the meds back in April, he had 3 events within 24 hours.  Neuropsychologist (12/16/2022)  Type: Chart Review. Néstor Burleson is a 57 y. O.  right-handed male referred by Dr. Benoit (originally by Dr. Saldana of [facility]) for consideration of surgical therapy for intractable epilepsy. The patient was formerly employed as " "a . He is accompanied by his wife Trista today, who helps to provide the history. His seizures began when he was 48 years old. He can recall no inciting event. He has 3 semiologies: "full blown" generalized events, staring episodes, and "mild" seizures, which are characterized as generalized events of shorter duration without incontinence and a shorter post-ictal period. He and his wife estimate that he persists in having about 2 events/month. He has failed multiple AEDs, including topiramate, lamotrigine, levetiracetam, and oxcarbazepine. He is currently taking eslicarbazepine, zonisamide, and clobazam. He had EMU monitoring in May-June of this year, which suggests left-sided activity. He had 3T MRI in June (personally reviewed) that was non-lesional; he also had PET at that time suggesting possible left-sided activity. He had neuropsychological testing on 8/10/20; summary findings included below. He lives with his wife and 16-year-old daughter, who provide excellent social support. While his scores are more consistent with Major Neurocognitive Disorder/dementia, he has improved in his management of his medications since undergoing surgery in 2021. Furthermore, etiology is considered multifactorial with seizures, impact of L laser amygdalohippocampectomy, significant medication side effects, and lethargy all considered contributory. His reduced performance validity measures indicate that effort issues may also be contributory. As such, I will leave his diagnosis as is for the time being.     Current Presentation  Recent/Interim History:  The patient presents with father in law. Father in law has limited knowledge regarding clinical history. The patient, likewise, has significant memory deficits surrounding clinical history. As such, nearly all history is gathered from a review of previous medical records. The patient was [place] in [facility]. The formal patient education " "included English which he reports is his 3rd language. He reports that he was fluent in English by the age of 14. The patient reports a lifetime history of symptoms suggestive of developmental ADHD inattentive subtype   Though he was never formally diagnosed. He reports always being, at best, an average student and was able to just "make do" during his life. He reports new-onset migraine headaches between the ages of [age]. He denied knowledge of any auras as a child and was not treated formally for migraines until late adulthood. The patient [place] the [place] in [place] and worked as a  in [place], [place], for many years before leaving this profession. He would do various drugs until sticking to  which he did between the 1990s and 2010s. In 2012 the patient had his 1st seizure. On presentation, the patient has difficulty recollecting the exact date, thinking that his 1st seizure was in 2018; however, the medical record is clear his 1st seizure was in 2012. The patient's 1st seizure occurred on October 15, 2012. It was an unclear provoking factor; however, the patient reports he woke up in the hospital after having a generalized tonic-clonic seizure per the report of his family. He is poorly placed on Keppra on discharge. The patient would otherwise be seizure-free while on Keppra for the next four years. In 2016 the patient stopped Keppra without oversight by his medical professionals. Medical records indicated he felt like he did not need it anymore. Following this, he had several episodes of episodic amnesia. He experienced several episodes where he has trouble remembering things and seems "out of it." The patient has no recollection of these episodes. The patient will be re-started on Keppra with a resolution of these events. Sometime between 2016 and 2017, the patient was started on topiramate for migraine headaches. Beginning in 2017, the patient reported to his physicians that he " "had increasing arm jerking at night. He is had increasing staring spells, often stopping in mid-conversation, staring off for a few seconds. His family began to report changes in temperament. He would often go into a rage during these spells. Episodes would last 30 minutes or more no recall of the event. He would have no warning signs of the events that would occur. Despite multiple medication trials patient's seizure frequency increased. The patient was diagnosed with mild cognitive impairment in 2020 by neuropsychology thought to be secondary to intractable epilepsy and medication side effects. Serial EEGs would eventually recognize a left anterior temporal focus and diagnose with temporal lobe epilepsy. He would undergo surgical ablation of the left hippocampal area in March of 2021. While in the tapering process, seizures re-emerged. The patient would require increasing high dose of antiepileptics during 2022 when his family reported he was "zombie-like." His family became increasingly concerned about the progressive cognitive impairment status post temporal lobe epilepsy ablation. Repeat neurocognitive testing in 2022 indicated interval worsening any amnesia semantic fluency and semantic knowledge. The patient presents as a referral from the epilepsy team pending a potential repeat left temporal lobectomy. The primary question by referring provider is whether the patient's clinical presentation is consistent with a neurodegenerative pathology given interval worsening left anterior temporal atrophy status post hippocampal ablation and interval worsening cognitive impairment. On presentation, the patient has limited insight into cognitive deficit and clinical history. Dates and times are confused; however, the patient is able to describe a meaningful series of events. The patient is generally bradykinetic and bradyphrenic with frequent word-finding pauses however is otherwise clinically appropriate, conversant, " and engaged in examination and history.  Unresolved Concern(s) reported by patient/family:  Neurodegeneration vs post-op complications     Review of cognitive, visuospatial, motor, sensory, and behavioral systems:     Memory:   The patient's memory has worsened in the past few years.  He does repeat statements or asks the same question repeatedly.  He does have difficulty remembering recent important conversations.  He does not have difficulty remembering recent events.  He does not forget information within minutes.  His recent retrograde memory is intact.  His remote memory is intact.  Attention:   The patient's attention and concentration are impaired.  He does not have attentional fluctuations.  He does have difficulty with selective attention.  He does become easily distracted.  He does have difficulty with divided attention.  Executive:   The patient's cognitive processing speed is slower.  He does have difficulty with working memory.  He does misplace personal items (e.g., keys, cell phone, wallet) more frequently.  He does have difficulty keeping track of his medications.  He does not have difficulty with planning/organizing/completing multistep tasks.  He does have not difficulty with executive attention.  He does have difficulty with flexible thinking.  He does not have difficulty with response inhibition.  He denies new impulsivity or rash/careless actions.  His judgment is intact.  Language:   The patient's speech is affected.  He does forget people's names more frequently.  He does have word-finding difficulties.  His speech is fluent and non-effortful.  His speech is grammatically intact.  He does not make word substitutions.  He does not have difficulty reading.  He does not appear to have impaired comprehension.  Visuospatial:   The patient does not have new visuospatial difficulty.  He does not become confused or disoriented in *new*, unfamiliar places.  He does not have trouble with navigation.  He  does not get lost in familiar places.  He does not have visuospatial disorientation.  He does not have difficulty recognizing objects or faces.  He denies problems with driving or parking.  Motor/Coordination:   The patient does not have difficulty with walking.  He does not feel imbalanced.  He denies having fallen.  He does not appear to have new muscle weakness.  He does not have difficulty buttoning shirts, operating zippers, or manipulating tools/utensils.  His handwriting has not become micrographic.  He does not have a resting tremor.  He denies having any new involuntary movements and/or muscle jerking.  He does not have swallowing difficulty.  He denies new muscle cramps and twitching.  Sensory:   The patient denies new numbness, tingling, paresthesias, or pain.  The patient denies a loss of vision, blurry vision, or double vision.  The patient denies new loss of hearing or worsening tinnitus.  The patient denies anosmia.  Sleep:   The patient reports difficulty sleeping.  The patient does not have difficulty going to sleep.  The patient reports difficulty staying asleep and/or frequently awakening at night.  The patient does snore and/or have witnessed apneas while sleeping.  When he wakes up in the morning, he does not feel well-rested.  He denies dream-enactment behavior.  He denies symptoms suggestive of restless leg syndrome.  Behavior:   The patient's personality has not changed.  He does not have symptoms of disinhibition and social inappropriateness.  He does not have symptoms to suggest a loss of manners or decorum.  He does not appear apathetic or has decreased motivation.  He does not appear to have a change in inertia.  There is no report that The patient has had a change in their emotional expression.  He does not have emotional blunting or lability.  He does have symptoms of irritability and mood lability.  He does not have symptoms of agitation, aggression, or violent outbursts.  His insight  into his health and situation is intact.  His personal hygiene is intact.  He is not exhibiting a diminished response to other people's needs and feelings.  He is not exhibiting a diminished social interest, interrelatedness, or personal warmth.  He denies restlessness.  He denies new and/or worsening simple repetitive behaviors.  His speech has not become simplified or become repetitive/stereotyped.  He denies new/worsening complex repetitive/ritualistic compulsions and behaviors.  He does not have symptoms of hyper-religiosity or dogmatism.  His interests/pleasures have not become restrictive, simplified, interrupting, or repetitive.  He denies a change of self-stimulating behavior.  He denies any changes in eating behavior.  He denies increased consumption of food or substances.  He denies oral exploration or consumption of inedible objects.  Psychiatric:   He does not feel depressed.  He is exhibiting symptoms of social withdrawal/indifference.  He denies anxiety.  He does not exhibit cycling behavior.  He does not exhibit hyperactive behavior.  He is not exhibited symptoms of paranoia.  He does not have delusions.  He does not have hallucinations.  He does not have a history of sensitivity to neuroleptic/psychotropic medications.  Medical Review of Systems:   The patient does not have constipation.  The patient does not have urinary incontinence.  The patient denies orthostatic lightheadedness.  The patient's weight is stable.  Functional status:  Difficulty performing the following Instrumental ADLs:  Housekeeping: No  Food Preparation: No  Shopping: No  Ability to Handle Finances: Yes  Transportation/Driving: No  Household Appliances/Stove: No  Laundry: No  Difficulty performing the following Basic ADLs:  Dressing: No  Bathing: No  Toileting: No  Personal hygiene and grooming: No  Feeding: No  Care Management:  Patient/Family Safety Concerns:  Medication Adherence: No  Home Safety: No  Wandered: No  Firearms:  No  Fall Risk: No  Home Alone: No          Past Medical History:   Diagnosis Date    Anxiety     Dementia in other diseases classified elsewhere, unspecified severity, without behavioral disturbance, psychotic disturbance, mood disturbance, and anxiety 2023    Depression     GERD (gastroesophageal reflux disease)     Hyperlipidemia     Hypertension     Migraine     Seizures        Past Surgical History:   Procedure Laterality Date    CYST REMOVAL      skin cyst - benign    REMOVAL OF STEREO EEG LEADS (DEPTH ELECTRODES) Bilateral 2021    Procedure: REMOVAL OF STEREO EEG LEADS (DEPTH ELECTRODES);  Surgeon: Ruchi Chen MD;  Location: [facility] OR [address] [place] [address];  Service: Neurosurgery;  Laterality: Bilateral;    TONSILLECTOMY      teenage        Family History   Problem Relation Age of Onset    Heart disease Maternal Uncle 50    Migraines Paternal Grandfather        Social History     Socioeconomic History    Marital status:      Spouse name: Reilly    Years of education: 2 year college    Highest education level: Associate degree: occupational, technical, or vocational program   Tobacco Use    Smoking status: Former     Types: Cigarettes     Quit date:      Years since quittin.0    Smokeless tobacco: Never   Substance and Sexual Activity    Alcohol use: Yes     Comment: one drink once a week    Drug use: Never    Sexual activity: Yes     Partners: Female     Social Determinants of Health     Financial Resource Strain: Medium Risk    Difficulty of Paying Living Expenses: Somewhat hard   Food Insecurity: No Food Insecurity    Worried About Running Out of Food in the Last Year: Never true    Ran Out of Food in the Last Year: Never true   Transportation Needs: No Transportation Needs    Lack of Transportation (Medical): No    Lack of Transportation (Non-Medical): No   Physical Activity: Unknown    Days of Exercise per Week: Patient refused    Minutes of Exercise per Session: 0 min   Stress:  No Stress Concern Present    Feeling of Stress : Only a little   Social Connections: Unknown    Frequency of Communication with Friends and Family: More than three times a week    Frequency of Social Gatherings with Friends and Family: More than three times a week    Active Member of Clubs or Organizations: No    Attends Club or Organization Meetings: Never    Marital Status:    Housing Stability: Low Risk     Unable to Pay for Housing in the Last Year: No    Number of Places Lived in the Last Year: 1    Unstable Housing in the Last Year: No       Medication:     Current Outpatient Medications on File Prior to Visit   Medication Sig Dispense Refill    cloBAZam (ONFI) 20 mg Tab Take 2 tablets (40 mg total) by mouth every evening. 60 tablet 3    EScitalopram oxalate (LEXAPRO) 10 MG tablet Take 1 tablet (10 mg total) by mouth once daily. 30 tablet 11    eslicarbazepine (APTIOM) 400 mg Tab tablet Take 1 tablet (400 mg total) by mouth once daily. 30 tablet 5    eslicarbazepine (APTIOM) 800 mg Tab Take 800 mg by mouth once daily. 30 tablet 5    zonisamide (ZONEGRAN) 100 MG Cap Take 6 capsules (600 mg total) by mouth every evening. (Patient taking differently: Take 500 mg by mouth every evening.) 540 capsule 3    amLODIPine (NORVASC) 5 MG tablet Take 1 tablet (5 mg total) by mouth once daily. 30 tablet 11    aspirin (ECOTRIN) 81 MG EC tablet Take 1 tablet (81 mg total) by mouth once daily. 360 tablet 0    mag hydrox/aluminum hyd/simeth (ANTACID ORAL) Take by mouth as needed. Acid reflux      midazolam (NAYZILAM) 5 mg/spray (0.1 mL) Spry Instill 1 spray into the nostril for seizure and may repeat in 10 minutes if needed; not more than 1 unit per week per MD (Patient not taking: Reported on 2/2/2023) 4 each 1    ondansetron (ZOFRAN-ODT) 8 MG TbDL Take 8 mg by mouth every 8 (eight) hours as needed. nausea      oxyCODONE (ROXICODONE) 5 MG immediate release tablet Take 1 tablet (5 mg total) by mouth every 6 (six) hours  as needed for Pain. 30 tablet 0    rimegepant (NURTEC) 75 mg odt Take 75 mg by mouth.      rizatriptan (MAXALT-MLT) 10 MG disintegrating tablet Take 10 mg by mouth as needed. No more than 3 doses / week       Current Facility-Administered Medications on File Prior to Visit   Medication Dose Route Frequency Provider Last Rate Last Admin    [COMPLETED] gadobutroL (GADAVIST) injection 10 mL  10 mL Intravenous ONCE PRN Kaleb Edgar MD   10 mL at 02/01/23 1513        Review of patient's allergies indicates:   Allergen Reactions    Losartan Rash       Medications Reconciliation:   I have reconciled the patient's home medications and discharge medications with the patient/family. I have updated all changes.  Refer to After-Visit Medication List.    Objective:  Vital Signs:  Vitals:    02/02/23 1101   BP: 116/77   Pulse: 65     Wt Readings from Last 3 Encounters:   02/02/23 1101 87.1 kg (192 lb 0.3 oz)   12/15/22 0803 88.6 kg (195 lb 3.5 oz)   11/29/22 1301 89 kg (196 lb 1.6 oz)     Body mass index is 26.78 kg/m².           Neurological examination:  Mental Status:   His appearance was abnormal.   Comment: looks older than stated age, unkempt/disheveled;  Throughout the interview, he is cooperative, his eye contact is appropriate.  His behavior is appropriate to the clinical context without impropriety or improper language/conduct.  His behavior was not characterized by episodes of sudden uncontrollable and inappropriate laughing or crying.  The patient's energy level is abnormal.   Comment: Hypoactive;  His orientation is normal; Spatial 5/5 (location, the floor of building, city, county, state) and temporal 5/5 (month, day, year, BERTA) dimensions are accurate.  His attention/concentration is impaired.  He can complete three-step commands.  His fund of knowledge was less than what would be expected given age, culture and level of education.  His thought process is not logical or goal-oriented.   Comment: bradyphrenia;  He  "demonstrated appropriate insight based on actions, awareness of his illness, plans for the future.  He demonstrated good judgment based on actions and plans for the future.  He has no evidence of hallucinations (auditory, visual, olfactory).  He has no evidence of delusions (paranoid, grandiose, bizarre).  Cranial Nerves:   His pupils were normal.  His visual fields were full to confrontation in all quadrants.  His ocular pursuit was impaired.  His saccades were abnormal.   Comment: decreased initiation;  He demonstrated no square-wave jerks.  His eyelid assessment showed no apraxia. There was no eyelid dysfunction, retraction, or healy sign.  His blink rate was abnormal.   Comment: decreased;  His facial strength was normal.  His facial expression was abnormal.   Comment: Hypomimia, reptilian stare;  His oropharynx and soft palate appeared abnormal.   Comment: mallampati 4;  His tongue showed no evidence of scalloping.  He can protrude their tongue beyond His lips for >10 sec.  Speech/Language:   The patient's speech was fluent, non-effortful, and his rate was appropriate to the context.  His speech timbre is abnormal.   Comment: quiet;  His speech rate is abnormal.   Comment: slow;  His respirations are within normal range and appropriate to context.  His speech timbre is normal.  He has no articulation (segmental features) errors.  He has no speech dysdiadochokinesia with repetition of syllables such as "/PA/, /TA/, /KA/, /OM/".  He made no errors during the repetition of rapid syllables and or words such as "caterpillar" "", and "huckleberry"  He has no repetition errors of rapid sequences of consonants, such as in "Sikhism Judaism" or "[employer]".  He made prosody (suprasegmental features) errors.  His stress assessment showed no repetition errors in linguistically complex words, including multisyllabic words ("planetarium," "questionable," "accomplishment," "phonetic.  His tone was " "emotionally limited, and his tempo was arrhythmic.  The patient's speech is not dysarthric.  The patient showed evidence of anomia.   Comment: Mild;  He showed evidence of anomia during spontaneous speech.  He showed evidence of anomia during confrontational naming.   Comment: Mild;  He makes no phonological loop errors.  He makes no errors during the repetition of gibberish words (e.g., "Supercalifragilisticexpialidocious," "Pigglywiggly," "[place]oo," "Zowzy," "Bazinga").  He makes no errors during the repetition of complex meaningless phrases (e.g., "The horse raced past the barn fell.", "The complex houses  and single soldiers and their families," "Wishes are hopping, and trees are west," and "Brushing liked to mauricio arriola's direction").  He can comprehend commands that cross the midline (e.g., with your left thumb, touch your right ear).  He can comprehend commands that depend on syntax (e.g., point to the ceiling after you point to the floor).  His speech is grammatically intact; (no function/semantic word substitutions, phonemic/semantic paraphasias, or binary confusion).  He makes superordinate errors when asked to draw an animal.  Motor:   The patient's bilateral upper extremity muscle bulk is appropriate.  The patient's upper extremity muscle tone is increased.   Comment: B/L, R>L, Mod; paratonia>rigidity  The patient's bilateral upper extremity muscle tone does not suggest spasticity.  There is evidence of rigidity/cogwheeling.   Comment: Muscle tone is increased and there is evidence of rigidity/cogwheeling.; Muscle tone is increased and there is evidence of rigidity/cogwheeling.  There is evidence of paratonia.   Comment: Muscle tone is increased and there is evidence of paratonia.; Muscle tone is increased and there is evidence of paratonia.  Assessment of motor strength was symmetric and at minimal anti-gravity.  Deltoid L +5/5 R +5/5 Biceps L +5/5 R +5/5 Triceps L +5/5 R +5/5 Wrist extension L " +5/5 R +5/5 Finger abduction L +5/5 R +5/5 Hip flexion L +5/5 R +5/5 Hip extension L +5/5 R +5/5 Knee flexion L +5/5 R +5/5 Knee extension L +5/5 R +5/5 Ankle flexion L +5/5 R +5/5 Ankle extension L +5/5 R +5/5  There is no pronator or downward drift.  There is no myoclonus observed in The patient's bilateral upper and lower extremities.  There are no fasciculations observed in The patient's bilateral upper and lower extremities.  Coordination:   He has no bilateral upper extremity limb dysmetria or past pointing on finger-nose-finger bilaterally.  He has no limb dysdiadochokinesia of the upper extremity on the pronation/supination test and screwing in a light bulb or lower extremity during tapping ball of each foot bilaterally.  He has no visible tremor.  He has evidence of interhemispheric motor control deficits.   Comment: L->R; L->R  He demonstrates evidence of motor overflow.  He has no akathisia.  The patient's upper extremity fine motor coordination was abnormal.   Comment: B/L, R>L, Mild;  The patient's upper extremity fine motor coordination was not slow.   Comment: finger tapping, pronation/supination, and the open-close fist was slow.; finger tapping, pronation/supination, and the open-close fist was slow.  The patient's bilateral upper extremity coordination with finger tapping, pronation/supination, and the open-close fist showed hypometria.  The patient's bilateral upper extremity coordination with finger tapping, pronation/supination, and the open-close fist showed dysrhythmia.  Higher Cortical Function:   The patient showed no evidence of simultanagnosia (Navon hierarchical letters).  He demonstrates no evidence of dorsal simultanagnosia (overlapping objects).  He demonstrates no evidence of ventral simultanagnosia (complex picture synthesis).  The patient showed no evidence of visuospatial constructional dysfunction.  He has no evidence of dysgraphia.  The patient showed evidence of apraxia.  He  showed no evidence of ideomotor apraxia performing tool-use pantomimes (e.g., use a hammer, use a screwdriver, use a comb, flip a coin, waving goodbye).  He showed no evidence of ideational apraxia (e.g., taking off and putting on shoes, folding paper into an envelope).  He showed evidence of limb-motor apraxia during mimicking complex bimanual hand shapes.  He showed no evidence of buccofacial apraxia (e.g., blow out a candle, puff out cheeks, and whistle).  He showed no dysexecutive behavior.  Sensory:   His cortical sensory assessment demonstrated no neglect bilaterally.  He he had no astereognosis (paper clip, ring, dime) bilaterally in the palms.  He he had no agraphesthesia (drawing numbers) in the palms.  Reflexes:   Reflexes were symmetric and 2+ at biceps, 2+ triceps, and 2+ brachioradialis, 2+ at the knees bilaterally, there was no cross-abductor sign, 2+ in the bilateral ankles.  Gait:   He can arise from a chair and sit back down without using their arms.  His gait was abnormal.  His posture while walking is normal.  His gait initiation/inhibition was normal.  His stance while walking is normal.  His gait speed was abnormal (70-80 F 1.13 m/s M 1.26 m/s, >80 F 0.94 m/sec, M 0.97 m/sec).   Comment: slow;  His stride including step-time, step-width, and step-length was normal.  His arms swing is abnormal.   Comment: B/L, R>L, Mild; asymmetric and decreased amplitude.  He takes turns in <4 steps.  When attempting to walk abnormally (heels, tiptoes, tandem), he makes errors.  Neuropsychological Evaluation Summary:  Prior Neurocognitive/Neurobehavioral Evaluation(s)  No Prior Testing Available  Neurocognitive/Neurobehavioral Evaluation completed on 2023-02-02    Memory    Registration-3 3/3 Within Normal Limits.   Recall-3 2/3 Impairment: Moderate.   Recall-5 2/5 Impairment: Significant.   Registration-5 5/0     Executive    Three-step command 3/3 Within Normal Limits.   Trials-1 1/1 Within Normal Limits.    WORLD Backward 5/5 Within Normal Limits.   Digit Span - 2 2/2 Within Normal Limits.   Serial Sevens 2/3 Impairment: Moderate.   Fluency 1/1 Within Normal Limits.   Digit Span Backwards 4 Within Normal Limits.   Lexical Fluency - D 16 Within Normal Limits.   Lexical Fluency - F 14 Within Normal Limits.   Lexical Fluency - A 18 Within Normal Limits.   Lexical Fluency - S 17 Within Normal Limits.   Semantic Fluency - Animals 9 Impairment: -2.9 STDs below the average score based on age and education.   Semantic Fluency - Vegetables 8 Impairment: -3.1 STDs below the average score based on age and education.   Visuospatial    Intersecting Pentagons 1/1 Within Normal Limits.   3D Cube Copy 0/1 Impairment: Significant.   Clock Draw 3/3 Within Normal Limits.   Morin Copy 15/17 Impairment: Mild to Normal.   Overlapping Images - Update 12/12 Within Normal Limits.   Picture Synthesis 3/3 Within Normal Limits.   Attention    Orientation-10 9/10 Impairment: Mild to Normal.   Orientation-6 5/6 Impairment: Mild to Normal.   Alternating Sequence 1/1 Within Normal Limits.   Digit Span Forwards 7 Within Normal Limits.   Language    Repetition-1 1/1 Within Normal Limits.   Naming-2 2/2 Within Normal Limits.   Following written command 1/1 Within Normal Limits.   Writing a complete sentence 1/1 Within Normal Limits.   Naming-3 3/3 Within Normal Limits.   Repetition-2 2/2 Within Normal Limits.   Abstraction 2/2 Within Normal Limits.   15-Item BNT 12/15 Impairment: -1.9 STDs below the average score based on age and education.   Repetition of Phrases 5/5 Within Normal Limits.   Verbal Agility 6/6 Within Normal Limits.   SYDBAT - Semantic Association 9/30 Impairment: Significant.   Repeat & Point - Nonfluent 10/10 Within Normal Limits.   Repeat & Point - Semantics 3/10 Impairment: Significant.   Neurocognitive Focused Evaluation Aggregate Score(s)    MMSE 28/30 MMSE Score suggestive of normal to questionable cognitive impairment.   MOCA  24/30 MOCA Score suggestive of mild cognitive impairment.   Neuropsychiatric/Behavioral Focused Evaluation Assessment    BEHAV5+ 3/6 See ROS section for a full description   Laboratories:     Lab Date Value [Reference]   Autoimmune/Paraneoplastic Screening           CRP 2021, Aug-07    3.50 (H)      Metabolic Screening   Hemoglobin A1C External 2020, May-31    5.4 [4.0 - 5.6 %]      Lipase 07/22/2021  40 [12 - 53 IU/L]      TSH 08/10/2022  1.476 [0.400 - 4.000 uIU/mL]  2.159 [0.400 - 4.000 uIU/mL]      Folate 08/10/2022  12.3 [4.0 - 24.0 ng/mL]      Thiamine 08/10/2022  95 [38 - 122 ug/L]  91 [38 - 122 ug/L]  50 [38 - 122 ug/L]      Vitamin B-12 06/01/20202020, Jun-01    551 [210 - 950 pg/mL]  468 [210 - 950 pg/mL]  405 [210 - 950 pg/mL]      Vitamin B6 2022, Aug-10    39 [5 - 50 ug/L]      Infectious Disease/Immunocompromised Screening   SARS-CoV-2 RNA, Amplification, Qual 2022, Aug-09    Negative      SARS-CoV2 (COVID-19) Qualitative PCR 06/01/2020  Not Detected [Not Detected]  Not Detected [Not Detected]  Not Detected [Not Detected]      RPR 2020, Jun-01    Non-reactive      Standard Hematology Screen   Hemoglobin 2021, Jan-17    14.1 [14.0 - 18.0 g/dL]      Neuroendocrine/Electrolyte Screening   Sodium 2021, Jan-12    136 [136 - 145 mmol/L]      Toxin/Heavy Metal Screening   Amphetamine Screen, Ur 2022, Aug-09    Negative      Barbiturate Screen, Ur 2022, Aug-09    Negative      Benzodiazepines 2022, Aug-09    Presumptive Positive (A)      Cocaine (Metab.) 2022, Aug-09    Negative      Marijuana (THC) Metabolite 2022, Aug-09    Negative      Methadone metabolites 2022, Aug-09    Negative      Opiate Scrn, Ur 2022, Aug-09    Negative      Phencyclidine 2022, Aug-09    Negative           Neuroimaging:    Brain Fluorodeoxyglucose-positron emission tomography on 6/5/2020  Formal interpretation by Radiology:  No visualized area of abnormal activity to suggest an interictal epileptogenic focus. Multiple regions of  decreased uptake as per [facility]IM[facility] quantitative analysis as above. (Quantitative analysis with [facility] demonstrates multiple regions of decreased uptake, most prominent within the left rolandic operculum with a Z-score of -3.3)  Independently reviewed radiological imaging by Stiven Marie MD. MPH. Behavioral Neurologist  Impression: : decreased uptake, most prominent within the left rolandic operculum    MRI brain/head with and without contrast on 6/5/2020  Formal interpretation by Radiology:  Continued few scattered punctate size foci of T2 FLAIR signal hyperintensity supratentorial white matter most concentrated in the right frontal lobe which are nonspecific and of doubtful clinical significance. There is no evidence for acute infarction or enhancing lesion. Otherwise unremarkable MRI brain as detailed above.  Independently reviewed radiological imaging by Stiven Marie MD. MPH. Behavioral Neurologist  T1: mild anterior cingulate focal sulcal widening with hypoplasia/ developmental variant of the orbital frontal prefrontal cortices. mild sulcal widening of the right anterior temporal cortex specifically the superior temporal gyrus. Otherwise normal volume. 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 Intact corpus callosum with normal volume and ratio. Thinning and tapering the midbrain with intact volume and ratio midbrain pontine ratio 10.0/17.9. intact subcortical nuclei with good volume and mass with relatively large corpus callosum transcortical fiber bundle. Likely normal variant  T2/FLAIR: very mild subcortical white matter changes with right greater than left periventricular capping with mild wallerian changes in the right insula nearly juxtacortical possible regional damage or degeneration of the right anterior insular inferior prefrontal cortex.  DWI/ADC: No Significant DWI hyperintensities/hypointensities. No ADC correlation.  SWI/GRE: No Significant hypointensities to suggest  cortical/subcortical hemosiderin deposition.  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 Unclear if degenerative or vascular.    MRI brain/head without contrast on 2/1/2023  Formal interpretation by Radiology:  No significant change from prior allowing for differences in technique. Stable encephalomalacia left medial temporal lobe compatible with prior laser ablation No evidence for new signal abnormality or enhancement. Ventricles stable without hydrocephalus. Additional thin section imaging for intraoperative navigation/preoperative planning purposes  Independently reviewed radiological imaging by Stiven Marie MD. MPH. Behavioral Neurologist  T1: Significant postoperative changes. Compared to 2020, mild interval worsening anterior cingulate ventral medial prefrontal cortical atrophy. Unclear if posttraumatic/ iatrogenic or degenerative nature. Relative sparing of the posterior cortices corpus callosum and brainstem. Compared to 2020 interval worsening left anterior temporal regional atrophy. Significant medial temporal cortical atrophy status post ablation with additional superior and ventral anterior temporal sulcal widening suggesting regional atrophy. interval worsening left anterior insular regional atrophy with sulcal widening. Again unclear degenerative or posttraumatic. No significant dorsal lateral or orbital prefrontal cortex regional changes compared to 2020.  T2/FLAIR: Significant postoperative changes. gliosis the bilateral hippocampi with left hippocampal sclerosis and degeneration postoperatively. multifocal iatrogenic cortical hyperintensities consistent with postoperative changes. Atypical lateral juxtacortical hyperintensity  in the middle temporal gyrus. Linear subcortical hyperintensities appreciated in the high dorsal right prefrontal cortex and mid left prefrontal cortex with regional atrophy associated. Gliotic changes of the left hippocampus and parahippocampal structures appear to have mild wallerian degeneration to the left anterior insula  DWI/ADC: No Significant DWI hyperintensities/hypointensities. No ADC correlation.  SWI/GRE: No Significant hypointensities to suggest cortical/subcortical hemosiderin deposition.  Impression: : compared to 2020, interval worsening left anterior temporal medial and lateral cortical atrophy with multifocal linear hyperintensities consistent with postoperative changes. Gliosis with wallerian degeneration multifocal in the anterior medial and lateral temporal lobes. 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     Procedures:    Electroencephalogram on 5/26-27/2020  Formal interpretation:  This is an abnormal continuous EEG monitoring study because of occasional epileptiform appearing discharges in the left frontotemporal region, occurring primarily in sleep, consistent with an area of focal cortical dysfunction and a potential seizure focus in this region. There is generalized and bilateral independent intermittent slowing consistent with subcortical or deep midline dysfunction. There is generalized background slowing consistent with diffuse cortical dysfunction and a mild-moderate encephalopathy. This finding is nonspecific with regards to etiology but can be seen in the setting of toxic/metabolic derangements, infection, as a postictal phenomenon (the patient had a seizure earlier in the day while off EEG) and as a medication effect. There are no pushbutton activations, no electrographic seizures, and none of the patient's typical events are captured during this recording session.  Independently reviewed  Electroencephalogram by Stiven Marie MD. MPH. Behavioral Neurologist  Impression: : occasional epileptiform appearing discharges in the left frontotemporal region,    Electrocardiogram on 8/9/2022  Formal interpretation:  Vent. Rate : 058 BPM     Atrial Rate : 058 BPM    P-R Int : 218 ms          QRS Dur : 082 ms     QT Int : 424 ms       P-R-T Axes : 029 -04 018 degrees    QTc Int : 416 ms Sinus bradycardia with 1st degree A-V block Otherwise normal ECG  Independently reviewed Electrocardiogram by Stiven Marie MD. MPH. Behavioral Neurologist  Impression: : Received ECG has borderline evidence of sinus node disease. HR (>=50-60). Prolonged W[place] interval (>0.22 s). Broad QRS complex (> 0.12 s).     Clinical Summary:     The patient is a 57-year-old right-handed male without a relevant past medical history who presents reporting a 11-year history of gradually progressive neurocognitive impairment.       The clinical history is suggestive of:  Memory Impairment: STM encoding impairment, LTM encoding-retrieval impairment  Attention Impairment: Attention, Selective attention, Sustained attention, Shifting attention  Executive Impairment: Energization, Working Memory  Language Impairment: Language Dysfunction  Behavior Impairment: Emotional Regulation  Psychiatric Impairment: Social Coherence  iADL Impairment: [place] Instrumental Activities of Daily Living Scale  The neurological examination is significant for:  Cortical Frontal-Parietal Disconnection: apraxia (limb-motor)  Cortical Temporal Dysfunction: anomic aphasia (spontaneous, confrontational), semantic aphasia (superordinate errors)  Cortical Transcallosal Disconnection: interhemispheric motor control (interhemispheric motor control ), motor efference (motor overflow)  Executive Impairment: thought disorder  Movement Disorder (Gait): abnormal features (speed, abnormal swing)  Movement Disorder (Hypokinetic): parkinsonism (diminished facial expression,  tone, bradykinesia), paratonia (tone), dyskinesia (slowing)  Movement Disorder (Ocular): abnormal ocular movement (pursuit, abnormal saccades), eyelid apraxia  Movement Disorder (Speech): abnormal vocal features (volume, rate, prosody), AOS (tone)  The neurocognitive battery is significant (based on age and education) for:  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  Moderate Memory Impairment: recall.  Moderate Executive Impairment: He scored >3 standard deviations below the norm on at least one measure. He had difficulty with semantic fluency, semantic fluency.  Moderate Visuospatial Impairment: visuospatial construction.  Very Mild Attention Impairment: orientation.  MMSE 28/30: MMSE Score suggestive of normal to questionable cognitive impairment.  MOCA 24/30: MOCA Score suggestive of mild cognitive impairment.  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 Unclear if degenerative or vascular.  MRI brain/head without contrast (2/1/2023): compared to 2020, interval worsening left anterior temporal medial and lateral cortical atrophy with multifocal linear hyperintensities consistent with postoperative changes. Gliosis with wallerian degeneration multifocal in the anterior medial and lateral temporal lobes. 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     The patient's clinical presentation meets the criteria for Mild Cognitive Impairment (MCI-ADRC) (Jimbo LONGO, et al. 2011 Alzheimer's & Dementia).     Concern regarding an intraindividual change in cognition  Impairment in one or more cognitive domains  Preservation of independence in functional abilities.  Not demented     The patient's clinical presentation does not meet criteria for any specific neurodegenerative syndrome.     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 The patient has a 10 year history of intractable epilepsy initially responsive to Keppra between 2012 and 2016. Unclear if patient's starting topiramate in 2016 in any way is related to intractable nature of epilepsy though clinical history is concerning as it was started same year seizures became unmanageable. His family began reporting cognitive deficits in the setting of uncontrolled epilepsy as early as 2016 in the form of transient amnesia and language deficits. The symptoms have gradually and progressively worsened per the medical chart in the setting of intractable epilepsy and abruptly declined following left medial temporal ablation. On presentation examination neurocognitive testing localized known areas of dysfunction and damage without evidence of multifocal cortical dysfunction beyond areas related to epilepsy and surgery. At this time brain imaging clinical history and examination do not strongly favor  neurodegenerative pathology in of itself. Regardless we recommend screening for  markers of primary neurodegeneration.    The observations made above, were discussed with the patient and his family. We have discussed opportunities for biomarker testing (CSF Coe biomarkers, IDEAs Amyloid-PET, Syn-One skin biopsy). We will schedule for IR lumbar puncture with biomarkers for Alzheimer's disease related pathology. Recommend screening for active neuro degeneration with neurofilament light chain. Given clinical history of intractable epilepsy following starting of topiramate in 2016 recommend getting a pharmacogenomics panel and ammonia level. Potentially can consider genetic testing through [facility] given lifetime history of migraine headaches and late onset intractable epilepsy. The patient has also self endorses a longstanding history concerning for sleep apnea. We have placed referrals to sleep medicine due to signs and symptoms suggestive of sleep apnea to help maintain brain health.     Care Management Plan:     #Diagnostic Workup:   Laboratories: CBC, Glu, Protein, IGG, Bands, VDRL, Coe AD Biomarker, NSE, Nfl, pharmacogenic panel, ammonia level  Procedures: Lumbar Puncture and CSF analysis  #Neurocognitive Disorder Treatment:  We have discussed opportunities for further testing with CSF biomarkers or Amyloid-PET - schedule with IR  No indication for donepezil at this time  No indication for memantine at this time  #Insomnia Treatment:  We have made referrals to Sleep medicine for SXS suggest of SHILO  #Behavioral/Environmental Treatment  We recommend engaging in activities that stimulate cognitively and socially while avoiding excessive stimulation and fatigue in overwhelmingly complex situations.  We recommend integrating routine and schedule into your daily life. https://www.alzheimersproject.org/news/the-importance-of-routine-and-familiarity-to-persons-with-dementia/  #Health Maintenance/Lifestyle Advice  We have  "discussed the value in aggressively controlling vascular risk factors like hypertension, hyperlipidemia, and Diabetes SBP<130, LDL<100, A1C<7.0.  We discussed the need to optimize lifestyle choices including a heart-healthy diet (e.g., Mediterranean or DASH), increased cardiovascular exercise (goal 150 minutes of moderate-intensity per week), and stay cognitively and socially active.  #Support  We all need support sometimes. Get easy access to local resources, community programs, and services. https://www.communityresourcefinder.org/  Learn more about Cognitive Impairment in [place]: https://www.alz.org/professionals/public-health/state-overview/[place]  #Safety  The Alzheimer's Association administers the nationwide "Safe Return" program with identification bracelets, necklaces, or clothing tags and 24-hour assistance. More information is available online at https://www.alz.org/help-support/caregiving/safety/medicalert-with-24-7-wandering-support  #Follow up:  Follow-up in 4 weeks (Mar 2023).    Thank you for allowing us to participate in the care of your patient. Please do not hesitate to contact us with any questions or concerns.     It was a pleasure seeing The patient and we look forward to seeing them at their follow-up visit.     This note is dictated on M*Modal Fluency Direct word recognition program. There are word recognition mistakes that are occasionally missed on review.         Scheduled Follow-up :  Future Appointments   Date Time Provider Department Center   2/3/2023  7:30 AM [facility] PET CT LIMIT 500 LBS [facility] CT [facility]   2/8/2023  1:45 PM Marnie Desai [facility]   2/13/2023  1:45 PM Marnie Desai [facility]   2/15/2023  1:45 PM Marnie Desai [facility]   2/20/2023  1:45 PM Marnie Desai [facility]   2/22/2023  1:45 PM Marnie Desai [facility]   2/27/2023  1:45 PM Marnie" Lemaitre, [facility]   3/1/2023  1:45 [facility]   3/6/2023  1[facility]45 [facility]   3/8/2023  1:45 PM [facility]   3/13/2023  1:45 PM [facility]   3/15/2023  1:45 PM [facility]   3/20/2023  1:45 PM [facility], [facility]   3/22/2023  1:45 PM [facility]   3/27/2023  1:45 PM [facility]   3/29/2023  1:45 PM [facility]   4/3/2023  1:45 PM [facility], [facility]   4/5/2023  1:45 PM [facility] Lem[facility], [facility]   4/10/2023  1:45 PM [facility]ie Lem[facility], [facility]   4/12/2023  1:45 PM [facility] Lem[facility], [facility]   4/17/2023  1:45 PM [facility] Lem[facility], [facility]       After Visit Medication List :     Medication List            Accurate as of February 2, 2023 12:15 PM. If you have any questions, ask your nurse or doctor.                CHANGE how you take these medications      zonisamide 100 MG Cap  Commonly known as: ZONEGRAN  Take 6 capsules (600 mg total) by mouth every evening.  What changed: how much to take            CONTINUE taking these medications      amLODIPine 5 MG tablet  Commonly known as: NORVASC  Take 1 tablet (5 mg total) by mouth once daily.     ANTACID ORAL     aspirin 81 MG EC tablet  Commonly known as: ECOTRIN  Take 1 tablet (81 mg total) by mouth once daily.     cloBAZam 20 mg Tab  Commonly known as: ONFI  Take 2 tablets (40 mg total) by mouth every evening.     EScitalopram oxalate 10 MG tablet  Commonly known as: LEXAPRO  Take 1 tablet (10 mg total) by mouth once daily.     * eslicarbazepine 800 mg Tab  Commonly known as: APTIOM  Take 800 mg by  mouth once daily.     * eslicarbazepine 400 mg Tab tablet  Commonly known as: APTIOM  Take 1 tablet (400 mg total) by mouth once daily.     NAYZILAM 5 mg/spray (0.1 mL) Spry  Generic drug: midazolam  Instill 1 spray into the nostril for seizure and may repeat in 10 minutes if needed; not more than 1 unit per week per MD     NURTEC 75 mg odt  Generic drug: rimegepant     ondansetron 8 MG Tbdl  Commonly known as: ZOFRAN-ODT     oxyCODONE 5 MG immediate release tablet  Commonly known as: ROXICODONE  Take 1 tablet (5 mg total) by mouth every 6 (six) hours as needed for Pain.     rizatriptan 10 MG disintegrating tablet  Commonly known as: MAXALT-MLT           * This list has 2 medication(s) that are the same as other medications prescribed for you. Read the directions carefully, and ask your doctor or other care provider to review them with you.                  Signing Physician:  Stiven Andrew MD    Billing:        -----------------------------------------------------------------------------    I spent a total of 85 minutes (time-in: 11:15 AM; time-out: 12:40 PM) on 2023-02-02, in person face-to-face with the patient and caregiver(s), >50% of that time was spent counseling regarding the symptoms, treatment plan, risks, therapeutic options, lifestyle modifications, and/or safety issues for the diagnoses above.    10/14 Review of Systems completed and is negative except as stated above in HPI (Systems reviewed: Const, Eyes, ENT, Resp, CV, GI, , MSK, Skin, Neuro)    I reviewed previous labs for a total of 5 minutes on 2023-02-02. This is directly related to the face-to-face encounter. Review of previous labs was performed all negative except as stated above in HPI    I reviewed previous diagnostic testing for a total of 10 minutes on 2023-02-02. This is directly related to the face-to-face encounter. A review of previous diagnostic testing was performed was noted to be within normal limits except as is stated above in  HPI    I performed a neurobehavioral status examination that included a clinical assessment of thinking, reasoning, and judgment. Please see above HPI and ROS for full details. This exam was performed on 2023-02-02 and included 14 minutes spent on direct face-to-face clinical observation and interview with the patient and 20 minutes spent interpreting test results and preparing the report. The total time of 34 minutes spent on the neurobehavioral status examination is not included in the time spent on evaluation and management coding.    I performed a neuropsychological evaluation that included the application of a series of standardized neurocognitive tests. Please see the informal neuropsychological assessment above for full details. This evaluation was performed on 2023-02-02 and included 14 minutes spent on direct face-to-face clinical standardized test administration with the patient and 18 minutes spent on interpreting standardized test results, integrating patient data into a treatment plan, and providing feedback to the patient and caregiver. The total time of 32 minutes spent on the neuropsychological evaluation is not included in the time spent on evaluation and management coding.    Total Billing time spent on encounter/documentation for this patient's evaluation and management, not including the neurobehavioral status examination and neuropsychological evaluation: 72 minutes.

## 2023-02-02 NOTE — TELEPHONE ENCOUNTER
----- Message from Kwasi Pardo sent at 2/2/2023  1:16 PM CST -----  Regarding: adv  Contact: @[phone]  Pt called in regards to.upcoming surgery..Please call and adv @[phone]

## 2023-02-03 ENCOUNTER — HOSPITAL ENCOUNTER (OUTPATIENT)
Dept: RADIOLOGY | Facility: HOSPITAL | Age: 58
Discharge: HOME | End: 2023-02-03
Attending: PSYCHIATRY & NEUROLOGY
Payer: MEDICARE

## 2023-02-03 DIAGNOSIS — G40.109 TEMPORAL LOBE EPILEPSY: ICD-10-CM

## 2023-02-03 DIAGNOSIS — F06.70 MILD NEUROCOGNITIVE DISORDER DUE TO ANOTHER MEDICAL CONDITION: ICD-10-CM

## 2023-02-03 DIAGNOSIS — R56.9 SEIZURE: ICD-10-CM

## 2023-02-03 LAB — POCT GLUCOSE: 73 MG/DL (ref 70–110)

## 2023-02-03 PROCEDURE — A9552 F18 FDG: HCPCS

## 2023-02-03 PROCEDURE — 78608 BRAIN IMAGING (PET): CPT | Mod: 26,PS,, | Performed by: RADIOLOGY

## 2023-02-03 PROCEDURE — 78608 NM PET BRAIN: ICD-10-PCS | Mod: 26,PS,, | Performed by: RADIOLOGY

## 2023-02-03 NOTE — PROGRESS NOTES
Dr. Hammond,  Is the generalized decreased uptake on this PET more severe than on his prior one?  CV

## 2023-02-03 NOTE — TELEPHONE ENCOUNTER
Spoke w/ Declan to discuss scheduling for a flouro LP next Friday 2/10 at 2 PM. Dani was instructed to not take any blood thinners (Aspirin being the only listed for him) for 5 days before, with Sunday being last. Dani restated to confirm.     - - -    Declan and I also discussed food and water intake for the morning of his Fluoro LP as relayed to me by Fluoroscopy/Radiology. He knows he may eat breakfast as normal the day of rather than fasting and was encouraged to drink plenty of water to ensure he stays hydrated.

## 2023-02-10 ENCOUNTER — PATIENT MESSAGE (OUTPATIENT)
Dept: NEUROLOGY | Facility: CLINIC | Age: 58
End: 2023-02-10
Payer: MEDICARE

## 2023-02-10 ENCOUNTER — HOSPITAL ENCOUNTER (OUTPATIENT)
Dept: RADIOLOGY | Facility: HOSPITAL | Age: 58
Discharge: HOME | End: 2023-02-10
Attending: PSYCHIATRY & NEUROLOGY
Payer: MEDICARE

## 2023-02-10 DIAGNOSIS — G31.84 MCI (MILD COGNITIVE IMPAIRMENT): ICD-10-CM

## 2023-02-10 DIAGNOSIS — G40.211 PARTIAL SYMPTOMATIC EPILEPSY WITH COMPLEX PARTIAL SEIZURES, INTRACTABLE, WITH STATUS EPILEPTICUS: ICD-10-CM

## 2023-02-10 LAB
CLARITY CSF: CLEAR
COLOR CSF: COLORLESS
CSF TUBE NUMBER: 3
CSF TUBE NUMBER: 3
GLUCOSE CSF-MCNC: 55 MG/DL (ref 40–70)
LYMPHOCYTES NFR CSF MANUAL: 92 % (ref 40–80)
MONOS+MACROS NFR CSF MANUAL: 5 % (ref 15–45)
NEUTROPHILS NFR CSF MANUAL: 3 % (ref 0–6)
PROT CSF-MCNC: 33 MG/DL (ref 15–40)
RBC # CSF: 33 /CU MM
SPECIMEN VOL CSF: 2 ML
WBC # CSF: 2 /CU MM (ref 0–5)

## 2023-02-10 PROCEDURE — 36415 COLL VENOUS BLD VENIPUNCTURE: CPT | Performed by: PSYCHIATRY & NEUROLOGY

## 2023-02-10 PROCEDURE — 82232 ASSAY OF BETA-2 PROTEIN: CPT | Performed by: PSYCHIATRY & NEUROLOGY

## 2023-02-10 PROCEDURE — 83521 IG LIGHT CHAINS FREE EACH: CPT | Performed by: PSYCHIATRY & NEUROLOGY

## 2023-02-10 PROCEDURE — 84157 ASSAY OF PROTEIN OTHER: CPT | Performed by: PSYCHIATRY & NEUROLOGY

## 2023-02-10 PROCEDURE — 86341 ISLET CELL ANTIBODY: CPT | Performed by: PSYCHIATRY & NEUROLOGY

## 2023-02-10 PROCEDURE — 82042 OTHER SOURCE ALBUMIN QUAN EA: CPT | Performed by: PSYCHIATRY & NEUROLOGY

## 2023-02-10 PROCEDURE — 62328 DX LMBR SPI PNXR W/FLUOR/CT: CPT | Mod: ,,, | Performed by: RADIOLOGY

## 2023-02-10 PROCEDURE — 86255 FLUORESCENT ANTIBODY SCREEN: CPT | Mod: 59 | Performed by: PSYCHIATRY & NEUROLOGY

## 2023-02-10 PROCEDURE — 89051 BODY FLUID CELL COUNT: CPT | Performed by: PSYCHIATRY & NEUROLOGY

## 2023-02-10 PROCEDURE — 83520 IMMUNOASSAY QUANT NOS NONAB: CPT | Performed by: PSYCHIATRY & NEUROLOGY

## 2023-02-10 PROCEDURE — 25000003 PHARM REV CODE 250: Performed by: PSYCHIATRY & NEUROLOGY

## 2023-02-10 PROCEDURE — 62328 DX LMBR SPI PNXR W/FLUOR/CT: CPT

## 2023-02-10 PROCEDURE — 83520 IMMUNOASSAY QUANT NOS NONAB: CPT | Mod: 59 | Performed by: PSYCHIATRY & NEUROLOGY

## 2023-02-10 PROCEDURE — 62328 FL LUMBAR PUNCTURE DIAGNOSTIC WITH IMAGING: ICD-10-PCS | Mod: ,,, | Performed by: RADIOLOGY

## 2023-02-10 PROCEDURE — 82945 GLUCOSE OTHER FLUID: CPT | Performed by: PSYCHIATRY & NEUROLOGY

## 2023-02-10 PROCEDURE — 86592 SYPHILIS TEST NON-TREP QUAL: CPT | Performed by: PSYCHIATRY & NEUROLOGY

## 2023-02-10 PROCEDURE — 83873 ASSAY OF CSF PROTEIN: CPT | Performed by: PSYCHIATRY & NEUROLOGY

## 2023-02-10 RX ORDER — LIDOCAINE HYDROCHLORIDE 10 MG/ML
1 INJECTION INFILTRATION; PERINEURAL ONCE
Status: COMPLETED | OUTPATIENT
Start: 2023-02-10 | End: 2023-02-10

## 2023-02-10 RX ADMIN — LIDOCAINE HYDROCHLORIDE 1 ML: 10 INJECTION, SOLUTION INFILTRATION; PERINEURAL at 02:02

## 2023-02-10 NOTE — H&P
Radiology History & Physical      SUBJECTIVE:     Chief Complaint: neurocognitive impairment    History of Present Illness:  Declan Burleson is a 58 y.o. male who presents for diagnostic lumbar puncture.    Past Medical History:   Diagnosis Date    Anxiety     Dementia in other diseases classified elsewhere, unspecified severity, without behavioral disturbance, psychotic disturbance, mood disturbance, and anxiety 2/2/2023    Depression     GERD (gastroesophageal reflux disease)     Hyperlipidemia     Hypertension     Migraine     Seizures      Past Surgical History:   Procedure Laterality Date    CYST REMOVAL  March 2016/2017    skin cyst - benign    REMOVAL OF STEREO EEG LEADS (DEPTH ELECTRODES) Bilateral 1/21/2021    Procedure: REMOVAL OF STEREO EEG LEADS (DEPTH ELECTRODES);  Surgeon: Ruchi Chen MD;  Location: [facility] OR [address] [place] [address];  Service: Neurosurgery;  Laterality: Bilateral;    TONSILLECTOMY      teenage        Home Meds:   Prior to Admission medications    Medication Sig Start Date End Date Taking? Authorizing Provider   aspirin (ECOTRIN) 81 MG EC tablet Take 1 tablet (81 mg total) by mouth once daily. 6/7/20 6/7/21  Kristen Jean MD   cloBAZam (ONFI) 20 mg Tab Take 2 tablets (40 mg total) by mouth every evening. 8/24/22 2/20/23  Anca Gaston PA-C   EScitalopram oxalate (LEXAPRO) 10 MG tablet Take 1 tablet (10 mg total) by mouth once daily. 7/7/21 2/2/23  Kaleb Edgar MD   eslicarbazepine (APTIOM) 400 mg Tab tablet Take 1 tablet (400 mg total) by mouth once daily. 8/6/21   Kaleb Edgar MD   eslicarbazepine (APTIOM) 800 mg Tab Take 800 mg by mouth once daily. 8/6/21   Kaleb Edgar MD   mag hydrox/aluminum hyd/simeth (ANTACID ORAL) Take by mouth as needed. Acid reflux    Historical Provider   ondansetron (ZOFRAN-ODT) 8 MG TbDL Take 8 mg by mouth every 8 (eight) hours as needed. nausea    Historical Provider   rimegepant (NURTEC) 75 mg odt Take 75 mg by mouth. 2/2/22   Historical Provider    rizatriptan (MAXALT-MLT) 10 MG disintegrating tablet Take 10 mg by mouth as needed. No more than 3 doses / week    Historical Provider   zonisamide (ZONEGRAN) 100 MG Cap Take 6 capsules (600 mg total) by mouth every evening.  Patient taking differently: Take 500 mg by mouth every evening. 7/25/22 7/25/23  Wilma Mckinney PA-C     Anticoagulants/Antiplatelets: aspirin    Allergies:   Review of patient's allergies indicates:   Allergen Reactions    Losartan Rash     Sedation History:  no adverse reactions    Review of Systems:   Hematological: no known coagulopathies  Respiratory: no shortness of breath  Cardiovascular: no chest pain  Gastrointestinal: no abdominal pain  Genito-Urinary: no dysuria  Musculoskeletal: negative  Neurological: no TIA or stroke symptoms, +HA        OBJECTIVE:     Vital Signs (Most Recent) N/A       Physical Exam:  General: no acute distress  Mental Status: alert and oriented to person, place and time  HEENT: normocephalic, atraumatic  Chest: unlabored breathing  Heart: regular heart rate  Abdomen: nondistended  Extremity: moves all extremities    Laboratory  Lab Results   Component Value Date    INR 1.0 03/04/2021       Lab Results   Component Value Date    WBC 7.28 08/09/2022    HGB 15.7 08/09/2022    HCT 46.8 08/09/2022    MCV 91 08/09/2022     08/09/2022      Lab Results   Component Value Date    GLU 84 08/09/2022     (L) 08/09/2022    K 4.2 08/09/2022    CL 99 08/09/2022    CO2 27 08/09/2022    BUN 11 08/09/2022    CREATININE 0.8 08/09/2022    CALCIUM 9.5 08/09/2022    MG 1.8 03/03/2021    ALT 24 08/09/2022    AST 24 08/09/2022    ALBUMIN 4.5 08/09/2022    BILITOT 0.3 08/09/2022       ASSESSMENT/PLAN:     Sedation Plan: local anesthesia only.  Patient will undergo diagnostic lumbar puncture. CSF AD labs to be collected.    Miguel Bae MD PGY-2  Department of Radiology  [facility]

## 2023-02-10 NOTE — PROCEDURES
Radiology Post-Procedure Note    Pre Op Diagnosis: neurocognitive impairment    Post Op Diagnosis: Same    Procedure: lumbar puncture    Procedure performed by: Miguel Bae MD    Staff supervising physician: Richardson León MD    Written Informed Consent Obtained: Yes    Specimen Removed: 10 mL CSF    Estimated Blood Loss: Minimal    Findings: Following written informed consent and sterile prep and drape, a 3.5 inch (9 cm) 22 gauge spinal needle was inserted at L2 - L3 intralaminar space under fluoroscopic surveillance.  Approximately 11 mL clear CSF removed and sent to the lab for further analysis (5 tubes total, including send-out tube).  There were no complications.    Patient tolerated procedure well.    Miguel Bae MD PGY-2  Department of Radiology  [facility]

## 2023-02-11 ENCOUNTER — PATIENT MESSAGE (OUTPATIENT)
Dept: NEUROSURGERY | Facility: CLINIC | Age: 58
End: 2023-02-11
Payer: MEDICARE

## 2023-02-11 DIAGNOSIS — R47.01 APHASIA DETERMINED BY EXAMINATION: Primary | ICD-10-CM

## 2023-02-13 LAB — B2 MICROGLOB CSF-MCNC: 1.51 MCG/ML (ref 0.7–1.8)

## 2023-02-14 ENCOUNTER — PATIENT MESSAGE (OUTPATIENT)
Dept: NEUROSURGERY | Facility: CLINIC | Age: 58
End: 2023-02-14
Payer: MEDICARE

## 2023-02-14 LAB
KAPPA LC FREE CSF-MCNC: 0.01 MG/DL
MAYO MISCELLANEOUS RESULT (REF): NORMAL
VDRL CSF QL: NEGATIVE

## 2023-02-15 ENCOUNTER — PATIENT MESSAGE (OUTPATIENT)
Dept: NEUROLOGY | Facility: CLINIC | Age: 58
End: 2023-02-15
Payer: MEDICARE

## 2023-02-15 LAB
GAD65 AB CSF-SCNC: 0 NMOL/L
NSE CSF-MCNC: 22 NG/ML

## 2023-02-20 DIAGNOSIS — G40.209 COMPLEX PARTIAL SEIZURES EVOLVING TO GENERALIZED TONIC-CLONIC SEIZURES: Primary | ICD-10-CM

## 2023-02-20 LAB
AMPA-R AB CBA, CSF: NEGATIVE
AMPHIPHYSIN AB TITR CSF: NEGATIVE {TITER}
CASPR2-IGG CBA, CSF: NEGATIVE
CV2 IGG TITR CSF: NEGATIVE {TITER}
DPPX IGG CSF QL IF: NEGATIVE
GABA-B-R AB, CBA, CSF: NEGATIVE
GAD65 AB CSF-SCNC: 0 NMOL/L
GFAP ALPHA IGG CSF QL IF: NEGATIVE
GLIAL NUC TYPE 1 AB TITR CSF: NEGATIVE {TITER}
HU1 AB TITR CSF IF: NEGATIVE {TITER}
HU2 AB TITR CSF IF: NEGATIVE {TITER}
HU3 AB TITR CSF: NEGATIVE {TITER}
IGLON5 IFA, CSF: NEGATIVE
IMMUNOLOGIST REVIEW: NORMAL
LGI1-IGG CBA,CSF: NEGATIVE
M NEUROCHONDRIN IFA, CSF: NEGATIVE
MGLUR1 AB IFA, CSF: NEGATIVE
NIF IFA, CSF: NEGATIVE
NMDAR1 AB, CBA, CSF: NEGATIVE
PCA-2 AB TITR CSF: NEGATIVE {TITER}
PCA-TR AB TITR CSF: NEGATIVE {TITER}

## 2023-02-23 ENCOUNTER — OFFICE VISIT (OUTPATIENT)
Dept: INTERNAL MEDICINE | Facility: CLINIC | Age: 58
End: 2023-02-23
Payer: MEDICARE

## 2023-02-23 ENCOUNTER — OFFICE VISIT (OUTPATIENT)
Dept: NEUROSURGERY | Facility: CLINIC | Age: 58
End: 2023-02-23
Payer: MEDICARE

## 2023-02-23 ENCOUNTER — HOSPITAL ENCOUNTER (OUTPATIENT)
Dept: RADIOLOGY | Facility: HOSPITAL | Age: 58
Discharge: HOME | End: 2023-02-23
Attending: NEUROLOGICAL SURGERY
Payer: MEDICARE

## 2023-02-23 ENCOUNTER — PATIENT MESSAGE (OUTPATIENT)
Dept: NEUROLOGY | Facility: CLINIC | Age: 58
End: 2023-02-23
Payer: MEDICARE

## 2023-02-23 ENCOUNTER — TELEPHONE (OUTPATIENT)
Dept: PREADMISSION TESTING | Facility: HOSPITAL | Age: 58
End: 2023-02-23
Payer: MEDICARE

## 2023-02-23 VITALS
SYSTOLIC BLOOD PRESSURE: 130 MMHG | HEART RATE: 60 BPM | DIASTOLIC BLOOD PRESSURE: 83 MMHG | RESPIRATION RATE: 16 BRPM | TEMPERATURE: 98 F

## 2023-02-23 VITALS
BODY MASS INDEX: 28.44 KG/M2 | TEMPERATURE: 98 F | SYSTOLIC BLOOD PRESSURE: 117 MMHG | OXYGEN SATURATION: 99 % | HEIGHT: 69 IN | WEIGHT: 192 LBS | DIASTOLIC BLOOD PRESSURE: 67 MMHG | HEART RATE: 59 BPM

## 2023-02-23 DIAGNOSIS — R41.841 COGNITIVE COMMUNICATION DEFICIT: ICD-10-CM

## 2023-02-23 DIAGNOSIS — Z01.818 PRE-OP TESTING: ICD-10-CM

## 2023-02-23 DIAGNOSIS — G40.219 COMPLEX PARTIAL EPILEPSY WITH GENERALIZATION AND WITH INTRACTABLE EPILEPSY: Primary | ICD-10-CM

## 2023-02-23 DIAGNOSIS — Z79.02 LONG TERM (CURRENT) USE OF ANTITHROMBOTICS/ANTIPLATELETS: ICD-10-CM

## 2023-02-23 DIAGNOSIS — K21.9 GASTROESOPHAGEAL REFLUX DISEASE, UNSPECIFIED WHETHER ESOPHAGITIS PRESENT: ICD-10-CM

## 2023-02-23 DIAGNOSIS — G40.209 COMPLEX PARTIAL SEIZURES EVOLVING TO GENERALIZED TONIC-CLONIC SEIZURES: ICD-10-CM

## 2023-02-23 DIAGNOSIS — F41.8 DEPRESSION WITH ANXIETY: Chronic | ICD-10-CM

## 2023-02-23 DIAGNOSIS — R06.83 SNORING: ICD-10-CM

## 2023-02-23 DIAGNOSIS — D64.9 NORMOCYTIC ANEMIA: ICD-10-CM

## 2023-02-23 DIAGNOSIS — G43.909 MIGRAINE WITHOUT STATUS MIGRAINOSUS, NOT INTRACTABLE, UNSPECIFIED MIGRAINE TYPE: Chronic | ICD-10-CM

## 2023-02-23 DIAGNOSIS — E87.1 HYPONATREMIA: ICD-10-CM

## 2023-02-23 DIAGNOSIS — G40.219 COMPLEX PARTIAL EPILEPSY WITH GENERALIZATION AND WITH INTRACTABLE EPILEPSY: ICD-10-CM

## 2023-02-23 DIAGNOSIS — Z01.818 PREOPERATIVE EXAMINATION: Primary | ICD-10-CM

## 2023-02-23 DIAGNOSIS — I10 PRIMARY HYPERTENSION: Chronic | ICD-10-CM

## 2023-02-23 PROCEDURE — 99999 PR PBB SHADOW E&M-EST. PATIENT-LVL III: CPT | Mod: PBBFAC,,, | Performed by: NEUROLOGICAL SURGERY

## 2023-02-23 PROCEDURE — 70450 CT HEAD STEALTH W/O CONTRAST: ICD-10-PCS | Mod: 26,,, | Performed by: RADIOLOGY

## 2023-02-23 PROCEDURE — 3074F PR MOST RECENT SYSTOLIC BLOOD PRESSURE < 130 MM HG: ICD-10-PCS | Mod: CPTII,S$GLB,, | Performed by: NURSE PRACTITIONER

## 2023-02-23 PROCEDURE — 3078F DIAST BP <80 MM HG: CPT | Mod: CPTII,S$GLB,, | Performed by: NURSE PRACTITIONER

## 2023-02-23 PROCEDURE — 99214 OFFICE O/P EST MOD 30 MIN: CPT | Mod: 57,S$GLB,, | Performed by: NEUROLOGICAL SURGERY

## 2023-02-23 PROCEDURE — 99215 PR OFFICE/OUTPT VISIT, EST, LEVL V, 40-54 MIN: ICD-10-PCS | Mod: S$GLB,,, | Performed by: NURSE PRACTITIONER

## 2023-02-23 PROCEDURE — 99215 OFFICE O/P EST HI 40 MIN: CPT | Mod: S$GLB,,, | Performed by: NURSE PRACTITIONER

## 2023-02-23 PROCEDURE — 99999 PR PBB SHADOW E&M-EST. PATIENT-LVL III: ICD-10-PCS | Mod: PBBFAC,,, | Performed by: NEUROLOGICAL SURGERY

## 2023-02-23 PROCEDURE — 1159F PR MEDICATION LIST DOCUMENTED IN MEDICAL RECORD: ICD-10-PCS | Mod: CPTII,S$GLB,, | Performed by: NURSE PRACTITIONER

## 2023-02-23 PROCEDURE — 3078F PR MOST RECENT DIASTOLIC BLOOD PRESSURE < 80 MM HG: ICD-10-PCS | Mod: CPTII,S$GLB,, | Performed by: NURSE PRACTITIONER

## 2023-02-23 PROCEDURE — 3008F PR BODY MASS INDEX (BMI) DOCUMENTED: ICD-10-PCS | Mod: CPTII,S$GLB,, | Performed by: NURSE PRACTITIONER

## 2023-02-23 PROCEDURE — 3074F SYST BP LT 130 MM HG: CPT | Mod: CPTII,S$GLB,, | Performed by: NURSE PRACTITIONER

## 2023-02-23 PROCEDURE — 99999 PR PBB SHADOW E&M-EST. PATIENT-LVL IV: CPT | Mod: PBBFAC,,, | Performed by: NURSE PRACTITIONER

## 2023-02-23 PROCEDURE — 99999 PR PBB SHADOW E&M-EST. PATIENT-LVL IV: ICD-10-PCS | Mod: PBBFAC,,, | Performed by: NURSE PRACTITIONER

## 2023-02-23 PROCEDURE — 3075F PR MOST RECENT SYSTOLIC BLOOD PRESS GE 130-139MM HG: ICD-10-PCS | Mod: CPTII,S$GLB,, | Performed by: NEUROLOGICAL SURGERY

## 2023-02-23 PROCEDURE — 70450 CT HEAD/BRAIN W/O DYE: CPT | Mod: TC

## 2023-02-23 PROCEDURE — 1159F MED LIST DOCD IN RCRD: CPT | Mod: CPTII,S$GLB,, | Performed by: NURSE PRACTITIONER

## 2023-02-23 PROCEDURE — 3079F DIAST BP 80-89 MM HG: CPT | Mod: CPTII,S$GLB,, | Performed by: NEUROLOGICAL SURGERY

## 2023-02-23 PROCEDURE — 3079F PR MOST RECENT DIASTOLIC BLOOD PRESSURE 80-89 MM HG: ICD-10-PCS | Mod: CPTII,S$GLB,, | Performed by: NEUROLOGICAL SURGERY

## 2023-02-23 PROCEDURE — 70450 CT HEAD/BRAIN W/O DYE: CPT | Mod: 26,,, | Performed by: RADIOLOGY

## 2023-02-23 PROCEDURE — 3008F BODY MASS INDEX DOCD: CPT | Mod: CPTII,S$GLB,, | Performed by: NURSE PRACTITIONER

## 2023-02-23 PROCEDURE — 99214 PR OFFICE/OUTPT VISIT, EST, LEVL IV, 30-39 MIN: ICD-10-PCS | Mod: 57,S$GLB,, | Performed by: NEUROLOGICAL SURGERY

## 2023-02-23 PROCEDURE — 3075F SYST BP GE 130 - 139MM HG: CPT | Mod: CPTII,S$GLB,, | Performed by: NEUROLOGICAL SURGERY

## 2023-02-23 RX ORDER — OXYCODONE HYDROCHLORIDE 5 MG/1
5 TABLET ORAL EVERY 4 HOURS PRN
COMMUNITY
End: 2023-03-27 | Stop reason: CLARIF

## 2023-02-23 NOTE — ASSESSMENT & PLAN NOTE
Denies SHILO. Possible sleep apnea: recommend caution with sedating medication in the perioperative period.   Declines Sleep Study referral

## 2023-02-23 NOTE — DISCHARGE INSTRUCTIONS
Your surgery has been scheduled for:______2/27/23____________________________________    You should report to:  ____[facility], located on the [place] side of the first floor of the           [facility] ([phone])  __X__[facility], located on the [facility] side of the            Second floor of the [facility] ([phone])  ____3rd Floor [facility] located on the [facility] side of the [facility] [phone]  ____[facility]: located at [address] [facility] [place] [address]. [address].     Please Note   Tell your doctor if you take Aspirin, products containing Aspirin, herbal medications  or blood thinners, such as Coumadin, Ticlid, or Plavix.  (Consult your provider regarding holding or stopping before surgery).  Arrange for someone to drive you home following surgery.  You will not be allowed to leave the surgical facility alone or drive yourself home following sedation and anesthesia.    Before Surgery  Stop taking all herbal medications, vitamins, and supplements 7 days prior to surgery  No Motrin/Advil (Ibuprofen) 7 days before surgery  No Aleve (Naproxen) 7 days before surgery  Stop Taking Asprin, products containing Aspirin __7___days before surgery   No Goody's/BC Powder 7 days before surgery  Refrain from drinking alcoholic beverages for 24 hours before and after surgery  Stop or limit smoking at least 24 hours prior to surgery  You may take Tylenol for pain    Night before Surgery  Do not eat or drink after midnight  Take a shower or bath (shower is recommended).  Bathe with Hibiclens soap or an antibacterial soap from the neck down.  If not supplied by your surgeon, hibiclens soap will need to be purchased over the counter in pharmacy.  Rinse soap off thoroughly.  Shampoo your hair with your regular shampoo    The Day of Surgery  Take another bath or shower with hibiclens  or any antibacterial soap, to reduce the chance of infection.  Take heart and blood pressure medications with a small sip of water, as advised by the perioperative team.  Do not take fluid pills  You may brush your teeth and rinse your mouth, but do not swallow any additional water.   Do not apply perfumes, powder, body lotions or deodorant on the day of surgery.  Nail polish should be removed.  Do not wear makeup or moisturizer  Wear comfortable clothes, such as a button front shirt and loose fitting pants.  Leave all jewelry, including body piercings, and valuables at home.    Bring any devices you will neeed after surgery such as crutches or canes.  If you have sleep apnea, please bring your CPAP machine  In the event that your physical condition changes including the onset of a cold or respiratory illness, or if you have to delay or cancel your surgery, please notify your surgeon.
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
## 2023-02-23 NOTE — ASSESSMENT & PLAN NOTE
Sodium level is 129 today; chronic. Possibly attributed to Lexapro and Aptiom use.   Denies: N/V/ recent headache/disorientation/muscle cramps; reports lethargy that is attributed to AED meds.  Recommend drinking when thirsty for excess water intake  May need to drink fluids with electrolytes ( ex: Gatorade).  Suggest avoiding hypotonic IV fluids   Will repeat sodium level tomorrow- discussed with patient and wife.
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
## 2023-02-23 NOTE — ASSESSMENT & PLAN NOTE
Current BP  at goal today; 117/67.    Currently off of Amlodipine x 6 months- needs refill with PCP. BP is being monitored by wife at home with normal readings.    Lifestyle changes to reduce systolic BP:  exercise 30 minutes per day,  5 days per week or 150 minutes weekly once recovered from surgery; sodium reduction and avoidance of high salt foods such as processed meats, frozen meals and  fast foods.     BP acceptable for surgery. I recommend monitoring BP during perioperative period as uncontrolled pain can elevate blood pressure.

## 2023-02-23 NOTE — ASSESSMENT & PLAN NOTE
Current labs:  Hgb-   13.6   Hct-    40.6; stable.  Recommend monitoring during perioperative period.

## 2023-02-23 NOTE — ASSESSMENT & PLAN NOTE
Treated with: Lexapro and Clobazam; controlled.   Denies symptoms of depression at this time, mostly anxiety at this time.   Denies suicidal/homicidal ideations

## 2023-02-23 NOTE — ASSESSMENT & PLAN NOTE
Followed by Neurology; last seen 2/2/23  Not currently treated  Current symptoms : long-term memory impairment   Able to carry on ADLs

## 2023-02-24 LAB — MBP CSF-MCNC: <2 MCG/L

## 2023-02-24 NOTE — PROGRESS NOTES
"Neurosurgery  Follow-up     SUBJECTIVE:     Chief Complaint: intractable epilepsy      History of Present Illness:  Declan Burleson is a 58 y.o. right-handed male referred by Dr. Benoit (originally by Dr. Saldana of [place]) for consideration of surgical therapy for intractable epilepsy. The patient was formerly employed as a . He is accompanied by his wife Trista today, who helps to provide the history. His seizures began when he was 48 years old. He can recall no inciting event. He has 3 semiologies: "full blown" generalized events, staring episodes, and "mild" seizures, which are characterized as generalized events of shorter duration without incontinence and a shorter post-ictal period. He and his wife estimate that he persists in having about 2 events/month.     He has failed multiple AEDs, including topiramate, lamotrigine, levetiracetam, and oxcarbazepine. He is currently taking eslicarbazepine, zonisamide, and clobazam. He had EMU monitoring in May-June of this year, which suggests left-sided activity. He had 3T MRI in June (personally reviewed) that was non-lesional; he also had PET at that time suggesting possible left-sided activity. He had neuropsychological testing on 8/10/20; summary findings included below. He lives with his wife and 16-year-old daughter, who provide excellent social support.     He takes ASA 81, which will need to be held for one week prior to surgery.     As of 12/22/20, the patient reports he has had no significant change. He is hoping to be seen today for pre-operative optimization. He is out of Aptiom, which his wife is working to re-obtain for him.      As of 2/9/21, the patient underwent bilateral sEEG monitoring on 1/11/21. He self-removed several of the leads on 1/21/21 and was taken to the OR for removal of the remaining electrodes. Fortunately, there was no significant intracranial hemorrhage associated. Since being discharged home, the patient " reports that he is overall doing well. He has had no fever, chills, or drainage from the incisions. He has had some headache. He also feels that he has been sleeping more than before the procedure. He persists in having bad memory.     As of 4/13/21, the patient underwent left laser amygdalohippocampectomy on 3/1/21. Overall, the has done well since then. He and his wife report that he has been seizure free. He persists in having some days that are better than others; he is more irritable and tired than before surgery at times. This is inconsistent, and some days he is at his preoperative baseline. He has been compliant with his AEDs; he is taking 1200 mg of aptiom daily.    They deny any fever, chills, or drainage from the incision, though there was a scab that was removed when he got a haircut recently. He is also experiencing headache that radiates from the site of his incision forward. This improves with Maxalt.     As of 7/6/21, the patient reports that he is having headache from the base of the neck skilled nursing up the head. It feels like a small drum that comes and goes. He had to turn off the lights, lay down, and take over-the-counter medication (Tylenol, at times) last week. He also became more pale with these episodes. It is made worse by lying on the left side in bed.  He is now at his normal baseline. He also reports chest pain; he remains hyponatremic. He is currently out of the anxiety and blood pressure medications for at least 4 days.     He has been seizure free since the procedure. Trista reports that the incision is well healed. No fever/chills.     He is more irritable than before surgery and has some memory issues.     As of 7/22/21, the patient returns in follow up. He still has headache and mood swings at times. He has not had any seizures. He and his wife both feel that his speech and conversation and improving. He remains highly distractable. He and his wife endorse that his grandfather had  "migraines.     He will need to re-establish with a new epileptologist since Dr. Benoit is moving to [place].     As of 9/30/21, the patient reports he has remained seizure-free. He continues to have significant headaches, particularly over the left posterior aspect of the head, radiating over to the right side. His wife endorses that he has bradykinesia and decreased arm swing when walking. Jean Edgar and Karen are concerned he may have Parkinson disease.     As of 12/15/22, the patient returns with his wife, Trista. They are frustrated that he is "like a zombie" because of the high doses of AEDs. He has been seizure-free since April, but when he decreased the meds back in April, he had 3 events within 24 hours.     Mood swings are also an issue; he gets irritated more readily than he did in the past. He is seeing a speech therapist in [facility]; he is making progress WRT conversational communication and memory.     They now have 2 grandchildren.     Goals of surgery: to be free of seizures and reduce medications.     They would like to undergo repeat sEEG since the first was cut short to see if he may be a candidate for further epilepsy surgery.     As of 2/23/23, the patient returns for further discussion of repeat sEEG. He has undergone extensive discussion in interdisciplinary conference, together with review by Dr. Andrew of memory neurology, and he is being recommended for repeat sEEG. He has no evidence of non-epilepsy, non-TBI related neurodegenerative disease on Dr. Andrew's initial evaluation, per discussion with Dr. ALEK Edgar.     He and his wife remain eager to proceed with sEEG placement to determine whether he will be a candidate for further intracranial seizure surgery.     Review of patient's allergies indicates:   Allergen Reactions    Losartan Rash       Current Outpatient Medications   Medication Sig Dispense Refill    eslicarbazepine (APTIOM) 400 mg Tab tablet Take 1 tablet (400 mg total) by " mouth once daily. 30 tablet 5    eslicarbazepine (APTIOM) 800 mg Tab Take 800 mg by mouth once daily. 30 tablet 5    midazolam 5 mg/spray (0.1 mL) Spry 0.1 mLs by Nasal route.      oxyCODONE (ROXICODONE) 5 MG immediate release tablet Take 5 mg by mouth every 4 (four) hours as needed.      rizatriptan (MAXALT-MLT) 10 MG disintegrating tablet Take 10 mg by mouth as needed. No more than 3 doses / week      aspirin (ECOTRIN) 81 MG EC tablet Take 1 tablet (81 mg total) by mouth once daily. 360 tablet 0    cloBAZam (ONFI) 20 mg Tab Take 2 tablets (40 mg total) by mouth every evening. 60 tablet 3    EScitalopram oxalate (LEXAPRO) 10 MG tablet Take 1 tablet (10 mg total) by mouth once daily. 30 tablet 11    mag hydrox/aluminum hyd/simeth (ANTACID ORAL) Take by mouth as needed. Acid reflux      ondansetron (ZOFRAN-ODT) 8 MG TbDL Take 8 mg by mouth every 8 (eight) hours as needed. nausea      rimegepant (NURTEC) 75 mg odt Take 75 mg by mouth.      zonisamide (ZONEGRAN) 100 MG Cap Take 6 capsules (600 mg total) by mouth every evening. (Patient taking differently: Take 500 mg by mouth every evening.) 540 capsule 3     No current facility-administered medications for this visit.       Past Medical History:   Diagnosis Date    Anxiety     Dementia in other diseases classified elsewhere, unspecified severity, without behavioral disturbance, psychotic disturbance, mood disturbance, and anxiety 2/2/2023    Depression     GERD (gastroesophageal reflux disease)     Hyperlipidemia     Hypertension     Long term (current) use of antithrombotics/antiplatelets 12/22/2020    Migraine     Normocytic anemia 2/23/2023    Seizures      Past Surgical History:   Procedure Laterality Date    CYST REMOVAL  March 2016/2017    skin cyst - benign    REMOVAL OF STEREO EEG LEADS (DEPTH ELECTRODES) Bilateral 1/21/2021    Procedure: REMOVAL OF STEREO EEG LEADS (DEPTH ELECTRODES);  Surgeon: Ruchi Chen MD;  Location: [facility] OR [address] [place] [address];  Service:  Neurosurgery;  Laterality: Bilateral;    TONSILLECTOMY      teenage      Family History       Problem Relation (Age of Onset)    Heart disease Maternal Uncle (50)    Lung disease Father    Migraines Paternal Grandfather    No Known Problems Mother          Social History     Socioeconomic History    Marital status:      Spouse name: Reilly    Years of education: 2 year college    Highest education level: Associate degree: occupational, technical, or vocational program   Tobacco Use    Smoking status: Former     Types: Cigarettes     Quit date:      Years since quittin.1    Smokeless tobacco: Never   Substance and Sexual Activity    Alcohol use: Yes     Comment: one drink once a week    Drug use: Never    Sexual activity: Yes     Partners: Female     Social Determinants of Health     Financial Resource Strain: Medium Risk    Difficulty of Paying Living Expenses: Somewhat hard   Food Insecurity: No Food Insecurity    Worried About Running Out of Food in the Last Year: Never true    Ran Out of Food in the Last Year: Never true   Transportation Needs: No Transportation Needs    Lack of Transportation (Medical): No    Lack of Transportation (Non-Medical): No   Physical Activity: Unknown    Days of Exercise per Week: Patient refused    Minutes of Exercise per Session: 0 min   Stress: No Stress Concern Present    Feeling of Stress : Only a little   Social Connections: Unknown    Frequency of Communication with Friends and Family: More than three times a week    Frequency of Social Gatherings with Friends and Family: More than three times a week    Active Member of Clubs or Organizations: No    Attends Club or Organization Meetings: Never    Marital Status:    Housing Stability: Low Risk     Unable to Pay for Housing in the Last Year: No    Number of Places Lived in the Last Year: 1    Unstable Housing in the Last Year: No       Review of Systems   Constitutional:  Positive for diaphoresis. Negative  for fever.   HENT:  Positive for nosebleeds.    Eyes:  Negative for visual disturbance.   Respiratory:  Negative for shortness of breath.    Cardiovascular:  Negative for chest pain.   Gastrointestinal:  Negative for vomiting.   Endocrine: Negative for cold intolerance.   Genitourinary:  Negative for difficulty urinating.   Musculoskeletal:  Negative for back pain.   Skin:  Negative for color change.   Neurological:  Negative for seizures.   Hematological:  Does not bruise/bleed easily.   Psychiatric/Behavioral:  The patient is nervous/anxious.      OBJECTIVE:     Vital Signs  Temp: 98.3 °F (36.8 °C)  Pulse: 60  Resp: 16  BP: 130/83  Pain Score: 0-No pain  There is no height or weight on file to calculate BMI.      Physical Exam:    Constitutional: He appears well-developed and well-nourished. No distress.     Eyes: Pupils are equal, round, and reactive to light. EOM are normal.     Cardiovascular: No edema.     Abdominal: Soft.     Skin: Skin displays no rash on extremities. Skin displays no lesions on extremities.   Mostly bald     Psych/Behavior: He is alert. He is oriented to person, place, and time.     Musculoskeletal: Gait is normal.        Right Upper Extremities: Muscle strength is 5/5.        Left Upper Extremities: Muscle strength is 5/5.       Right Lower Extremities: Muscle strength is 5/5.        Left Lower Extremities: Muscle strength is 5/5.     Neurological:        Coordination: He has normal finger to nose coordination.        DTRs: DTRs are DTRS NORMAL AND SYMMETRICnormal and symmetric.        Cranial nerves:   Face grossly symmetric   Shoulder shrug equal bilaterally  Pulmonary: symmetric expansion     Incisions: well healed     Diagnostic Results:  MRI brain personally reviewed   Piriform cortex appears intact     ASSESSMENT/PLAN:     Declan Burleson is a 58 y.o. male with intractable epilepsy who presents s/p bilateral sEEG and L laser amygdalohippocampectomy (3/1/21).     Overall, he is  doing well, but he and his wife are increasingly frustrated with the side effects of the AEDs that he is taking. They are very interested in repeat sEEG to see whether he may benefit from further intracranial epilepsy surgery.     I had a lengthy, detailed discussion with the patient and his wife about the expectation, risks, benefits, and alternatives to intracranial localization for seizures.      We discussed that monitoring typically takes 1-2 weeks but may be longer or shorter depending on how long it takes to have concordant seizures.  We discussed that he will not be able to get out of bed while the electrodes are in the head, and that he will remain in the ICU during that time. He will require mittens and strict 1:1 supervision since he previously self-removed his sEEG leads, terminating the monitoring early. We also discussed that this is a diagnostic, not therapeutic, procedure, and that the definitive surgery would be performed 4-8 weeks after the time of sEEG localization. We discussed that the possibility of seizure freedom is reduced if the epilepsy is found to be multifocal; furthermore, we discussed that he will need to stay on medications for at least one year after surgery; there is a possibility but no guarantee that they may be reduced after that.      We discussed that there is approximately 20% rate of asymptomatic hemorrhage and approximately 1% rate of symptomatic hemorrhage from placement of sEEG electrode per review of the international literature.       We also discussed the specific risks of the localization surgery. Risks include, but are not limited to, bleeding, pain, infection, scarring, need for further/repeat procedure, death, paralysis, stroke (including venous infarct)/damage to major blood vessels, leak of cerebrospinal fluid, and hardware-related complications. There is a chance that we would fail to localize the seizures with the initial electrode plan, which would require  placement of additional electrodes.      We also discussed that there surgical treatments for seizures that do not require localization intracranially: VNS and DBS, but these offer a lower change of seizure freedom than laser ablation or temporal lobectomy, which may be considerations pending the results of localization surgery.     Informed consent was obtained of the patient with his wife present.     I have encouraged them to contact the clinic in interim with any questions, concerns, or adverse clinical change.

## 2023-02-24 NOTE — H&P (VIEW-ONLY)
"Neurosurgery  Follow-up     SUBJECTIVE:     Chief Complaint: intractable epilepsy      History of Present Illness:  Declan Burleson is a 58 y.o. right-handed male referred by Dr. Benoit (originally by Dr. Saldana of [place]) for consideration of surgical therapy for intractable epilepsy. The patient was formerly employed as a . He is accompanied by his wife Trista today, who helps to provide the history. His seizures began when he was 48 years old. He can recall no inciting event. He has 3 semiologies: "full blown" generalized events, staring episodes, and "mild" seizures, which are characterized as generalized events of shorter duration without incontinence and a shorter post-ictal period. He and his wife estimate that he persists in having about 2 events/month.     He has failed multiple AEDs, including topiramate, lamotrigine, levetiracetam, and oxcarbazepine. He is currently taking eslicarbazepine, zonisamide, and clobazam. He had EMU monitoring in May-June of this year, which suggests left-sided activity. He had 3T MRI in June (personally reviewed) that was non-lesional; he also had PET at that time suggesting possible left-sided activity. He had neuropsychological testing on 8/10/20; summary findings included below. He lives with his wife and 16-year-old daughter, who provide excellent social support.     He takes ASA 81, which will need to be held for one week prior to surgery.     As of 12/22/20, the patient reports he has had no significant change. He is hoping to be seen today for pre-operative optimization. He is out of Aptiom, which his wife is working to re-obtain for him.      As of 2/9/21, the patient underwent bilateral sEEG monitoring on 1/11/21. He self-removed several of the leads on 1/21/21 and was taken to the OR for removal of the remaining electrodes. Fortunately, there was no significant intracranial hemorrhage associated. Since being discharged home, the patient " reports that he is overall doing well. He has had no fever, chills, or drainage from the incisions. He has had some headache. He also feels that he has been sleeping more than before the procedure. He persists in having bad memory.     As of 4/13/21, the patient underwent left laser amygdalohippocampectomy on 3/1/21. Overall, the has done well since then. He and his wife report that he has been seizure free. He persists in having some days that are better than others; he is more irritable and tired than before surgery at times. This is inconsistent, and some days he is at his preoperative baseline. He has been compliant with his AEDs; he is taking 1200 mg of aptiom daily.    They deny any fever, chills, or drainage from the incision, though there was a scab that was removed when he got a haircut recently. He is also experiencing headache that radiates from the site of his incision forward. This improves with Maxalt.     As of 7/6/21, the patient reports that he is having headache from the base of the neck [facility] up the head. It feels like a small drum that comes and goes. He had to turn off the lights, lay down, and take over-the-counter medication (Tylenol, at times) last week. He also became more pale with these episodes. It is made worse by lying on the left side in bed.  He is now at his normal baseline. He also reports chest pain; he remains hyponatremic. He is currently out of the anxiety and blood pressure medications for at least 4 days.     He has been seizure free since the procedure. Trista reports that the incision is well healed. No fever/chills.     He is more irritable than before surgery and has some memory issues.     As of 7/22/21, the patient returns in follow up. He still has headache and mood swings at times. He has not had any seizures. He and his wife both feel that his speech and conversation and improving. He remains highly distractable. He and his wife endorse that his grandfather had  "migraines.     He will need to re-establish with a new epileptologist since Dr. Benoit is moving to [place].     As of 9/30/21, the patient reports he has remained seizure-free. He continues to have significant headaches, particularly over the left posterior aspect of the head, radiating over to the right side. His wife endorses that he has bradykinesia and decreased arm swing when walking. Jean Edgar and Karen are concerned he may have Parkinson disease.     As of 12/15/22, the patient returns with his wife, Trista. They are frustrated that he is "like a zombie" because of the high doses of AEDs. He has been seizure-free since April, but when he decreased the meds back in April, he had 3 events within 24 hours.     Mood swings are also an issue; he gets irritated more readily than he did in the past. He is seeing a speech therapist in [facility]; he is making progress WRT conversational communication and memory.     They now have 2 grandchildren.     Goals of surgery: to be free of seizures and reduce medications.     They would like to undergo repeat sEEG since the first was cut short to see if he may be a candidate for further epilepsy surgery.     As of 2/23/23, the patient returns for further discussion of repeat sEEG. He has undergone extensive discussion in interdisciplinary conference, together with review by Dr. Andrew of memory neurology, and he is being recommended for repeat sEEG. He has no evidence of non-epilepsy, non-TBI related neurodegenerative disease on Dr. Andrew's initial evaluation, per discussion with Dr. ALEK Edgar.     He and his wife remain eager to proceed with sEEG placement to determine whether he will be a candidate for further intracranial seizure surgery.     Review of patient's allergies indicates:   Allergen Reactions    Losartan Rash       Current Outpatient Medications   Medication Sig Dispense Refill    eslicarbazepine (APTIOM) 400 mg Tab tablet Take 1 tablet (400 mg total) by " mouth once daily. 30 tablet 5    eslicarbazepine (APTIOM) 800 mg Tab Take 800 mg by mouth once daily. 30 tablet 5    midazolam 5 mg/spray (0.1 mL) Spry 0.1 mLs by Nasal route.      oxyCODONE (ROXICODONE) 5 MG immediate release tablet Take 5 mg by mouth every 4 (four) hours as needed.      rizatriptan (MAXALT-MLT) 10 MG disintegrating tablet Take 10 mg by mouth as needed. No more than 3 doses / week      aspirin (ECOTRIN) 81 MG EC tablet Take 1 tablet (81 mg total) by mouth once daily. 360 tablet 0    cloBAZam (ONFI) 20 mg Tab Take 2 tablets (40 mg total) by mouth every evening. 60 tablet 3    EScitalopram oxalate (LEXAPRO) 10 MG tablet Take 1 tablet (10 mg total) by mouth once daily. 30 tablet 11    mag hydrox/aluminum hyd/simeth (ANTACID ORAL) Take by mouth as needed. Acid reflux      ondansetron (ZOFRAN-ODT) 8 MG TbDL Take 8 mg by mouth every 8 (eight) hours as needed. nausea      rimegepant (NURTEC) 75 mg odt Take 75 mg by mouth.      zonisamide (ZONEGRAN) 100 MG Cap Take 6 capsules (600 mg total) by mouth every evening. (Patient taking differently: Take 500 mg by mouth every evening.) 540 capsule 3     No current facility-administered medications for this visit.       Past Medical History:   Diagnosis Date    Anxiety     Dementia in other diseases classified elsewhere, unspecified severity, without behavioral disturbance, psychotic disturbance, mood disturbance, and anxiety 2/2/2023    Depression     GERD (gastroesophageal reflux disease)     Hyperlipidemia     Hypertension     Long term (current) use of antithrombotics/antiplatelets 12/22/2020    Migraine     Normocytic anemia 2/23/2023    Seizures      Past Surgical History:   Procedure Laterality Date    CYST REMOVAL  March 2016/2017    skin cyst - benign    REMOVAL OF STEREO EEG LEADS (DEPTH ELECTRODES) Bilateral 1/21/2021    Procedure: REMOVAL OF STEREO EEG LEADS (DEPTH ELECTRODES);  Surgeon: Ruchi Chen MD;  Location: [facility] OR [address] [place] [address];  Service:  Neurosurgery;  Laterality: Bilateral;    TONSILLECTOMY      teenage      Family History       Problem Relation (Age of Onset)    Heart disease Maternal Uncle (50)    Lung disease Father    Migraines Paternal Grandfather    No Known Problems Mother          Social History     Socioeconomic History    Marital status:      Spouse name: Reilly    Years of education: 2 year college    Highest education level: Associate degree: occupational, technical, or vocational program   Tobacco Use    Smoking status: Former     Types: Cigarettes     Quit date:      Years since quittin.1    Smokeless tobacco: Never   Substance and Sexual Activity    Alcohol use: Yes     Comment: one drink once a week    Drug use: Never    Sexual activity: Yes     Partners: Female     Social Determinants of Health     Financial Resource Strain: Medium Risk    Difficulty of Paying Living Expenses: Somewhat hard   Food Insecurity: No Food Insecurity    Worried About Running Out of Food in the Last Year: Never true    Ran Out of Food in the Last Year: Never true   Transportation Needs: No Transportation Needs    Lack of Transportation (Medical): No    Lack of Transportation (Non-Medical): No   Physical Activity: Unknown    Days of Exercise per Week: Patient refused    Minutes of Exercise per Session: 0 min   Stress: No Stress Concern Present    Feeling of Stress : Only a little   Social Connections: Unknown    Frequency of Communication with Friends and Family: More than three times a week    Frequency of Social Gatherings with Friends and Family: More than three times a week    Active Member of Clubs or Organizations: No    Attends Club or Organization Meetings: Never    Marital Status:    Housing Stability: Low Risk     Unable to Pay for Housing in the Last Year: No    Number of Places Lived in the Last Year: 1    Unstable Housing in the Last Year: No       Review of Systems   Constitutional:  Positive for diaphoresis. Negative  for fever.   HENT:  Positive for nosebleeds.    Eyes:  Negative for visual disturbance.   Respiratory:  Negative for shortness of breath.    Cardiovascular:  Negative for chest pain.   Gastrointestinal:  Negative for vomiting.   Endocrine: Negative for cold intolerance.   Genitourinary:  Negative for difficulty urinating.   Musculoskeletal:  Negative for back pain.   Skin:  Negative for color change.   Neurological:  Negative for seizures.   Hematological:  Does not bruise/bleed easily.   Psychiatric/Behavioral:  The patient is nervous/anxious.      OBJECTIVE:     Vital Signs  Temp: 98.3 °F (36.8 °C)  Pulse: 60  Resp: 16  BP: 130/83  Pain Score: 0-No pain  There is no height or weight on file to calculate BMI.      Physical Exam:    Constitutional: He appears well-developed and well-nourished. No distress.     Eyes: Pupils are equal, round, and reactive to light. EOM are normal.     Cardiovascular: No edema.     Abdominal: Soft.     Skin: Skin displays no rash on extremities. Skin displays no lesions on extremities.   Mostly bald     Psych/Behavior: He is alert. He is oriented to person, place, and time.     Musculoskeletal: Gait is normal.        Right Upper Extremities: Muscle strength is 5/5.        Left Upper Extremities: Muscle strength is 5/5.       Right Lower Extremities: Muscle strength is 5/5.        Left Lower Extremities: Muscle strength is 5/5.     Neurological:        Coordination: He has normal finger to nose coordination.        DTRs: DTRs are DTRS NORMAL AND SYMMETRICnormal and symmetric.        Cranial nerves:   Face grossly symmetric   Shoulder shrug equal bilaterally  Pulmonary: symmetric expansion     Incisions: well healed     Diagnostic Results:  MRI brain personally reviewed   Piriform cortex appears intact     ASSESSMENT/PLAN:     Declan Burleson is a 58 y.o. male with intractable epilepsy who presents s/p bilateral sEEG and L laser amygdalohippocampectomy (3/1/21).     Overall, he is  doing well, but he and his wife are increasingly frustrated with the side effects of the AEDs that he is taking. They are very interested in repeat sEEG to see whether he may benefit from further intracranial epilepsy surgery.     I had a lengthy, detailed discussion with the patient and his wife about the expectation, risks, benefits, and alternatives to intracranial localization for seizures.      We discussed that monitoring typically takes 1-2 weeks but may be longer or shorter depending on how long it takes to have concordant seizures.  We discussed that he will not be able to get out of bed while the electrodes are in the head, and that he will remain in the ICU during that time. He will require mittens and strict 1:1 supervision since he previously self-removed his sEEG leads, terminating the monitoring early. We also discussed that this is a diagnostic, not therapeutic, procedure, and that the definitive surgery would be performed 4-8 weeks after the time of sEEG localization. We discussed that the possibility of seizure freedom is reduced if the epilepsy is found to be multifocal; furthermore, we discussed that he will need to stay on medications for at least one year after surgery; there is a possibility but no guarantee that they may be reduced after that.      We discussed that there is approximately 20% rate of asymptomatic hemorrhage and approximately 1% rate of symptomatic hemorrhage from placement of sEEG electrode per review of the international literature.       We also discussed the specific risks of the localization surgery. Risks include, but are not limited to, bleeding, pain, infection, scarring, need for further/repeat procedure, death, paralysis, stroke (including venous infarct)/damage to major blood vessels, leak of cerebrospinal fluid, and hardware-related complications. There is a chance that we would fail to localize the seizures with the initial electrode plan, which would require  placement of additional electrodes.      We also discussed that there surgical treatments for seizures that do not require localization intracranially: VNS and DBS, but these offer a lower change of seizure freedom than laser ablation or temporal lobectomy, which may be considerations pending the results of localization surgery.     Informed consent was obtained of the patient with his wife present.     I have encouraged them to contact the clinic in interim with any questions, concerns, or adverse clinical change.

## 2023-02-27 ENCOUNTER — ANESTHESIA EVENT (OUTPATIENT)
Dept: SURGERY | Facility: HOSPITAL | Age: 58
DRG: 026 | End: 2023-02-27
Payer: MEDICARE

## 2023-02-27 ENCOUNTER — HOSPITAL ENCOUNTER (INPATIENT)
Facility: HOSPITAL | Age: 58
LOS: 10 days | Discharge: HOME | DRG: 026 | End: 2023-03-09
Attending: NEUROLOGICAL SURGERY | Admitting: NEUROLOGICAL SURGERY
Payer: MEDICARE

## 2023-02-27 ENCOUNTER — TELEPHONE (OUTPATIENT)
Dept: NEUROLOGY | Facility: CLINIC | Age: 58
End: 2023-02-27
Payer: MEDICARE

## 2023-02-27 ENCOUNTER — PATIENT MESSAGE (OUTPATIENT)
Dept: INTERNAL MEDICINE | Facility: CLINIC | Age: 58
End: 2023-02-27
Payer: MEDICARE

## 2023-02-27 ENCOUNTER — ANESTHESIA (OUTPATIENT)
Dept: SURGERY | Facility: HOSPITAL | Age: 58
DRG: 026 | End: 2023-02-27
Payer: MEDICARE

## 2023-02-27 DIAGNOSIS — R07.89 CHEST DISCOMFORT: ICD-10-CM

## 2023-02-27 DIAGNOSIS — G40.909 EPILEPSY: ICD-10-CM

## 2023-02-27 DIAGNOSIS — G40.109 TEMPORAL LOBE EPILEPSY: ICD-10-CM

## 2023-02-27 DIAGNOSIS — G40.209 COMPLEX PARTIAL SEIZURES EVOLVING TO GENERALIZED TONIC-CLONIC SEIZURES: Primary | Chronic | ICD-10-CM

## 2023-02-27 LAB
ABO + RH BLD: NORMAL
AL BETA42 CSF-MCNC: 1461 PG/ML
ANION GAP SERPL CALC-SCNC: 9 MMOL/L (ref 8–16)
APTT BLDCRRT: 27.4 SEC (ref 21–32)
BASOPHILS # BLD AUTO: 0.02 K/UL (ref 0–0.2)
BASOPHILS NFR BLD: 0.3 % (ref 0–1.9)
BLD GP AB SCN CELLS X3 SERPL QL: NORMAL
BUN SERPL-MCNC: 8 MG/DL (ref 6–20)
CALCIUM SERPL-MCNC: 8.4 MG/DL (ref 8.7–10.5)
CHLORIDE SERPL-SCNC: 100 MMOL/L (ref 95–110)
CO2 SERPL-SCNC: 19 MMOL/L (ref 23–29)
CREAT SERPL-MCNC: 0.7 MG/DL (ref 0.5–1.4)
DIFFERENTIAL METHOD: ABNORMAL
EOSINOPHIL # BLD AUTO: 0 K/UL (ref 0–0.5)
EOSINOPHIL NFR BLD: 0.4 % (ref 0–8)
ERYTHROCYTE [DISTWIDTH] IN BLOOD BY AUTOMATED COUNT: 12.4 % (ref 11.5–14.5)
EST. GFR  (NO RACE VARIABLE): >60 ML/MIN/1.73 M^2
GLUCOSE SERPL-MCNC: 119 MG/DL (ref 70–110)
HCT VFR BLD AUTO: 39.5 % (ref 40–54)
HGB BLD-MCNC: 13.3 G/DL (ref 14–18)
IMM GRANULOCYTES # BLD AUTO: 0.04 K/UL (ref 0–0.04)
IMM GRANULOCYTES NFR BLD AUTO: 0.6 % (ref 0–0.5)
IMMUNOLOGIST REVIEW: NORMAL
INR PPP: 1 (ref 0.8–1.2)
LYMPHOCYTES # BLD AUTO: 0.6 K/UL (ref 1–4.8)
LYMPHOCYTES NFR BLD: 8.1 % (ref 18–48)
MCH RBC QN AUTO: 30.9 PG (ref 27–31)
MCHC RBC AUTO-ENTMCNC: 33.7 G/DL (ref 32–36)
MCV RBC AUTO: 92 FL (ref 82–98)
MONOCYTES # BLD AUTO: 0.2 K/UL (ref 0.3–1)
MONOCYTES NFR BLD: 2.2 % (ref 4–15)
NEUTROPHILS # BLD AUTO: 6.1 K/UL (ref 1.8–7.7)
NEUTROPHILS NFR BLD: 88.4 % (ref 38–73)
NRBC BLD-RTO: 0 /100 WBC
P-TAU181 CSF IA-MCNC: 15.2 PG/ML
PLATELET # BLD AUTO: 328 K/UL (ref 150–450)
PMV BLD AUTO: 9.3 FL (ref 9.2–12.9)
POTASSIUM SERPL-SCNC: 4 MMOL/L (ref 3.5–5.1)
PROTHROMBIN TIME: 10.5 SEC (ref 9–12.5)
RBC # BLD AUTO: 4.3 M/UL (ref 4.6–6.2)
SODIUM SERPL-SCNC: 128 MMOL/L (ref 136–145)
TAU PROT CSF-MCNC: 176 PG/ML
TAU PROT/AL BETA42 CSF-RTO: 0.01 RATIO
WBC # BLD AUTO: 6.91 K/UL (ref 3.9–12.7)

## 2023-02-27 PROCEDURE — 86900 BLOOD TYPING SEROLOGIC ABO: CPT | Performed by: NEUROLOGICAL SURGERY

## 2023-02-27 PROCEDURE — 85730 THROMBOPLASTIN TIME PARTIAL: CPT | Performed by: PHYSICIAN ASSISTANT

## 2023-02-27 PROCEDURE — D9220A PRA ANESTHESIA: Mod: CRNA,,, | Performed by: NURSE ANESTHETIST, CERTIFIED REGISTERED

## 2023-02-27 PROCEDURE — 27201037 HC PRESSURE MONITORING SET UP

## 2023-02-27 PROCEDURE — 25000003 PHARM REV CODE 250: Performed by: NEUROLOGICAL SURGERY

## 2023-02-27 PROCEDURE — D9220A PRA ANESTHESIA: Mod: ANES,,, | Performed by: ANESTHESIOLOGY

## 2023-02-27 PROCEDURE — 85610 PROTHROMBIN TIME: CPT | Performed by: PHYSICIAN ASSISTANT

## 2023-02-27 PROCEDURE — 36000710: Performed by: NEUROLOGICAL SURGERY

## 2023-02-27 PROCEDURE — A4648 IMPLANTABLE TISSUE MARKER: HCPCS | Performed by: NEUROLOGICAL SURGERY

## 2023-02-27 PROCEDURE — 63600175 PHARM REV CODE 636 W HCPCS: Performed by: NURSE ANESTHETIST, CERTIFIED REGISTERED

## 2023-02-27 PROCEDURE — D9220A PRA ANESTHESIA: ICD-10-PCS | Mod: ANES,,, | Performed by: ANESTHESIOLOGY

## 2023-02-27 PROCEDURE — 25000003 PHARM REV CODE 250: Performed by: PHYSICIAN ASSISTANT

## 2023-02-27 PROCEDURE — 80048 BASIC METABOLIC PNL TOTAL CA: CPT | Performed by: PHYSICIAN ASSISTANT

## 2023-02-27 PROCEDURE — 27201423 OPTIME MED/SURG SUP & DEVICES STERILE SUPPLY: Performed by: NEUROLOGICAL SURGERY

## 2023-02-27 PROCEDURE — 63600175 PHARM REV CODE 636 W HCPCS: Performed by: NEUROLOGICAL SURGERY

## 2023-02-27 PROCEDURE — 27000221 HC OXYGEN, UP TO 24 HOURS

## 2023-02-27 PROCEDURE — 95720 PR EEG, W/VIDEO, CONT RECORD, I&R, >12<26 HRS: ICD-10-PCS | Mod: ,,, | Performed by: PSYCHIATRY & NEUROLOGY

## 2023-02-27 PROCEDURE — 36620 ARTERIAL: ICD-10-PCS | Mod: 59,,, | Performed by: ANESTHESIOLOGY

## 2023-02-27 PROCEDURE — 85025 COMPLETE CBC W/AUTO DIFF WBC: CPT | Performed by: PHYSICIAN ASSISTANT

## 2023-02-27 PROCEDURE — 94761 N-INVAS EAR/PLS OXIMETRY MLT: CPT

## 2023-02-27 PROCEDURE — 63600175 PHARM REV CODE 636 W HCPCS: Performed by: PHYSICIAN ASSISTANT

## 2023-02-27 PROCEDURE — 86920 COMPATIBILITY TEST SPIN: CPT | Performed by: NEUROLOGICAL SURGERY

## 2023-02-27 PROCEDURE — C1778 LEAD, NEUROSTIMULATOR: HCPCS | Performed by: NEUROLOGICAL SURGERY

## 2023-02-27 PROCEDURE — 99223 1ST HOSP IP/OBS HIGH 75: CPT | Mod: ,,, | Performed by: NURSE PRACTITIONER

## 2023-02-27 PROCEDURE — 25000003 PHARM REV CODE 250: Performed by: NURSE PRACTITIONER

## 2023-02-27 PROCEDURE — 25000003 PHARM REV CODE 250: Performed by: STUDENT IN AN ORGANIZED HEALTH CARE EDUCATION/TRAINING PROGRAM

## 2023-02-27 PROCEDURE — 99233 PR SUBSEQUENT HOSPITAL CARE,LEVL III: ICD-10-PCS | Mod: ,,, | Performed by: PSYCHIATRY & NEUROLOGY

## 2023-02-27 PROCEDURE — 36620 INSERTION CATHETER ARTERY: CPT | Mod: 59,,, | Performed by: ANESTHESIOLOGY

## 2023-02-27 PROCEDURE — 61760 IMPLANT BRAIN ELECTRODES: CPT | Mod: ,,, | Performed by: NEUROLOGICAL SURGERY

## 2023-02-27 PROCEDURE — 99223 PR INITIAL HOSPITAL CARE,LEVL III: ICD-10-PCS | Mod: ,,, | Performed by: NURSE PRACTITIONER

## 2023-02-27 PROCEDURE — 36000711: Performed by: NEUROLOGICAL SURGERY

## 2023-02-27 PROCEDURE — 37000009 HC ANESTHESIA EA ADD 15 MINS: Performed by: NEUROLOGICAL SURGERY

## 2023-02-27 PROCEDURE — D9220A PRA ANESTHESIA: ICD-10-PCS | Mod: CRNA,,, | Performed by: NURSE ANESTHETIST, CERTIFIED REGISTERED

## 2023-02-27 PROCEDURE — 20000000 HC ICU ROOM

## 2023-02-27 PROCEDURE — 63600175 PHARM REV CODE 636 W HCPCS: Performed by: STUDENT IN AN ORGANIZED HEALTH CARE EDUCATION/TRAINING PROGRAM

## 2023-02-27 PROCEDURE — 37000008 HC ANESTHESIA 1ST 15 MINUTES: Performed by: NEUROLOGICAL SURGERY

## 2023-02-27 PROCEDURE — 25000003 PHARM REV CODE 250: Performed by: NURSE ANESTHETIST, CERTIFIED REGISTERED

## 2023-02-27 PROCEDURE — C1713 ANCHOR/SCREW BN/BN,TIS/BN: HCPCS | Performed by: NEUROLOGICAL SURGERY

## 2023-02-27 PROCEDURE — 99233 SBSQ HOSP IP/OBS HIGH 50: CPT | Mod: ,,, | Performed by: PSYCHIATRY & NEUROLOGY

## 2023-02-27 PROCEDURE — 61760 PR STEREO IMPLANT BRAIN ELECTRODES, SEIZURE MONITOR: ICD-10-PCS | Mod: ,,, | Performed by: NEUROLOGICAL SURGERY

## 2023-02-27 PROCEDURE — 95720 EEG PHY/QHP EA INCR W/VEEG: CPT | Mod: ,,, | Performed by: PSYCHIATRY & NEUROLOGY

## 2023-02-27 DEVICE — FIDUCIAL KIT UNI STEREO 10 MM: Type: IMPLANTABLE DEVICE | Site: CRANIAL | Status: FUNCTIONAL

## 2023-02-27 DEVICE — ANCHOR SEEG BOLT 30X2.1MM: Type: IMPLANTABLE DEVICE | Site: CRANIAL | Status: FUNCTIONAL

## 2023-02-27 DEVICE — ANCHOR BOLT 25X2.1MM: Type: IMPLANTABLE DEVICE | Site: CRANIAL | Status: FUNCTIONAL

## 2023-02-27 RX ORDER — MUPIROCIN 20 MG/G
OINTMENT TOPICAL
Status: DISCONTINUED | OUTPATIENT
Start: 2023-02-27 | End: 2023-02-27 | Stop reason: HOSPADM

## 2023-02-27 RX ORDER — LIDOCAINE HYDROCHLORIDE AND EPINEPHRINE 10; 10 MG/ML; UG/ML
INJECTION, SOLUTION INFILTRATION; PERINEURAL
Status: DISCONTINUED | OUTPATIENT
Start: 2023-02-27 | End: 2023-02-27 | Stop reason: HOSPADM

## 2023-02-27 RX ORDER — ACETAMINOPHEN 325 MG/1
650 TABLET ORAL EVERY 4 HOURS PRN
Status: DISCONTINUED | OUTPATIENT
Start: 2023-02-27 | End: 2023-02-28

## 2023-02-27 RX ORDER — CLOBAZAM 10 MG/1
20 TABLET ORAL NIGHTLY
Status: DISCONTINUED | OUTPATIENT
Start: 2023-02-27 | End: 2023-02-28

## 2023-02-27 RX ORDER — HYDROCODONE BITARTRATE AND ACETAMINOPHEN 10; 325 MG/1; MG/1
1 TABLET ORAL EVERY 4 HOURS PRN
Status: DISCONTINUED | OUTPATIENT
Start: 2023-02-27 | End: 2023-03-01

## 2023-02-27 RX ORDER — DEXAMETHASONE SODIUM PHOSPHATE 4 MG/ML
INJECTION, SOLUTION INTRA-ARTICULAR; INTRALESIONAL; INTRAMUSCULAR; INTRAVENOUS; SOFT TISSUE
Status: DISCONTINUED | OUTPATIENT
Start: 2023-02-27 | End: 2023-02-27

## 2023-02-27 RX ORDER — HYDROMORPHONE HYDROCHLORIDE 1 MG/ML
0.5 INJECTION, SOLUTION INTRAMUSCULAR; INTRAVENOUS; SUBCUTANEOUS EVERY 6 HOURS PRN
Status: DISCONTINUED | OUTPATIENT
Start: 2023-02-27 | End: 2023-03-09 | Stop reason: HOSPADM

## 2023-02-27 RX ORDER — FENTANYL CITRATE 50 UG/ML
INJECTION, SOLUTION INTRAMUSCULAR; INTRAVENOUS
Status: DISCONTINUED | OUTPATIENT
Start: 2023-02-27 | End: 2023-02-27

## 2023-02-27 RX ORDER — HYDROCODONE BITARTRATE AND ACETAMINOPHEN 5; 325 MG/1; MG/1
1 TABLET ORAL EVERY 4 HOURS PRN
Status: DISCONTINUED | OUTPATIENT
Start: 2023-02-27 | End: 2023-03-01

## 2023-02-27 RX ORDER — ONDANSETRON 2 MG/ML
INJECTION INTRAMUSCULAR; INTRAVENOUS
Status: DISCONTINUED | OUTPATIENT
Start: 2023-02-27 | End: 2023-02-27

## 2023-02-27 RX ORDER — CEFAZOLIN SODIUM 1 G/3ML
INJECTION, POWDER, FOR SOLUTION INTRAMUSCULAR; INTRAVENOUS
Status: DISCONTINUED | OUTPATIENT
Start: 2023-02-27 | End: 2023-02-27

## 2023-02-27 RX ORDER — CEFTRIAXONE 1 G/1
INJECTION, POWDER, FOR SOLUTION INTRAMUSCULAR; INTRAVENOUS
Status: DISCONTINUED | OUTPATIENT
Start: 2023-02-27 | End: 2023-02-27 | Stop reason: HOSPADM

## 2023-02-27 RX ORDER — VASOPRESSIN 20 [USP'U]/ML
INJECTION, SOLUTION INTRAMUSCULAR; SUBCUTANEOUS
Status: DISCONTINUED | OUTPATIENT
Start: 2023-02-27 | End: 2023-02-27

## 2023-02-27 RX ORDER — POLYETHYLENE GLYCOL 3350 17 G/17G
17 POWDER, FOR SOLUTION ORAL DAILY
Status: DISCONTINUED | OUTPATIENT
Start: 2023-02-27 | End: 2023-03-09 | Stop reason: HOSPADM

## 2023-02-27 RX ORDER — BACITRACIN ZINC 500 UNIT/G
OINTMENT (GRAM) TOPICAL
Status: DISCONTINUED | OUTPATIENT
Start: 2023-02-27 | End: 2023-02-27 | Stop reason: HOSPADM

## 2023-02-27 RX ORDER — ROCURONIUM BROMIDE 10 MG/ML
INJECTION, SOLUTION INTRAVENOUS
Status: DISCONTINUED | OUTPATIENT
Start: 2023-02-27 | End: 2023-02-27

## 2023-02-27 RX ORDER — AMOXICILLIN 250 MG
1 CAPSULE ORAL DAILY
Status: DISCONTINUED | OUTPATIENT
Start: 2023-02-27 | End: 2023-03-02

## 2023-02-27 RX ORDER — BUPIVACAINE HYDROCHLORIDE AND EPINEPHRINE 5; 5 MG/ML; UG/ML
INJECTION, SOLUTION EPIDURAL; INTRACAUDAL; PERINEURAL
Status: DISCONTINUED | OUTPATIENT
Start: 2023-02-27 | End: 2023-02-27 | Stop reason: HOSPADM

## 2023-02-27 RX ORDER — LIDOCAINE HYDROCHLORIDE 20 MG/ML
INJECTION INTRAVENOUS
Status: DISCONTINUED | OUTPATIENT
Start: 2023-02-27 | End: 2023-02-27

## 2023-02-27 RX ORDER — MUPIROCIN 20 MG/G
1 OINTMENT TOPICAL 2 TIMES DAILY
Status: DISCONTINUED | OUTPATIENT
Start: 2023-02-27 | End: 2023-02-27 | Stop reason: HOSPADM

## 2023-02-27 RX ORDER — ESCITALOPRAM OXALATE 10 MG/1
10 TABLET ORAL DAILY
Status: DISCONTINUED | OUTPATIENT
Start: 2023-02-28 | End: 2023-03-09 | Stop reason: HOSPADM

## 2023-02-27 RX ORDER — HEPARIN SODIUM 5000 [USP'U]/ML
5000 INJECTION, SOLUTION INTRAVENOUS; SUBCUTANEOUS EVERY 8 HOURS
Status: COMPLETED | OUTPATIENT
Start: 2023-02-28 | End: 2023-03-07

## 2023-02-27 RX ORDER — MUPIROCIN 20 MG/G
OINTMENT TOPICAL 2 TIMES DAILY
Status: COMPLETED | OUTPATIENT
Start: 2023-02-27 | End: 2023-03-04

## 2023-02-27 RX ORDER — ONDANSETRON 8 MG/1
8 TABLET, ORALLY DISINTEGRATING ORAL EVERY 8 HOURS PRN
Status: DISCONTINUED | OUTPATIENT
Start: 2023-02-27 | End: 2023-03-09 | Stop reason: HOSPADM

## 2023-02-27 RX ORDER — GLYCERIN 1 G/1
1 SUPPOSITORY RECTAL DAILY PRN
Status: DISCONTINUED | OUTPATIENT
Start: 2023-02-27 | End: 2023-03-09 | Stop reason: HOSPADM

## 2023-02-27 RX ORDER — ZONISAMIDE 100 MG/1
200 CAPSULE ORAL NIGHTLY
Status: DISCONTINUED | OUTPATIENT
Start: 2023-02-27 | End: 2023-02-28

## 2023-02-27 RX ORDER — PROPOFOL 10 MG/ML
VIAL (ML) INTRAVENOUS
Status: DISCONTINUED | OUTPATIENT
Start: 2023-02-27 | End: 2023-02-27

## 2023-02-27 RX ORDER — EPHEDRINE SULFATE 50 MG/ML
INJECTION, SOLUTION INTRAVENOUS
Status: DISCONTINUED | OUTPATIENT
Start: 2023-02-27 | End: 2023-02-27

## 2023-02-27 RX ADMIN — VASOPRESSIN 0.5 UNITS: 20 INJECTION INTRAVENOUS at 07:02

## 2023-02-27 RX ADMIN — DEXTROSE MONOHYDRATE 2 G: 5 INJECTION INTRAVENOUS at 09:02

## 2023-02-27 RX ADMIN — SODIUM CHLORIDE, SODIUM GLUCONATE, SODIUM ACETATE, POTASSIUM CHLORIDE, MAGNESIUM CHLORIDE, SODIUM PHOSPHATE, DIBASIC, AND POTASSIUM PHOSPHATE: .53; .5; .37; .037; .03; .012; .00082 INJECTION, SOLUTION INTRAVENOUS at 07:02

## 2023-02-27 RX ADMIN — GLYCOPYRROLATE 0.2 MG: 0.2 INJECTION, SOLUTION INTRAMUSCULAR; INTRAVENOUS at 07:02

## 2023-02-27 RX ADMIN — SODIUM CHLORIDE: 0.9 INJECTION, SOLUTION INTRAVENOUS at 07:02

## 2023-02-27 RX ADMIN — ROCURONIUM BROMIDE 30 MG: 10 INJECTION INTRAVENOUS at 08:02

## 2023-02-27 RX ADMIN — HYDROCODONE BITARTRATE AND ACETAMINOPHEN 1 TABLET: 5; 325 TABLET ORAL at 05:02

## 2023-02-27 RX ADMIN — VASOPRESSIN 1 UNITS: 20 INJECTION INTRAVENOUS at 08:02

## 2023-02-27 RX ADMIN — HYDROCODONE BITARTRATE AND ACETAMINOPHEN 1 TABLET: 10; 325 TABLET ORAL at 11:02

## 2023-02-27 RX ADMIN — CEFTRIAXONE 2 G: 2 INJECTION, POWDER, FOR SOLUTION INTRAMUSCULAR; INTRAVENOUS at 08:02

## 2023-02-27 RX ADMIN — ESLICARBAZEPINE ACETATE 400 MG: 400 TABLET ORAL at 08:02

## 2023-02-27 RX ADMIN — ROCURONIUM BROMIDE 10 MG: 10 INJECTION INTRAVENOUS at 08:02

## 2023-02-27 RX ADMIN — SUGAMMADEX 200 MG: 100 INJECTION, SOLUTION INTRAVENOUS at 11:02

## 2023-02-27 RX ADMIN — DEXAMETHASONE SODIUM PHOSPHATE 4 MG: 4 INJECTION, SOLUTION INTRAMUSCULAR; INTRAVENOUS at 07:02

## 2023-02-27 RX ADMIN — CLOBAZAM 20 MG: 10 TABLET ORAL at 08:02

## 2023-02-27 RX ADMIN — FENTANYL CITRATE 50 MCG: 50 INJECTION, SOLUTION INTRAMUSCULAR; INTRAVENOUS at 07:02

## 2023-02-27 RX ADMIN — CEFAZOLIN 2 G: 330 INJECTION, POWDER, FOR SOLUTION INTRAMUSCULAR; INTRAVENOUS at 07:02

## 2023-02-27 RX ADMIN — ROCURONIUM BROMIDE 40 MG: 10 INJECTION INTRAVENOUS at 07:02

## 2023-02-27 RX ADMIN — VASOPRESSIN 1 UNITS: 20 INJECTION INTRAVENOUS at 07:02

## 2023-02-27 RX ADMIN — PROPOFOL 30 MG: 10 INJECTION, EMULSION INTRAVENOUS at 08:02

## 2023-02-27 RX ADMIN — PROPOFOL 180 MG: 10 INJECTION, EMULSION INTRAVENOUS at 07:02

## 2023-02-27 RX ADMIN — LIDOCAINE HYDROCHLORIDE 100 MG: 20 INJECTION INTRAVENOUS at 07:02

## 2023-02-27 RX ADMIN — ONDANSETRON 4 MG: 2 INJECTION INTRAMUSCULAR; INTRAVENOUS at 10:02

## 2023-02-27 RX ADMIN — HYDROCODONE BITARTRATE AND ACETAMINOPHEN 1 TABLET: 10; 325 TABLET ORAL at 01:02

## 2023-02-27 RX ADMIN — ZONISAMIDE 200 MG: 100 CAPSULE ORAL at 08:02

## 2023-02-27 RX ADMIN — MUPIROCIN: 20 OINTMENT TOPICAL at 08:02

## 2023-02-27 RX ADMIN — MUPIROCIN: 20 OINTMENT TOPICAL at 06:02

## 2023-02-27 RX ADMIN — EPHEDRINE SULFATE 10 MG: 50 INJECTION INTRAVENOUS at 08:02

## 2023-02-27 RX ADMIN — ACETAMINOPHEN 650 MG: 325 TABLET ORAL at 08:02

## 2023-02-27 RX ADMIN — EPHEDRINE SULFATE 5 MG: 50 INJECTION INTRAVENOUS at 09:02

## 2023-02-27 NOTE — ANESTHESIA PROCEDURE NOTES
Arterial    Diagnosis: Complex partial seizures evolving to generalized tonic-clonic seizures    Patient location during procedure: done in OR    Staffing  Authorizing Provider: Francesco Brandt MD  Performing Provider: Manan Morin CRNA    Anesthesiologist was present at the time of the procedure.    Preanesthetic Checklist  Completed: patient identified, IV checked, site marked, risks and benefits discussed, surgical consent, monitors and equipment checked, pre-op evaluation, timeout performed and anesthesia consent givenArterial  Skin Prep: chlorhexidine gluconate  Orientation: right  Location: radial    Catheter Size: 20 G  Catheter placement by Anatomical landmarks. Heme positive aspiration all ports. Insertion Attempts: 1  Assessment  Dressing: secured with tape and tegaderm  Patient: Tolerated well

## 2023-02-27 NOTE — ANESTHESIA PROCEDURE NOTES
Intubation    Date/Time: 2/27/2023 7:29 AM  Performed by: Manan Morin CRNA  Authorized by: Francesco Brandt MD     Intubation:     Induction:  Intravenous    Intubated:  Postinduction    Mask Ventilation:  Easy with oral airway    Attempts:  1    Attempted By:  CRNA    Method of Intubation:  Video laryngoscopy    Blade:  Carpio 3    Laryngeal View Grade: Grade I - full view of cords      Difficult Airway Encountered?: No      Complications:  None    Airway Device:  Oral endotracheal tube    Airway Device Size:  7.5    Style/Cuff Inflation:  Cuffed (inflated to minimal occlusive pressure)    Inflation Amount (mL):  6    Tube secured:  21    Secured at:  The lips    Placement Verified By:  Revisualization with laryngoscopy and Capnometry    Complicating Factors:  Anterior larynx and retrognathia    Findings Post-Intubation:  BS equal bilateral and atraumatic/condition of teeth unchanged

## 2023-02-27 NOTE — TELEPHONE ENCOUNTER
----- Message from Stiven Andrew MD sent at 2/27/2023  3:49 PM CST -----  Hi,  Please schedule the patient for a video follow-up to discuss test results and care management.  Stiven Mooney

## 2023-02-27 NOTE — NURSING
Pt wife noted some swelling to pt upper lip. Lip appears slightly more swollen than earlier in shift. Pt has only received norco 10mg since on the unit, which he has had before. VSS and airway intact. Magda, NP notified and to bedside. No new orders per Magda, NP. To keep a close eye on pt and notify if gets significantly worse or pt has difficulty with speaking or swallowing.

## 2023-02-27 NOTE — OP NOTE
[facility] - Neuro Critical Care  Neurosurgery  Operative Note    SUMMARY      Date of Procedure: 2/27/2023     Procedure: Procedure(s) (LRB):  PLACEMENT OF STEREO EEG LEADS (DEPTH ELECTRODES) (Left)     Surgeon(s) and Role:     * Ruchi Chen MD - Primary    Assisting Surgeon: Isaura Burgos PA-C (no resident was available to assist in the procedure)      Pre-Operative Diagnosis: Complex partial seizures evolving to generalized tonic-clonic seizures [G40.209]    Post-Operative Diagnosis: Post-Op Diagnosis Codes:     * Complex partial seizures evolving to generalized tonic-clonic seizures [G40.209]    Anesthesia: General      Procedure:   1. Placement of fiducial screws   2. Placement of 8 left-sided sEEG depth electrodes (PMT)   3. Use of JUAN robot neuro-navigation   4. Use of intraoperative LoopX       Indications:   Declan Burleson is a 58 y.o. male with intractable epilepsy who presents s/p bilateral sEEG and L laser amygdalohippocampectomy (3/1/21).      Overall, he is doing well, but he and his wife are increasingly frustrated with the side effects of the AEDs that he is taking. They are very interested in repeat sEEG to see whether he may benefit from further intracranial epilepsy surgery.      I had a lengthy, detailed discussion with the patient and his wife about the expectation, risks, benefits, and alternatives to intracranial localization for seizures.      We discussed that monitoring typically takes 1-2 weeks but may be longer or shorter depending on how long it takes to have concordant seizures.  We discussed that he will not be able to get out of bed while the electrodes are in the head, and that he will remain in the ICU during that time. He will require mittens and strict 1:1 supervision since he previously self-removed his sEEG leads, terminating the monitoring early. We also discussed that this is a diagnostic, not therapeutic, procedure, and that the definitive surgery would be  performed 4-8 weeks after the time of sEEG localization. We discussed that the possibility of seizure freedom is reduced if the epilepsy is found to be multifocal; furthermore, we discussed that he will need to stay on medications for at least one year after surgery; there is a possibility but no guarantee that they may be reduced after that.      We discussed that there is approximately 20% rate of asymptomatic hemorrhage and approximately 1% rate of symptomatic hemorrhage from placement of sEEG electrode per review of the international literature.       We also discussed the specific risks of the localization surgery. Risks include, but are not limited to, bleeding, pain, infection, scarring, need for further/repeat procedure, death, paralysis, stroke (including venous infarct)/damage to major blood vessels, leak of cerebrospinal fluid, and hardware-related complications. There is a chance that we would fail to localize the seizures with the initial electrode plan, which would require placement of additional electrodes.      Description of the Procedure:  The patient was brought back to the operating room and was carefully positioned on the stretcher in anticipation of skull fiducial placement. General endotracheal anesthesia was induced by the anesthesia team. The patient received 2 grams of IV Ancef within 30 minutes of skin incision. The patient was prepped and draped in the usual sterile fashion. Two ccs of 1% lidocaine with epinephrine was used to infiltrate the skin, and a stab incision was performed with a number 15 behind the hairline. The pericranium was elevated, and a skull fiducial was placed using the power screwdriver and checked with the hand screwdriver. This process was repeated an additional four times. The patient the had a Loop X volumetric study on the Loop X table. Again, all pressure points were carefully padded.     At this point, the patient's head was affixed using the [employer] [place]  headframe to the JUAN robot, onto which we loaded the previously completed sEEG plan and merged to the Loop X scan with fiducials. As soon as the head was affixed to the JUAN, the bed was unplugged from the wall. Skull fiducial registration was then completed with an appropriate projected accuracy of 0.4 mm. The patient was then again prepped and draped in the usual sterile fashion and given 2g of IV Rocephin. Instructions were given to the anesthesia team to keep SBP <140 at all times, and they were further instructed that [id number] is upregulated due to being on chronic AEDs. SREE hose and SCDs were applied.     Attention was turned to the first trajectory, left superior margin. The skin was marked at the site of incision and infiltrated with 1% lidocaine with epinephrine. Using the JUAN as a trajectory guide, the hand drill was used to drill through the skull. Care was taken not to plunge. The dura was cauterized with application of the Bovie cautery to a blunt Lio pin. The reducing tube was removed, and the bone anchor was secured to the skull with two-finger tightness. After being carefully measured and marked, a rigid stylet followed by the the electrode was then passed to depth, and secured to the bone anchor. The stylet was then removed, and the electrode number was read and confirmed with the EEG technician.     This exact procedure was repeated 7 more times, for the left entorhinal cortex, left medial prefrontal cortex, left anterior insula, left mid insula, left orbitofrontal, left mid cingulate, and left hippo tail.      We then confirmed the trajectory placements intraoperatively with LoopX, which appeared to demonstrate adequate accuracy. We also tested each of the electrodes to ensure they were in parenchyma and functioning, which were confirmed by Dr. Feliciano.       The patient tolerated the procedure well without apparent complication. All counts were correct x2. A retention stitch was placed  around the electrodes, taking care not to kink any. The patient was removed from the headframe. A large, protective, sterile headwrap was applied. The patient was then extubated by the anesthesia team with disposition to the neuro ICU via CT scan. CT did not demonstrate any large-volume hemorrhage.      Complications: No     Estimated Blood Loss (EBL): minimal            Specimens:   Specimen (24h ago, onward)      None             Implants:   Implant Name Type Inv. Item Serial No.  Lot No. LRB No. Used Action   FIDUCIAL KIT UNI STEREO 10 MM - [id number]  FIDUCIAL KIT UNI STEREO 10 MM  [employer]  N/A 5 Implanted   FirmFix 4    [employer] [id number] N/A 2 Implanted and Explanted   ANCHOR BOLT 25X2.1MM - [id number]  ANCHOR BOLT 25X2.1MM  [employer] [id number] Left 1 Implanted   sEEG Depth Electrode   [id number] [employer] [id number] Left 1 Implanted   ANCHOR SEEG BOLT 30X2.1MM - [id number]  ANCHOR SEEG BOLT 30X2.1MM  [employer] [id number] Left 1 Implanted   sEEG Depth Electrode   90265 [employer] [id number] Left 1 Implanted   ANCHOR BOLT 25X2.1MM - [id number]  ANCHOR BOLT 25X2.1MM  [employer] [id number] Left 1 Implanted   sEEG Depth Electrode   36393 [employer] [id number] Left 1 Implanted   ANCHOR BOLT 25X2.1MM - [id number]  ANCHOR BOLT 25X2.1MM  [employer] [id number] Left 1 Implanted   sEEG Depth Electrode   46222 [employer] [id number] Left 1 Implanted   ANCHOR BOLT 25X2.1MM - [id number]  ANCHOR BOLT 25X2.1MM  [employer] [id number] Left 1 Implanted   sEEG Depth Electrode   96393 [employer] [id number] Left 1 Implanted   ANCHOR BOLT 25X2.1MM - [id number]  ANCHOR BOLT 25X2.1MM  [employer] [id number] Left 1 Implanted   sEEG Depth Electrode   71375 [employer] [id number] Left 1 Implanted   ANCHOR BOLT 25X2.1MM - [id number]  ANCHOR BOLT 25X2.1MM  [employer] [id number] Left 1 Implanted   sEEG Depth Electrode   17991 [employer] [id number] Left 1 Implanted   ANCHOR BOLT 25X2.1MM - [id number]   ANCHOR BOLT 25X2.1MM  [employer] [id number] Left 1 Implanted   sEEG Depth Electrode   09531 [employer] [id number] Left 1 Implanted              Condition: Stable    Disposition: ICU - extubated and stable.    Attestation: I was present and scrubbed for the entire procedure.

## 2023-02-27 NOTE — SUBJECTIVE & OBJECTIVE
Past Medical History:   Diagnosis Date    Anxiety     Dementia in other diseases classified elsewhere, unspecified severity, without behavioral disturbance, psychotic disturbance, mood disturbance, and anxiety 2/2/2023    Depression     GERD (gastroesophageal reflux disease)     Hyperlipidemia     Hypertension     Long term (current) use of antithrombotics/antiplatelets 12/22/2020    Migraine     Normocytic anemia 2/23/2023    Seizures      Past Surgical History:   Procedure Laterality Date    CYST REMOVAL  March 2016/2017    skin cyst - benign    REMOVAL OF STEREO EEG LEADS (DEPTH ELECTRODES) Bilateral 1/21/2021    Procedure: REMOVAL OF STEREO EEG LEADS (DEPTH ELECTRODES);  Surgeon: Ruchi Chen MD;  Location: [facility] OR [address] [place] [address];  Service: Neurosurgery;  Laterality: Bilateral;    TONSILLECTOMY      teenage       No current facility-administered medications on file prior to encounter.     Current Outpatient Medications on File Prior to Encounter   Medication Sig Dispense Refill    eslicarbazepine (APTIOM) 400 mg Tab tablet Take 1 tablet (400 mg total) by mouth once daily. 30 tablet 5    eslicarbazepine (APTIOM) 800 mg Tab Take 800 mg by mouth once daily. 30 tablet 5    mag hydrox/aluminum hyd/simeth (ANTACID ORAL) Take by mouth as needed. Acid reflux      ondansetron (ZOFRAN-ODT) 8 MG TbDL Take 8 mg by mouth every 8 (eight) hours as needed. nausea      rimegepant (NURTEC) 75 mg odt Take 75 mg by mouth.      rizatriptan (MAXALT-MLT) 10 MG disintegrating tablet Take 10 mg by mouth as needed. No more than 3 doses / week      zonisamide (ZONEGRAN) 100 MG Cap Take 6 capsules (600 mg total) by mouth every evening. (Patient taking differently: Take 500 mg by mouth every evening.) 540 capsule 3    aspirin (ECOTRIN) 81 MG EC tablet Take 1 tablet (81 mg total) by mouth once daily. 360 tablet 0    cloBAZam (ONFI) 20 mg Tab Take 2 tablets (40 mg total) by mouth every evening. 60 tablet 3    EScitalopram oxalate (LEXAPRO) 10 MG  tablet Take 1 tablet (10 mg total) by mouth once daily. 30 tablet 11      Allergies: Losartan    Family History   Problem Relation Age of Onset    No Known Problems Mother     Lung disease Father     Migraines Paternal Grandfather     Heart disease Maternal Uncle 50     Social History     Tobacco Use    Smoking status: Former     Types: Cigarettes     Quit date:      Years since quittin.1    Smokeless tobacco: Never   Substance Use Topics    Alcohol use: Yes     Comment: one drink once a week    Drug use: Never     Review of Systems  Objective:     Vitals:    Temp: 97.6 °F (36.4 °C)  Pulse: 69  Rhythm: normal sinus rhythm  BP: 127/76  MAP (mmHg): 95  Resp: 12  SpO2: (!) 93 %    Temp  Min: 97.5 °F (36.4 °C)  Max: 97.6 °F (36.4 °C)  Pulse  Min: 61  Max: 75  BP  Min: 115/71  Max: 155/86  MAP (mmHg)  Min: 88  Max: 111  Resp  Min: 12  Max: 18  SpO2  Min: 93 %  Max: 99 %    No intake/output data recorded.   Unmeasured Output  Stool Occurrence: 0       Physical Exam  GA: Alert, comfortable, no acute distress.   HEENT: No scleral icterus or JVD.   Pulmonary: Clear to auscultation A/L.  Cardiac: RRR S1 & S2 w/o rubs/murmurs/gallops.   Abdominal: Bowel sounds present x 4. No appreciable hepatosplenomegaly.  Skin: No jaundice, rashes, or visible lesions.  Neuro:  --GCS: E4 V5 M6  --Mental Status:  awake, oriented X4, follows commands, fluent speech  --CN II-XII grossly intact.   --Pupils 3mm, PERRL.   --Corneal reflex, gag, cough intact.  --MOTTA spont and against gravity    Today I personally reviewed pertinent medications, lines/drains/airways, imaging, cardiology results, laboratory results, microbiology results,

## 2023-02-27 NOTE — ASSESSMENT & PLAN NOTE
Declan Burleson is a 58 y.o.M  admitted to [facility] s/p SEEG placement. He has failed multiple AEDs, including topiramate, lamotrigine, levetiracetam, and oxcarbazepine. He is currently taking eslicarbazepine, zonisamide, and clobazam.     -- s/p sEEG  -- NSGY and epilepsy following  -- AED's per epilepsy reccs  -- Neuro checks q 2hr  -- SBP <160  -- seizure precautions  -- PT/OT/SLP

## 2023-02-27 NOTE — HPI
Mr. Burleson is a 48 year old man with intractable epilepsy since age 48 admitted to [facility] s/p placement of 8 left sided sEEG electrodes for further seizure localization and potential surgical management of his epilepsy. Patient previously completed sEEG monitoring in January 2021, which was cut short due to patient self-removing leads some of his leads while post-ictal during his stay. He underwent left laser amygdalohippocampectomy in March of 2021, after which he initially had some improvement in seizures, but ultimately he developed recurrence of seizures. Patient is currently maintained on Clobazam 40 mg qHS, Eslicarbazepine 1200 mg qHS, and Zonisamide 500 mg qHS with continued events of staring/loss of awareness, in addition to generalized convulsions. Patient underwent repeat Neuropsychology testing and evaluation by [facility] Neu[facility], as well as extensive discussion in multi-disciplinary epilepsy surgery conference, with recommendation for repeat sEEG to evaluate if patient is a candidate for further surgical management options of his epilepsy. Admit to [facility] s/p placement of 8 left sided sEEG electrodes by NSGY. Neurology Epilepsy following for assistance in management.

## 2023-02-27 NOTE — PLAN OF CARE
POC reviewed with Declan Burleson and family at 1400. Pt verbalized understanding. Questions and concerns addressed. No acute events today. Pt progressing toward goals. Will continue to monitor. See below and flowsheets for full assessment and VS info.     -sEEG leads placed this AM  -Post-op CTH complete      Neuro:  [place] Coma Scale  Best Eye Response: 3-->(E3) to speech  Best Motor Response: 6-->(M6) obeys commands  Best Verbal Response: 5-->(V5) oriented  [place] Coma Scale Score: 14  Assessment Qualifiers: no eye obstruction present, patient not sedated/intubated        24 hr Temp:  [97.5 °F (36.4 °C)-97.6 °F (36.4 °C)]     CV:   Rhythm: normal sinus rhythm  BP goals:   SBP < 160  MAP > 65    Resp:           Plan: N/A    GI/:     Diet/Nutrition Received: regular  Last Bowel Movement: 02/26/23  Voiding Characteristics: urethral catheter (bladder)    Intake/Output Summary (Last 24 hours) at 2/27/2023 1647  Last data filed at 2/27/2023 1600  Gross per 24 hour   Intake 1600 ml   Output 1750 ml   Net -150 ml     Unmeasured Output  Stool Occurrence: 0    Labs/Accuchecks:  Recent Labs   Lab 02/27/23  1207   WBC 6.91   RBC 4.30*   HGB 13.3*   HCT 39.5*         Recent Labs   Lab 02/23/23  1020 02/24/23  0955 02/27/23  1207   *   < > 128*   K 4.2   < > 4.0   CO2 26   < > 19*   CL 97   < > 100   BUN 11   < > 8   CREATININE 1.0   < > 0.7   ALKPHOS 117  --   --    ALT 13  --   --    AST 16  --   --    BILITOT 0.4  --   --     < > = values in this interval not displayed.      Recent Labs   Lab 02/27/23  0555   INR 1.0   APTT 27.4    No results for input(s): CPK, CPKMB, TROPONINI, MB in the last 168 hours.    Electrolytes: N/A - electrolytes WDL  Accuchecks: none    Gtts:      LDA/Wounds:  Lines/Drains/Airways       Drain  Duration                  Urethral Catheter 02/27/23 0738 Non-latex;Silicone 16 Fr. <1 day              Arterial Line  Duration             Arterial Line 02/27/23 0746 Right Radial  <1 day              Peripheral Intravenous Line  Duration                  Peripheral IV - Single Lumen 02/27/23 0608 20 G Left;Posterior Hand <1 day         Peripheral IV - Single Lumen 02/27/23 0736 18 G Left Forearm <1 day                  Wounds: No  Wound care consulted: No

## 2023-02-27 NOTE — SUBJECTIVE & OBJECTIVE
Past Medical History:   Diagnosis Date    Anxiety     Dementia in other diseases classified elsewhere, unspecified severity, without behavioral disturbance, psychotic disturbance, mood disturbance, and anxiety 2/2/2023    Depression     GERD (gastroesophageal reflux disease)     Hyperlipidemia     Hypertension     Long term (current) use of antithrombotics/antiplatelets 12/22/2020    Migraine     Normocytic anemia 2/23/2023    Seizures        Past Surgical History:   Procedure Laterality Date    CYST REMOVAL  March 2016/2017    skin cyst - benign    REMOVAL OF STEREO EEG LEADS (DEPTH ELECTRODES) Bilateral 1/21/2021    Procedure: REMOVAL OF STEREO EEG LEADS (DEPTH ELECTRODES);  Surgeon: Ruchi Chen MD;  Location: [facility] OR [address] [place] [address];  Service: Neurosurgery;  Laterality: Bilateral;    TONSILLECTOMY      teenage        Review of patient's allergies indicates:   Allergen Reactions    Losartan Rash       No current facility-administered medications on file prior to encounter.     Current Outpatient Medications on File Prior to Encounter   Medication Sig    eslicarbazepine (APTIOM) 400 mg Tab tablet Take 1 tablet (400 mg total) by mouth once daily.    eslicarbazepine (APTIOM) 800 mg Tab Take 800 mg by mouth once daily.    mag hydrox/aluminum hyd/simeth (ANTACID ORAL) Take by mouth as needed. Acid reflux    ondansetron (ZOFRAN-ODT) 8 MG TbDL Take 8 mg by mouth every 8 (eight) hours as needed. nausea    rimegepant (NURTEC) 75 mg odt Take 75 mg by mouth.    rizatriptan (MAXALT-MLT) 10 MG disintegrating tablet Take 10 mg by mouth as needed. No more than 3 doses / week    zonisamide (ZONEGRAN) 100 MG Cap Take 6 capsules (600 mg total) by mouth every evening. (Patient taking differently: Take 500 mg by mouth every evening.)    aspirin (ECOTRIN) 81 MG EC tablet Take 1 tablet (81 mg total) by mouth once daily.    cloBAZam (ONFI) 20 mg Tab Take 2 tablets (40 mg total) by mouth every evening.    EScitalopram oxalate (LEXAPRO) 10 MG  tablet Take 1 tablet (10 mg total) by mouth once daily.     Continuous Infusions:    Family History       Problem Relation (Age of Onset)    Heart disease Maternal Uncle (50)    Lung disease Father    Migraines Paternal Grandfather    No Known Problems Mother          Tobacco Use    Smoking status: Former     Types: Cigarettes     Quit date:      Years since quittin.1    Smokeless tobacco: Never   Substance and Sexual Activity    Alcohol use: Yes     Comment: one drink once a week    Drug use: Never    Sexual activity: Yes     Partners: Female     Review of Systems   Constitutional:  Positive for fatigue.   HENT:  Positive for facial swelling (left).    Gastrointestinal:  Negative for nausea and vomiting.   Neurological:  Positive for headaches.   All other systems reviewed and are negative.  Objective:     Vital Signs (Most Recent):  Temp: 97.6 °F (36.4 °C) (23 1136)  Pulse: 69 (23 1401)  Resp: 12 (23 1401)  BP: 127/76 (23 1401)  SpO2: (!) 93 % (23 1401)   Vital Signs (24h Range):  Temp:  [97.5 °F (36.4 °C)-97.6 °F (36.4 °C)] 97.6 °F (36.4 °C)  Pulse:  [61-75] 69  Resp:  [11-18] 12  SpO2:  [92 %-99 %] 93 %  BP: (115-155)/(71-86) 127/76  Arterial Line BP: (102-121)/(88-99) 102/91     Weight: 87.5 kg (193 lb)  Body mass index is 28.5 kg/m².    Physical Exam  Vitals and nursing note reviewed.   Constitutional:       General: He is not in acute distress.     Appearance: He is not diaphoretic.   HENT:      Head: Normocephalic.      Comments: sEEG leads + headwrap in place  Eyes:      General: No scleral icterus.     Extraocular Movements: Extraocular movements intact.      Pupils: Pupils are equal, round, and reactive to light.   Cardiovascular:      Rate and Rhythm: Normal rate.   Pulmonary:      Effort: Pulmonary effort is normal. No respiratory distress.   Abdominal:      General: There is no distension.      Palpations: Abdomen is soft.   Musculoskeletal:         General: No  deformity or signs of injury.      Cervical back: Normal range of motion and neck supple.   Skin:     General: Skin is warm and dry.   Neurological:      Mental Status: He is alert.      Coordination: Finger-Nose-Finger Test normal.   Psychiatric:         Mood and Affect: Mood normal.         Speech: Speech normal.         Behavior: Behavior normal.       NEUROLOGICAL EXAMINATION:     MENTAL STATUS   Speech: speech is normal   Level of consciousness: alert       Speech slowed, at baseline     CRANIAL NERVES     CN III, IV, VI   Pupils are equal, round, and reactive to light.    CN VII   Facial expression full, symmetric.     CN VIII   Hearing: intact    CN XI   CN XI normal.     CN XII   CN XII normal.     MOTOR EXAM   Muscle bulk: normal  Overall muscle tone: normal       Follows commands, moves all extremities spontaneously and symmetrically     GAIT AND COORDINATION      Coordination   Finger to nose coordination: normal    Significant Labs: All pertinent lab results from the past 24 hours have been reviewed.    Significant Studies: I have reviewed all pertinent imaging results/findings within the past 24 hours.

## 2023-02-27 NOTE — H&P
"[facility]  Neurocritical Care  History & Physical    Admit Date: 2/27/2023  Service Date: 02/27/2023  Length of Stay: 0    Subjective:     Chief Complaint: Complex partial seizures evolving to generalized tonic-clonic seizures    History of Present Illness: Declan Burleson is a 58 y.o.M  admitted to [facility] s/p SEEG placement. Per chart review he was referred by Dr. Benoit (originally by Dr. Saldana of [place]) for consideration of surgical therapy for intractable epilepsy. The patient was formerly employed as a . He is accompanied by his wife Trista, who helps to provide the history. His seizures began when he was 48 years old. He can recall no inciting event. He has 3 semiologies: "full blown" generalized events, staring episodes, and "mild" seizures, which are characterized as generalized events of shorter duration without incontinence and a shorter post-ictal period. He and his wife estimate that he persists in having about 2 events/month. He has failed multiple AEDs, including topiramate, lamotrigine, levetiracetam, and oxcarbazepine. He is currently taking eslicarbazepine, zonisamide, and clobazam. He had EMU monitoring in May-June of this year, which suggests left-sided activity. NSGY and epilepsy following. Patient admitted to [facility] for close monitoring and higher level of care.       Past Medical History:   Diagnosis Date    Anxiety     Dementia in other diseases classified elsewhere, unspecified severity, without behavioral disturbance, psychotic disturbance, mood disturbance, and anxiety 2/2/2023    Depression     GERD (gastroesophageal reflux disease)     Hyperlipidemia     Hypertension     Long term (current) use of antithrombotics/antiplatelets 12/22/2020    Migraine     Normocytic anemia 2/23/2023    Seizures      Past Surgical History:   Procedure Laterality Date    CYST REMOVAL  March 2016/2017    skin cyst - benign    REMOVAL OF STEREO EEG LEADS " (DEPTH ELECTRODES) Bilateral 2021    Procedure: REMOVAL OF STEREO EEG LEADS (DEPTH ELECTRODES);  Surgeon: Ruchi Chen MD;  Location: [facility] OR [address] [place] [address];  Service: Neurosurgery;  Laterality: Bilateral;    TONSILLECTOMY      teenage       No current facility-administered medications on file prior to encounter.     Current Outpatient Medications on File Prior to Encounter   Medication Sig Dispense Refill    eslicarbazepine (APTIOM) 400 mg Tab tablet Take 1 tablet (400 mg total) by mouth once daily. 30 tablet 5    eslicarbazepine (APTIOM) 800 mg Tab Take 800 mg by mouth once daily. 30 tablet 5    mag hydrox/aluminum hyd/simeth (ANTACID ORAL) Take by mouth as needed. Acid reflux      ondansetron (ZOFRAN-ODT) 8 MG TbDL Take 8 mg by mouth every 8 (eight) hours as needed. nausea      rimegepant (NURTEC) 75 mg odt Take 75 mg by mouth.      rizatriptan (MAXALT-MLT) 10 MG disintegrating tablet Take 10 mg by mouth as needed. No more than 3 doses / week      zonisamide (ZONEGRAN) 100 MG Cap Take 6 capsules (600 mg total) by mouth every evening. (Patient taking differently: Take 500 mg by mouth every evening.) 540 capsule 3    aspirin (ECOTRIN) 81 MG EC tablet Take 1 tablet (81 mg total) by mouth once daily. 360 tablet 0    cloBAZam (ONFI) 20 mg Tab Take 2 tablets (40 mg total) by mouth every evening. 60 tablet 3    EScitalopram oxalate (LEXAPRO) 10 MG tablet Take 1 tablet (10 mg total) by mouth once daily. 30 tablet 11      Allergies: Losartan    Family History   Problem Relation Age of Onset    No Known Problems Mother     Lung disease Father     Migraines Paternal Grandfather     Heart disease Maternal Uncle 50     Social History     Tobacco Use    Smoking status: Former     Types: Cigarettes     Quit date:      Years since quittin.1    Smokeless tobacco: Never   Substance Use Topics    Alcohol use: Yes     Comment: one drink once a week    Drug use: Never     Review of Systems  Objective:      Vitals:    Temp: 97.6 °F (36.4 °C)  Pulse: 69  Rhythm: normal sinus rhythm  BP: 127/76  MAP (mmHg): 95  Resp: 12  SpO2: (!) 93 %    Temp  Min: 97.5 °F (36.4 °C)  Max: 97.6 °F (36.4 °C)  Pulse  Min: 61  Max: 75  BP  Min: 115/71  Max: 155/86  MAP (mmHg)  Min: 88  Max: 111  Resp  Min: 12  Max: 18  SpO2  Min: 93 %  Max: 99 %    No intake/output data recorded.   Unmeasured Output  Stool Occurrence: 0       Physical Exam  GA: Alert, comfortable, no acute distress.   HEENT: No scleral icterus or JVD.   Pulmonary: Clear to auscultation A/L.  Cardiac: RRR S1 & S2 w/o rubs/murmurs/gallops.   Abdominal: Bowel sounds present x 4. No appreciable hepatosplenomegaly.  Skin: No jaundice, rashes, or visible lesions.  Neuro:  --GCS: E4 V5 M6  --Mental Status:  awake, oriented X4, follows commands, fluent speech  --CN II-XII grossly intact.   --Pupils 3mm, PERRL.   --Corneal reflex, gag, cough intact.  --MOTTA spont and against gravity    Today I personally reviewed pertinent medications, lines/drains/airways, imaging, cardiology results, laboratory results, microbiology results,       Assessment/Plan:     Neuro  * Complex partial seizures evolving to generalized tonic-clonic seizures  Declan Burleson is a 58 y.o.M  admitted to [facility] s/p SEEG placement. He has failed multiple AEDs, including topiramate, lamotrigine, levetiracetam, and oxcarbazepine. He is currently taking eslicarbazepine, zonisamide, and clobazam.     -- s/p sEEG  -- NSGY and epilepsy following  -- AED's per epilepsy reccs  -- Neuro checks q 2hr  -- SBP <160  -- seizure precautions  -- PT/OT/SLP          The patient is being Prophylaxed for:  Venous Thromboembolism with: Chemical  Stress Ulcer with: None  Ventilator Pneumonia with: not applicable    Activity Orders          Diet Adult Regular (IDDSI Level 7): Regular starting at 02/27 1103    Bed rest starting at 02/27 1102        Full Code    Magda Weiss NP  Neurocritical Care  [facility]

## 2023-02-27 NOTE — CONSULTS
[facility]  Neurology-Epilepsy  Consult Note    Patient Name: Declan Burleson  MRN: [id number]  Admission Date: 2/27/2023  Hospital Length of Stay: 0 days  Code Status: Full Code   Attending Provider: Fred Wayne MD   Consulting Provider: Teresa Cerrato PA-C  Primary Care Physician: Juan Carlos Simmons NP  Principal Problem:Complex partial seizures evolving to generalized tonic-clonic seizures    Consults   Subjective:     HPI:   Mr. Burleson is a 48 year old man with intractable epilepsy since age 48 admitted to [facility] s/p placement of 8 left sided sEEG electrodes for further seizure localization and potential surgical management of his epilepsy. Patient previously completed sEEG monitoring in January 2021, which was cut short due to patient self-removing leads some of his leads while post-ictal during his stay. He underwent left laser amygdalohippocampectomy in March of 2021, after which he initially had some improvement in seizures, but ultimately he developed recurrence of seizures. Patient is currently maintained on Clobazam 40 mg qHS, Eslicarbazepine 1200 mg qHS, and Zonisamide 500 mg qHS with continued events of staring/loss of awareness, in addition to generalized convulsions. Patient underwent repeat Neuropsychology testing and evaluation by [facility], as well as extensive discussion in multi-disciplinary epilepsy surgery conference, with recommendation for repeat sEEG to evaluate if patient is a candidate for further surgical management options of his epilepsy. Admit to [facility] s/p placement of 8 left sided sEEG electrodes by NSGIOVANNA. Neurology Epilepsy following for assistance in management.       Hospital Course:   No notes on file     Past Medical History:   Diagnosis Date    Anxiety     Dementia in other diseases classified elsewhere, unspecified severity, without behavioral disturbance, psychotic disturbance, mood disturbance, and anxiety 2/2/2023    Depression     GERD  (gastroesophageal reflux disease)     Hyperlipidemia     Hypertension     Long term (current) use of antithrombotics/antiplatelets 12/22/2020    Migraine     Normocytic anemia 2/23/2023    Seizures        Past Surgical History:   Procedure Laterality Date    CYST REMOVAL  March 2016/2017    skin cyst - benign    REMOVAL OF STEREO EEG LEADS (DEPTH ELECTRODES) Bilateral 1/21/2021    Procedure: REMOVAL OF STEREO EEG LEADS (DEPTH ELECTRODES);  Surgeon: Ruchi Chen MD;  Location: [facility] OR [address] [place] [address];  Service: Neurosurgery;  Laterality: Bilateral;    TONSILLECTOMY      teenage        Review of patient's allergies indicates:   Allergen Reactions    Losartan Rash       No current facility-administered medications on file prior to encounter.     Current Outpatient Medications on File Prior to Encounter   Medication Sig    eslicarbazepine (APTIOM) 400 mg Tab tablet Take 1 tablet (400 mg total) by mouth once daily.    eslicarbazepine (APTIOM) 800 mg Tab Take 800 mg by mouth once daily.    mag hydrox/aluminum hyd/simeth (ANTACID ORAL) Take by mouth as needed. Acid reflux    ondansetron (ZOFRAN-ODT) 8 MG TbDL Take 8 mg by mouth every 8 (eight) hours as needed. nausea    rimegepant (NURTEC) 75 mg odt Take 75 mg by mouth.    rizatriptan (MAXALT-MLT) 10 MG disintegrating tablet Take 10 mg by mouth as needed. No more than 3 doses / week    zonisamide (ZONEGRAN) 100 MG Cap Take 6 capsules (600 mg total) by mouth every evening. (Patient taking differently: Take 500 mg by mouth every evening.)    aspirin (ECOTRIN) 81 MG EC tablet Take 1 tablet (81 mg total) by mouth once daily.    cloBAZam (ONFI) 20 mg Tab Take 2 tablets (40 mg total) by mouth every evening.    EScitalopram oxalate (LEXAPRO) 10 MG tablet Take 1 tablet (10 mg total) by mouth once daily.     Continuous Infusions:    Family History       Problem Relation (Age of Onset)    Heart disease Maternal Uncle (50)    Lung disease Father    Migraines Paternal  Grandfather    No Known Problems Mother          Tobacco Use    Smoking status: Former     Types: Cigarettes     Quit date:      Years since quittin.1    Smokeless tobacco: Never   Substance and Sexual Activity    Alcohol use: Yes     Comment: one drink once a week    Drug use: Never    Sexual activity: Yes     Partners: Female     Review of Systems   Constitutional:  Positive for fatigue.   HENT:  Positive for facial swelling (left).    Gastrointestinal:  Negative for nausea and vomiting.   Neurological:  Positive for headaches.   All other systems reviewed and are negative.  Objective:     Vital Signs (Most Recent):  Temp: 97.6 °F (36.4 °C) (23 1136)  Pulse: 69 (23 1401)  Resp: 12 (23 1401)  BP: 127/76 (23 1401)  SpO2: (!) 93 % (23 1401)   Vital Signs (24h Range):  Temp:  [97.5 °F (36.4 °C)-97.6 °F (36.4 °C)] 97.6 °F (36.4 °C)  Pulse:  [61-75] 69  Resp:  [11-18] 12  SpO2:  [92 %-99 %] 93 %  BP: (115-155)/(71-86) 127/76  Arterial Line BP: (102-121)/(88-99) 102/91     Weight: 87.5 kg (193 lb)  Body mass index is 28.5 kg/m².    Physical Exam  Vitals and nursing note reviewed.   Constitutional:       General: He is not in acute distress.     Appearance: He is not diaphoretic.   HENT:      Head: Normocephalic.      Comments: sEEG leads + headwrap in place  Eyes:      General: No scleral icterus.     Extraocular Movements: Extraocular movements intact.      Pupils: Pupils are equal, round, and reactive to light.   Cardiovascular:      Rate and Rhythm: Normal rate.   Pulmonary:      Effort: Pulmonary effort is normal. No respiratory distress.   Abdominal:      General: There is no distension.      Palpations: Abdomen is soft.   Musculoskeletal:         General: No deformity or signs of injury.      Cervical back: Normal range of motion and neck supple.   Skin:     General: Skin is warm and dry.   Neurological:      Mental Status: He is alert.      Coordination:  Finger-Nose-Finger Test normal.   Psychiatric:         Mood and Affect: Mood normal.         Speech: Speech normal.         Behavior: Behavior normal.       NEUROLOGICAL EXAMINATION:     MENTAL STATUS   Speech: speech is normal   Level of consciousness: alert       Speech slowed, at baseline     CRANIAL NERVES     CN III, IV, VI   Pupils are equal, round, and reactive to light.    CN VII   Facial expression full, symmetric.     CN VIII   Hearing: intact    CN XI   CN XI normal.     CN XII   CN XII normal.     MOTOR EXAM   Muscle bulk: normal  Overall muscle tone: normal       Follows commands, moves all extremities spontaneously and symmetrically     GAIT AND COORDINATION      Coordination   Finger to nose coordination: normal    Significant Labs: All pertinent lab results from the past 24 hours have been reviewed.    Significant Studies: I have reviewed all pertinent imaging results/findings within the past 24 hours.    Assessment and Plan:     * Complex partial seizures evolving to generalized tonic-clonic seizures  Mr. Burleson is a 58 year old man with intractable epilepsy s/p prior sEEG monitoring in January 2021, left laser amygdalohippocampectomy in March 2021 admitted to [facility] s/p placement of 8 left-sided sEEG electrodes for repeat intracranial monitoring and further localization of his epilepsy in consideration of additional surgical management options of his epilepsy. Currently maintained on Clobazam 40 mg qHS, Eslicarbazepine 1200 mg qHS, and Zonisamide 500 mg qHS with continued events of staring/loss of awareness and generalized convulsions.    - s/p placement of 8 left-sided sEEG electrodes by [facility] 2/27  - Decrease Clobazam to 20 mg qHS, Eslicarbazepine to 400 mg qHS, and Zonisamide to 200 mg qHS  - Patient will require mittens and strict 1:1 supervision as he previously self-removed sEEG leads during monitoring in 2021, requiring early termination of monitoring  - post-operative imaging timing, pain  control per NCC, NSGY  - Seizure precautions  - Please call epilepsy to notify of any seizures  -  If more than 3 seizures in 2 hours or any seizure lasting greater than 3 minutes, please give 2 mg IV lorazepam and contact on-call epilepsy    Plan of care discussed with NCC team, patient and wife at bedside. Will continue to follow, please call with any questions.     Depression with anxiety  - Ok to continue home Escitalopram from Epilepsy standpoint    Migraine without status migrainosus, not intractable  - Followed by Dr. Saldana as outpatient  - Supportive care      VTE Risk Mitigation (From admission, onward)         Ordered     heparin (porcine) injection 5,000 Units  Every 8 hours         02/27/23 1236     Place sequential compression device  Until discontinued         02/27/23 8378                Thank you for your consult. I will follow-up with patient. Please contact us if you have any additional questions.    Teresa Cerrato PA-C  Neurology-Epilepsy  [facility]  Staff: Dr. Feliciano

## 2023-02-27 NOTE — ANESTHESIA PREPROCEDURE EVALUATION
02/27/2023  Declan Burleson is a 58 y.o., male.      Pre-op Assessment    I have reviewed the Patient Summary Reports.     I have reviewed the Nursing Notes. I have reviewed the NPO Status.   I have reviewed the Medications.     Review of Systems  Anesthesia Hx:  No problems with previous Anesthesia  History of prior surgery of interest to airway management or planning: Previous anesthesia: General  Denies Personal Hx of Anesthesia complications.   Cardiovascular:   Hypertension    Pulmonary:  Pulmonary Normal    Renal/:  Renal/ Normal     Hepatic/GI:   GERD    Neurological:   Headaches Seizures    Endocrine:  Endocrine Normal    Psych:   Psychiatric History anxiety        Patient Active Problem List   Diagnosis    Complex partial seizures evolving to generalized tonic-clonic seizures    Migraine without status migrainosus, not intractable    Complex partial epilepsy with generalization and with intractable epilepsy    Hypertension    Facial rash    Neurological deficit, transient    Adjustment disorder with anxious mood    Mild neurocognitive disorder    Snoring    Acid reflux    Former smoker    Edema    Depression with anxiety    Hyponatremia    Arrhythmia    Epilepsy    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    Aphasia determined by examination    Cognitive communication deficit    Dementia in other diseases classified elsewhere, unspecified severity, without behavioral disturbance, psychotic disturbance, mood disturbance, and anxiety    Normocytic anemia     Past Medical History:   Diagnosis Date    Anxiety     Dementia in other diseases classified elsewhere, unspecified severity, without behavioral disturbance, psychotic disturbance, mood disturbance, and anxiety 2/2/2023    Depression     GERD (gastroesophageal reflux disease)     Hyperlipidemia      Hypertension     Long term (current) use of antithrombotics/antiplatelets 12/22/2020    Migraine     Normocytic anemia 2/23/2023    Seizures      Past Surgical History:   Procedure Laterality Date    CYST REMOVAL  March 2016/2017    skin cyst - benign    REMOVAL OF STEREO EEG LEADS (DEPTH ELECTRODES) Bilateral 1/21/2021    Procedure: REMOVAL OF STEREO EEG LEADS (DEPTH ELECTRODES);  Surgeon: Ruchi Chen MD;  Location: [facility] OR [address] [place] [address];  Service: Neurosurgery;  Laterality: Bilateral;    TONSILLECTOMY      teenage          Physical Exam  General: Well nourished, Cooperative and Alert    Airway:  Mallampati: II   Mouth Opening: Normal  TM Distance: Normal  Tongue: Normal  Neck ROM: Normal ROM    Dental:  Intact    Chest/Lungs:  Clear to auscultation, Normal Respiratory Rate    Heart:  Rate: Normal  Rhythm: Regular Rhythm  Sounds: Normal        Anesthesia Plan  Type of Anesthesia, risks & benefits discussed:    Anesthesia Type: Gen Natural Airway, Gen ETT, MAC  Intra-op Monitoring Plan: Standard ASA Monitors  Post Op Pain Control Plan: multimodal analgesia  Induction:  IV  Airway Plan: Direct, Post-Induction  Informed Consent: Informed consent signed with the Patient and all parties understand the risks and agree with anesthesia plan.  All questions answered.   ASA Score: 3  Day of Surgery Review of History & Physical: H&P Update referred to the surgeon/provider.    Ready For Surgery From Anesthesia Perspective.     .

## 2023-02-27 NOTE — NURSING
Patient arrived to [facility] from OR >> 6923    Report received from: [facility]    Type of stroke/diagnosis:  sEEG    Current symptoms: lethargic post-op    Skin assessment done: Yes  Wounds noted: none    Zoe Completed? Yes-passed    Patient Belongings on Admit: with wife    NCC notified: Magda GERBER

## 2023-02-27 NOTE — ASSESSMENT & PLAN NOTE
Mr. Burleson is a 58 year old man with intractable epilepsy s/p prior sEEG monitoring in January 2021, left laser amygdalohippocampectomy in March 2021 admitted to [facility] s/p placement of 8 left-sided sEEG electrodes for repeat intracranial monitoring and further localization of his epilepsy in consideration of additional surgical management options of his epilepsy. Currently maintained on Clobazam 40 mg qHS, Eslicarbazepine 1200 mg qHS, and Zonisamide 500 mg qHS with continued events of staring/loss of awareness and generalized convulsions.    - s/p placement of 8 left-sided sEEG electrodes by NSGY 2/27  - Decrease Clobazam to 20 mg qHS, Eslicarbazepine to 400 mg qHS, and Zonisamide to 200 mg qHS  - Patient will require mittens and strict 1:1 supervision as he previously self-removed sEEG leads during monitoring in 2021, requiring early termination of monitoring  - post-operative imaging timing, pain control per [facility], NSGY  - Seizure precautions  - Please call epilepsy to notify of any seizures  -  If more than 3 seizures in 2 hours or any seizure lasting greater than 3 minutes, please give 2 mg IV lorazepam and contact on-call epilepsy    Plan of care discussed with [facility] team, patient and wife at bedside. Will continue to follow, please call with any questions.

## 2023-02-27 NOTE — PLAN OF CARE
Certification of Assistant at Surgery       Surgery Date: 2/27/2023     Participating Surgeons:  Surgeon(s) and Role:     * Ruchi Chen MD - Primary  Isaura Burgos PA-C Assisting     Procedures:  Procedure(s) (LRB):  PLACEMENT OF STEREO EEG LEADS (DEPTH ELECTRODES) (Left)    Assistant Surgeon's Certification of Necessity:  I understand that section 1842 (b) (6) (d) of the Social Security Act generally prohibits Medicare Part B reasonable charge payment for the services of assistants at surgery in [facility] hospitals when qualified residents are available to furnish such services. I certify that the services for which payment is claimed were medically necessary, and that no qualified resident was available to perform the services. I further understand that these services are subject to post-payment review by the Medicare carrier.      Isaura Burgos PA-C    02/27/2023  4:21 PM

## 2023-02-27 NOTE — HPI
"Declan Burleson is a 58 y.o.M  admitted to [facility] s/p SEEG placement. Per chart review he was referred by Dr. Benoit (originally by Dr. Saldana of [place]) for consideration of surgical therapy for intractable epilepsy. The patient was formerly employed as a . He is accompanied by his wife Trista, who helps to provide the history. His seizures began when he was 48 years old. He can recall no inciting event. He has 3 semiologies: "full blown" generalized events, staring episodes, and "mild" seizures, which are characterized as generalized events of shorter duration without incontinence and a shorter post-ictal period. He and his wife estimate that he persists in having about 2 events/month. He has failed multiple AEDs, including topiramate, lamotrigine, levetiracetam, and oxcarbazepine. He is currently taking eslicarbazepine, zonisamide, and clobazam. He had EMU monitoring in May-June of this year, which suggests left-sided activity. NSGY and epilepsy following. Patient admitted to [facility] for close monitoring and higher level of care.   "

## 2023-02-27 NOTE — TRANSFER OF CARE
Anesthesia Transfer of Care Note    Patient: Declan Burleson    Procedure(s) Performed: Procedure(s) (LRB):  PLACEMENT OF STEREO EEG LEADS (DEPTH ELECTRODES) (Left)    Patient location: Other: CT    Anesthesia Type: general    Transport from OR: Transported from OR on 6-10 L/min O2 by face mask with adequate spontaneous ventilation. Continuous ECG monitoring in transport. Continuous SpO2 monitoring in transport. Continuos invasive BP monitoring in transport    Post pain: adequate analgesia    Post assessment: no apparent anesthetic complications and tolerated procedure well    Post vital signs: stable    Level of consciousness: awake and lethargic    Nausea/Vomiting: no nausea/vomiting    Complications: none    Transfer of care protocol was followedComments: ICU RN given report and received in CT scan. Vitals stable during transfer of care.       Last vitals:   Visit Vitals  BP (!) 138/72   Pulse 61   Temp 36.4 °C (97.5 °F) (Temporal)   Resp 18   Wt 87.5 kg (193 lb)   SpO2 99%   BMI 28.50 kg/m²

## 2023-02-28 ENCOUNTER — TELEPHONE (OUTPATIENT)
Dept: NEUROLOGY | Facility: CLINIC | Age: 58
End: 2023-02-28
Payer: MEDICARE

## 2023-02-28 LAB
ALBUMIN SERPL BCP-MCNC: 3.4 G/DL (ref 3.5–5.2)
ALP SERPL-CCNC: 106 U/L (ref 55–135)
ALT SERPL W/O P-5'-P-CCNC: 12 U/L (ref 10–44)
ANION GAP SERPL CALC-SCNC: 7 MMOL/L (ref 8–16)
AST SERPL-CCNC: 13 U/L (ref 10–40)
BASOPHILS # BLD AUTO: 0.03 K/UL (ref 0–0.2)
BASOPHILS NFR BLD: 0.4 % (ref 0–1.9)
BILIRUB SERPL-MCNC: 0.3 MG/DL (ref 0.1–1)
BUN SERPL-MCNC: 8 MG/DL (ref 6–20)
CALCIUM SERPL-MCNC: 8.5 MG/DL (ref 8.7–10.5)
CHLORIDE SERPL-SCNC: 102 MMOL/L (ref 95–110)
CO2 SERPL-SCNC: 23 MMOL/L (ref 23–29)
CREAT SERPL-MCNC: 0.8 MG/DL (ref 0.5–1.4)
DIFFERENTIAL METHOD: ABNORMAL
EOSINOPHIL # BLD AUTO: 0.1 K/UL (ref 0–0.5)
EOSINOPHIL NFR BLD: 0.9 % (ref 0–8)
ERYTHROCYTE [DISTWIDTH] IN BLOOD BY AUTOMATED COUNT: 12.9 % (ref 11.5–14.5)
EST. GFR  (NO RACE VARIABLE): >60 ML/MIN/1.73 M^2
GLUCOSE SERPL-MCNC: 116 MG/DL (ref 70–110)
HCT VFR BLD AUTO: 39.5 % (ref 40–54)
HGB BLD-MCNC: 13.3 G/DL (ref 14–18)
IMM GRANULOCYTES # BLD AUTO: 0.03 K/UL (ref 0–0.04)
IMM GRANULOCYTES NFR BLD AUTO: 0.4 % (ref 0–0.5)
LYMPHOCYTES # BLD AUTO: 1.6 K/UL (ref 1–4.8)
LYMPHOCYTES NFR BLD: 18.8 % (ref 18–48)
MAGNESIUM SERPL-MCNC: 1.8 MG/DL (ref 1.6–2.6)
MCH RBC QN AUTO: 31.2 PG (ref 27–31)
MCHC RBC AUTO-ENTMCNC: 33.7 G/DL (ref 32–36)
MCV RBC AUTO: 93 FL (ref 82–98)
MONOCYTES # BLD AUTO: 0.9 K/UL (ref 0.3–1)
MONOCYTES NFR BLD: 11.1 % (ref 4–15)
NEUTROPHILS # BLD AUTO: 5.8 K/UL (ref 1.8–7.7)
NEUTROPHILS NFR BLD: 68.4 % (ref 38–73)
NRBC BLD-RTO: 0 /100 WBC
PHOSPHATE SERPL-MCNC: 3.5 MG/DL (ref 2.7–4.5)
PLATELET # BLD AUTO: 333 K/UL (ref 150–450)
PMV BLD AUTO: 9.2 FL (ref 9.2–12.9)
POTASSIUM SERPL-SCNC: 3.6 MMOL/L (ref 3.5–5.1)
PROT SERPL-MCNC: 6.4 G/DL (ref 6–8.4)
RBC # BLD AUTO: 4.26 M/UL (ref 4.6–6.2)
SODIUM SERPL-SCNC: 132 MMOL/L (ref 136–145)
WBC # BLD AUTO: 8.45 K/UL (ref 3.9–12.7)

## 2023-02-28 PROCEDURE — 85025 COMPLETE CBC W/AUTO DIFF WBC: CPT | Performed by: PHYSICIAN ASSISTANT

## 2023-02-28 PROCEDURE — 95720 PR EEG, W/VIDEO, CONT RECORD, I&R, >12<26 HRS: ICD-10-PCS | Mod: ,,, | Performed by: PSYCHIATRY & NEUROLOGY

## 2023-02-28 PROCEDURE — 99233 SBSQ HOSP IP/OBS HIGH 50: CPT | Mod: FS,,, | Performed by: PHYSICIAN ASSISTANT

## 2023-02-28 PROCEDURE — 95720 EEG PHY/QHP EA INCR W/VEEG: CPT | Mod: ,,, | Performed by: PSYCHIATRY & NEUROLOGY

## 2023-02-28 PROCEDURE — 99233 SBSQ HOSP IP/OBS HIGH 50: CPT | Mod: ,,, | Performed by: NURSE PRACTITIONER

## 2023-02-28 PROCEDURE — 25000003 PHARM REV CODE 250: Performed by: NURSE PRACTITIONER

## 2023-02-28 PROCEDURE — 97110 THERAPEUTIC EXERCISES: CPT

## 2023-02-28 PROCEDURE — 99233 PR SUBSEQUENT HOSPITAL CARE,LEVL III: ICD-10-PCS | Mod: ,,, | Performed by: NURSE PRACTITIONER

## 2023-02-28 PROCEDURE — 25000003 PHARM REV CODE 250: Performed by: PHYSICIAN ASSISTANT

## 2023-02-28 PROCEDURE — 80053 COMPREHEN METABOLIC PANEL: CPT | Performed by: NURSE PRACTITIONER

## 2023-02-28 PROCEDURE — 84100 ASSAY OF PHOSPHORUS: CPT | Performed by: NURSE PRACTITIONER

## 2023-02-28 PROCEDURE — 97162 PT EVAL MOD COMPLEX 30 MIN: CPT

## 2023-02-28 PROCEDURE — 20000000 HC ICU ROOM

## 2023-02-28 PROCEDURE — 83735 ASSAY OF MAGNESIUM: CPT | Performed by: NURSE PRACTITIONER

## 2023-02-28 PROCEDURE — 27000221 HC OXYGEN, UP TO 24 HOURS

## 2023-02-28 PROCEDURE — 95714 VEEG EA 12-26 HR UNMNTR: CPT

## 2023-02-28 PROCEDURE — 63600175 PHARM REV CODE 636 W HCPCS: Performed by: PHYSICIAN ASSISTANT

## 2023-02-28 PROCEDURE — 94761 N-INVAS EAR/PLS OXIMETRY MLT: CPT

## 2023-02-28 PROCEDURE — 99233 PR SUBSEQUENT HOSPITAL CARE,LEVL III: ICD-10-PCS | Mod: FS,,, | Performed by: PHYSICIAN ASSISTANT

## 2023-02-28 PROCEDURE — 25000003 PHARM REV CODE 250: Performed by: PSYCHIATRY & NEUROLOGY

## 2023-02-28 PROCEDURE — 97165 OT EVAL LOW COMPLEX 30 MIN: CPT

## 2023-02-28 RX ORDER — ZONISAMIDE 100 MG/1
100 CAPSULE ORAL NIGHTLY
Status: DISCONTINUED | OUTPATIENT
Start: 2023-02-28 | End: 2023-03-01

## 2023-02-28 RX ORDER — METHOCARBAMOL 500 MG/1
500 TABLET, FILM COATED ORAL ONCE AS NEEDED
Status: COMPLETED | OUTPATIENT
Start: 2023-02-28 | End: 2023-02-28

## 2023-02-28 RX ORDER — HYDRALAZINE HYDROCHLORIDE 20 MG/ML
10 INJECTION INTRAMUSCULAR; INTRAVENOUS EVERY 4 HOURS PRN
Status: DISCONTINUED | OUTPATIENT
Start: 2023-02-28 | End: 2023-03-09 | Stop reason: HOSPADM

## 2023-02-28 RX ORDER — ACETAMINOPHEN 500 MG
1000 TABLET ORAL EVERY 8 HOURS PRN
Status: DISCONTINUED | OUTPATIENT
Start: 2023-02-28 | End: 2023-03-01

## 2023-02-28 RX ADMIN — METHOCARBAMOL 500 MG: 500 TABLET ORAL at 05:02

## 2023-02-28 RX ADMIN — CEFTRIAXONE 2 G: 2 INJECTION, POWDER, FOR SOLUTION INTRAMUSCULAR; INTRAVENOUS at 09:02

## 2023-02-28 RX ADMIN — HYDROCODONE BITARTRATE AND ACETAMINOPHEN 1 TABLET: 10; 325 TABLET ORAL at 08:02

## 2023-02-28 RX ADMIN — HYDROCODONE BITARTRATE AND ACETAMINOPHEN 1 TABLET: 10; 325 TABLET ORAL at 02:02

## 2023-02-28 RX ADMIN — HEPARIN SODIUM 5000 UNITS: 5000 INJECTION INTRAVENOUS; SUBCUTANEOUS at 05:02

## 2023-02-28 RX ADMIN — ZONISAMIDE 100 MG: 100 CAPSULE ORAL at 09:02

## 2023-02-28 RX ADMIN — HEPARIN SODIUM 5000 UNITS: 5000 INJECTION INTRAVENOUS; SUBCUTANEOUS at 09:02

## 2023-02-28 RX ADMIN — HEPARIN SODIUM 5000 UNITS: 5000 INJECTION INTRAVENOUS; SUBCUTANEOUS at 02:02

## 2023-02-28 RX ADMIN — ESCITALOPRAM OXALATE 10 MG: 10 TABLET ORAL at 08:02

## 2023-02-28 RX ADMIN — MUPIROCIN: 20 OINTMENT TOPICAL at 08:02

## 2023-02-28 RX ADMIN — SENNOSIDES AND DOCUSATE SODIUM 1 TABLET: 50; 8.6 TABLET ORAL at 08:02

## 2023-02-28 RX ADMIN — MUPIROCIN: 20 OINTMENT TOPICAL at 09:02

## 2023-02-28 RX ADMIN — POLYETHYLENE GLYCOL 3350 17 G: 17 POWDER, FOR SOLUTION ORAL at 08:02

## 2023-02-28 RX ADMIN — ESLICARBAZEPINE ACETATE 400 MG: 400 TABLET ORAL at 09:02

## 2023-02-28 RX ADMIN — CEFTRIAXONE 2 G: 2 INJECTION, POWDER, FOR SOLUTION INTRAMUSCULAR; INTRAVENOUS at 08:02

## 2023-02-28 NOTE — PT/OT/SLP EVAL
Physical Therapy Evaluation    Patient Name:  Declan Burleson   MRN:  [id number]    Recommendations:     Discharge Recommendations: other (see comments) (TBD pending sEEG removal and OOB assessment)   Discharge Equipment Recommendations: other (see comments) (TBD pending sEEG removal and OOB assessment)   Barriers to discharge: None    Assessment:     Declan Burleson is a 58 y.o. male admitted with a medical diagnosis of Complex partial seizures evolving to generalized tonic-clonic seizures.  He presents with the following impairments/functional limitations: weakness, impaired self care skills, impaired functional mobility, decreased lower extremity function, decreased upper extremity function, other (comment) (bedrest precautions likely to lead to deconditioning). Pt w/ good strength and ROM, w/ mobility limited 2/2 bedrest orders. PT will continue to follow to ensure pt maintains independence w/ HEP and doesn't start to decondition from prolonged bedrest.    Rehab Prognosis: Good; patient would benefit from acute skilled PT services to address these deficits and reach maximum level of function.    Recent Surgery: Procedure(s) (LRB):  PLACEMENT OF STEREO EEG LEADS (DEPTH ELECTRODES) (Left) 1 Day Post-Op    Plan:     During this hospitalization, patient to be seen 1 x/week to address the identified rehab impairments via therapeutic exercises and progress toward the following goals:    Plan of Care Expires:  04/10/23    Subjective     Chief Complaint: none  Patient/Family Comments/goals: get seizures controlled  Pain/Comfort:  Pain Rating 1: 0/10  Pain Rating Post-Intervention 1: 0/10    Patients cultural, spiritual, [employer] conflicts given the current situation: no    Living Environment:  Pt lives w/ wife in a [facility] w/ 1 [address]SM[address].  Prior to admission, patients level of function was independent.  Equipment used at home: none.  DME owned (not currently used): none.  Upon discharge, patient will have assistance  from wife.    Objective:     Communicated with RN prior to session.  Patient found HOB elevated with peripheral IV, oxygen, blood pressure cuff, SCD, bed alarm, pulse ox (continuous), telemetry, restraints, EEG  upon PT entry to room.    General Precautions: Standard, aspiration, fall, seizure  Orthopedic Precautions:    Braces: N/A  Respiratory Status: Nasal cannula, flow 2 L/min    Exams:  Cognitive Exam:  Patient is oriented to Person, Place, Time, and Situation  Gross Motor Coordination:  WFL  Sensation:    -       Intact  light/touch BLE  RLE ROM: WFL  RLE Strength: WFL  LLE ROM: WFL  LLE Strength: WFL    Functional Mobility:  NT 2/2 sEEG bed rest orders      AM-PAC 6 CLICK MOBILITY  Total Score:6       Treatment & Education:  Pt educated on PT POC/goals, d/c recs, and continued treatment. All questions answered and pt in agreement w/ POC.   Pt educated on therex to perform 3x/day independently in order to maintain/progress mobility and strength and prevent general deconditioning from bedrest. Pt demonstrated independence with all exercises and verbalized understanding: ankle pumps, heel slides, hip abd/add, SLR, quad sets, glute sets. PT returned in afternoon to review therex and provide pt a printed HEP program of the therex performed during session.    Patient left HOB elevated with all lines intact, call button in reach, bed alarm on, restraints reapplied at end of session, RN notified, and spouse present.    GOALS:   Multidisciplinary Problems       Physical Therapy Goals          Problem: Physical Therapy    Goal Priority Disciplines Outcome Goal Variances Interventions   Physical Therapy Goal     PT, PT/OT Ongoing, Progressing     Description: Goals to be met by: 3/10/23     Patient will increase functional independence with mobility by performin. Maintain strength and ROM WFL grossly  2. Participate in OOB assessment once sEEG removed                       History:     Past Medical History:    Diagnosis Date    Anxiety     Dementia in other diseases classified elsewhere, unspecified severity, without behavioral disturbance, psychotic disturbance, mood disturbance, and anxiety 2/2/2023    Depression     GERD (gastroesophageal reflux disease)     Hyperlipidemia     Hypertension     Long term (current) use of antithrombotics/antiplatelets 12/22/2020    Migraine     Normocytic anemia 2/23/2023    Seizures        Past Surgical History:   Procedure Laterality Date    CYST REMOVAL  March 2016/2017    skin cyst - benign    PLACEMENT OF STEREO EEG LEADS(DEPTH ELECTRODES) Left 2/27/2023    Procedure: PLACEMENT OF STEREO EEG LEADS (DEPTH ELECTRODES);  Surgeon: Ruchi Chen MD;  Location: [facility] OR [address] [place] [address];  Service: Neurosurgery;  Laterality: Left;    REMOVAL OF STEREO EEG LEADS (DEPTH ELECTRODES) Bilateral 1/21/2021    Procedure: REMOVAL OF STEREO EEG LEADS (DEPTH ELECTRODES);  Surgeon: Ruchi Chen MD;  Location: [facility] OR [address] [place] [address];  Service: Neurosurgery;  Laterality: Bilateral;    TONSILLECTOMY      teenage        Time Tracking:     PT Received On: 02/28/23  PT Start Time: 1026     PT Stop Time: 1037  PT Total Time (min): 11 min + 5min when PT returned to review therex and provide HEP handout    Billable Minutes: Evaluation 5 and Therapeutic Exercise 11      02/28/2023

## 2023-02-28 NOTE — PLAN OF CARE
[facility] Care Plan    POC reviewed with Declan Burleson and family at 0300. Pt verbalized understanding. Questions and concerns addressed. No acute events overnight. Pt progressing toward goals. Will continue to monitor. See below and flowsheets for full assessment and VS info.     No acute neuro changes overnight.  No clinical seizure activity noted.  Norco 10 mg x 1 and tylenol x 1 administered for mouth pain/headache.  SBP <160 maintained without intervention.  Leiva removed per post operative policy.   Nasal canula applied while patient was asleep to O2 sat dropping below 96%.       Is this a stroke patient? no    Neuro:  [place]en Coma Scale  Best Eye Response: 4-->(E4) spontaneous  Best Motor Response: 6-->(M6) obeys commands  Best Verbal Response: 5-->(V5) oriented  Ruben Coma Scale Score: 15  Assessment Qualifiers: patient not sedated/intubated  Pupil PERRLA: yes     24hr Temp:  [97.5 °F (36.4 °C)-98 °F (36.7 °C)]     CV:   Rhythm: normal sinus rhythm  BP goals:   SBP < 160  MAP > 65    Resp:           Plan: N/A    GI/:     Diet/Nutrition Received: regular  Last Bowel Movement: 02/26/23  Voiding Characteristics: urethral catheter (bladder)    Intake/Output Summary (Last 24 hours) at 2/28/2023 0534  Last data filed at 2/28/2023 0502  Gross per 24 hour   Intake 1898.38 ml   Output 2635 ml   Net -736.62 ml     Unmeasured Output  Stool Occurrence: 0    Labs/Accuchecks:  Recent Labs   Lab 02/28/23  0018   WBC 8.45   RBC 4.26*   HGB 13.3*   HCT 39.5*         Recent Labs   Lab 02/28/23  0018   *   K 3.6   CO2 23      BUN 8   CREATININE 0.8   ALKPHOS 106   ALT 12   AST 13   BILITOT 0.3      Recent Labs   Lab 02/27/23  0555   INR 1.0   APTT 27.4    No results for input(s): CPK, CPKMB, TROPONINI, MB in the last 168 hours.    Electrolytes: N/A - electrolytes WDL  Accuchecks: none    Gtts:      LDA/Wounds:  Lines/Drains/Airways       Arterial Line  Duration             Arterial Line 02/27/23 0746  Right Radial <1 day              Peripheral Intravenous Line  Duration                  Peripheral IV - Single Lumen 02/27/23 0608 20 G Left;Posterior Hand <1 day         Peripheral IV - Single Lumen 02/27/23 0736 18 G Left Forearm <1 day                  Wounds: No  Wound care consulted: No

## 2023-02-28 NOTE — NURSING
"Pt wife notified this RN she pressed the event button on the EEG because she noticed the lines on the EEG machine "going crazy". Pt noted to be sleeping in bed. When woken up stated he felt fine. Pt wife educated that the waveform on the EEG varies for multiple reasons and she does not have to press the button for changes on the EEG waveform.   "

## 2023-02-28 NOTE — HOSPITAL COURSE
3/5/2023: CHARY, patient complains of L side headache, L eye pressure (no visual disturbance) PRN pain available  3/5/2023: complained of chest pain- troponin neg, EKG stable, AED's per Epilepsy  03/05/2023 Increased senna, added daily suppository. Weaned off of AEDs for this afternoon per epilepsy.  03/05/2023 No seizures overnight. BM this AM. PT/OT today.  03/05/2023 BM this am. Decreased senna and dc suppository.   03/05/2023 NAEON. Family and epilepsy agreed not to do sleep deprivation last night. 3 minute seizure today, given 2mg ativan. Post ictal. Pt had another 2 min seizure around noon, no ativan given, post ictal now.  03/06/2023 2 min seizure overnight. 1 seizure this morning, post ictal when seen this AM. Given aptiom 600 and onfi 20 per epilepsy. Doses for tomorrow will be decided after review by epilepsy.  03/07/2023: Home AEDs restarted. Plans for sEEG removal tomorrow. NPO after midnight.   3/8/2023: PHILIP, to OR today w/ NSGY for sEEG removal  3/9/2023: CHARY, discharge home today

## 2023-02-28 NOTE — PROGRESS NOTES
[facility]  Neurology-Epilepsy  Progress Note    Patient Name: Declan Burleson  MRN: [id number]  Admission Date: 2/27/2023  Hospital Length of Stay: 1 days  Code Status: Full Code   Attending Provider: Fred Wayne MD  Primary Care Physician: Juan Carlos Simmons NP   Principal Problem:Complex partial seizures evolving to generalized tonic-clonic seizures    Subjective:     Hospital Course:   2/27>2/28: Clobazam decreased to 20 mg qHS, Eslicarbazepine decreased to 400 mg qHS, and Zonisamide decreased to 200 mg qHS on admission. No seizures noted overnight. Beginning 2/28 pm, hold Clobazam, decrease Zonisamide further to 100 mg qHS. Continue Eslicarbazepine 400 mg nightly.       Interval History: No seizures noted overnight, patient doing well. Endorses slight left-sided headache, left-sided jaw pain.     Current Facility-Administered Medications   Medication Dose Route Frequency Provider Last Rate Last Admin    acetaminophen tablet 1,000 mg  1,000 mg Oral Q8H PRN Magda Weiss NP        cefTRIAXone (ROCEPHIN) 2 g in dextrose 5 % in water (D5W) 5 % 50 mL IVPB (MB+)  2 g Intravenous Q12H Isaura Burgos PA-C   Stopped at 02/28/23 0923    cloBAZam Tab 20 mg  20 mg Oral QHS Teresa Cerrato PA-C   20 mg at 02/27/23 2027    EScitalopram oxalate tablet 10 mg  10 mg Oral Daily Magda Isela, NP   10 mg at 02/28/23 0845    eslicarbazepine tablet Tab 400 mg  400 mg Oral QHS Magdabritton Weiss, NP   400 mg at 02/27/23 2027    glycerin adult suppository 1 suppository  1 suppository Rectal Daily PRN Isaura Burgos PA-C        heparin (porcine) injection 5,000 Units  5,000 Units Subcutaneous Q8H Isaura Burgos PA-C   5,000 Units at 02/28/23 0503    hydrALAZINE injection 10 mg  10 mg Intravenous Q4H PRN Magda Weiss NP        HYDROcodone-acetaminophen  mg per tablet 1 tablet  1 tablet Oral Q4H PRN Isaura Burgos PA-C   1 tablet at 02/28/23 0857    HYDROcodone-acetaminophen  5-325 mg per tablet 1 tablet  1 tablet Oral Q4H PRN Isaura L. ROMIE Burgos-C   1 tablet at 02/27/23 1709    HYDROmorphone injection 0.5 mg  0.5 mg Intravenous Q6H PRN ROMIE Maya-C        mupirocin 2 % ointment   Nasal BID Isaura THURMANBlessing ROMIE Burgos-C   Given at 02/28/23 0846    ondansetron disintegrating tablet 8 mg  8 mg Oral Q8H PRN ROMIE Maya-C        polyethylene glycol packet 17 g  17 g Oral Daily IsauraROMIE Gomez-C   17 g at 02/28/23 0846    senna-docusate 8.6-50 mg per tablet 1 tablet  1 tablet Oral Daily ROMIE Maya-C   1 tablet at 02/28/23 0845    zonisamide capsule 200 mg  200 mg Oral QHS ROMIE Goel-C   200 mg at 02/27/23 2027     Continuous Infusions:    Review of Systems   Constitutional:  Positive for fatigue.   HENT:          + left jaw pain   Gastrointestinal:  Negative for nausea and vomiting.   Neurological:  Positive for headaches. Negative for seizures (none overnight).   All other systems reviewed and are negative.  Objective:     Vital Signs (Most Recent):  Temp: 98 °F (36.7 °C) (02/28/23 1101)  Pulse: 68 (02/28/23 1301)  Resp: 12 (02/28/23 1301)  BP: (!) 140/84 (02/28/23 1301)  SpO2: (!) 94 % (02/28/23 1301)   Vital Signs (24h Range):  Temp:  [97.6 °F (36.4 °C)-98 °F (36.7 °C)] 98 °F (36.7 °C)  Pulse:  [62-79] 68  Resp:  [10-24] 12  SpO2:  [92 %-100 %] 94 %  BP: (116-164)/(74-96) 140/84  Arterial Line BP: ()/() 130/118     Weight: 87.5 kg (193 lb)  Body mass index is 28.5 kg/m².    Physical Exam  Vitals and nursing note reviewed.   Constitutional:       General: He is not in acute distress.     Appearance: He is not diaphoretic.   HENT:      Head: Normocephalic.      Comments: sEEG leads + headwrap in place  Eyes:      General: No scleral icterus.     Extraocular Movements: Extraocular movements intact.      Pupils: Pupils are equal, round, and reactive to light.   Cardiovascular:      Rate and Rhythm: Normal rate.    Pulmonary:      Effort: Pulmonary effort is normal. No respiratory distress.   Abdominal:      General: There is no distension.      Palpations: Abdomen is soft.   Musculoskeletal:         General: No deformity or signs of injury.      Cervical back: Normal range of motion and neck supple.   Skin:     General: Skin is warm and dry.   Neurological:      Mental Status: He is alert and oriented to person, place, and time.      Coordination: Finger-Nose-Finger Test normal.   Psychiatric:         Mood and Affect: Mood normal.         Speech: Speech normal.         Behavior: Behavior normal.       NEUROLOGICAL EXAMINATION:     MENTAL STATUS   Oriented to person, place, and time.   Speech: speech is normal   Level of consciousness: alert       Speech slightly slowed, at baseline     CRANIAL NERVES     CN III, IV, VI   Pupils are equal, round, and reactive to light.    CN VII   Facial expression full, symmetric.     CN VIII   Hearing: intact    CN XI   CN XI normal.     CN XII   CN XII normal.     MOTOR EXAM   Muscle bulk: normal  Overall muscle tone: normal       Follows commands, moves all extremities spontaneously and symmetrically     GAIT AND COORDINATION      Coordination   Finger to nose coordination: normal       No pronator drift noted     Significant Labs: All pertinent lab results from the past 24 hours have been reviewed.    Significant Studies: I have reviewed all pertinent imaging results/findings within the past 24 hours.    Assessment and Plan:     * Complex partial seizures evolving to generalized tonic-clonic seizures  Mr. Burleson is a 58 year old man with intractable epilepsy s/p prior sEEG monitoring in January 2021, left laser amygdalohippocampectomy in March 2021 admitted to [facility] s/p placement of 8 left-sided sEEG electrodes for repeat intracranial monitoring and further localization of his epilepsy in consideration of additional surgical management options of his epilepsy. Currently maintained on  Clobazam 40 mg qHS, Eslicarbazepine 1200 mg qHS, and Zonisamide 500 mg qHS with continued events of staring/loss of awareness and generalized convulsions.    - s/p placement of 8 left-sided sEEG electrodes by [facility]I 2/27  - No seizures overnight  - Beginning 2/28 pm, hold Clobazam, decrease Zonisamide further to 100 mg qHS. Continue Eslicarbazepine 400 mg qHS.  - Continue mittens and strict 1:1 supervision as he previously self-removed sEEG leads during monitoring in 2021, requiring early termination of monitoring  - Post-operative imaging timing, pain control per NCC, NSGY  - Seizure precautions  - Please call epilepsy to notify of any seizures  -  If more than 3 seizures in 2 hours or any seizure lasting greater than 3 minutes, please give 2 mg IV lorazepam and contact on-call epilepsy    Plan of care discussed with NCC team, patient and wife at bedside. Will continue to follow, please call with any questions.     Depression with anxiety  - Ok to continue home Escitalopram from Epilepsy standpoint    Migraine without status migrainosus, not intractable  - Followed by Dr. Saldana as outpatient  - Supportive care during admission        VTE Risk Mitigation (From admission, onward)         Ordered     heparin (porcine) injection 5,000 Units  Every 8 hours         02/27/23 1236     Place sequential compression device  Until discontinued         02/27/23 7749                Teresa Cerrato PA-C  Neurology-Epilepsy  [facility]  Staff: Dr. Feliciano

## 2023-02-28 NOTE — PROCEDURES
SEEG Report      IMPLANTATION DATE:  2/27/2023  DATE OF ADMISSION: 2/28/2023  5:30 AM     ADMITTING/REQUESTING PROVIDER: Ruchi Chen MD     REASON FOR CONSULT:  58-year-old man admitted for phase 2 epilepsy surgical workup with the placement of intracranial sEEG leads for seizure capture and onset localization.     METHODOLOGY  Depth electrodes consist of thin wires with electrical contacts at fixed distances along the wire. These are implanted using a stereotactic procedure targeting cortical and subcortical structures. Digital video recording of the patient is simultaneously recorded with the EEG. The patient is instructed to report clinical symptoms which may occur during the recording session. EEG and video recording are stored and archived in digital format. Activation procedures which include photic stimulation, hyperventilation and instructing patients to perform simple tasks are done in selected patients. Compresses spectral analysis (CSA) is performed on the activity recorded from each individual channel. This is displayed as a power display of frequencies from 0 to 30 Hz over time. The CSA analysis is done and displayed continuously. This is reviewed for asymmetries in power between homologous areas of the scalp and for presence of changes in power which can be seen when seizures occur. Sections of suspected abnormalities on the CSA is then compared with the original EEG recording.      The following is a chart outlying the details related to the depth electrodes implanted:       Electrode    Medial target  Lateral target  Cable Color Serial Number # Of Contacts Head Box  Location   1 L Sup margin Piriformis Inf Temp Gyrus Blk/Gr 51947 16 1-16   2 L Entorihinal Entorhinal Inf Temp Gyrus Yel/Scott 05803 12 17-28   3 L Prefrontal Ant Cing Mid Frontal Gyrus Scott/Blk 73939 16 29-44   4 L Ant Insular Ant Insula Frontal Operculum Yel/Gre 51343 10 45-54   5 L Mid Insular Mid Insula Pars Triangularis Yel/Scott 21180  10 55-64   6 L Orbitofrontal Med OrbFr Lat OrbFr Blk/Yel 38179 16 65-80   7 L Mid Cing Mid Cingulate Sup Frontal Gyrus Red/Yel 61653 14 81-94   8 L Hippo Tail Post Hippo Mid Temp Gyrus Red/Gre 20516 14     EKG           109-110        RECORDING TIMES  Start on February 27, 2023   End on February 28, 2023       The total time of intracranial EEG recording for the study was 18:02:22     EEG FINDINGS  Button Push:  There were no push button events during this study     Interictal:  None     Ictal:   None     Impression:    Normal one day video EEG

## 2023-02-28 NOTE — HOSPITAL COURSE
2/27>2/28: Clobazam decreased to 20 mg qHS, Eslicarbazepine decreased to 400 mg qHS, and Zonisamide decreased to 200 mg qHS on admission. No seizures noted overnight. Beginning 2/28 pm, hold Clobazam, decrease Zonisamide further to 100 mg qHS. Continue Eslicarbazepine 400 mg nightly.   2/28>3/1: No seizures overnight, EEG normal. Hold Zonisamide beginning 3/1 pm.   3/1>3/2: No seizures overnight. Event 3/2 approximately 1035 of sudden eye opening/fluttering, confusion. Hold Eslicarbazepine beginning 3/2 pm.   3/2>3/3: No clinical seizures overnight. Continue close vEEG off all AEDs. Plan for cortical stimulation 3/3 evening with Dr. Edgar and Dr. Feliciano.  3/3-3/4: 1 seizure provoked yesterday by cortical stimulation, no spontaneous seizures.  Plan for sleep deprivation tonight.   3/4-3/5: 2 clinical seizures.  First consisted of shuffling leg movements w/ oral automatisms followed by prolonged post ictal aphasia, second consisted of right hand movement (possibly manual automatisms, difficult to tell hand in mitten) -> oral automatisms -> ictal cry and right version -> generalized tonic clonic seizure lasting 2 minutes; afterwards post ictal aphasia.  Onset of both was Lhippocampus (w/ second one having early recruitment of Lorbitofrontal region).    3/5-3/6: Three additional seizures overnight, please see EEG report for full details. On 3/6 am, given Clobazam 20 mg x1, Eslicarbazepine 600 mg x1. No further seizures noted throughout the day. Plan to give additional Clobazam 20 mg, Eslicarbazepine 600 mg, and Zonisamide 500 mg 3/6 pm, resume all home dose AEDs 3/7 pm.   3/6-3/7: Clinical seizures 3/6 approximately 1939, EEG notable for additional seizures 3/6 at 0924, 1448, and 1938. No further seizures overnight after additional AED doses. Continue home AED regimen, plan for sEEG removal tomorrow.   3/7-3/8: No clinical seizures overnight, sEEG leads explanted 3/8. Continue home AED regimen.  3/8>3/9: sEEG  electrodes explanted 3/8, patient tolerated procedure well. Evaluated by PT/OT 3/9, stable for discharge home. Continue Clobazam 40 mg qHS, Eslicarbazepine 1200 mg qHS, and Zonisamide 500 mg qHS. Outpatient follow up with Dr. Edgar for further discussion of surgical options.

## 2023-02-28 NOTE — PLAN OF CARE
[facility] Care Plan    POC reviewed with Declan Burleson and family at 1400. Pt verbalized understanding. Questions and concerns addressed. No acute events today. Pt progressing toward goals. Will continue to monitor. See below and flowsheets for full assessment and VS info.     -no seizures so far during shift  -Norco 10 given x2             Is this a stroke patient? no    Neuro:  [place] Coma Scale  Best Eye Response: 4-->(E4) spontaneous  Best Motor Response: 6-->(M6) obeys commands  Best Verbal Response: 5-->(V5) oriented  Ruben Coma Scale Score: 15  Assessment Qualifiers: patient not sedated/intubated, no eye obstruction present  Pupil PERRLA: yes     24 hr Temp:  [97.7 °F (36.5 °C)-98 °F (36.7 °C)]     CV:   Rhythm: normal sinus rhythm  BP goals:   SBP < 160  MAP > 65    Resp:           Plan: N/A    GI/:     Diet/Nutrition Received: regular  Last Bowel Movement: 02/26/23  Voiding Characteristics: urethral catheter (bladder)    Intake/Output Summary (Last 24 hours) at 2/28/2023 1526  Last data filed at 2/28/2023 0949  Gross per 24 hour   Intake 348.11 ml   Output 1035 ml   Net -686.89 ml     Unmeasured Output  Stool Occurrence: 0    Labs/Accuchecks:  Recent Labs   Lab 02/28/23  0018   WBC 8.45   RBC 4.26*   HGB 13.3*   HCT 39.5*         Recent Labs   Lab 02/28/23  0018   *   K 3.6   CO2 23      BUN 8   CREATININE 0.8   ALKPHOS 106   ALT 12   AST 13   BILITOT 0.3      Recent Labs   Lab 02/27/23  0555   INR 1.0   APTT 27.4    No results for input(s): CPK, CPKMB, TROPONINI, MB in the last 168 hours.    Electrolytes: N/A - electrolytes WDL  Accuchecks: none    Gtts:      LDA/Wounds:  Lines/Drains/Airways       Arterial Line  Duration             Arterial Line 02/27/23 0746 Right Radial 1 day              Peripheral Intravenous Line  Duration                  Peripheral IV - Single Lumen 02/27/23 0608 20 G Left;Posterior Hand 1 day         Peripheral IV - Single Lumen 02/27/23 0736 18 G Left  Forearm 1 day                  Wounds: Yes  Wound care consulted: No

## 2023-02-28 NOTE — PLAN OF CARE
OT evaluation completed.  Problem: Occupational Therapy  Goal: Occupational Therapy Goal  Description: Goals set 2/28 to be addressed for 14 days with expiration date, 3/15:  Patient will increase functional independence with ADLs by performing:    Patient will demonstrate rolling to the right with modified independence.  Not met   Patient will demonstrate rolling to the left with modified independence.   Not met  Patient will demonstrate supine -sit with modified independence.   Not met  Patient will demonstrate stand pivot transfers with supervision.   Not met  Patient will demonstrate grooming while standing with supervision.   Not met  Patient will demonstrate upper body dressing with supervision while seated EOB.   Not met  Patient will demonstrate lower body dressing with supervision while seated EOB.   Not met  Patient will demonstrate toileting with supervision.   Not met  Patient will demonstrate bathing while seated EOB with supervision.   Not met  Patient's family / caregiver will demonstrate independence and safety with assisting patient with self-care skills and functional mobility.     Not met  Patient's family / caregiver will demonstrate independence with providing ROM and changes in bed positioning.   Not met      Outcome: Ongoing, Progressing

## 2023-02-28 NOTE — SUBJECTIVE & OBJECTIVE
Review of Systems  Constitutional: Denies fevers, weight loss, chills, or weakness.  Eyes: Denies changes in vision.  ENT: Denies dysphagia, nasal discharge, ear pain or discharge.  Cardiovascular: Denies chest pain, palpitations, orthopnea, or claudication.  Respiratory: Denies shortness of breath, cough, hemoptysis, or wheezing.  GI: Denies nausea/vomitting, hematochezia, melena, abd pain, or changes in appetite.  : Denies dysuria, incontinence, or hematuria.  Musculoskeletal: Denies joint pain or myalgias.  Skin/breast: Denies rashes, lumps, lesions, or discharge.  Neurologic: Denies headache, dizziness, vertigo, or paresthesias.  Psychiatric: Denies changes in mood or hallucinations.  Endocrine: Denies polyuria, polydipsia, heat/cold intolerance.  Hematologic/Lymph: Denies lymphadenopathy, easy bruising or easy bleeding.  Allergic/Immunologic: Denies rash, rhinitis.    Objective:     Vitals:  Temp: 98 °F (36.7 °C)  Pulse: 68  Rhythm: normal sinus rhythm  BP: (!) 161/91  MAP (mmHg): 120  Resp: 15  SpO2: 100 %    Temp  Min: 97.6 °F (36.4 °C)  Max: 98 °F (36.7 °C)  Pulse  Min: 62  Max: 79  BP  Min: 115/71  Max: 164/84  MAP (mmHg)  Min: 88  Max: 120  Resp  Min: 10  Max: 24  SpO2  Min: 92 %  Max: 100 %    02/27 0701 - 02/28 0700  In: 1898.4 [P.O.:300]  Out: 2635 [Urine:2635]   Unmeasured Output  Stool Occurrence: 0       Physical Exam  GA: Alert, comfortable, no acute distress.   HEENT: No scleral icterus or JVD.   Pulmonary: Clear to auscultation A/L.   Cardiac: RRR S1 & S2 w/o rubs/murmurs/gallops.   Abdominal: Bowel sounds present x 4. No appreciable hepatosplenomegaly.  Skin: No jaundice, rashes, or visible lesions.  Neuro:  --GCS: E4 V5 M6  --Mental Status:  awake, oriented X4, follows commands, fluent speech  --CN II-XII grossly intact.   --Pupils 3->2mm, PERRL.   --Corneal reflex, gag, cough intact.  --MOTTA spont and against gravity    Medications:  Continuous ScheduledcefTRIAXone (ROCEPHIN) IVPB, 2 g,  Q12H  cloBAZam, 20 mg, QHS  EScitalopram oxalate, 10 mg, Daily  eslicarbazepine, 400 mg, QHS  heparin (porcine), 5,000 Units, Q8H  mupirocin, , BID  polyethylene glycol, 17 g, Daily  senna-docusate 8.6-50 mg, 1 tablet, Daily  zonisamide, 200 mg, QHS    PRNacetaminophen, 1,000 mg, Q8H PRN  glycerin adult, 1 suppository, Daily PRN  hydrALAZINE, 10 mg, Q4H PRN  HYDROcodone-acetaminophen, 1 tablet, Q4H PRN  HYDROcodone-acetaminophen, 1 tablet, Q4H PRN  HYDROmorphone, 0.5 mg, Q6H PRN  ondansetron, 8 mg, Q8H PRN      Today I personally reviewed pertinent medications, lines/drains/airways, imaging, cardiology results, laboratory results, microbiology results,    Diet  Diet Adult Regular (IDDSI Level 7)

## 2023-02-28 NOTE — PLAN OF CARE
[facility]  Initial Discharge Assessment       Primary Care Provider: Juan Carlos Simmons NP    Admission Diagnosis: Complex partial seizures evolving to generalized tonic-clonic seizures [G40.209]  Epilepsy [G40.909]    Admission Date: 2/27/2023  Expected Discharge Date: 3/8/2023    Discharge Barriers Identified: None    Payor: HU[employer] MANAGED MEDICARE / Plan: HU[employer] SNP (SPECIAL NEEDS PLAN) / Product Type: Medicare Advantage /     Extended Emergency Contact Information  Primary Emergency Contact: TRISTA MOSES  Address: [address] [place] [address] [facility]  Home Phone: [phone]  Work Phone: [phone]  Mobile Phone: [phone]  Relation: Spouse  Preferred language: English   needed? No  Secondary Emergency Contact: Lucinda Demarco  Address: [address] [id number] [facility]  Home Phone: [phone]  Mobile Phone: [phone]  Relation: Mother    Discharge Plan A: Home  Discharge Plan B: Home      [facility] ([place]) - [place], [place] - [address]  [address]  [place] [address]  Phone: [phone] Fax: [phone]      Transferred from:  Home    Past Medical History:   Diagnosis Date    Anxiety     Dementia in other diseases classified elsewhere, unspecified severity, without behavioral disturbance, psychotic disturbance, mood disturbance, and anxiety 2/2/2023    Depression     GERD (gastroesophageal reflux disease)     Hyperlipidemia     Hypertension     Long term (current) use of antithrombotics/antiplatelets 12/22/2020    Migraine     Normocytic anemia 2/23/2023    Seizures          CM met with patient and Trista Moses  in room for Discharge Planning Assessment.  Patient is able to answer questions.  Per patient, he lives with his wife in a single story house with 1 step(s) to enter.   Per patient, he was independent with ADLS and used no dme for ambulation.  Patient will have  assistance from his wife upon discharge.   Discharge Planning Booklet given to patient/family and discussed.  All questions addressed.  CM will follow for needs.    Initial Assessment (most recent)       Adult Discharge Assessment - 02/28/23 1415          Discharge Assessment    Assessment Type Discharge Planning Assessment     Confirmed/corrected address, phone number and insurance Yes     Confirmed Demographics Correct on Facesheet     Source of Information patient     Communicated KENNEDY with patient/caregiver Date not available/Unable to determine     Reason For Admission Complex partial epilepsy with generalization and with intractable epilepsy     People in Home spouse     Facility Arrived From: Home     Do you expect to return to your current living situation? Yes     Do you have help at home or someone to help you manage your care at home? Yes     Who are your caregiver(s) and their phone number(s)? Trista Jha (wife) [phone]     Prior to hospitilization cognitive status: Alert/Oriented     Current cognitive status: Alert/Oriented     Walking or Climbing Stairs --   Independent    Dressing/Bathing --   Independent    Equipment Currently Used at Home none     Readmission within 30 days? No     Patient currently being followed by outpatient case management? No     Do you currently have service(s) that help you manage your care at home? No     Do you take prescription medications? Yes     Do you have prescription coverage? Yes     Coverage [employer]     Do you have any problems affording any of your prescribed medications? No     Is the patient taking medications as prescribed? yes     Who is going to help you get home at discharge? Trista Jha (wife) [phone]     How do you get to doctors appointments? family or friend will provide     Are you on dialysis? No     Do you take coumadin? No     Discharge Plan A Home     Discharge Plan B Home     DME Needed Upon Discharge  none     Discharge Plan  discussed with: Patient;Spouse/sig other     Name(s) and Number(s) Trista Jha (wife) [phone] (at bedside)     Discharge Barriers Identified None        OTHER    Name(s) of People in Home Trista Jha (wife) [phone]                      Tammy Garcia RN, CCRN-K, [facility]  Neuro-Critical Care   X [phone]

## 2023-02-28 NOTE — PT/OT/SLP EVAL
"Occupational Therapy   Evaluation    Name: Declan Burleson  MRN: [id number]  Admitting Diagnosis: Complex partial seizures evolving to generalized tonic-clonic seizures  Recent Surgery: Procedure(s) (LRB):  PLACEMENT OF STEREO EEG LEADS (DEPTH ELECTRODES) (Left) 1 Day Post-Op    Recommendations:     Discharge Recommendations:  (pending OOB activity)  Discharge Equipment Recommendations:   (pending OOB activity)  Barriers to discharge:  None    Assessment:     Declan Burleson is a 58 y.o. male with a medical diagnosis of Complex partial seizures evolving to generalized tonic-clonic seizures.  He presents with performance deficits affecting function: weakness, abnormal tone, decreased ROM, impaired endurance, impaired cognition, impaired sensation, decreased coordination.      Rehab Prognosis: Good; patient would benefit from acute skilled OT services to address these deficits and reach maximum level of function.       Plan:     Patient to be seen 3 x/week to address the above listed problems via self-care/home management, therapeutic activities, therapeutic exercises, neuromuscular re-education, cognitive retraining  Plan of Care Expires: 03/28/23  Plan of Care Reviewed with: patient, spouse    Subjective   Patient:  "I was a  and am now on disability."     Occupational Profile:  Patient resides in [place] with wife in one story home with one step to enter.  PTA patient independent with ADLs, not driving.  Currently owns no DME.  Hobbies: washing clothes, washing dishes, collecting model cars.    Pain/Comfort:  Pain Rating 1: 0/10  Pain Rating Post-Intervention 1: 0/10    Patients cultural, spiritual, Spiritism conflicts given the current situation: no    Objective:     Communicated with: Nurse prior to session.  Patient found supine with peripheral IV, oxygen, blood pressure cuff, SCD, bed alarm, pulse ox (continuous) upon OT entry to room.    General Precautions: Standard, aspiration, fall " (bedrest with SEEG)  Orthopedic Precautions: N/A  Braces: N/A  Respiratory Status: Nasal cannula, flow 2 L/min    Occupational Performance:    Activities of Daily Living:  Grooming: supervision while supine    Cognitive/Visual Perceptual:  Cognitive/Psychosocial Skills:     -       Oriented to: Person, Place, Time, and Situation   -       Follows Commands/attention:Follows one-step commands  -       Communication: clear/fluent    Physical Exam:  Postural examination/scapula alignment:    -       Rounded shoulders  Skin integrity: Visible skin intact  Edema:  None noted  Upper Extremity Range of Motion:     -       Right Upper Extremity: AROM WNL  -       Left Upper Extremity: AROM WNL  Upper Extremity Strength:    -       Right Upper Extremity: WFL  -       Left Upper Extremity: WFL    AMPAC 6 Click ADL:  AMPAC Total Score: 18    Treatment & Education:  Patient education provided on role of OT.  AROM performed bilateral UE/LEs one set x 10 rep in all planes of motion. Continued education, patient/ family training recommended.  Education provided on resistive strength training exercises that will be performed in bed while SEEG intact.  Patient left supine with all lines intact, call button in reach, and bed alarm on    GOALS:   Multidisciplinary Problems       Occupational Therapy Goals          Problem: Occupational Therapy    Goal Priority Disciplines Outcome Interventions   Occupational Therapy Goal     OT, PT/OT Ongoing, Progressing    Description: Goals set 2/28 to be addressed for 14 days with expiration date, 3/15:  Patient will increase functional independence with ADLs by performing:    Patient will demonstrate rolling to the right with modified independence.  Not met   Patient will demonstrate rolling to the left with modified independence.   Not met  Patient will demonstrate supine -sit with modified independence.   Not met  Patient will demonstrate stand pivot transfers with supervision.   Not  met  Patient will demonstrate grooming while standing with supervision.   Not met  Patient will demonstrate upper body dressing with supervision while seated EOB.   Not met  Patient will demonstrate lower body dressing with supervision while seated EOB.   Not met  Patient will demonstrate toileting with supervision.   Not met  Patient will demonstrate bathing while seated EOB with supervision.   Not met  Patient's family / caregiver will demonstrate independence and safety with assisting patient with self-care skills and functional mobility.     Not met  Patient's family / caregiver will demonstrate independence with providing ROM and changes in bed positioning.   Not met                           History:     Past Medical History:   Diagnosis Date    Anxiety     Dementia in other diseases classified elsewhere, unspecified severity, without behavioral disturbance, psychotic disturbance, mood disturbance, and anxiety 2/2/2023    Depression     GERD (gastroesophageal reflux disease)     Hyperlipidemia     Hypertension     Long term (current) use of antithrombotics/antiplatelets 12/22/2020    Migraine     Normocytic anemia 2/23/2023    Seizures          Past Surgical History:   Procedure Laterality Date    CYST REMOVAL  March 2016/2017    skin cyst - benign    PLACEMENT OF STEREO EEG LEADS(DEPTH ELECTRODES) Left 2/27/2023    Procedure: PLACEMENT OF STEREO EEG LEADS (DEPTH ELECTRODES);  Surgeon: Ruchi Chen MD;  Location: [address];  Service: Neurosurgery;  Laterality: Left;    REMOVAL OF STEREO EEG LEADS (DEPTH ELECTRODES) Bilateral 1/21/2021    Procedure: REMOVAL OF STEREO EEG LEADS (DEPTH ELECTRODES);  Surgeon: Rucih Chen MD;  Location: [address];  Service: Neurosurgery;  Laterality: Bilateral;    TONSILLECTOMY      teenage        Time Tracking:     OT Date of Treatment: 02/28/23  OT Start Time: 0532  OT Stop Time: 0548  OT Total Time (min): 16 min    Billable Minutes:Evaluation 8  Therapeutic  Exercise 8    2/28/2023

## 2023-02-28 NOTE — ASSESSMENT & PLAN NOTE
57 yo M with intractable epilepsy who is now s/p sEEG lead placement for seizure monitoring/mapping    - admitted to [facility]  - q1 neurochecks  - CT stealth post op reviewed, sEEG leads in appropriate position, no large volume hemorrhage  - AEDs and seizure management per epilepsy  - hyponatremia, ctm  - bowel regiment, ctm for BMs  - nevarez removed, f/u spontaneous voiding  - bed rest  - PT/OT, daily stationary bike  - SQH    Dispo: ongoing, timing TBD for sEEG lead removal

## 2023-02-28 NOTE — PROGRESS NOTES
"[facility]  Neurosurgery  Progress Note    Subjective:     History of Present Illness: Declan Burleson is a 58 y.o. right-handed male referred by Dr. Benoit (originally by Dr. Saldana of [place]) for consideration of surgical therapy for intractable epilepsy. The patient was formerly employed as a . He is accompanied by his wife Trista today, who helps to provide the history. His seizures began when he was 48 years old. He can recall no inciting event. He has 3 semiologies: "full blown" generalized events, staring episodes, and "mild" seizures, which are characterized as generalized events of shorter duration without incontinence and a shorter post-ictal period. He and his wife estimate that he persists in having about 2 events/month.      He has failed multiple AEDs, including topiramate, lamotrigine, levetiracetam, and oxcarbazepine. He is currently taking eslicarbazepine, zonisamide, and clobazam. He had EMU monitoring in May-June of this year, which suggests left-sided activity. He had 3T MRI in June (personally reviewed) that was non-lesional; he also had PET at that time suggesting possible left-sided activity. He had neuropsychological testing on 8/10/20; summary findings included below. He lives with his wife and 16-year-old daughter, who provide excellent social support.      He takes ASA 81, which will need to be held for one week prior to surgery.      As of 12/22/20, the patient reports he has had no significant change. He is hoping to be seen today for pre-operative optimization. He is out of Aptiom, which his wife is working to re-obtain for him.       As of 2/9/21, the patient underwent bilateral sEEG monitoring on 1/11/21. He self-removed several of the leads on 1/21/21 and was taken to the OR for removal of the remaining electrodes. Fortunately, there was no significant intracranial hemorrhage associated. Since being discharged home, the patient reports " that he is overall doing well. He has had no fever, chills, or drainage from the incisions. He has had some headache. He also feels that he has been sleeping more than before the procedure. He persists in having bad memory.      As of 4/13/21, the patient underwent left laser amygdalohippocampectomy on 3/1/21. Overall, the has done well since then. He and his wife report that he has been seizure free. He persists in having some days that are better than others; he is more irritable and tired than before surgery at times. This is inconsistent, and some days he is at his preoperative baseline. He has been compliant with his AEDs; he is taking 1200 mg of aptiom daily.     They deny any fever, chills, or drainage from the incision, though there was a scab that was removed when he got a haircut recently. He is also experiencing headache that radiates from the site of his incision forward. This improves with Maxalt.      As of 7/6/21, the patient reports that he is having headache from the base of the neck half-way up the head. It feels like a small drum that comes and goes. He had to turn off the lights, lay down, and take over-the-counter medication (Tylenol, at times) last week. He also became more pale with these episodes. It is made worse by lying on the left side in bed.  He is now at his normal baseline. He also reports chest pain; he remains hyponatremic. He is currently out of the anxiety and blood pressure medications for at least 4 days.      He has been seizure free since the procedure. Trista reports that the incision is well healed. No fever/chills.      He is more irritable than before surgery and has some memory issues.      As of 7/22/21, the patient returns in follow up. He still has headache and mood swings at times. He has not had any seizures. He and his wife both feel that his speech and conversation and improving. He remains highly distractable. He and his wife endorse that his grandfather had  "migraines.      He will need to re-establish with a new epileptologist since Dr. Benoit is moving to [place].      As of 9/30/21, the patient reports he has remained seizure-free. He continues to have significant headaches, particularly over the left posterior aspect of the head, radiating over to the right side. His wife endorses that he has bradykinesia and decreased arm swing when walking. Jean Edgar and Karen are concerned he may have Parkinson disease.      As of 12/15/22, the patient returns with his wife, Trista. They are frustrated that he is "like a zombie" because of the high doses of AEDs. He has been seizure-free since April, but when he decreased the meds back in April, he had 3 events within 24 hours.      Mood swings are also an issue; he gets irritated more readily than he did in the past. He is seeing a speech therapist in [facility]; he is making progress WRT conversational communication and memory.      They now have 2 grandchildren.      Goals of surgery: to be free of seizures and reduce medications.      They would like to undergo repeat sEEG since the first was cut short to see if he may be a candidate for further epilepsy surgery.      As of 2/23/23, the patient returns for further discussion of repeat sEEG. He has undergone extensive discussion in interdisciplinary conference, together with review by Dr. Andrew of memory neurology, and he is being recommended for repeat sEEG. He has no evidence of non-epilepsy, non-TBI related neurodegenerative disease on Dr. Andrew's initial evaluation, per discussion with Dr. ALEK Edgar.      He and his wife remain eager to proceed with sEEG placement to determine whether he will be a candidate for further intracranial seizure surgery.     Post-Op Info:  Procedure(s) (LRB):  PLACEMENT OF STEREO EEG LEADS (DEPTH ELECTRODES) (Left)   1 Day Post-Op     Interval History: 2/28: no seizures overnight. POD from sEEG lead placement. CT post op with leads in " appropriate position and no large volume hemorrhage    Medications:  Continuous Infusions:  Scheduled Meds:   cefTRIAXone (ROCEPHIN) IVPB  2 g Intravenous Q12H    cloBAZam  20 mg Oral QHS    EScitalopram oxalate  10 mg Oral Daily    eslicarbazepine  400 mg Oral QHS    heparin (porcine)  5,000 Units Subcutaneous Q8H    mupirocin   Nasal BID    polyethylene glycol  17 g Oral Daily    senna-docusate 8.6-50 mg  1 tablet Oral Daily    zonisamide  200 mg Oral QHS     PRN Meds:acetaminophen, glycerin adult, hydrALAZINE, HYDROcodone-acetaminophen, HYDROcodone-acetaminophen, HYDROmorphone, ondansetron     Review of Systems   Neurological:  Positive for seizures.   All other systems reviewed and are negative.  Objective:     Weight: 87.5 kg (193 lb)  Body mass index is 28.5 kg/m².  Vital Signs (Most Recent):  Temp: 97.8 °F (36.6 °C) (02/28/23 0701)  Pulse: 62 (02/28/23 1001)  Resp: 13 (02/28/23 1001)  BP: (!) 156/96 (02/28/23 1001)  SpO2: 100 % (02/28/23 1001)   Vital Signs (24h Range):  Temp:  [97.6 °F (36.4 °C)-98 °F (36.7 °C)] 97.8 °F (36.6 °C)  Pulse:  [62-79] 62  Resp:  [10-24] 13  SpO2:  [92 %-100 %] 100 %  BP: (115-164)/(71-96) 156/96  Arterial Line BP: ()/() 166/100     Date 02/28/23 0700 - 03/01/23 0659   Shift 2567-0450 5720-9064 9671-9777 24 Hour Total   INTAKE   I.V.(mL/kg) 21(0.2)   21(0.2)   IV Piggyback 49.7   49.7   Shift Total(mL/kg) 70.7(0.8)   70.7(0.8)   OUTPUT   Shift Total(mL/kg)       Weight (kg) 87.5 87.5 87.5 87.5                        Physical Exam  Constitutional:       Appearance: He is well-developed and well-nourished.   Eyes:      Extraocular Movements: EOM normal.      Conjunctiva/sclera: Conjunctivae normal.      Pupils: Pupils are equal, round, and reactive to light.   Cardiovascular:      Pulses: Normal pulses.   Abdominal:      Palpations: Abdomen is soft.   Neurological:      Mental Status: He is alert and oriented to person, place, and time.      Comments: AAOx3, higher order  confusion  PERRL  CN intact  BUE 5/5  BLE 5/5  SILT    Headwrap in place with sEEG leads   Psychiatric:         Mood and Affect: Mood and affect normal.       Physical Exam:    Constitutional: He appears well-developed and well-nourished.     Eyes: Pupils are equal, round, and reactive to light. Conjunctivae and EOM are normal.     Cardiovascular: Normal pulses.     Abdominal: Soft.     Psych/Behavior: He is alert. He is oriented to person, place, and time. He has a normal mood and affect.     Neurological:   AAOx3, higher order confusion  PERRL  CN intact  BUE 5/5  BLE 5/5  SILT    Headwrap in place with sEEG leads     Significant Labs:  Recent Labs   Lab 02/27/23  1207 02/28/23  0018   * 116*   * 132*   K 4.0 3.6    102   CO2 19* 23   BUN 8 8   CREATININE 0.7 0.8   CALCIUM 8.4* 8.5*   MG  --  1.8     Recent Labs   Lab 02/27/23  1207 02/28/23  0018   WBC 6.91 8.45   HGB 13.3* 13.3*   HCT 39.5* 39.5*    333     Recent Labs   Lab 02/27/23  0555   INR 1.0   APTT 27.4     Microbiology Results (last 7 days)       ** No results found for the last 168 hours. **          All pertinent labs from the last 24 hours have been reviewed.    Significant Diagnostics:  I have reviewed all pertinent imaging results/findings within the past 24 hours.    Assessment/Plan:     * Complex partial seizures evolving to generalized tonic-clonic seizures  57 yo M with intractable epilepsy who is now s/p sEEG lead placement for seizure monitoring/mapping    - admitted to [facility]  - q1 neurochecks  - CT stealth post op reviewed, sEEG leads in appropriate position, no large volume hemorrhage  - AEDs and seizure management per epilepsy  - hyponatremia, ctm  - bowel regiment, ctm for BMs  - nevarez removed, f/u spontaneous voiding  - bed rest  - PT/OT, daily stationary bike  - SQH    Dispo: ongoing, timing TBD for sEEG lead removal        Ashley Clements MD  Neurosurgery  [facility]

## 2023-02-28 NOTE — PROGRESS NOTES
"[facility]  Neurocritical Care  Progress Note    Admit Date: 2/27/2023  Service Date: 02/28/2023  Length of Stay: 1    Subjective:     Chief Complaint: Complex partial seizures evolving to generalized tonic-clonic seizures    History of Present Illness: Declan Burleson is a 58 y.o.M  admitted to [facility] s/p SEEG placement. Per chart review he was referred by Dr. Benoit (originally by Dr. Saldana of [place]) for consideration of surgical therapy for intractable epilepsy. The patient was formerly employed as a . He is accompanied by his wife Trista, who helps to provide the history. His seizures began when he was 48 years old. He can recall no inciting event. He has 3 semiologies: "full blown" generalized events, staring episodes, and "mild" seizures, which are characterized as generalized events of shorter duration without incontinence and a shorter post-ictal period. He and his wife estimate that he persists in having about 2 events/month. He has failed multiple AEDs, including topiramate, lamotrigine, levetiracetam, and oxcarbazepine. He is currently taking eslicarbazepine, zonisamide, and clobazam. He had EMU monitoring in May-June of this year, which suggests left-sided activity. NSGY and epilepsy following. Patient admitted to [facility] for close monitoring and higher level of care.       Hospital Course: 2/28/2023: NAEON, patient complains of L side headache, L eye pressure (no visual disturbance) PRN pain available          Review of Systems  Constitutional: Denies fevers, weight loss, chills, or weakness.  Eyes: Denies changes in vision.  ENT: Denies dysphagia, nasal discharge, ear pain or discharge.  Cardiovascular: Denies chest pain, palpitations, orthopnea, or claudication.  Respiratory: Denies shortness of breath, cough, hemoptysis, or wheezing.  GI: Denies nausea/vomitting, hematochezia, melena, abd pain, or changes in appetite.  : Denies dysuria, incontinence, or " hematuria.  Musculoskeletal: Denies joint pain or myalgias.  Skin/breast: Denies rashes, lumps, lesions, or discharge.  Neurologic: Denies headache, dizziness, vertigo, or paresthesias.  Psychiatric: Denies changes in mood or hallucinations.  Endocrine: Denies polyuria, polydipsia, heat/cold intolerance.  Hematologic/Lymph: Denies lymphadenopathy, easy bruising or easy bleeding.  Allergic/Immunologic: Denies rash, rhinitis.    Objective:     Vitals:  Temp: 98 °F (36.7 °C)  Pulse: 68  Rhythm: normal sinus rhythm  BP: (!) 161/91  MAP (mmHg): 120  Resp: 15  SpO2: 100 %    Temp  Min: 97.6 °F (36.4 °C)  Max: 98 °F (36.7 °C)  Pulse  Min: 62  Max: 79  BP  Min: 115/71  Max: 164/84  MAP (mmHg)  Min: 88  Max: 120  Resp  Min: 10  Max: 24  SpO2  Min: 92 %  Max: 100 %    02/27 0701 - 02/28 0700  In: 1898.4 [P.O.:300]  Out: 2635 [Urine:2635]   Unmeasured Output  Stool Occurrence: 0       Physical Exam  GA: Alert, comfortable, no acute distress.   HEENT: No scleral icterus or JVD.   Pulmonary: Clear to auscultation A/L.   Cardiac: RRR S1 & S2 w/o rubs/murmurs/gallops.   Abdominal: Bowel sounds present x 4. No appreciable hepatosplenomegaly.  Skin: No jaundice, rashes, or visible lesions.  Neuro:  --GCS: E4 V5 M6  --Mental Status:  awake, oriented X4, follows commands, fluent speech  --CN II-XII grossly intact.   --Pupils 3->2mm, PERRL.   --Corneal reflex, gag, cough intact.  --MOTTA spont and against gravity    Medications:  Continuous ScheduledcefTRIAXone (ROCEPHIN) IVPB, 2 g, Q12H  cloBAZam, 20 mg, QHS  EScitalopram oxalate, 10 mg, Daily  eslicarbazepine, 400 mg, QHS  heparin (porcine), 5,000 Units, Q8H  mupirocin, , BID  polyethylene glycol, 17 g, Daily  senna-docusate 8.6-50 mg, 1 tablet, Daily  zonisamide, 200 mg, QHS    PRNacetaminophen, 1,000 mg, Q8H PRN  glycerin adult, 1 suppository, Daily PRN  hydrALAZINE, 10 mg, Q4H PRN  HYDROcodone-acetaminophen, 1 tablet, Q4H PRN  HYDROcodone-acetaminophen, 1 tablet, Q4H  PRN  HYDROmorphone, 0.5 mg, Q6H PRN  ondansetron, 8 mg, Q8H PRN      Today I personally reviewed pertinent medications, lines/drains/airways, imaging, cardiology results, laboratory results, microbiology results,    Diet  Diet Adult Regular (IDDSI Level 7)        Assessment/Plan:     Neuro  * Complex partial seizures evolving to generalized tonic-clonic seizures  Declan Burleson is a 58 y.o.M  admitted to [facility] s/p SEEG placement. He has failed multiple AEDs, including topiramate, lamotrigine, levetiracetam, and oxcarbazepine. He is currently taking eslicarbazepine, zonisamide, and clobazam.     -- s/p sEEG  -- NSGY and epilepsy following  -- AED's per epilepsy reccs  -- Neuro checks q 2hr  -- SBP <160  -- seizure precautions  -- PT/OT/SLP    Psychiatric  Depression with anxiety  Continue home med Escitalopram          The patient is being Prophylaxed for:  Venous Thromboembolism with: Chemical  Stress Ulcer with: None  Ventilator Pneumonia with: not applicable    Activity Orders          Diet Adult Regular (IDDSI Level 7): Regular starting at 02/27 1103        Full Code    Magda Weiss NP  Neurocritical Care  [facility]

## 2023-02-28 NOTE — ASSESSMENT & PLAN NOTE
Declan Burleson is a 58 y.o.M  admitted to [facility] s/p SEEG placement. He has failed multiple AEDs, including topiramate, lamotrigine, levetiracetam, and oxcarbazepine. He is currently taking eslicarbazepine, zonisamide, and clobazam.     -- s/p sEEG  -- NSGY and epilepsy following  -- AED's per epilepsy reccs  -- Neuro checks q 2hr  -- SBP <160  -- seizure precautions  -- PT/OT/SLP

## 2023-02-28 NOTE — ASSESSMENT & PLAN NOTE
Mr. Burleson is a 58 year old man with intractable epilepsy s/p prior sEEG monitoring in January 2021, left laser amygdalohippocampectomy in March 2021 admitted to [facility] s/p placement of 8 left-sided sEEG electrodes for repeat intracranial monitoring and further localization of his epilepsy in consideration of additional surgical management options of his epilepsy. Currently maintained on Clobazam 40 mg qHS, Eslicarbazepine 1200 mg qHS, and Zonisamide 500 mg qHS with continued events of staring/loss of awareness and generalized convulsions.    - s/p placement of 8 left-sided sEEG electrodes by NSGY 2/27  - No seizures overnight  - Beginning 2/28 pm, hold Clobazam, decrease Zonisamide further to 100 mg qHS. Continue Eslicarbazepine 400 mg qHS.  - Continue mittens and strict 1:1 supervision as he previously self-removed sEEG leads during monitoring in 2021, requiring early termination of monitoring  - Post-operative imaging timing, pain control per [facility], NSGY  - Seizure precautions  - Please call epilepsy to notify of any seizures  -  If more than 3 seizures in 2 hours or any seizure lasting greater than 3 minutes, please give 2 mg IV lorazepam and contact on-call epilepsy    Plan of care discussed with NCC team, patient and wife at bedside. Will continue to follow, please call with any questions.

## 2023-02-28 NOTE — HOSPITAL COURSE
2/28: no seizures overnight. POD from sEEG lead placement. CT post op with leads in appropriate position and no large volume hemorrhage  3/1: complaining of chest pain this morning, trops negative, likely GI discomfort. No seizures, no BM. Expected left jaw pain. Leiva removed, voiding spontaneously  3/2: NAEON, no BM, no seizures  3/3: NAEON, no seizures, no BM, reportedly refusion BR. Some ROM work with wife, needs more encouragement for improved PT activity  3/4: multiple BM's yesterday. 1 seizure from cortical stimulation. Was working with PT  3/5: no seizures overnight. Working with PT. BM yesterday. Complaining of left sided head pain related to surgery   3/6: Seizures yesterday and this morning. Last BM 3/4.   3/7: seizure overnight, BM overnight, restarting AEDs per epilepsy. Plan for sEEG removal tomorrow.   3/8: NAEON, NPO MN, hep held, OR for sEEG removal today   3/9: sEEG removed yesterday, CTH post op with expected changes, no large volume hematoma. OOB today, DC after working with PT/OT

## 2023-02-28 NOTE — SUBJECTIVE & OBJECTIVE
Interval History: No seizures noted overnight, patient doing well. Endorses slight left-sided headache, left-sided jaw pain.     Current Facility-Administered Medications   Medication Dose Route Frequency Provider Last Rate Last Admin    acetaminophen tablet 1,000 mg  1,000 mg Oral Q8H PRN Magda Weiss, NP        cefTRIAXone (ROCEPHIN) 2 g in dextrose 5 % in water (D5W) 5 % 50 mL IVPB (MB+)  2 g Intravenous Q12H Isaura Burgos PA-C   Stopped at 02/28/23 0923    cloBAZam Tab 20 mg  20 mg Oral QHS ROMIE Goel-C   20 mg at 02/27/23 2027    EScitalopram oxalate tablet 10 mg  10 mg Oral Daily Magda Miwill, NP   10 mg at 02/28/23 0845    eslicarbazepine tablet Tab 400 mg  400 mg Oral QHS Magda Miwill, NP   400 mg at 02/27/23 2027    glycerin adult suppository 1 suppository  1 suppository Rectal Daily PRN Isaura Burgos PA-C        heparin (porcine) injection 5,000 Units  5,000 Units Subcutaneous Q8H Isaura Burgos PA-C   5,000 Units at 02/28/23 0503    hydrALAZINE injection 10 mg  10 mg Intravenous Q4H PRN Magda Weiss, NP        HYDROcodone-acetaminophen  mg per tablet 1 tablet  1 tablet Oral Q4H PRN ROMIE Maya-NAMITA   1 tablet at 02/28/23 0857    HYDROcodone-acetaminophen 5-325 mg per tablet 1 tablet  1 tablet Oral Q4H PRN ROMIE Maya-NAMITA   1 tablet at 02/27/23 1709    HYDROmorphone injection 0.5 mg  0.5 mg Intravenous Q6H PRN Isaura Burgos PA-C        mupirocin 2 % ointment   Nasal BID Isaura Burgos PA-C   Given at 02/28/23 0846    ondansetron disintegrating tablet 8 mg  8 mg Oral Q8H PRN ROMIE Maya-NAMITA        polyethylene glycol packet 17 g  17 g Oral Daily MATEO MayaC   17 g at 02/28/23 0846    senna-docusate 8.6-50 mg per tablet 1 tablet  1 tablet Oral Daily Isaura Burgos PA-C   1 tablet at 02/28/23 0845    zonisamide capsule 200 mg  200 mg Oral QHS Teresa Cerrato PA-C   200 mg at 02/27/23 2027     Continuous  Infusions:    Review of Systems   Constitutional:  Positive for fatigue.   HENT:          + left jaw pain   Gastrointestinal:  Negative for nausea and vomiting.   Neurological:  Positive for headaches. Negative for seizures (none overnight).   All other systems reviewed and are negative.  Objective:     Vital Signs (Most Recent):  Temp: 98 °F (36.7 °C) (02/28/23 1101)  Pulse: 68 (02/28/23 1301)  Resp: 12 (02/28/23 1301)  BP: (!) 140/84 (02/28/23 1301)  SpO2: (!) 94 % (02/28/23 1301)   Vital Signs (24h Range):  Temp:  [97.6 °F (36.4 °C)-98 °F (36.7 °C)] 98 °F (36.7 °C)  Pulse:  [62-79] 68  Resp:  [10-24] 12  SpO2:  [92 %-100 %] 94 %  BP: (116-164)/(74-96) 140/84  Arterial Line BP: ()/() 130/118     Weight: 87.5 kg (193 lb)  Body mass index is 28.5 kg/m².    Physical Exam  Vitals and nursing note reviewed.   Constitutional:       General: He is not in acute distress.     Appearance: He is not diaphoretic.   HENT:      Head: Normocephalic.      Comments: sEEG leads + headwrap in place  Eyes:      General: No scleral icterus.     Extraocular Movements: Extraocular movements intact.      Pupils: Pupils are equal, round, and reactive to light.   Cardiovascular:      Rate and Rhythm: Normal rate.   Pulmonary:      Effort: Pulmonary effort is normal. No respiratory distress.   Abdominal:      General: There is no distension.      Palpations: Abdomen is soft.   Musculoskeletal:         General: No deformity or signs of injury.      Cervical back: Normal range of motion and neck supple.   Skin:     General: Skin is warm and dry.   Neurological:      Mental Status: He is alert and oriented to person, place, and time.      Coordination: Finger-Nose-Finger Test normal.   Psychiatric:         Mood and Affect: Mood normal.         Speech: Speech normal.         Behavior: Behavior normal.       NEUROLOGICAL EXAMINATION:     MENTAL STATUS   Oriented to person, place, and time.   Speech: speech is normal   Level of  consciousness: alert       Speech slightly slowed, at baseline     CRANIAL NERVES     CN III, IV, VI   Pupils are equal, round, and reactive to light.    CN VII   Facial expression full, symmetric.     CN VIII   Hearing: intact    CN XI   CN XI normal.     CN XII   CN XII normal.     MOTOR EXAM   Muscle bulk: normal  Overall muscle tone: normal       Follows commands, moves all extremities spontaneously and symmetrically     GAIT AND COORDINATION      Coordination   Finger to nose coordination: normal       No pronator drift noted     Significant Labs: All pertinent lab results from the past 24 hours have been reviewed.    Significant Studies: I have reviewed all pertinent imaging results/findings within the past 24 hours.

## 2023-03-01 ENCOUNTER — DOCUMENTATION ONLY (OUTPATIENT)
Dept: NEUROLOGY | Facility: CLINIC | Age: 58
End: 2023-03-01
Payer: MEDICARE

## 2023-03-01 ENCOUNTER — SOCIAL WORK (OUTPATIENT)
Dept: NEUROLOGY | Facility: CLINIC | Age: 58
End: 2023-03-01
Payer: MEDICARE

## 2023-03-01 LAB
ALBUMIN SERPL BCP-MCNC: 3.5 G/DL (ref 3.5–5.2)
ALP SERPL-CCNC: 113 U/L (ref 55–135)
ALT SERPL W/O P-5'-P-CCNC: 10 U/L (ref 10–44)
ANION GAP SERPL CALC-SCNC: 7 MMOL/L (ref 8–16)
AST SERPL-CCNC: 14 U/L (ref 10–40)
BASOPHILS # BLD AUTO: 0.06 K/UL (ref 0–0.2)
BASOPHILS NFR BLD: 0.8 % (ref 0–1.9)
BILIRUB SERPL-MCNC: 0.2 MG/DL (ref 0.1–1)
BUN SERPL-MCNC: 11 MG/DL (ref 6–20)
CALCIUM SERPL-MCNC: 8.8 MG/DL (ref 8.7–10.5)
CHLORIDE SERPL-SCNC: 102 MMOL/L (ref 95–110)
CO2 SERPL-SCNC: 25 MMOL/L (ref 23–29)
CREAT SERPL-MCNC: 0.8 MG/DL (ref 0.5–1.4)
DIFFERENTIAL METHOD: ABNORMAL
EOSINOPHIL # BLD AUTO: 0.6 K/UL (ref 0–0.5)
EOSINOPHIL NFR BLD: 7.9 % (ref 0–8)
ERYTHROCYTE [DISTWIDTH] IN BLOOD BY AUTOMATED COUNT: 13 % (ref 11.5–14.5)
EST. GFR  (NO RACE VARIABLE): >60 ML/MIN/1.73 M^2
GLUCOSE SERPL-MCNC: 93 MG/DL (ref 70–110)
HCT VFR BLD AUTO: 40.1 % (ref 40–54)
HGB BLD-MCNC: 13.5 G/DL (ref 14–18)
IMM GRANULOCYTES # BLD AUTO: 0.02 K/UL (ref 0–0.04)
IMM GRANULOCYTES NFR BLD AUTO: 0.3 % (ref 0–0.5)
LYMPHOCYTES # BLD AUTO: 2 K/UL (ref 1–4.8)
LYMPHOCYTES NFR BLD: 27.8 % (ref 18–48)
MAGNESIUM SERPL-MCNC: 1.9 MG/DL (ref 1.6–2.6)
MCH RBC QN AUTO: 31.6 PG (ref 27–31)
MCHC RBC AUTO-ENTMCNC: 33.7 G/DL (ref 32–36)
MCV RBC AUTO: 94 FL (ref 82–98)
MONOCYTES # BLD AUTO: 0.9 K/UL (ref 0.3–1)
MONOCYTES NFR BLD: 11.7 % (ref 4–15)
NEUTROPHILS # BLD AUTO: 3.7 K/UL (ref 1.8–7.7)
NEUTROPHILS NFR BLD: 51.5 % (ref 38–73)
NRBC BLD-RTO: 0 /100 WBC
PHOSPHATE SERPL-MCNC: 4.2 MG/DL (ref 2.7–4.5)
PLATELET # BLD AUTO: 327 K/UL (ref 150–450)
PMV BLD AUTO: 9.1 FL (ref 9.2–12.9)
POTASSIUM SERPL-SCNC: 3.9 MMOL/L (ref 3.5–5.1)
PROT SERPL-MCNC: 6.7 G/DL (ref 6–8.4)
RBC # BLD AUTO: 4.27 M/UL (ref 4.6–6.2)
SODIUM SERPL-SCNC: 134 MMOL/L (ref 136–145)
TROPONIN I SERPL DL<=0.01 NG/ML-MCNC: <0.006 NG/ML (ref 0–0.03)
TROPONIN I SERPL DL<=0.01 NG/ML-MCNC: <0.006 NG/ML (ref 0–0.03)
WBC # BLD AUTO: 7.26 K/UL (ref 3.9–12.7)

## 2023-03-01 PROCEDURE — 84484 ASSAY OF TROPONIN QUANT: CPT | Performed by: PSYCHIATRY & NEUROLOGY

## 2023-03-01 PROCEDURE — 95720 PR EEG, W/VIDEO, CONT RECORD, I&R, >12<26 HRS: ICD-10-PCS | Mod: ,,, | Performed by: PSYCHIATRY & NEUROLOGY

## 2023-03-01 PROCEDURE — 99233 PR SUBSEQUENT HOSPITAL CARE,LEVL III: ICD-10-PCS | Mod: ,,, | Performed by: NURSE PRACTITIONER

## 2023-03-01 PROCEDURE — 97110 THERAPEUTIC EXERCISES: CPT

## 2023-03-01 PROCEDURE — 80053 COMPREHEN METABOLIC PANEL: CPT | Performed by: PHYSICIAN ASSISTANT

## 2023-03-01 PROCEDURE — 99233 SBSQ HOSP IP/OBS HIGH 50: CPT | Mod: FS,,, | Performed by: PHYSICIAN ASSISTANT

## 2023-03-01 PROCEDURE — 85025 COMPLETE CBC W/AUTO DIFF WBC: CPT | Performed by: PHYSICIAN ASSISTANT

## 2023-03-01 PROCEDURE — 99233 PR SUBSEQUENT HOSPITAL CARE,LEVL III: ICD-10-PCS | Mod: FS,,, | Performed by: PHYSICIAN ASSISTANT

## 2023-03-01 PROCEDURE — 84484 ASSAY OF TROPONIN QUANT: CPT | Mod: 91 | Performed by: STUDENT IN AN ORGANIZED HEALTH CARE EDUCATION/TRAINING PROGRAM

## 2023-03-01 PROCEDURE — 93010 ELECTROCARDIOGRAM REPORT: CPT | Mod: ,,, | Performed by: INTERNAL MEDICINE

## 2023-03-01 PROCEDURE — 93010 EKG 12-LEAD: ICD-10-PCS | Mod: ,,, | Performed by: INTERNAL MEDICINE

## 2023-03-01 PROCEDURE — 20000000 HC ICU ROOM

## 2023-03-01 PROCEDURE — 95714 VEEG EA 12-26 HR UNMNTR: CPT

## 2023-03-01 PROCEDURE — 25000003 PHARM REV CODE 250: Performed by: NURSE PRACTITIONER

## 2023-03-01 PROCEDURE — 99233 SBSQ HOSP IP/OBS HIGH 50: CPT | Mod: ,,, | Performed by: NURSE PRACTITIONER

## 2023-03-01 PROCEDURE — 83735 ASSAY OF MAGNESIUM: CPT | Performed by: PHYSICIAN ASSISTANT

## 2023-03-01 PROCEDURE — 25000003 PHARM REV CODE 250: Performed by: PHYSICIAN ASSISTANT

## 2023-03-01 PROCEDURE — 93005 ELECTROCARDIOGRAM TRACING: CPT

## 2023-03-01 PROCEDURE — 27000221 HC OXYGEN, UP TO 24 HOURS

## 2023-03-01 PROCEDURE — 84100 ASSAY OF PHOSPHORUS: CPT | Performed by: PHYSICIAN ASSISTANT

## 2023-03-01 PROCEDURE — 94761 N-INVAS EAR/PLS OXIMETRY MLT: CPT

## 2023-03-01 PROCEDURE — 63600175 PHARM REV CODE 636 W HCPCS: Performed by: PHYSICIAN ASSISTANT

## 2023-03-01 PROCEDURE — 95720 EEG PHY/QHP EA INCR W/VEEG: CPT | Mod: ,,, | Performed by: PSYCHIATRY & NEUROLOGY

## 2023-03-01 RX ORDER — FAMOTIDINE 20 MG/1
20 TABLET, FILM COATED ORAL 2 TIMES DAILY
Status: DISCONTINUED | OUTPATIENT
Start: 2023-03-02 | End: 2023-03-09 | Stop reason: HOSPADM

## 2023-03-01 RX ORDER — PANTOPRAZOLE SODIUM 40 MG/10ML
40 INJECTION, POWDER, LYOPHILIZED, FOR SOLUTION INTRAVENOUS 2 TIMES DAILY
Status: DISCONTINUED | OUTPATIENT
Start: 2023-03-01 | End: 2023-03-01

## 2023-03-01 RX ORDER — FAMOTIDINE 20 MG/1
20 TABLET, FILM COATED ORAL 2 TIMES DAILY
Status: DISCONTINUED | OUTPATIENT
Start: 2023-03-01 | End: 2023-03-01

## 2023-03-01 RX ORDER — ACETAMINOPHEN 500 MG
1000 TABLET ORAL EVERY 8 HOURS
Status: DISCONTINUED | OUTPATIENT
Start: 2023-03-01 | End: 2023-03-09 | Stop reason: HOSPADM

## 2023-03-01 RX ORDER — CALCIUM CARBONATE 200(500)MG
1000 TABLET,CHEWABLE ORAL 2 TIMES DAILY PRN
Status: DISCONTINUED | OUTPATIENT
Start: 2023-03-01 | End: 2023-03-09 | Stop reason: HOSPADM

## 2023-03-01 RX ORDER — METHOCARBAMOL 500 MG/1
500 TABLET, FILM COATED ORAL 4 TIMES DAILY PRN
Status: DISCONTINUED | OUTPATIENT
Start: 2023-03-01 | End: 2023-03-09 | Stop reason: HOSPADM

## 2023-03-01 RX ORDER — OXYCODONE HYDROCHLORIDE 5 MG/1
5 TABLET ORAL EVERY 6 HOURS PRN
Status: DISCONTINUED | OUTPATIENT
Start: 2023-03-01 | End: 2023-03-09 | Stop reason: HOSPADM

## 2023-03-01 RX ADMIN — MUPIROCIN: 20 OINTMENT TOPICAL at 09:03

## 2023-03-01 RX ADMIN — HEPARIN SODIUM 5000 UNITS: 5000 INJECTION INTRAVENOUS; SUBCUTANEOUS at 09:03

## 2023-03-01 RX ADMIN — HEPARIN SODIUM 5000 UNITS: 5000 INJECTION INTRAVENOUS; SUBCUTANEOUS at 02:03

## 2023-03-01 RX ADMIN — HYDROCODONE BITARTRATE AND ACETAMINOPHEN 1 TABLET: 10; 325 TABLET ORAL at 02:03

## 2023-03-01 RX ADMIN — MUPIROCIN: 20 OINTMENT TOPICAL at 08:03

## 2023-03-01 RX ADMIN — HYDROCODONE BITARTRATE AND ACETAMINOPHEN 1 TABLET: 10; 325 TABLET ORAL at 08:03

## 2023-03-01 RX ADMIN — ACETAMINOPHEN 1000 MG: 500 TABLET ORAL at 09:03

## 2023-03-01 RX ADMIN — CEFTRIAXONE 2 G: 2 INJECTION, POWDER, FOR SOLUTION INTRAMUSCULAR; INTRAVENOUS at 08:03

## 2023-03-01 RX ADMIN — ACETAMINOPHEN 1000 MG: 500 TABLET ORAL at 02:03

## 2023-03-01 RX ADMIN — OXYCODONE 5 MG: 5 TABLET ORAL at 09:03

## 2023-03-01 RX ADMIN — ESLICARBAZEPINE ACETATE 400 MG: 400 TABLET ORAL at 09:03

## 2023-03-01 RX ADMIN — POLYETHYLENE GLYCOL 3350 17 G: 17 POWDER, FOR SOLUTION ORAL at 08:03

## 2023-03-01 RX ADMIN — ESCITALOPRAM OXALATE 10 MG: 10 TABLET ORAL at 08:03

## 2023-03-01 RX ADMIN — HEPARIN SODIUM 5000 UNITS: 5000 INJECTION INTRAVENOUS; SUBCUTANEOUS at 06:03

## 2023-03-01 RX ADMIN — SENNOSIDES AND DOCUSATE SODIUM 1 TABLET: 50; 8.6 TABLET ORAL at 08:03

## 2023-03-01 NOTE — PLAN OF CARE
Goals remain appropriate.  Problem: Occupational Therapy  Goal: Occupational Therapy Goal  Description: Goals set 2/28 to be addressed for 14 days with expiration date, 3/15:  Patient will increase functional independence with ADLs by performing:    Patient will demonstrate rolling to the right with modified independence.  Not met   Patient will demonstrate rolling to the left with modified independence.   Not met  Patient will demonstrate supine -sit with modified independence.   Not met  Patient will demonstrate stand pivot transfers with supervision.   Not met  Patient will demonstrate grooming while standing with supervision.   Not met  Patient will demonstrate upper body dressing with supervision while seated EOB.   Not met  Patient will demonstrate lower body dressing with supervision while seated EOB.   Not met  Patient will demonstrate toileting with supervision.   Not met  Patient will demonstrate bathing while seated EOB with supervision.   Not met  Patient's family / caregiver will demonstrate independence and safety with assisting patient with self-care skills and functional mobility.     Not met  Patient's family / caregiver will demonstrate independence with providing ROM and changes in bed positioning.   Not met      Outcome: Ongoing, Progressing

## 2023-03-01 NOTE — PROGRESS NOTES
EEG Hook up  AM Check Electrodes had to be fixed.No    Tech straightened out surface EKG electrodes      Deepti Cortes   03/01/2023 9:34 AM

## 2023-03-01 NOTE — PROGRESS NOTES
"[facility]  Neurocritical Care  Progress Note    Admit Date: 2/27/2023  Service Date: 03/01/2023  Length of Stay: 2    Subjective:     Chief Complaint: Complex partial seizures evolving to generalized tonic-clonic seizures    History of Present Illness: Declan Burleson is a 58 y.o.M  admitted to [facility] s/p SEEG placement. Per chart review he was referred by Dr. Benoit (originally by Dr. Saldana of [place]) for consideration of surgical therapy for intractable epilepsy. The patient was formerly employed as a . He is accompanied by his wife Trisat, who helps to provide the history. His seizures began when he was 48 years old. He can recall no inciting event. He has 3 semiologies: "full blown" generalized events, staring episodes, and "mild" seizures, which are characterized as generalized events of shorter duration without incontinence and a shorter post-ictal period. He and his wife estimate that he persists in having about 2 events/month. He has failed multiple AEDs, including topiramate, lamotrigine, levetiracetam, and oxcarbazepine. He is currently taking eslicarbazepine, zonisamide, and clobazam. He had EMU monitoring in May-June of this year, which suggests left-sided activity. NSGY and epilepsy following. Patient admitted to [facility] for close monitoring and higher level of care.       Hospital Course: 2/28/2023: NAEON, patient complains of L side headache, L eye pressure (no visual disturbance) PRN pain available  3/1/2023: complained of chest pain- troponin neg, EKG stable, AED's per Epilepsy          Review of Systems  Constitutional: Denies fevers, weight loss, chills, or weakness.  Eyes: Denies changes in vision.  ENT: Denies dysphagia, nasal discharge, ear pain or discharge.  Cardiovascular: Denies chest pain, palpitations, orthopnea, or claudication.  Respiratory: Denies shortness of breath, cough, hemoptysis, or wheezing.  GI: Denies nausea/vomitting, hematochezia, " melena, abd pain, or changes in appetite.  : Denies dysuria, incontinence, or hematuria.  Musculoskeletal: Denies joint pain or myalgias.  Skin/breast: Denies rashes, lumps, lesions, or discharge.  Neurologic: Denies headache, dizziness, vertigo, or paresthesias.  Psychiatric: Denies changes in mood or hallucinations.  Endocrine: Denies polyuria, polydipsia, heat/cold intolerance.  Hematologic/Lymph: Denies lymphadenopathy, easy bruising or easy bleeding.  Allergic/Immunologic: Denies rash, rhinitis.    Objective:     Vitals:  Temp: 98.2 °F (36.8 °C)  Pulse: 65  Rhythm: normal sinus rhythm  BP: (!) 147/81  MAP (mmHg): 107  Resp: 12  SpO2: 98 %  Oxygen Concentration (%): 28    Temp  Min: 97.9 °F (36.6 °C)  Max: 98.9 °F (37.2 °C)  Pulse  Min: 63  Max: 72  BP  Min: 131/81  Max: 164/94  MAP (mmHg)  Min: 101  Max: 122  Resp  Min: 11  Max: 28  SpO2  Min: 92 %  Max: 98 %  Oxygen Concentration (%)  Min: 28  Max: 28    02/28 0701 - 03/01 0700  In: 99.4   Out: 1100 [Urine:1100]   Unmeasured Output  Urine Occurrence: 1  Stool Occurrence: 0       Physical Exam  GA: Alert, comfortable, no acute distress.   HEENT: No scleral icterus or JVD.   Pulmonary: Clear to auscultation A/P/L. No wheezing, crackles, or rhonchi.  Cardiac: RRR S1 & S2 w/o rubs/murmurs/gallops.   Abdominal: Bowel sounds present x 4. No appreciable hepatosplenomegaly.  Skin: No jaundice, rashes, or visible lesions.  Neuro:  --GCS: E4 V5 M6  --Mental Status:  awake, oriented X4, follows commends,   --CN II-XII grossly intact.   --Pupils 3mm, PERRL.   --Corneal reflex, gag, cough intact.  --MOTTA spont    Medications:  Continuous Scheduledacetaminophen, 1,000 mg, Q8H  cefTRIAXone (ROCEPHIN) IVPB, 2 g, Q12H  EScitalopram oxalate, 10 mg, Daily  eslicarbazepine, 400 mg, QHS  [START ON 3/2/2023] famotidine, 20 mg, BID  heparin (porcine), 5,000 Units, Q8H  mupirocin, , BID  polyethylene glycol, 17 g, Daily  senna-docusate 8.6-50 mg, 1 tablet, Daily    PRNcalcium  carbonate, 1,000 mg, BID PRN  glycerin adult, 1 suppository, Daily PRN  hydrALAZINE, 10 mg, Q4H PRN  HYDROmorphone, 0.5 mg, Q6H PRN  methocarbamoL, 500 mg, QID PRN  ondansetron, 8 mg, Q8H PRN  oxyCODONE, 5 mg, Q6H PRN      Today I personally reviewed pertinent medications, lines/drains/airways, imaging, cardiology results, laboratory results, microbiology results,     Diet  Diet Adult Regular (IDDSI Level 7)      Assessment/Plan:     Neuro  * Complex partial seizures evolving to generalized tonic-clonic seizures  Declan Burleson is a 58 y.o.M  admitted to [facility] s/p SEEG placement. He has failed multiple AEDs, including topiramate, lamotrigine, levetiracetam, and oxcarbazepine. He is currently taking eslicarbazepine, zonisamide, and clobazam.     -- s/p sEEG  -- NSGY and epilepsy following  -- AED's per epilepsy reccs  -- Neuro checks q 2hr  -- SBP <160  -- seizure precautions  -- PT/OT/SLP\  3/1/2023 d/c Zonisamide per Epilepsy    Psychiatric  Depression with anxiety  Continue home med Escitalopram          The patient is being Prophylaxed for:  Venous Thromboembolism with: Chemical  Stress Ulcer with: H2B  Ventilator Pneumonia with: not applicable    Activity Orders          Diet Adult Regular (IDDSI Level 7): Regular starting at 02/27 1103        Full Code    Magda Weiss NP  Neurocritical Care  [facility]

## 2023-03-01 NOTE — SUBJECTIVE & OBJECTIVE
Interval History: No seizures noted overnight, EEG normal. Hold Zonisamide beginning this evening.     Current Facility-Administered Medications   Medication Dose Route Frequency Provider Last Rate Last Admin    acetaminophen tablet 1,000 mg  1,000 mg Oral Q8H Magda Weiss, BUZZ        cefTRIAXone (ROCEPHIN) 2 g in dextrose 5 % in water (D5W) 5 % 50 mL IVPB (MB+)  2 g Intravenous Q12H Isaura Burgos PA-C   Stopped at 03/01/23 0908    EScitalopram oxalate tablet 10 mg  10 mg Oral Daily Magdabritton Weiss, NP   10 mg at 03/01/23 0831    eslicarbazepine tablet Tab 400 mg  400 mg Oral QHS Magda Weiss, NP   400 mg at 02/28/23 2119    glycerin adult suppository 1 suppository  1 suppository Rectal Daily PRN Isaura Burgos PA-C        heparin (porcine) injection 5,000 Units  5,000 Units Subcutaneous Q8H ROMIE Maya-C   5,000 Units at 03/01/23 0614    hydrALAZINE injection 10 mg  10 mg Intravenous Q4H PRN Magda Weiss, NP        HYDROmorphone injection 0.5 mg  0.5 mg Intravenous Q6H PRN Isaura Burgos PA-C        methocarbamoL tablet 500 mg  500 mg Oral QID PRN Magda Weiss, BUZZ        mupirocin 2 % ointment   Nasal BID Isaura Burgos PA-C   Given at 03/01/23 0833    ondansetron disintegrating tablet 8 mg  8 mg Oral Q8H PRN Isaura Burgos PA-C        oxyCODONE immediate release tablet 5 mg  5 mg Oral Q6H PRN Magda Weiss, NP        polyethylene glycol packet 17 g  17 g Oral Daily ROMIE Maya-C   17 g at 03/01/23 0832    senna-docusate 8.6-50 mg per tablet 1 tablet  1 tablet Oral Daily ROMIE Maya-C   1 tablet at 03/01/23 0832     Continuous Infusions:    Review of Systems   Constitutional:  Positive for fatigue.   HENT:          + left jaw pain   Gastrointestinal:  Negative for nausea and vomiting.   Neurological:  Positive for headaches. Negative for seizures (none overnight).   All other systems reviewed and are negative.  Objective:     Vital Signs (Most  Recent):  Temp: 97.9 °F (36.6 °C) (03/01/23 1105)  Pulse: 63 (03/01/23 1105)  Resp: 11 (03/01/23 1105)  BP: (!) 145/91 (03/01/23 1105)  SpO2: 97 % (03/01/23 1105)   Vital Signs (24h Range):  Temp:  [97.9 °F (36.6 °C)-98.9 °F (37.2 °C)] 97.9 °F (36.6 °C)  Pulse:  [63-73] 63  Resp:  [11-28] 11  SpO2:  [92 %-98 %] 97 %  BP: (131-164)/(80-94) 145/91  Arterial Line BP: (117-155)/() 138/129     Weight: 87.5 kg (193 lb)  Body mass index is 28.5 kg/m².    Physical Exam  Vitals and nursing note reviewed.   Constitutional:       General: He is not in acute distress.     Appearance: He is not diaphoretic.   HENT:      Head: Normocephalic.      Comments: sEEG leads + headwrap in place  Eyes:      General: No scleral icterus.     Extraocular Movements: Extraocular movements intact.      Pupils: Pupils are equal, round, and reactive to light.   Cardiovascular:      Rate and Rhythm: Normal rate.   Pulmonary:      Effort: Pulmonary effort is normal. No respiratory distress.   Abdominal:      General: There is no distension.      Palpations: Abdomen is soft.   Musculoskeletal:         General: No deformity or signs of injury.      Cervical back: Normal range of motion and neck supple.   Skin:     General: Skin is warm and dry.   Neurological:      Mental Status: He is alert and oriented to person, place, and time.      Coordination: Finger-Nose-Finger Test normal.   Psychiatric:         Mood and Affect: Mood normal.         Speech: Speech normal.         Behavior: Behavior normal.       NEUROLOGICAL EXAMINATION:     MENTAL STATUS   Oriented to person, place, and time.   Speech: speech is normal   Level of consciousness: alert       Speech slightly slowed, at baseline     CRANIAL NERVES     CN III, IV, VI   Pupils are equal, round, and reactive to light.    CN VII   Facial expression full, symmetric.     CN VIII   Hearing: intact    CN XI   CN XI normal.     CN XII   CN XII normal.     MOTOR EXAM   Muscle bulk: normal  Overall  muscle tone: normal       Follows commands, moves all extremities spontaneously and symmetrically     GAIT AND COORDINATION      Coordination   Finger to nose coordination: normal       No pronator drift noted     Significant Labs: All pertinent lab results from the past 24 hours have been reviewed.    Significant Studies: I have reviewed all pertinent imaging results/findings within the past 24 hours.

## 2023-03-01 NOTE — PROGRESS NOTES
[facility]  Neurology-Epilepsy  Progress Note    Patient Name: Declan Burleson  MRN: [id number]  Admission Date: 2/27/2023  Hospital Length of Stay: 2 days  Code Status: Full Code   Attending Provider: Fred Wayne MD  Primary Care Physician: Juan Carlos Simmons NP   Principal Problem:Complex partial seizures evolving to generalized tonic-clonic seizures    Subjective:     Hospital Course:   2/27>2/28: Clobazam decreased to 20 mg qHS, Eslicarbazepine decreased to 400 mg qHS, and Zonisamide decreased to 200 mg qHS on admission. No seizures noted overnight. Beginning 2/28 pm, hold Clobazam, decrease Zonisamide further to 100 mg qHS. Continue Eslicarbazepine 400 mg nightly.   2/28>3/1: No seizures overnight, EEG normal. Hold Zonisamide beginning 3/1 pm.       Interval History: No seizures noted overnight, EEG normal. Hold Zonisamide beginning this evening.     Current Facility-Administered Medications   Medication Dose Route Frequency Provider Last Rate Last Admin    acetaminophen tablet 1,000 mg  1,000 mg Oral Q8H Magda Isela, NP        cefTRIAXone (ROCEPHIN) 2 g in dextrose 5 % in water (D5W) 5 % 50 mL IVPB (MB+)  2 g Intravenous Q12H Isaura Burgos PA-C   Stopped at 03/01/23 0908    EScitalopram oxalate tablet 10 mg  10 mg Oral Daily Magda Isela, NP   10 mg at 03/01/23 0831    eslicarbazepine tablet Tab 400 mg  400 mg Oral QHS Magdabritton Weiss, NP   400 mg at 02/28/23 2119    glycerin adult suppository 1 suppository  1 suppository Rectal Daily PRN Isaura Burgos PA-C        heparin (porcine) injection 5,000 Units  5,000 Units Subcutaneous Q8H Isaura Burgos PA-C   5,000 Units at 03/01/23 0614    hydrALAZINE injection 10 mg  10 mg Intravenous Q4H PRN Magda Weiss, NP        HYDROmorphone injection 0.5 mg  0.5 mg Intravenous Q6H PRN Isaura Burgos PA-C        methocarbamoL tablet 500 mg  500 mg Oral QID PRN Magda Miinea, NP        mupirocin 2 % ointment   Nasal  BID Isaura Burgos PA-C   Given at 03/01/23 0833    ondansetron disintegrating tablet 8 mg  8 mg Oral Q8H PRN Isaura THURMANBlessing Burgos PA-C        oxyCODONE immediate release tablet 5 mg  5 mg Oral Q6H PRN Magda Weiss NP        polyethylene glycol packet 17 g  17 g Oral Daily Isaura THURMANBlessing Burgos PA-C   17 g at 03/01/23 0832    senna-docusate 8.6-50 mg per tablet 1 tablet  1 tablet Oral Daily Isauramynor Burgos PA-C   1 tablet at 03/01/23 0832     Continuous Infusions:    Review of Systems   Constitutional:  Positive for fatigue.   HENT:          + left jaw pain   Gastrointestinal:  Negative for nausea and vomiting.   Neurological:  Positive for headaches. Negative for seizures (none overnight).   All other systems reviewed and are negative.  Objective:     Vital Signs (Most Recent):  Temp: 97.9 °F (36.6 °C) (03/01/23 1105)  Pulse: 63 (03/01/23 1105)  Resp: 11 (03/01/23 1105)  BP: (!) 145/91 (03/01/23 1105)  SpO2: 97 % (03/01/23 1105)   Vital Signs (24h Range):  Temp:  [97.9 °F (36.6 °C)-98.9 °F (37.2 °C)] 97.9 °F (36.6 °C)  Pulse:  [63-73] 63  Resp:  [11-28] 11  SpO2:  [92 %-98 %] 97 %  BP: (131-164)/(80-94) 145/91  Arterial Line BP: (117-155)/() 138/129     Weight: 87.5 kg (193 lb)  Body mass index is 28.5 kg/m².    Physical Exam  Vitals and nursing note reviewed.   Constitutional:       General: He is not in acute distress.     Appearance: He is not diaphoretic.   HENT:      Head: Normocephalic.      Comments: sEEG leads + headwrap in place  Eyes:      General: No scleral icterus.     Extraocular Movements: Extraocular movements intact.      Pupils: Pupils are equal, round, and reactive to light.   Cardiovascular:      Rate and Rhythm: Normal rate.   Pulmonary:      Effort: Pulmonary effort is normal. No respiratory distress.   Abdominal:      General: There is no distension.      Palpations: Abdomen is soft.   Musculoskeletal:         General: No deformity or signs of injury.      Cervical  back: Normal range of motion and neck supple.   Skin:     General: Skin is warm and dry.   Neurological:      Mental Status: He is alert and oriented to person, place, and time.      Coordination: Finger-Nose-Finger Test normal.   Psychiatric:         Mood and Affect: Mood normal.         Speech: Speech normal.         Behavior: Behavior normal.       NEUROLOGICAL EXAMINATION:     MENTAL STATUS   Oriented to person, place, and time.   Speech: speech is normal   Level of consciousness: alert       Speech slightly slowed, at baseline     CRANIAL NERVES     CN III, IV, VI   Pupils are equal, round, and reactive to light.    CN VII   Facial expression full, symmetric.     CN VIII   Hearing: intact    CN XI   CN XI normal.     CN XII   CN XII normal.     MOTOR EXAM   Muscle bulk: normal  Overall muscle tone: normal       Follows commands, moves all extremities spontaneously and symmetrically     GAIT AND COORDINATION      Coordination   Finger to nose coordination: normal       No pronator drift noted     Significant Labs: All pertinent lab results from the past 24 hours have been reviewed.    Significant Studies: I have reviewed all pertinent imaging results/findings within the past 24 hours.    Assessment and Plan:     * Complex partial seizures evolving to generalized tonic-clonic seizures  Mr. Burleson is a 58 year old man with intractable epilepsy s/p prior sEEG monitoring in January 2021, left laser amygdalohippocampectomy in March 2021 admitted to [facility] s/p placement of 8 left-sided sEEG electrodes for repeat intracranial monitoring and further localization of his epilepsy in consideration of additional surgical management options of his epilepsy. Currently maintained on Clobazam 40 mg qHS, Eslicarbazepine 1200 mg qHS, and Zonisamide 500 mg qHS with continued events of staring/loss of awareness and generalized convulsions.    - s/p placement of 8 left-sided sEEG electrodes by [facility] 2/27  - No seizures overnight, EEG  normal  - Continue Eslicarbazepine 400 mg qHS  - 3/1 begin holding Zonisamide  - Holding Clobazam since 2/28  - Continue mittens and strict 1:1 supervision as he previously self-removed sEEG leads during monitoring in 2021, requiring early termination of monitoring  - Post-operative imaging timing, pain control per NCC, NSGY  - Seizure precautions  - Please call epilepsy to notify of any seizures  -  If more than 3 seizures in 2 hours or any seizure lasting greater than 3 minutes, please give 2 mg IV lorazepam and contact on-call epilepsy    Plan of care discussed with NCC team, patient and wife at bedside. Will continue to follow, please call with any questions.     Depression with anxiety  - Continue home Escitalopram 10 mg daily    Migraine without status migrainosus, not intractable  - Followed by Dr. Saldana as outpatient  - Supportive care during admission        VTE Risk Mitigation (From admission, onward)         Ordered     heparin (porcine) injection 5,000 Units  Every 8 hours         02/27/23 1236     Place sequential compression device  Until discontinued         02/27/23 9351                Teresa Cerrato PA-C  Neurology-Epilepsy  [facility]  Staff: Dr. Feliciano

## 2023-03-01 NOTE — SUBJECTIVE & OBJECTIVE
Review of Systems  Constitutional: Denies fevers, weight loss, chills, or weakness.  Eyes: Denies changes in vision.  ENT: Denies dysphagia, nasal discharge, ear pain or discharge.  Cardiovascular: Denies chest pain, palpitations, orthopnea, or claudication.  Respiratory: Denies shortness of breath, cough, hemoptysis, or wheezing.  GI: Denies nausea/vomitting, hematochezia, melena, abd pain, or changes in appetite.  : Denies dysuria, incontinence, or hematuria.  Musculoskeletal: Denies joint pain or myalgias.  Skin/breast: Denies rashes, lumps, lesions, or discharge.  Neurologic: Denies headache, dizziness, vertigo, or paresthesias.  Psychiatric: Denies changes in mood or hallucinations.  Endocrine: Denies polyuria, polydipsia, heat/cold intolerance.  Hematologic/Lymph: Denies lymphadenopathy, easy bruising or easy bleeding.  Allergic/Immunologic: Denies rash, rhinitis.    Objective:     Vitals:  Temp: 98.2 °F (36.8 °C)  Pulse: 65  Rhythm: normal sinus rhythm  BP: (!) 147/81  MAP (mmHg): 107  Resp: 12  SpO2: 98 %  Oxygen Concentration (%): 28    Temp  Min: 97.9 °F (36.6 °C)  Max: 98.9 °F (37.2 °C)  Pulse  Min: 63  Max: 72  BP  Min: 131/81  Max: 164/94  MAP (mmHg)  Min: 101  Max: 122  Resp  Min: 11  Max: 28  SpO2  Min: 92 %  Max: 98 %  Oxygen Concentration (%)  Min: 28  Max: 28    02/28 0701 - 03/01 0700  In: 99.4   Out: 1100 [Urine:1100]   Unmeasured Output  Urine Occurrence: 1  Stool Occurrence: 0       Physical Exam  GA: Alert, comfortable, no acute distress.   HEENT: No scleral icterus or JVD.   Pulmonary: Clear to auscultation A/P/L. No wheezing, crackles, or rhonchi.  Cardiac: RRR S1 & S2 w/o rubs/murmurs/gallops.   Abdominal: Bowel sounds present x 4. No appreciable hepatosplenomegaly.  Skin: No jaundice, rashes, or visible lesions.  Neuro:  --GCS: E4 V5 M6  --Mental Status:  awake, oriented X4, follows commends,   --CN II-XII grossly intact.   --Pupils 3mm, PERRL.   --Corneal reflex, gag, cough  intact.  --MOTTA spont    Medications:  Continuous Scheduledacetaminophen, 1,000 mg, Q8H  cefTRIAXone (ROCEPHIN) IVPB, 2 g, Q12H  EScitalopram oxalate, 10 mg, Daily  eslicarbazepine, 400 mg, QHS  [START ON 3/2/2023] famotidine, 20 mg, BID  heparin (porcine), 5,000 Units, Q8H  mupirocin, , BID  polyethylene glycol, 17 g, Daily  senna-docusate 8.6-50 mg, 1 tablet, Daily    PRNcalcium carbonate, 1,000 mg, BID PRN  glycerin adult, 1 suppository, Daily PRN  hydrALAZINE, 10 mg, Q4H PRN  HYDROmorphone, 0.5 mg, Q6H PRN  methocarbamoL, 500 mg, QID PRN  ondansetron, 8 mg, Q8H PRN  oxyCODONE, 5 mg, Q6H PRN      Today I personally reviewed pertinent medications, lines/drains/airways, imaging, cardiology results, laboratory results, microbiology results,     Diet  Diet Adult Regular (IDDSI Level 7)

## 2023-03-01 NOTE — SUBJECTIVE & OBJECTIVE
Interval History: 3/1: complaining of chest pain this morning, trops negative, likely GI discomfort. No seizures, no BM. Expected left jaw pain. Leiva removed, voiding spontaneously    Medications:  Continuous Infusions:  Scheduled Meds:   acetaminophen  1,000 mg Oral Q8H    cefTRIAXone (ROCEPHIN) IVPB  2 g Intravenous Q12H    EScitalopram oxalate  10 mg Oral Daily    eslicarbazepine  400 mg Oral QHS    heparin (porcine)  5,000 Units Subcutaneous Q8H    mupirocin   Nasal BID    polyethylene glycol  17 g Oral Daily    senna-docusate 8.6-50 mg  1 tablet Oral Daily     PRN Meds:calcium carbonate, glycerin adult, hydrALAZINE, HYDROmorphone, methocarbamoL, ondansetron, oxyCODONE     Review of Systems   Neurological:  Positive for seizures.   All other systems reviewed and are negative.  Objective:     Weight: 87.5 kg (193 lb)  Body mass index is 28.5 kg/m².  Vital Signs (Most Recent):  Temp: 97.9 °F (36.6 °C) (03/01/23 1105)  Pulse: 68 (03/01/23 1405)  Resp: 12 (03/01/23 1405)  BP: (!) 144/84 (03/01/23 1405)  SpO2: 97 % (03/01/23 1405)   Vital Signs (24h Range):  Temp:  [97.9 °F (36.6 °C)-98.9 °F (37.2 °C)] 97.9 °F (36.6 °C)  Pulse:  [63-72] 68  Resp:  [11-28] 12  SpO2:  [92 %-98 %] 97 %  BP: (131-164)/(80-94) 144/84  Arterial Line BP: (117-155)/() 118/97     Date 03/01/23 0700 - 03/02/23 0659   Shift 9232-8654 9486-0254 [id number] 24 Hour Total   INTAKE   P.O. 200   200   IV Piggyback 49.9   49.9   Shift Total(mL/kg) 249.9(2.9)   249.9(2.9)   OUTPUT   Urine(mL/kg/hr) 600(0.9)   600   Shift Total(mL/kg) 600(6.9)   600(6.9)   Weight (kg) 87.5 87.5 87.5 87.5                Oxygen Concentration (%):  [28] 28         Physical Exam  Constitutional:       Appearance: He is well-developed and well-nourished.   Eyes:      Extraocular Movements: EOM normal.      Conjunctiva/sclera: Conjunctivae normal.      Pupils: Pupils are equal, round, and reactive to light.   Cardiovascular:      Pulses: Normal pulses.   Abdominal:       Palpations: Abdomen is soft.   Neurological:      Mental Status: He is alert and oriented to person, place, and time.      Comments: AAOx3, higher order confusion  PERRL  CN intact  BUE 5/5  BLE 5/5  SILT    Headwrap in place with sEEG leads   Psychiatric:         Mood and Affect: Mood and affect normal.       Physical Exam:    Constitutional: He appears well-developed and well-nourished.     Eyes: Pupils are equal, round, and reactive to light. Conjunctivae and EOM are normal.     Cardiovascular: Normal pulses.     Abdominal: Soft.     Psych/Behavior: He is alert. He is oriented to person, place, and time. He has a normal mood and affect.     Neurological:   AAOx3, higher order confusion  PERRL  CN intact  BUE 5/5  BLE 5/5  SILT    Headwrap in place with sEEG leads     Significant Labs:  Recent Labs   Lab 02/28/23  0018 03/01/23  0023   * 93   * 134*   K 3.6 3.9    102   CO2 23 25   BUN 8 11   CREATININE 0.8 0.8   CALCIUM 8.5* 8.8   MG 1.8 1.9       Recent Labs   Lab 02/28/23  0018 03/01/23  0023   WBC 8.45 7.26   HGB 13.3* 13.5*   HCT 39.5* 40.1    327       No results for input(s): LABPT, INR, APTT in the last 48 hours.    Microbiology Results (last 7 days)       ** No results found for the last 168 hours. **          All pertinent labs from the last 24 hours have been reviewed.    Significant Diagnostics:  I have reviewed all pertinent imaging results/findings within the past 24 hours.

## 2023-03-01 NOTE — PROCEDURES
SEEG Report      IMPLANTATION DATE:  2/27/2023  DATE OF ADMISSION: 2/27/2023       ADMITTING/REQUESTING PROVIDER: Ruchi Chen MD     REASON FOR CONSULT:  58-year-old man admitted for phase 2 epilepsy surgical workup with the placement of intracranial sEEG leads for seizure capture and onset localization.     METHODOLOGY  Depth electrodes consist of thin wires with electrical contacts at fixed distances along the wire. These are implanted using a stereotactic procedure targeting cortical and subcortical structures. Digital video recording of the patient is simultaneously recorded with the EEG. The patient is instructed to report clinical symptoms which may occur during the recording session. EEG and video recording are stored and archived in digital format. Activation procedures which include photic stimulation, hyperventilation and instructing patients to perform simple tasks are done in selected patients. Compresses spectral analysis (CSA) is performed on the activity recorded from each individual channel. This is displayed as a power display of frequencies from 0 to 30 Hz over time. The CSA analysis is done and displayed continuously. This is reviewed for asymmetries in power between homologous areas of the scalp and for presence of changes in power which can be seen when seizures occur. Sections of suspected abnormalities on the CSA is then compared with the original EEG recording.      The following is a chart outlying the details related to the depth electrodes implanted:       Electrode    Medial target  Lateral target  Cable Color Serial Number # Of Contacts Head Box  Location   1 L Sup margin Piriformis Inf Temp Gyrus Blk/Gr 98723 16 1-16   2 L Entorihinal Entorhinal Inf Temp Gyrus Yel/Scott 58678 12 17-28   3 L Prefrontal Ant Cing Mid Frontal Gyrus Scott/Blk 29599 16 29-44   4 L Ant Insular Ant Insula Frontal Operculum Yel/Gre 18449 10 45-54   5 L Mid Insular Mid Insula Pars Triangularis Yel/Scott 66015 10  55-64   6 L Orbitofrontal Med OrbFr Lat OrbFr Blk/Yel 24194 16 65-80   7 L Mid Cing Mid Cingulate Sup Frontal Gyrus Red/Yel 93426 14 81-94   8 L Hippo Tail Post Hippo Mid Temp Gyrus Red/Gre 90681 14     EKG           109-110        RECORDING TIMES  Start on February 28, 2023   End on March 2023, 2023       The total time of intracranial EEG recording for the study was 23:59:14     EEG FINDINGS  Button Push:  There were no push button events during this study     Interictal:  None     Ictal:   None     Impression:    Normal one day video EEG

## 2023-03-01 NOTE — PROGRESS NOTES
"[facility]  Neurosurgery  Progress Note    Subjective:     History of Present Illness: 6Mitchrajinder Burleson is a 58 y.o. right-handed male referred by Dr. Benoit (originally by Dr. Saldana of [facility]) for consideration of surgical therapy for intractable epilepsy. The patient was formerly employed as a . He is accompanied by his wife Trista today, who helps to provide the history. His seizures began when he was 48 years old. He can recall no inciting event. He has 3 semiologies: "full blown" generalized events, staring episodes, and "mild" seizures, which are characterized as generalized events of shorter duration without incontinence and a shorter post-ictal period. He and his wife estimate that he persists in having about 2 events/month.      He has failed multiple AEDs, including topiramate, lamotrigine, levetiracetam, and oxcarbazepine. He is currently taking eslicarbazepine, zonisamide, and clobazam. He had EMU monitoring in May-June of this year, which suggests left-sided activity. He had 3T MRI in June (personally reviewed) that was non-lesional; he also had PET at that time suggesting possible left-sided activity. He had neuropsychological testing on 8/10/20; summary findings included below. He lives with his wife and 16-year-old daughter, who provide excellent social support.      He takes ASA 81, which will need to be held for one week prior to surgery.      As of 12/22/20, the patient reports he has had no significant change. He is hoping to be seen today for pre-operative optimization. He is out of Aptiom, which his wife is working to re-obtain for him.       As of 2/9/21, the patient underwent bilateral sEEG monitoring on 1/11/21. He self-removed several of the leads on 1/21/21 and was taken to the OR for removal of the remaining electrodes. Fortunately, there was no significant intracranial hemorrhage associated. Since being discharged home, the patient reports " that he is overall doing well. He has had no fever, chills, or drainage from the incisions. He has had some headache. He also feels that he has been sleeping more than before the procedure. He persists in having bad memory.      As of 4/13/21, the patient underwent left laser amygdalohippocampectomy on 3/1/21. Overall, the has done well since then. He and his wife report that he has been seizure free. He persists in having some days that are better than others; he is more irritable and tired than before surgery at times. This is inconsistent, and some days he is at his preoperative baseline. He has been compliant with his AEDs; he is taking 1200 mg of aptiom daily.     They deny any fever, chills, or drainage from the incision, though there was a scab that was removed when he got a haircut recently. He is also experiencing headache that radiates from the site of his incision forward. This improves with Maxalt.      As of 7/6/21, the patient reports that he is having headache from the base of the neck custodial up the head. It feels like a small drum that comes and goes. He had to turn off the lights, lay down, and take over-the-counter medication (Tylenol, at times) last week. He also became more pale with these episodes. It is made worse by lying on the left side in bed.  He is now at his normal baseline. He also reports chest pain; he remains hyponatremic. He is currently out of the anxiety and blood pressure medications for at least 4 days.      He has been seizure free since the procedure. Trista reports that the incision is well healed. No fever/chills.      He is more irritable than before surgery and has some memory issues.      As of 7/22/21, the patient returns in follow up. He still has headache and mood swings at times. He has not had any seizures. He and his wife both feel that his speech and conversation and improving. He remains highly distractable. He and his wife endorse that his grandfather had  "migraines.      He will need to re-establish with a new epileptologist since Dr. Benoit is moving to [place].      As of 9/30/21, the patient reports he has remained seizure-free. He continues to have significant headaches, particularly over the left posterior aspect of the head, radiating over to the right side. His wife endorses that he has bradykinesia and decreased arm swing when walking. Jean Edgar and Karen are concerned he may have Parkinson disease.      As of 12/15/22, the patient returns with his wife, Trista. They are frustrated that he is "like a zombie" because of the high doses of AEDs. He has been seizure-free since April, but when he decreased the meds back in April, he had 3 events within 24 hours.      Mood swings are also an issue; he gets irritated more readily than he did in the past. He is seeing a speech therapist in [facility]; he is making progress WRT conversational communication and memory.      They now have 2 grandchildren.      Goals of surgery: to be free of seizures and reduce medications.      They would like to undergo repeat sEEG since the first was cut short to see if he may be a candidate for further epilepsy surgery.      As of 2/23/23, the patient returns for further discussion of repeat sEEG. He has undergone extensive discussion in interdisciplinary conference, together with review by Dr. Andrew of memory neurology, and he is being recommended for repeat sEEG. He has no evidence of non-epilepsy, non-TBI related neurodegenerative disease on Dr. Andrew's initial evaluation, per discussion with Dr. ALEK Edgar.      He and his wife remain eager to proceed with sEEG placement to determine whether he will be a candidate for further intracranial seizure surgery.     Post-Op Info:  Procedure(s) (LRB):  PLACEMENT OF STEREO EEG LEADS (DEPTH ELECTRODES) (Left)   2 Days Post-Op     Interval History: 3/1: complaining of chest pain this morning, trops negative, likely GI discomfort. No " seizures, no BM. Expected left jaw pain. Leiva removed, voiding spontaneously    Medications:  Continuous Infusions:  Scheduled Meds:   acetaminophen  1,000 mg Oral Q8H    cefTRIAXone (ROCEPHIN) IVPB  2 g Intravenous Q12H    EScitalopram oxalate  10 mg Oral Daily    eslicarbazepine  400 mg Oral QHS    heparin (porcine)  5,000 Units Subcutaneous Q8H    mupirocin   Nasal BID    polyethylene glycol  17 g Oral Daily    senna-docusate 8.6-50 mg  1 tablet Oral Daily     PRN Meds:calcium carbonate, glycerin adult, hydrALAZINE, HYDROmorphone, methocarbamoL, ondansetron, oxyCODONE     Review of Systems   Neurological:  Positive for seizures.   All other systems reviewed and are negative.  Objective:     Weight: 87.5 kg (193 lb)  Body mass index is 28.5 kg/m².  Vital Signs (Most Recent):  Temp: 97.9 °F (36.6 °C) (03/01/23 1105)  Pulse: 68 (03/01/23 1405)  Resp: 12 (03/01/23 1405)  BP: (!) 144/84 (03/01/23 1405)  SpO2: 97 % (03/01/23 1405)   Vital Signs (24h Range):  Temp:  [97.9 °F (36.6 °C)-98.9 °F (37.2 °C)] 97.9 °F (36.6 °C)  Pulse:  [63-72] 68  Resp:  [11-28] 12  SpO2:  [92 %-98 %] 97 %  BP: (131-164)/(80-94) 144/84  Arterial Line BP: (117-155)/() 118/97     Date 03/01/23 0700 - 03/02/23 0659   Shift 4266-6281 0649-4058 9042-1571 24 Hour Total   INTAKE   P.O. 200   200   IV Piggyback 49.9   49.9   Shift Total(mL/kg) 249.9(2.9)   249.9(2.9)   OUTPUT   Urine(mL/kg/hr) 600(0.9)   600   Shift Total(mL/kg) 600(6.9)   600(6.9)   Weight (kg) 87.5 87.5 87.5 87.5                Oxygen Concentration (%):  [28] 28         Physical Exam  Constitutional:       Appearance: He is well-developed and well-nourished.   Eyes:      Extraocular Movements: EOM normal.      Conjunctiva/sclera: Conjunctivae normal.      Pupils: Pupils are equal, round, and reactive to light.   Cardiovascular:      Pulses: Normal pulses.   Abdominal:      Palpations: Abdomen is soft.   Neurological:      Mental Status: He is alert and oriented to person,  place, and time.      Comments: AAOx3, higher order confusion  PERRL  CN intact  BUE 5/5  BLE 5/5  SILT    Headwrap in place with sEEG leads   Psychiatric:         Mood and Affect: Mood and affect normal.       Physical Exam:    Constitutional: He appears well-developed and well-nourished.     Eyes: Pupils are equal, round, and reactive to light. Conjunctivae and EOM are normal.     Cardiovascular: Normal pulses.     Abdominal: Soft.     Psych/Behavior: He is alert. He is oriented to person, place, and time. He has a normal mood and affect.     Neurological:   AAOx3, higher order confusion  PERRL  CN intact  BUE 5/5  BLE 5/5  SILT    Headwrap in place with sEEG leads     Significant Labs:  Recent Labs   Lab 02/28/23  0018 03/01/23  0023   * 93   * 134*   K 3.6 3.9    102   CO2 23 25   BUN 8 11   CREATININE 0.8 0.8   CALCIUM 8.5* 8.8   MG 1.8 1.9       Recent Labs   Lab 02/28/23 0018 03/01/23  0023   WBC 8.45 7.26   HGB 13.3* 13.5*   HCT 39.5* 40.1    327       No results for input(s): LABPT, INR, APTT in the last 48 hours.    Microbiology Results (last 7 days)       ** No results found for the last 168 hours. **          All pertinent labs from the last 24 hours have been reviewed.    Significant Diagnostics:  I have reviewed all pertinent imaging results/findings within the past 24 hours.    Assessment/Plan:     * Complex partial seizures evolving to generalized tonic-clonic seizures  57 yo M with intractable epilepsy who is now s/p sEEG lead placement for seizure monitoring/mapping    - admitted to [facility]  - q1 neurochecks  - CT stealth post op reviewed, sEEG leads in appropriate position, no large volume hemorrhage  - AEDs and seizure management per epilepsy  - hyponatremia, ctm  - bowel regiment, ctm for BMs  - nevarez removed, f/u spontaneous voiding  - Chest pain, trops negative, likely GI related pain    - PPI, Bowel regimen  - bed rest  - PT/OT, daily stationary bike  - [facility]    Dispo:  ongoing, timing TBD for sEEG lead removal        Obinna Avendano MD  Neurosurgery  [facility]

## 2023-03-01 NOTE — PROGRESS NOTES
Epilepsy  met with patient briefly in their room in the ICU.   Patient currently resting and asleep in bed. Patient accompanied by wife resting on the couch.     SW briefly spoke to patient's wife.  She expresses some concern related to patient's swelling on the left side of his face.    Medical staff have told her they will continue to monitor.     No other questions or concerns at this time and SW will visit when he can.    Patient's wife will also reach out to MESSI via portal as she has before.       Benny Cid [facility]    Clinical  -ALS, Epilepsy, Headache  [facility].  [email or web address]  Phone# [phone]  Fax # [phone]

## 2023-03-01 NOTE — ASSESSMENT & PLAN NOTE
59 yo M with intractable epilepsy who is now s/p sEEG lead placement for seizure monitoring/mapping    - admitted to [facility]  - q1 neurochecks  - CT stealth post op reviewed, sEEG leads in appropriate position, no large volume hemorrhage  - AEDs and seizure management per epilepsy  - hyponatremia, ctm  - bowel regiment, ctm for BMs  - nevarez removed, f/u spontaneous voiding  - Chest pain, trops negative, likely GI related pain    - PPI, Bowel regimen  - bed rest  - PT/OT, daily stationary bike  - [facility]    Dispo: ongoing, timing TBD for sEEG lead removal

## 2023-03-01 NOTE — PLAN OF CARE
"[facility] Care Plan    POC reviewed with Declan Burleson and family at 1400. Pt verbalized understanding. Questions and concerns addressed. No acute events today. Pt progressing toward goals. Will continue to monitor. See below and flowsheets for full assessment and VS info.     - no clinical seizures noted during shift  - EKG and troponin for chest pain. Pt stated the chest pain was "nothing new" and he came into the hospital with it  - changed pain medication regimen        Is this a stroke patient? no    Neuro:  [place] Coma Scale  Best Eye Response: 4-->(E4) spontaneous  Best Motor Response: 6-->(M6) obeys commands  Best Verbal Response: 5-->(V5) oriented  [place] Coma Scale Score: 15  Assessment Qualifiers: patient not sedated/intubated  Pupil PERRLA: yes     24 hr Temp:  [97.9 °F (36.6 °C)-98.9 °F (37.2 °C)]     CV:   Rhythm: normal sinus rhythm  BP goals:   SBP < 160  MAP > 65    Resp:      Oxygen Concentration (%): 28    Plan: N/A    GI/:     Diet/Nutrition Received: regular  Last Bowel Movement: 02/26/23  Voiding Characteristics: voids spontaneously without difficulty    Intake/Output Summary (Last 24 hours) at 3/1/2023 1630  Last data filed at 3/1/2023 1333  Gross per 24 hour   Intake 299.55 ml   Output 1100 ml   Net -800.45 ml     Unmeasured Output  Urine Occurrence: 1  Stool Occurrence: 0    Labs/Accuchecks:  Recent Labs   Lab 03/01/23  0023   WBC 7.26   RBC 4.27*   HGB 13.5*   HCT 40.1         Recent Labs   Lab 03/01/23  0023   *   K 3.9   CO2 25      BUN 11   CREATININE 0.8   ALKPHOS 113   ALT 10   AST 14   BILITOT 0.2      Recent Labs   Lab 02/27/23  0555   INR 1.0   APTT 27.4      Recent Labs   Lab 03/01/23  1112   TROPONINI <0.006       Electrolytes: N/A - electrolytes WDL  Accuchecks: none    Gtts:      LDA/Wounds:  Lines/Drains/Airways       Arterial Line  Duration             Arterial Line 02/27/23 0746 Right Radial 2 days              Peripheral Intravenous Line  Duration "                  Peripheral IV - Single Lumen 02/27/23 0608 20 G Posterior;Right Hand 2 days         Peripheral IV - Single Lumen 02/27/23 0736 18 G Left Forearm 2 days                  Wounds: No  Wound care consulted: No

## 2023-03-01 NOTE — PT/OT/SLP PROGRESS
"Occupational Therapy   Treatment    Name: Declan Burleson  MRN: [id number]  Admitting Diagnosis:  Complex partial seizures evolving to generalized tonic-clonic seizures  2 Days Post-Op    Recommendations:     Discharge Recommendations:  (pending OOB assessment once SEEG removed)  Discharge Equipment Recommendations:   (pending OOB assessment once SEEG removed)  Barriers to discharge:  None    Assessment:     Declan Burleson is a 58 y.o. male with a medical diagnosis of Complex partial seizures evolving to generalized tonic-clonic seizures.  He presents with performance deficits affecting function are weakness, impaired self care skills, impaired functional mobility.     Rehab Prognosis:  Good; patient would benefit from acute skilled OT services to address these deficits and reach maximum level of function.       Plan:     Patient to be seen 3 x/week to address the above listed problems via therapeutic exercises, therapeutic activities, self-care/home management  Plan of Care Expires: 03/28/23  Plan of Care Reviewed with: patient, spouse    Subjective   Patient:  "My left side of my head is hurting.  If I chew on the left, it hurts.  I can't open my mouth wide to eat.  I tilt my head to the right to avoid the pain."     Pain/Comfort:  Pain Rating 1: 8/10  Location:  Left side of head when head positioned in midline and when chewing on left side  Pain Rating Post-Intervention 1: 0/10    Objective:     Communicated with: Nurse prior to session.  Patient found supine with peripheral IV, oxygen, blood pressure cuff, SCD, bed alarm, pulse ox (continuous), telemetry, restraints, EEG upon OT entry to room.    General Precautions: Standard, aspiration, fall, seizure    Orthopedic Precautions:N/A  Braces: N/A  Respiratory Status: Nasal cannula, flow 2 L/min     Occupational Performance:     Functional Mobility/Transfers:  NT 2* bedrest; SEEG    [facility] 6 Click ADL: 18    Treatment & Education:  Patient education " provided on role of OT.  AROM performed bilateral UE/LEs one set x 10 rep in all planes of motion. Continued education, patient/ family training recommended.  Education provided on resistive strength training exercises that will be performed in bed while SEEG intact.    Patient left supine with all lines intact, call button in reach, and bed alarm on    GOALS:   Multidisciplinary Problems       Occupational Therapy Goals          Problem: Occupational Therapy    Goal Priority Disciplines Outcome Interventions   Occupational Therapy Goal     OT, PT/OT Ongoing, Progressing    Description: Goals set 2/28 to be addressed for 14 days with expiration date, 3/15:  Patient will increase functional independence with ADLs by performing:    Patient will demonstrate rolling to the right with modified independence.  Not met   Patient will demonstrate rolling to the left with modified independence.   Not met  Patient will demonstrate supine -sit with modified independence.   Not met  Patient will demonstrate stand pivot transfers with supervision.   Not met  Patient will demonstrate grooming while standing with supervision.   Not met  Patient will demonstrate upper body dressing with supervision while seated EOB.   Not met  Patient will demonstrate lower body dressing with supervision while seated EOB.   Not met  Patient will demonstrate toileting with supervision.   Not met  Patient will demonstrate bathing while seated EOB with supervision.   Not met  Patient's family / caregiver will demonstrate independence and safety with assisting patient with self-care skills and functional mobility.     Not met  Patient's family / caregiver will demonstrate independence with providing ROM and changes in bed positioning.   Not met                           Time Tracking:     OT Date of Treatment: 03/01/23  OT Start Time: 0612  OT Stop Time: 0625  OT Total Time (min): 13 min    Billable Minutes:Therapeutic Exercise 13    OT/BENITEZ: OT           3/1/2023

## 2023-03-01 NOTE — ASSESSMENT & PLAN NOTE
Mr. Burleson is a 58 year old man with intractable epilepsy s/p prior sEEG monitoring in January 2021, left laser amygdalohippocampectomy in March 2021 admitted to [facility] s/p placement of 8 left-sided sEEG electrodes for repeat intracranial monitoring and further localization of his epilepsy in consideration of additional surgical management options of his epilepsy. Currently maintained on Clobazam 40 mg qHS, Eslicarbazepine 1200 mg qHS, and Zonisamide 500 mg qHS with continued events of staring/loss of awareness and generalized convulsions.    - s/p placement of 8 left-sided sEEG electrodes by NSGY 2/27  - No seizures overnight, EEG normal  - Continue Eslicarbazepine 400 mg qHS  - 3/1 begin holding Zonisamide  - Holding Clobazam since 2/28  - Continue mittens and strict 1:1 supervision as he previously self-removed sEEG leads during monitoring in 2021, requiring early termination of monitoring  - Post-operative imaging timing, pain control per [facility], NSGY  - Seizure precautions  - Please call epilepsy to notify of any seizures  -  If more than 3 seizures in 2 hours or any seizure lasting greater than 3 minutes, please give 2 mg IV lorazepam and contact on-call epilepsy    Plan of care discussed with NCC team, patient and wife at bedside. Will continue to follow, please call with any questions.

## 2023-03-01 NOTE — PLAN OF CARE
[facility] Care Plan    POC reviewed with Declan Burleson and family at 0300. Pt verbalized understanding. Questions and concerns addressed. No acute events overnight. Pt progressing toward goals. Will continue to monitor. See below and flowsheets for full assessment and VS info.     - sEEG continued; no seizure activity observed overnight  - PRN norco given x1      Is this a stroke patient? no    Neuro:  [place] Coma Scale  Best Eye Response: 4-->(E4) spontaneous  Best Motor Response: 6-->(M6) obeys commands  Best Verbal Response: 5-->(V5) oriented  [place] Coma Scale Score: 15  Assessment Qualifiers: patient not sedated/intubated, no eye obstruction present  Pupil PERRLA: yes     24hr Temp:  [97.8 °F (36.6 °C)-98.9 °F (37.2 °C)]     CV:   Rhythm: normal sinus rhythm  BP goals:   SBP < 160  MAP > 65    Resp:      Oxygen Concentration (%): 28    Plan: N/A    GI/:     Diet/Nutrition Received: regular  Last Bowel Movement: 02/26/23  Voiding Characteristics: voids spontaneously without difficulty    Intake/Output Summary (Last 24 hours) at 3/1/2023 0511  Last data filed at 2/28/2023 2305  Gross per 24 hour   Intake 99.37 ml   Output 1100 ml   Net -1000.63 ml     Unmeasured Output  Urine Occurrence: 1  Stool Occurrence: 0    Labs/Accuchecks:  Recent Labs   Lab 03/01/23  0023   WBC 7.26   RBC 4.27*   HGB 13.5*   HCT 40.1         Recent Labs   Lab 03/01/23  0023   *   K 3.9   CO2 25      BUN 11   CREATININE 0.8   ALKPHOS 113   ALT 10   AST 14   BILITOT 0.2      Recent Labs   Lab 02/27/23  0555   INR 1.0   APTT 27.4    No results for input(s): CPK, CPKMB, TROPONINI, MB in the last 168 hours.    Electrolytes: N/A - electrolytes WDL  Accuchecks: none    Gtts:      LDA/Wounds:  Lines/Drains/Airways       Arterial Line  Duration             Arterial Line 02/27/23 0746 Right Radial 1 day              Peripheral Intravenous Line  Duration                  Peripheral IV - Single Lumen 02/27/23 0608 20 G  Left;Posterior Hand 1 day         Peripheral IV - Single Lumen 02/27/23 0736 18 G Left Forearm 1 day                  Wounds: No  Wound care consulted: No

## 2023-03-01 NOTE — ASSESSMENT & PLAN NOTE
Declan Burleson is a 58 y.o.M  admitted to [facility] s/p SEEG placement. He has failed multiple AEDs, including topiramate, lamotrigine, levetiracetam, and oxcarbazepine. He is currently taking eslicarbazepine, zonisamide, and clobazam.     -- s/p sEEG  -- NSGY and epilepsy following  -- AED's per epilepsy reccs  -- Neuro checks q 2hr  -- SBP <160  -- seizure precautions  -- PT/OT/SLP\  3/1/2023 d/c Zonisamide per Epilepsy

## 2023-03-02 ENCOUNTER — DOCUMENTATION ONLY (OUTPATIENT)
Dept: NEUROLOGY | Facility: CLINIC | Age: 58
End: 2023-03-02
Payer: MEDICARE

## 2023-03-02 LAB
ALBUMIN SERPL BCP-MCNC: 3.4 G/DL (ref 3.5–5.2)
ALP SERPL-CCNC: 106 U/L (ref 55–135)
ALT SERPL W/O P-5'-P-CCNC: 11 U/L (ref 10–44)
ANION GAP SERPL CALC-SCNC: 8 MMOL/L (ref 8–16)
AST SERPL-CCNC: 13 U/L (ref 10–40)
BASOPHILS # BLD AUTO: 0.05 K/UL (ref 0–0.2)
BASOPHILS NFR BLD: 0.7 % (ref 0–1.9)
BILIRUB SERPL-MCNC: 0.3 MG/DL (ref 0.1–1)
BUN SERPL-MCNC: 13 MG/DL (ref 6–20)
CALCIUM SERPL-MCNC: 9 MG/DL (ref 8.7–10.5)
CHLORIDE SERPL-SCNC: 103 MMOL/L (ref 95–110)
CO2 SERPL-SCNC: 24 MMOL/L (ref 23–29)
CREAT SERPL-MCNC: 0.7 MG/DL (ref 0.5–1.4)
DIFFERENTIAL METHOD: ABNORMAL
EOSINOPHIL # BLD AUTO: 0.7 K/UL (ref 0–0.5)
EOSINOPHIL NFR BLD: 9.7 % (ref 0–8)
ERYTHROCYTE [DISTWIDTH] IN BLOOD BY AUTOMATED COUNT: 13 % (ref 11.5–14.5)
EST. GFR  (NO RACE VARIABLE): >60 ML/MIN/1.73 M^2
GLUCOSE SERPL-MCNC: 100 MG/DL (ref 70–110)
HCT VFR BLD AUTO: 42.2 % (ref 40–54)
HGB BLD-MCNC: 13.4 G/DL (ref 14–18)
IMM GRANULOCYTES # BLD AUTO: 0.03 K/UL (ref 0–0.04)
IMM GRANULOCYTES NFR BLD AUTO: 0.4 % (ref 0–0.5)
LYMPHOCYTES # BLD AUTO: 1.9 K/UL (ref 1–4.8)
LYMPHOCYTES NFR BLD: 27.4 % (ref 18–48)
MAGNESIUM SERPL-MCNC: 2 MG/DL (ref 1.6–2.6)
MCH RBC QN AUTO: 30.8 PG (ref 27–31)
MCHC RBC AUTO-ENTMCNC: 31.8 G/DL (ref 32–36)
MCV RBC AUTO: 97 FL (ref 82–98)
MONOCYTES # BLD AUTO: 0.8 K/UL (ref 0.3–1)
MONOCYTES NFR BLD: 10.9 % (ref 4–15)
NEUTROPHILS # BLD AUTO: 3.5 K/UL (ref 1.8–7.7)
NEUTROPHILS NFR BLD: 50.9 % (ref 38–73)
NRBC BLD-RTO: 0 /100 WBC
PHOSPHATE SERPL-MCNC: 4.6 MG/DL (ref 2.7–4.5)
PLATELET # BLD AUTO: 287 K/UL (ref 150–450)
PMV BLD AUTO: 9.2 FL (ref 9.2–12.9)
POTASSIUM SERPL-SCNC: 3.9 MMOL/L (ref 3.5–5.1)
PROT SERPL-MCNC: 6.6 G/DL (ref 6–8.4)
RBC # BLD AUTO: 4.35 M/UL (ref 4.6–6.2)
SODIUM SERPL-SCNC: 135 MMOL/L (ref 136–145)
WBC # BLD AUTO: 6.89 K/UL (ref 3.9–12.7)

## 2023-03-02 PROCEDURE — 63600175 PHARM REV CODE 636 W HCPCS: Performed by: PHYSICIAN ASSISTANT

## 2023-03-02 PROCEDURE — 99233 SBSQ HOSP IP/OBS HIGH 50: CPT | Mod: FS,,, | Performed by: PHYSICIAN ASSISTANT

## 2023-03-02 PROCEDURE — 20000000 HC ICU ROOM

## 2023-03-02 PROCEDURE — 95714 VEEG EA 12-26 HR UNMNTR: CPT

## 2023-03-02 PROCEDURE — 99233 SBSQ HOSP IP/OBS HIGH 50: CPT | Mod: FS,,,

## 2023-03-02 PROCEDURE — 95720 PR EEG, W/VIDEO, CONT RECORD, I&R, >12<26 HRS: ICD-10-PCS | Mod: ,,, | Performed by: PSYCHIATRY & NEUROLOGY

## 2023-03-02 PROCEDURE — 94761 N-INVAS EAR/PLS OXIMETRY MLT: CPT

## 2023-03-02 PROCEDURE — 84100 ASSAY OF PHOSPHORUS: CPT | Performed by: PHYSICIAN ASSISTANT

## 2023-03-02 PROCEDURE — 83735 ASSAY OF MAGNESIUM: CPT | Performed by: PHYSICIAN ASSISTANT

## 2023-03-02 PROCEDURE — 80053 COMPREHEN METABOLIC PANEL: CPT | Performed by: PHYSICIAN ASSISTANT

## 2023-03-02 PROCEDURE — 25000003 PHARM REV CODE 250: Performed by: PSYCHIATRY & NEUROLOGY

## 2023-03-02 PROCEDURE — 25000003 PHARM REV CODE 250: Performed by: PHYSICIAN ASSISTANT

## 2023-03-02 PROCEDURE — 99233 PR SUBSEQUENT HOSPITAL CARE,LEVL III: ICD-10-PCS | Mod: FS,,,

## 2023-03-02 PROCEDURE — 99233 PR SUBSEQUENT HOSPITAL CARE,LEVL III: ICD-10-PCS | Mod: FS,,, | Performed by: PHYSICIAN ASSISTANT

## 2023-03-02 PROCEDURE — 95720 EEG PHY/QHP EA INCR W/VEEG: CPT | Mod: ,,, | Performed by: PSYCHIATRY & NEUROLOGY

## 2023-03-02 PROCEDURE — 97110 THERAPEUTIC EXERCISES: CPT

## 2023-03-02 PROCEDURE — 85025 COMPLETE CBC W/AUTO DIFF WBC: CPT | Performed by: PHYSICIAN ASSISTANT

## 2023-03-02 PROCEDURE — 25000003 PHARM REV CODE 250: Performed by: NURSE PRACTITIONER

## 2023-03-02 RX ORDER — BISACODYL 10 MG
10 SUPPOSITORY, RECTAL RECTAL DAILY
Status: DISCONTINUED | OUTPATIENT
Start: 2023-03-03 | End: 2023-03-04

## 2023-03-02 RX ORDER — AMOXICILLIN 250 MG
2 CAPSULE ORAL DAILY
Status: DISCONTINUED | OUTPATIENT
Start: 2023-03-03 | End: 2023-03-04

## 2023-03-02 RX ADMIN — HEPARIN SODIUM 5000 UNITS: 5000 INJECTION INTRAVENOUS; SUBCUTANEOUS at 09:03

## 2023-03-02 RX ADMIN — MUPIROCIN: 20 OINTMENT TOPICAL at 08:03

## 2023-03-02 RX ADMIN — POLYETHYLENE GLYCOL 3350 17 G: 17 POWDER, FOR SOLUTION ORAL at 09:03

## 2023-03-02 RX ADMIN — CEFTRIAXONE 2 G: 2 INJECTION, POWDER, FOR SOLUTION INTRAMUSCULAR; INTRAVENOUS at 08:03

## 2023-03-02 RX ADMIN — METHOCARBAMOL 500 MG: 500 TABLET ORAL at 09:03

## 2023-03-02 RX ADMIN — ACETAMINOPHEN 1000 MG: 500 TABLET ORAL at 05:03

## 2023-03-02 RX ADMIN — HEPARIN SODIUM 5000 UNITS: 5000 INJECTION INTRAVENOUS; SUBCUTANEOUS at 05:03

## 2023-03-02 RX ADMIN — FAMOTIDINE 20 MG: 20 TABLET ORAL at 09:03

## 2023-03-02 RX ADMIN — OXYCODONE 5 MG: 5 TABLET ORAL at 07:03

## 2023-03-02 RX ADMIN — CEFTRIAXONE 2 G: 2 INJECTION, POWDER, FOR SOLUTION INTRAMUSCULAR; INTRAVENOUS at 09:03

## 2023-03-02 RX ADMIN — ACETAMINOPHEN 1000 MG: 500 TABLET ORAL at 09:03

## 2023-03-02 RX ADMIN — ESCITALOPRAM OXALATE 10 MG: 10 TABLET ORAL at 08:03

## 2023-03-02 RX ADMIN — SENNOSIDES AND DOCUSATE SODIUM 1 TABLET: 50; 8.6 TABLET ORAL at 08:03

## 2023-03-02 RX ADMIN — FAMOTIDINE 20 MG: 20 TABLET ORAL at 08:03

## 2023-03-02 RX ADMIN — HEPARIN SODIUM 5000 UNITS: 5000 INJECTION INTRAVENOUS; SUBCUTANEOUS at 01:03

## 2023-03-02 NOTE — PLAN OF CARE
[facility] Neuro Critical Care  Discharge Reassessment    Primary Care Provider: Juan Carlos Simmons NP    Expected Discharge Date: 3/8/2023    Per Neurosurgery: timing TBD for sEEG lead removal.    Patient not medically ready for discharge.       Reassessment (most recent)       Discharge Reassessment - 03/02/23 1104          Discharge Reassessment    Assessment Type Discharge Planning Reassessment     Did the patient's condition or plan change since previous assessment? No     Communicated KENNEDY with patient/caregiver Date not available/Unable to determine     Discharge Plan A Home     Discharge Plan B Home     DME Needed Upon Discharge  none     Discharge Barriers Identified None     Why the patient remains in the hospital Requires continued medical care                     Tammy Garcia RN, CCRN-K, [facility]  Neuro-Critical Care   [phone]

## 2023-03-02 NOTE — ANESTHESIA POSTPROCEDURE EVALUATION
Anesthesia Post Evaluation    Patient: Declan Burleson    Procedure(s) Performed: Procedure(s) (LRB):  PLACEMENT OF STEREO EEG LEADS (DEPTH ELECTRODES) (Left)    Final Anesthesia Type: general      Patient location during evaluation: PACU  Patient participation: Yes- Able to Participate  Level of consciousness: awake and alert and oriented  Post-procedure vital signs: reviewed and stable  Pain management: adequate  Airway patency: patent    PONV status at discharge: No PONV  Anesthetic complications: no      Cardiovascular status: hemodynamically stable  Respiratory status: unassisted, spontaneous ventilation and room air  Hydration status: euvolemic  Follow-up not needed.          Vitals Value Taken Time   /89 03/02/23 0901   Temp 36.7 °C (98 °F) 03/02/23 0701   Pulse 68 03/02/23 0909   Resp 15 03/02/23 0909   SpO2 96 % 03/02/23 0909   Vitals shown include unvalidated device data.      No case tracking events are documented in the log.      Pain/Karri Score: Pain Rating Prior to Med Admin: 8 (3/2/2023  7:47 AM)  Pain Rating Post Med Admin: 0 (3/1/2023  9:25 AM)

## 2023-03-02 NOTE — PROGRESS NOTES
"[facility] - Neuro Critical Care  Neurocritical Care  Progress Note    Admit Date: 2/27/2023  Service Date: 03/02/2023  Length of Stay: 3    Subjective:     Chief Complaint: Complex partial seizures evolving to generalized tonic-clonic seizures    History of Present Illness: Declan Burleson is a 58 y.o.M  admitted to [facility] s/p SEEG placement. Per chart review he was referred by Dr. Benoit (originally by Dr. Saldana of [place]) for consideration of surgical therapy for intractable epilepsy. The patient was formerly employed as a . He is accompanied by his wife Trista, who helps to provide the history. His seizures began when he was 48 years old. He can recall no inciting event. He has 3 semiologies: "full blown" generalized events, staring episodes, and "mild" seizures, which are characterized as generalized events of shorter duration without incontinence and a shorter post-ictal period. He and his wife estimate that he persists in having about 2 events/month. He has failed multiple AEDs, including topiramate, lamotrigine, levetiracetam, and oxcarbazepine. He is currently taking eslicarbazepine, zonisamide, and clobazam. He had EMU monitoring in May-June of this year, which suggests left-sided activity. NSGY and epilepsy following. Patient admitted to [facility] for close monitoring and higher level of care.       Hospital Course: 2/28/2023: NAEON, patient complains of L side headache, L eye pressure (no visual disturbance) PRN pain available  3/1/2023: complained of chest pain- troponin neg, EKG stable, AED's per Epilepsy  03/02/2023 Increased senna, added daily suppository. Weaned off of AEDs for this afternoon per epilepsy.      Review of Systems   Constitutional:  Negative for chills, diaphoresis, fatigue and fever.   HENT:  Negative for facial swelling and trouble swallowing.    Eyes:  Negative for photophobia and visual disturbance.   Respiratory:  Negative for cough and shortness of breath.  "   Cardiovascular:  Negative for chest pain, palpitations and leg swelling.   Gastrointestinal:  Positive for constipation. Negative for abdominal distention, abdominal pain, diarrhea, nausea and vomiting.   Endocrine: Negative for polyuria.   Genitourinary:  Negative for difficulty urinating.   Musculoskeletal:  Negative for back pain, neck pain and neck stiffness.   Skin:  Negative for pallor.   Neurological:  Positive for seizures and headaches.   Psychiatric/Behavioral:  Negative for behavioral problems and confusion.      Objective:     Vitals:  Temp: 97.6 °F (36.4 °C)  Pulse: 67  Rhythm: normal sinus rhythm  BP: (!) 140/84  MAP (mmHg): 108  Resp: 14  SpO2: (!) 93 %    Temp  Min: 97.5 °F (36.4 °C)  Max: 98 °F (36.7 °C)  Pulse  Min: 59  Max: 75  BP  Min: 132/89  Max: 165/89  MAP (mmHg)  Min: 102  Max: 122  Resp  Min: 10  Max: 23  SpO2  Min: 93 %  Max: 98 %    03/01 0701 - 03/02 0700  In: 299.5 [P.O.:200]  Out: 1550 [Urine:1550]   Unmeasured Output  Urine Occurrence: 1  Stool Occurrence: 0       Physical Exam  Vitals and nursing note reviewed.   Constitutional:       Appearance: He is normal weight.   HENT:      Head:      Comments: sEEG dressing in place       Right Ear: External ear normal.      Left Ear: External ear normal.      Ears:      Comments: Pain behind L ear     Nose: Nose normal.      Mouth/Throat:      Mouth: Mucous membranes are moist.      Pharynx: Oropharynx is clear.   Eyes:      Extraocular Movements: Extraocular movements intact.      Conjunctiva/sclera: Conjunctivae normal.      Pupils: Pupils are equal, round, and reactive to light.   Cardiovascular:      Rate and Rhythm: Normal rate and regular rhythm.      Pulses: Normal pulses.   Pulmonary:      Effort: Pulmonary effort is normal.   Abdominal:      General: Bowel sounds are normal.      Palpations: Abdomen is soft.   Musculoskeletal:      Cervical back: Normal range of motion.      Right lower leg: No edema.      Left lower leg: No edema.    Skin:     General: Skin is warm and dry.   Neurological:      Mental Status: He is alert.      Comments: E4 V5 M6  Alert. Oriented x 3. Follows commands. Speech is delayed.  PERRL, EOMI   No facial asymmetry, facial sensation intact    Tongue midline, + shoulder shrug    Moves all extremities spontaneously and antigravity   Sensation intact throughout   No drift  No ataxia    Psychiatric:         Mood and Affect: Mood normal.       Medications:  Continuous Scheduledacetaminophen, 1,000 mg, Q8H  [START ON 3/3/2023] bisacodyL, 10 mg, Daily  cefTRIAXone (ROCEPHIN) IVPB, 2 g, Q12H  EScitalopram oxalate, 10 mg, Daily  famotidine, 20 mg, BID  heparin (porcine), 5,000 Units, Q8H  mupirocin, , BID  polyethylene glycol, 17 g, Daily  [START ON 3/3/2023] senna-docusate 8.6-50 mg, 2 tablet, Daily    PRNcalcium carbonate, 1,000 mg, BID PRN  glycerin adult, 1 suppository, Daily PRN  hydrALAZINE, 10 mg, Q4H PRN  HYDROmorphone, 0.5 mg, Q6H PRN  methocarbamoL, 500 mg, QID PRN  ondansetron, 8 mg, Q8H PRN  oxyCODONE, 5 mg, Q6H PRN      Today I personally reviewed pertinent medications, lines/drains/airways, imaging, cardiology results, laboratory results, microbiology results,     Diet  Diet Adult Regular (IDDSI Level 7)      Assessment/Plan:     Neuro  * Complex partial seizures evolving to generalized tonic-clonic seizures  Declan Burleson is a 58 y.o.M  admitted to [facility] s/p SEEG placement. He has failed multiple AEDs, including topiramate, lamotrigine, levetiracetam, and oxcarbazepine. He is currently taking eslicarbazepine, zonisamide, and clobazam.     -- s/p sEEG  -- NSGY and epilepsy following  -- AED's per epilepsy reccs- weaned off all AEDs on 3/2 per epilepsy   -- Neuro checks q 2hr  -- SBP <160  -- seizure precautions  -- PT/OT/SLP    Psychiatric  Depression with anxiety  Continue home med Escitalopram        The patient is being Prophylaxed for:  Venous Thromboembolism with: Mechanical or Chemical  Stress Ulcer  with: H2B  Ventilator Pneumonia with: not applicable    Activity Orders          Diet Adult Regular (IDDSI Level 7): Regular starting at 02/27 1103        Full Code     35 minutes critical care time     Shin López PA-C  Neurocritical Care  [facility] - Neuro Critical Care

## 2023-03-02 NOTE — SUBJECTIVE & OBJECTIVE
Interval History: Event 3/2 approximately 1035 of sudden eye opening/fluttering, confusion, patient reported remembering a dream right before event. Hold Eslicarbazepine beginning this evening.     Current Facility-Administered Medications   Medication Dose Route Frequency Provider Last Rate Last Admin    acetaminophen tablet 1,000 mg  1,000 mg Oral Q8H Magda Isela, NP   1,000 mg at 03/02/23 0521    calcium carbonate 200 mg calcium (500 mg) chewable tablet 1,000 mg  1,000 mg Oral BID PRN Magda Weiss, NP        cefTRIAXone (ROCEPHIN) 2 g in dextrose 5 % in water (D5W) 5 % 50 mL IVPB (MB+)  2 g Intravenous Q12H Isaura Burgos PA-C   Stopped at 03/02/23 0838    EScitalopram oxalate tablet 10 mg  10 mg Oral Daily Magda Isela, NP   10 mg at 03/02/23 0806    famotidine tablet 20 mg  20 mg Oral BID Fred Wayne MD   20 mg at 03/02/23 0806    glycerin adult suppository 1 suppository  1 suppository Rectal Daily PRN Isaura Burgos PA-C        heparin (porcine) injection 5,000 Units  5,000 Units Subcutaneous Q8H Isaura Burgos PA-C   5,000 Units at 03/02/23 0521    hydrALAZINE injection 10 mg  10 mg Intravenous Q4H PRN Magda Weiss, NP        HYDROmorphone injection 0.5 mg  0.5 mg Intravenous Q6H PRN Isaura Burgos PA-C        methocarbamoL tablet 500 mg  500 mg Oral QID PRN Magda Weiss, BUZZ        mupirocin 2 % ointment   Nasal BID Isaura Burgos PA-C   Given at 03/02/23 0806    ondansetron disintegrating tablet 8 mg  8 mg Oral Q8H PRN Isaura Burgos PA-C        oxyCODONE immediate release tablet 5 mg  5 mg Oral Q6H PRN Magda Weiss, NP   5 mg at 03/02/23 0747    polyethylene glycol packet 17 g  17 g Oral Daily Isaura Burgos PA-C   17 g at 03/01/23 0832    senna-docusate 8.6-50 mg per tablet 1 tablet  1 tablet Oral Daily Isaura Burgos PA-C   1 tablet at 03/02/23 0806     Continuous Infusions:    Review of Systems   Constitutional:  Positive for fatigue.   HENT:           + left jaw pain   Gastrointestinal:  Negative for nausea and vomiting.   Neurological:  Positive for headaches. Negative for seizures (none overnight).   All other systems reviewed and are negative.  Objective:     Vital Signs (Most Recent):  Temp: 97.6 °F (36.4 °C) (03/02/23 1101)  Pulse: 75 (03/02/23 1301)  Resp: 20 (03/02/23 1301)  BP: (!) 157/91 (03/02/23 1301)  SpO2: 97 % (03/02/23 1301)   Vital Signs (24h Range):  Temp:  [97.5 °F (36.4 °C)-98.2 °F (36.8 °C)] 97.6 °F (36.4 °C)  Pulse:  [59-75] 75  Resp:  [10-23] 20  SpO2:  [96 %-98 %] 97 %  BP: (132-165)/(78-91) 157/91  Arterial Line BP: (106-154)/() 106/94     Weight: 87.5 kg (193 lb)  Body mass index is 28.5 kg/m².    Physical Exam  Vitals and nursing note reviewed.   Constitutional:       General: He is not in acute distress.     Appearance: He is not diaphoretic.   HENT:      Head: Normocephalic.      Comments: sEEG leads + headwrap in place  Eyes:      General: No scleral icterus.     Extraocular Movements: Extraocular movements intact.      Pupils: Pupils are equal, round, and reactive to light.   Cardiovascular:      Rate and Rhythm: Normal rate.   Pulmonary:      Effort: Pulmonary effort is normal. No respiratory distress.   Abdominal:      General: There is no distension.      Palpations: Abdomen is soft.   Musculoskeletal:         General: No deformity or signs of injury.      Cervical back: Normal range of motion and neck supple.   Skin:     General: Skin is warm and dry.   Neurological:      Mental Status: He is alert and oriented to person, place, and time.      Coordination: Finger-Nose-Finger Test normal.   Psychiatric:         Mood and Affect: Mood normal.         Speech: Speech normal.         Behavior: Behavior normal.       NEUROLOGICAL EXAMINATION:     MENTAL STATUS   Oriented to person, place, and time.   Speech: speech is normal   Level of consciousness: alert       Speech slightly slowed, at baseline     CRANIAL NERVES     CN  III, IV, VI   Pupils are equal, round, and reactive to light.    CN VII   Facial expression full, symmetric.     CN VIII   Hearing: intact    CN XI   CN XI normal.     CN XII   CN XII normal.     MOTOR EXAM   Muscle bulk: normal  Overall muscle tone: normal       Follows commands, moves all extremities spontaneously and symmetrically     GAIT AND COORDINATION      Coordination   Finger to nose coordination: normal       No pronator drift noted     Significant Labs: All pertinent lab results from the past 24 hours have been reviewed.    Significant Studies: I have reviewed all pertinent imaging results/findings within the past 24 hours.

## 2023-03-02 NOTE — ASSESSMENT & PLAN NOTE
Declan Burleson is a 58 y.o.M  admitted to [facility] s/p SEEG placement. He has failed multiple AEDs, including topiramate, lamotrigine, levetiracetam, and oxcarbazepine. He is currently taking eslicarbazepine, zonisamide, and clobazam.     -- s/p sEEG  -- NSGY and epilepsy following  -- AED's per epilepsy reccs- weaned off all AEDs on 3/2 per epilepsy   -- Neuro checks q 2hr  -- SBP <160  -- seizure precautions  -- PT/OT/SLP

## 2023-03-02 NOTE — NURSING
"Pt resting in bed and suddenly eyes opened. Pt with upwards gaze and eye fluttering. Did not respond when RN called name when entered room. VSS. Event button pressed. Lasted a couple of seconds and pt started to respond to RN in room. Neuro intact. Pt stated "it felt like a dream" does say it is one type of his events. Epilepsy PA notified.   "

## 2023-03-02 NOTE — PROGRESS NOTES
[facility] Neuro Critical Care  Neurology-Epilepsy  Progress Note    Patient Name: eDclan Burleson  MRN: [id number]  Admission Date: 2/27/2023  Hospital Length of Stay: 3 days  Code Status: Full Code   Attending Provider: Fred Wayne MD  Primary Care Physician: Juan Carlos Simmons NP   Principal Problem:Complex partial seizures evolving to generalized tonic-clonic seizures    Subjective:     Hospital Course:   2/27>2/28: Clobazam decreased to 20 mg qHS, Eslicarbazepine decreased to 400 mg qHS, and Zonisamide decreased to 200 mg qHS on admission. No seizures noted overnight. Beginning 2/28 pm, hold Clobazam, decrease Zonisamide further to 100 mg qHS. Continue Eslicarbazepine 400 mg nightly.   2/28>3/1: No seizures overnight, EEG normal. Hold Zonisamide beginning 3/1 pm.   3/1>3/2: No seizures overnight. Event 3/2 approximately 1035 of sudden eye opening/fluttering, confusion. Hold Eslicarbazepine beginning 3/2 pm.       Interval History: Event 3/2 approximately 1035 of sudden eye opening/fluttering, confusion, patient reported remembering a dream right before event. Hold Eslicarbazepine beginning this evening.     Current Facility-Administered Medications   Medication Dose Route Frequency Provider Last Rate Last Admin    acetaminophen tablet 1,000 mg  1,000 mg Oral Q8H Magda Weiss NP   1,000 mg at 03/02/23 0521    calcium carbonate 200 mg calcium (500 mg) chewable tablet 1,000 mg  1,000 mg Oral BID PRN Magda Weiss NP        cefTRIAXone (ROCEPHIN) 2 g in dextrose 5 % in water (D5W) 5 % 50 mL IVPB (MB+)  2 g Intravenous Q12H Isaura Burgos PA-C   Stopped at 03/02/23 0838    EScitalopram oxalate tablet 10 mg  10 mg Oral Daily Magda Weiss NP   10 mg at 03/02/23 0806    famotidine tablet 20 mg  20 mg Oral BID Fred Wayne MD   20 mg at 03/02/23 0806    glycerin adult suppository 1 suppository  1 suppository Rectal Daily PRN Isaura Burgos PA-C        heparin (porcine) injection 5,000 Units   5,000 Units Subcutaneous Q8H Isaura L. Aubrey, PA-C   5,000 Units at 03/02/23 0521    hydrALAZINE injection 10 mg  10 mg Intravenous Q4H PRN Magda Miinea, NP        HYDROmorphone injection 0.5 mg  0.5 mg Intravenous Q6H PRN Isaura L. Aubrey, PA-C        methocarbamoL tablet 500 mg  500 mg Oral QID PRN Magda Miinea, NP        mupirocin 2 % ointment   Nasal BID Isaura L. Aubrey, PA-C   Given at 03/02/23 0806    ondansetron disintegrating tablet 8 mg  8 mg Oral Q8H PRN Isaura L. Aubrey, PA-C        oxyCODONE immediate release tablet 5 mg  5 mg Oral Q6H PRN Magda Miinea, NP   5 mg at 03/02/23 0747    polyethylene glycol packet 17 g  17 g Oral Daily Isaura L. Aubrey, PA-C   17 g at 03/01/23 0832    senna-docusate 8.6-50 mg per tablet 1 tablet  1 tablet Oral Daily Isaura L. Aubrey, PA-C   1 tablet at 03/02/23 0806     Continuous Infusions:    Review of Systems   Constitutional:  Positive for fatigue.   HENT:          + left jaw pain   Gastrointestinal:  Negative for nausea and vomiting.   Neurological:  Positive for headaches. Negative for seizures (none overnight).   All other systems reviewed and are negative.  Objective:     Vital Signs (Most Recent):  Temp: 97.6 °F (36.4 °C) (03/02/23 1101)  Pulse: 75 (03/02/23 1301)  Resp: 20 (03/02/23 1301)  BP: (!) 157/91 (03/02/23 1301)  SpO2: 97 % (03/02/23 1301)   Vital Signs (24h Range):  Temp:  [97.5 °F (36.4 °C)-98.2 °F (36.8 °C)] 97.6 °F (36.4 °C)  Pulse:  [59-75] 75  Resp:  [10-23] 20  SpO2:  [96 %-98 %] 97 %  BP: (132-165)/(78-91) 157/91  Arterial Line BP: (106-154)/() 106/94     Weight: 87.5 kg (193 lb)  Body mass index is 28.5 kg/m².    Physical Exam  Vitals and nursing note reviewed.   Constitutional:       General: He is not in acute distress.     Appearance: He is not diaphoretic.   HENT:      Head: Normocephalic.      Comments: sEEG leads + headwrap in place  Eyes:      General: No scleral icterus.     Extraocular Movements:  Extraocular movements intact.      Pupils: Pupils are equal, round, and reactive to light.   Cardiovascular:      Rate and Rhythm: Normal rate.   Pulmonary:      Effort: Pulmonary effort is normal. No respiratory distress.   Abdominal:      General: There is no distension.      Palpations: Abdomen is soft.   Musculoskeletal:         General: No deformity or signs of injury.      Cervical back: Normal range of motion and neck supple.   Skin:     General: Skin is warm and dry.   Neurological:      Mental Status: He is alert and oriented to person, place, and time.      Coordination: Finger-Nose-Finger Test normal.   Psychiatric:         Mood and Affect: Mood normal.         Speech: Speech normal.         Behavior: Behavior normal.       NEUROLOGICAL EXAMINATION:     MENTAL STATUS   Oriented to person, place, and time.   Speech: speech is normal   Level of consciousness: alert       Speech slightly slowed, at baseline     CRANIAL NERVES     CN III, IV, VI   Pupils are equal, round, and reactive to light.    CN VII   Facial expression full, symmetric.     CN VIII   Hearing: intact    CN XI   CN XI normal.     CN XII   CN XII normal.     MOTOR EXAM   Muscle bulk: normal  Overall muscle tone: normal       Follows commands, moves all extremities spontaneously and symmetrically     GAIT AND COORDINATION      Coordination   Finger to nose coordination: normal       No pronator drift noted     Significant Labs: All pertinent lab results from the past 24 hours have been reviewed.    Significant Studies: I have reviewed all pertinent imaging results/findings within the past 24 hours.    Assessment and Plan:     * Complex partial seizures evolving to generalized tonic-clonic seizures  Mr. Burleson is a 58 year old man with intractable epilepsy s/p prior sEEG monitoring in January 2021, left laser amygdalohippocampectomy in March 2021 admitted to [facility] s/p placement of 8 left-sided sEEG electrodes for repeat intracranial monitoring  and further localization of his epilepsy in consideration of additional surgical management options of his epilepsy. Currently maintained on Clobazam 40 mg qHS, Eslicarbazepine 1200 mg qHS, and Zonisamide 500 mg qHS with continued events of staring/loss of awareness and generalized convulsions.    - s/p placement of 8 left-sided sEEG electrodes by [facility] 2/27  - Event 3/2 am, pending further review  - 3/2 begin holding Eslicarbazepine  - Holding Zonisamide since 3/1  - Holding Clobazam since 2/28  - Continue mittens and strict 1:1 supervision as he previously self-removed sEEG leads during monitoring in 2021, requiring early termination of monitoring  - Post-operative imaging timing, pain control per NCC, [facility]  - Seizure precautions  - Please call epilepsy to notify of any seizures  -  If more than 3 seizures in 2 hours or any seizure lasting greater than 3 minutes, please give 2 mg IV lorazepam and contact on-call epilepsy    Plan of care discussed with NCC team, patient and wife at bedside. Will continue to follow, please call with any questions.     Depression with anxiety  - Continue home Escitalopram 10 mg daily    Migraine without status migrainosus, not intractable  - Followed by Dr. Saldana as outpatient  - Supportive care during admission        VTE Risk Mitigation (From admission, onward)         Ordered     heparin (porcine) injection 5,000 Units  Every 8 hours         02/27/23 1236     Place sequential compression device  Until discontinued         02/27/23 8896                Teresa Cerrato PA-C  Neurology-Epilepsy  [facility] - Neuro Critical Care  Staff: Dr. Feliciano

## 2023-03-02 NOTE — ASSESSMENT & PLAN NOTE
Mr. Burleson is a 58 year old man with intractable epilepsy s/p prior sEEG monitoring in January 2021, left laser amygdalohippocampectomy in March 2021 admitted to [facility] s/p placement of 8 left-sided sEEG electrodes for repeat intracranial monitoring and further localization of his epilepsy in consideration of additional surgical management options of his epilepsy. Currently maintained on Clobazam 40 mg qHS, Eslicarbazepine 1200 mg qHS, and Zonisamide 500 mg qHS with continued events of staring/loss of awareness and generalized convulsions.    - s/p placement of 8 left-sided sEEG electrodes by [facility] 2/27  - Event 3/2 am, pending further review  - 3/2 begin holding Eslicarbazepine  - Holding Zonisamide since 3/1  - Holding Clobazam since 2/28  - Continue mittens and strict 1:1 supervision as he previously self-removed sEEG leads during monitoring in 2021, requiring early termination of monitoring  - [facility]  - Seizure precautions  - Please call epilepsy to notify of any seizures  -  If more than 3 seizures in 2 hours or any seizure lasting greater than 3 minutes, please give 2 mg IV lorazepam and contact on-call epilepsy    Plan of care discussed with NCC team, patient and wife at bedside. Will continue to follow, please call with any questions.

## 2023-03-02 NOTE — PLAN OF CARE
POC reviewed with Declan Burleson and family at 1400. Pt verbalized understanding. Questions and concerns addressed. No acute events today. Pt progressing toward goals. Will continue to monitor. See below and flowsheets for full assessment and VS info.     -Oxycodone given x1 for pain  -Pt with one seizure like event, refer to RNs previous note      Neuro:  Ruben Coma Scale  Best Eye Response: 4-->(E4) spontaneous  Best Motor Response: 6-->(M6) obeys commands  Best Verbal Response: 5-->(V5) oriented  Ruben Coma Scale Score: 15  Assessment Qualifiers: no eye obstruction present, patient not sedated/intubated  Pupil PERRLA: yes     24 hr Temp:  [97.5 °F (36.4 °C)-98 °F (36.7 °C)]     CV:   Rhythm: normal sinus rhythm  BP goals:   SBP < 160  MAP > 65    Resp:      Oxygen Concentration (%): 28    Plan: N/A    GI/:     Diet/Nutrition Received: regular  Last Bowel Movement: 02/26/23  Voiding Characteristics: voids spontaneously without difficulty    Intake/Output Summary (Last 24 hours) at 3/2/2023 1604  Last data filed at 3/2/2023 1513  Gross per 24 hour   Intake 848.45 ml   Output 1500 ml   Net -651.55 ml     Unmeasured Output  Urine Occurrence: 1  Stool Occurrence: 0    Labs/Accuchecks:  Recent Labs   Lab 03/02/23  0219   WBC 6.89   RBC 4.35*   HGB 13.4*   HCT 42.2         Recent Labs   Lab 03/02/23  0219   *   K 3.9   CO2 24      BUN 13   CREATININE 0.7   ALKPHOS 106   ALT 11   AST 13   BILITOT 0.3      Recent Labs   Lab 02/27/23  0555   INR 1.0   APTT 27.4      Recent Labs   Lab 03/01/23  1112   TROPONINI <0.006       Electrolytes: N/A - electrolytes WDL  Accuchecks: none    Gtts:      LDA/Wounds:  Lines/Drains/Airways       Arterial Line  Duration             Arterial Line 02/27/23 0746 Right Radial 3 days              Peripheral Intravenous Line  Duration                  Peripheral IV - Single Lumen 02/27/23 0608 20 G Posterior;Right Hand 3 days         Peripheral IV - Single Lumen  02/27/23 0736 18 G Left Forearm 3 days                  Wounds: No  Wound care consulted: No

## 2023-03-02 NOTE — PLAN OF CARE
[facility] Care Plan    POC reviewed with Declan Burleson and family at 0300. Pt verbalized understanding. Questions and concerns addressed. No acute events overnight. Pt progressing toward goals. Will continue to monitor. See below and flowsheets for full assessment and VS info.   -No clinical seizures noted during shift  -PRN oxy given once for pain  -Bath given, linens changed        Is this a stroke patient? no    Neuro:  Ruben Coma Scale  Best Eye Response: 4-->(E4) spontaneous  Best Motor Response: 6-->(M6) obeys commands  Best Verbal Response: 5-->(V5) oriented  Newton Coma Scale Score: 15  Assessment Qualifiers: patient not sedated/intubated  Pupil PERRLA: yes     24hr Temp:  [97.5 °F (36.4 °C)-98.2 °F (36.8 °C)]     CV:   Rhythm: normal sinus rhythm  BP goals:   SBP < 160  MAP > 65    Resp:      Oxygen Concentration (%): 28    Plan: N/A    GI/:     Diet/Nutrition Received: regular  Last Bowel Movement: 02/26/23  Voiding Characteristics: voids spontaneously without difficulty    Intake/Output Summary (Last 24 hours) at 3/2/2023 0343  Last data filed at 3/1/2023 2303  Gross per 24 hour   Intake 299.51 ml   Output 1000 ml   Net -700.49 ml     Unmeasured Output  Urine Occurrence: 1  Stool Occurrence: 0    Labs/Accuchecks:  Recent Labs   Lab 03/02/23 0219   WBC 6.89   RBC 4.35*   HGB 13.4*   HCT 42.2         Recent Labs   Lab 03/02/23 0219   *   K 3.9   CO2 24      BUN 13   CREATININE 0.7   ALKPHOS 106   ALT 11   AST 13   BILITOT 0.3      Recent Labs   Lab 02/27/23  0555   INR 1.0   APTT 27.4      Recent Labs   Lab 03/01/23  1112   TROPONINI <0.006       Electrolytes: N/A - electrolytes WDL  Accuchecks: none    Gtts:      LDA/Wounds:  Lines/Drains/Airways       Arterial Line  Duration             Arterial Line 02/27/23 0746 Right Radial 2 days              Peripheral Intravenous Line  Duration                  Peripheral IV - Single Lumen 02/27/23 0608 20 G Posterior;Right Hand 2 days          Peripheral IV - Single Lumen 02/27/23 0736 18 G Left Forearm 2 days                  Wounds: No  Wound care consulted: No

## 2023-03-02 NOTE — PT/OT/SLP PROGRESS
"Occupational Therapy   Treatment    Name: Declan Burleson  MRN: [id number]  Admitting Diagnosis:  Complex partial seizures evolving to generalized tonic-clonic seizures  3 Days Post-Op    Recommendations:     Discharge Recommendations:  (pending OOB assessment with SEEG removed)  Discharge Equipment Recommendations:   (pending OOB assessment when SEEG removed)  Barriers to discharge:  None    Assessment:     Declan Burleson is a 58 y.o. male with a medical diagnosis of Complex partial seizures evolving to generalized tonic-clonic seizures.  He presents with performance deficits affecting function are weakness, abnormal tone, decreased ROM, impaired cognition, impaired endurance, impaired sensation, impaired self care skills, impaired functional mobility, gait instability, impaired balance.     Rehab Prognosis:  Good; patient would benefit from acute skilled OT services to address these deficits and reach maximum level of function.       Plan:     Patient to be seen 3 x/week to address the above listed problems via sensory integration, cognitive retraining, neuromuscular re-education, therapeutic exercises, therapeutic activities, self-care/home management  Plan of Care Expires: 03/28/23  Plan of Care Reviewed with: patient, spouse    Subjective   Patient:  "It's good to see you."  Pain/Comfort:  Pain Rating 1: 0/10  Pain Rating Post-Intervention 1: 0/10    Objective:     Communicated with: Nurse and NS prior to session.  Patient found supine with peripheral IV, oxygen, telemetry, arterial line, restraints, pulse ox (continuous) upon OT entry to room.    General Precautions: Standard, aspiration, fall, seizure; bedrest--SEEG    Orthopedic Precautions:N/A  Braces: N/A  Respiratory Status: Room air     Occupational Performance:     Activities of Daily Living:    Treatment & Education:  Patient education provided on role of OT.  1 set x 10 rep of green theraband exercises performed for B UE shoulder flexion, " horizontal abd/add, bicep and tricep,  Continued education, patient/ family training recommended.      Patient left supine with all lines intact, call button in reach, and bed alarm on    GOALS:   Multidisciplinary Problems       Occupational Therapy Goals          Problem: Occupational Therapy    Goal Priority Disciplines Outcome Interventions   Occupational Therapy Goal     OT, PT/OT Ongoing, Progressing    Description: Goals set 2/28 to be addressed for 14 days with expiration date, 3/15:  Patient will increase functional independence with ADLs by performing:    Patient will demonstrate rolling to the right with modified independence.  Not met   Patient will demonstrate rolling to the left with modified independence.   Not met  Patient will demonstrate supine -sit with modified independence.   Not met  Patient will demonstrate stand pivot transfers with supervision.   Not met  Patient will demonstrate grooming while standing with supervision.   Not met  Patient will demonstrate upper body dressing with supervision while seated EOB.   Not met  Patient will demonstrate lower body dressing with supervision while seated EOB.   Not met  Patient will demonstrate toileting with supervision.   Not met  Patient will demonstrate bathing while seated EOB with supervision.   Not met  Patient's family / caregiver will demonstrate independence and safety with assisting patient with self-care skills and functional mobility.     Not met  Patient's family / caregiver will demonstrate independence with providing ROM and changes in bed positioning.   Not met                           Time Tracking:     OT Date of Treatment: 03/02/23  OT Start Time: 0652  OT Stop Time: 0702  OT Total Time (min): 10 min    Billable Minutes:10    OT/BENITEZ: OT          3/2/2023

## 2023-03-02 NOTE — SUBJECTIVE & OBJECTIVE
Review of Systems   Constitutional:  Negative for chills, diaphoresis, fatigue and fever.   HENT:  Negative for facial swelling and trouble swallowing.    Eyes:  Negative for photophobia and visual disturbance.   Respiratory:  Negative for cough and shortness of breath.    Cardiovascular:  Negative for chest pain, palpitations and leg swelling.   Gastrointestinal:  Positive for constipation. Negative for abdominal distention, abdominal pain, diarrhea, nausea and vomiting.   Endocrine: Negative for polyuria.   Genitourinary:  Negative for difficulty urinating.   Musculoskeletal:  Negative for back pain, neck pain and neck stiffness.   Skin:  Negative for pallor.   Neurological:  Positive for seizures and headaches.   Psychiatric/Behavioral:  Negative for behavioral problems and confusion.      Objective:     Vitals:  Temp: 97.6 °F (36.4 °C)  Pulse: 67  Rhythm: normal sinus rhythm  BP: (!) 140/84  MAP (mmHg): 108  Resp: 14  SpO2: (!) 93 %    Temp  Min: 97.5 °F (36.4 °C)  Max: 98 °F (36.7 °C)  Pulse  Min: 59  Max: 75  BP  Min: 132/89  Max: 165/89  MAP (mmHg)  Min: 102  Max: 122  Resp  Min: 10  Max: 23  SpO2  Min: 93 %  Max: 98 %    03/01 0701 - 03/02 0700  In: 299.5 [P.O.:200]  Out: 1550 [Urine:1550]   Unmeasured Output  Urine Occurrence: 1  Stool Occurrence: 0       Physical Exam  Vitals and nursing note reviewed.   Constitutional:       Appearance: He is normal weight.   HENT:      Head:      Comments: sEEG dressing in place       Right Ear: External ear normal.      Left Ear: External ear normal.      Ears:      Comments: Pain behind L ear     Nose: Nose normal.      Mouth/Throat:      Mouth: Mucous membranes are moist.      Pharynx: Oropharynx is clear.   Eyes:      Extraocular Movements: Extraocular movements intact.      Conjunctiva/sclera: Conjunctivae normal.      Pupils: Pupils are equal, round, and reactive to light.   Cardiovascular:      Rate and Rhythm: Normal rate and regular rhythm.      Pulses: Normal  pulses.   Pulmonary:      Effort: Pulmonary effort is normal.   Abdominal:      General: Bowel sounds are normal.      Palpations: Abdomen is soft.   Musculoskeletal:      Cervical back: Normal range of motion.      Right lower leg: No edema.      Left lower leg: No edema.   Skin:     General: Skin is warm and dry.   Neurological:      Mental Status: He is alert.      Comments: E4 V5 M6  Alert. Oriented x 3. Follows commands. Speech is delayed.  PERRL, EOMI   No facial asymmetry, facial sensation intact    Tongue midline, + shoulder shrug    Moves all extremities spontaneously and antigravity   Sensation intact throughout   No drift  No ataxia    Psychiatric:         Mood and Affect: Mood normal.       Medications:  Continuous Scheduledacetaminophen, 1,000 mg, Q8H  [START ON 3/3/2023] bisacodyL, 10 mg, Daily  cefTRIAXone (ROCEPHIN) IVPB, 2 g, Q12H  EScitalopram oxalate, 10 mg, Daily  famotidine, 20 mg, BID  heparin (porcine), 5,000 Units, Q8H  mupirocin, , BID  polyethylene glycol, 17 g, Daily  [START ON 3/3/2023] senna-docusate 8.6-50 mg, 2 tablet, Daily    PRNcalcium carbonate, 1,000 mg, BID PRN  glycerin adult, 1 suppository, Daily PRN  hydrALAZINE, 10 mg, Q4H PRN  HYDROmorphone, 0.5 mg, Q6H PRN  methocarbamoL, 500 mg, QID PRN  ondansetron, 8 mg, Q8H PRN  oxyCODONE, 5 mg, Q6H PRN      Today I personally reviewed pertinent medications, lines/drains/airways, imaging, cardiology results, laboratory results, microbiology results,     Diet  Diet Adult Regular (IDDSI Level 7)

## 2023-03-03 ENCOUNTER — TELEPHONE (OUTPATIENT)
Dept: NEUROLOGY | Facility: CLINIC | Age: 58
End: 2023-03-03
Payer: MEDICARE

## 2023-03-03 ENCOUNTER — SOCIAL WORK (OUTPATIENT)
Dept: NEUROLOGY | Facility: CLINIC | Age: 58
End: 2023-03-03
Payer: MEDICARE

## 2023-03-03 LAB
ABO + RH BLD: NORMAL
ALBUMIN SERPL BCP-MCNC: 3.3 G/DL (ref 3.5–5.2)
ALP SERPL-CCNC: 107 U/L (ref 55–135)
ALT SERPL W/O P-5'-P-CCNC: 22 U/L (ref 10–44)
ANION GAP SERPL CALC-SCNC: 10 MMOL/L (ref 8–16)
AST SERPL-CCNC: 22 U/L (ref 10–40)
BASOPHILS # BLD AUTO: 0.05 K/UL (ref 0–0.2)
BASOPHILS NFR BLD: 0.7 % (ref 0–1.9)
BILIRUB SERPL-MCNC: 0.3 MG/DL (ref 0.1–1)
BLD GP AB SCN CELLS X3 SERPL QL: NORMAL
BLD PROD TYP BPU: NORMAL
BLD PROD TYP BPU: NORMAL
BLOOD UNIT EXPIRATION DATE: NORMAL
BLOOD UNIT EXPIRATION DATE: NORMAL
BLOOD UNIT TYPE CODE: 5100
BLOOD UNIT TYPE CODE: 5100
BLOOD UNIT TYPE: NORMAL
BLOOD UNIT TYPE: NORMAL
BUN SERPL-MCNC: 14 MG/DL (ref 6–20)
CALCIUM SERPL-MCNC: 9.1 MG/DL (ref 8.7–10.5)
CHLORIDE SERPL-SCNC: 104 MMOL/L (ref 95–110)
CO2 SERPL-SCNC: 24 MMOL/L (ref 23–29)
CODING SYSTEM: NORMAL
CODING SYSTEM: NORMAL
CREAT SERPL-MCNC: 0.7 MG/DL (ref 0.5–1.4)
CROSSMATCH INTERPRETATION: NORMAL
CROSSMATCH INTERPRETATION: NORMAL
DIFFERENTIAL METHOD: ABNORMAL
DISPENSE STATUS: NORMAL
DISPENSE STATUS: NORMAL
EOSINOPHIL # BLD AUTO: 0.8 K/UL (ref 0–0.5)
EOSINOPHIL NFR BLD: 11.7 % (ref 0–8)
ERYTHROCYTE [DISTWIDTH] IN BLOOD BY AUTOMATED COUNT: 12.9 % (ref 11.5–14.5)
EST. GFR  (NO RACE VARIABLE): >60 ML/MIN/1.73 M^2
GLUCOSE SERPL-MCNC: 96 MG/DL (ref 70–110)
HCT VFR BLD AUTO: 41.1 % (ref 40–54)
HGB BLD-MCNC: 13.5 G/DL (ref 14–18)
IMM GRANULOCYTES # BLD AUTO: 0.03 K/UL (ref 0–0.04)
IMM GRANULOCYTES NFR BLD AUTO: 0.4 % (ref 0–0.5)
LYMPHOCYTES # BLD AUTO: 1.9 K/UL (ref 1–4.8)
LYMPHOCYTES NFR BLD: 27.4 % (ref 18–48)
MAGNESIUM SERPL-MCNC: 1.9 MG/DL (ref 1.6–2.6)
MCH RBC QN AUTO: 30.7 PG (ref 27–31)
MCHC RBC AUTO-ENTMCNC: 32.8 G/DL (ref 32–36)
MCV RBC AUTO: 93 FL (ref 82–98)
MONOCYTES # BLD AUTO: 0.7 K/UL (ref 0.3–1)
MONOCYTES NFR BLD: 10.2 % (ref 4–15)
NEUTROPHILS # BLD AUTO: 3.4 K/UL (ref 1.8–7.7)
NEUTROPHILS NFR BLD: 49.6 % (ref 38–73)
NRBC BLD-RTO: 0 /100 WBC
PHOSPHATE SERPL-MCNC: 4.2 MG/DL (ref 2.7–4.5)
PLATELET # BLD AUTO: 323 K/UL (ref 150–450)
PMV BLD AUTO: 9.6 FL (ref 9.2–12.9)
POTASSIUM SERPL-SCNC: 3.7 MMOL/L (ref 3.5–5.1)
PROT SERPL-MCNC: 6.6 G/DL (ref 6–8.4)
RBC # BLD AUTO: 4.4 M/UL (ref 4.6–6.2)
SODIUM SERPL-SCNC: 138 MMOL/L (ref 136–145)
TRANS ERYTHROCYTES VOL PATIENT: NORMAL ML
TRANS ERYTHROCYTES VOL PATIENT: NORMAL ML
WBC # BLD AUTO: 6.83 K/UL (ref 3.9–12.7)

## 2023-03-03 PROCEDURE — 80053 COMPREHEN METABOLIC PANEL: CPT | Performed by: PHYSICIAN ASSISTANT

## 2023-03-03 PROCEDURE — 83735 ASSAY OF MAGNESIUM: CPT | Performed by: PHYSICIAN ASSISTANT

## 2023-03-03 PROCEDURE — 95720 EEG PHY/QHP EA INCR W/VEEG: CPT | Mod: ,,, | Performed by: STUDENT IN AN ORGANIZED HEALTH CARE EDUCATION/TRAINING PROGRAM

## 2023-03-03 PROCEDURE — 86900 BLOOD TYPING SEROLOGIC ABO: CPT | Performed by: PSYCHIATRY & NEUROLOGY

## 2023-03-03 PROCEDURE — 84100 ASSAY OF PHOSPHORUS: CPT | Performed by: PHYSICIAN ASSISTANT

## 2023-03-03 PROCEDURE — 99233 SBSQ HOSP IP/OBS HIGH 50: CPT | Mod: FS,,, | Performed by: PHYSICIAN ASSISTANT

## 2023-03-03 PROCEDURE — 95714 VEEG EA 12-26 HR UNMNTR: CPT

## 2023-03-03 PROCEDURE — 25000003 PHARM REV CODE 250

## 2023-03-03 PROCEDURE — 95961 ELECTRODE STIMULATION BRAIN: CPT | Mod: 26,,, | Performed by: PSYCHIATRY & NEUROLOGY

## 2023-03-03 PROCEDURE — 25000003 PHARM REV CODE 250: Performed by: NURSE PRACTITIONER

## 2023-03-03 PROCEDURE — 99233 PR SUBSEQUENT HOSPITAL CARE,LEVL III: ICD-10-PCS | Mod: FS,,, | Performed by: PHYSICIAN ASSISTANT

## 2023-03-03 PROCEDURE — 99291 CRITICAL CARE FIRST HOUR: CPT | Mod: ,,,

## 2023-03-03 PROCEDURE — 94761 N-INVAS EAR/PLS OXIMETRY MLT: CPT

## 2023-03-03 PROCEDURE — 63600175 PHARM REV CODE 636 W HCPCS: Performed by: PHYSICIAN ASSISTANT

## 2023-03-03 PROCEDURE — 95961 PR ELECTRODE STIM,BRAIN,MD 1ST HR: ICD-10-PCS | Mod: 26,,, | Performed by: PSYCHIATRY & NEUROLOGY

## 2023-03-03 PROCEDURE — 20000000 HC ICU ROOM

## 2023-03-03 PROCEDURE — 25000003 PHARM REV CODE 250: Performed by: PHYSICIAN ASSISTANT

## 2023-03-03 PROCEDURE — 99291 PR CRITICAL CARE, E/M 30-74 MINUTES: ICD-10-PCS | Mod: ,,,

## 2023-03-03 PROCEDURE — 99233 PR SUBSEQUENT HOSPITAL CARE,LEVL III: ICD-10-PCS | Mod: ,,, | Performed by: PSYCHIATRY & NEUROLOGY

## 2023-03-03 PROCEDURE — 85025 COMPLETE CBC W/AUTO DIFF WBC: CPT | Performed by: PHYSICIAN ASSISTANT

## 2023-03-03 PROCEDURE — 25000003 PHARM REV CODE 250: Performed by: PSYCHIATRY & NEUROLOGY

## 2023-03-03 PROCEDURE — 95720 PR EEG, W/VIDEO, CONT RECORD, I&R, >12<26 HRS: ICD-10-PCS | Mod: ,,, | Performed by: STUDENT IN AN ORGANIZED HEALTH CARE EDUCATION/TRAINING PROGRAM

## 2023-03-03 PROCEDURE — 99233 SBSQ HOSP IP/OBS HIGH 50: CPT | Mod: ,,, | Performed by: PSYCHIATRY & NEUROLOGY

## 2023-03-03 RX ORDER — LORAZEPAM 2 MG/ML
INJECTION INTRAMUSCULAR
Status: DISCONTINUED
Start: 2023-03-03 | End: 2023-03-03 | Stop reason: WASHOUT

## 2023-03-03 RX ORDER — ADHESIVE BANDAGE
30 BANDAGE TOPICAL DAILY
Status: DISCONTINUED | OUTPATIENT
Start: 2023-03-03 | End: 2023-03-04

## 2023-03-03 RX ORDER — LACTULOSE 10 G/15ML
15 SOLUTION ORAL 3 TIMES DAILY
Status: DISCONTINUED | OUTPATIENT
Start: 2023-03-03 | End: 2023-03-03

## 2023-03-03 RX ADMIN — ACETAMINOPHEN 1000 MG: 500 TABLET ORAL at 01:03

## 2023-03-03 RX ADMIN — CEFTRIAXONE 2 G: 2 INJECTION, POWDER, FOR SOLUTION INTRAMUSCULAR; INTRAVENOUS at 08:03

## 2023-03-03 RX ADMIN — FAMOTIDINE 20 MG: 20 TABLET ORAL at 09:03

## 2023-03-03 RX ADMIN — CEFTRIAXONE 2 G: 2 INJECTION, POWDER, FOR SOLUTION INTRAMUSCULAR; INTRAVENOUS at 09:03

## 2023-03-03 RX ADMIN — ACETAMINOPHEN 1000 MG: 500 TABLET ORAL at 06:03

## 2023-03-03 RX ADMIN — ESCITALOPRAM OXALATE 10 MG: 10 TABLET ORAL at 08:03

## 2023-03-03 RX ADMIN — OXYCODONE 5 MG: 5 TABLET ORAL at 09:03

## 2023-03-03 RX ADMIN — HEPARIN SODIUM 5000 UNITS: 5000 INJECTION INTRAVENOUS; SUBCUTANEOUS at 09:03

## 2023-03-03 RX ADMIN — SENNOSIDES AND DOCUSATE SODIUM 2 TABLET: 50; 8.6 TABLET ORAL at 08:03

## 2023-03-03 RX ADMIN — FAMOTIDINE 20 MG: 20 TABLET ORAL at 08:03

## 2023-03-03 RX ADMIN — HEPARIN SODIUM 5000 UNITS: 5000 INJECTION INTRAVENOUS; SUBCUTANEOUS at 01:03

## 2023-03-03 RX ADMIN — MUPIROCIN: 20 OINTMENT TOPICAL at 09:03

## 2023-03-03 RX ADMIN — MUPIROCIN: 20 OINTMENT TOPICAL at 08:03

## 2023-03-03 RX ADMIN — OXYCODONE 5 MG: 5 TABLET ORAL at 08:03

## 2023-03-03 RX ADMIN — ACETAMINOPHEN 1000 MG: 500 TABLET ORAL at 09:03

## 2023-03-03 RX ADMIN — HEPARIN SODIUM 5000 UNITS: 5000 INJECTION INTRAVENOUS; SUBCUTANEOUS at 06:03

## 2023-03-03 RX ADMIN — BISACODYL 10 MG: 10 SUPPOSITORY RECTAL at 08:03

## 2023-03-03 RX ADMIN — POLYETHYLENE GLYCOL 3350 17 G: 17 POWDER, FOR SOLUTION ORAL at 08:03

## 2023-03-03 NOTE — SUBJECTIVE & OBJECTIVE
Interval History: 3/3: NAEON, no seizures, no BM, reportedly refusion BR. Some ROM work with wife, needs more encouragement for improved PT activity    Medications:  Continuous Infusions:  Scheduled Meds:   acetaminophen  1,000 mg Oral Q8H    bisacodyL  10 mg Rectal Daily    cefTRIAXone (ROCEPHIN) IVPB  2 g Intravenous Q12H    EScitalopram oxalate  10 mg Oral Daily    famotidine  20 mg Oral BID    heparin (porcine)  5,000 Units Subcutaneous Q8H    magnesium hydroxide 400 mg/5 ml  30 mL Oral Daily    mupirocin   Nasal BID    polyethylene glycol  17 g Oral Daily    senna-docusate 8.6-50 mg  2 tablet Oral Daily     PRN Meds:calcium carbonate, glycerin adult, hydrALAZINE, HYDROmorphone, methocarbamoL, ondansetron, oxyCODONE     Review of Systems   Neurological:  Positive for seizures.   All other systems reviewed and are negative.  Objective:     Weight: 87.5 kg (193 lb)  Body mass index is 28.5 kg/m².  Vital Signs (Most Recent):  Temp: 98.3 °F (36.8 °C) (03/03/23 0701)  Pulse: 74 (03/03/23 0901)  Resp: (!) 21 (03/03/23 0901)  BP: (!) 152/94 (03/03/23 0901)  SpO2: (!) 93 % (03/03/23 0901)   Vital Signs (24h Range):  Temp:  [97.6 °F (36.4 °C)-98.9 °F (37.2 °C)] 98.3 °F (36.8 °C)  Pulse:  [61-75] 74  Resp:  [11-23] 21  SpO2:  [91 %-97 %] 93 %  BP: (132-159)/(80-95) 152/94  Arterial Line BP: (101-149)/() 149/94     Date 03/03/23 0700 - 03/04/23 0659   Shift 2179-0061 7624-1922 3998-1852 24 Hour Total   INTAKE   P.O. 400   400   Shift Total(mL/kg) 400(4.6)   400(4.6)   OUTPUT   Urine(mL/kg/hr) 150   150   Shift Total(mL/kg) 150(1.7)   150(1.7)   Weight (kg) 87.5 87.5 87.5 87.5                          Physical Exam  Constitutional:       Appearance: He is well-developed and well-nourished.   Eyes:      Extraocular Movements: EOM normal.      Conjunctiva/sclera: Conjunctivae normal.      Pupils: Pupils are equal, round, and reactive to light.   Cardiovascular:      Pulses: Normal pulses.   Abdominal:      Palpations:  Abdomen is soft.   Neurological:      Mental Status: He is alert and oriented to person, place, and time.      Comments: AAOx3, higher order confusion  PERRL  CN intact  BUE 5/5  BLE 5/5  SILT    Headwrap in place with sEEG leads   Psychiatric:         Mood and Affect: Mood and affect normal.       Physical Exam:    Constitutional: He appears well-developed and well-nourished.     Eyes: Pupils are equal, round, and reactive to light. Conjunctivae and EOM are normal.     Cardiovascular: Normal pulses.     Abdominal: Soft.     Psych/Behavior: He is alert. He is oriented to person, place, and time. He has a normal mood and affect.     Neurological:   AAOx3, higher order confusion  PERRL  CN intact  BUE 5/5  BLE 5/5  SILT    Headwrap in place with sEEG leads     Significant Labs:  Recent Labs   Lab 03/02/23  0219 03/03/23  0121    96   * 138   K 3.9 3.7    104   CO2 24 24   BUN 13 14   CREATININE 0.7 0.7   CALCIUM 9.0 9.1   MG 2.0 1.9       Recent Labs   Lab 03/02/23  0219 03/03/23  0121   WBC 6.89 6.83   HGB 13.4* 13.5*   HCT 42.2 41.1    323       No results for input(s): LABPT, INR, APTT in the last 48 hours.    Microbiology Results (last 7 days)       ** No results found for the last 168 hours. **          All pertinent labs from the last 24 hours have been reviewed.    Significant Diagnostics:  I have reviewed all pertinent imaging results/findings within the past 24 hours.

## 2023-03-03 NOTE — PLAN OF CARE
POC reviewed with Declan Burleson and family at 1400. Pt verbalized understanding. Questions and concerns addressed. No acute events today. Pt progressing toward goals. Will continue to monitor. See below and flowsheets for full assessment and VS info.     -sEEG in place, no seizures so far this shift  -Oxycodone given x1 for pain  -2 large BMs this shift  -Cortical stimulation done at bedside by Tala HOPE      Neuro:  Ruben Coma Scale  Best Eye Response: 4-->(E4) spontaneous  Best Motor Response: 6-->(M6) obeys commands  Best Verbal Response: 5-->(V5) oriented  Ruben Coma Scale Score: 15  Assessment Qualifiers: no eye obstruction present, patient not sedated/intubated  Pupil PERRLA: yes     24 hr Temp:  [98.3 °F (36.8 °C)-98.9 °F (37.2 °C)]     CV:   Rhythm: normal sinus rhythm  BP goals:   SBP < 160  MAP > 65    Resp:      Oxygen Concentration (%): 28    Plan: N/A    GI/:     Diet/Nutrition Received: regular  Last Bowel Movement: 03/03/23  Voiding Characteristics: voids spontaneously without difficulty    Intake/Output Summary (Last 24 hours) at 3/3/2023 1720  Last data filed at 3/3/2023 1637  Gross per 24 hour   Intake 660 ml   Output 1075 ml   Net -415 ml     Unmeasured Output  Urine Occurrence: 1  Stool Occurrence: 1    Labs/Accuchecks:  Recent Labs   Lab 03/03/23  0121   WBC 6.83   RBC 4.40*   HGB 13.5*   HCT 41.1         Recent Labs   Lab 03/03/23  0121      K 3.7   CO2 24      BUN 14   CREATININE 0.7   ALKPHOS 107   ALT 22   AST 22   BILITOT 0.3      Recent Labs   Lab 02/27/23  0555   INR 1.0   APTT 27.4      Recent Labs   Lab 03/01/23  1112   TROPONINI <0.006       Electrolytes: N/A - electrolytes WDL  Accuchecks: none    Gtts:      LDA/Wounds:  Lines/Drains/Airways       Arterial Line  Duration             Arterial Line 02/27/23 0746 Right Radial 4 days              Peripheral Intravenous Line  Duration                  Peripheral IV - Single Lumen 02/27/23 0608 20 G  Posterior;Right Hand 4 days         Peripheral IV - Single Lumen 03/03/23 0853 20 G Left;Posterior Hand <1 day                  Wounds: No  Wound care consulted: No

## 2023-03-03 NOTE — NURSING
Dr. Edgar at bedside for cortical stimulation test. 2mg ativan and suction at bedside. RN to remain in rm with MD while test in progress.    1 seizure witnessed during stimulation test. Pt with mouth smacking/quivering and unable to answer questions. BP up to 190's and HR up to 120's. Lasted about 30 seconds. BP and HR returned to baseline after seizure. Pt still with some word finding difficulty post-ictally.

## 2023-03-03 NOTE — PROGRESS NOTES
[facility]  Neurology-Epilepsy  Progress Note    Patient Name: Declan Burleson  MRN: [id number]  Admission Date: 2/27/2023  Hospital Length of Stay: 4 days  Code Status: Full Code   Attending Provider: Fred Wayne MD  Primary Care Physician: Juan Carlos Simmons NP   Principal Problem:Complex partial seizures evolving to generalized tonic-clonic seizures    Subjective:     Hospital Course:   2/27>2/28: Clobazam decreased to 20 mg qHS, Eslicarbazepine decreased to 400 mg qHS, and Zonisamide decreased to 200 mg qHS on admission. No seizures noted overnight. Beginning 2/28 pm, hold Clobazam, decrease Zonisamide further to 100 mg qHS. Continue Eslicarbazepine 400 mg nightly.   2/28>3/1: No seizures overnight, EEG normal. Hold Zonisamide beginning 3/1 pm.   3/1>3/2: No seizures overnight. Event 3/2 approximately 1035 of sudden eye opening/fluttering, confusion. Hold Eslicarbazepine beginning 3/2 pm.   3/2>3/3: No clinical seizures overnight. Continue close vEEG off all AEDs. Plan for cortical stimulation 3/3 evening with Dr. Edgar and Dr. Feliciano.      Interval History: No seizures noted overnight, off all AEDs as of 3/2. Continue close vEEG. Plan for cortical stimulation Dr. Edgar and Dr. Feliciano this evening.     Current Facility-Administered Medications   Medication Dose Route Frequency Provider Last Rate Last Admin    acetaminophen tablet 1,000 mg  1,000 mg Oral Q8H Magda Weiss NP   1,000 mg at 03/03/23 0601    bisacodyL suppository 10 mg  10 mg Rectal Daily Shin Rolonl, PA-C   10 mg at 03/03/23 0816    calcium carbonate 200 mg calcium (500 mg) chewable tablet 1,000 mg  1,000 mg Oral BID PRN Magda Weiss NP        cefTRIAXone (ROCEPHIN) 2 g in dextrose 5 % in water (D5W) 5 % 50 mL IVPB (MB+)  2 g Intravenous Q12H Isaura Burgos PA-C   Stopped at 03/03/23 0847    EScitalopram oxalate tablet 10 mg  10 mg Oral Daily Magda Weiss NP   10 mg at 03/03/23 0815    famotidine tablet  20 mg  20 mg Oral BID Fred Wayne MD   20 mg at 03/03/23 0815    glycerin adult suppository 1 suppository  1 suppository Rectal Daily PRN Isaura Burgos PA-C        heparin (porcine) injection 5,000 Units  5,000 Units Subcutaneous Q8H MATEO MayaC   5,000 Units at 03/03/23 0602    hydrALAZINE injection 10 mg  10 mg Intravenous Q4H PRN Magda Miinea, NP        HYDROmorphone injection 0.5 mg  0.5 mg Intravenous Q6H PRN Isaura Burgos PA-C        magnesium hydroxide 400 mg/5 ml suspension 2,400 mg  30 mL Oral Daily Obinna Avendano MD        methocarbamoL tablet 500 mg  500 mg Oral QID PRN Magda Miinea, NP   500 mg at 03/02/23 2114    mupirocin 2 % ointment   Nasal BID Isaura Burgos PA-C   Given at 03/03/23 0827    ondansetron disintegrating tablet 8 mg  8 mg Oral Q8H PRN Isaura Burgos PA-C        oxyCODONE immediate release tablet 5 mg  5 mg Oral Q6H PRN Magda Miinea, NP   5 mg at 03/03/23 0833    polyethylene glycol packet 17 g  17 g Oral Daily ROMIE Maya-C   17 g at 03/03/23 0815    senna-docusate 8.6-50 mg per tablet 2 tablet  2 tablet Oral Daily ROMIE Arambula-C   2 tablet at 03/03/23 0816     Continuous Infusions:    Review of Systems   Constitutional:  Positive for fatigue.   Gastrointestinal:  Negative for nausea and vomiting.   Neurological:  Negative for seizures (none overnight) and headaches.   All other systems reviewed and are negative.  Objective:     Vital Signs (Most Recent):  Temp: 98.8 °F (37.1 °C) (03/03/23 1101)  Pulse: 67 (03/03/23 1301)  Resp: 15 (03/03/23 1301)  BP: (!) 153/101 (03/03/23 1301)  SpO2: 96 % (03/03/23 1301)   Vital Signs (24h Range):  Temp:  [97.6 °F (36.4 °C)-98.9 °F (37.2 °C)] 98.8 °F (37.1 °C)  Pulse:  [61-74] 67  Resp:  [12-23] 15  SpO2:  [91 %-96 %] 96 %  BP: (136-159)/() 153/101  Arterial Line BP: (101-149)/() 147/88     Weight: 87.5 kg (193 lb)  Body mass index is 28.5 kg/m².    Physical  Exam  Vitals and nursing note reviewed.   Constitutional:       General: He is not in acute distress.     Appearance: He is not diaphoretic.   HENT:      Head: Normocephalic.      Comments: sEEG leads + headwrap in place  Eyes:      General: No scleral icterus.     Extraocular Movements: Extraocular movements intact.      Pupils: Pupils are equal, round, and reactive to light.   Cardiovascular:      Rate and Rhythm: Normal rate.   Pulmonary:      Effort: Pulmonary effort is normal. No respiratory distress.   Abdominal:      General: There is no distension.      Palpations: Abdomen is soft.   Musculoskeletal:         General: No deformity or signs of injury.      Cervical back: Normal range of motion and neck supple.   Skin:     General: Skin is warm and dry.   Neurological:      Mental Status: He is alert and oriented to person, place, and time.   Psychiatric:         Mood and Affect: Mood normal.         Speech: Speech normal.         Behavior: Behavior normal.       NEUROLOGICAL EXAMINATION:     MENTAL STATUS   Oriented to person, place, and time.   Speech: speech is normal   Level of consciousness: alert       Speech slightly slowed, at baseline     CRANIAL NERVES     CN III, IV, VI   Pupils are equal, round, and reactive to light.    CN VII   Facial expression full, symmetric.     CN VIII   Hearing: intact    CN XI   CN XI normal.     CN XII   CN XII normal.     MOTOR EXAM   Muscle bulk: normal  Overall muscle tone: normal       Follows commands, moves all extremities spontaneously and symmetrically     GAIT AND COORDINATION        No pronator drift noted     Significant Labs: All pertinent lab results from the past 24 hours have been reviewed.    Significant Studies: I have reviewed all pertinent imaging results/findings within the past 24 hours.    Assessment and Plan:     * Complex partial seizures evolving to generalized tonic-clonic seizures  Mr. Burleson is a 58 year old man with intractable epilepsy s/p  prior sEEG monitoring in January 2021, left laser amygdalohippocampectomy in March 2021 admitted to [facility] s/p placement of 8 left-sided sEEG electrodes for repeat intracranial monitoring and further localization of his epilepsy in consideration of additional surgical management options of his epilepsy. Currently maintained on Clobazam 40 mg qHS, Eslicarbazepine 1200 mg qHS, and Zonisamide 500 mg qHS with continued events of staring/loss of awareness and generalized convulsions.    - s/p placement of 8 left-sided sEEG electrodes by [facility] 2/27  - Event 3/2 am with no associated EEG change, not consistent with patients typical seizure  - Holding Eslicarbazepine since 3/2, Zonisamide since 3/1, Clobazam since 2/28  - Cortical stimulation 3/3 evening by Dr. Edgar and Dr. Feliciano  - Continue mittens and strict 1:1 supervision as he previously self-removed sEEG leads during monitoring in 2021, requiring early termination of monitoring  - Post-operative imaging timing, pain control per [facility], [facility]  - Seizure precautions  - Please call epilepsy to notify of any seizures  -  If more than 3 seizures in 2 hours or any seizure lasting greater than 3 minutes, please give 2 mg IV lorazepam and contact on-call epilepsy    Plan of care discussed with NCC team, patient and wife at bedside. Will continue to follow, please call with any questions.     Depression with anxiety  - Continue home Escitalopram 10 mg daily    Migraine without status migrainosus, not intractable  - Followed by Dr. Saldana as outpatient  - Supportive care during admission        VTE Risk Mitigation (From admission, onward)         Ordered     heparin (porcine) injection 5,000 Units  Every 8 hours         02/27/23 1236     Place sequential compression device  Until discontinued         02/27/23 7180                Teresa Cerrato PA-C  Neurology-Epilepsy  [facility]  Staff: Dr. Feliciano

## 2023-03-03 NOTE — ASSESSMENT & PLAN NOTE
Declan Burleson is a 58 y.o.M  admitted to [facility] s/p SEEG placement. He has failed multiple AEDs, including topiramate, lamotrigine, levetiracetam, and oxcarbazepine. He is currently taking eslicarbazepine, zonisamide, and clobazam.     -- s/p sEEG  -- NSGY and epilepsy following  -- AED's per epilepsy reccs- weaned off all AEDs on 3/2 per epilepsy   -- Neuro checks q 2hr  -- SBP <160  -- seizure precautions  -- PT/OT/SLP

## 2023-03-03 NOTE — ASSESSMENT & PLAN NOTE
59 yo M with intractable epilepsy who is now s/p sEEG lead placement for seizure monitoring/mapping    - admitted to [facility]  - q1 neurochecks  - CT stealth post op reviewed, sEEG leads in appropriate position, no large volume hemorrhage  - AEDs and seizure management per epilepsy  - hyponatremia, ctm  - bowel regiment, ctm for BMs   -- Added mild of mag today    -- Suppository today   - nevarez removed, f/u spontaneous voiding  - Chest pain, trops negative, likely GI related pain    - PPI, Bowel regimen  - bed rest  - PT/OT, daily stationary bike   -- Please encourage patient for improved PT activity   - [facility]    Dispo: ongoing, timing TBD for sEEG lead removal

## 2023-03-03 NOTE — PROGRESS NOTES
"[facility]  Neurosurgery  Progress Note    Subjective:     History of Present Illness: Declan Burleson is a 58 y.o. right-handed male referred by Dr. Benoit (originally by Dr. Saldana of [place]) for consideration of surgical therapy for intractable epilepsy. The patient was formerly employed as a . He is accompanied by his wife Trista today, who helps to provide the history. His seizures began when he was 48 years old. He can recall no inciting event. He has 3 semiologies: "full blown" generalized events, staring episodes, and "mild" seizures, which are characterized as generalized events of shorter duration without incontinence and a shorter post-ictal period. He and his wife estimate that he persists in having about 2 events/month.      He has failed multiple AEDs, including topiramate, lamotrigine, levetiracetam, and oxcarbazepine. He is currently taking eslicarbazepine, zonisamide, and clobazam. He had EMU monitoring in May-June of this year, which suggests left-sided activity. He had 3T MRI in June (personally reviewed) that was non-lesional; he also had PET at that time suggesting possible left-sided activity. He had neuropsychological testing on 8/10/20; summary findings included below. He lives with his wife and 16-year-old daughter, who provide excellent social support.      He takes ASA 81, which will need to be held for one week prior to surgery.      As of 12/22/20, the patient reports he has had no significant change. He is hoping to be seen today for pre-operative optimization. He is out of Aptiom, which his wife is working to re-obtain for him.       As of 2/9/21, the patient underwent bilateral sEEG monitoring on 1/11/21. He self-removed several of the leads on 1/21/21 and was taken to the OR for removal of the remaining electrodes. Fortunately, there was no significant intracranial hemorrhage associated. Since being discharged home, the patient reports " that he is overall doing well. He has had no fever, chills, or drainage from the incisions. He has had some headache. He also feels that he has been sleeping more than before the procedure. He persists in having bad memory.      As of 4/13/21, the patient underwent left laser amygdalohippocampectomy on 3/1/21. Overall, the has done well since then. He and his wife report that he has been seizure free. He persists in having some days that are better than others; he is more irritable and tired than before surgery at times. This is inconsistent, and some days he is at his preoperative baseline. He has been compliant with his AEDs; he is taking 1200 mg of aptiom daily.     They deny any fever, chills, or drainage from the incision, though there was a scab that was removed when he got a haircut recently. He is also experiencing headache that radiates from the site of his incision forward. This improves with Maxalt.      As of 7/6/21, the patient reports that he is having headache from the base of the neck long term up the head. It feels like a small drum that comes and goes. He had to turn off the lights, lay down, and take over-the-counter medication (Tylenol, at times) last week. He also became more pale with these episodes. It is made worse by lying on the left side in bed.  He is now at his normal baseline. He also reports chest pain; he remains hyponatremic. He is currently out of the anxiety and blood pressure medications for at least 4 days.      He has been seizure free since the procedure. Trista reports that the incision is well healed. No fever/chills.      He is more irritable than before surgery and has some memory issues.      As of 7/22/21, the patient returns in follow up. He still has headache and mood swings at times. He has not had any seizures. He and his wife both feel that his speech and conversation and improving. He remains highly distractable. He and his wife endorse that his grandfather had  "migraines.      He will need to re-establish with a new epileptologist since Dr. Benoit is moving to [place].      As of 9/30/21, the patient reports he has remained seizure-free. He continues to have significant headaches, particularly over the left posterior aspect of the head, radiating over to the right side. His wife endorses that he has bradykinesia and decreased arm swing when walking. Jean Egdar and Karen are concerned he may have Parkinson disease.      As of 12/15/22, the patient returns with his wife, Trista. They are frustrated that he is "like a zombie" because of the high doses of AEDs. He has been seizure-free since April, but when he decreased the meds back in April, he had 3 events within 24 hours.      Mood swings are also an issue; he gets irritated more readily than he did in the past. He is seeing a speech therapist in [facility]; he is making progress WRT conversational communication and memory.      They now have 2 grandchildren.      Goals of surgery: to be free of seizures and reduce medications.      They would like to undergo repeat sEEG since the first was cut short to see if he may be a candidate for further epilepsy surgery.      As of 2/23/23, the patient returns for further discussion of repeat sEEG. He has undergone extensive discussion in interdisciplinary conference, together with review by Dr. Andrew of memory neurology, and he is being recommended for repeat sEEG. He has no evidence of non-epilepsy, non-TBI related neurodegenerative disease on Dr. Andrew's initial evaluation, per discussion with Dr. ALEK Edgar.      He and his wife remain eager to proceed with sEEG placement to determine whether he will be a candidate for further intracranial seizure surgery.     Post-Op Info:  Procedure(s) (LRB):  PLACEMENT OF STEREO EEG LEADS (DEPTH ELECTRODES) (Left)   3 Days Post-Op     Interval History: 3/2: NAEON, no BM, no seizures    Medications:  Continuous Infusions:  Scheduled Meds:   " acetaminophen  1,000 mg Oral Q8H    [START ON 3/3/2023] bisacodyL  10 mg Rectal Daily    cefTRIAXone (ROCEPHIN) IVPB  2 g Intravenous Q12H    EScitalopram oxalate  10 mg Oral Daily    famotidine  20 mg Oral BID    heparin (porcine)  5,000 Units Subcutaneous Q8H    mupirocin   Nasal BID    polyethylene glycol  17 g Oral Daily    [START ON 3/3/2023] senna-docusate 8.6-50 mg  2 tablet Oral Daily     PRN Meds:calcium carbonate, glycerin adult, hydrALAZINE, HYDROmorphone, methocarbamoL, ondansetron, oxyCODONE     Review of Systems   Neurological:  Positive for seizures.   All other systems reviewed and are negative.  Objective:     Weight: 87.5 kg (193 lb)  Body mass index is 28.5 kg/m².  Vital Signs (Most Recent):  Temp: 98.9 °F (37.2 °C) (03/02/23 1905)  Pulse: 67 (03/02/23 1905)  Resp: 15 (03/02/23 1905)  BP: (!) 149/92 (03/02/23 1905)  SpO2: (!) 92 % (03/02/23 1905)   Vital Signs (24h Range):  Temp:  [97.6 °F (36.4 °C)-98.9 °F (37.2 °C)] 98.9 °F (37.2 °C)  Pulse:  [59-75] 67  Resp:  [10-23] 15  SpO2:  [92 %-98 %] 92 %  BP: (132-165)/(80-92) 149/92  Arterial Line BP: (106-154)/() 135/79     Date 03/02/23 0700 - 03/03/23 0659   Shift 2158-2057 2588-6351 7455-9131 24 Hour Total   INTAKE   P.O. 600 350  950   IV Piggyback 48.9   48.9   Shift Total(mL/kg) 648.9(7.4) 350(4)  998.9(11.4)   OUTPUT   Urine(mL/kg/hr)  550  550   Shift Total(mL/kg)  550(6.3)  550(6.3)   Weight (kg) 87.5 87.5 87.5 87.5                          Physical Exam  Constitutional:       Appearance: He is well-developed and well-nourished.   Eyes:      Extraocular Movements: EOM normal.      Conjunctiva/sclera: Conjunctivae normal.      Pupils: Pupils are equal, round, and reactive to light.   Cardiovascular:      Pulses: Normal pulses.   Abdominal:      Palpations: Abdomen is soft.   Neurological:      Mental Status: He is alert and oriented to person, place, and time.      Comments: AAOx3, higher order confusion  PERRL  CN intact  BUE 5/5  BLE  5/5  SILT    Headwrap in place with sEEG leads   Psychiatric:         Mood and Affect: Mood and affect normal.       Physical Exam:    Constitutional: He appears well-developed and well-nourished.     Eyes: Pupils are equal, round, and reactive to light. Conjunctivae and EOM are normal.     Cardiovascular: Normal pulses.     Abdominal: Soft.     Psych/Behavior: He is alert. He is oriented to person, place, and time. He has a normal mood and affect.     Neurological:   AAOx3, higher order confusion  PERRL  CN intact  BUE 5/5  BLE 5/5  SILT    Headwrap in place with sEEG leads     Significant Labs:  Recent Labs   Lab 03/01/23  0023 03/02/23  0219   GLU 93 100   * 135*   K 3.9 3.9    103   CO2 25 24   BUN 11 13   CREATININE 0.8 0.7   CALCIUM 8.8 9.0   MG 1.9 2.0       Recent Labs   Lab 03/01/23  0023 03/02/23  0219   WBC 7.26 6.89   HGB 13.5* 13.4*   HCT 40.1 42.2    287       No results for input(s): LABPT, INR, APTT in the last 48 hours.    Microbiology Results (last 7 days)       ** No results found for the last 168 hours. **          All pertinent labs from the last 24 hours have been reviewed.    Significant Diagnostics:  I have reviewed all pertinent imaging results/findings within the past 24 hours.    Assessment/Plan:     * Complex partial seizures evolving to generalized tonic-clonic seizures  59 yo M with intractable epilepsy who is now s/p sEEG lead placement for seizure monitoring/mapping    - admitted to [facility]  -  neurochecks  - CT stealth post op reviewed, sEEG leads in appropriate position, no large volume hemorrhage  - AEDs and seizure management per epilepsy  - hyponatremia, ctm  - bowel regiment, ctm for BMs  - nevarez removed, f/u spontaneous voiding  - Chest pain, trops negative, likely GI related pain    - PPI, Bowel regimen   - Scheduled lactulose  - bed rest  - PT/OT, daily stationary bike  - [facility]    Dispo: ongoing, timing TBD for sEEG lead removal        Obinna Avendano,  MD  Neurosurgery  [facility]

## 2023-03-03 NOTE — PROCEDURES
SEEG Report      IMPLANTATION DATE:  2/27/2023  DATE OF ADMISSION: 2/27/2023       ADMITTING/REQUESTING PROVIDER: Ruchi Chen MD     REASON FOR CONSULT:  58-year-old man admitted for phase 2 epilepsy surgical workup with the placement of intracranial sEEG leads for seizure capture and onset localization.     METHODOLOGY  Depth electrodes consist of thin wires with electrical contacts at fixed distances along the wire. These are implanted using a stereotactic procedure targeting cortical and subcortical structures. Digital video recording of the patient is simultaneously recorded with the EEG. The patient is instructed to report clinical symptoms which may occur during the recording session. EEG and video recording are stored and archived in digital format. Activation procedures which include photic stimulation, hyperventilation and instructing patients to perform simple tasks are done in selected patients. Compresses spectral analysis (CSA) is performed on the activity recorded from each individual channel. This is displayed as a power display of frequencies from 0 to 30 Hz over time. The CSA analysis is done and displayed continuously. This is reviewed for asymmetries in power between homologous areas of the scalp and for presence of changes in power which can be seen when seizures occur. Sections of suspected abnormalities on the CSA is then compared with the original EEG recording.      The following is a chart outlying the details related to the depth electrodes implanted:       Electrode    Medial target  Lateral target  Cable Color Serial Number # Of Contacts Head Box  Location   1 L Sup margin Piriformis Inf Temp Gyrus Blk/Gr 74668 16 1-16   2 L Entorihinal Entorhinal Inf Temp Gyrus Yel/Scott 66169 12 17-28   3 L Prefrontal Ant Cing Mid Frontal Gyrus Scott/Blk 75132 16 29-44   4 L Ant Insular Ant Insula Frontal Operculum Yel/Gre 67357 10 45-54   5 L Mid Insular Mid Insula Pars Triangularis Yel/Scott 09059 10  55-64   6 L Orbitofrontal Med OrbFr Lat OrbFr Blk/Yel 10651 16 65-80   7 L Mid Cing Mid Cingulate Sup Frontal Gyrus Red/Yel 40180 14 81-94   8 L Hippo Tail Post Hippo Mid Temp Gyrus Red/Gre 71103 14     EKG           109-110        RECORDING TIMES  Start on March 2, 2023   End on March 3, 2023       The total time of intracranial EEG recording for the study was 23:59:18     EEG FINDINGS  Button Push:  There were no push button events during this study     Interictal:  None     Ictal:   None    At approximately 10:30 patient awoke with startle and reported recalling a dream.  There was no associated EEG change.    Impression:    Normal one day video EEG

## 2023-03-03 NOTE — SUBJECTIVE & OBJECTIVE
Interval History: 3/2: NAEON, no BM, no seizures    Medications:  Continuous Infusions:  Scheduled Meds:   acetaminophen  1,000 mg Oral Q8H    [START ON 3/3/2023] bisacodyL  10 mg Rectal Daily    cefTRIAXone (ROCEPHIN) IVPB  2 g Intravenous Q12H    EScitalopram oxalate  10 mg Oral Daily    famotidine  20 mg Oral BID    heparin (porcine)  5,000 Units Subcutaneous Q8H    mupirocin   Nasal BID    polyethylene glycol  17 g Oral Daily    [START ON 3/3/2023] senna-docusate 8.6-50 mg  2 tablet Oral Daily     PRN Meds:calcium carbonate, glycerin adult, hydrALAZINE, HYDROmorphone, methocarbamoL, ondansetron, oxyCODONE     Review of Systems   Neurological:  Positive for seizures.   All other systems reviewed and are negative.  Objective:     Weight: 87.5 kg (193 lb)  Body mass index is 28.5 kg/m².  Vital Signs (Most Recent):  Temp: 98.9 °F (37.2 °C) (03/02/23 1905)  Pulse: 67 (03/02/23 1905)  Resp: 15 (03/02/23 1905)  BP: (!) 149/92 (03/02/23 1905)  SpO2: (!) 92 % (03/02/23 1905)   Vital Signs (24h Range):  Temp:  [97.6 °F (36.4 °C)-98.9 °F (37.2 °C)] 98.9 °F (37.2 °C)  Pulse:  [59-75] 67  Resp:  [10-23] 15  SpO2:  [92 %-98 %] 92 %  BP: (132-165)/(80-92) 149/92  Arterial Line BP: (106-154)/() 135/79     Date 03/02/23 0700 - 03/03/23 0659   Shift 7447-2393 2134-5723 0391-1964 24 Hour Total   INTAKE   P.O. 600 350  950   IV Piggyback 48.9   48.9   Shift Total(mL/kg) 648.9(7.4) 350(4)  998.9(11.4)   OUTPUT   Urine(mL/kg/hr)  550  550   Shift Total(mL/kg)  550(6.3)  550(6.3)   Weight (kg) 87.5 87.5 87.5 87.5                          Physical Exam  Constitutional:       Appearance: He is well-developed and well-nourished.   Eyes:      Extraocular Movements: EOM normal.      Conjunctiva/sclera: Conjunctivae normal.      Pupils: Pupils are equal, round, and reactive to light.   Cardiovascular:      Pulses: Normal pulses.   Abdominal:      Palpations: Abdomen is soft.   Neurological:      Mental Status: He is alert and oriented  to person, place, and time.      Comments: AAOx3, higher order confusion  PERRL  CN intact  BUE 5/5  BLE 5/5  SILT    Headwrap in place with sEEG leads   Psychiatric:         Mood and Affect: Mood and affect normal.       Physical Exam:    Constitutional: He appears well-developed and well-nourished.     Eyes: Pupils are equal, round, and reactive to light. Conjunctivae and EOM are normal.     Cardiovascular: Normal pulses.     Abdominal: Soft.     Psych/Behavior: He is alert. He is oriented to person, place, and time. He has a normal mood and affect.     Neurological:   AAOx3, higher order confusion  PERRL  CN intact  BUE 5/5  BLE 5/5  SILT    Headwrap in place with sEEG leads     Significant Labs:  Recent Labs   Lab 03/01/23 0023 03/02/23 0219   GLU 93 100   * 135*   K 3.9 3.9    103   CO2 25 24   BUN 11 13   CREATININE 0.8 0.7   CALCIUM 8.8 9.0   MG 1.9 2.0       Recent Labs   Lab 03/01/23 0023 03/02/23 0219   WBC 7.26 6.89   HGB 13.5* 13.4*   HCT 40.1 42.2    287       No results for input(s): LABPT, INR, APTT in the last 48 hours.    Microbiology Results (last 7 days)       ** No results found for the last 168 hours. **          All pertinent labs from the last 24 hours have been reviewed.    Significant Diagnostics:  I have reviewed all pertinent imaging results/findings within the past 24 hours.

## 2023-03-03 NOTE — PROCEDURES
SEEG Report      IMPLANTATION DATE:  2/27/2023  DATE OF ADMISSION: 2/27/2023       ADMITTING/REQUESTING PROVIDER: Ruchi Chen MD     REASON FOR CONSULT:  58-year-old man admitted for phase 2 epilepsy surgical workup with the placement of intracranial sEEG leads for seizure capture and onset localization.     METHODOLOGY  Depth electrodes consist of thin wires with electrical contacts at fixed distances along the wire. These are implanted using a stereotactic procedure targeting cortical and subcortical structures. Digital video recording of the patient is simultaneously recorded with the EEG. The patient is instructed to report clinical symptoms which may occur during the recording session. EEG and video recording are stored and archived in digital format. Activation procedures which include photic stimulation, hyperventilation and instructing patients to perform simple tasks are done in selected patients. Compresses spectral analysis (CSA) is performed on the activity recorded from each individual channel. This is displayed as a power display of frequencies from 0 to 30 Hz over time. The CSA analysis is done and displayed continuously. This is reviewed for asymmetries in power between homologous areas of the scalp and for presence of changes in power which can be seen when seizures occur. Sections of suspected abnormalities on the CSA is then compared with the original EEG recording.      The following is a chart outlying the details related to the depth electrodes implanted:       Electrode    Medial target  Lateral target  Cable Color Serial Number # Of Contacts Head Box  Location   1 L Sup margin Piriformis Inf Temp Gyrus Blk/Gr [id number] 16 1-16   2 L Entorihinal Entorhinal Inf Temp Gyrus Yel/Scott 85923 12 17-28   3 L Prefrontal Ant Cing Mid Frontal Gyrus Scott/Blk 89769 16 29-44   4 L Ant Insular Ant Insula Frontal Operculum Yel/Gre 74233 10 45-54   5 L Mid Insular Mid Insula Pars Triangularis Yel/Scott 49063 10  55-64   6 L Orbitofrontal Med OrbFr Lat OrbFr Blk/Yel 92500 16 65-80   7 L Mid Cing Mid Cingulate Sup Frontal Gyrus Red/Yel 76435 14 81-94   8 L Hippo Tail Post Hippo Mid Temp Gyrus Red/Gre 14020 14     EKG           109-110        RECORDING TIMES  Start on March 1, 2023   End on March 2, 2023       The total time of intracranial EEG recording for the study was 23:59:11     EEG FINDINGS  Button Push:  There were no push button events during this study     Interictal:  None     Ictal:   None    At approximately 10:30 patient awoke with startle and reported recalling a dream.  There was no associated EEG change.    Impression:    Normal one day video EEG

## 2023-03-03 NOTE — PROGRESS NOTES
[facility]  Neurosurgery  Progress Note    Subjective:     History of Present Illness: 58 M with epilepsy presents for elective sEEG placement on 2/27      Post-Op Info:  Procedure(s) (LRB):  PLACEMENT OF STEREO EEG LEADS (DEPTH ELECTRODES) (Left)   4 Days Post-Op     Interval History: 3/3: NAEON, no seizures, no BM, reportedly refusion BR. Some ROM work with wife, needs more encouragement for improved PT activity    Medications:  Continuous Infusions:  Scheduled Meds:   acetaminophen  1,000 mg Oral Q8H    bisacodyL  10 mg Rectal Daily    cefTRIAXone (ROCEPHIN) IVPB  2 g Intravenous Q12H    EScitalopram oxalate  10 mg Oral Daily    famotidine  20 mg Oral BID    heparin (porcine)  5,000 Units Subcutaneous Q8H    magnesium hydroxide 400 mg/5 ml  30 mL Oral Daily    mupirocin   Nasal BID    polyethylene glycol  17 g Oral Daily    senna-docusate 8.6-50 mg  2 tablet Oral Daily     PRN Meds:calcium carbonate, glycerin adult, hydrALAZINE, HYDROmorphone, methocarbamoL, ondansetron, oxyCODONE     Review of Systems   Neurological:  Positive for seizures.   All other systems reviewed and are negative.  Objective:     Weight: 87.5 kg (193 lb)  Body mass index is 28.5 kg/m².  Vital Signs (Most Recent):  Temp: 98.3 °F (36.8 °C) (03/03/23 0701)  Pulse: 74 (03/03/23 0901)  Resp: (!) 21 (03/03/23 0901)  BP: (!) 152/94 (03/03/23 0901)  SpO2: (!) 93 % (03/03/23 0901)   Vital Signs (24h Range):  Temp:  [97.6 °F (36.4 °C)-98.9 °F (37.2 °C)] 98.3 °F (36.8 °C)  Pulse:  [61-75] 74  Resp:  [11-23] 21  SpO2:  [91 %-97 %] 93 %  BP: (132-159)/(80-95) 152/94  Arterial Line BP: (101-149)/() 149/94     Date 03/03/23 0700 - 03/04/23 0659   Shift 7823-9614 0975-4825 3471-1755 24 Hour Total   INTAKE   P.O. 400   400   Shift Total(mL/kg) 400(4.6)   400(4.6)   OUTPUT   Urine(mL/kg/hr) 150   150   Shift Total(mL/kg) 150(1.7)   150(1.7)   Weight (kg) 87.5 87.5 87.5 87.5                          Physical  Exam  Constitutional:       Appearance: He is well-developed and well-nourished.   Eyes:      Extraocular Movements: EOM normal.      Conjunctiva/sclera: Conjunctivae normal.      Pupils: Pupils are equal, round, and reactive to light.   Cardiovascular:      Pulses: Normal pulses.   Abdominal:      Palpations: Abdomen is soft.   Neurological:      Mental Status: He is alert and oriented to person, place, and time.      Comments: AAOx3, higher order confusion  PERRL  CN intact  BUE 5/5  BLE 5/5  SILT    Headwrap in place with sEEG leads   Psychiatric:         Mood and Affect: Mood and affect normal.       Physical Exam:    Constitutional: He appears well-developed and well-nourished.     Eyes: Pupils are equal, round, and reactive to light. Conjunctivae and EOM are normal.     Cardiovascular: Normal pulses.     Abdominal: Soft.     Psych/Behavior: He is alert. He is oriented to person, place, and time. He has a normal mood and affect.     Neurological:   AAOx3, higher order confusion  PERRL  CN intact  BUE 5/5  BLE 5/5  SILT    Headwrap in place with sEEG leads     Significant Labs:  Recent Labs   Lab 03/02/23  0219 03/03/23  0121    96   * 138   K 3.9 3.7    104   CO2 24 24   BUN 13 14   CREATININE 0.7 0.7   CALCIUM 9.0 9.1   MG 2.0 1.9       Recent Labs   Lab 03/02/23  0219 03/03/23  0121   WBC 6.89 6.83   HGB 13.4* 13.5*   HCT 42.2 41.1    323       No results for input(s): LABPT, INR, APTT in the last 48 hours.    Microbiology Results (last 7 days)       ** No results found for the last 168 hours. **          All pertinent labs from the last 24 hours have been reviewed.    Significant Diagnostics:  I have reviewed all pertinent imaging results/findings within the past 24 hours.    Assessment/Plan:     * Complex partial seizures evolving to generalized tonic-clonic seizures  59 yo M with intractable epilepsy who is now s/p sEEG lead placement for seizure monitoring/mapping    - admitted  to [facility]  - q1 neurochecks  - CT stealth post op reviewed, sEEG leads in appropriate position, no large volume hemorrhage  - AEDs and seizure management per epilepsy  - hyponatremia, ctm  - bowel regiment, ctm for BMs   -- Added mild of mag today    -- Suppository today   - nevarez removed, f/u spontaneous voiding  - Chest pain, trops negative, likely GI related pain    - PPI, Bowel regimen  - bed rest  - PT/OT, daily stationary bike   -- Please encourage patient for improved PT activity   - [facility]    Dispo: ongoing, timing TBD for sEEG lead removal        Obinna Avendano MD  Neurosurgery  [facility]

## 2023-03-03 NOTE — PROGRESS NOTES
"[facility]  Neurocritical Care  Progress Note    Admit Date: 2/27/2023  Service Date: 03/03/2023  Length of Stay: 4    Subjective:     Chief Complaint: Complex partial seizures evolving to generalized tonic-clonic seizures    History of Present Illness: Declan Burleson is a 58 y.o.M  admitted to [facility] s/p SEEG placement. Per chart review he was referred by Dr. Benoit (originally by Dr. Saldana of [place]) for consideration of surgical therapy for intractable epilepsy. The patient was formerly employed as a . He is accompanied by his wife Trista, who helps to provide the history. His seizures began when he was 48 years old. He can recall no inciting event. He has 3 semiologies: "full blown" generalized events, staring episodes, and "mild" seizures, which are characterized as generalized events of shorter duration without incontinence and a shorter post-ictal period. He and his wife estimate that he persists in having about 2 events/month. He has failed multiple AEDs, including topiramate, lamotrigine, levetiracetam, and oxcarbazepine. He is currently taking eslicarbazepine, zonisamide, and clobazam. He had EMU monitoring in May-June of this year, which suggests left-sided activity. NSGY and epilepsy following. Patient admitted to [facility] for close monitoring and higher level of care.       Hospital Course: 2/28/2023: NAEON, patient complains of L side headache, L eye pressure (no visual disturbance) PRN pain available  3/1/2023: complained of chest pain- troponin neg, EKG stable, AED's per Epilepsy  03/02/2023 Increased senna, added daily suppository. Weaned off of AEDs for this afternoon per epilepsy.  03/03/2023 No seizures overnight. BM this AM. PT/OT today.      Review of Systems   Constitutional:  Negative for chills, diaphoresis, fatigue and fever.   HENT:  Negative for facial swelling and trouble swallowing.    Eyes:  Negative for photophobia and visual disturbance. "   Respiratory:  Negative for cough and shortness of breath.    Cardiovascular:  Negative for chest pain, palpitations and leg swelling.   Gastrointestinal:  Negative for abdominal distention, abdominal pain, constipation, diarrhea, nausea and vomiting.   Endocrine: Negative for polyuria.   Genitourinary:  Negative for difficulty urinating.   Musculoskeletal:  Negative for back pain, neck pain and neck stiffness.   Skin:  Negative for pallor.   Neurological:  Positive for headaches. Negative for seizures.   Psychiatric/Behavioral:  Negative for behavioral problems and confusion.      Objective:     Vitals:  Temp: 98.8 °F (37.1 °C)  Pulse: 67  Rhythm: normal sinus rhythm  BP: (!) 144/88  MAP (mmHg): 109  Resp: 17  SpO2: 95 %    Temp  Min: 97.6 °F (36.4 °C)  Max: 98.9 °F (37.2 °C)  Pulse  Min: 61  Max: 74  BP  Min: 136/86  Max: 159/95  MAP (mmHg)  Min: 104  Max: 122  Resp  Min: 12  Max: 23  SpO2  Min: 91 %  Max: 95 %    03/02 0701 - 03/03 0700  In: 1058.9 [P.O.:1010]  Out: 1050 [Urine:1050]   Unmeasured Output  Urine Occurrence: 1  Stool Occurrence: 1       Physical Exam  Vitals and nursing note reviewed.   Constitutional:       Appearance: He is normal weight.   HENT:      Head:      Comments: sEEG dressing in place       Right Ear: External ear normal.      Left Ear: External ear normal.      Ears:      Comments: Pain behind L ear     Nose: Nose normal.      Mouth/Throat:      Mouth: Mucous membranes are moist.      Pharynx: Oropharynx is clear.   Eyes:      Extraocular Movements: Extraocular movements intact.      Conjunctiva/sclera: Conjunctivae normal.      Pupils: Pupils are equal, round, and reactive to light.   Cardiovascular:      Rate and Rhythm: Normal rate and regular rhythm.      Pulses: Normal pulses.   Pulmonary:      Effort: Pulmonary effort is normal.   Abdominal:      General: Bowel sounds are normal.      Palpations: Abdomen is soft.   Musculoskeletal:      Cervical back: Normal range of motion.       Right lower leg: No edema.      Left lower leg: No edema.   Skin:     General: Skin is warm and dry.   Neurological:      Mental Status: He is alert.      Comments: E4 V5 M6  Alert. Oriented x 3. Follows commands. Speech is delayed.  PERRL, EOMI   No facial asymmetry, facial sensation intact    Tongue midline, + shoulder shrug    Moves all extremities spontaneously and antigravity   Sensation intact throughout   No drift  No ataxia    Psychiatric:         Mood and Affect: Mood normal.       Medications:  Continuous Scheduledacetaminophen, 1,000 mg, Q8H  bisacodyL, 10 mg, Daily  cefTRIAXone (ROCEPHIN) IVPB, 2 g, Q12H  EScitalopram oxalate, 10 mg, Daily  famotidine, 20 mg, BID  heparin (porcine), 5,000 Units, Q8H  magnesium hydroxide 400 mg/5 ml, 30 mL, Daily  mupirocin, , BID  polyethylene glycol, 17 g, Daily  senna-docusate 8.6-50 mg, 2 tablet, Daily    PRNcalcium carbonate, 1,000 mg, BID PRN  glycerin adult, 1 suppository, Daily PRN  hydrALAZINE, 10 mg, Q4H PRN  HYDROmorphone, 0.5 mg, Q6H PRN  methocarbamoL, 500 mg, QID PRN  ondansetron, 8 mg, Q8H PRN  oxyCODONE, 5 mg, Q6H PRN      Today I personally reviewed pertinent medications, lines/drains/airways, imaging, cardiology results, laboratory results, microbiology results,     Diet  Diet Adult Regular (IDDSI Level 7)      Assessment/Plan:     Neuro  * Complex partial seizures evolving to generalized tonic-clonic seizures  Declan Néstor Burleson is a 58 y.o.M  admitted to [facility] s/p SEEG placement. He has failed multiple AEDs, including topiramate, lamotrigine, levetiracetam, and oxcarbazepine. He is currently taking eslicarbazepine, zonisamide, and clobazam.     -- s/p sEEG  -- NSGY and epilepsy following  -- AED's per epilepsy reccs- weaned off all AEDs on 3/2 per epilepsy   -- Neuro checks q 2hr  -- SBP <160  -- seizure precautions  -- PT/OT/SLP    Psychiatric  Depression with anxiety  Continue home med Escitalopram        The patient is being Prophylaxed  for:  Venous Thromboembolism with: Mechanical or Chemical  Stress Ulcer with: H2B  Ventilator Pneumonia with: not applicable    Activity Orders          Diet Adult Regular (IDDSI Level 7): Regular starting at 02/27 1103        Full Code     35 minutes critical care time    Shin López PA-C  Neurocritical Care  [facility]

## 2023-03-03 NOTE — PLAN OF CARE
[facility] Care Plan    POC reviewed with Declan Burleson and family at 0300. Pt and family verbalized understanding. Questions and concerns addressed. No acute events overnight. Pt progressing toward goals. Will continue to monitor. See below and flowsheets for full assessment and VS info.     Neuro exam unchanged overnight.   No seizures overnight.     PRN's given for pain control.     Neuro:  Ruben Coma Scale  Best Eye Response: 4-->(E4) spontaneous  Best Motor Response: 6-->(M6) obeys commands  Best Verbal Response: 5-->(V5) oriented  [place] Coma Scale Score: 15  Assessment Qualifiers: no eye obstruction present  Pupil PERRLA: yes     24hr Temp:  [97.6 °F (36.4 °C)-98.9 °F (37.2 °C)]     CV:   Rhythm: normal sinus rhythm  BP goals:   SBP < 160  MAP > 65    Resp:      Oxygen Concentration (%): 28    Plan: N/A    GI/:     Diet/Nutrition Received: regular  Last Bowel Movement: 02/26/23  Voiding Characteristics: voids spontaneously without difficulty    Intake/Output Summary (Last 24 hours) at 3/3/2023 0552  Last data filed at 3/3/2023 0405  Gross per 24 hour   Intake 1058.85 ml   Output 1050 ml   Net 8.85 ml     Unmeasured Output  Urine Occurrence: 1  Stool Occurrence: 0    Labs/Accuchecks:  Recent Labs   Lab 03/03/23  0121   WBC 6.83   RBC 4.40*   HGB 13.5*   HCT 41.1         Recent Labs   Lab 03/03/23  0121      K 3.7   CO2 24      BUN 14   CREATININE 0.7   ALKPHOS 107   ALT 22   AST 22   BILITOT 0.3      Recent Labs   Lab 02/27/23  0555   INR 1.0   APTT 27.4      Recent Labs   Lab 03/01/23  1112   TROPONINI <0.006       Electrolytes: N/A - electrolytes WDL  Accuchecks: none    Gtts:      LDA/Wounds:  Lines/Drains/Airways       Arterial Line  Duration             Arterial Line 02/27/23 0746 Right Radial 3 days              Peripheral Intravenous Line  Duration                  Peripheral IV - Single Lumen 02/27/23 0608 20 G Posterior;Right Hand 3 days         Peripheral IV - Single Lumen  02/27/23 0736 18 G Left Forearm 3 days

## 2023-03-03 NOTE — ASSESSMENT & PLAN NOTE
57 yo M with intractable epilepsy who is now s/p sEEG lead placement for seizure monitoring/mapping    - admitted to [facility]  - q1 neurochecks  - CT stealth post op reviewed, sEEG leads in appropriate position, no large volume hemorrhage  - AEDs and seizure management per epilepsy  - hyponatremia, ctm  - bowel regiment, ctm for BMs  - nevarez removed, f/u spontaneous voiding  - Chest pain, trops negative, likely GI related pain    - PPI, Bowel regimen   - Scheduled lactulose  - bed rest  - PT/OT, daily stationary bike  - [facility]    Dispo: ongoing, timing TBD for sEEG lead removal

## 2023-03-03 NOTE — ASSESSMENT & PLAN NOTE
Mr. Burleson is a 58 year old man with intractable epilepsy s/p prior sEEG monitoring in January 2021, left laser amygdalohippocampectomy in March 2021 admitted to [facility] s/p placement of 8 left-sided sEEG electrodes for repeat intracranial monitoring and further localization of his epilepsy in consideration of additional surgical management options of his epilepsy. Currently maintained on Clobazam 40 mg qHS, Eslicarbazepine 1200 mg qHS, and Zonisamide 500 mg qHS with continued events of staring/loss of awareness and generalized convulsions.    - s/p placement of 8 left-sided sEEG electrodes by NSGY 2/27  - Event 3/2 am with no associated EEG change, not consistent with patients typical seizure  - Holding Eslicarbazepine since 3/2, Zonisamide since 3/1, Clobazam since 2/28  - Cortical stimulation 3/3 evening by Dr. Edgar and Dr. Feliciano  - Continue mittens and strict 1:1 supervision as he previously self-removed sEEG leads during monitoring in 2021, requiring early termination of monitoring  - Post-operative imaging timing, pain control per [facility]  - Seizure precautions  - Please call epilepsy to notify of any seizures  -  If more than 3 seizures in 2 hours or any seizure lasting greater than 3 minutes, please give 2 mg IV lorazepam and contact on-call epilepsy    Plan of care discussed with [facility] team, patient and wife at bedside. Will continue to follow, please call with any questions.

## 2023-03-03 NOTE — SUBJECTIVE & OBJECTIVE
Interval History: No seizures noted overnight, off all AEDs as of 3/2. Continue close vEEG. Plan for cortical stimulation Dr. Edgar and Dr. Feliciano this evening.     Current Facility-Administered Medications   Medication Dose Route Frequency Provider Last Rate Last Admin    acetaminophen tablet 1,000 mg  1,000 mg Oral Q8H Magda Miinea, NP   1,000 mg at 03/03/23 0601    bisacodyL suppository 10 mg  10 mg Rectal Daily Shin Mickal PA-C   10 mg at 03/03/23 0816    calcium carbonate 200 mg calcium (500 mg) chewable tablet 1,000 mg  1,000 mg Oral BID PRN Magda Miinea, NP        cefTRIAXone (ROCEPHIN) 2 g in dextrose 5 % in water (D5W) 5 % 50 mL IVPB (MB+)  2 g Intravenous Q12H Isaura Burgos PA-C   Stopped at 03/03/23 0847    EScitalopram oxalate tablet 10 mg  10 mg Oral Daily Magda Miinea, NP   10 mg at 03/03/23 0815    famotidine tablet 20 mg  20 mg Oral BID Fred Wayne MD   20 mg at 03/03/23 0815    glycerin adult suppository 1 suppository  1 suppository Rectal Daily PRN Isaura Burgos PA-C        heparin (porcine) injection 5,000 Units  5,000 Units Subcutaneous Q8H ROMIE Maya-C   5,000 Units at 03/03/23 0602    hydrALAZINE injection 10 mg  10 mg Intravenous Q4H PRN Magda Miinea, NP        HYDROmorphone injection 0.5 mg  0.5 mg Intravenous Q6H PRN ROMIE Maya-NAMITA        magnesium hydroxide 400 mg/5 ml suspension 2,400 mg  30 mL Oral Daily Obinna Avendano MD        methocarbamoL tablet 500 mg  500 mg Oral QID PRN Magdabritton Weiss, NP   500 mg at 03/02/23 2114    mupirocin 2 % ointment   Nasal BID Isaura Burgos PA-C   Given at 03/03/23 0827    ondansetron disintegrating tablet 8 mg  8 mg Oral Q8H PRN Isaura Burgos PA-C        oxyCODONE immediate release tablet 5 mg  5 mg Oral Q6H PRN Magda Weiss NP   5 mg at 03/03/23 0833    polyethylene glycol packet 17 g  17 g Oral Daily Isaura Burgos PA-C   17 g at 03/03/23 0815    senna-docusate 8.6-50 mg per tablet  2 tablet  2 tablet Oral Daily Shin Mickal, PA-C   2 tablet at 03/03/23 0816     Continuous Infusions:    Review of Systems   Constitutional:  Positive for fatigue.   Gastrointestinal:  Negative for nausea and vomiting.   Neurological:  Negative for seizures (none overnight) and headaches.   All other systems reviewed and are negative.  Objective:     Vital Signs (Most Recent):  Temp: 98.8 °F (37.1 °C) (03/03/23 1101)  Pulse: 67 (03/03/23 1301)  Resp: 15 (03/03/23 1301)  BP: (!) 153/101 (03/03/23 1301)  SpO2: 96 % (03/03/23 1301)   Vital Signs (24h Range):  Temp:  [97.6 °F (36.4 °C)-98.9 °F (37.2 °C)] 98.8 °F (37.1 °C)  Pulse:  [61-74] 67  Resp:  [12-23] 15  SpO2:  [91 %-96 %] 96 %  BP: (136-159)/() 153/101  Arterial Line BP: (101-149)/() 147/88     Weight: 87.5 kg (193 lb)  Body mass index is 28.5 kg/m².    Physical Exam  Vitals and nursing note reviewed.   Constitutional:       General: He is not in acute distress.     Appearance: He is not diaphoretic.   HENT:      Head: Normocephalic.      Comments: sEEG leads + headwrap in place  Eyes:      General: No scleral icterus.     Extraocular Movements: Extraocular movements intact.      Pupils: Pupils are equal, round, and reactive to light.   Cardiovascular:      Rate and Rhythm: Normal rate.   Pulmonary:      Effort: Pulmonary effort is normal. No respiratory distress.   Abdominal:      General: There is no distension.      Palpations: Abdomen is soft.   Musculoskeletal:         General: No deformity or signs of injury.      Cervical back: Normal range of motion and neck supple.   Skin:     General: Skin is warm and dry.   Neurological:      Mental Status: He is alert and oriented to person, place, and time.   Psychiatric:         Mood and Affect: Mood normal.         Speech: Speech normal.         Behavior: Behavior normal.       NEUROLOGICAL EXAMINATION:     MENTAL STATUS   Oriented to person, place, and time.   Speech: speech is normal   Level of  consciousness: alert       Speech slightly slowed, at baseline     CRANIAL NERVES     CN III, IV, VI   Pupils are equal, round, and reactive to light.    CN VII   Facial expression full, symmetric.     CN VIII   Hearing: intact    CN XI   CN XI normal.     CN XII   CN XII normal.     MOTOR EXAM   Muscle bulk: normal  Overall muscle tone: normal       Follows commands, moves all extremities spontaneously and symmetrically     GAIT AND COORDINATION        No pronator drift noted     Significant Labs: All pertinent lab results from the past 24 hours have been reviewed.    Significant Studies: I have reviewed all pertinent imaging results/findings within the past 24 hours.

## 2023-03-03 NOTE — SUBJECTIVE & OBJECTIVE
Review of Systems   Constitutional:  Negative for chills, diaphoresis, fatigue and fever.   HENT:  Negative for facial swelling and trouble swallowing.    Eyes:  Negative for photophobia and visual disturbance.   Respiratory:  Negative for cough and shortness of breath.    Cardiovascular:  Negative for chest pain, palpitations and leg swelling.   Gastrointestinal:  Negative for abdominal distention, abdominal pain, constipation, diarrhea, nausea and vomiting.   Endocrine: Negative for polyuria.   Genitourinary:  Negative for difficulty urinating.   Musculoskeletal:  Negative for back pain, neck pain and neck stiffness.   Skin:  Negative for pallor.   Neurological:  Positive for headaches. Negative for seizures.   Psychiatric/Behavioral:  Negative for behavioral problems and confusion.      Objective:     Vitals:  Temp: 98.8 °F (37.1 °C)  Pulse: 67  Rhythm: normal sinus rhythm  BP: (!) 144/88  MAP (mmHg): 109  Resp: 17  SpO2: 95 %    Temp  Min: 97.6 °F (36.4 °C)  Max: 98.9 °F (37.2 °C)  Pulse  Min: 61  Max: 74  BP  Min: 136/86  Max: 159/95  MAP (mmHg)  Min: 104  Max: 122  Resp  Min: 12  Max: 23  SpO2  Min: 91 %  Max: 95 %    03/02 0701 - 03/03 0700  In: 1058.9 [P.O.:1010]  Out: 1050 [Urine:1050]   Unmeasured Output  Urine Occurrence: 1  Stool Occurrence: 1       Physical Exam  Vitals and nursing note reviewed.   Constitutional:       Appearance: He is normal weight.   HENT:      Head:      Comments: sEEG dressing in place       Right Ear: External ear normal.      Left Ear: External ear normal.      Ears:      Comments: Pain behind L ear     Nose: Nose normal.      Mouth/Throat:      Mouth: Mucous membranes are moist.      Pharynx: Oropharynx is clear.   Eyes:      Extraocular Movements: Extraocular movements intact.      Conjunctiva/sclera: Conjunctivae normal.      Pupils: Pupils are equal, round, and reactive to light.   Cardiovascular:      Rate and Rhythm: Normal rate and regular rhythm.      Pulses: Normal  pulses.   Pulmonary:      Effort: Pulmonary effort is normal.   Abdominal:      General: Bowel sounds are normal.      Palpations: Abdomen is soft.   Musculoskeletal:      Cervical back: Normal range of motion.      Right lower leg: No edema.      Left lower leg: No edema.   Skin:     General: Skin is warm and dry.   Neurological:      Mental Status: He is alert.      Comments: E4 V5 M6  Alert. Oriented x 3. Follows commands. Speech is delayed.  PERRL, EOMI   No facial asymmetry, facial sensation intact    Tongue midline, + shoulder shrug    Moves all extremities spontaneously and antigravity   Sensation intact throughout   No drift  No ataxia    Psychiatric:         Mood and Affect: Mood normal.       Medications:  Continuous Scheduledacetaminophen, 1,000 mg, Q8H  bisacodyL, 10 mg, Daily  cefTRIAXone (ROCEPHIN) IVPB, 2 g, Q12H  EScitalopram oxalate, 10 mg, Daily  famotidine, 20 mg, BID  heparin (porcine), 5,000 Units, Q8H  magnesium hydroxide 400 mg/5 ml, 30 mL, Daily  mupirocin, , BID  polyethylene glycol, 17 g, Daily  senna-docusate 8.6-50 mg, 2 tablet, Daily    PRNcalcium carbonate, 1,000 mg, BID PRN  glycerin adult, 1 suppository, Daily PRN  hydrALAZINE, 10 mg, Q4H PRN  HYDROmorphone, 0.5 mg, Q6H PRN  methocarbamoL, 500 mg, QID PRN  ondansetron, 8 mg, Q8H PRN  oxyCODONE, 5 mg, Q6H PRN      Today I personally reviewed pertinent medications, lines/drains/airways, imaging, cardiology results, laboratory results, microbiology results,     Diet  Diet Adult Regular (IDDSI Level 7)

## 2023-03-04 LAB
ALBUMIN SERPL BCP-MCNC: 3.4 G/DL (ref 3.5–5.2)
ALP SERPL-CCNC: 115 U/L (ref 55–135)
ALT SERPL W/O P-5'-P-CCNC: 27 U/L (ref 10–44)
ANION GAP SERPL CALC-SCNC: 10 MMOL/L (ref 8–16)
AST SERPL-CCNC: 26 U/L (ref 10–40)
BASOPHILS # BLD AUTO: 0.08 K/UL (ref 0–0.2)
BASOPHILS NFR BLD: 1.2 % (ref 0–1.9)
BILIRUB SERPL-MCNC: 0.2 MG/DL (ref 0.1–1)
BUN SERPL-MCNC: 15 MG/DL (ref 6–20)
CALCIUM SERPL-MCNC: 9.1 MG/DL (ref 8.7–10.5)
CHLORIDE SERPL-SCNC: 103 MMOL/L (ref 95–110)
CO2 SERPL-SCNC: 23 MMOL/L (ref 23–29)
CREAT SERPL-MCNC: 0.7 MG/DL (ref 0.5–1.4)
DIFFERENTIAL METHOD: ABNORMAL
EOSINOPHIL # BLD AUTO: 0.9 K/UL (ref 0–0.5)
EOSINOPHIL NFR BLD: 13.2 % (ref 0–8)
ERYTHROCYTE [DISTWIDTH] IN BLOOD BY AUTOMATED COUNT: 12.8 % (ref 11.5–14.5)
EST. GFR  (NO RACE VARIABLE): >60 ML/MIN/1.73 M^2
GLUCOSE SERPL-MCNC: 105 MG/DL (ref 70–110)
HCT VFR BLD AUTO: 40.2 % (ref 40–54)
HGB BLD-MCNC: 13.6 G/DL (ref 14–18)
IMM GRANULOCYTES # BLD AUTO: 0.02 K/UL (ref 0–0.04)
IMM GRANULOCYTES NFR BLD AUTO: 0.3 % (ref 0–0.5)
LYMPHOCYTES # BLD AUTO: 1.7 K/UL (ref 1–4.8)
LYMPHOCYTES NFR BLD: 25 % (ref 18–48)
MAGNESIUM SERPL-MCNC: 2 MG/DL (ref 1.6–2.6)
MCH RBC QN AUTO: 31.5 PG (ref 27–31)
MCHC RBC AUTO-ENTMCNC: 33.8 G/DL (ref 32–36)
MCV RBC AUTO: 93 FL (ref 82–98)
MONOCYTES # BLD AUTO: 0.9 K/UL (ref 0.3–1)
MONOCYTES NFR BLD: 13.6 % (ref 4–15)
NEUTROPHILS # BLD AUTO: 3.1 K/UL (ref 1.8–7.7)
NEUTROPHILS NFR BLD: 46.7 % (ref 38–73)
NRBC BLD-RTO: 0 /100 WBC
PHOSPHATE SERPL-MCNC: 5 MG/DL (ref 2.7–4.5)
PLATELET # BLD AUTO: 292 K/UL (ref 150–450)
PMV BLD AUTO: 9.1 FL (ref 9.2–12.9)
POTASSIUM SERPL-SCNC: 4 MMOL/L (ref 3.5–5.1)
PROT SERPL-MCNC: 6.7 G/DL (ref 6–8.4)
RBC # BLD AUTO: 4.32 M/UL (ref 4.6–6.2)
SODIUM SERPL-SCNC: 136 MMOL/L (ref 136–145)
WBC # BLD AUTO: 6.68 K/UL (ref 3.9–12.7)

## 2023-03-04 PROCEDURE — 95720 EEG PHY/QHP EA INCR W/VEEG: CPT | Mod: ,,, | Performed by: STUDENT IN AN ORGANIZED HEALTH CARE EDUCATION/TRAINING PROGRAM

## 2023-03-04 PROCEDURE — 25000003 PHARM REV CODE 250: Performed by: NURSE PRACTITIONER

## 2023-03-04 PROCEDURE — 20000000 HC ICU ROOM

## 2023-03-04 PROCEDURE — 25000003 PHARM REV CODE 250: Performed by: PSYCHIATRY & NEUROLOGY

## 2023-03-04 PROCEDURE — 99233 SBSQ HOSP IP/OBS HIGH 50: CPT | Mod: 95,,, | Performed by: STUDENT IN AN ORGANIZED HEALTH CARE EDUCATION/TRAINING PROGRAM

## 2023-03-04 PROCEDURE — 80053 COMPREHEN METABOLIC PANEL: CPT | Performed by: PHYSICIAN ASSISTANT

## 2023-03-04 PROCEDURE — 63600175 PHARM REV CODE 636 W HCPCS: Performed by: PHYSICIAN ASSISTANT

## 2023-03-04 PROCEDURE — 84100 ASSAY OF PHOSPHORUS: CPT | Performed by: PHYSICIAN ASSISTANT

## 2023-03-04 PROCEDURE — 83735 ASSAY OF MAGNESIUM: CPT | Performed by: PHYSICIAN ASSISTANT

## 2023-03-04 PROCEDURE — 99291 CRITICAL CARE FIRST HOUR: CPT | Mod: ,,,

## 2023-03-04 PROCEDURE — 95720 PR EEG, W/VIDEO, CONT RECORD, I&R, >12<26 HRS: ICD-10-PCS | Mod: ,,, | Performed by: STUDENT IN AN ORGANIZED HEALTH CARE EDUCATION/TRAINING PROGRAM

## 2023-03-04 PROCEDURE — 94761 N-INVAS EAR/PLS OXIMETRY MLT: CPT

## 2023-03-04 PROCEDURE — 25000003 PHARM REV CODE 250

## 2023-03-04 PROCEDURE — 99291 PR CRITICAL CARE, E/M 30-74 MINUTES: ICD-10-PCS | Mod: ,,,

## 2023-03-04 PROCEDURE — 95714 VEEG EA 12-26 HR UNMNTR: CPT

## 2023-03-04 PROCEDURE — 99233 PR SUBSEQUENT HOSPITAL CARE,LEVL III: ICD-10-PCS | Mod: 95,,, | Performed by: STUDENT IN AN ORGANIZED HEALTH CARE EDUCATION/TRAINING PROGRAM

## 2023-03-04 PROCEDURE — 25000003 PHARM REV CODE 250: Performed by: PHYSICIAN ASSISTANT

## 2023-03-04 PROCEDURE — 99900035 HC TECH TIME PER 15 MIN (STAT)

## 2023-03-04 PROCEDURE — 85025 COMPLETE CBC W/AUTO DIFF WBC: CPT | Performed by: PHYSICIAN ASSISTANT

## 2023-03-04 RX ORDER — AMOXICILLIN 250 MG
1 CAPSULE ORAL DAILY
Status: DISCONTINUED | OUTPATIENT
Start: 2023-03-04 | End: 2023-03-09 | Stop reason: HOSPADM

## 2023-03-04 RX ADMIN — ACETAMINOPHEN 1000 MG: 500 TABLET ORAL at 09:03

## 2023-03-04 RX ADMIN — FAMOTIDINE 20 MG: 20 TABLET ORAL at 09:03

## 2023-03-04 RX ADMIN — CEFTRIAXONE 2 G: 2 INJECTION, POWDER, FOR SOLUTION INTRAMUSCULAR; INTRAVENOUS at 09:03

## 2023-03-04 RX ADMIN — METHOCARBAMOL 500 MG: 500 TABLET ORAL at 11:03

## 2023-03-04 RX ADMIN — HEPARIN SODIUM 5000 UNITS: 5000 INJECTION INTRAVENOUS; SUBCUTANEOUS at 09:03

## 2023-03-04 RX ADMIN — ACETAMINOPHEN 1000 MG: 500 TABLET ORAL at 01:03

## 2023-03-04 RX ADMIN — OXYCODONE 5 MG: 5 TABLET ORAL at 10:03

## 2023-03-04 RX ADMIN — METHOCARBAMOL 500 MG: 500 TABLET ORAL at 03:03

## 2023-03-04 RX ADMIN — HEPARIN SODIUM 5000 UNITS: 5000 INJECTION INTRAVENOUS; SUBCUTANEOUS at 05:03

## 2023-03-04 RX ADMIN — MUPIROCIN: 20 OINTMENT TOPICAL at 09:03

## 2023-03-04 RX ADMIN — ACETAMINOPHEN 1000 MG: 500 TABLET ORAL at 05:03

## 2023-03-04 RX ADMIN — METHOCARBAMOL 500 MG: 500 TABLET ORAL at 09:03

## 2023-03-04 RX ADMIN — OXYCODONE 5 MG: 5 TABLET ORAL at 11:03

## 2023-03-04 RX ADMIN — HEPARIN SODIUM 5000 UNITS: 5000 INJECTION INTRAVENOUS; SUBCUTANEOUS at 01:03

## 2023-03-04 RX ADMIN — SENNOSIDES AND DOCUSATE SODIUM 1 TABLET: 50; 8.6 TABLET ORAL at 09:03

## 2023-03-04 RX ADMIN — ESCITALOPRAM OXALATE 10 MG: 10 TABLET ORAL at 09:03

## 2023-03-04 NOTE — PLAN OF CARE
[facility] Care Plan    POC reviewed with Declan Burleson and family at 0300. Pt verbalized understanding. Questions and concerns addressed. No acute events overnight. Pt progressing toward goals. Will continue to monitor. See below and flowsheets for full assessment and VS info.     No acute neuro changes overnight. Q2hr neuro assessments in place.  No clinical seizure activity noted overnight.  Oxy x 1 and Robaxin x 1 administered for pain.  PIV placed.  BM x 1, liquid. Please hold bowel regimen.      Is this a stroke patient? no    Neuro:  Vicco Coma Scale  Best Eye Response: 4-->(E4) spontaneous  Best Motor Response: 6-->(M6) obeys commands  Best Verbal Response: 5-->(V5) oriented  Ruben Coma Scale Score: 15  Assessment Qualifiers: no eye obstruction present, patient not sedated/intubated  Pupil PERRLA: yes     24hr Temp:  [97.6 °F (36.4 °C)-98.8 °F (37.1 °C)]     CV:   Rhythm: normal sinus rhythm  BP goals:   SBP < 160  MAP > 65    Resp:      Oxygen Concentration (%): 28    Plan: N/A    GI/:     Diet/Nutrition Received: regular  Last Bowel Movement: 03/03/23  Voiding Characteristics: voids spontaneously without difficulty    Intake/Output Summary (Last 24 hours) at 3/4/2023 0416  Last data filed at 3/3/2023 1637  Gross per 24 hour   Intake 600 ml   Output 575 ml   Net 25 ml     Unmeasured Output  Urine Occurrence: 1  Stool Occurrence: 1    Labs/Accuchecks:  Recent Labs   Lab 03/04/23  0021   WBC 6.68   RBC 4.32*   HGB 13.6*   HCT 40.2         Recent Labs   Lab 03/04/23  0021      K 4.0   CO2 23      BUN 15   CREATININE 0.7   ALKPHOS 115   ALT 27   AST 26   BILITOT 0.2      Recent Labs   Lab 02/27/23  0555   INR 1.0   APTT 27.4      Recent Labs   Lab 03/01/23  1112   TROPONINI <0.006       Electrolytes: N/A - electrolytes WDL  Accuchecks: none    Gtts:      LDA/Wounds:  Lines/Drains/Airways       Arterial Line  Duration             Arterial Line 02/27/23 0746 Right Radial 4 days               Peripheral Intravenous Line  Duration                  Peripheral IV - Single Lumen 02/27/23 0608 20 G Posterior;Right Hand 4 days         Peripheral IV - Single Lumen 03/03/23 0853 20 G Left;Posterior Hand <1 day         Peripheral IV - Single Lumen 03/03/23 2004 22 G Anterior;Proximal;Right Forearm <1 day                  Wounds: No  Wound care consulted: No

## 2023-03-04 NOTE — PROGRESS NOTES
[facility]  Neurosurgery  Progress Note    Subjective:     History of Present Illness: 58 M with epilepsy presents for elective sEEG placement on 2/27      Post-Op Info:  Procedure(s) (LRB):  PLACEMENT OF STEREO EEG LEADS (DEPTH ELECTRODES) (Left)   5 Days Post-Op     Interval History: 3/4: multiple BM's yesterday. No seizures. Was working on stationary bike.       Medications:  Continuous Infusions:  Scheduled Meds:   acetaminophen  1,000 mg Oral Q8H    cefTRIAXone (ROCEPHIN) IVPB  2 g Intravenous Q12H    EScitalopram oxalate  10 mg Oral Daily    famotidine  20 mg Oral BID    heparin (porcine)  5,000 Units Subcutaneous Q8H    polyethylene glycol  17 g Oral Daily    senna-docusate 8.6-50 mg  1 tablet Oral Daily     PRN Meds:calcium carbonate, glycerin adult, hydrALAZINE, HYDROmorphone, methocarbamoL, ondansetron, oxyCODONE     Review of Systems   Neurological:  Positive for seizures.   All other systems reviewed and are negative.  Objective:     Weight: 87.5 kg (193 lb)  Body mass index is 28.5 kg/m².  Vital Signs (Most Recent):  Temp: 98.5 °F (36.9 °C) (03/04/23 0701)  Pulse: 77 (03/04/23 0901)  Resp: (!) 29 (03/04/23 0901)  BP: (!) 148/93 (03/04/23 0901)  SpO2: 95 % (03/04/23 0901)   Vital Signs (24h Range):  Temp:  [97.6 °F (36.4 °C)-98.8 °F (37.1 °C)] 98.5 °F (36.9 °C)  Pulse:  [58-77] 77  Resp:  [11-29] 29  SpO2:  [93 %-98 %] 95 %  BP: (129-165)/() 148/93  Arterial Line BP: (112-151)/() 136/85                            Physical Exam  Constitutional:       Appearance: He is well-developed and well-nourished.   Eyes:      Extraocular Movements: EOM normal.      Conjunctiva/sclera: Conjunctivae normal.      Pupils: Pupils are equal, round, and reactive to light.   Cardiovascular:      Pulses: Normal pulses.   Abdominal:      Palpations: Abdomen is soft.   Neurological:      Mental Status: He is alert and oriented to person, place, and time.      Comments: AAOx3, higher order  confusion  PERRL  CN intact  BUE 5/5  BLE 5/5  SILT    Headwrap in place with sEEG leads   Psychiatric:         Mood and Affect: Mood and affect normal.       Physical Exam:    Constitutional: He appears well-developed and well-nourished.     Eyes: Pupils are equal, round, and reactive to light. Conjunctivae and EOM are normal.     Cardiovascular: Normal pulses.     Abdominal: Soft.     Psych/Behavior: He is alert. He is oriented to person, place, and time. He has a normal mood and affect.     Neurological:   AAOx3, higher order confusion  PERRL  CN intact  BUE 5/5  BLE 5/5  SILT    Headwrap in place with sEEG leads     Significant Labs:  Recent Labs   Lab 03/03/23  0121 03/04/23  0021   GLU 96 105    136   K 3.7 4.0    103   CO2 24 23   BUN 14 15   CREATININE 0.7 0.7   CALCIUM 9.1 9.1   MG 1.9 2.0       Recent Labs   Lab 03/03/23  0121 03/04/23  0021   WBC 6.83 6.68   HGB 13.5* 13.6*   HCT 41.1 40.2    292       No results for input(s): LABPT, INR, APTT in the last 48 hours.    Microbiology Results (last 7 days)       ** No results found for the last 168 hours. **          All pertinent labs from the last 24 hours have been reviewed.    Significant Diagnostics:  I have reviewed all pertinent imaging results/findings within the past 24 hours.    Assessment/Plan:     * Complex partial seizures evolving to generalized tonic-clonic seizures  59 yo M with intractable epilepsy who is now s/p sEEG lead placement for seizure monitoring/mapping    - admitted to [facility]  - q1 neurochecks  - CT stealth post op reviewed, sEEG leads in appropriate position, no large volume hemorrhage  - AEDs and seizure management per epilepsy  - hyponatremia, ctm  - bowel regiment, last BM 3/3  - voiding spontaneously  - Chest pain, trops negative, likely GI related pain    - PPI, Bowel regimen  - bed rest  - PT/OT, daily stationary bike   -- Please encourage patient for improved PT activity   - SQ    Dispo: ongoing, timing TBD  for sEEG lead removal        Ashley Clements MD  Neurosurgery  [facility]

## 2023-03-04 NOTE — ASSESSMENT & PLAN NOTE
Declan Burleson is a 58 y.o.M  admitted to [facility] s/p SEEG placement. He has failed multiple AEDs, including topiramate, lamotrigine, levetiracetam, and oxcarbazepine. He is currently taking eslicarbazepine, zonisamide, and clobazam.     -- s/p sEEG  -- NSGY and epilepsy following  -- AED's per epilepsy reccs- weaned off all AEDs on 3/2 per epilepsy   -- Neuro checks q 2hr  -- SBP <160  -- seizure precautions  -- PT/OT/SLP  - cortical stimulation 3/3 w epilepsy- 1x 30 sec seizure  - Sleep deprivation tonight 3/4 per epilepsy

## 2023-03-04 NOTE — SUBJECTIVE & OBJECTIVE
Review of Systems   Constitutional:  Negative for chills, diaphoresis, fatigue and fever.   HENT:  Negative for facial swelling and trouble swallowing.    Eyes:  Negative for photophobia and visual disturbance.   Respiratory:  Negative for cough and shortness of breath.    Cardiovascular:  Negative for chest pain, palpitations and leg swelling.   Gastrointestinal:  Negative for abdominal distention, abdominal pain, constipation, diarrhea, nausea and vomiting.   Endocrine: Negative for polyuria.   Genitourinary:  Negative for difficulty urinating.   Musculoskeletal:  Negative for back pain, neck pain and neck stiffness.   Skin:  Negative for pallor.   Neurological:  Positive for headaches. Negative for seizures.   Psychiatric/Behavioral:  Negative for behavioral problems and confusion.      Objective:     Vitals:  Temp: 98.4 °F (36.9 °C)  Pulse: 62  Rhythm: normal sinus rhythm  BP: (!) 147/84  MAP (mmHg): 108  Resp: 18  SpO2: (!) 93 %    Temp  Min: 97.6 °F (36.4 °C)  Max: 98.9 °F (37.2 °C)  Pulse  Min: 57  Max: 79  BP  Min: 141/89  Max: 165/80  MAP (mmHg)  Min: 108  Max: 122  Resp  Min: 11  Max: 29  SpO2  Min: 92 %  Max: 98 %    03/03 0701 - 03/04 0700  In: 600 [P.O.:600]  Out: 1075 [Urine:1075]   Unmeasured Output  Urine Occurrence: 1  Stool Occurrence: 1       Physical Exam  Vitals and nursing note reviewed.   Constitutional:       Appearance: He is normal weight.   HENT:      Head:      Comments: sEEG dressing in place       Right Ear: External ear normal.      Left Ear: External ear normal.      Ears:      Comments: Pain behind L ear     Nose: Nose normal.      Mouth/Throat:      Mouth: Mucous membranes are moist.      Pharynx: Oropharynx is clear.   Eyes:      Extraocular Movements: Extraocular movements intact.      Conjunctiva/sclera: Conjunctivae normal.      Pupils: Pupils are equal, round, and reactive to light.   Cardiovascular:      Rate and Rhythm: Normal rate and regular rhythm.      Pulses: Normal  pulses.   Pulmonary:      Effort: Pulmonary effort is normal.   Abdominal:      General: Bowel sounds are normal.      Palpations: Abdomen is soft.   Musculoskeletal:      Cervical back: Normal range of motion.      Right lower leg: No edema.      Left lower leg: No edema.   Skin:     General: Skin is warm and dry.   Neurological:      Mental Status: He is alert.      Comments: E4 V5 M6  Alert. Oriented x 3. Follows commands. Speech is delayed.  PERRL, EOMI   No facial asymmetry, facial sensation intact    Tongue midline, + shoulder shrug    Moves all extremities spontaneously and antigravity   Sensation intact throughout   No drift  No ataxia    Psychiatric:         Mood and Affect: Mood normal.       Medications:  Continuous Scheduledacetaminophen, 1,000 mg, Q8H  cefTRIAXone (ROCEPHIN) IVPB, 2 g, Q12H  EScitalopram oxalate, 10 mg, Daily  famotidine, 20 mg, BID  heparin (porcine), 5,000 Units, Q8H  polyethylene glycol, 17 g, Daily  senna-docusate 8.6-50 mg, 1 tablet, Daily    PRNcalcium carbonate, 1,000 mg, BID PRN  glycerin adult, 1 suppository, Daily PRN  hydrALAZINE, 10 mg, Q4H PRN  HYDROmorphone, 0.5 mg, Q6H PRN  methocarbamoL, 500 mg, QID PRN  ondansetron, 8 mg, Q8H PRN  oxyCODONE, 5 mg, Q6H PRN      Today I personally reviewed pertinent medications, lines/drains/airways, imaging, cardiology results, laboratory results, microbiology results,     Diet  Diet Adult Regular (IDDSI Level 7)

## 2023-03-04 NOTE — PROGRESS NOTES
[facility]  Neurology-Epilepsy  Progress Note    Patient Name: Declan Burleson  MRN: [id number]  Admission Date: 2/27/2023  Hospital Length of Stay: 5 days  Code Status: Full Code   Attending Provider: Fred Wayne MD  Primary Care Physician: Juan Carlos Simmons NP   Principal Problem:Complex partial seizures evolving to generalized tonic-clonic seizures    Subjective:     Hospital Course:   2/27>2/28: Clobazam decreased to 20 mg qHS, Eslicarbazepine decreased to 400 mg qHS, and Zonisamide decreased to 200 mg qHS on admission. No seizures noted overnight. Beginning 2/28 pm, hold Clobazam, decrease Zonisamide further to 100 mg qHS. Continue Eslicarbazepine 400 mg nightly.   2/28>3/1: No seizures overnight, EEG normal. Hold Zonisamide beginning 3/1 pm.   3/1>3/2: No seizures overnight. Event 3/2 approximately 1035 of sudden eye opening/fluttering, confusion. Hold Eslicarbazepine beginning 3/2 pm.   3/2>3/3: No clinical seizures overnight. Continue close vEEG off all AEDs. Plan for cortical stimulation 3/3 evening with Dr. Edgar and Dr. Feliciano.  3/3-3/4: 1 seizure provoked yesterday by cortical stimulation, no spontaneous seizures.  Plan for sleep deprivation tonight.       Interval History: No spontaneous seizures overnight.  Patient complains of moderate headache this morning, otherwise doing alright.      Current Facility-Administered Medications   Medication Dose Route Frequency Provider Last Rate Last Admin    acetaminophen tablet 1,000 mg  1,000 mg Oral Q8H Magda Weiss NP   1,000 mg at 03/04/23 0517    calcium carbonate 200 mg calcium (500 mg) chewable tablet 1,000 mg  1,000 mg Oral BID PRN Magda Weiss NP        cefTRIAXone (ROCEPHIN) 2 g in dextrose 5 % in water (D5W) 5 % 50 mL IVPB (MB+)  2 g Intravenous Q12H Isaura Burgos PA-C   Stopped at 03/04/23 0938    EScitalopram oxalate tablet 10 mg  10 mg Oral Daily Magda Weiss NP   10 mg at 03/04/23 0905    famotidine tablet  20 mg  20 mg Oral BID Fred Wayne MD   20 mg at 03/04/23 0905    glycerin adult suppository 1 suppository  1 suppository Rectal Daily PRN Isaura Burgos PA-C        heparin (porcine) injection 5,000 Units  5,000 Units Subcutaneous Q8H Isaura Burogs PA-C   5,000 Units at 03/04/23 0518    hydrALAZINE injection 10 mg  10 mg Intravenous Q4H PRN Magda Miinea, NP        HYDROmorphone injection 0.5 mg  0.5 mg Intravenous Q6H PRN Isaura Burgos PA-C        methocarbamoL tablet 500 mg  500 mg Oral QID PRN Magda Miinea, NP   500 mg at 03/04/23 0319    ondansetron disintegrating tablet 8 mg  8 mg Oral Q8H PRN Isaura Burgos PA-C        oxyCODONE immediate release tablet 5 mg  5 mg Oral Q6H PRN Magda Miinea, NP   5 mg at 03/04/23 1104    polyethylene glycol packet 17 g  17 g Oral Daily ROMIE Maya-C   17 g at 03/03/23 0815    senna-docusate 8.6-50 mg per tablet 1 tablet  1 tablet Oral Daily ROMIE Arambula-C   1 tablet at 03/04/23 0905     Continuous Infusions:    Review of Systems   Constitutional: Negative.    HENT: Negative.     Eyes: Negative.    Respiratory: Negative.     Cardiovascular: Negative.    Endocrine: Negative.    Neurological:  Positive for seizures and headaches.   Psychiatric/Behavioral: Negative.     Objective:     Vital Signs (Most Recent):  Temp: 98.5 °F (36.9 °C) (03/04/23 0701)  Pulse: 77 (03/04/23 0901)  Resp: 15 (03/04/23 1104)  BP: (!) 148/93 (03/04/23 0901)  SpO2: 95 % (03/04/23 0901)   Vital Signs (24h Range):  Temp:  [97.6 °F (36.4 °C)-98.5 °F (36.9 °C)] 98.5 °F (36.9 °C)  Pulse:  [58-77] 77  Resp:  [11-29] 15  SpO2:  [93 %-98 %] 95 %  BP: (129-165)/() 148/93  Arterial Line BP: (112-151)/() 136/85     Weight: 87.5 kg (193 lb)  Body mass index is 28.5 kg/m².    Physical Exam  HENT:      Head:      Comments:  sEEG leads + headwrap in place  Eyes:      Extraocular Movements: EOM normal.      Pupils: Pupils are equal, round, and  reactive to light.   Cardiovascular:      Rate and Rhythm: Normal rate.   Pulmonary:      Effort: Pulmonary effort is normal.   Skin:     General: Skin is warm and dry.   Neurological:      Mental Status: He is oriented to person, place, and time.       NEUROLOGICAL EXAMINATION:     MENTAL STATUS   Oriented to person, place, and time.        Speech slight slow/delayed; present at baseline     CRANIAL NERVES     CN III, IV, VI   Pupils are equal, round, and reactive to light.  Extraocular motions are normal.     CN VII   Facial expression full, symmetric.     MOTOR EXAM     Strength   Left strength: Moves all extremities equally and spontaneously    SENSORY EXAM   Light touch normal.     Significant Labs: All pertinent lab results from the past 24 hours have been reviewed.    Significant Studies: I have reviewed and interpreted all pertinent imaging results/findings within the past 24 hours.    Assessment and Plan:     * Complex partial seizures evolving to generalized tonic-clonic seizures  Mr. Burleson is a 58 year old man with intractable epilepsy s/p prior sEEG monitoring in January 2021, left laser amygdalohippocampectomy in March 2021 admitted to [facility] s/p placement of 8 left-sided sEEG electrodes for repeat intracranial monitoring and further localization of his epilepsy in consideration of additional surgical management options of his epilepsy. Currently maintained on Clobazam 40 mg qHS, Eslicarbazepine 1200 mg qHS, and Zonisamide 500 mg qHS with continued events of staring/loss of awareness and generalized convulsions.    - s/p placement of 8 left-sided sEEG electrodes by [facility] 2/27  - Event 3/2 am with no associated EEG change, not consistent with patients typical seizure  - Holding Eslicarbazepine since 3/2, Zonisamide since 3/1, Clobazam since 2/28  - Cortical stimulation 3/3 evening by Dr. Edgar and Dr. Feliciano  - 3/4: sleep deprive, continue to hold AEDs  - Continue mittens and strict 1:1 supervision as he  previously self-removed sEEG leads during monitoring in 2021, requiring early termination of monitoring  - Post-operative imaging timing, pain control per NCC, NSGY  - Seizure precautions  - Please call epilepsy to notify of any seizures  -  If more than 3 seizures in 2 hours or any seizure lasting greater than 3 minutes, please give 2 mg IV lorazepam and contact on-call epilepsy    Plan of care discussed with NCC team, patient and wife at bedside. Will continue to follow, please call with any questions.     Depression with anxiety  - Continue home Escitalopram 10 mg daily    Migraine without status migrainosus, not intractable  - Followed by Dr. Saldana as outpatient  - Supportive care during admission        VTE Risk Mitigation (From admission, onward)         Ordered     heparin (porcine) injection 5,000 Units  Every 8 hours         02/27/23 1236     Place sequential compression device  Until discontinued         02/27/23 8184              Carissa Carmona MD  [facility]   Department of Neurology

## 2023-03-04 NOTE — ASSESSMENT & PLAN NOTE
59 yo M with intractable epilepsy who is now s/p sEEG lead placement for seizure monitoring/mapping    - admitted to [facility]  - q1 neurochecks  - CT stealth post op reviewed, sEEG leads in appropriate position, no large volume hemorrhage  - AEDs and seizure management per epilepsy  - hyponatremia, ctm  - bowel regiment, last BM 3/3  - voiding spontaneously  - Chest pain, trops negative, likely GI related pain    - PPI, Bowel regimen  - bed rest  - PT/OT, daily stationary bike   -- Please encourage patient for improved PT activity   - [facility]    Dispo: ongoing, timing TBD for sEEG lead removal

## 2023-03-04 NOTE — PROGRESS NOTES
"[facility]  Neurocritical Care  Progress Note    Admit Date: 2/27/2023  Service Date: 03/04/2023  Length of Stay: 5    Subjective:     Chief Complaint: Complex partial seizures evolving to generalized tonic-clonic seizures    History of Present Illness: Declan Burleson is a 58 y.o.M  admitted to [facility] s/p SEEG placement. Per chart review he was referred by Dr. Benoit (originally by Dr. Saldana of [place]) for consideration of surgical therapy for intractable epilepsy. The patient was formerly employed as a . He is accompanied by his wife Trista, who helps to provide the history. His seizures began when he was 48 years old. He can recall no inciting event. He has 3 semiologies: "full blown" generalized events, staring episodes, and "mild" seizures, which are characterized as generalized events of shorter duration without incontinence and a shorter post-ictal period. He and his wife estimate that he persists in having about 2 events/month. He has failed multiple AEDs, including topiramate, lamotrigine, levetiracetam, and oxcarbazepine. He is currently taking eslicarbazepine, zonisamide, and clobazam. He had EMU monitoring in May-June of this year, which suggests left-sided activity. NSGY and epilepsy following. Patient admitted to [facility] for close monitoring and higher level of care.       Hospital Course: 2/28/2023: NAEON, patient complains of L side headache, L eye pressure (no visual disturbance) PRN pain available  3/1/2023: complained of chest pain- troponin neg, EKG stable, AED's per Epilepsy  03/02/2023 Increased senna, added daily suppository. Weaned off of AEDs for this afternoon per epilepsy.  03/03/2023 No seizures overnight. BM this AM. PT/OT today.  03/04/2023 BM this am. Decreased senna and dc suppository. Sleep deprivation tonight per epilepsy.      Review of Systems   Constitutional:  Negative for chills, diaphoresis, fatigue and fever.   HENT:  Negative for " facial swelling and trouble swallowing.    Eyes:  Negative for photophobia and visual disturbance.   Respiratory:  Negative for cough and shortness of breath.    Cardiovascular:  Negative for chest pain, palpitations and leg swelling.   Gastrointestinal:  Negative for abdominal distention, abdominal pain, constipation, diarrhea, nausea and vomiting.   Endocrine: Negative for polyuria.   Genitourinary:  Negative for difficulty urinating.   Musculoskeletal:  Negative for back pain, neck pain and neck stiffness.   Skin:  Negative for pallor.   Neurological:  Positive for headaches. Negative for seizures.   Psychiatric/Behavioral:  Negative for behavioral problems and confusion.      Objective:     Vitals:  Temp: 98.4 °F (36.9 °C)  Pulse: 62  Rhythm: normal sinus rhythm  BP: (!) 147/84  MAP (mmHg): 108  Resp: 18  SpO2: (!) 93 %    Temp  Min: 97.6 °F (36.4 °C)  Max: 98.9 °F (37.2 °C)  Pulse  Min: 57  Max: 79  BP  Min: 141/89  Max: 165/80  MAP (mmHg)  Min: 108  Max: 122  Resp  Min: 11  Max: 29  SpO2  Min: 92 %  Max: 98 %    03/03 0701 - 03/04 0700  In: 600 [P.O.:600]  Out: 1075 [Urine:1075]   Unmeasured Output  Urine Occurrence: 1  Stool Occurrence: 1       Physical Exam  Vitals and nursing note reviewed.   Constitutional:       Appearance: He is normal weight.   HENT:      Head:      Comments: sEEG dressing in place       Right Ear: External ear normal.      Left Ear: External ear normal.      Ears:      Comments: Pain behind L ear     Nose: Nose normal.      Mouth/Throat:      Mouth: Mucous membranes are moist.      Pharynx: Oropharynx is clear.   Eyes:      Extraocular Movements: Extraocular movements intact.      Conjunctiva/sclera: Conjunctivae normal.      Pupils: Pupils are equal, round, and reactive to light.   Cardiovascular:      Rate and Rhythm: Normal rate and regular rhythm.      Pulses: Normal pulses.   Pulmonary:      Effort: Pulmonary effort is normal.   Abdominal:      General: Bowel sounds are normal.       Palpations: Abdomen is soft.   Musculoskeletal:      Cervical back: Normal range of motion.      Right lower leg: No edema.      Left lower leg: No edema.   Skin:     General: Skin is warm and dry.   Neurological:      Mental Status: He is alert.      Comments: E4 V5 M6  Alert. Oriented x 3. Follows commands. Speech is delayed.  PERRL, EOMI   No facial asymmetry, facial sensation intact    Tongue midline, + shoulder shrug    Moves all extremities spontaneously and antigravity   Sensation intact throughout   No drift  No ataxia    Psychiatric:         Mood and Affect: Mood normal.       Medications:  Continuous Scheduledacetaminophen, 1,000 mg, Q8H  cefTRIAXone (ROCEPHIN) IVPB, 2 g, Q12H  EScitalopram oxalate, 10 mg, Daily  famotidine, 20 mg, BID  heparin (porcine), 5,000 Units, Q8H  polyethylene glycol, 17 g, Daily  senna-docusate 8.6-50 mg, 1 tablet, Daily    PRNcalcium carbonate, 1,000 mg, BID PRN  glycerin adult, 1 suppository, Daily PRN  hydrALAZINE, 10 mg, Q4H PRN  HYDROmorphone, 0.5 mg, Q6H PRN  methocarbamoL, 500 mg, QID PRN  ondansetron, 8 mg, Q8H PRN  oxyCODONE, 5 mg, Q6H PRN      Today I personally reviewed pertinent medications, lines/drains/airways, imaging, cardiology results, laboratory results, microbiology results,     Diet  Diet Adult Regular (IDDSI Level 7)      Assessment/Plan:     Neuro  * Complex partial seizures evolving to generalized tonic-clonic seizures  Declan Néstor Burleson is a 58 y.o.M  admitted to [facility] s/p SEEG placement. He has failed multiple AEDs, including topiramate, lamotrigine, levetiracetam, and oxcarbazepine. He is currently taking eslicarbazepine, zonisamide, and clobazam.     -- s/p sEEG  -- NSGY and epilepsy following  -- AED's per epilepsy reccs- weaned off all AEDs on 3/2 per epilepsy   -- Neuro checks q 2hr  -- SBP <160  -- seizure precautions  -- PT/OT/SLP  - cortical stimulation 3/3 w epilepsy- 1x 30 sec seizure  - Sleep deprivation tonight 3/4 per epilepsy      Migraine without status migrainosus, not intractable  Tylenol 1g q8h  Mag   Prn robaxin and oxy     Psychiatric  Depression with anxiety  Continue home med Escitalopram          The patient is being Prophylaxed for:  Venous Thromboembolism with: Mechanical or Chemical  Stress Ulcer with: H2B  Ventilator Pneumonia with: not applicable    Activity Orders          Diet Adult Regular (IDDSI Level 7): Regular starting at 02/27 1103        Full Code    Critical care time spent on the evaluation and treatment of severe organ dysfunction, review of pertinent labs and imaging studies, discussions with consulting providers and discussions with patient/family: 35 minutes.    Shin López PA-C  Neurocritical Care  [facility]

## 2023-03-04 NOTE — ASSESSMENT & PLAN NOTE
Mr. Burleson is a 58 year old man with intractable epilepsy s/p prior sEEG monitoring in January 2021, left laser amygdalohippocampectomy in March 2021 admitted to [facility] s/p placement of 8 left-sided sEEG electrodes for repeat intracranial monitoring and further localization of his epilepsy in consideration of additional surgical management options of his epilepsy. Currently maintained on Clobazam 40 mg qHS, Eslicarbazepine 1200 mg qHS, and Zonisamide 500 mg qHS with continued events of staring/loss of awareness and generalized convulsions.    - s/p placement of 8 left-sided sEEG electrodes by NSGY 2/27  - Event 3/2 am with no associated EEG change, not consistent with patients typical seizure  - Holding Eslicarbazepine since 3/2, Zonisamide since 3/1, Clobazam since 2/28  - Cortical stimulation 3/3 evening by Dr. Edgar and Dr. Feliciano  - 3/4: sleep deprive, continue to hold AEDs  - Continue mittens and strict 1:1 supervision as he previously self-removed sEEG leads during monitoring in 2021, requiring early termination of monitoring  - Post-operative imaging timing, pain control per NCC, NSGY  - Seizure precautions  - Please call epilepsy to notify of any seizures  -  If more than 3 seizures in 2 hours or any seizure lasting greater than 3 minutes, please give 2 mg IV lorazepam and contact on-call epilepsy    Plan of care discussed with NCC team, patient and wife at bedside. Will continue to follow, please call with any questions.

## 2023-03-04 NOTE — SUBJECTIVE & OBJECTIVE
Interval History: No spontaneous seizures overnight.  Patient complains of moderate headache this morning, otherwise doing alright.      Current Facility-Administered Medications   Medication Dose Route Frequency Provider Last Rate Last Admin    acetaminophen tablet 1,000 mg  1,000 mg Oral Q8H Magda Miwill, NP   1,000 mg at 03/04/23 0517    calcium carbonate 200 mg calcium (500 mg) chewable tablet 1,000 mg  1,000 mg Oral BID PRN Magdabritton Weiss, NP        cefTRIAXone (ROCEPHIN) 2 g in dextrose 5 % in water (D5W) 5 % 50 mL IVPB (MB+)  2 g Intravenous Q12H Isaura Burgos PA-C   Stopped at 03/04/23 0938    EScitalopram oxalate tablet 10 mg  10 mg Oral Daily Magdabritton Weiss, NP   10 mg at 03/04/23 0905    famotidine tablet 20 mg  20 mg Oral BID Fred Wayne MD   20 mg at 03/04/23 0905    glycerin adult suppository 1 suppository  1 suppository Rectal Daily PRN Isaura Burgos PA-C        heparin (porcine) injection 5,000 Units  5,000 Units Subcutaneous Q8H MATEO MayaC   5,000 Units at 03/04/23 0518    hydrALAZINE injection 10 mg  10 mg Intravenous Q4H PRN Magda Weiss, NP        HYDROmorphone injection 0.5 mg  0.5 mg Intravenous Q6H PRN Isaura Burgos PA-C        methocarbamoL tablet 500 mg  500 mg Oral QID PRN Magda Weiss, NP   500 mg at 03/04/23 0319    ondansetron disintegrating tablet 8 mg  8 mg Oral Q8H PRN Isaura Burgos PA-C        oxyCODONE immediate release tablet 5 mg  5 mg Oral Q6H PRN Magda Weiss, NP   5 mg at 03/04/23 1104    polyethylene glycol packet 17 g  17 g Oral Daily Isaura Burgos PA-C   17 g at 03/03/23 0815    senna-docusate 8.6-50 mg per tablet 1 tablet  1 tablet Oral Daily Shin López PA-C   1 tablet at 03/04/23 0905     Continuous Infusions:    Review of Systems   Constitutional: Negative.    HENT: Negative.     Eyes: Negative.    Respiratory: Negative.     Cardiovascular: Negative.    Endocrine: Negative.    Neurological:  Positive for  seizures and headaches.   Psychiatric/Behavioral: Negative.     Objective:     Vital Signs (Most Recent):  Temp: 98.5 °F (36.9 °C) (03/04/23 0701)  Pulse: 77 (03/04/23 0901)  Resp: 15 (03/04/23 1104)  BP: (!) 148/93 (03/04/23 0901)  SpO2: 95 % (03/04/23 0901)   Vital Signs (24h Range):  Temp:  [97.6 °F (36.4 °C)-98.5 °F (36.9 °C)] 98.5 °F (36.9 °C)  Pulse:  [58-77] 77  Resp:  [11-29] 15  SpO2:  [93 %-98 %] 95 %  BP: (129-165)/() 148/93  Arterial Line BP: (112-151)/() 136/85     Weight: 87.5 kg (193 lb)  Body mass index is 28.5 kg/m².    Physical Exam  HENT:      Head:      Comments:  sEEG leads + headwrap in place  Eyes:      Extraocular Movements: EOM normal.      Pupils: Pupils are equal, round, and reactive to light.   Cardiovascular:      Rate and Rhythm: Normal rate.   Pulmonary:      Effort: Pulmonary effort is normal.   Skin:     General: Skin is warm and dry.   Neurological:      Mental Status: He is oriented to person, place, and time.       NEUROLOGICAL EXAMINATION:     MENTAL STATUS   Oriented to person, place, and time.        Speech slight slow/delayed; present at baseline     CRANIAL NERVES     CN III, IV, VI   Pupils are equal, round, and reactive to light.  Extraocular motions are normal.     CN VII   Facial expression full, symmetric.     MOTOR EXAM     Strength   Left strength: Moves all extremities equally and spontaneously    SENSORY EXAM   Light touch normal.     Significant Labs: All pertinent lab results from the past 24 hours have been reviewed.    Significant Studies: I have reviewed and interpreted all pertinent imaging results/findings within the past 24 hours.

## 2023-03-04 NOTE — SUBJECTIVE & OBJECTIVE
Interval History: 3/4: multiple BM's yesterday. No seizures. Was working on stationary bike.       Medications:  Continuous Infusions:  Scheduled Meds:   acetaminophen  1,000 mg Oral Q8H    cefTRIAXone (ROCEPHIN) IVPB  2 g Intravenous Q12H    EScitalopram oxalate  10 mg Oral Daily    famotidine  20 mg Oral BID    heparin (porcine)  5,000 Units Subcutaneous Q8H    polyethylene glycol  17 g Oral Daily    senna-docusate 8.6-50 mg  1 tablet Oral Daily     PRN Meds:calcium carbonate, glycerin adult, hydrALAZINE, HYDROmorphone, methocarbamoL, ondansetron, oxyCODONE     Review of Systems   Neurological:  Positive for seizures.   All other systems reviewed and are negative.  Objective:     Weight: 87.5 kg (193 lb)  Body mass index is 28.5 kg/m².  Vital Signs (Most Recent):  Temp: 98.5 °F (36.9 °C) (03/04/23 0701)  Pulse: 77 (03/04/23 0901)  Resp: (!) 29 (03/04/23 0901)  BP: (!) 148/93 (03/04/23 0901)  SpO2: 95 % (03/04/23 0901)   Vital Signs (24h Range):  Temp:  [97.6 °F (36.4 °C)-98.8 °F (37.1 °C)] 98.5 °F (36.9 °C)  Pulse:  [58-77] 77  Resp:  [11-29] 29  SpO2:  [93 %-98 %] 95 %  BP: (129-165)/() 148/93  Arterial Line BP: (112-151)/() 136/85                            Physical Exam  Constitutional:       Appearance: He is well-developed and well-nourished.   Eyes:      Extraocular Movements: EOM normal.      Conjunctiva/sclera: Conjunctivae normal.      Pupils: Pupils are equal, round, and reactive to light.   Cardiovascular:      Pulses: Normal pulses.   Abdominal:      Palpations: Abdomen is soft.   Neurological:      Mental Status: He is alert and oriented to person, place, and time.      Comments: AAOx3, higher order confusion  PERRL  CN intact  BUE 5/5  BLE 5/5  SILT    Headwrap in place with sEEG leads   Psychiatric:         Mood and Affect: Mood and affect normal.       Physical Exam:    Constitutional: He appears well-developed and well-nourished.     Eyes: Pupils are equal, round, and reactive to  light. Conjunctivae and EOM are normal.     Cardiovascular: Normal pulses.     Abdominal: Soft.     Psych/Behavior: He is alert. He is oriented to person, place, and time. He has a normal mood and affect.     Neurological:   AAOx3, higher order confusion  PERRL  CN intact  BUE 5/5  BLE 5/5  SILT    Headwrap in place with sEEG leads     Significant Labs:  Recent Labs   Lab 03/03/23  0121 03/04/23  0021   GLU 96 105    136   K 3.7 4.0    103   CO2 24 23   BUN 14 15   CREATININE 0.7 0.7   CALCIUM 9.1 9.1   MG 1.9 2.0       Recent Labs   Lab 03/03/23  0121 03/04/23  0021   WBC 6.83 6.68   HGB 13.5* 13.6*   HCT 41.1 40.2    292       No results for input(s): LABPT, INR, APTT in the last 48 hours.    Microbiology Results (last 7 days)       ** No results found for the last 168 hours. **          All pertinent labs from the last 24 hours have been reviewed.    Significant Diagnostics:  I have reviewed all pertinent imaging results/findings within the past 24 hours.

## 2023-03-05 LAB
ALBUMIN SERPL BCP-MCNC: 3.1 G/DL (ref 3.5–5.2)
ALP SERPL-CCNC: 101 U/L (ref 55–135)
ALT SERPL W/O P-5'-P-CCNC: 31 U/L (ref 10–44)
ANION GAP SERPL CALC-SCNC: 9 MMOL/L (ref 8–16)
AST SERPL-CCNC: 28 U/L (ref 10–40)
BASOPHILS # BLD AUTO: 0.07 K/UL (ref 0–0.2)
BASOPHILS NFR BLD: 0.9 % (ref 0–1.9)
BILIRUB SERPL-MCNC: 0.2 MG/DL (ref 0.1–1)
BUN SERPL-MCNC: 17 MG/DL (ref 6–20)
CALCIUM SERPL-MCNC: 8.7 MG/DL (ref 8.7–10.5)
CHLORIDE SERPL-SCNC: 103 MMOL/L (ref 95–110)
CO2 SERPL-SCNC: 24 MMOL/L (ref 23–29)
CREAT SERPL-MCNC: 0.7 MG/DL (ref 0.5–1.4)
DIFFERENTIAL METHOD: ABNORMAL
EOSINOPHIL # BLD AUTO: 1 K/UL (ref 0–0.5)
EOSINOPHIL NFR BLD: 13.4 % (ref 0–8)
ERYTHROCYTE [DISTWIDTH] IN BLOOD BY AUTOMATED COUNT: 12.9 % (ref 11.5–14.5)
EST. GFR  (NO RACE VARIABLE): >60 ML/MIN/1.73 M^2
GLUCOSE SERPL-MCNC: 174 MG/DL (ref 70–110)
HCT VFR BLD AUTO: 40 % (ref 40–54)
HGB BLD-MCNC: 12.6 G/DL (ref 14–18)
IMM GRANULOCYTES # BLD AUTO: 0.03 K/UL (ref 0–0.04)
IMM GRANULOCYTES NFR BLD AUTO: 0.4 % (ref 0–0.5)
LYMPHOCYTES # BLD AUTO: 1.8 K/UL (ref 1–4.8)
LYMPHOCYTES NFR BLD: 23.5 % (ref 18–48)
MAGNESIUM SERPL-MCNC: 1.9 MG/DL (ref 1.6–2.6)
MCH RBC QN AUTO: 30.4 PG (ref 27–31)
MCHC RBC AUTO-ENTMCNC: 31.5 G/DL (ref 32–36)
MCV RBC AUTO: 97 FL (ref 82–98)
MONOCYTES # BLD AUTO: 1 K/UL (ref 0.3–1)
MONOCYTES NFR BLD: 12.5 % (ref 4–15)
NEUTROPHILS # BLD AUTO: 3.8 K/UL (ref 1.8–7.7)
NEUTROPHILS NFR BLD: 49.3 % (ref 38–73)
NRBC BLD-RTO: 0 /100 WBC
PHOSPHATE SERPL-MCNC: 4.4 MG/DL (ref 2.7–4.5)
PLATELET # BLD AUTO: 287 K/UL (ref 150–450)
PMV BLD AUTO: 9.7 FL (ref 9.2–12.9)
POTASSIUM SERPL-SCNC: 3.8 MMOL/L (ref 3.5–5.1)
PROT SERPL-MCNC: 6.5 G/DL (ref 6–8.4)
RBC # BLD AUTO: 4.14 M/UL (ref 4.6–6.2)
SODIUM SERPL-SCNC: 136 MMOL/L (ref 136–145)
WBC # BLD AUTO: 7.62 K/UL (ref 3.9–12.7)

## 2023-03-05 PROCEDURE — 85025 COMPLETE CBC W/AUTO DIFF WBC: CPT | Performed by: PHYSICIAN ASSISTANT

## 2023-03-05 PROCEDURE — 63600175 PHARM REV CODE 636 W HCPCS: Performed by: PHYSICIAN ASSISTANT

## 2023-03-05 PROCEDURE — 99291 PR CRITICAL CARE, E/M 30-74 MINUTES: ICD-10-PCS | Mod: ,,,

## 2023-03-05 PROCEDURE — 63600175 PHARM REV CODE 636 W HCPCS

## 2023-03-05 PROCEDURE — 25000003 PHARM REV CODE 250

## 2023-03-05 PROCEDURE — 25000003 PHARM REV CODE 250: Performed by: PSYCHIATRY & NEUROLOGY

## 2023-03-05 PROCEDURE — 95720 EEG PHY/QHP EA INCR W/VEEG: CPT | Mod: ,,, | Performed by: PSYCHIATRY & NEUROLOGY

## 2023-03-05 PROCEDURE — 27000221 HC OXYGEN, UP TO 24 HOURS

## 2023-03-05 PROCEDURE — 25000003 PHARM REV CODE 250: Performed by: PHYSICIAN ASSISTANT

## 2023-03-05 PROCEDURE — 95720 PR EEG, W/VIDEO, CONT RECORD, I&R, >12<26 HRS: ICD-10-PCS | Mod: ,,, | Performed by: PSYCHIATRY & NEUROLOGY

## 2023-03-05 PROCEDURE — 99233 SBSQ HOSP IP/OBS HIGH 50: CPT | Mod: ,,, | Performed by: STUDENT IN AN ORGANIZED HEALTH CARE EDUCATION/TRAINING PROGRAM

## 2023-03-05 PROCEDURE — 95714 VEEG EA 12-26 HR UNMNTR: CPT

## 2023-03-05 PROCEDURE — 83735 ASSAY OF MAGNESIUM: CPT | Performed by: PHYSICIAN ASSISTANT

## 2023-03-05 PROCEDURE — 99233 PR SUBSEQUENT HOSPITAL CARE,LEVL III: ICD-10-PCS | Mod: ,,, | Performed by: STUDENT IN AN ORGANIZED HEALTH CARE EDUCATION/TRAINING PROGRAM

## 2023-03-05 PROCEDURE — 20000000 HC ICU ROOM

## 2023-03-05 PROCEDURE — 94761 N-INVAS EAR/PLS OXIMETRY MLT: CPT

## 2023-03-05 PROCEDURE — 25000003 PHARM REV CODE 250: Performed by: NURSE PRACTITIONER

## 2023-03-05 PROCEDURE — 99900035 HC TECH TIME PER 15 MIN (STAT)

## 2023-03-05 PROCEDURE — 84100 ASSAY OF PHOSPHORUS: CPT | Performed by: PHYSICIAN ASSISTANT

## 2023-03-05 PROCEDURE — 99291 CRITICAL CARE FIRST HOUR: CPT | Mod: ,,,

## 2023-03-05 PROCEDURE — 80053 COMPREHEN METABOLIC PANEL: CPT | Performed by: PHYSICIAN ASSISTANT

## 2023-03-05 PROCEDURE — 63600175 PHARM REV CODE 636 W HCPCS: Performed by: STUDENT IN AN ORGANIZED HEALTH CARE EDUCATION/TRAINING PROGRAM

## 2023-03-05 RX ORDER — LORAZEPAM 2 MG/ML
INJECTION INTRAMUSCULAR
Status: COMPLETED
Start: 2023-03-05 | End: 2023-03-05

## 2023-03-05 RX ORDER — LORAZEPAM 2 MG/ML
2 INJECTION INTRAMUSCULAR ONCE
Status: COMPLETED | OUTPATIENT
Start: 2023-03-05 | End: 2023-03-05

## 2023-03-05 RX ORDER — LORAZEPAM 2 MG/ML
2 INJECTION INTRAMUSCULAR ONCE AS NEEDED
Status: DISCONTINUED | OUTPATIENT
Start: 2023-03-05 | End: 2023-03-09 | Stop reason: HOSPADM

## 2023-03-05 RX ORDER — LORAZEPAM 2 MG/ML
2 INJECTION INTRAMUSCULAR ONCE
Status: DISCONTINUED | OUTPATIENT
Start: 2023-03-05 | End: 2023-03-05

## 2023-03-05 RX ADMIN — HEPARIN SODIUM 5000 UNITS: 5000 INJECTION INTRAVENOUS; SUBCUTANEOUS at 10:03

## 2023-03-05 RX ADMIN — CEFTRIAXONE 2 G: 2 INJECTION, POWDER, FOR SOLUTION INTRAMUSCULAR; INTRAVENOUS at 08:03

## 2023-03-05 RX ADMIN — ACETAMINOPHEN 1000 MG: 500 TABLET ORAL at 10:03

## 2023-03-05 RX ADMIN — ACETAMINOPHEN 1000 MG: 500 TABLET ORAL at 01:03

## 2023-03-05 RX ADMIN — HEPARIN SODIUM 5000 UNITS: 5000 INJECTION INTRAVENOUS; SUBCUTANEOUS at 05:03

## 2023-03-05 RX ADMIN — SENNOSIDES AND DOCUSATE SODIUM 1 TABLET: 50; 8.6 TABLET ORAL at 08:03

## 2023-03-05 RX ADMIN — LORAZEPAM 2 MG: 2 INJECTION INTRAMUSCULAR; INTRAVENOUS at 09:03

## 2023-03-05 RX ADMIN — METHOCARBAMOL 500 MG: 500 TABLET ORAL at 08:03

## 2023-03-05 RX ADMIN — ESCITALOPRAM OXALATE 10 MG: 10 TABLET ORAL at 08:03

## 2023-03-05 RX ADMIN — ACETAMINOPHEN 1000 MG: 500 TABLET ORAL at 05:03

## 2023-03-05 RX ADMIN — FAMOTIDINE 20 MG: 20 TABLET ORAL at 08:03

## 2023-03-05 RX ADMIN — METHOCARBAMOL 500 MG: 500 TABLET ORAL at 05:03

## 2023-03-05 RX ADMIN — HEPARIN SODIUM 5000 UNITS: 5000 INJECTION INTRAVENOUS; SUBCUTANEOUS at 01:03

## 2023-03-05 NOTE — PROGRESS NOTES
[facility] - [facility]  Neurology-Epilepsy  Progress Note    Patient Name: Declan Burleson  MRN: [id number]  Admission Date: 2/27/2023  Hospital Length of Stay: 6 days  Code Status: Full Code   Attending Provider: Fred Wayne MD  Primary Care Physician: Juan Carlos Simmons NP   Principal Problem:Complex partial seizures evolving to generalized tonic-clonic seizures    Subjective:     Hospital Course:   2/27>2/28: Clobazam decreased to 20 mg qHS, Eslicarbazepine decreased to 400 mg qHS, and Zonisamide decreased to 200 mg qHS on admission. No seizures noted overnight. Beginning 2/28 pm, hold Clobazam, decrease Zonisamide further to 100 mg qHS. Continue Eslicarbazepine 400 mg nightly.   2/28>3/1: No seizures overnight, EEG normal. Hold Zonisamide beginning 3/1 pm.   3/1>3/2: No seizures overnight. Event 3/2 approximately 1035 of sudden eye opening/fluttering, confusion. Hold Eslicarbazepine beginning 3/2 pm.   3/2>3/3: No clinical seizures overnight. Continue close vEEG off all AEDs. Plan for cortical stimulation 3/3 evening with Dr. Edgar and Dr. Feliciano.  3/3-3/4: 1 seizure provoked yesterday by cortical stimulation, no spontaneous seizures.  Plan for sleep deprivation tonight.   3/4-3/5: 2 clinical seizures.  First consisted of shuffling leg movements w/ oral automatisms followed by prolonged post ictal aphasia, second consisted of right hand movement (possibly manual automatisms, difficult to tell hand in mitten) -> oral automatisms -> ictal cry and right version -> generalized tonic clonic seizure lasting 2 minutes; afterwards post ictal aphasia.  Onset of both was Lhippocampus (w/ second one having early recruitment of Lorbitofrontal region).        Interval History: Patient seen after having first seizure in AM; post ictal aphasia noted that gradually improved.     Current Facility-Administered Medications   Medication Dose Route Frequency Provider Last Rate Last Admin    acetaminophen tablet 1,000  mg  1,000 mg Oral Q8H Magda Miinea, NP   1,000 mg at 03/05/23 1340    calcium carbonate 200 mg calcium (500 mg) chewable tablet 1,000 mg  1,000 mg Oral BID PRN Magda Miinea, NP        cefTRIAXone (ROCEPHIN) 2 g in dextrose 5 % in water (D5W) 5 % 50 mL IVPB (MB+)  2 g Intravenous Q12H Isaura Burgos PA-C   Stopped at 03/05/23 0843    EScitalopram oxalate tablet 10 mg  10 mg Oral Daily Magda Miinea, NP   10 mg at 03/05/23 0814    famotidine tablet 20 mg  20 mg Oral BID Fred Wayne MD   20 mg at 03/05/23 0814    glycerin adult suppository 1 suppository  1 suppository Rectal Daily PRN Isaura Burgos PA-C        heparin (porcine) injection 5,000 Units  5,000 Units Subcutaneous Q8H Isaura Burgos PA-C   5,000 Units at 03/05/23 1341    hydrALAZINE injection 10 mg  10 mg Intravenous Q4H PRN Magda Miinea, NP        HYDROmorphone injection 0.5 mg  0.5 mg Intravenous Q6H PRN Isaura Burgos PA-C        LORazepam injection 2 mg  2 mg Intravenous Once PRN Shin López PA-C        methocarbamoL tablet 500 mg  500 mg Oral QID PRN Magda Miinea, NP   500 mg at 03/05/23 1717    ondansetron disintegrating tablet 8 mg  8 mg Oral Q8H PRN Isaura Burgos PA-C        oxyCODONE immediate release tablet 5 mg  5 mg Oral Q6H PRN Magda Miinea, NP   5 mg at 03/04/23 2218    polyethylene glycol packet 17 g  17 g Oral Daily Isaura Burgos PA-C   17 g at 03/03/23 0815    senna-docusate 8.6-50 mg per tablet 1 tablet  1 tablet Oral Daily Shin López PA-C   1 tablet at 03/05/23 0814     Continuous Infusions:    Review of Systems   Constitutional: Negative.    HENT: Negative.     Eyes: Negative.    Respiratory: Negative.     Cardiovascular: Negative.    Endocrine: Negative.    Neurological:  Positive for seizures and headaches.   Psychiatric/Behavioral: Negative.     Objective:     Vital Signs (Most Recent):  Temp: 98.5 °F (36.9 °C) (03/05/23 1501)  Pulse: 63 (03/05/23 1601)  Resp: 18  (03/05/23 1601)  BP: (!) 159/91 (03/05/23 1601)  SpO2: 97 % (03/05/23 1601)   Vital Signs (24h Range):  Temp:  [97.7 °F (36.5 °C)-98.7 °F (37.1 °C)] 98.5 °F (36.9 °C)  Pulse:  [] 63  Resp:  [13-26] 18  SpO2:  [92 %-99 %] 97 %  BP: (136-184)/(81-97) 159/91     Weight: 87.5 kg (193 lb)  Body mass index is 28.5 kg/m².    Physical Exam  HENT:      Head:      Comments:  sEEG leads + headwrap in place  Eyes:      Extraocular Movements: EOM normal.      Pupils: Pupils are equal, round, and reactive to light.   Cardiovascular:      Rate and Rhythm: Normal rate.   Pulmonary:      Effort: Pulmonary effort is normal.   Skin:     General: Skin is warm and dry.   Neurological:      Mental Status: He is oriented to person, place, and time.         NEUROLOGICAL EXAMINATION:      MENTAL STATUS   Postictally: aphasic, unable to follow commands initially, then started to say his name and he is in the hospital     CRANIAL NERVES      CN III, IV, VI   Pupils are equal, round, and reactive to light.  Extraocular motions are normal.      CN VII   Facial expression full, symmetric.      MOTOR EXAM      Strength   Left strength: Moves all extremities equally and spontaneously     SENSORY EXAM   Light touch normal.        Significant Labs: All pertinent lab results from the past 24 hours have been reviewed.    Significant Studies: I have reviewed and interpreted all pertinent imaging results/findings within the past 24 hours.    Assessment and Plan:     * Complex partial seizures evolving to generalized tonic-clonic seizures  Mr. Burleson is a 58 year old man with intractable epilepsy s/p prior sEEG monitoring in January 2021, left laser amygdalohippocampectomy in March 2021 admitted to [facility] s/p placement of 8 left-sided sEEG electrodes for repeat intracranial monitoring and further localization of his epilepsy in consideration of additional surgical management options of his epilepsy. Currently maintained on Clobazam 40 mg qHS,  Eslicarbazepine 1200 mg qHS, and Zonisamide 500 mg qHS with continued events of staring/loss of awareness and generalized convulsions.    - s/p placement of 8 left-sided sEEG electrodes by NSGY 2/27  - Event 3/2 am with no associated EEG change, not consistent with patients typical seizure  - Holding Eslicarbazepine since 3/2, Zonisamide since 3/1, Clobazam since 2/28  - Cortical stimulation 3/3 evening by Dr. Edgar and Dr. Feliciano  - 3/4: sleep deprive, continue to hold AEDs  - 3/5: 2 seizures recorded, would like to record more so continue to hold AEDs.   - Continue mittens and strict 1:1 supervision as he previously self-removed sEEG leads during monitoring in 2021, requiring early termination of monitoring  - Post-operative imaging timing, pain control per NCC, NSGY  - Seizure precautions  - Please call epilepsy to notify of any seizures  -  If more than 3 seizures in 2 hours or any seizure lasting greater than 3 minutes, please give 2 mg IV lorazepam and contact on-call epilepsy    Plan of care discussed with NCC team, patient and wife at bedside. Will continue to follow, please call with any questions.     Depression with anxiety  - Continue home Escitalopram 10 mg daily    Migraine without status migrainosus, not intractable  - Followed by Dr. Saldana as outpatient  - Supportive care during admission        VTE Risk Mitigation (From admission, onward)         Ordered     heparin (porcine) injection 5,000 Units  Every 8 hours         02/27/23 1236     Place sequential compression device  Until discontinued         02/27/23 4428                Carissa Carmona MD  [facility]   Department of Neurology/Epilepsy

## 2023-03-05 NOTE — NURSING
1157: Pt yelled. RN and wife to bedside.  Pt began convulsions (Arm straightening, leg stiffness/straightening). Convulsions continued for two minutes.  Pt destated to 50's - face mask placed.  HR increased to 130s.  BP - 184/94.  MD Mathew notified. No new orders.  Ativan at bedside.  WCTM.       1200: Pt sleeping with 2L NC.  WCTM.

## 2023-03-05 NOTE — PROCEDURES
SEEG Report      IMPLANTATION DATE:  2/27/2023  DATE OF ADMISSION: 2/27/2023       ADMITTING/REQUESTING PROVIDER: Ruchi Chen MD     REASON FOR CONSULT:  58-year-old man admitted for phase 2 epilepsy surgical workup with the placement of intracranial sEEG leads for seizure capture and onset localization.     METHODOLOGY  Depth electrodes consist of thin wires with electrical contacts at fixed distances along the wire. These are implanted using a stereotactic procedure targeting cortical and subcortical structures. Digital video recording of the patient is simultaneously recorded with the EEG. The patient is instructed to report clinical symptoms which may occur during the recording session. EEG and video recording are stored and archived in digital format. Activation procedures which include photic stimulation, hyperventilation and instructing patients to perform simple tasks are done in selected patients. Compresses spectral analysis (CSA) is performed on the activity recorded from each individual channel. This is displayed as a power display of frequencies from 0 to 30 Hz over time. The CSA analysis is done and displayed continuously. This is reviewed for asymmetries in power between homologous areas of the scalp and for presence of changes in power which can be seen when seizures occur. Sections of suspected abnormalities on the CSA is then compared with the original EEG recording.      The following is a chart outlying the details related to the depth electrodes implanted:       Electrode    Medial target  Lateral target  Cable Color Serial Number # Of Contacts Head Box  Location   1 L Sup margin Piriformis Inf Temp Gyrus Blk/Gr 12605 16 1-16   2 L Entorihinal Entorhinal Inf Temp Gyrus Yel/Scott 88601 12 17-28   3 L Prefrontal Ant Cing Mid Frontal Gyrus Scott/Blk 80625 16 29-44   4 L Ant Insular Ant Insula Frontal Operculum Yel/Gre 74273 10 45-54   5 L Mid Insular Mid Insula Pars Triangularis Yel/Scott 11260 10  55-64   6 L Orbitofrontal Med OrbFr Lat OrbFr Blk/Yel 25469 16 65-80   7 L Mid Cing Mid Cingulate Sup Frontal Gyrus Red/Yel 20149 14 81-94   8 L Hippo Tail Post Hippo Mid Temp Gyrus Red/Gre 80065 14     EKG           109-110        RECORDING TIMES  Start on March 3, 2023   End on March 4, 2023       The total time of intracranial EEG recording for the study was 24 hours     EEG FINDINGS     Interictal:  - Independent spikes LSuMar1-4  - Spikes LSuMar1-11 and [id number]  - Independent spikes Lhippo1-2, (but these can also occur with spikes at LSuMar1-7/LPreFr1-5/LOrFro1-5)  - Independent but infrequent spikes LMiCin8-12  - LSuMar1-7, Lencor1-9, Lprefr1-5, and LOrFr1-5 fire together at times      (Blue Lsumar1-7, green LPreFr1-5, pink LOrFr1-5; orange Lhippo1-2)      Cortical stimulation:  - Stimulation at LEnCor8-9, LEnCor6-7, EnCor2-3, EEhRte59-88, LuSmar8-9 resulted in brief after-discharges and no clinical symptoms  - Stimulation at LSuMar4-5 resulted in one clinical seizure.  Electrographically, while stimulation was applied at LSuMar4-5, there were brief epileptiform discharges at Lhippo1 concurrent with LOrFro1-4 for 4 seconds, then fades for about 2-3 seconds.  Following this, there are epileptiform discharges at LSuMar1-3, Lencor1, and Lhippo1, which then spreads to Lorfro1-4 and then and LMiCin1-3, then becomes widespread.  Clinically, the patient was unable to say his name and then could not follow commands (did not stick tongue out, did not look at his wife when asked).  He also had oral automatisms (chewing).  After send of EEG seizure pattern, he continued to remain aphasic and had some brief post ictal automatisms (more chewing).  He then gradually starts to recover, at first just miming when asked to follow commands and then eventually being able to say his name and starts following commands.  Overall, he had post ictal aphasia for 4 minutes and then he gradually started to recover.          Impression:    Abnormal sEEG with one clinical seizure induced by cortical stimulation at LSuMar4-5.  Clinically, the seizure consisted of aphasia and oral automatisms, followed by a period of post ictal aphasia that was prolonged.  Electrographically, epileptiform discharges were noted at LSuMar1-3, Lencor1, and Lhippo1 at onset, with spread to Lorfro1-4 and LMiCin1-3 and then became more widespread.  There were also interictal epileptiform discharges most frequent LSuMar1-4 or LSuMar1-11;LEnCor1-9, Lhippo1-2, as well as LPreFr1-5, LOrFr1-5, and infrequent spikes LMiCin8-12.

## 2023-03-05 NOTE — NURSING
Shift report relayed cancellation of sleep deprivation per day shift RN. Critical care team, epilepsy, and neurosurgery discussed with family and patient during previous shift. Per day shift, decision made to not move forward with sleep deprivation due previous attempts not being effective in inducing seizures.     Critical care team and neurosurgery called due to lack of documentation of the cancellation. Verbally told by ROMIE Boyd that we are not doing sleep deprivation. Will continued to monitor patient for spontaneous seizure activity.

## 2023-03-05 NOTE — ASSESSMENT & PLAN NOTE
Declan Burleson is a 58 y.o.M  admitted to [facility] s/p SEEG placement. He has failed multiple AEDs, including topiramate, lamotrigine, levetiracetam, and oxcarbazepine. He is currently taking eslicarbazepine, zonisamide, and clobazam.     -- s/p sEEG  -- NSGY and epilepsy following  -- AED's per epilepsy reccs- weaned off all AEDs on 3/2 per epilepsy   -- Neuro checks q 2hr  -- SBP <160  -- seizure precautions  -- PT/OT/SLP  - cortical stimulation 3/3 w epilepsy- 1x 30 sec seizure  - Family and epilepsy agreed not to do sleep deprivation last night.   - 3/5: 3 minute seizure today, given 2mg ativan. Post ictal. Pt had another 2 min seizure around noon, no ativan given, post ictal now.

## 2023-03-05 NOTE — NURSING
0923: Pt non responsive to verbal stimuli.  Wife called RN to bedside.  RN asked orientation questions and patient unable to respond.  Pt seemed to come out of seizure and resume starring. This happened 4 times within 5 minutes. No changes in vital signs. HR 60-65. BP < 160. SPO2 98% on 2L NC.  MD Mathew called, given orders to give Ativan.  MD Mathew to bedside.  Ativan given.  WCTM.

## 2023-03-05 NOTE — PLAN OF CARE
[facility] Care Plan    POC reviewed with Declan Burleson and family at 1400. Pt verbalized understanding. Questions and concerns addressed. No acute events today. Pt progressing toward goals. Will continue to monitor. See below and flowsheets for full assessment and VS info.     - Seizure activity today. Ativan given x 1. See previous notes for details.   - Bath given and linen changed.   - Supplement O2 given for sleeping.   - Robaxin given x 1        Is this a stroke patient? no    Neuro:  [place] Coma Scale  Best Eye Response: 4-->(E4) spontaneous  Best Motor Response: 6-->(M6) obeys commands  Best Verbal Response: 5-->(V5) oriented  Ruben Coma Scale Score: 15  Assessment Qualifiers: patient not sedated/intubated, no eye obstruction present  Pupil PERRLA: yes     24 hr Temp:  [97.7 °F (36.5 °C)-98.7 °F (37.1 °C)]     CV:   Rhythm: normal sinus rhythm  BP goals:   SBP < 160  MAP > 65    Resp:      Oxygen Concentration (%): 28    Plan: N/A    GI/:     Diet/Nutrition Received: regular  Last Bowel Movement: 03/04/23  Voiding Characteristics: voids spontaneously without difficulty    Intake/Output Summary (Last 24 hours) at 3/5/2023 1535  Last data filed at 3/5/2023 1101  Gross per 24 hour   Intake 50 ml   Output 1300 ml   Net -1250 ml     Unmeasured Output  Urine Occurrence: 1  Stool Occurrence: 1    Labs/Accuchecks:  Recent Labs   Lab 03/05/23  0022   WBC 7.62   RBC 4.14*   HGB 12.6*   HCT 40.0         Recent Labs   Lab 03/05/23  0022      K 3.8   CO2 24      BUN 17   CREATININE 0.7   ALKPHOS 101   ALT 31   AST 28   BILITOT 0.2      Recent Labs   Lab 02/27/23  0555   INR 1.0   APTT 27.4      Recent Labs   Lab 03/01/23  1112   TROPONINI <0.006       Electrolytes: No replacement orders  Accuchecks: none    Gtts:      LDA/Wounds:  Lines/Drains/Airways       Peripheral Intravenous Line  Duration                  Peripheral IV - Single Lumen 03/03/23 0853 20 G Left;Posterior Hand 2 days          Peripheral IV - Single Lumen 03/03/23 2004 22 G Anterior;Proximal;Right Forearm 1 day                  Wounds: No  Wound care consulted: No

## 2023-03-05 NOTE — PLAN OF CARE
[facility] Care Plan    POC reviewed with Declan Burleson and family at 0300. Pt verbalized understanding. Questions and concerns addressed. No acute events overnight. Pt progressing toward goals. Will continue to monitor. See below and flowsheets for full assessment and VS info.     No acute changes overnight.  No clinical seizure activity noted.  Oxy x 1 and robaxin x 1 administered for pain.      Is this a stroke patient? no    Neuro:  Ruben Coma Scale  Best Eye Response: 4-->(E4) spontaneous  Best Motor Response: 6-->(M6) obeys commands  Best Verbal Response: 5-->(V5) oriented  Ell[place] Coma Scale Score: 15  Assessment Qualifiers: patient not sedated/intubated  Pupil PERRLA: yes     24hr Temp:  [97.7 °F (36.5 °C)-98.9 °F (37.2 °C)]     CV:   Rhythm: normal sinus rhythm  BP goals:   SBP < 160  MAP > 65    Resp:      Oxygen Concentration (%): 28    Plan: N/A    GI/:     Diet/Nutrition Received: regular  Last Bowel Movement: 03/04/23  Voiding Characteristics: voids spontaneously without difficulty    Intake/Output Summary (Last 24 hours) at 3/5/2023 0355  Last data filed at 3/4/2023 2125  Gross per 24 hour   Intake 50 ml   Output 1000 ml   Net -950 ml     Unmeasured Output  Urine Occurrence: 1  Stool Occurrence: 1    Labs/Accuchecks:  Recent Labs   Lab 03/05/23  0022   WBC 7.62   RBC 4.14*   HGB 12.6*   HCT 40.0         Recent Labs   Lab 03/05/23  0022      K 3.8   CO2 24      BUN 17   CREATININE 0.7   ALKPHOS 101   ALT 31   AST 28   BILITOT 0.2      Recent Labs   Lab 02/27/23  0555   INR 1.0   APTT 27.4      Recent Labs   Lab 03/01/23  1112   TROPONINI <0.006       Electrolytes: N/A - electrolytes WDL  Accuchecks: none    Gtts:      LDA/Wounds:  Lines/Drains/Airways       Peripheral Intravenous Line  Duration                  Peripheral IV - Single Lumen 03/03/23 0853 20 G Left;Posterior Hand 1 day         Peripheral IV - Single Lumen 03/03/23 2004 22 G Anterior;Proximal;Right Forearm 1 day                   Wounds: No  Wound care consulted: No

## 2023-03-05 NOTE — SUBJECTIVE & OBJECTIVE
Interval History: 3/5: no seizures overnight. Working with PT. BM yesterday. Complaining of left sided head pain related to surgery       Medications:  Continuous Infusions:  Scheduled Meds:   acetaminophen  1,000 mg Oral Q8H    cefTRIAXone (ROCEPHIN) IVPB  2 g Intravenous Q12H    EScitalopram oxalate  10 mg Oral Daily    famotidine  20 mg Oral BID    heparin (porcine)  5,000 Units Subcutaneous Q8H    polyethylene glycol  17 g Oral Daily    senna-docusate 8.6-50 mg  1 tablet Oral Daily     PRN Meds:calcium carbonate, glycerin adult, hydrALAZINE, HYDROmorphone, lorazepam, methocarbamoL, ondansetron, oxyCODONE     Review of Systems   Neurological:  Positive for seizures.   All other systems reviewed and are negative.  Objective:     Weight: 87.5 kg (193 lb)  Body mass index is 28.5 kg/m².  Vital Signs (Most Recent):  Temp: 97.9 °F (36.6 °C) (03/05/23 0701)  Pulse: (!) 57 (03/05/23 0901)  Resp: 17 (03/05/23 0901)  BP: (!) 172/92 (03/05/23 0901)  SpO2: 97 % (03/05/23 0901)   Vital Signs (24h Range):  Temp:  [97.7 °F (36.5 °C)-98.9 °F (37.2 °C)] 97.9 °F (36.6 °C)  Pulse:  [53-81] 57  Resp:  [13-26] 17  SpO2:  [92 %-97 %] 97 %  BP: (136-172)/(81-97) 172/92                            Physical Exam  Constitutional:       Appearance: He is well-developed and well-nourished.   Eyes:      Extraocular Movements: EOM normal.      Conjunctiva/sclera: Conjunctivae normal.      Pupils: Pupils are equal, round, and reactive to light.   Cardiovascular:      Pulses: Normal pulses.   Abdominal:      Palpations: Abdomen is soft.   Neurological:      Mental Status: He is alert and oriented to person, place, and time.      Comments: AAOx3, higher order confusion  PERRL  CN intact  BUE 5/5  BLE 5/5  SILT    Headwrap in place with sEEG leads   Psychiatric:         Mood and Affect: Mood and affect normal.       Physical Exam:    Constitutional: He appears well-developed and well-nourished.     Eyes: Pupils are equal, round, and reactive to  light. Conjunctivae and EOM are normal.     Cardiovascular: Normal pulses.     Abdominal: Soft.     Psych/Behavior: He is alert. He is oriented to person, place, and time. He has a normal mood and affect.     Neurological:   AAOx3, higher order confusion  PERRL  CN intact  BUE 5/5  BLE 5/5  SILT    Headwrap in place with sEEG leads     Significant Labs:  Recent Labs   Lab 03/04/23  0021 03/05/23  0022    174*    136   K 4.0 3.8    103   CO2 23 24   BUN 15 17   CREATININE 0.7 0.7   CALCIUM 9.1 8.7   MG 2.0 1.9       Recent Labs   Lab 03/04/23  0021 03/05/23  0022   WBC 6.68 7.62   HGB 13.6* 12.6*   HCT 40.2 40.0    287       No results for input(s): LABPT, INR, APTT in the last 48 hours.    Microbiology Results (last 7 days)       ** No results found for the last 168 hours. **          All pertinent labs from the last 24 hours have been reviewed.    Significant Diagnostics:  I have reviewed all pertinent imaging results/findings within the past 24 hours.

## 2023-03-05 NOTE — PROGRESS NOTES
[facility]  Neurosurgery  Progress Note    Subjective:     History of Present Illness: 58 M with epilepsy presents for elective sEEG placement on 2/27      Post-Op Info:  Procedure(s) (LRB):  PLACEMENT OF STEREO EEG LEADS (DEPTH ELECTRODES) (Left)   6 Days Post-Op     Interval History: 3/5: no seizures overnight. Working with PT. BM yesterday. Complaining of left sided head pain related to surgery       Medications:  Continuous Infusions:  Scheduled Meds:   acetaminophen  1,000 mg Oral Q8H    cefTRIAXone (ROCEPHIN) IVPB  2 g Intravenous Q12H    EScitalopram oxalate  10 mg Oral Daily    famotidine  20 mg Oral BID    heparin (porcine)  5,000 Units Subcutaneous Q8H    polyethylene glycol  17 g Oral Daily    senna-docusate 8.6-50 mg  1 tablet Oral Daily     PRN Meds:calcium carbonate, glycerin adult, hydrALAZINE, HYDROmorphone, lorazepam, methocarbamoL, ondansetron, oxyCODONE     Review of Systems   Neurological:  Positive for seizures.   All other systems reviewed and are negative.  Objective:     Weight: 87.5 kg (193 lb)  Body mass index is 28.5 kg/m².  Vital Signs (Most Recent):  Temp: 97.9 °F (36.6 °C) (03/05/23 0701)  Pulse: (!) 57 (03/05/23 0901)  Resp: 17 (03/05/23 0901)  BP: (!) 172/92 (03/05/23 0901)  SpO2: 97 % (03/05/23 0901)   Vital Signs (24h Range):  Temp:  [97.7 °F (36.5 °C)-98.9 °F (37.2 °C)] 97.9 °F (36.6 °C)  Pulse:  [53-81] 57  Resp:  [13-26] 17  SpO2:  [92 %-97 %] 97 %  BP: (136-172)/(81-97) 172/92                            Physical Exam  Constitutional:       Appearance: He is well-developed and well-nourished.   Eyes:      Extraocular Movements: EOM normal.      Conjunctiva/sclera: Conjunctivae normal.      Pupils: Pupils are equal, round, and reactive to light.   Cardiovascular:      Pulses: Normal pulses.   Abdominal:      Palpations: Abdomen is soft.   Neurological:      Mental Status: He is alert and oriented to person, place, and time.      Comments: AAOx3, higher  order confusion  PERRL  CN intact  BUE 5/5  BLE 5/5  SILT    Headwrap in place with sEEG leads   Psychiatric:         Mood and Affect: Mood and affect normal.       Physical Exam:    Constitutional: He appears well-developed and well-nourished.     Eyes: Pupils are equal, round, and reactive to light. Conjunctivae and EOM are normal.     Cardiovascular: Normal pulses.     Abdominal: Soft.     Psych/Behavior: He is alert. He is oriented to person, place, and time. He has a normal mood and affect.     Neurological:   AAOx3, higher order confusion  PERRL  CN intact  BUE 5/5  BLE 5/5  SILT    Headwrap in place with sEEG leads     Significant Labs:  Recent Labs   Lab 03/04/23  0021 03/05/23  0022    174*    136   K 4.0 3.8    103   CO2 23 24   BUN 15 17   CREATININE 0.7 0.7   CALCIUM 9.1 8.7   MG 2.0 1.9       Recent Labs   Lab 03/04/23  0021 03/05/23  0022   WBC 6.68 7.62   HGB 13.6* 12.6*   HCT 40.2 40.0    287       No results for input(s): LABPT, INR, APTT in the last 48 hours.    Microbiology Results (last 7 days)       ** No results found for the last 168 hours. **          All pertinent labs from the last 24 hours have been reviewed.    Significant Diagnostics:  I have reviewed all pertinent imaging results/findings within the past 24 hours.    Assessment/Plan:     * Complex partial seizures evolving to generalized tonic-clonic seizures  59 yo M with intractable epilepsy who is now s/p sEEG lead placement for seizure monitoring/mapping    - admitted to [facility]  - q1 neurochecks  - CT stealth post op reviewed, sEEG leads in appropriate position, no large volume hemorrhage  - AEDs and seizure management per epilepsy, seizure stimulation per epilepsy  - hyponatremia, ctm  - bowel regiment, last BM 3/4  - voiding spontaneously  - Chest pain, trops negative, likely GI related pain    - PPI, Bowel regimen  - bed rest  - PT/OT, daily stationary bike when fixed   -- Please encourage patient for  improved PT activity   - [facility]    Dispo: ongoing, timing TBD for sEEG lead removal        Ashley Clements MD  Neurosurgery  [facility]

## 2023-03-05 NOTE — PROGRESS NOTES
"[facility]  Neurocritical Care  Progress Note    Admit Date: 2/27/2023  Service Date: 03/05/2023  Length of Stay: 6    Subjective:     Chief Complaint: Complex partial seizures evolving to generalized tonic-clonic seizures    History of Present Illness: Declan Burleson is a 58 y.o.M  admitted to [facility] s/p SEEG placement. Per chart review he was referred by Dr. Benoit (originally by Dr. Saldana of [place]) for consideration of surgical therapy for intractable epilepsy. The patient was formerly employed as a . He is accompanied by his wife Trista, who helps to provide the history. His seizures began when he was 48 years old. He can recall no inciting event. He has 3 semiologies: "full blown" generalized events, staring episodes, and "mild" seizures, which are characterized as generalized events of shorter duration without incontinence and a shorter post-ictal period. He and his wife estimate that he persists in having about 2 events/month. He has failed multiple AEDs, including topiramate, lamotrigine, levetiracetam, and oxcarbazepine. He is currently taking eslicarbazepine, zonisamide, and clobazam. He had EMU monitoring in May-June of this year, which suggests left-sided activity. NSGY and epilepsy following. Patient admitted to [facility] for close monitoring and higher level of care.       Hospital Course: 3/5/2023: NAEON, patient complains of L side headache, L eye pressure (no visual disturbance) PRN pain available  3/5/2023: complained of chest pain- troponin neg, EKG stable, AED's per Epilepsy  03/05/2023 Increased senna, added daily suppository. Weaned off of AEDs for this afternoon per epilepsy.  03/05/2023 No seizures overnight. BM this AM. PT/OT today.  03/05/2023 BM this am. Decreased senna and dc suppository.   03/05/2023 NAEON. Family and epilepsy agreed not to do sleep deprivation last night. 3 minute seizure today, given 2mg ativan. Post ictal. Pt had another 2 min " seizure around noon, no ativan given, post ictal now.      Review of Systems   Reason unable to perform ROS: post ictal.     Objective:     Vitals:  Temp: 98.7 °F (37.1 °C)  Pulse: 73  Rhythm: normal sinus rhythm  BP: (!) 160/94  MAP (mmHg): 121  Resp: 19  SpO2: 96 %    Temp  Min: 97.7 °F (36.5 °C)  Max: 98.7 °F (37.1 °C)  Pulse  Min: 53  Max: 81  BP  Min: 136/86  Max: 172/92  MAP (mmHg)  Min: 107  Max: 123  Resp  Min: 13  Max: 26  SpO2  Min: 92 %  Max: 98 %    03/04 0701 - 03/05 0700  In: 50 [P.O.:50]  Out: 1150 [Urine:1150]   Unmeasured Output  Urine Occurrence: 1  Stool Occurrence: 1       Physical Exam  Vitals and nursing note reviewed.   Constitutional:       Appearance: He is normal weight. He is ill-appearing and diaphoretic.      Comments: Post ictal   HENT:      Head:      Comments: sEEG dressing in place       Right Ear: External ear normal.      Left Ear: External ear normal.      Nose: Nose normal.      Mouth/Throat:      Mouth: Mucous membranes are moist.      Pharynx: Oropharynx is clear.   Eyes:      Extraocular Movements: Extraocular movements intact.      Conjunctiva/sclera: Conjunctivae normal.      Pupils: Pupils are equal, round, and reactive to light.   Cardiovascular:      Rate and Rhythm: Normal rate and regular rhythm.      Pulses: Normal pulses.   Pulmonary:      Effort: Pulmonary effort is normal. No respiratory distress.      Comments: SpO2 dropped to 50 during seizure and face mask placed  Now on 5L O2  Abdominal:      General: Bowel sounds are normal.      Palpations: Abdomen is soft.   Musculoskeletal:      Cervical back: Normal range of motion.      Right lower leg: No edema.      Left lower leg: No edema.   Skin:     General: Skin is warm.   Neurological:      Comments: E4 V4 M5  Awake, post ictal  Confused. Following commands. Speech is delayed.  PERRL, tracks examiner- no obvious deficits in EOM, No facial asymmetry  Moves all extremities spontaneously, generalized weakness          Medications:  Continuous Scheduledacetaminophen, 1,000 mg, Q8H  cefTRIAXone (ROCEPHIN) IVPB, 2 g, Q12H  EScitalopram oxalate, 10 mg, Daily  famotidine, 20 mg, BID  heparin (porcine), 5,000 Units, Q8H  polyethylene glycol, 17 g, Daily  senna-docusate 8.6-50 mg, 1 tablet, Daily    PRNcalcium carbonate, 1,000 mg, BID PRN  glycerin adult, 1 suppository, Daily PRN  hydrALAZINE, 10 mg, Q4H PRN  HYDROmorphone, 0.5 mg, Q6H PRN  lorazepam, 2 mg, Once PRN  methocarbamoL, 500 mg, QID PRN  ondansetron, 8 mg, Q8H PRN  oxyCODONE, 5 mg, Q6H PRN      Today I personally reviewed pertinent medications, lines/drains/airways, imaging, cardiology results, laboratory results, microbiology results,     Diet  Diet Adult Regular (IDDSI Level 7)      Assessment/Plan:     Neuro  * Complex partial seizures evolving to generalized tonic-clonic seizures  Declan Néstor Burleson is a 58 y.o.M  admitted to [facility] s/p SEEG placement. He has failed multiple AEDs, including topiramate, lamotrigine, levetiracetam, and oxcarbazepine. He is currently taking eslicarbazepine, zonisamide, and clobazam.     -- s/p sEEG  -- NSGY and epilepsy following  -- AED's per epilepsy reccs- weaned off all AEDs on 3/2 per epilepsy   -- Neuro checks q 2hr  -- SBP <160  -- seizure precautions  -- PT/OT/SLP  - cortical stimulation 3/3 w epilepsy- 1x 30 sec seizure  - Family and epilepsy agreed not to do sleep deprivation last night.   - 3/5: 3 minute seizure today, given 2mg ativan. Post ictal. Pt had another 2 min seizure around noon, no ativan given, post ictal now.    Migraine without status migrainosus, not intractable  Tylenol 1g q8h  Mag   Prn robaxin and oxy     Psychiatric  Depression with anxiety  Continue home med Escitalopram        The patient is being Prophylaxed for:  Venous Thromboembolism with: Mechanical or Chemical  Stress Ulcer with: H2B  Ventilator Pneumonia with: not applicable    Activity Orders          Diet Adult Regular (IDDSI Level 7): Regular  starting at 02/27 1103        Full Code     Critical care time spent on the evaluation and treatment of severe organ dysfunction, review of pertinent labs and imaging studies, discussions with consulting providers and discussions with patient/family: 35 minutes.      Shin López PA-C  Neurocritical Care  [facility]

## 2023-03-05 NOTE — ASSESSMENT & PLAN NOTE
57 yo M with intractable epilepsy who is now s/p sEEG lead placement for seizure monitoring/mapping    - admitted to [facility]  - q1 neurochecks  - CT stealth post op reviewed, sEEG leads in appropriate position, no large volume hemorrhage  - AEDs and seizure management per epilepsy, seizure stimulation per epilepsy  - hyponatremia, ctm  - bowel regiment, last BM 3/4  - voiding spontaneously  - Chest pain, trops negative, likely GI related pain    - PPI, Bowel regimen  - bed rest  - PT/OT, daily stationary bike when fixed   -- Please encourage patient for improved PT activity   - [facility]    Dispo: ongoing, timing TBD for sEEG lead removal

## 2023-03-05 NOTE — SUBJECTIVE & OBJECTIVE
Interval History: Patient seen after having first seizure in AM; post ictal aphasia noted that gradually improved.     Current Facility-Administered Medications   Medication Dose Route Frequency Provider Last Rate Last Admin    acetaminophen tablet 1,000 mg  1,000 mg Oral Q8H Magdabritton Weiss, NP   1,000 mg at 03/05/23 1340    calcium carbonate 200 mg calcium (500 mg) chewable tablet 1,000 mg  1,000 mg Oral BID PRN Magda Weiss, NP        cefTRIAXone (ROCEPHIN) 2 g in dextrose 5 % in water (D5W) 5 % 50 mL IVPB (MB+)  2 g Intravenous Q12H Isaura Burgos PA-C   Stopped at 03/05/23 0843    EScitalopram oxalate tablet 10 mg  10 mg Oral Daily Magda Weiss, NP   10 mg at 03/05/23 0814    famotidine tablet 20 mg  20 mg Oral BID Fred Wayne MD   20 mg at 03/05/23 0814    glycerin adult suppository 1 suppository  1 suppository Rectal Daily PRN Isaura Burgos PA-C        heparin (porcine) injection 5,000 Units  5,000 Units Subcutaneous Q8H MATEO MayaC   5,000 Units at 03/05/23 1341    hydrALAZINE injection 10 mg  10 mg Intravenous Q4H PRN Magda Weiss, NP        HYDROmorphone injection 0.5 mg  0.5 mg Intravenous Q6H PRN Isaura Burgos PA-C        LORazepam injection 2 mg  2 mg Intravenous Once PRN Shin López PA-C        methocarbamoL tablet 500 mg  500 mg Oral QID PRN Magda Weiss, NP   500 mg at 03/05/23 1717    ondansetron disintegrating tablet 8 mg  8 mg Oral Q8H PRN Isaura Burgos PA-C        oxyCODONE immediate release tablet 5 mg  5 mg Oral Q6H PRN Magda Weiss, NP   5 mg at 03/04/23 2218    polyethylene glycol packet 17 g  17 g Oral Daily Isaura Burgos PA-C   17 g at 03/03/23 0815    senna-docusate 8.6-50 mg per tablet 1 tablet  1 tablet Oral Daily Shin López PA-C   1 tablet at 03/05/23 0814     Continuous Infusions:    Review of Systems   Constitutional: Negative.    HENT: Negative.     Eyes: Negative.    Respiratory: Negative.     Cardiovascular: Negative.     Endocrine: Negative.    Neurological:  Positive for seizures and headaches.   Psychiatric/Behavioral: Negative.     Objective:     Vital Signs (Most Recent):  Temp: 98.5 °F (36.9 °C) (03/05/23 1501)  Pulse: 63 (03/05/23 1601)  Resp: 18 (03/05/23 1601)  BP: (!) 159/91 (03/05/23 1601)  SpO2: 97 % (03/05/23 1601)   Vital Signs (24h Range):  Temp:  [97.7 °F (36.5 °C)-98.7 °F (37.1 °C)] 98.5 °F (36.9 °C)  Pulse:  [] 63  Resp:  [13-26] 18  SpO2:  [92 %-99 %] 97 %  BP: (136-184)/(81-97) 159/91     Weight: 87.5 kg (193 lb)  Body mass index is 28.5 kg/m².    Physical Exam  HENT:      Head:      Comments:  sEEG leads + headwrap in place  Eyes:      Extraocular Movements: EOM normal.      Pupils: Pupils are equal, round, and reactive to light.   Cardiovascular:      Rate and Rhythm: Normal rate.   Pulmonary:      Effort: Pulmonary effort is normal.   Skin:     General: Skin is warm and dry.   Neurological:      Mental Status: He is oriented to person, place, and time.         NEUROLOGICAL EXAMINATION:      MENTAL STATUS   Postictally: aphasic, unable to follow commands initially, then started to say his name and he is in the hospital     CRANIAL NERVES      CN III, IV, VI   Pupils are equal, round, and reactive to light.  Extraocular motions are normal.      CN VII   Facial expression full, symmetric.      MOTOR EXAM      Strength   Left strength: Moves all extremities equally and spontaneously     SENSORY EXAM   Light touch normal.        Significant Labs: All pertinent lab results from the past 24 hours have been reviewed.    Significant Studies: I have reviewed and interpreted all pertinent imaging results/findings within the past 24 hours.

## 2023-03-05 NOTE — ASSESSMENT & PLAN NOTE
Mr. Burleson is a 58 year old man with intractable epilepsy s/p prior sEEG monitoring in January 2021, left laser amygdalohippocampectomy in March 2021 admitted to [facility] s/p placement of 8 left-sided sEEG electrodes for repeat intracranial monitoring and further localization of his epilepsy in consideration of additional surgical management options of his epilepsy. Currently maintained on Clobazam 40 mg qHS, Eslicarbazepine 1200 mg qHS, and Zonisamide 500 mg qHS with continued events of staring/loss of awareness and generalized convulsions.    - s/p placement of 8 left-sided sEEG electrodes by NSGY 2/27  - Event 3/2 am with no associated EEG change, not consistent with patients typical seizure  - Holding Eslicarbazepine since 3/2, Zonisamide since 3/1, Clobazam since 2/28  - Cortical stimulation 3/3 evening by Dr. Edgar and Dr. Feliciano  - 3/4: sleep deprive, continue to hold AEDs  - 3/5: 2 seizures recorded, would like to record more so continue to hold AEDs.   - Continue mittens and strict 1:1 supervision as he previously self-removed sEEG leads during monitoring in 2021, requiring early termination of monitoring  - Post-operative imaging timing, pain control per [facility], NSGY  - Seizure precautions  - Please call epilepsy to notify of any seizures  -  If more than 3 seizures in 2 hours or any seizure lasting greater than 3 minutes, please give 2 mg IV lorazepam and contact on-call epilepsy    Plan of care discussed with NCC team, patient and wife at bedside. Will continue to follow, please call with any questions.

## 2023-03-05 NOTE — SUBJECTIVE & OBJECTIVE
Review of Systems   Reason unable to perform ROS: post ictal.     Objective:     Vitals:  Temp: 98.7 °F (37.1 °C)  Pulse: 73  Rhythm: normal sinus rhythm  BP: (!) 160/94  MAP (mmHg): 121  Resp: 19  SpO2: 96 %    Temp  Min: 97.7 °F (36.5 °C)  Max: 98.7 °F (37.1 °C)  Pulse  Min: 53  Max: 81  BP  Min: 136/86  Max: 172/92  MAP (mmHg)  Min: 107  Max: 123  Resp  Min: 13  Max: 26  SpO2  Min: 92 %  Max: 98 %    03/04 0701 - 03/05 0700  In: 50 [P.O.:50]  Out: 1150 [Urine:1150]   Unmeasured Output  Urine Occurrence: 1  Stool Occurrence: 1       Physical Exam  Vitals and nursing note reviewed.   Constitutional:       Appearance: He is normal weight. He is ill-appearing and diaphoretic.      Comments: Post ictal   HENT:      Head:      Comments: sEEG dressing in place       Right Ear: External ear normal.      Left Ear: External ear normal.      Nose: Nose normal.      Mouth/Throat:      Mouth: Mucous membranes are moist.      Pharynx: Oropharynx is clear.   Eyes:      Extraocular Movements: Extraocular movements intact.      Conjunctiva/sclera: Conjunctivae normal.      Pupils: Pupils are equal, round, and reactive to light.   Cardiovascular:      Rate and Rhythm: Normal rate and regular rhythm.      Pulses: Normal pulses.   Pulmonary:      Effort: Pulmonary effort is normal. No respiratory distress.      Comments: SpO2 dropped to 50 during seizure and face mask placed  Now on 5L O2  Abdominal:      General: Bowel sounds are normal.      Palpations: Abdomen is soft.   Musculoskeletal:      Cervical back: Normal range of motion.      Right lower leg: No edema.      Left lower leg: No edema.   Skin:     General: Skin is warm.   Neurological:      Comments: E4 V4 M5  Awake, post ictal  Confused. Following commands. Speech is delayed.  PERRL, tracks examiner- no obvious deficits in EOM, No facial asymmetry  Moves all extremities spontaneously, generalized weakness         Medications:  Continuous Scheduledacetaminophen, 1,000 mg,  Q8H  cefTRIAXone (ROCEPHIN) IVPB, 2 g, Q12H  EScitalopram oxalate, 10 mg, Daily  famotidine, 20 mg, BID  heparin (porcine), 5,000 Units, Q8H  polyethylene glycol, 17 g, Daily  senna-docusate 8.6-50 mg, 1 tablet, Daily    PRNcalcium carbonate, 1,000 mg, BID PRN  glycerin adult, 1 suppository, Daily PRN  hydrALAZINE, 10 mg, Q4H PRN  HYDROmorphone, 0.5 mg, Q6H PRN  lorazepam, 2 mg, Once PRN  methocarbamoL, 500 mg, QID PRN  ondansetron, 8 mg, Q8H PRN  oxyCODONE, 5 mg, Q6H PRN      Today I personally reviewed pertinent medications, lines/drains/airways, imaging, cardiology results, laboratory results, microbiology results,     Diet  Diet Adult Regular (IDDSI Level 7)

## 2023-03-06 LAB
ALBUMIN SERPL BCP-MCNC: 3.4 G/DL (ref 3.5–5.2)
ALP SERPL-CCNC: 111 U/L (ref 55–135)
ALT SERPL W/O P-5'-P-CCNC: 44 U/L (ref 10–44)
ANION GAP SERPL CALC-SCNC: 6 MMOL/L (ref 8–16)
AST SERPL-CCNC: 38 U/L (ref 10–40)
BASOPHILS # BLD AUTO: 0.07 K/UL (ref 0–0.2)
BASOPHILS NFR BLD: 0.8 % (ref 0–1.9)
BILIRUB SERPL-MCNC: 0.3 MG/DL (ref 0.1–1)
BUN SERPL-MCNC: 14 MG/DL (ref 6–20)
CALCIUM SERPL-MCNC: 9.4 MG/DL (ref 8.7–10.5)
CHLORIDE SERPL-SCNC: 102 MMOL/L (ref 95–110)
CO2 SERPL-SCNC: 29 MMOL/L (ref 23–29)
CREAT SERPL-MCNC: 0.7 MG/DL (ref 0.5–1.4)
DIFFERENTIAL METHOD: ABNORMAL
EOSINOPHIL # BLD AUTO: 1 K/UL (ref 0–0.5)
EOSINOPHIL NFR BLD: 12.6 % (ref 0–8)
ERYTHROCYTE [DISTWIDTH] IN BLOOD BY AUTOMATED COUNT: 12.9 % (ref 11.5–14.5)
EST. GFR  (NO RACE VARIABLE): >60 ML/MIN/1.73 M^2
GLUCOSE SERPL-MCNC: 100 MG/DL (ref 70–110)
HCT VFR BLD AUTO: 42.4 % (ref 40–54)
HGB BLD-MCNC: 13.8 G/DL (ref 14–18)
IMM GRANULOCYTES # BLD AUTO: 0.03 K/UL (ref 0–0.04)
IMM GRANULOCYTES NFR BLD AUTO: 0.4 % (ref 0–0.5)
LYMPHOCYTES # BLD AUTO: 2.2 K/UL (ref 1–4.8)
LYMPHOCYTES NFR BLD: 26.1 % (ref 18–48)
MAGNESIUM SERPL-MCNC: 1.9 MG/DL (ref 1.6–2.6)
MCH RBC QN AUTO: 31.2 PG (ref 27–31)
MCHC RBC AUTO-ENTMCNC: 32.5 G/DL (ref 32–36)
MCV RBC AUTO: 96 FL (ref 82–98)
MONOCYTES # BLD AUTO: 1 K/UL (ref 0.3–1)
MONOCYTES NFR BLD: 12 % (ref 4–15)
NEUTROPHILS # BLD AUTO: 4 K/UL (ref 1.8–7.7)
NEUTROPHILS NFR BLD: 48.1 % (ref 38–73)
NRBC BLD-RTO: 0 /100 WBC
PHOSPHATE SERPL-MCNC: 4.6 MG/DL (ref 2.7–4.5)
PLATELET # BLD AUTO: 268 K/UL (ref 150–450)
PMV BLD AUTO: 9.5 FL (ref 9.2–12.9)
POTASSIUM SERPL-SCNC: 4.5 MMOL/L (ref 3.5–5.1)
PROT SERPL-MCNC: 6.7 G/DL (ref 6–8.4)
RBC # BLD AUTO: 4.43 M/UL (ref 4.6–6.2)
SODIUM SERPL-SCNC: 137 MMOL/L (ref 136–145)
WBC # BLD AUTO: 8.25 K/UL (ref 3.9–12.7)

## 2023-03-06 PROCEDURE — 25000003 PHARM REV CODE 250: Performed by: PSYCHIATRY & NEUROLOGY

## 2023-03-06 PROCEDURE — 95714 VEEG EA 12-26 HR UNMNTR: CPT

## 2023-03-06 PROCEDURE — 95720 EEG PHY/QHP EA INCR W/VEEG: CPT | Mod: ,,, | Performed by: PSYCHIATRY & NEUROLOGY

## 2023-03-06 PROCEDURE — 25000003 PHARM REV CODE 250: Performed by: PHYSICIAN ASSISTANT

## 2023-03-06 PROCEDURE — 25000003 PHARM REV CODE 250: Performed by: NURSE PRACTITIONER

## 2023-03-06 PROCEDURE — 99291 CRITICAL CARE FIRST HOUR: CPT | Mod: ,,,

## 2023-03-06 PROCEDURE — 27000221 HC OXYGEN, UP TO 24 HOURS

## 2023-03-06 PROCEDURE — 99291 PR CRITICAL CARE, E/M 30-74 MINUTES: ICD-10-PCS | Mod: ,,,

## 2023-03-06 PROCEDURE — 95720 PR EEG, W/VIDEO, CONT RECORD, I&R, >12<26 HRS: ICD-10-PCS | Mod: ,,, | Performed by: PSYCHIATRY & NEUROLOGY

## 2023-03-06 PROCEDURE — 25000003 PHARM REV CODE 250

## 2023-03-06 PROCEDURE — 84100 ASSAY OF PHOSPHORUS: CPT | Performed by: PSYCHIATRY & NEUROLOGY

## 2023-03-06 PROCEDURE — 97110 THERAPEUTIC EXERCISES: CPT

## 2023-03-06 PROCEDURE — 20000000 HC ICU ROOM

## 2023-03-06 PROCEDURE — 63600175 PHARM REV CODE 636 W HCPCS: Performed by: PHYSICIAN ASSISTANT

## 2023-03-06 PROCEDURE — 94761 N-INVAS EAR/PLS OXIMETRY MLT: CPT

## 2023-03-06 PROCEDURE — 83735 ASSAY OF MAGNESIUM: CPT | Performed by: PSYCHIATRY & NEUROLOGY

## 2023-03-06 PROCEDURE — 80053 COMPREHEN METABOLIC PANEL: CPT | Performed by: PSYCHIATRY & NEUROLOGY

## 2023-03-06 PROCEDURE — 85025 COMPLETE CBC W/AUTO DIFF WBC: CPT | Performed by: PSYCHIATRY & NEUROLOGY

## 2023-03-06 PROCEDURE — 99233 PR SUBSEQUENT HOSPITAL CARE,LEVL III: ICD-10-PCS | Mod: FS,,, | Performed by: PHYSICIAN ASSISTANT

## 2023-03-06 PROCEDURE — 99900035 HC TECH TIME PER 15 MIN (STAT)

## 2023-03-06 PROCEDURE — 99233 SBSQ HOSP IP/OBS HIGH 50: CPT | Mod: FS,,, | Performed by: PHYSICIAN ASSISTANT

## 2023-03-06 PROCEDURE — 99233 PR SUBSEQUENT HOSPITAL CARE,LEVL III: ICD-10-PCS | Mod: ,,, | Performed by: PSYCHIATRY & NEUROLOGY

## 2023-03-06 PROCEDURE — 99233 SBSQ HOSP IP/OBS HIGH 50: CPT | Mod: ,,, | Performed by: PSYCHIATRY & NEUROLOGY

## 2023-03-06 RX ORDER — CLOBAZAM 10 MG/1
20 TABLET ORAL NIGHTLY
Status: COMPLETED | OUTPATIENT
Start: 2023-03-06 | End: 2023-03-06

## 2023-03-06 RX ORDER — CLOBAZAM 10 MG/1
40 TABLET ORAL NIGHTLY
Status: DISCONTINUED | OUTPATIENT
Start: 2023-03-07 | End: 2023-03-09 | Stop reason: HOSPADM

## 2023-03-06 RX ORDER — CLOBAZAM 10 MG/1
20 TABLET ORAL DAILY
Status: COMPLETED | OUTPATIENT
Start: 2023-03-06 | End: 2023-03-06

## 2023-03-06 RX ORDER — ZONISAMIDE 100 MG/1
500 CAPSULE ORAL NIGHTLY
Status: DISCONTINUED | OUTPATIENT
Start: 2023-03-06 | End: 2023-03-09 | Stop reason: HOSPADM

## 2023-03-06 RX ADMIN — HEPARIN SODIUM 5000 UNITS: 5000 INJECTION INTRAVENOUS; SUBCUTANEOUS at 01:03

## 2023-03-06 RX ADMIN — ACETAMINOPHEN 1000 MG: 500 TABLET ORAL at 02:03

## 2023-03-06 RX ADMIN — ACETAMINOPHEN 1000 MG: 500 TABLET ORAL at 05:03

## 2023-03-06 RX ADMIN — POLYETHYLENE GLYCOL 3350 17 G: 17 POWDER, FOR SOLUTION ORAL at 10:03

## 2023-03-06 RX ADMIN — FAMOTIDINE 20 MG: 20 TABLET ORAL at 09:03

## 2023-03-06 RX ADMIN — HEPARIN SODIUM 5000 UNITS: 5000 INJECTION INTRAVENOUS; SUBCUTANEOUS at 05:03

## 2023-03-06 RX ADMIN — CLOBAZAM 20 MG: 10 TABLET ORAL at 10:03

## 2023-03-06 RX ADMIN — ACETAMINOPHEN 1000 MG: 500 TABLET ORAL at 09:03

## 2023-03-06 RX ADMIN — ZONISAMIDE 500 MG: 50 CAPSULE ORAL at 09:03

## 2023-03-06 RX ADMIN — ESLICARBAZEPINE ACETATE 600 MG: 200 TABLET ORAL at 10:03

## 2023-03-06 RX ADMIN — HEPARIN SODIUM 5000 UNITS: 5000 INJECTION INTRAVENOUS; SUBCUTANEOUS at 09:03

## 2023-03-06 RX ADMIN — CLOBAZAM 20 MG: 10 TABLET ORAL at 09:03

## 2023-03-06 RX ADMIN — SENNOSIDES AND DOCUSATE SODIUM 1 TABLET: 50; 8.6 TABLET ORAL at 08:03

## 2023-03-06 RX ADMIN — ESLICARBAZEPINE ACETATE 600 MG: 200 TABLET ORAL at 09:03

## 2023-03-06 RX ADMIN — ESCITALOPRAM OXALATE 10 MG: 10 TABLET ORAL at 08:03

## 2023-03-06 RX ADMIN — FAMOTIDINE 20 MG: 20 TABLET ORAL at 08:03

## 2023-03-06 RX ADMIN — CEFTRIAXONE 2 G: 2 INJECTION, POWDER, FOR SOLUTION INTRAMUSCULAR; INTRAVENOUS at 08:03

## 2023-03-06 NOTE — PT/OT/SLP PROGRESS
"Occupational Therapy   Treatment    Name: Declan Burleson  MRN: [id number]  Admitting Diagnosis:  Complex partial seizures evolving to generalized tonic-clonic seizures  7 Days Post-Op    Recommendations:     Discharge Recommendations:  (pending OOB assessment following SEEG removal)  Discharge Equipment Recommendations:   (TBD)  Barriers to discharge:  None    Assessment:     Declan Burleson is a 58 y.o. male with a medical diagnosis of Complex partial seizures evolving to generalized tonic-clonic seizures.  He presents with performance deficits affecting function are weakness, decreased ROM, impaired cognition, impaired endurance, decreased coordination, impaired self care skills, impaired functional mobility.     Rehab Prognosis:  Good; patient would benefit from acute skilled OT services to address these deficits and reach maximum level of function.       Plan:     Patient to be seen 3 x/week to address the above listed problems via neuromuscular re-education, therapeutic activities, therapeutic exercises, self-care/home management  Plan of Care Expires: 03/28/23  Plan of Care Reviewed with: patient, spouse    Subjective   Nurse and MD reported that the patient has been having seizures.  Patient:  "I feel alright."    Pain/Comfort:  Pain Rating 1: 0/10  Pain Rating Post-Intervention 1: 0/10    Objective:     Communicated with: Nurse prior to session.  Patient found supine with peripheral IV, telemetry, oxygen, bed alarm, restraints, pulse ox (continuous) (SEEG) upon OT entry to room.    General Precautions: Standard, aspiration, fall, seizure    Orthopedic Precautions:N/A  Braces: N/A  Respiratory Status: Nasal cannula, flow 2 L/min     Occupational Performance:     AMPAC 6 Click ADL: 18    Treatment & Education:  Patient education provided on role of OT.  1 set x 10 rep of green theraband exercises performed for B UE shoulder flexion, horizontal abd/add, bicep and tricep,  Patient alert and oriented x " 3; following all commands.  Continued education, patient/ family training recommended.      Patient left supine with all lines intact, call button in reach, and bed alarm on    GOALS:   Multidisciplinary Problems       Occupational Therapy Goals          Problem: Occupational Therapy    Goal Priority Disciplines Outcome Interventions   Occupational Therapy Goal     OT, PT/OT Ongoing, Progressing    Description: Goals set 2/28 to be addressed for 14 days with expiration date, 3/15:  Patient will increase functional independence with ADLs by performing:    Patient will demonstrate rolling to the right with modified independence.  Not met   Patient will demonstrate rolling to the left with modified independence.   Not met  Patient will demonstrate supine -sit with modified independence.   Not met  Patient will demonstrate stand pivot transfers with supervision.   Not met  Patient will demonstrate grooming while standing with supervision.   Not met  Patient will demonstrate upper body dressing with supervision while seated EOB.   Not met  Patient will demonstrate lower body dressing with supervision while seated EOB.   Not met  Patient will demonstrate toileting with supervision.   Not met  Patient will demonstrate bathing while seated EOB with supervision.   Not met  Patient's family / caregiver will demonstrate independence and safety with assisting patient with self-care skills and functional mobility.     Not met  Patient's family / caregiver will demonstrate independence with providing ROM and changes in bed positioning.   Not met                           Time Tracking:     OT Date of Treatment: 03/06/23  OT Start Time: 0618  OT Stop Time: 0641  OT Total Time (min): 23 min    Billable Minutes:Therapeutic Exercise 23    OT/BENITEZ: OT          3/6/2023

## 2023-03-06 NOTE — SUBJECTIVE & OBJECTIVE
Interval History: 3/6: Seizures yesterday and this morning. Last BM 3/4.       Medications:  Continuous Infusions:  Scheduled Meds:   acetaminophen  1,000 mg Oral Q8H    cefTRIAXone (ROCEPHIN) IVPB  2 g Intravenous Q12H    EScitalopram oxalate  10 mg Oral Daily    famotidine  20 mg Oral BID    heparin (porcine)  5,000 Units Subcutaneous Q8H    polyethylene glycol  17 g Oral Daily    senna-docusate 8.6-50 mg  1 tablet Oral Daily     PRN Meds:calcium carbonate, glycerin adult, hydrALAZINE, HYDROmorphone, lorazepam, methocarbamoL, ondansetron, oxyCODONE     Review of Systems   Neurological:  Positive for seizures.   All other systems reviewed and are negative.  Objective:     Weight: 87.5 kg (193 lb)  Body mass index is 28.5 kg/m².  Vital Signs (Most Recent):  Temp: 98.3 °F (36.8 °C) (03/06/23 0701)  Pulse: 71 (03/06/23 0901)  Resp: 20 (03/06/23 0801)  BP: 137/89 (03/06/23 0901)  SpO2: 96 % (03/06/23 0901)   Vital Signs (24h Range):  Temp:  [98.3 °F (36.8 °C)-98.7 °F (37.1 °C)] 98.3 °F (36.8 °C)  Pulse:  [] 71  Resp:  [10-25] 20  SpO2:  [94 %-99 %] 96 %  BP: (137-184)/(80-95) 137/89                  Resp Rate Total:  [20 br/min] 20 br/min         Physical Exam  Constitutional:       Appearance: He is well-developed and well-nourished.   Eyes:      Extraocular Movements: EOM normal.      Conjunctiva/sclera: Conjunctivae normal.      Pupils: Pupils are equal, round, and reactive to light.   Cardiovascular:      Pulses: Normal pulses.   Abdominal:      Palpations: Abdomen is soft.   Neurological:      Mental Status: He is alert and oriented to person, place, and time.      Comments: AAOx3, higher order confusion  PERRL  CN intact  BUE 5/5  BLE 5/5  SILT    Headwrap in place with sEEG leads   Psychiatric:         Mood and Affect: Mood and affect normal.       Physical Exam:    Constitutional: He appears well-developed and well-nourished.     Eyes: Pupils are equal, round, and reactive to light. Conjunctivae and EOM  are normal.     Cardiovascular: Normal pulses.     Abdominal: Soft.     Psych/Behavior: He is alert. He is oriented to person, place, and time. He has a normal mood and affect.     Neurological:   AAOx3, higher order confusion  PERRL  CN intact  BUE 5/5  BLE 5/5  SILT    Headwrap in place with sEEG leads     Significant Labs:  Recent Labs   Lab 03/05/23  0022 03/06/23  0356   * 100    137   K 3.8 4.5    102   CO2 24 29   BUN 17 14   CREATININE 0.7 0.7   CALCIUM 8.7 9.4   MG 1.9 1.9       Recent Labs   Lab 03/05/23  0022 03/06/23  0356   WBC 7.62 8.25   HGB 12.6* 13.8*   HCT 40.0 42.4    268       No results for input(s): LABPT, INR, APTT in the last 48 hours.    Microbiology Results (last 7 days)       ** No results found for the last 168 hours. **          All pertinent labs from the last 24 hours have been reviewed.    Significant Diagnostics:  I have reviewed all pertinent imaging results/findings within the past 24 hours.

## 2023-03-06 NOTE — SUBJECTIVE & OBJECTIVE
Review of Systems   Reason unable to perform ROS: post ictal.     Objective:     Vitals:  Temp: 98 °F (36.7 °C)  Pulse: 71  Rhythm: normal sinus rhythm  BP: (!) 140/83  MAP (mmHg): 107  Resp: 20  SpO2: (!) 92 %    Temp  Min: 98 °F (36.7 °C)  Max: 98.7 °F (37.1 °C)  Pulse  Min: 55  Max: 97  BP  Min: 108/59  Max: 157/87  MAP (mmHg)  Min: 79  Max: 119  Resp  Min: 10  Max: 25  SpO2  Min: 92 %  Max: 98 %    03/05 0701 - 03/06 0700  In: -   Out: 1500 [Urine:1500]   Unmeasured Output  Urine Occurrence: 1  Stool Occurrence: 1       Physical Exam  Vitals and nursing note reviewed.   Constitutional:       Appearance: He is normal weight. He is ill-appearing and diaphoretic.      Comments: Post ictal   HENT:      Head:      Comments: sEEG dressing in place       Right Ear: External ear normal.      Left Ear: External ear normal.      Nose: Nose normal.      Mouth/Throat:      Mouth: Mucous membranes are moist.      Pharynx: Oropharynx is clear.   Eyes:      Extraocular Movements: Extraocular movements intact.      Conjunctiva/sclera: Conjunctivae normal.      Pupils: Pupils are equal, round, and reactive to light.   Cardiovascular:      Rate and Rhythm: Normal rate and regular rhythm.      Pulses: Normal pulses.   Pulmonary:      Effort: Pulmonary effort is normal. No respiratory distress.   Abdominal:      General: Bowel sounds are normal.      Palpations: Abdomen is soft.   Musculoskeletal:      Cervical back: Normal range of motion.      Right lower leg: No edema.      Left lower leg: No edema.   Skin:     General: Skin is warm.   Neurological:      Comments: E4 V4 M5  Awake, post ictal. Confused. Following commands. Speech is delayed.  - Pupillary hippus present   - tracks examiner, No facial asymmetry  - Moves all extremities spontaneously, generalized weakness       Medications:  Continuous Scheduledacetaminophen, 1,000 mg, Q8H  EScitalopram oxalate, 10 mg, Daily  eslicarbazepine, 600 mg, Daily  famotidine, 20 mg,  BID  heparin (porcine), 5,000 Units, Q8H  polyethylene glycol, 17 g, Daily  senna-docusate 8.6-50 mg, 1 tablet, Daily    PRNcalcium carbonate, 1,000 mg, BID PRN  glycerin adult, 1 suppository, Daily PRN  hydrALAZINE, 10 mg, Q4H PRN  HYDROmorphone, 0.5 mg, Q6H PRN  lorazepam, 2 mg, Once PRN  methocarbamoL, 500 mg, QID PRN  ondansetron, 8 mg, Q8H PRN  oxyCODONE, 5 mg, Q6H PRN      Today I personally reviewed pertinent medications, lines/drains/airways, imaging, cardiology results, laboratory results, microbiology results,     Diet  Diet Adult Regular (IDDSI Level 7)

## 2023-03-06 NOTE — PLAN OF CARE
[facility] Care Plan    POC reviewed with Declan Burleson and family at 1400. Pt verbalized understanding. Questions and concerns addressed. No acute events today. Pt progressing toward goals. Will continue to monitor. See below and flowsheets for full assessment and VS info.     -Spouse updated over the phone.   -Time 09:27 pt experienced second seizure lasting about one minute, push button activated, Ox mask applied. Teresa Cerrato with Epilepsy paged and notified. Clobazam 20 mg and eslicarbazepine 600 mg administered per order.   -Restraints continued.   -BM x1  -Pt tolerating regular diet well.   -AAO x4  -Hemodynamically stable throughout the shift.        Is this a stroke patient? no    Neuro:  [place] Coma Scale  Best Eye Response: 4-->(E4) spontaneous  Best Motor Response: 6-->(M6) obeys commands  Best Verbal Response: 5-->(V5) oriented  Ruben Coma Scale Score: 15  Assessment Qualifiers: no eye obstruction present, patient not sedated/intubated  Pupil PERRLA: yes     24 hr Temp:  [98 °F (36.7 °C)-99 °F (37.2 °C)]     CV:   Rhythm: normal sinus rhythm  BP goals:   SBP < 160  MAP > 65    Resp:      Oxygen Concentration (%): 28    Plan: N/A    GI/:     Diet/Nutrition Received: regular  Last Bowel Movement: 03/04/23  Voiding Characteristics: voids spontaneously without difficulty    Intake/Output Summary (Last 24 hours) at 3/6/2023 1731  Last data filed at 3/6/2023 0505  Gross per 24 hour   Intake --   Output 600 ml   Net -600 ml     Unmeasured Output  Urine Occurrence: 1  Stool Occurrence: 1    Labs/Accuchecks:  Recent Labs   Lab 03/06/23  0356   WBC 8.25   RBC 4.43*   HGB 13.8*   HCT 42.4         Recent Labs   Lab 03/06/23  0356      K 4.5   CO2 29      BUN 14   CREATININE 0.7   ALKPHOS 111   ALT 44   AST 38   BILITOT 0.3    No results for input(s): PROTIME, INR, APTT, HEPANTIXA in the last 168 hours.   Recent Labs   Lab 03/01/23  1112   TROPONINI <0.006       Electrolytes: N/A -  electrolytes WDL  Accuchecks: none    Gtts:      LDA/Wounds:  Lines/Drains/Airways       Peripheral Intravenous Line  Duration                  Peripheral IV - Single Lumen 03/03/23 0853 20 G Left;Posterior Hand 3 days         Peripheral IV - Single Lumen 03/03/23 2004 22 G Anterior;Proximal;Right Forearm 2 days                  Wounds: No  Wound care consulted: No

## 2023-03-06 NOTE — PROGRESS NOTES
"[facility]  Neurocritical Care  Progress Note    Admit Date: 2/27/2023  Service Date: 03/06/2023  Length of Stay: 7    Subjective:     Chief Complaint: Complex partial seizures evolving to generalized tonic-clonic seizures    History of Present Illness: Declan Burleson is a 58 y.o.M  admitted to [facility] s/p SEEG placement. Per chart review he was referred by Dr. Benoit (originally by Dr. Saldana of [place]) for consideration of surgical therapy for intractable epilepsy. The patient was formerly employed as a . He is accompanied by his wife Trista, who helps to provide the history. His seizures began when he was 48 years old. He can recall no inciting event. He has 3 semiologies: "full blown" generalized events, staring episodes, and "mild" seizures, which are characterized as generalized events of shorter duration without incontinence and a shorter post-ictal period. He and his wife estimate that he persists in having about 2 events/month. He has failed multiple AEDs, including topiramate, lamotrigine, levetiracetam, and oxcarbazepine. He is currently taking eslicarbazepine, zonisamide, and clobazam. He had EMU monitoring in May-June of this year, which suggests left-sided activity. NSGY and epilepsy following. Patient admitted to [facility] for close monitoring and higher level of care.       Hospital Course: 3/5/2023: NAEON, patient complains of L side headache, L eye pressure (no visual disturbance) PRN pain available  3/5/2023: complained of chest pain- troponin neg, EKG stable, AED's per Epilepsy  03/05/2023 Increased senna, added daily suppository. Weaned off of AEDs for this afternoon per epilepsy.  03/05/2023 No seizures overnight. BM this AM. PT/OT today.  03/05/2023 BM this am. Decreased senna and dc suppository.   03/05/2023 NAEON. Family and epilepsy agreed not to do sleep deprivation last night. 3 minute seizure today, given 2mg ativan. Post ictal. Pt had another 2 min " seizure around noon, no ativan given, post ictal now.  03/06/2023 2 min seizure overnight. 1 seizure this morning, post ictal when seen this AM. Given aptiom 600 and onfi 20 per epilepsy. Doses for tomorrow will be decided after review by epilepsy.      Review of Systems   Reason unable to perform ROS: post ictal.     Objective:     Vitals:  Temp: 98 °F (36.7 °C)  Pulse: 71  Rhythm: normal sinus rhythm  BP: (!) 140/83  MAP (mmHg): 107  Resp: 20  SpO2: (!) 92 %    Temp  Min: 98 °F (36.7 °C)  Max: 98.7 °F (37.1 °C)  Pulse  Min: 55  Max: 97  BP  Min: 108/59  Max: 157/87  MAP (mmHg)  Min: 79  Max: 119  Resp  Min: 10  Max: 25  SpO2  Min: 92 %  Max: 98 %    03/05 0701 - 03/06 0700  In: -   Out: 1500 [Urine:1500]   Unmeasured Output  Urine Occurrence: 1  Stool Occurrence: 1       Physical Exam  Vitals and nursing note reviewed.   Constitutional:       Appearance: He is normal weight. He is ill-appearing and diaphoretic.      Comments: Post ictal   HENT:      Head:      Comments: sEEG dressing in place       Right Ear: External ear normal.      Left Ear: External ear normal.      Nose: Nose normal.      Mouth/Throat:      Mouth: Mucous membranes are moist.      Pharynx: Oropharynx is clear.   Eyes:      Extraocular Movements: Extraocular movements intact.      Conjunctiva/sclera: Conjunctivae normal.      Pupils: Pupils are equal, round, and reactive to light.   Cardiovascular:      Rate and Rhythm: Normal rate and regular rhythm.      Pulses: Normal pulses.   Pulmonary:      Effort: Pulmonary effort is normal. No respiratory distress.   Abdominal:      General: Bowel sounds are normal.      Palpations: Abdomen is soft.   Musculoskeletal:      Cervical back: Normal range of motion.      Right lower leg: No edema.      Left lower leg: No edema.   Skin:     General: Skin is warm.   Neurological:      Comments: E4 V4 M5  Awake, post ictal. Confused. Following commands. Speech is delayed.  - Pupillary hippus present   - tracks  examiner, No facial asymmetry  - Moves all extremities spontaneously, generalized weakness       Medications:  Continuous Scheduledacetaminophen, 1,000 mg, Q8H  EScitalopram oxalate, 10 mg, Daily  eslicarbazepine, 600 mg, Daily  famotidine, 20 mg, BID  heparin (porcine), 5,000 Units, Q8H  polyethylene glycol, 17 g, Daily  senna-docusate 8.6-50 mg, 1 tablet, Daily    PRNcalcium carbonate, 1,000 mg, BID PRN  glycerin adult, 1 suppository, Daily PRN  hydrALAZINE, 10 mg, Q4H PRN  HYDROmorphone, 0.5 mg, Q6H PRN  lorazepam, 2 mg, Once PRN  methocarbamoL, 500 mg, QID PRN  ondansetron, 8 mg, Q8H PRN  oxyCODONE, 5 mg, Q6H PRN      Today I personally reviewed pertinent medications, lines/drains/airways, imaging, cardiology results, laboratory results, microbiology results,     Diet  Diet Adult Regular (IDDSI Level 7)      Assessment/Plan:     Neuro  * Complex partial seizures evolving to generalized tonic-clonic seizures  Declan Burleson is a 58 y.o.M  admitted to [facility] s/p SEEG placement. He has failed multiple AEDs, including topiramate, lamotrigine, levetiracetam, and oxcarbazepine. He is currently taking eslicarbazepine, zonisamide, and clobazam.     -- s/p sEEG  -- NSGY and epilepsy following  -- AED's per epilepsy reccs- weaned off all AEDs on 3/2 per epilepsy   -- Neuro checks q 2hr  -- SBP <160  -- seizure precautions  -- PT/OT/SLP  - cortical stimulation 3/3 w epilepsy- 1x 30 sec seizure  - 3/5: 3 minute seizure today, given 2mg ativan. Post ictal. Pt had another 2 min seizure around noon, no ativan given, post ictal now.  - 3/6: 2 min seizure overnight. 1 seizure this morning, post ictal when seen this AM. Given aptiom 600 and onfi 20 per epilepsy. Doses for tomorrow will be decided after review by epilepsy.    Migraine without status migrainosus, not intractable  Tylenol 1g q8h  Prn robaxin and oxy     Psychiatric  Depression with anxiety  Continue home med Escitalopram        The patient is being Prophylaxed  for:  Venous Thromboembolism with: Mechanical or Chemical  Stress Ulcer with: H2B  Ventilator Pneumonia with: not applicable    Activity Orders          Diet Adult Regular (IDDSI Level 7): Regular starting at 02/27 1103        Full Code    Critical care time spent on the evaluation and treatment of severe organ dysfunction, review of pertinent labs and imaging studies, discussions with consulting providers and discussions with patient/family: 35 minutes.    Shin López PA-C  Neurocritical Care  [facility]

## 2023-03-06 NOTE — PLAN OF CARE
[facility]  Discharge Reassessment    Primary Care Provider: Juan Carlos Simmons NP    Expected Discharge Date: 3/9/2023    Per MD: Tentative sEEG explant on Wednesday.   Patient not medically ready for discharge.         Reassessment (most recent)       Discharge Reassessment - 03/06/23 1550          Discharge Reassessment    Assessment Type Discharge Planning Reassessment     Did the patient's condition or plan change since previous assessment? No     Communicated KENNEDY with patient/caregiver Date not available/Unable to determine     Discharge Plan A Home     Discharge Plan B Home     DME Needed Upon Discharge  none     Discharge Barriers Identified None     Why the patient remains in the hospital Requires continued medical care                   Tammy Garcia, RN, CCRN-K, [facility]  Neuro-Critical Care   X [phone]

## 2023-03-06 NOTE — NURSING
Spoke with Dr. Feliciano over phone, verified that Epilepsy did want to keep Ativan at bedside at all times. Nursing communication put in. WCTM

## 2023-03-06 NOTE — ASSESSMENT & PLAN NOTE
57 yo M with intractable epilepsy who is now s/p sEEG lead placement for seizure monitoring/mapping    - admitted to [facility]  - q1 neurochecks  - CT stealth post op reviewed, sEEG leads in appropriate position, no large volume hemorrhage  - AEDs and seizure management per epilepsy, seizure stimulation per epilepsy  - hyponatremia, ctm  - bowel regiment, last BM 3/4  - voiding spontaneously  - Chest pain, trops negative, likely GI related pain    - PPI, Bowel regimen  - bed rest  - PT/OT, daily stationary bike when fixed   -- Please encourage patient for improved PT activity   - SQ    Dispo: ongoing, timing TBD for sEEG lead removal

## 2023-03-06 NOTE — PROGRESS NOTES
[facility]  Neurosurgery  Progress Note    Subjective:     History of Present Illness: 58 M with epilepsy presents for elective sEEG placement on 2/27      Post-Op Info:  Procedure(s) (LRB):  PLACEMENT OF STEREO EEG LEADS (DEPTH ELECTRODES) (Left)   7 Days Post-Op     Interval History: 3/6: Seizures yesterday and this morning. Last BM 3/4.       Medications:  Continuous Infusions:  Scheduled Meds:   acetaminophen  1,000 mg Oral Q8H    cefTRIAXone (ROCEPHIN) IVPB  2 g Intravenous Q12H    EScitalopram oxalate  10 mg Oral Daily    famotidine  20 mg Oral BID    heparin (porcine)  5,000 Units Subcutaneous Q8H    polyethylene glycol  17 g Oral Daily    senna-docusate 8.6-50 mg  1 tablet Oral Daily     PRN Meds:calcium carbonate, glycerin adult, hydrALAZINE, HYDROmorphone, lorazepam, methocarbamoL, ondansetron, oxyCODONE     Review of Systems   Neurological:  Positive for seizures.   All other systems reviewed and are negative.  Objective:     Weight: 87.5 kg (193 lb)  Body mass index is 28.5 kg/m².  Vital Signs (Most Recent):  Temp: 98.3 °F (36.8 °C) (03/06/23 0701)  Pulse: 71 (03/06/23 0901)  Resp: 20 (03/06/23 0801)  BP: 137/89 (03/06/23 0901)  SpO2: 96 % (03/06/23 0901)   Vital Signs (24h Range):  Temp:  [98.3 °F (36.8 °C)-98.7 °F (37.1 °C)] 98.3 °F (36.8 °C)  Pulse:  [] 71  Resp:  [10-25] 20  SpO2:  [94 %-99 %] 96 %  BP: (137-184)/(80-95) 137/89                  Resp Rate Total:  [20 br/min] 20 br/min         Physical Exam  Constitutional:       Appearance: He is well-developed and well-nourished.   Eyes:      Extraocular Movements: EOM normal.      Conjunctiva/sclera: Conjunctivae normal.      Pupils: Pupils are equal, round, and reactive to light.   Cardiovascular:      Pulses: Normal pulses.   Abdominal:      Palpations: Abdomen is soft.   Neurological:      Mental Status: He is alert and oriented to person, place, and time.      Comments: AAOx3, higher order confusion  PERRL  CN  intact  BUE 5/5  BLE 5/5  SILT    Headwrap in place with sEEG leads   Psychiatric:         Mood and Affect: Mood and affect normal.       Physical Exam:    Constitutional: He appears well-developed and well-nourished.     Eyes: Pupils are equal, round, and reactive to light. Conjunctivae and EOM are normal.     Cardiovascular: Normal pulses.     Abdominal: Soft.     Psych/Behavior: He is alert. He is oriented to person, place, and time. He has a normal mood and affect.     Neurological:   AAOx3, higher order confusion  PERRL  CN intact  BUE 5/5  BLE 5/5  SILT    Headwrap in place with sEEG leads     Significant Labs:  Recent Labs   Lab 03/05/23  0022 03/06/23  0356   * 100    137   K 3.8 4.5    102   CO2 24 29   BUN 17 14   CREATININE 0.7 0.7   CALCIUM 8.7 9.4   MG 1.9 1.9       Recent Labs   Lab 03/05/23  0022 03/06/23  0356   WBC 7.62 8.25   HGB 12.6* 13.8*   HCT 40.0 42.4    268       No results for input(s): LABPT, INR, APTT in the last 48 hours.    Microbiology Results (last 7 days)       ** No results found for the last 168 hours. **          All pertinent labs from the last 24 hours have been reviewed.    Significant Diagnostics:  I have reviewed all pertinent imaging results/findings within the past 24 hours.    Assessment/Plan:     * Complex partial seizures evolving to generalized tonic-clonic seizures  57 yo M with intractable epilepsy who is now s/p sEEG lead placement for seizure monitoring/mapping    - admitted to [facility]  - q1 neurochecks  - CT stealth post op reviewed, sEEG leads in appropriate position, no large volume hemorrhage  - AEDs and seizure management per epilepsy, seizure stimulation per epilepsy  - hyponatremia, ctm  - bowel regiment, last BM 3/4  - voiding spontaneously  - Chest pain, trops negative, likely GI related pain    - PPI, Bowel regimen  - bed rest  - PT/OT, daily stationary bike when fixed   -- Please encourage patient for improved PT activity   -  [facility]    Dispo: ongoing, timing TBD for sEEG lead removal        Obinna Avendano MD  Neurosurgery  [facility]

## 2023-03-06 NOTE — PROGRESS NOTES
Epilepsy LCSW visited patient in his room in the ICU.   Patient alert and spoke to SW briefly.  He reports some head pain but overall doing well.     LCSW met with patient's wife who had just asked the nurse a few questions.   Patient's wife had questions related to patient's scheduled stim / stimulation later today.   She rightfully is concerned about duration and if it will result in patient needed reconstructive surgery.     LCSW assured patient that he has witnesses the stimulation before and it is not as barbaric as she thinks. LCSW also noted that there will be a team of Mds and the nurse in the room during the stimulation.  LCSW also noted that at times, it may not provoke any seizures but it may as well .  LCSW reported he would relay the concerns to dr. Edgar and patient's wife reported she would ask questions as well.     Trista thanked DOMINICK for his time and Dr. Edgar aware.

## 2023-03-06 NOTE — ASSESSMENT & PLAN NOTE
Declan Burleson is a 58 y.o.M  admitted to [facility] s/p SEEG placement. He has failed multiple AEDs, including topiramate, lamotrigine, levetiracetam, and oxcarbazepine. He is currently taking eslicarbazepine, zonisamide, and clobazam.     -- s/p sEEG  -- NSGY and epilepsy following  -- AED's per epilepsy reccs- weaned off all AEDs on 3/2 per epilepsy   -- Neuro checks q 2hr  -- SBP <160  -- seizure precautions  -- PT/OT/SLP  - cortical stimulation 3/3 w epilepsy- 1x 30 sec seizure  - 3/5: 3 minute seizure today, given 2mg ativan. Post ictal. Pt had another 2 min seizure around noon, no ativan given, post ictal now.  - 3/6: 2 min seizure overnight. 1 seizure this morning, post ictal when seen this AM. Given aptiom 600 and onfi 20 per epilepsy. Doses for tomorrow will be decided after review by epilepsy.

## 2023-03-06 NOTE — SUBJECTIVE & OBJECTIVE
Interval History: Multiple seizures overnight, none further throughout the day 3/6 after Clobazam 20 mg and Eslicarbazepine 600 mg. Plan to give additional AEDs this evening.     Current Facility-Administered Medications   Medication Dose Route Frequency Provider Last Rate Last Admin    acetaminophen tablet 1,000 mg  1,000 mg Oral Q8H Magda Miinea, NP   1,000 mg at 03/06/23 1401    calcium carbonate 200 mg calcium (500 mg) chewable tablet 1,000 mg  1,000 mg Oral BID PRN Magdabritton Weiss, NP        cloBAZam Tab 20 mg  20 mg Oral QHS Teresa Cerrato PA-C        Followed by    [START ON 3/7/2023] cloBAZam Tab 40 mg  40 mg Oral QHS Teresa Cerrato PA-C        EScitalopram oxalate tablet 10 mg  10 mg Oral Daily Magdabritton Weiss, NP   10 mg at 03/06/23 0818    eslicarbazepine Tab 600 mg  600 mg Oral Nightly Teresa Cerrato PA-C        Followed by    [START ON 3/7/2023] eslicarbazepine Tab 1,200 mg  1,200 mg Oral Nightly Teresa Cerrato PA-C        famotidine tablet 20 mg  20 mg Oral BID Fred Wayne MD   20 mg at 03/06/23 0819    glycerin adult suppository 1 suppository  1 suppository Rectal Daily PRN Isaura Burgos PA-C        heparin (porcine) injection 5,000 Units  5,000 Units Subcutaneous Q8H Isaura Burgos PA-C   5,000 Units at 03/06/23 1308    hydrALAZINE injection 10 mg  10 mg Intravenous Q4H PRN Magda Weiss, NP        HYDROmorphone injection 0.5 mg  0.5 mg Intravenous Q6H PRN Isaura Burgos PA-C        LORazepam injection 2 mg  2 mg Intravenous Once PRN Shin López PA-C        methocarbamoL tablet 500 mg  500 mg Oral QID PRN Magdabritton Weiss, NP   500 mg at 03/05/23 2031    ondansetron disintegrating tablet 8 mg  8 mg Oral Q8H PRN Isaura Burgos PA-C        oxyCODONE immediate release tablet 5 mg  5 mg Oral Q6H PRN Magda Miinea, NP   5 mg at 03/04/23 2218    polyethylene glycol packet 17 g  17 g Oral Daily Isaura Burgos PA-C   17 g at 03/06/23 1042    senna-docusate 8.6-50 mg per tablet 1  tablet  1 tablet Oral Daily Shin López PA-C   1 tablet at 03/06/23 0819    zonisamide capsule 500 mg  500 mg Oral QHS Teresa Cerrato PA-C         Continuous Infusions:    Review of Systems   Constitutional:  Positive for fatigue.   Gastrointestinal:  Negative for nausea and vomiting.   Neurological:  Positive for seizures. Negative for headaches.   All other systems reviewed and are negative.  Objective:     Vital Signs (Most Recent):  Temp: 98 °F (36.7 °C) (03/06/23 1101)  Pulse: 71 (03/06/23 1401)  Resp: 20 (03/06/23 1401)  BP: (!) 140/83 (03/06/23 1401)  SpO2: (!) 92 % (03/06/23 1401)   Vital Signs (24h Range):  Temp:  [98 °F (36.7 °C)-98.7 °F (37.1 °C)] 98 °F (36.7 °C)  Pulse:  [55-97] 71  Resp:  [10-25] 20  SpO2:  [92 %-98 %] 92 %  BP: (108-157)/(59-95) 140/83     Weight: 87.5 kg (193 lb)  Body mass index is 28.5 kg/m².    Physical Exam  Vitals and nursing note reviewed.   Constitutional:       General: He is not in acute distress.     Appearance: He is not diaphoretic.   HENT:      Head: Normocephalic.      Comments: sEEG leads + headwrap in place  Eyes:      General: No scleral icterus.     Extraocular Movements: Extraocular movements intact.      Pupils: Pupils are equal, round, and reactive to light.   Cardiovascular:      Rate and Rhythm: Normal rate.   Pulmonary:      Effort: Pulmonary effort is normal. No respiratory distress.   Abdominal:      General: There is no distension.      Palpations: Abdomen is soft.   Musculoskeletal:         General: No deformity or signs of injury.      Cervical back: Normal range of motion and neck supple.   Skin:     General: Skin is warm and dry.   Neurological:      Mental Status: He is alert and oriented to person, place, and time.   Psychiatric:         Mood and Affect: Mood normal.         Speech: Speech normal.         Behavior: Behavior normal.       NEUROLOGICAL EXAMINATION:     MENTAL STATUS   Oriented to person, place, and time.   Attention: normal.  Concentration: normal.   Speech: speech is normal   Level of consciousness: alert    CRANIAL NERVES     CN III, IV, VI   Pupils are equal, round, and reactive to light.    CN VII   Facial expression full, symmetric.     CN VIII   Hearing: intact    CN XI   CN XI normal.     CN XII   CN XII normal.     MOTOR EXAM   Muscle bulk: normal  Overall muscle tone: normal       Follows commands, moves all extremities spontaneously and symmetrically     GAIT AND COORDINATION        No pronator drift noted     Significant Labs: All pertinent lab results from the past 24 hours have been reviewed.    Significant Studies: I have reviewed all pertinent imaging results/findings within the past 24 hours.

## 2023-03-06 NOTE — ASSESSMENT & PLAN NOTE
Mr. Burleson is a 58 year old man with intractable epilepsy s/p prior sEEG monitoring in January 2021, left laser amygdalohippocampectomy in March 2021 admitted to [facility] s/p placement of 8 left-sided sEEG electrodes for repeat intracranial monitoring and further localization of his epilepsy in consideration of additional surgical management options of his epilepsy. Currently maintained on Clobazam 40 mg qHS, Eslicarbazepine 1200 mg qHS, and Zonisamide 500 mg qHS with continued events of staring/loss of awareness and generalized convulsions.    - s/p placement of 8 left-sided sEEG electrodes by NSGY 2/27  - Multiple seizures 3/5>3/6, please see EEG reports for full details  - 3/6 am given Clobazam 20 mg, Eslicarbazepine 600 mg   - This evening, plan to give additional Clobazam 20 mg, Eslicarbazpine 600 mg, and Zonisamide 500 mg  - Resume home dose AEDs 3/7 pm  - Continue mittens and strict 1:1 supervision as he previously self-removed sEEG leads during monitoring in 2021, requiring early termination of monitoring  - Post-operative imaging timing, pain control per [facility], NSGY  - Seizure precautions  - Please call epilepsy to notify of any seizures  -  If more than 3 seizures in 2 hours or any seizure lasting greater than 3 minutes, please give 2 mg IV lorazepam and contact on-call epilepsy    Plan of care discussed with NCC team, patient and wife at bedside. Will continue to follow, please call with any questions.

## 2023-03-06 NOTE — PLAN OF CARE
[facility] Care Plan    POC reviewed with Declan Néstor Huseyinmynor and family at 0300. Pt verbalized understanding. Questions and concerns addressed. No acute events overnight. Pt progressing toward goals. Will continue to monitor. See below and flowsheets for full assessment and VS info.     0516 - Push button activated. Was mid conversation with patient when patient became non responsive to verbal stimuli. Lasted about two minutes total. HR in the 110's. SPO2 down to high 80's. Pt stayed in a post ictal phase till about 0525. Able to nod head to commands but unable to speak.   0530 - Back to normal neuro exam. AAOx4.     Robaxin given once for pain control     Neuro:  [place] Coma Scale  Best Eye Response: 4-->(E4) spontaneous  Best Motor Response: 6-->(M6) obeys commands  Best Verbal Response: 5-->(V5) oriented  [place] Coma Scale Score: 15  Assessment Qualifiers: patient not sedated/intubated  Pupil PERRLA: yes     24hr Temp:  [97.9 °F (36.6 °C)-98.7 °F (37.1 °C)]     CV:   Rhythm: normal sinus rhythm  BP goals:   SBP < 160  MAP > 65    Resp:      Oxygen Concentration (%): 28    Plan: N/A    GI/:     Diet/Nutrition Received: regular  Last Bowel Movement: 03/04/23  Voiding Characteristics: voids spontaneously without difficulty    Intake/Output Summary (Last 24 hours) at 3/6/2023 0532  Last data filed at 3/6/2023 0505  Gross per 24 hour   Intake --   Output 1500 ml   Net -1500 ml     Unmeasured Output  Urine Occurrence: 1  Stool Occurrence: 1    Labs/Accuchecks:  Recent Labs   Lab 03/06/23  0356   WBC 8.25   RBC 4.43*   HGB 13.8*   HCT 42.4         Recent Labs   Lab 03/06/23  0356      K 4.5   CO2 29      BUN 14   CREATININE 0.7   ALKPHOS 111   ALT 44   AST 38   BILITOT 0.3      Recent Labs   Lab 02/27/23  0555   INR 1.0   APTT 27.4      Recent Labs   Lab 03/01/23  1112   TROPONINI <0.006       Electrolytes: N/A - electrolytes WDL  Accuchecks: none    Gtts:      LDA/Wounds:  Lines/Drains/Airways        Peripheral Intravenous Line  Duration                  Peripheral IV - Single Lumen 03/03/23 0853 20 G Left;Posterior Hand 2 days         Peripheral IV - Single Lumen 03/03/23 2004 22 G Anterior;Proximal;Right Forearm 2 days

## 2023-03-06 NOTE — PROGRESS NOTES
[facility]  Neurology-Epilepsy  Progress Note    Patient Name: Declan Burleson  MRN: [id number]  Admission Date: 2/27/2023  Hospital Length of Stay: 7 days  Code Status: Full Code   Attending Provider: Fred Wayne MD  Primary Care Physician: Juan Carlos Simmons NP   Principal Problem:Complex partial seizures evolving to generalized tonic-clonic seizures    Subjective:     Hospital Course:   2/27>2/28: Clobazam decreased to 20 mg qHS, Eslicarbazepine decreased to 400 mg qHS, and Zonisamide decreased to 200 mg qHS on admission. No seizures noted overnight. Beginning 2/28 pm, hold Clobazam, decrease Zonisamide further to 100 mg qHS. Continue Eslicarbazepine 400 mg nightly.   2/28>3/1: No seizures overnight, EEG normal. Hold Zonisamide beginning 3/1 pm.   3/1>3/2: No seizures overnight. Event 3/2 approximately 1035 of sudden eye opening/fluttering, confusion. Hold Eslicarbazepine beginning 3/2 pm.   3/2>3/3: No clinical seizures overnight. Continue close vEEG off all AEDs. Plan for cortical stimulation 3/3 evening with Dr. Edgar and Dr. Feliciano.  3/3-3/4: 1 seizure provoked yesterday by cortical stimulation, no spontaneous seizures.  Plan for sleep deprivation tonight.   3/4-3/5: 2 clinical seizures.  First consisted of shuffling leg movements w/ oral automatisms followed by prolonged post ictal aphasia, second consisted of right hand movement (possibly manual automatisms, difficult to tell hand in mitten) -> oral automatisms -> ictal cry and right version -> generalized tonic clonic seizure lasting 2 minutes; afterwards post ictal aphasia.  Onset of both was Lhippocampus (w/ second one having early recruitment of Lorbitofrontal region).    3/5-3/6: Three additional seizures overnight, please see EEG report for full details. On 3/6 am, given Clobazam 20 mg x1, Eslicarbazepine 600 mg x1. No further seizures noted throughout the day. Plan to give additional Clobazam 20 mg, Eslicarbazepine 600 mg,  and Zonisamide 500 mg 3/6 pm, resume all home dose AEDs 3/7 pm.       Interval History: Multiple seizures overnight, none further throughout the day 3/6 after Clobazam 20 mg and Eslicarbazepine 600 mg. Plan to give additional AEDs this evening.     Current Facility-Administered Medications   Medication Dose Route Frequency Provider Last Rate Last Admin    acetaminophen tablet 1,000 mg  1,000 mg Oral Q8H Magda Miinea, NP   1,000 mg at 03/06/23 1401    calcium carbonate 200 mg calcium (500 mg) chewable tablet 1,000 mg  1,000 mg Oral BID PRN Magda Miinea, NP        cloBAZam Tab 20 mg  20 mg Oral QHS Teresa Cerrato PA-C        Followed by    [START ON 3/7/2023] cloBAZam Tab 40 mg  40 mg Oral QHS Teresa Cerrato PA-C        EScitalopram oxalate tablet 10 mg  10 mg Oral Daily Magda Miinea, NP   10 mg at 03/06/23 0818    eslicarbazepine Tab 600 mg  600 mg Oral Nightly Teresa Cerrato PA-C        Followed by    [START ON 3/7/2023] eslicarbazepine Tab 1,200 mg  1,200 mg Oral Nightly Teresa Cerrato PA-C        famotidine tablet 20 mg  20 mg Oral BID Fred Wayne MD   20 mg at 03/06/23 0819    glycerin adult suppository 1 suppository  1 suppository Rectal Daily PRN Isaura Burgos PA-C        heparin (porcine) injection 5,000 Units  5,000 Units Subcutaneous Q8H Isaura Burgos PA-C   5,000 Units at 03/06/23 1308    hydrALAZINE injection 10 mg  10 mg Intravenous Q4H PRN Magda Miinea, NP        HYDROmorphone injection 0.5 mg  0.5 mg Intravenous Q6H PRN Isaura Burgos PA-C        LORazepam injection 2 mg  2 mg Intravenous Once PRN Shin López PA-C        methocarbamoL tablet 500 mg  500 mg Oral QID PRN Magda Miinea, NP   500 mg at 03/05/23 2031    ondansetron disintegrating tablet 8 mg  8 mg Oral Q8H PRN Isaura Burgos PA-C        oxyCODONE immediate release tablet 5 mg  5 mg Oral Q6H PRN Magda Weiss NP   5 mg at 03/04/23 5917    polyethylene glycol packet 17 g  17 g Oral Daily Isaura  PAUL Burgos PA-C   17 g at 03/06/23 1042    senna-docusate 8.6-50 mg per tablet 1 tablet  1 tablet Oral Daily Shin López PA-C   1 tablet at 03/06/23 0819    zonisamide capsule 500 mg  500 mg Oral QHS Teresa Cerrato PA-C         Continuous Infusions:    Review of Systems   Constitutional:  Positive for fatigue.   Gastrointestinal:  Negative for nausea and vomiting.   Neurological:  Positive for seizures. Negative for headaches.   All other systems reviewed and are negative.  Objective:     Vital Signs (Most Recent):  Temp: 98 °F (36.7 °C) (03/06/23 1101)  Pulse: 71 (03/06/23 1401)  Resp: 20 (03/06/23 1401)  BP: (!) 140/83 (03/06/23 1401)  SpO2: (!) 92 % (03/06/23 1401)   Vital Signs (24h Range):  Temp:  [98 °F (36.7 °C)-98.7 °F (37.1 °C)] 98 °F (36.7 °C)  Pulse:  [55-97] 71  Resp:  [10-25] 20  SpO2:  [92 %-98 %] 92 %  BP: (108-157)/(59-95) 140/83     Weight: 87.5 kg (193 lb)  Body mass index is 28.5 kg/m².    Physical Exam  Vitals and nursing note reviewed.   Constitutional:       General: He is not in acute distress.     Appearance: He is not diaphoretic.   HENT:      Head: Normocephalic.      Comments: sEEG leads + headwrap in place  Eyes:      General: No scleral icterus.     Extraocular Movements: Extraocular movements intact.      Pupils: Pupils are equal, round, and reactive to light.   Cardiovascular:      Rate and Rhythm: Normal rate.   Pulmonary:      Effort: Pulmonary effort is normal. No respiratory distress.   Abdominal:      General: There is no distension.      Palpations: Abdomen is soft.   Musculoskeletal:         General: No deformity or signs of injury.      Cervical back: Normal range of motion and neck supple.   Skin:     General: Skin is warm and dry.   Neurological:      Mental Status: He is alert and oriented to person, place, and time.   Psychiatric:         Mood and Affect: Mood normal.         Speech: Speech normal.         Behavior: Behavior normal.       NEUROLOGICAL EXAMINATION:      MENTAL STATUS   Oriented to person, place, and time.   Attention: normal. Concentration: normal.   Speech: speech is normal   Level of consciousness: alert    CRANIAL NERVES     CN III, IV, VI   Pupils are equal, round, and reactive to light.    CN VII   Facial expression full, symmetric.     CN VIII   Hearing: intact    CN XI   CN XI normal.     CN XII   CN XII normal.     MOTOR EXAM   Muscle bulk: normal  Overall muscle tone: normal       Follows commands, moves all extremities spontaneously and symmetrically     GAIT AND COORDINATION        No pronator drift noted     Significant Labs: All pertinent lab results from the past 24 hours have been reviewed.    Significant Studies: I have reviewed all pertinent imaging results/findings within the past 24 hours.    Assessment and Plan:     * Complex partial seizures evolving to generalized tonic-clonic seizures  Mr. Burleson is a 58 year old man with intractable epilepsy s/p prior sEEG monitoring in January 2021, left laser amygdalohippocampectomy in March 2021 admitted to [facility] s/p placement of 8 left-sided sEEG electrodes for repeat intracranial monitoring and further localization of his epilepsy in consideration of additional surgical management options of his epilepsy. Currently maintained on Clobazam 40 mg qHS, Eslicarbazepine 1200 mg qHS, and Zonisamide 500 mg qHS with continued events of staring/loss of awareness and generalized convulsions.    - s/p placement of 8 left-sided sEEG electrodes by [facility] 2/27  - Multiple seizures 3/5>3/6, please see EEG reports for full details  - 3/6 am given Clobazam 20 mg, Eslicarbazepine 600 mg   - This evening, plan to give additional Clobazam 20 mg, Eslicarbazpine 600 mg, and Zonisamide 500 mg  - Resume home dose AEDs 3/7 pm  - Continue mittens and strict 1:1 supervision as he previously self-removed sEEG leads during monitoring in 2021, requiring early termination of monitoring  - Post-operative imaging timing, pain control  per NCC, NSGY  - Seizure precautions  - Please call epilepsy to notify of any seizures  -  If more than 3 seizures in 2 hours or any seizure lasting greater than 3 minutes, please give 2 mg IV lorazepam and contact on-call epilepsy    Plan of care discussed with NCC team, patient and wife at bedside. Will continue to follow, please call with any questions.     Depression with anxiety  - Continue home Escitalopram 10 mg daily    Migraine without status migrainosus, not intractable  - Followed by Dr. Saldana as outpatient  - Supportive care during admission        VTE Risk Mitigation (From admission, onward)         Ordered     heparin (porcine) injection 5,000 Units  Every 8 hours         02/27/23 1236     Place sequential compression device  Until discontinued         02/27/23 4938                Teresa Cerrato PA-C  Neurology-Epilepsy  [facility] - Neuro Critical [facility]  Staff: Dr. Edgar

## 2023-03-06 NOTE — NURSING
Time: 0650 Pt seizing during shift change, non responsive to verbal commands, -130 noticed on the monitor, O2 dropping to 85. Non-rebreather placed, push button activated. WCTM

## 2023-03-06 NOTE — PROCEDURES
SEEG Report      IMPLANTATION DATE:  2/27/2023  DATE OF ADMISSION: 2/27/2023       ADMITTING/REQUESTING PROVIDER: Ruchi Chen MD     REASON FOR CONSULT:  58-year-old man admitted for phase 2 epilepsy surgical workup with the placement of intracranial sEEG leads for seizure capture and onset localization.     METHODOLOGY  Depth electrodes consist of thin wires with electrical contacts at fixed distances along the wire. These are implanted using a stereotactic procedure targeting cortical and subcortical structures. Digital video recording of the patient is simultaneously recorded with the EEG. The patient is instructed to report clinical symptoms which may occur during the recording session. EEG and video recording are stored and archived in digital format. Activation procedures which include photic stimulation, hyperventilation and instructing patients to perform simple tasks are done in selected patients. Compresses spectral analysis (CSA) is performed on the activity recorded from each individual channel. This is displayed as a power display of frequencies from 0 to 30 Hz over time. The CSA analysis is done and displayed continuously. This is reviewed for asymmetries in power between homologous areas of the scalp and for presence of changes in power which can be seen when seizures occur. Sections of suspected abnormalities on the CSA is then compared with the original EEG recording.      The following is a chart outlying the details related to the depth electrodes implanted:       Electrode    Medial target  Lateral target  Cable Color Serial Number # Of Contacts Head Box  Location   1 L Sup margin Piriformis Inf Temp Gyrus Blk/Gr 40498 16 1-16   2 L Entorihinal Entorhinal Inf Temp Gyrus Yel/Scott 27460 12 17-28   3 L Prefrontal Ant Cing Mid Frontal Gyrus Scott/Blk 41178 16 29-44   4 L Ant Insular Ant Insula Frontal Operculum Yel/Gre 41365 10 45-54   5 L Mid Insular Mid Insula Pars Triangularis Yel/Scott 74779 10  55-64   6 L Orbitofrontal Med OrbFr Lat OrbFr Blk/Yel 99700 16 65-80   7 L Mid Cing Mid Cingulate Sup Frontal Gyrus Red/Yel 89345 14 81-94   8 L Hippo Tail Post Hippo Mid Temp Gyrus Red/Gre 98704 14     EKG           109-110        RECORDING TIMES  Start on March 5, 2023 at hours 7 minute 0 seconds 43  End on March 6, 2023 at hours 7 minute 0 seconds 4   The total time of EEG recording for the study was 23 hours and 59 minutes     EEG FINDINGS     Interictal:  - Independent spikes LSuMar1-2  - Spikes with a broad field LSuMar1-2, LPreFr 1-4, LOrFr 1-4  - Independent spikes Lhippo1-2    Ictal:   During this study a total of 4 seizures were recorded.    Seizure 1. Was recorded from hour 9 minute 19 seconds 36 to hour 9 minute 22 seconds 51  Electrographically the onset was characterized by buildup of periodic sharp activity at Lhippo1-2, which evolved in morphology and rhythmicity and subsequently spread to LEnCort  and LOrFr, and then subsequently to involve LHippo and LSuMar electrodes and was characterized by generalized periodic discharges.  Clinically the patient had noticeable automatisms of the left foot and subsequent bilateral lower extremity automatisms 18 seconds after electrographic onset.  The face was not visible on video EEG.  The patient had postictal aphasia.    Seizure 2. Was recorded from hour 11 minute 56 seconds 56 to hour 11 minute 58 seconds 37.  Electrographically the onset was characterized by buildup of periodic sharp activity at Lhippo1-2, which evolved in morphology and rhythmicity and subsequently spread to LOrFr, and then subsequently to involve LHippo, LEnCort and LSuMar electrodes and was characterized by generalized periodic discharges.  Clinically the patient had noticeable automatisms of the left foot and subsequent bilateral lower extremity automatisms 5 seconds after electrographic onset.  This was followed by oral automatisms and ictal cry. The seizure progressed to a  tonic-clonic seizure.  The patient had postictal aphasia.    Seizure 3. Was recorded from hour 5 minute 15 seconds 18 to hour 5 minute 17 seconds 3  Electrographically the onset was characterized by buildup of periodic sharp activity at Lhippo1-2, which evolved in morphology and rhythmicity and subsequently spread to LEnCort  and LOrFr, and then subsequently to involve LHippo and LSuMar electrodes and was characterized by generalized periodic discharges.  Clinically the patient had noticeable trouble speaking at 60 seconds after the electrographic onset seizure.  The legs were covered by the sheets.  The patient had postictal aphasia.    Seizure 4.  Was recorded from hours 6 minute 47 seconds 18 to hours 6 minute 51 seconds 12.  Electrographically the onset was characterized by buildup of periodic sharp activity at Lhippo1-2, which evolved in morphology and rhythmicity and subsequently spread to LEnCort  and LOrFr, and then subsequently to involve LHippo and LSuMar electrodes and was characterized by generalized periodic discharges.  Clinically the patient had noticeable oral automatisms at 51 seconds after the electrographic onset of the seizure.  This was followed by right hand automatisms and aphasia.    Impression:    This is an abnormal EEG during wakefulness, drowsiness and sleep.  The presence of interictal discharges in the   LSuMar1-2 contacts is suggestive of an irritable region.  At times a broad field was noted involving LPreFr 1-4, LOrFr 1-4 contacts.  In addition independent interictal discharges were also noted in the Lhippo1-2 contacts.  4  electrographic seizures were recorded which emanated from the Lhippo1-2 contacts.

## 2023-03-07 ENCOUNTER — ANESTHESIA EVENT (OUTPATIENT)
Dept: SURGERY | Facility: HOSPITAL | Age: 58
DRG: 026 | End: 2023-03-07
Payer: MEDICARE

## 2023-03-07 ENCOUNTER — SOCIAL WORK (OUTPATIENT)
Dept: NEUROLOGY | Facility: CLINIC | Age: 58
End: 2023-03-07
Payer: MEDICARE

## 2023-03-07 LAB
ABO + RH BLD: NORMAL
ALBUMIN SERPL BCP-MCNC: 3.5 G/DL (ref 3.5–5.2)
ALP SERPL-CCNC: 113 U/L (ref 55–135)
ALT SERPL W/O P-5'-P-CCNC: 52 U/L (ref 10–44)
ANION GAP SERPL CALC-SCNC: 5 MMOL/L (ref 8–16)
AST SERPL-CCNC: 40 U/L (ref 10–40)
BASOPHILS # BLD AUTO: 0.06 K/UL (ref 0–0.2)
BASOPHILS NFR BLD: 0.6 % (ref 0–1.9)
BILIRUB SERPL-MCNC: 0.5 MG/DL (ref 0.1–1)
BLD GP AB SCN CELLS X3 SERPL QL: NORMAL
BUN SERPL-MCNC: 15 MG/DL (ref 6–20)
CALCIUM SERPL-MCNC: 9.4 MG/DL (ref 8.7–10.5)
CHLORIDE SERPL-SCNC: 103 MMOL/L (ref 95–110)
CO2 SERPL-SCNC: 29 MMOL/L (ref 23–29)
CREAT SERPL-MCNC: 0.8 MG/DL (ref 0.5–1.4)
DIFFERENTIAL METHOD: ABNORMAL
EOSINOPHIL # BLD AUTO: 1 K/UL (ref 0–0.5)
EOSINOPHIL NFR BLD: 10.3 % (ref 0–8)
ERYTHROCYTE [DISTWIDTH] IN BLOOD BY AUTOMATED COUNT: 12.8 % (ref 11.5–14.5)
EST. GFR  (NO RACE VARIABLE): >60 ML/MIN/1.73 M^2
GLUCOSE SERPL-MCNC: 93 MG/DL (ref 70–110)
HCT VFR BLD AUTO: 41.1 % (ref 40–54)
HGB BLD-MCNC: 13.2 G/DL (ref 14–18)
IMM GRANULOCYTES # BLD AUTO: 0.02 K/UL (ref 0–0.04)
IMM GRANULOCYTES NFR BLD AUTO: 0.2 % (ref 0–0.5)
LYMPHOCYTES # BLD AUTO: 1.8 K/UL (ref 1–4.8)
LYMPHOCYTES NFR BLD: 18.8 % (ref 18–48)
MAGNESIUM SERPL-MCNC: 2 MG/DL (ref 1.6–2.6)
MCH RBC QN AUTO: 30.9 PG (ref 27–31)
MCHC RBC AUTO-ENTMCNC: 32.1 G/DL (ref 32–36)
MCV RBC AUTO: 96 FL (ref 82–98)
MONOCYTES # BLD AUTO: 1.1 K/UL (ref 0.3–1)
MONOCYTES NFR BLD: 11.9 % (ref 4–15)
NEUTROPHILS # BLD AUTO: 5.5 K/UL (ref 1.8–7.7)
NEUTROPHILS NFR BLD: 58.2 % (ref 38–73)
NRBC BLD-RTO: 0 /100 WBC
PHOSPHATE SERPL-MCNC: 4.1 MG/DL (ref 2.7–4.5)
PLATELET # BLD AUTO: 239 K/UL (ref 150–450)
PMV BLD AUTO: 9.7 FL (ref 9.2–12.9)
POTASSIUM SERPL-SCNC: 3.9 MMOL/L (ref 3.5–5.1)
PROT SERPL-MCNC: 6.7 G/DL (ref 6–8.4)
RBC # BLD AUTO: 4.27 M/UL (ref 4.6–6.2)
SODIUM SERPL-SCNC: 137 MMOL/L (ref 136–145)
WBC # BLD AUTO: 9.36 K/UL (ref 3.9–12.7)

## 2023-03-07 PROCEDURE — 99233 PR SUBSEQUENT HOSPITAL CARE,LEVL III: ICD-10-PCS | Mod: FS,,, | Performed by: PHYSICIAN ASSISTANT

## 2023-03-07 PROCEDURE — 83735 ASSAY OF MAGNESIUM: CPT | Performed by: PSYCHIATRY & NEUROLOGY

## 2023-03-07 PROCEDURE — 25000003 PHARM REV CODE 250: Performed by: PSYCHIATRY & NEUROLOGY

## 2023-03-07 PROCEDURE — 95720 PR EEG, W/VIDEO, CONT RECORD, I&R, >12<26 HRS: ICD-10-PCS | Mod: ,,, | Performed by: PSYCHIATRY & NEUROLOGY

## 2023-03-07 PROCEDURE — 25000003 PHARM REV CODE 250

## 2023-03-07 PROCEDURE — 85025 COMPLETE CBC W/AUTO DIFF WBC: CPT | Performed by: PSYCHIATRY & NEUROLOGY

## 2023-03-07 PROCEDURE — 94761 N-INVAS EAR/PLS OXIMETRY MLT: CPT

## 2023-03-07 PROCEDURE — 20000000 HC ICU ROOM

## 2023-03-07 PROCEDURE — 99233 SBSQ HOSP IP/OBS HIGH 50: CPT | Mod: ,,, | Performed by: NURSE PRACTITIONER

## 2023-03-07 PROCEDURE — 99233 PR SUBSEQUENT HOSPITAL CARE,LEVL III: ICD-10-PCS | Mod: ,,, | Performed by: PSYCHIATRY & NEUROLOGY

## 2023-03-07 PROCEDURE — 95714 VEEG EA 12-26 HR UNMNTR: CPT

## 2023-03-07 PROCEDURE — 63600175 PHARM REV CODE 636 W HCPCS: Performed by: PHYSICIAN ASSISTANT

## 2023-03-07 PROCEDURE — 99233 SBSQ HOSP IP/OBS HIGH 50: CPT | Mod: FS,,, | Performed by: PHYSICIAN ASSISTANT

## 2023-03-07 PROCEDURE — 95961 PR ELECTRODE STIM,BRAIN,MD 1ST HR: ICD-10-PCS | Mod: 26,,, | Performed by: PSYCHIATRY & NEUROLOGY

## 2023-03-07 PROCEDURE — 86900 BLOOD TYPING SEROLOGIC ABO: CPT | Performed by: PSYCHIATRY & NEUROLOGY

## 2023-03-07 PROCEDURE — 99233 SBSQ HOSP IP/OBS HIGH 50: CPT | Mod: ,,, | Performed by: PSYCHIATRY & NEUROLOGY

## 2023-03-07 PROCEDURE — 25000003 PHARM REV CODE 250: Performed by: PHYSICIAN ASSISTANT

## 2023-03-07 PROCEDURE — 25000003 PHARM REV CODE 250: Performed by: NURSE PRACTITIONER

## 2023-03-07 PROCEDURE — 95720 EEG PHY/QHP EA INCR W/VEEG: CPT | Mod: ,,, | Performed by: PSYCHIATRY & NEUROLOGY

## 2023-03-07 PROCEDURE — 84100 ASSAY OF PHOSPHORUS: CPT | Performed by: PSYCHIATRY & NEUROLOGY

## 2023-03-07 PROCEDURE — 95961 ELECTRODE STIMULATION BRAIN: CPT | Mod: 26,,, | Performed by: PSYCHIATRY & NEUROLOGY

## 2023-03-07 PROCEDURE — 99233 PR SUBSEQUENT HOSPITAL CARE,LEVL III: ICD-10-PCS | Mod: ,,, | Performed by: NURSE PRACTITIONER

## 2023-03-07 PROCEDURE — 80053 COMPREHEN METABOLIC PANEL: CPT | Performed by: PSYCHIATRY & NEUROLOGY

## 2023-03-07 PROCEDURE — 63600175 PHARM REV CODE 636 W HCPCS: Performed by: NEUROLOGICAL SURGERY

## 2023-03-07 RX ORDER — GABAPENTIN 300 MG/1
300 CAPSULE ORAL 3 TIMES DAILY
Status: DISCONTINUED | OUTPATIENT
Start: 2023-03-07 | End: 2023-03-09 | Stop reason: HOSPADM

## 2023-03-07 RX ORDER — TALC
9 POWDER (GRAM) TOPICAL NIGHTLY PRN
Status: DISCONTINUED | OUTPATIENT
Start: 2023-03-07 | End: 2023-03-09 | Stop reason: HOSPADM

## 2023-03-07 RX ORDER — PROMETHAZINE HYDROCHLORIDE 12.5 MG/1
12.5 TABLET ORAL EVERY 6 HOURS PRN
Status: DISCONTINUED | OUTPATIENT
Start: 2023-03-07 | End: 2023-03-09 | Stop reason: HOSPADM

## 2023-03-07 RX ORDER — TALC
6 POWDER (GRAM) TOPICAL NIGHTLY PRN
Status: DISCONTINUED | OUTPATIENT
Start: 2023-03-07 | End: 2023-03-07

## 2023-03-07 RX ADMIN — ACETAMINOPHEN 1000 MG: 500 TABLET ORAL at 05:03

## 2023-03-07 RX ADMIN — ACETAMINOPHEN 1000 MG: 500 TABLET ORAL at 02:03

## 2023-03-07 RX ADMIN — METHOCARBAMOL 500 MG: 500 TABLET ORAL at 05:03

## 2023-03-07 RX ADMIN — OXYCODONE 5 MG: 5 TABLET ORAL at 02:03

## 2023-03-07 RX ADMIN — Medication 9 MG: at 10:03

## 2023-03-07 RX ADMIN — METHOCARBAMOL 500 MG: 500 TABLET ORAL at 10:03

## 2023-03-07 RX ADMIN — SENNOSIDES AND DOCUSATE SODIUM 1 TABLET: 50; 8.6 TABLET ORAL at 08:03

## 2023-03-07 RX ADMIN — POLYETHYLENE GLYCOL 3350 17 G: 17 POWDER, FOR SOLUTION ORAL at 08:03

## 2023-03-07 RX ADMIN — ESLICARBAZEPINE ACETATE 1200 MG: 200 TABLET ORAL at 08:03

## 2023-03-07 RX ADMIN — FAMOTIDINE 20 MG: 20 TABLET ORAL at 08:03

## 2023-03-07 RX ADMIN — GABAPENTIN 300 MG: 300 CAPSULE ORAL at 08:03

## 2023-03-07 RX ADMIN — ACETAMINOPHEN 1000 MG: 500 TABLET ORAL at 10:03

## 2023-03-07 RX ADMIN — ESCITALOPRAM OXALATE 10 MG: 10 TABLET ORAL at 08:03

## 2023-03-07 RX ADMIN — HEPARIN SODIUM 5000 UNITS: 5000 INJECTION INTRAVENOUS; SUBCUTANEOUS at 02:03

## 2023-03-07 RX ADMIN — HEPARIN SODIUM 5000 UNITS: 5000 INJECTION INTRAVENOUS; SUBCUTANEOUS at 05:03

## 2023-03-07 RX ADMIN — CLOBAZAM 40 MG: 10 TABLET ORAL at 08:03

## 2023-03-07 RX ADMIN — ZONISAMIDE 500 MG: 50 CAPSULE ORAL at 08:03

## 2023-03-07 NOTE — SUBJECTIVE & OBJECTIVE
Review of Systems: Review of Systems   Eyes:  Negative for blurred vision and double vision.   Respiratory:  Negative for shortness of breath.    Cardiovascular:  Negative for chest pain.   Gastrointestinal:  Negative for heartburn, nausea and vomiting.   Neurological:  Positive for headaches. Negative for sensory change, speech change, focal weakness and weakness.        Improved after pain medication     Vitals:   Temp: 98.3 °F (36.8 °C)  Pulse: 72  Rhythm: normal sinus rhythm  BP: (!) 152/100  MAP (mmHg): 115  Resp: 19  SpO2: 95 %    Temp  Min: 96.9 °F (36.1 °C)  Max: 99 °F (37.2 °C)  Pulse  Min: 61  Max: 89  BP  Min: 133/89  Max: 157/87  MAP (mmHg)  Min: 104  Max: 125  Resp  Min: 12  Max: 31  SpO2  Min: 92 %  Max: 97 %    03/06 0701 - 03/07 0700  In: 784.2 [P.O.:350; I.V.:40]  Out: 840 [Urine:840]   Unmeasured Output  Urine Occurrence: 1  Stool Occurrence: 1  Emesis Occurrence: 0  Pad Count: 1     Examination:   Constitutional: Well-nourished and -developed. No apparent distress.   Eyes: Conjunctiva clear, anicteric. Lids no lesions.  Head/Ears/Nose/Mouth/Throat/Neck: Turban dressing in place. Moist mucous membranes. External ears, nose atraumatic.   Cardiovascular: Regular rhythm. No leg edema.  Respiratory: Comfortable respirations. Clear to auscultation.  Gastrointestinal: Soft, nondistended, nontender. + bowel sounds.    Neurologic:  -GCS E 4 V 5 M 6  -Alert. Oriented to person, place, and time. Speech fluent. Follows commands.  -Cranial nerves: EOM intact, PERRL, no facial droop, +cough  -Motor: Moves all extremities spontaneously, antigravity  -Sensation: Intact to light touch    Medications:   Continuous Scheduledacetaminophen, 1,000 mg, Q8H  cloBAZam, 40 mg, QHS  EScitalopram oxalate, 10 mg, Daily  eslicarbazepine, 1,200 mg, Nightly  famotidine, 20 mg, BID  heparin (porcine), 5,000 Units, Q8H  polyethylene glycol, 17 g, Daily  senna-docusate 8.6-50 mg, 1 tablet, Daily  zonisamide, 500 mg,  QHS    PRNcalcium carbonate, 1,000 mg, BID PRN  glycerin adult, 1 suppository, Daily PRN  hydrALAZINE, 10 mg, Q4H PRN  HYDROmorphone, 0.5 mg, Q6H PRN  lorazepam, 2 mg, Once PRN  methocarbamoL, 500 mg, QID PRN  ondansetron, 8 mg, Q8H PRN  oxyCODONE, 5 mg, Q6H PRN       Today I independently reviewed pertinent medications, lines/drains/airways, imaging, cardiology results, laboratory results, microbiology results, notably:     ISTAT: No results for input(s): PH, PCO2, PO2, POCSATURATED, HCO3, BE, POCNA, POCK, POCTCO2, POCGLU, POCICA, POCLAC, SAMPLE in the last 24 hours.   Chem:   Recent Labs   Lab 03/07/23  0527      K 3.9      CO2 29   GLU 93   BUN 15   CREATININE 0.8   CALCIUM 9.4   MG 2.0   PHOS 4.1   ANIONGAP 5*   PROT 6.7   ALBUMIN 3.5   BILITOT 0.5   ALKPHOS 113   AST 40   ALT 52*     Heme:   Recent Labs   Lab 03/07/23  0527   WBC 9.36   HGB 13.2*   HCT 41.1        Endo: No results for input(s): POCTGLUCOSE in the last 24 hours.

## 2023-03-07 NOTE — ASSESSMENT & PLAN NOTE
Mr. Burleson is a 58 year old man with intractable epilepsy s/p prior sEEG monitoring in January 2021, left laser amygdalohippocampectomy in March 2021 admitted to [facility] s/p placement of 8 left-sided sEEG electrodes for repeat intracranial monitoring and further localization of his epilepsy in consideration of additional surgical management options of his epilepsy. Currently maintained on Clobazam 40 mg qHS, Eslicarbazepine 1200 mg qHS, and Zonisamide 500 mg qHS with continued events of staring/loss of awareness and generalized convulsions.    - s/p placement of 8 left-sided sEEG electrodes by NSGY 2/27  - NPO after midnight for sEEG explantation 3/8  - Multiple seizures 3/5>3/6, additional seizure 3/6 pm. Please see EEG reports for full details  - Home AED regimen resumed, continue Clobazam 40 mg qHS, Eslicarbazepine 1200 mg qHS, Zonisamide 500 mg qHS  - Cortical stimulation completed 3/3, Dr. Edgar planning for additional cortical stimulation this evening  - Continue mittens and strict 1:1 supervision as he previously self-removed sEEG leads during monitoring in 2021, requiring early termination of monitoring  - Post-operative imaging timing, pain control per [facility], NSGY  - Seizure precautions  - Please call epilepsy to notify of any seizures  -  If more than 3 seizures in 2 hours or any seizure lasting greater than 3 minutes, please give 2 mg IV lorazepam and contact on-call epilepsy    Plan of care discussed with NCC team, patient and wife at bedside. Will continue to follow, please call with any questions.

## 2023-03-07 NOTE — PROGRESS NOTES
[facility]  Neurology-Epilepsy  Progress Note    Patient Name: Declan Burleson  MRN: [id number]  Admission Date: 2/27/2023  Hospital Length of Stay: 8 days  Code Status: Full Code   Attending Provider: Fred Wayne MD  Primary Care Physician: Juan Carlos Simmons NP   Principal Problem:Complex partial seizures evolving to generalized tonic-clonic seizures    Subjective:     Hospital Course:   2/27>2/28: Clobazam decreased to 20 mg qHS, Eslicarbazepine decreased to 400 mg qHS, and Zonisamide decreased to 200 mg qHS on admission. No seizures noted overnight. Beginning 2/28 pm, hold Clobazam, decrease Zonisamide further to 100 mg qHS. Continue Eslicarbazepine 400 mg nightly.   2/28>3/1: No seizures overnight, EEG normal. Hold Zonisamide beginning 3/1 pm.   3/1>3/2: No seizures overnight. Event 3/2 approximately 1035 of sudden eye opening/fluttering, confusion. Hold Eslicarbazepine beginning 3/2 pm.   3/2>3/3: No clinical seizures overnight. Continue close vEEG off all AEDs. Plan for cortical stimulation 3/3 evening with Dr. Edgar and Dr. Feliciano.  3/3-3/4: 1 seizure provoked yesterday by cortical stimulation, no spontaneous seizures.  Plan for sleep deprivation tonight.   3/4-3/5: 2 clinical seizures.  First consisted of shuffling leg movements w/ oral automatisms followed by prolonged post ictal aphasia, second consisted of right hand movement (possibly manual automatisms, difficult to tell hand in mitten) -> oral automatisms -> ictal cry and right version -> generalized tonic clonic seizure lasting 2 minutes; afterwards post ictal aphasia.  Onset of both was Lhippocampus (w/ second one having early recruitment of Lorbitofrontal region).    3/5-3/6: Three additional seizures overnight, please see EEG report for full details. On 3/6 am, given Clobazam 20 mg x1, Eslicarbazepine 600 mg x1. No further seizures noted throughout the day. Plan to give additional Clobazam 20 mg, Eslicarbazepine 600 mg,  and Zonisamide 500 mg 3/6 pm, resume all home dose AEDs 3/7 pm.   3/6-3/7: Clinical seizure 3/6 approximately 1939, EEG notable for additional seizures 3/6 at 0924, 1448, and 1938. No further seizures overnight after additional AED doses. Continue home AED regimen, plan for sEEG removal tomorrow.       Interval History: One clinical seizure noted overnight, 3 additional seizures noted on EEG 3/6. Continue home AED regimen this evening, planning for sEEG lead explantation 3/8.     Current Facility-Administered Medications   Medication Dose Route Frequency Provider Last Rate Last Admin    acetaminophen tablet 1,000 mg  1,000 mg Oral Q8H Magda Miwill, NP   1,000 mg at 03/07/23 0516    calcium carbonate 200 mg calcium (500 mg) chewable tablet 1,000 mg  1,000 mg Oral BID PRN Magda Weiss, NP        cloBAZam Tab 40 mg  40 mg Oral QHS Teresa Cerrato PA-C        EScitalopram oxalate tablet 10 mg  10 mg Oral Daily Magda Isela, NP   10 mg at 03/07/23 0817    eslicarbazepine Tab 1,200 mg  1,200 mg Oral Nightly Teresa Cerrato PA-C        famotidine tablet 20 mg  20 mg Oral BID Fred Wayne MD   20 mg at 03/07/23 0817    glycerin adult suppository 1 suppository  1 suppository Rectal Daily PRN Isaura Burgos PA-C        heparin (porcine) injection 5,000 Units  5,000 Units Subcutaneous Q8H Ruchi Chen MD   5,000 Units at 03/07/23 0516    hydrALAZINE injection 10 mg  10 mg Intravenous Q4H PRN Magda Weiss, NP        HYDROmorphone injection 0.5 mg  0.5 mg Intravenous Q6H PRN Isaura Burgos PA-C        LORazepam injection 2 mg  2 mg Intravenous Once PRN Shin López PA-C        methocarbamoL tablet 500 mg  500 mg Oral QID PRN Magda Weiss, NP   500 mg at 03/07/23 0516    ondansetron disintegrating tablet 8 mg  8 mg Oral Q8H PRN Isaura Burgos PA-C        oxyCODONE immediate release tablet 5 mg  5 mg Oral Q6H PRN Magda Weiss NP   5 mg at 03/04/23 8738    polyethylene glycol packet 17 g  17 g Oral Daily  Isaura Burgos PA-C   17 g at 03/07/23 0817    senna-docusate 8.6-50 mg per tablet 1 tablet  1 tablet Oral Daily Shin RodríguezMOISES parikh   1 tablet at 03/07/23 0817    zonisamide capsule 500 mg  500 mg Oral QHS Teresa CerratoMOISES   500 mg at 03/06/23 2153     Continuous Infusions:    Review of Systems   Constitutional:  Positive for fatigue.   Gastrointestinal:  Negative for nausea and vomiting.   Neurological:  Positive for seizures. Negative for headaches.   All other systems reviewed and are negative.  Objective:     Vital Signs (Most Recent):  Temp: 98.3 °F (36.8 °C) (03/07/23 1101)  Pulse: 72 (03/07/23 1101)  Resp: 19 (03/07/23 1101)  BP: (!) 152/100 (03/07/23 1101)  SpO2: 95 % (03/07/23 1101)   Vital Signs (24h Range):  Temp:  [96.9 °F (36.1 °C)-99 °F (37.2 °C)] 98.3 °F (36.8 °C)  Pulse:  [61-89] 72  Resp:  [12-31] 19  SpO2:  [92 %-97 %] 95 %  BP: (133-157)/() 152/100     Weight: 87.5 kg (193 lb)  Body mass index is 28.5 kg/m².    Physical Exam  Vitals and nursing note reviewed.   Constitutional:       General: He is not in acute distress.     Appearance: He is not diaphoretic.   HENT:      Head: Normocephalic.      Comments: sEEG leads + headwrap in place  Eyes:      General: No scleral icterus.     Extraocular Movements: Extraocular movements intact.      Pupils: Pupils are equal, round, and reactive to light.   Cardiovascular:      Rate and Rhythm: Normal rate.   Pulmonary:      Effort: Pulmonary effort is normal. No respiratory distress.   Abdominal:      General: There is no distension.      Palpations: Abdomen is soft.   Musculoskeletal:         General: No deformity or signs of injury.      Cervical back: Normal range of motion and neck supple.   Skin:     General: Skin is warm and dry.   Neurological:      Mental Status: He is alert and oriented to person, place, and time.   Psychiatric:         Mood and Affect: Mood normal.         Speech: Speech normal.         Behavior: Behavior normal.        NEUROLOGICAL EXAMINATION:     MENTAL STATUS   Oriented to person, place, and time.   Attention: normal. Concentration: normal.   Speech: speech is normal   Level of consciousness: alert    CRANIAL NERVES     CN III, IV, VI   Pupils are equal, round, and reactive to light.    CN VII   Facial expression full, symmetric.     CN VIII   Hearing: intact    CN XI   CN XI normal.     CN XII   CN XII normal.     MOTOR EXAM   Muscle bulk: normal  Overall muscle tone: normal       Follows commands, moves all extremities spontaneously and symmetrically     GAIT AND COORDINATION        No pronator drift noted     Significant Labs: All pertinent lab results from the past 24 hours have been reviewed.    Significant Studies: I have reviewed all pertinent imaging results/findings within the past 24 hours.    Assessment and Plan:     * Complex partial seizures evolving to generalized tonic-clonic seizures  Mr. Burleson is a 58 year old man with intractable epilepsy s/p prior sEEG monitoring in January 2021, left laser amygdalohippocampectomy in March 2021 admitted to [facility] s/p placement of 8 left-sided sEEG electrodes for repeat intracranial monitoring and further localization of his epilepsy in consideration of additional surgical management options of his epilepsy. Currently maintained on Clobazam 40 mg qHS, Eslicarbazepine 1200 mg qHS, and Zonisamide 500 mg qHS with continued events of staring/loss of awareness and generalized convulsions.    - s/p placement of 8 left-sided sEEG electrodes by [facility] 2/27  - NPO after midnight for sEEG explantation 3/8  - Multiple seizures 3/5>3/6, additional seizures 3/6. Please see EEG reports for full details  - Home AED regimen resumed, continue Clobazam 40 mg qHS, Eslicarbazepine 1200 mg qHS, Zonisamide 500 mg qHS  - Cortical stimulation completed 3/3, Dr. Edgar planning for additional cortical stimulation this evening  - Continue mittens and strict 1:1 supervision as he previously self-removed  sEEG leads during monitoring in 2021, requiring early termination of monitoring  - Post-operative imaging timing, pain control per NCC, NSGY  - Seizure precautions  - Please call epilepsy to notify of any seizures  -  If more than 3 seizures in 2 hours or any seizure lasting greater than 3 minutes, please give 2 mg IV lorazepam and contact on-call epilepsy    Plan of care discussed with NCC team, patient and wife at bedside. Will continue to follow, please call with any questions.     Depression with anxiety  - Continue home Escitalopram 10 mg daily    Migraine without status migrainosus, not intractable  - Followed by Dr. Saldana as outpatient  - Supportive care during admission        VTE Risk Mitigation (From admission, onward)           Ordered     heparin (porcine) injection 5,000 Units  Every 8 hours         02/27/23 1236     Place sequential compression device  Until discontinued         02/27/23 1149                    Teresa Cerrato PA-C  Neurology-Epilepsy  [facility]  Staff: Dr. Edgar

## 2023-03-07 NOTE — SUBJECTIVE & OBJECTIVE
Interval History: One clinical seizure noted overnight. Continue home AED regimen this evening, planning for sEEG lead explantation 3/8.     Current Facility-Administered Medications   Medication Dose Route Frequency Provider Last Rate Last Admin    acetaminophen tablet 1,000 mg  1,000 mg Oral Q8H Magda Miinea, NP   1,000 mg at 03/07/23 0516    calcium carbonate 200 mg calcium (500 mg) chewable tablet 1,000 mg  1,000 mg Oral BID PRN Magdabritton Weiss, NP        cloBAZam Tab 40 mg  40 mg Oral QHS Teresa Cerrato PA-C        EScitalopram oxalate tablet 10 mg  10 mg Oral Daily Magda Miinea, NP   10 mg at 03/07/23 0817    eslicarbazepine Tab 1,200 mg  1,200 mg Oral Nightly Teresa Cerrato PA-C        famotidine tablet 20 mg  20 mg Oral BID Fred Wayne MD   20 mg at 03/07/23 0817    glycerin adult suppository 1 suppository  1 suppository Rectal Daily PRN Isaura Burgos PA-C        heparin (porcine) injection 5,000 Units  5,000 Units Subcutaneous Q8H Ruchi Chen MD   5,000 Units at 03/07/23 0516    hydrALAZINE injection 10 mg  10 mg Intravenous Q4H PRN Magda Miinea, NP        HYDROmorphone injection 0.5 mg  0.5 mg Intravenous Q6H PRN Isaura Burgos PA-C        LORazepam injection 2 mg  2 mg Intravenous Once PRN Shin López PA-C        methocarbamoL tablet 500 mg  500 mg Oral QID PRN Magda Miinea, NP   500 mg at 03/07/23 0516    ondansetron disintegrating tablet 8 mg  8 mg Oral Q8H PRN Isaura Burgos PA-C        oxyCODONE immediate release tablet 5 mg  5 mg Oral Q6H PRN Magda Miinea, NP   5 mg at 03/04/23 2218    polyethylene glycol packet 17 g  17 g Oral Daily Isaura Burgos PA-C   17 g at 03/07/23 0817    senna-docusate 8.6-50 mg per tablet 1 tablet  1 tablet Oral Daily Shin López PA-C   1 tablet at 03/07/23 0817    zonisamide capsule 500 mg  500 mg Oral QHS Teresa Cerrato PA-C   500 mg at 03/06/23 9967     Continuous Infusions:    Review of Systems   Constitutional:  Positive for fatigue.    Gastrointestinal:  Negative for nausea and vomiting.   Neurological:  Positive for seizures. Negative for headaches.   All other systems reviewed and are negative.  Objective:     Vital Signs (Most Recent):  Temp: 98.3 °F (36.8 °C) (03/07/23 1101)  Pulse: 72 (03/07/23 1101)  Resp: 19 (03/07/23 1101)  BP: (!) 152/100 (03/07/23 1101)  SpO2: 95 % (03/07/23 1101)   Vital Signs (24h Range):  Temp:  [96.9 °F (36.1 °C)-99 °F (37.2 °C)] 98.3 °F (36.8 °C)  Pulse:  [61-89] 72  Resp:  [12-31] 19  SpO2:  [92 %-97 %] 95 %  BP: (133-157)/() 152/100     Weight: 87.5 kg (193 lb)  Body mass index is 28.5 kg/m².    Physical Exam  Vitals and nursing note reviewed.   Constitutional:       General: He is not in acute distress.     Appearance: He is not diaphoretic.   HENT:      Head: Normocephalic.      Comments: sEEG leads + headwrap in place  Eyes:      General: No scleral icterus.     Extraocular Movements: Extraocular movements intact.      Pupils: Pupils are equal, round, and reactive to light.   Cardiovascular:      Rate and Rhythm: Normal rate.   Pulmonary:      Effort: Pulmonary effort is normal. No respiratory distress.   Abdominal:      General: There is no distension.      Palpations: Abdomen is soft.   Musculoskeletal:         General: No deformity or signs of injury.      Cervical back: Normal range of motion and neck supple.   Skin:     General: Skin is warm and dry.   Neurological:      Mental Status: He is alert and oriented to person, place, and time.   Psychiatric:         Mood and Affect: Mood normal.         Speech: Speech normal.         Behavior: Behavior normal.       NEUROLOGICAL EXAMINATION:     MENTAL STATUS   Oriented to person, place, and time.   Attention: normal. Concentration: normal.   Speech: speech is normal   Level of consciousness: alert    CRANIAL NERVES     CN III, IV, VI   Pupils are equal, round, and reactive to light.    CN VII   Facial expression full, symmetric.     CN VIII   Hearing:  intact    CN XI   CN XI normal.     CN XII   CN XII normal.     MOTOR EXAM   Muscle bulk: normal  Overall muscle tone: normal       Follows commands, moves all extremities spontaneously and symmetrically     GAIT AND COORDINATION        No pronator drift noted     Significant Labs: All pertinent lab results from the past 24 hours have been reviewed.    Significant Studies: I have reviewed all pertinent imaging results/findings within the past 24 hours.

## 2023-03-07 NOTE — PLAN OF CARE
[facility] Care Plan    POC reviewed with Declan Burleson and family at 1400. Pt verbalized understanding. Questions and concerns addressed. No acute events today. Pt progressing toward goals. Will continue to monitor. See below and flowsheets for full assessment and VS info.     -Spouse updated at the bedside.   -NPO at midnight for removal of sEEG leads.   -Anesthesia consent witnessed and placed in pts chart.   -Pt on delirium precaution.           Is this a stroke patient? no    Neuro:  Ruben Coma Scale  Best Eye Response: 4-->(E4) spontaneous  Best Motor Response: 6-->(M6) obeys commands  Best Verbal Response: 5-->(V5) oriented  Ruben Coma Scale Score: 15  Assessment Qualifiers: no eye obstruction present, patient not sedated/intubated  Pupil PERRLA: yes     24 hr Temp:  [96.9 °F (36.1 °C)-98.9 °F (37.2 °C)]     CV:   Rhythm: normal sinus rhythm  BP goals:   SBP < 160  MAP > 65    Resp:      Oxygen Concentration (%): 28    Plan: N/A    GI/:     Diet/Nutrition Received: regular  Last Bowel Movement: 03/07/23  Voiding Characteristics: voids spontaneously without difficulty    Intake/Output Summary (Last 24 hours) at 3/7/2023 1555  Last data filed at 3/7/2023 1507  Gross per 24 hour   Intake 1334.19 ml   Output 1630 ml   Net -295.81 ml     Unmeasured Output  Urine Occurrence: 1  Stool Occurrence: 1  Emesis Occurrence: 0  Pad Count: 1    Labs/Accuchecks:  Recent Labs   Lab 03/07/23  0527   WBC 9.36   RBC 4.27*   HGB 13.2*   HCT 41.1         Recent Labs   Lab 03/07/23  0527      K 3.9   CO2 29      BUN 15   CREATININE 0.8   ALKPHOS 113   ALT 52*   AST 40   BILITOT 0.5    No results for input(s): PROTIME, INR, APTT, HEPANTIXA in the last 168 hours.   Recent Labs   Lab 03/01/23  1112   TROPONINI <0.006       Electrolytes: N/A - electrolytes WDL  Accuchecks: none    Gtts:      LDA/Wounds:  Lines/Drains/Airways       Peripheral Intravenous Line  Duration                  Peripheral IV - Single  Lumen 03/07/23 1110 20 G Anterior;Right Forearm <1 day         Peripheral IV - Single Lumen 03/07/23 1112 18 G Left;Posterior Forearm <1 day                  Wounds: No  Wound care consulted: No

## 2023-03-07 NOTE — PROGRESS NOTES
[facility]  Neurosurgery  Progress Note    Subjective:     History of Present Illness: 58 M with epilepsy presents for elective sEEG placement on 2/27      Post-Op Info:  Procedure(s) (LRB):  PLACEMENT OF STEREO EEG LEADS (DEPTH ELECTRODES) (Left)   8 Days Post-Op     Interval History: 3/7: seizure overnight, BM overnight, restarting AEDs per epilepsy. Plan for sEEG removal tomorrow.       Medications:  Continuous Infusions:  Scheduled Meds:   acetaminophen  1,000 mg Oral Q8H    cloBAZam  40 mg Oral QHS    EScitalopram oxalate  10 mg Oral Daily    eslicarbazepine  1,200 mg Oral Nightly    famotidine  20 mg Oral BID    heparin (porcine)  5,000 Units Subcutaneous Q8H    polyethylene glycol  17 g Oral Daily    senna-docusate 8.6-50 mg  1 tablet Oral Daily    zonisamide  500 mg Oral QHS     PRN Meds:calcium carbonate, glycerin adult, hydrALAZINE, HYDROmorphone, lorazepam, methocarbamoL, ondansetron, oxyCODONE     Review of Systems   Neurological:  Positive for seizures.   All other systems reviewed and are negative.  Objective:     Weight: 87.5 kg (193 lb)  Body mass index is 28.5 kg/m².  Vital Signs (Most Recent):  Temp: 96.9 °F (36.1 °C) (03/07/23 0701)  Pulse: 78 (03/07/23 1001)  Resp: 18 (03/07/23 1001)  BP: (!) 145/88 (03/07/23 1001)  SpO2: (!) 94 % (03/07/23 1001)   Vital Signs (24h Range):  Temp:  [96.9 °F (36.1 °C)-99 °F (37.2 °C)] 96.9 °F (36.1 °C)  Pulse:  [61-89] 78  Resp:  [12-31] 18  SpO2:  [92 %-97 %] 94 %  BP: (108-157)/() 145/88     Date 03/07/23 0700 - 03/08/23 0659   Shift 5844-8213 0147-8679 4038-7138 24 Hour Total   INTAKE   P.O. 350   350   Shift Total(mL/kg) 350(4)   350(4)   OUTPUT   Urine(mL/kg/hr) 490   490   Shift Total(mL/kg) 490(5.6)   490(5.6)   Weight (kg) 87.5 87.5 87.5 87.5                Resp Rate Total:  [16 br/min-25 br/min] 18 br/min         Physical Exam  Constitutional:       Appearance: He is well-developed and well-nourished.   Eyes:       Extraocular Movements: EOM normal.      Conjunctiva/sclera: Conjunctivae normal.      Pupils: Pupils are equal, round, and reactive to light.   Cardiovascular:      Pulses: Normal pulses.   Abdominal:      Palpations: Abdomen is soft.   Neurological:      Mental Status: He is alert and oriented to person, place, and time.      Comments: AAOx3, higher order confusion  PERRL  CN intact  BUE 5/5  BLE 5/5  SILT    Headwrap in place with sEEG leads   Psychiatric:         Mood and Affect: Mood and affect normal.       Physical Exam:    Constitutional: He appears well-developed and well-nourished.     Eyes: Pupils are equal, round, and reactive to light. Conjunctivae and EOM are normal.     Cardiovascular: Normal pulses.     Abdominal: Soft.     Psych/Behavior: He is alert. He is oriented to person, place, and time. He has a normal mood and affect.     Neurological:   AAOx3, higher order confusion  PERRL  CN intact  BUE 5/5  BLE 5/5  SILT    Headwrap in place with sEEG leads     Significant Labs:  Recent Labs   Lab 03/06/23  0356 03/07/23  0527    93    137   K 4.5 3.9    103   CO2 29 29   BUN 14 15   CREATININE 0.7 0.8   CALCIUM 9.4 9.4   MG 1.9 2.0       Recent Labs   Lab 03/06/23  0356 03/07/23  0527   WBC 8.25 9.36   HGB 13.8* 13.2*   HCT 42.4 41.1    239       No results for input(s): LABPT, INR, APTT in the last 48 hours.    Microbiology Results (last 7 days)       ** No results found for the last 168 hours. **          All pertinent labs from the last 24 hours have been reviewed.    Significant Diagnostics:  I have reviewed all pertinent imaging results/findings within the past 24 hours.    Assessment/Plan:     * Complex partial seizures evolving to generalized tonic-clonic seizures  57 yo M with intractable epilepsy who is now s/p sEEG lead placement for seizure monitoring/mapping    Plan for sEEG removal tomorrow 3/8    - admitted to [facility]  - q1 neurochecks  - CT stealth post op reviewed,  sEEG leads in appropriate position, no large volume hemorrhage  - AEDs and seizure management per epilepsy, seizure stimulation per epilepsy  - hyponatremia, ctm  - bowel regiment, last BM 3/6  - voiding spontaneously  - Chest pain, trops negative, likely GI related pain    - PPI, Bowel regimen  - bed rest  - PT/OT, daily stationary bike when fixed   -- Please encourage patient for improved PT activity   - SQH, hold tonight for OR  -- NPO MN    Dispo:  sEEG lead removal 3/8        Obinna Avendano MD  Neurosurgery  [facility]

## 2023-03-07 NOTE — NURSING
1939 - Push button activated. Seizure lasting about 1 minute and 30 seconds. Started with facial twitching and upper arm rigidity. HR in the 120's and sats dropping down into the 80's. Once broke out of seizure HR and sats back to baseline. Post ictal phase lasting about 10 to 15 minutes. Patient now back to baseline and AAO x 4.

## 2023-03-07 NOTE — PROGRESS NOTES
"[facility]  Neurocritical Care  Progress Note    Admit Date: 2/27/2023  Service Date: 03/07/2023  Length of Stay: 8    Subjective:     Chief Complaint: Complex partial seizures evolving to generalized tonic-clonic seizures    History of Present Illness: Declan Burleson is a 58 y.o.M  admitted to [facility] s/p SEEG placement. Per chart review he was referred by Dr. Benoit (originally by Dr. Saldana of [place]) for consideration of surgical therapy for intractable epilepsy. The patient was formerly employed as a . He is accompanied by his wife Trista, who helps to provide the history. His seizures began when he was 48 years old. He can recall no inciting event. He has 3 semiologies: "full blown" generalized events, staring episodes, and "mild" seizures, which are characterized as generalized events of shorter duration without incontinence and a shorter post-ictal period. He and his wife estimate that he persists in having about 2 events/month. He has failed multiple AEDs, including topiramate, lamotrigine, levetiracetam, and oxcarbazepine. He is currently taking eslicarbazepine, zonisamide, and clobazam. He had EMU monitoring in May-June of this year, which suggests left-sided activity. NSGY and epilepsy following. Patient admitted to [facility] for close monitoring and higher level of care.     Hospital Course: 3/5/2023: NAEON, patient complains of L side headache, L eye pressure (no visual disturbance) PRN pain available  3/5/2023: complained of chest pain- troponin neg, EKG stable, AED's per Epilepsy  03/05/2023 Increased senna, added daily suppository. Weaned off of AEDs for this afternoon per epilepsy.  03/05/2023 No seizures overnight. BM this AM. PT/OT today.  03/05/2023 BM this am. Decreased senna and dc suppository.   03/05/2023 NAEON. Family and epilepsy agreed not to do sleep deprivation last night. 3 minute seizure today, given 2mg ativan. Post ictal. Pt had another 2 min " seizure around noon, no ativan given, post ictal now.  03/06/2023 2 min seizure overnight. 1 seizure this morning, post ictal when seen this AM. Given aptiom 600 and onfi 20 per epilepsy. Doses for tomorrow will be decided after review by epilepsy.  03/07/2023: Home AEDs restarted. Plans for sEEG removal tomorrow. NPO after midnight.     Review of Systems: Review of Systems   Eyes:  Negative for blurred vision and double vision.   Respiratory:  Negative for shortness of breath.    Cardiovascular:  Negative for chest pain.   Gastrointestinal:  Negative for heartburn, nausea and vomiting.   Neurological:  Positive for headaches. Negative for sensory change, speech change, focal weakness and weakness.        Improved after pain medication     Vitals:   Temp: 98.3 °F (36.8 °C)  Pulse: 72  Rhythm: normal sinus rhythm  BP: (!) 152/100  MAP (mmHg): 115  Resp: 19  SpO2: 95 %    Temp  Min: 96.9 °F (36.1 °C)  Max: 99 °F (37.2 °C)  Pulse  Min: 61  Max: 89  BP  Min: 133/89  Max: 157/87  MAP (mmHg)  Min: 104  Max: 125  Resp  Min: 12  Max: 31  SpO2  Min: 92 %  Max: 97 %    03/06 0701 - 03/07 0700  In: 784.2 [P.O.:350; I.V.:40]  Out: 840 [Urine:840]   Unmeasured Output  Urine Occurrence: 1  Stool Occurrence: 1  Emesis Occurrence: 0  Pad Count: 1     Examination:   Constitutional: Well-nourished and -developed. No apparent distress.   Eyes: Conjunctiva clear, anicteric. Lids no lesions.  Head/Ears/Nose/Mouth/Throat/Neck: Turban dressing in place. Moist mucous membranes. External ears, nose atraumatic.   Cardiovascular: Regular rhythm. No leg edema.  Respiratory: Comfortable respirations. Clear to auscultation.  Gastrointestinal: Soft, nondistended, nontender. + bowel sounds.    Neurologic:  -GCS E 4 V 5 M 6  -Alert. Oriented to person, place, and time. Speech fluent. Follows commands.  -Cranial nerves: EOM intact, PERRL, no facial droop, +cough  -Motor: Moves all extremities spontaneously, antigravity  -Sensation: Intact to light  touch    Medications:   Continuous Scheduledacetaminophen, 1,000 mg, Q8H  cloBAZam, 40 mg, QHS  EScitalopram oxalate, 10 mg, Daily  eslicarbazepine, 1,200 mg, Nightly  famotidine, 20 mg, BID  heparin (porcine), 5,000 Units, Q8H  polyethylene glycol, 17 g, Daily  senna-docusate 8.6-50 mg, 1 tablet, Daily  zonisamide, 500 mg, QHS    PRNcalcium carbonate, 1,000 mg, BID PRN  glycerin adult, 1 suppository, Daily PRN  hydrALAZINE, 10 mg, Q4H PRN  HYDROmorphone, 0.5 mg, Q6H PRN  lorazepam, 2 mg, Once PRN  methocarbamoL, 500 mg, QID PRN  ondansetron, 8 mg, Q8H PRN  oxyCODONE, 5 mg, Q6H PRN       Today I independently reviewed pertinent medications, lines/drains/airways, imaging, cardiology results, laboratory results, microbiology results, notably:     ISTAT: No results for input(s): PH, PCO2, PO2, POCSATURATED, HCO3, BE, POCNA, POCK, POCTCO2, POCGLU, POCICA, POCLAC, SAMPLE in the last 24 hours.   Chem:   Recent Labs   Lab 03/07/23  0527      K 3.9      CO2 29   GLU 93   BUN 15   CREATININE 0.8   CALCIUM 9.4   MG 2.0   PHOS 4.1   ANIONGAP 5*   PROT 6.7   ALBUMIN 3.5   BILITOT 0.5   ALKPHOS 113   AST 40   ALT 52*     Heme:   Recent Labs   Lab 03/07/23  0527   WBC 9.36   HGB 13.2*   HCT 41.1        Endo: No results for input(s): POCTGLUCOSE in the last 24 hours.       Assessment/Plan:     Neuro  * Complex partial seizures evolving to generalized tonic-clonic seizures  Declan Burleson is a 58 y.o.M  admitted to [facility] s/p SEEG placement. He has failed multiple AEDs, including topiramate, lamotrigine, levetiracetam, and oxcarbazepine. He is currently taking eslicarbazepine, zonisamide, and clobazam.     -- s/p sEEG  -- NSGY and epilepsy following  -- AED's per epilepsy reccs- weaned off all AEDs on 3/2 per epilepsy   -- Neuro checks q 2hr  -- SBP <160  -- seizure precautions  -- PT/OT/SLP  - cortical stimulation 3/3 w epilepsy- 1x 30 sec seizure  - Restarted on home AEDs per epilepsy team:  Clobazam 40mg  HS  eslicarbazepine 1200mg HS  zonisamide 500mg HS  - To OR tomorrow for removal of sEEG leads, NPO after midnight    Migraine without status migrainosus, not intractable  Tylenol 1g q8h  Prn robaxin and oxy     Psychiatric  Depression with anxiety  Continue home med Escitalopram    The patient is being Prophylaxed for:  Venous Thromboembolism with: Mechanical or Chemical  Stress Ulcer with: Not Applicable   Ventilator Pneumonia with: not applicable    Activity Orders          Diet NPO Except for: Medication, Sips with Medication: NPO starting at 03/08 0001    Turn patient every 2 hours starting at 03/07 0800    Bed rest starting at 03/06 1821    Diet Adult Regular (IDDSI Level 7): Regular starting at 02/27 1103        Full Code     Level III    Diana Jordan NP  Neurocritical Care  [facility]

## 2023-03-07 NOTE — SUBJECTIVE & OBJECTIVE
Interval History: 3/7: seizure overnight, BM overnight, restarting AEDs per epilepsy. Plan for sEEG removal tomorrow.       Medications:  Continuous Infusions:  Scheduled Meds:   acetaminophen  1,000 mg Oral Q8H    cloBAZam  40 mg Oral QHS    EScitalopram oxalate  10 mg Oral Daily    eslicarbazepine  1,200 mg Oral Nightly    famotidine  20 mg Oral BID    heparin (porcine)  5,000 Units Subcutaneous Q8H    polyethylene glycol  17 g Oral Daily    senna-docusate 8.6-50 mg  1 tablet Oral Daily    zonisamide  500 mg Oral QHS     PRN Meds:calcium carbonate, glycerin adult, hydrALAZINE, HYDROmorphone, lorazepam, methocarbamoL, ondansetron, oxyCODONE     Review of Systems   Neurological:  Positive for seizures.   All other systems reviewed and are negative.  Objective:     Weight: 87.5 kg (193 lb)  Body mass index is 28.5 kg/m².  Vital Signs (Most Recent):  Temp: 96.9 °F (36.1 °C) (03/07/23 0701)  Pulse: 78 (03/07/23 1001)  Resp: 18 (03/07/23 1001)  BP: (!) 145/88 (03/07/23 1001)  SpO2: (!) 94 % (03/07/23 1001)   Vital Signs (24h Range):  Temp:  [96.9 °F (36.1 °C)-99 °F (37.2 °C)] 96.9 °F (36.1 °C)  Pulse:  [61-89] 78  Resp:  [12-31] 18  SpO2:  [92 %-97 %] 94 %  BP: (108-157)/() 145/88     Date 03/07/23 0700 - 03/08/23 0659   Shift 0712-0949 5297-6537 3600-4845 24 Hour Total   INTAKE   P.O. 350   350   Shift Total(mL/kg) 350(4)   350(4)   OUTPUT   Urine(mL/kg/hr) 490   490   Shift Total(mL/kg) 490(5.6)   490(5.6)   Weight (kg) 87.5 87.5 87.5 87.5                Resp Rate Total:  [16 br/min-25 br/min] 18 br/min         Physical Exam  Constitutional:       Appearance: He is well-developed and well-nourished.   Eyes:      Extraocular Movements: EOM normal.      Conjunctiva/sclera: Conjunctivae normal.      Pupils: Pupils are equal, round, and reactive to light.   Cardiovascular:      Pulses: Normal pulses.   Abdominal:      Palpations: Abdomen is soft.   Neurological:      Mental Status: He is alert and oriented to  person, place, and time.      Comments: AAOx3, higher order confusion  PERRL  CN intact  BUE 5/5  BLE 5/5  SILT    Headwrap in place with sEEG leads   Psychiatric:         Mood and Affect: Mood and affect normal.       Physical Exam:    Constitutional: He appears well-developed and well-nourished.     Eyes: Pupils are equal, round, and reactive to light. Conjunctivae and EOM are normal.     Cardiovascular: Normal pulses.     Abdominal: Soft.     Psych/Behavior: He is alert. He is oriented to person, place, and time. He has a normal mood and affect.     Neurological:   AAOx3, higher order confusion  PERRL  CN intact  BUE 5/5  BLE 5/5  SILT    Headwrap in place with sEEG leads     Significant Labs:  Recent Labs   Lab 03/06/23  0356 03/07/23  0527    93    137   K 4.5 3.9    103   CO2 29 29   BUN 14 15   CREATININE 0.7 0.8   CALCIUM 9.4 9.4   MG 1.9 2.0       Recent Labs   Lab 03/06/23  0356 03/07/23  0527   WBC 8.25 9.36   HGB 13.8* 13.2*   HCT 42.4 41.1    239       No results for input(s): LABPT, INR, APTT in the last 48 hours.    Microbiology Results (last 7 days)       ** No results found for the last 168 hours. **          All pertinent labs from the last 24 hours have been reviewed.    Significant Diagnostics:  I have reviewed all pertinent imaging results/findings within the past 24 hours.

## 2023-03-07 NOTE — PROCEDURES
SEEG Report      IMPLANTATION DATE:  2/27/2023  DATE OF ADMISSION: 2/27/2023       ADMITTING/REQUESTING PROVIDER: Ruchi Chen MD     REASON FOR CONSULT:  58-year-old man admitted for phase 2 epilepsy surgical workup with the placement of intracranial sEEG leads for seizure capture and onset localization.     METHODOLOGY  Depth electrodes consist of thin wires with electrical contacts at fixed distances along the wire. These are implanted using a stereotactic procedure targeting cortical and subcortical structures. Digital video recording of the patient is simultaneously recorded with the EEG. The patient is instructed to report clinical symptoms which may occur during the recording session. EEG and video recording are stored and archived in digital format. Activation procedures which include photic stimulation, hyperventilation and instructing patients to perform simple tasks are done in selected patients. Compresses spectral analysis (CSA) is performed on the activity recorded from each individual channel. This is displayed as a power display of frequencies from 0 to 30 Hz over time. The CSA analysis is done and displayed continuously. This is reviewed for asymmetries in power between homologous areas of the scalp and for presence of changes in power which can be seen when seizures occur. Sections of suspected abnormalities on the CSA is then compared with the original EEG recording.      The following is a chart outlying the details related to the depth electrodes implanted:       Electrode    Medial target  Lateral target  Cable Color Serial Number # Of Contacts Head Box  Location   1 L Sup margin Piriformis Inf Temp Gyrus Blk/Gr 42506 16 1-16   2 L Entorihinal Entorhinal Inf Temp Gyrus Yel/Scott 00029 12 17-28   3 L Prefrontal Ant Cing Mid Frontal Gyrus Scott/Blk 66765 16 29-44   4 L Ant Insular Ant Insula Frontal Operculum Yel/Gre 80440 10 45-54   5 L Mid Insular Mid Insula Pars Triangularis Yel/Scott 71120 10  55-64   6 L Orbitofrontal Med OrbFr Lat OrbFr Blk/Yel 90308 16 65-80   7 L Mid Cing Mid Cingulate Sup Frontal Gyrus Red/Yel 25347 14 81-94   8 L Hippo Tail Post Hippo Mid Temp Gyrus Red/Gre 22989 14     EKG           109-110        RECORDING TIMES  Start on 3/4/23  Stop on 3/5/23     The total time of intracranial EEG recording for the study was 24 hours     EEG FINDINGS     Interictal:  - Independent spikes LSuMar1-4  - Spikes LSuMar1-14 and LEnCor5-10 often fire concurrently, sometimes with LOrFr1-5  - Independent spikes Lhippo1-2  - Occasional more broad discharges involving LSuMar1-7, Lencor1-9, Lprefr1-5, and LOrFr1-5     Lhippo discharges      LSuMar,LEnCor, and LOrFr discharges      Ictal:  None        Impression:    Abnormal sEEG with interictal epileptiform discharges noted at LSuMar, LEnCor, LOrFr, and independent spikes at Lhipp1-2.  Most frequent discharges are seen in the L superior margin and L hippocampus.  No electrographic or clinical seizures are seen in this portion of the record.

## 2023-03-07 NOTE — ASSESSMENT & PLAN NOTE
Declan Burleson is a 58 y.o.M  admitted to [facility] s/p SEEG placement. He has failed multiple AEDs, including topiramate, lamotrigine, levetiracetam, and oxcarbazepine. He is currently taking eslicarbazepine, zonisamide, and clobazam.     -- s/p sEEG  -- NSGY and epilepsy following  -- AED's per epilepsy reccs- weaned off all AEDs on 3/2 per epilepsy   -- Neuro checks q 2hr  -- SBP <160  -- seizure precautions  -- PT/OT/SLP  - cortical stimulation 3/3 w epilepsy- 1x 30 sec seizure  - Restarted on home AEDs per epilepsy team:  Clobazam 40mg HS  eslicarbazepine 1200mg HS  zonisamide 500mg HS  - To OR tomorrow for removal of sEEG leads, NPO after midnight

## 2023-03-07 NOTE — ASSESSMENT & PLAN NOTE
59 yo M with intractable epilepsy who is now s/p sEEG lead placement for seizure monitoring/mapping    Plan for sEEG removal tomorrow 3/8    - admitted to [facility]  - q1 neurochecks  - CT stealth post op reviewed, sEEG leads in appropriate position, no large volume hemorrhage  - AEDs and seizure management per epilepsy, seizure stimulation per epilepsy  - hyponatremia, ctm  - bowel regiment, last BM 3/6  - voiding spontaneously  - Chest pain, trops negative, likely GI related pain    - PPI, Bowel regimen  - bed rest  - PT/OT, daily stationary bike when fixed   -- Please encourage patient for improved PT activity   - SQH, hold tonight for OR  -- NPO MN    Dispo:  sEEG lead removal 3/8

## 2023-03-07 NOTE — PLAN OF CARE
[facility] Care Plan    POC reviewed with Declan Néstor Burleson and family at 0300. Pt verbalized understanding. Questions and concerns addressed. No acute events overnight. Pt progressing toward goals. Will continue to monitor. See below and flowsheets for full assessment and VS info.     1 seizure overnight. Look at previous note for details.   Robaxin given PRN for pain control    BM overnight.     Neuro:  Ruben Coma Scale  Best Eye Response: 4-->(E4) spontaneous  Best Motor Response: 6-->(M6) obeys commands  Best Verbal Response: 5-->(V5) oriented  Cisco Coma Scale Score: 15  Assessment Qualifiers: patient not sedated/intubated  Pupil PERRLA: yes     24hr Temp:  [98 °F (36.7 °C)-99 °F (37.2 °C)]     CV:   Rhythm: normal sinus rhythm  BP goals:   SBP < 160  MAP > 65    Resp:      Oxygen Concentration (%): 28    Plan: N/A    GI/:     Diet/Nutrition Received: regular  Last Bowel Movement: 03/06/23  Voiding Characteristics: voids spontaneously without difficulty    Intake/Output Summary (Last 24 hours) at 3/7/2023 0541  Last data filed at 3/7/2023 0005  Gross per 24 hour   Intake 784.19 ml   Output 840 ml   Net -55.81 ml     Unmeasured Output  Urine Occurrence: 1  Stool Occurrence: 1  Emesis Occurrence: 0  Pad Count: 1    Labs/Accuchecks:  Recent Labs   Lab 03/06/23  0356   WBC 8.25   RBC 4.43*   HGB 13.8*   HCT 42.4         Recent Labs   Lab 03/06/23  0356      K 4.5   CO2 29      BUN 14   CREATININE 0.7   ALKPHOS 111   ALT 44   AST 38   BILITOT 0.3    No results for input(s): PROTIME, INR, APTT, HEPANTIXA in the last 168 hours.   Recent Labs   Lab 03/01/23  1112   TROPONINI <0.006       Electrolytes: N/A - electrolytes WDL  Accuchecks: none    Gtts:      LDA/Wounds:  Lines/Drains/Airways       Peripheral Intravenous Line  Duration                  Peripheral IV - Single Lumen 03/03/23 0853 20 G Left;Posterior Hand 3 days         Peripheral IV - Single Lumen 03/03/23 2004 22 G  Anterior;Proximal;Right Forearm 3 days

## 2023-03-07 NOTE — ANESTHESIA PREPROCEDURE EVALUATION
[facility]  Anesthesia Pre-Operative Evaluation         Patient Name: Declan Burleson  YOB: 1965  MRN: [id number]    SUBJECTIVE:     Pre-operative evaluation for Procedure(s) (LRB):  REMOVAL OF STEREO EEG LEADS (DEPTH ELECTRODES) (Left)     03/07/2023    Declan Burleson is a 58 y.o. male w/ a significant PMHx of  HTN, intractable epilepsy, Migraines, GERD (controlled), Documented arrhythmias and type 1 heart block admitted with complex partial seizures evolving to tonic clonic seizures.     Patient now presents for the above procedure(s).      LDA:   Peripheral IV 20G Posterior L Hand  Peirpheral IV 22G R Forearm    Prev airway:   Intubation     Date/Time: 2/27/2023 7:29 AM  Performed by: Manan Morin CRNA  Authorized by: Francesco Brandt MD      Intubation:     Induction:  Intravenous    Intubated:  Postinduction    Mask Ventilation:  Easy with oral airway    Attempts:  1    Attempted By:  CRNA    Method of Intubation:  Video laryngoscopy    Blade:  Carpio 3    Laryngeal View Grade: Grade I - full view of cords      Difficult Airway Encountered?: No      Complications:  None    Airway Device:  Oral endotracheal tube    Airway Device Size:  7.5    Style/Cuff Inflation:  Cuffed (inflated to minimal occlusive pressure)    Inflation Amount (mL):  6    Tube secured:  21    Secured at:  The lips    Placement Verified By:  Revisualization with laryngoscopy and Capnometry    Complicating Factors:  Anterior larynx and retrognathia    Findings Post-Intubation:  BS equal bilateral and atraumatic/condition of teeth unchanged    Drips: None documented.    Patient Active Problem List   Diagnosis    Complex partial seizures evolving to generalized tonic-clonic seizures    Migraine without status migrainosus, not intractable    Complex partial epilepsy with generalization and with intractable epilepsy    Hypertension    Facial rash    Neurological deficit, transient     Adjustment disorder with anxious mood    Mild neurocognitive disorder    Snoring    Acid reflux    Former smoker    Edema    Depression with anxiety    Hyponatremia    Arrhythmia    Epilepsy    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    Aphasia determined by examination    Cognitive communication deficit    Dementia in other diseases classified elsewhere, unspecified severity, without behavioral disturbance, psychotic disturbance, mood disturbance, and anxiety    Normocytic anemia       Review of patient's allergies indicates:   Allergen Reactions    Losartan Rash       Current Outpatient Medications:    Current Facility-Administered Medications:     acetaminophen tablet 1,000 mg, 1,000 mg, Oral, Q8H, Magda Weiss NP, 1,000 mg at 03/07/23 0516    calcium carbonate 200 mg calcium (500 mg) chewable tablet 1,000 mg, 1,000 mg, Oral, BID PRN, Magda Weiss NP    [COMPLETED] cloBAZam Tab 20 mg, 20 mg, Oral, QHS, 20 mg at 03/06/23 2154 **FOLLOWED BY** cloBAZam Tab 40 mg, 40 mg, Oral, QHS, Teresa Cerrato PA-C    EScitalopram oxalate tablet 10 mg, 10 mg, Oral, Daily, Magda Weiss NP, 10 mg at 03/07/23 0817    [COMPLETED] eslicarbazepine Tab 600 mg, 600 mg, Oral, Nightly, 600 mg at 03/06/23 2153 **FOLLOWED BY** eslicarbazepine Tab 1,200 mg, 1,200 mg, Oral, Nightly, Teresa Cerrato PA-C    famotidine tablet 20 mg, 20 mg, Oral, BID, Fred Wayne MD, 20 mg at 03/07/23 0817    glycerin adult suppository 1 suppository, 1 suppository, Rectal, Daily PRN, Isaura Burgos PA-C    heparin (porcine) injection 5,000 Units, 5,000 Units, Subcutaneous, Q8H, Isaura Burgos PA-C, 5,000 Units at 03/07/23 0516    hydrALAZINE injection 10 mg, 10 mg, Intravenous, Q4H PRN, Magda Weiss NP    HYDROmorphone injection 0.5 mg, 0.5 mg, Intravenous, Q6H PRN, Isaura Burgos PA-C    LORazepam injection 2 mg, 2 mg, Intravenous, Once PRN, Shin López PA-C    methocarbamoL tablet 500 mg, 500 mg, Oral,  QID PRN, Magda Miinea, NP, 500 mg at 23 0516    ondansetron disintegrating tablet 8 mg, 8 mg, Oral, Q8H PRN, ROMIE Maya-NAMITA    oxyCODONE immediate release tablet 5 mg, 5 mg, Oral, Q6H PRN, Magda Miinea, NP, 5 mg at 23 2218    polyethylene glycol packet 17 g, 17 g, Oral, Daily, ROMIE Maya-NAMITA, 17 g at 23 0817    senna-docusate 8.6-50 mg per tablet 1 tablet, 1 tablet, Oral, Daily, ROMIE Arambula-NAMITA, 1 tablet at 23 0817    zonisamide capsule 500 mg, 500 mg, Oral, QHS, ROMIE Goel-C, 500 mg at 233    Past Surgical History:   Procedure Laterality Date    CYST REMOVAL      skin cyst - benign    PLACEMENT OF STEREO EEG LEADS(DEPTH ELECTRODES) Left 2023    Procedure: PLACEMENT OF STEREO EEG LEADS (DEPTH ELECTRODES);  Surgeon: Ruchi Chen MD;  Location: [facility] OR [address] [place] [address];  Service: Neurosurgery;  Laterality: Left;    REMOVAL OF STEREO EEG LEADS (DEPTH ELECTRODES) Bilateral 2021    Procedure: REMOVAL OF STEREO EEG LEADS (DEPTH ELECTRODES);  Surgeon: Ruchi Chen MD;  Location: [facility] OR [address] [place] [address];  Service: Neurosurgery;  Laterality: Bilateral;    TONSILLECTOMY      teenage        Social History     Socioeconomic History    Marital status:      Spouse name: Reilly    Years of education: 2 year college    Highest education level: Associate degree: occupational, technical, or vocational program   Tobacco Use    Smoking status: Former     Types: Cigarettes     Quit date:      Years since quittin.1    Smokeless tobacco: Never   Substance and Sexual Activity    Alcohol use: Yes     Comment: one drink once a week    Drug use: Never    Sexual activity: Yes     Partners: Female     Social Determinants of Health     Financial Resource Strain: Medium Risk    Difficulty of Paying Living Expenses: Somewhat hard   Food Insecurity: No Food Insecurity    Worried About Running Out of Food in the Last Year: Never true     Ran Out of Food in the Last Year: Never true   Transportation Needs: No Transportation Needs    Lack of Transportation (Medical): No    Lack of Transportation (Non-Medical): No   Physical Activity: Insufficiently Active    Days of Exercise per Week: 2 days    Minutes of Exercise per Session: 40 min   Stress: No Stress Concern Present    Feeling of Stress : Not at all   Social Connections: Unknown    Frequency of Communication with Friends and Family: More than three times a week    Frequency of Social Gatherings with Friends and Family: More than three times a week    Active Member of Clubs or Organizations: No    Attends Club or Organization Meetings: Never    Marital Status:    Housing Stability: Low Risk     Unable to Pay for Housing in the Last Year: No    Number of Places Lived in the Last Year: 1    Unstable Housing in the Last Year: No       OBJECTIVE:     Vital Signs Range (Last 24H):  Temp:  [36.1 °C (96.9 °F)-37.2 °C (99 °F)]   Pulse:  [61-89]   Resp:  [12-31]   BP: (108-157)/()   SpO2:  [92 %-98 %]       Significant Labs:  Lab Results   Component Value Date    WBC 9.36 03/07/2023    HGB 13.2 (L) 03/07/2023    HCT 41.1 03/07/2023     03/07/2023    ALT 52 (H) 03/07/2023    AST 40 03/07/2023     03/07/2023    K 3.9 03/07/2023     03/07/2023    CREATININE 0.8 03/07/2023    BUN 15 03/07/2023    CO2 29 03/07/2023    TSH 1.476 07/22/2021    INR 1.0 02/27/2023    HGBA1C 5.4 05/31/2020       Diagnostic Studies: No relevant studies.    EKG:   Results for orders placed or performed during the hospital encounter of 02/27/23   EKG 12-lead    Collection Time: 03/01/23 12:11 PM    Narrative    Test Reason : R07.89,    Vent. Rate : 084 BPM     Atrial Rate : 084 BPM     P-R Int : 184 ms          QRS Dur : 082 ms      QT Int : 398 ms       P-R-T Axes : 060 040 054 degrees     QTc Int : 470 ms    Normal sinus rhythm  Normal ECG  When compared with ECG of 09-AUG-2022 16:03,  DC  interval has decreased  T wave amplitude has decreased in Lateral leads  QT has lengthened  Confirmed by Piort KEITH MD ([id number]) on 3/1/2023 5:15:51 PM    Referred By: YOBANI CELESTE           Confirmed By:Piotr KEITH MD       2D ECHO:  TTE:  No results found for this or any previous visit.    COSMO:  No results found for this or any previous visit.    ASSESSMENT/PLAN:         Pre-op Assessment    I have reviewed the Patient Summary Reports.     I have reviewed the Nursing Notes. I have reviewed the NPO Status.   I have reviewed the Medications.     Review of Systems  Anesthesia Hx:  No problems with previous Anesthesia  History of prior surgery of interest to airway management or planning: Previous anesthesia: General  Denies Personal Hx of Anesthesia complications.   Cardiovascular:   Hypertension    Pulmonary:  Pulmonary Normal    Renal/:  Renal/ Normal     Hepatic/GI:   GERD    Neurological:   Headaches Seizures    Endocrine:  Endocrine Normal    Psych:   Psychiatric History anxiety          Physical Exam  General: Well nourished, Cooperative, Confusion and Alert    Airway:  Mallampati: II / II  Mouth Opening: Normal  TM Distance: Normal  Tongue: Normal  Neck ROM: Normal ROM    Dental:  Intact        Anesthesia Plan  Type of Anesthesia, risks & benefits discussed:    Anesthesia Type: Gen ETT  Intra-op Monitoring Plan: Standard ASA Monitors  Post Op Pain Control Plan: multimodal analgesia and IV/PO Opioids PRN  Induction:  IV  Airway Plan: Direct and Video, Post-Induction  Informed Consent: Informed consent signed with the Patient and all parties understand the risks and agree with anesthesia plan.  All questions answered. Patient consented to blood products? Yes  ASA Score: 3  Day of Surgery Review of History & Physical: H&P Update referred to the surgeon/provider.    Ready For Surgery From Anesthesia Perspective.     .

## 2023-03-08 ENCOUNTER — ANESTHESIA (OUTPATIENT)
Dept: SURGERY | Facility: HOSPITAL | Age: 58
DRG: 026 | End: 2023-03-08
Payer: MEDICARE

## 2023-03-08 LAB
ALBUMIN SERPL BCP-MCNC: 3.6 G/DL (ref 3.5–5.2)
ALP SERPL-CCNC: 116 U/L (ref 55–135)
ALT SERPL W/O P-5'-P-CCNC: 51 U/L (ref 10–44)
ANION GAP SERPL CALC-SCNC: 9 MMOL/L (ref 8–16)
APTT BLDCRRT: 28.5 SEC (ref 21–32)
AST SERPL-CCNC: 35 U/L (ref 10–40)
BASOPHILS # BLD AUTO: 0.05 K/UL (ref 0–0.2)
BASOPHILS NFR BLD: 0.7 % (ref 0–1.9)
BILIRUB SERPL-MCNC: 0.4 MG/DL (ref 0.1–1)
BUN SERPL-MCNC: 13 MG/DL (ref 6–20)
CALCIUM SERPL-MCNC: 9.3 MG/DL (ref 8.7–10.5)
CHLORIDE SERPL-SCNC: 104 MMOL/L (ref 95–110)
CO2 SERPL-SCNC: 24 MMOL/L (ref 23–29)
CREAT SERPL-MCNC: 0.8 MG/DL (ref 0.5–1.4)
DIFFERENTIAL METHOD: ABNORMAL
EOSINOPHIL # BLD AUTO: 0.9 K/UL (ref 0–0.5)
EOSINOPHIL NFR BLD: 12.3 % (ref 0–8)
ERYTHROCYTE [DISTWIDTH] IN BLOOD BY AUTOMATED COUNT: 12.8 % (ref 11.5–14.5)
EST. GFR  (NO RACE VARIABLE): >60 ML/MIN/1.73 M^2
GLUCOSE SERPL-MCNC: 95 MG/DL (ref 70–110)
HCT VFR BLD AUTO: 42.3 % (ref 40–54)
HGB BLD-MCNC: 13.4 G/DL (ref 14–18)
IMM GRANULOCYTES # BLD AUTO: 0.03 K/UL (ref 0–0.04)
IMM GRANULOCYTES NFR BLD AUTO: 0.4 % (ref 0–0.5)
INR PPP: 1 (ref 0.8–1.2)
LYMPHOCYTES # BLD AUTO: 1.5 K/UL (ref 1–4.8)
LYMPHOCYTES NFR BLD: 19.9 % (ref 18–48)
MAGNESIUM SERPL-MCNC: 2 MG/DL (ref 1.6–2.6)
MCH RBC QN AUTO: 30.5 PG (ref 27–31)
MCHC RBC AUTO-ENTMCNC: 31.7 G/DL (ref 32–36)
MCV RBC AUTO: 96 FL (ref 82–98)
MONOCYTES # BLD AUTO: 0.9 K/UL (ref 0.3–1)
MONOCYTES NFR BLD: 12 % (ref 4–15)
NEUTROPHILS # BLD AUTO: 4.1 K/UL (ref 1.8–7.7)
NEUTROPHILS NFR BLD: 54.7 % (ref 38–73)
NRBC BLD-RTO: 0 /100 WBC
PHOSPHATE SERPL-MCNC: 4.7 MG/DL (ref 2.7–4.5)
PLATELET # BLD AUTO: 242 K/UL (ref 150–450)
PMV BLD AUTO: 9.7 FL (ref 9.2–12.9)
POTASSIUM SERPL-SCNC: 3.7 MMOL/L (ref 3.5–5.1)
PROT SERPL-MCNC: 7.2 G/DL (ref 6–8.4)
PROTHROMBIN TIME: 10.7 SEC (ref 9–12.5)
RBC # BLD AUTO: 4.4 M/UL (ref 4.6–6.2)
SODIUM SERPL-SCNC: 137 MMOL/L (ref 136–145)
WBC # BLD AUTO: 7.47 K/UL (ref 3.9–12.7)

## 2023-03-08 PROCEDURE — 84100 ASSAY OF PHOSPHORUS: CPT | Performed by: PSYCHIATRY & NEUROLOGY

## 2023-03-08 PROCEDURE — 99233 PR SUBSEQUENT HOSPITAL CARE,LEVL III: ICD-10-PCS | Mod: ,,, | Performed by: NURSE PRACTITIONER

## 2023-03-08 PROCEDURE — 25000003 PHARM REV CODE 250: Performed by: STUDENT IN AN ORGANIZED HEALTH CARE EDUCATION/TRAINING PROGRAM

## 2023-03-08 PROCEDURE — 95718 EEG PHYS/QHP 2-12 HR W/VEEG: CPT | Mod: ,,, | Performed by: PSYCHIATRY & NEUROLOGY

## 2023-03-08 PROCEDURE — 99233 SBSQ HOSP IP/OBS HIGH 50: CPT | Mod: ,,, | Performed by: NURSE PRACTITIONER

## 2023-03-08 PROCEDURE — 37000009 HC ANESTHESIA EA ADD 15 MINS: Performed by: NEUROLOGICAL SURGERY

## 2023-03-08 PROCEDURE — D9220A PRA ANESTHESIA: ICD-10-PCS | Mod: CRNA,,, | Performed by: NURSE ANESTHETIST, CERTIFIED REGISTERED

## 2023-03-08 PROCEDURE — D9220A PRA ANESTHESIA: ICD-10-PCS | Mod: ANES,,, | Performed by: ANESTHESIOLOGY

## 2023-03-08 PROCEDURE — 64999 UNLISTED PX NERVOUS SYSTEM: CPT | Mod: ,,, | Performed by: NEUROLOGICAL SURGERY

## 2023-03-08 PROCEDURE — 95718 PR EEG, W/VIDEO, CONT RECORD, I&R, 2-12 HRS: ICD-10-PCS | Mod: ,,, | Performed by: PSYCHIATRY & NEUROLOGY

## 2023-03-08 PROCEDURE — 36000707: Performed by: NEUROLOGICAL SURGERY

## 2023-03-08 PROCEDURE — 64999 PR REMOVAL OF EEG ELECTRODES: ICD-10-PCS | Mod: ,,, | Performed by: NEUROLOGICAL SURGERY

## 2023-03-08 PROCEDURE — 97164 PT RE-EVAL EST PLAN CARE: CPT

## 2023-03-08 PROCEDURE — 99233 PR SUBSEQUENT HOSPITAL CARE,LEVL III: ICD-10-PCS | Mod: FS,,, | Performed by: PHYSICIAN ASSISTANT

## 2023-03-08 PROCEDURE — 37000008 HC ANESTHESIA 1ST 15 MINUTES: Performed by: NEUROLOGICAL SURGERY

## 2023-03-08 PROCEDURE — 25000003 PHARM REV CODE 250: Performed by: PHYSICIAN ASSISTANT

## 2023-03-08 PROCEDURE — 80053 COMPREHEN METABOLIC PANEL: CPT | Performed by: PSYCHIATRY & NEUROLOGY

## 2023-03-08 PROCEDURE — 85025 COMPLETE CBC W/AUTO DIFF WBC: CPT | Performed by: PSYCHIATRY & NEUROLOGY

## 2023-03-08 PROCEDURE — 97530 THERAPEUTIC ACTIVITIES: CPT

## 2023-03-08 PROCEDURE — 27000221 HC OXYGEN, UP TO 24 HOURS

## 2023-03-08 PROCEDURE — 83735 ASSAY OF MAGNESIUM: CPT | Performed by: PSYCHIATRY & NEUROLOGY

## 2023-03-08 PROCEDURE — 85730 THROMBOPLASTIN TIME PARTIAL: CPT | Performed by: STUDENT IN AN ORGANIZED HEALTH CARE EDUCATION/TRAINING PROGRAM

## 2023-03-08 PROCEDURE — 25000003 PHARM REV CODE 250: Performed by: NURSE ANESTHETIST, CERTIFIED REGISTERED

## 2023-03-08 PROCEDURE — 99233 SBSQ HOSP IP/OBS HIGH 50: CPT | Mod: FS,,, | Performed by: PHYSICIAN ASSISTANT

## 2023-03-08 PROCEDURE — 20000000 HC ICU ROOM

## 2023-03-08 PROCEDURE — 36000706: Performed by: NEUROLOGICAL SURGERY

## 2023-03-08 PROCEDURE — 25000003 PHARM REV CODE 250

## 2023-03-08 PROCEDURE — D9220A PRA ANESTHESIA: Mod: ANES,,, | Performed by: ANESTHESIOLOGY

## 2023-03-08 PROCEDURE — 25000003 PHARM REV CODE 250: Performed by: NEUROLOGICAL SURGERY

## 2023-03-08 PROCEDURE — 94761 N-INVAS EAR/PLS OXIMETRY MLT: CPT

## 2023-03-08 PROCEDURE — 25000003 PHARM REV CODE 250: Performed by: NURSE PRACTITIONER

## 2023-03-08 PROCEDURE — 99233 PR SUBSEQUENT HOSPITAL CARE,LEVL III: ICD-10-PCS | Mod: ,,, | Performed by: PSYCHIATRY & NEUROLOGY

## 2023-03-08 PROCEDURE — 97110 THERAPEUTIC EXERCISES: CPT

## 2023-03-08 PROCEDURE — D9220A PRA ANESTHESIA: Mod: CRNA,,, | Performed by: NURSE ANESTHETIST, CERTIFIED REGISTERED

## 2023-03-08 PROCEDURE — 99900035 HC TECH TIME PER 15 MIN (STAT)

## 2023-03-08 PROCEDURE — 25000003 PHARM REV CODE 250: Performed by: PSYCHIATRY & NEUROLOGY

## 2023-03-08 PROCEDURE — 99233 SBSQ HOSP IP/OBS HIGH 50: CPT | Mod: ,,, | Performed by: PSYCHIATRY & NEUROLOGY

## 2023-03-08 PROCEDURE — 63600175 PHARM REV CODE 636 W HCPCS: Performed by: NURSE ANESTHETIST, CERTIFIED REGISTERED

## 2023-03-08 PROCEDURE — 63600175 PHARM REV CODE 636 W HCPCS: Performed by: STUDENT IN AN ORGANIZED HEALTH CARE EDUCATION/TRAINING PROGRAM

## 2023-03-08 PROCEDURE — 85610 PROTHROMBIN TIME: CPT | Performed by: STUDENT IN AN ORGANIZED HEALTH CARE EDUCATION/TRAINING PROGRAM

## 2023-03-08 RX ORDER — LIDOCAINE HYDROCHLORIDE 20 MG/ML
INJECTION, SOLUTION EPIDURAL; INFILTRATION; INTRACAUDAL; PERINEURAL
Status: DISCONTINUED | OUTPATIENT
Start: 2023-03-08 | End: 2023-03-08

## 2023-03-08 RX ORDER — CEFAZOLIN SODIUM 1 G/3ML
INJECTION, POWDER, FOR SOLUTION INTRAMUSCULAR; INTRAVENOUS
Status: DISCONTINUED | OUTPATIENT
Start: 2023-03-08 | End: 2023-03-08

## 2023-03-08 RX ORDER — EPHEDRINE SULFATE 50 MG/ML
INJECTION, SOLUTION INTRAVENOUS
Status: DISCONTINUED | OUTPATIENT
Start: 2023-03-08 | End: 2023-03-08

## 2023-03-08 RX ORDER — MIDAZOLAM HYDROCHLORIDE 1 MG/ML
INJECTION, SOLUTION INTRAMUSCULAR; INTRAVENOUS
Status: DISCONTINUED | OUTPATIENT
Start: 2023-03-08 | End: 2023-03-08

## 2023-03-08 RX ORDER — PROPOFOL 10 MG/ML
VIAL (ML) INTRAVENOUS
Status: DISCONTINUED | OUTPATIENT
Start: 2023-03-08 | End: 2023-03-08

## 2023-03-08 RX ORDER — FENTANYL CITRATE 50 UG/ML
INJECTION, SOLUTION INTRAMUSCULAR; INTRAVENOUS
Status: DISCONTINUED | OUTPATIENT
Start: 2023-03-08 | End: 2023-03-08

## 2023-03-08 RX ORDER — BACITRACIN ZINC 500 UNIT/G
OINTMENT (GRAM) TOPICAL
Status: DISCONTINUED | OUTPATIENT
Start: 2023-03-08 | End: 2023-03-08 | Stop reason: HOSPADM

## 2023-03-08 RX ORDER — DEXAMETHASONE SODIUM PHOSPHATE 4 MG/ML
INJECTION, SOLUTION INTRA-ARTICULAR; INTRALESIONAL; INTRAMUSCULAR; INTRAVENOUS; SOFT TISSUE
Status: DISCONTINUED | OUTPATIENT
Start: 2023-03-08 | End: 2023-03-08

## 2023-03-08 RX ORDER — ONDANSETRON 2 MG/ML
INJECTION INTRAMUSCULAR; INTRAVENOUS
Status: DISCONTINUED | OUTPATIENT
Start: 2023-03-08 | End: 2023-03-08

## 2023-03-08 RX ADMIN — LIDOCAINE HYDROCHLORIDE 100 MG: 20 INJECTION, SOLUTION EPIDURAL; INFILTRATION; INTRACAUDAL; PERINEURAL at 12:03

## 2023-03-08 RX ADMIN — MIDAZOLAM HYDROCHLORIDE 1 MG: 1 INJECTION, SOLUTION INTRAMUSCULAR; INTRAVENOUS at 12:03

## 2023-03-08 RX ADMIN — GABAPENTIN 300 MG: 300 CAPSULE ORAL at 08:03

## 2023-03-08 RX ADMIN — ACETAMINOPHEN 1000 MG: 500 TABLET ORAL at 03:03

## 2023-03-08 RX ADMIN — FAMOTIDINE 20 MG: 20 TABLET ORAL at 09:03

## 2023-03-08 RX ADMIN — DEXAMETHASONE SODIUM PHOSPHATE 4 MG: 4 INJECTION, SOLUTION INTRAMUSCULAR; INTRAVENOUS at 12:03

## 2023-03-08 RX ADMIN — CEFAZOLIN 2 G: 1 INJECTION, POWDER, FOR SOLUTION INTRAMUSCULAR; INTRAVENOUS at 12:03

## 2023-03-08 RX ADMIN — ONDANSETRON 4 MG: 2 INJECTION INTRAMUSCULAR; INTRAVENOUS at 12:03

## 2023-03-08 RX ADMIN — SODIUM CHLORIDE: 9 INJECTION, SOLUTION INTRAVENOUS at 12:03

## 2023-03-08 RX ADMIN — FAMOTIDINE 20 MG: 20 TABLET ORAL at 08:03

## 2023-03-08 RX ADMIN — CEFTRIAXONE 2 G: 2 INJECTION, POWDER, FOR SOLUTION INTRAMUSCULAR; INTRAVENOUS at 02:03

## 2023-03-08 RX ADMIN — ACETAMINOPHEN 1000 MG: 500 TABLET ORAL at 09:03

## 2023-03-08 RX ADMIN — CLOBAZAM 40 MG: 10 TABLET ORAL at 09:03

## 2023-03-08 RX ADMIN — ESCITALOPRAM OXALATE 10 MG: 10 TABLET ORAL at 08:03

## 2023-03-08 RX ADMIN — ACETAMINOPHEN 1000 MG: 500 TABLET ORAL at 06:03

## 2023-03-08 RX ADMIN — OXYCODONE 5 MG: 5 TABLET ORAL at 02:03

## 2023-03-08 RX ADMIN — EPHEDRINE SULFATE 10 MG: 50 INJECTION INTRAVENOUS at 12:03

## 2023-03-08 RX ADMIN — ESLICARBAZEPINE ACETATE 1200 MG: 200 TABLET ORAL at 09:03

## 2023-03-08 RX ADMIN — GABAPENTIN 300 MG: 300 CAPSULE ORAL at 09:03

## 2023-03-08 RX ADMIN — FENTANYL CITRATE 100 MCG: 50 INJECTION, SOLUTION INTRAMUSCULAR; INTRAVENOUS at 12:03

## 2023-03-08 RX ADMIN — GABAPENTIN 300 MG: 300 CAPSULE ORAL at 02:03

## 2023-03-08 RX ADMIN — PROPOFOL 150 MG: 10 INJECTION, EMULSION INTRAVENOUS at 12:03

## 2023-03-08 RX ADMIN — METHOCARBAMOL 500 MG: 500 TABLET ORAL at 06:03

## 2023-03-08 RX ADMIN — ZONISAMIDE 500 MG: 50 CAPSULE ORAL at 09:03

## 2023-03-08 RX ADMIN — SUGAMMADEX 200 MG: 100 INJECTION, SOLUTION INTRAVENOUS at 01:03

## 2023-03-08 NOTE — PROGRESS NOTES
"[facility]  Neurocritical Care  Progress Note    Admit Date: 2/27/2023  Service Date: 03/08/2023  Length of Stay: 9    Subjective:     Chief Complaint: Complex partial seizures evolving to generalized tonic-clonic seizures    History of Present Illness: Declan Burleson is a 58 y.o.M  admitted to [facility] s/p SEEG placement. Per chart review he was referred by Dr. Benoit (originally by Dr. Saldana of [place]) for consideration of surgical therapy for intractable epilepsy. The patient was formerly employed as a . He is accompanied by his wife Trista, who helps to provide the history. His seizures began when he was 48 years old. He can recall no inciting event. He has 3 semiologies: "full blown" generalized events, staring episodes, and "mild" seizures, which are characterized as generalized events of shorter duration without incontinence and a shorter post-ictal period. He and his wife estimate that he persists in having about 2 events/month. He has failed multiple AEDs, including topiramate, lamotrigine, levetiracetam, and oxcarbazepine. He is currently taking eslicarbazepine, zonisamide, and clobazam. He had EMU monitoring in May-June of this year, which suggests left-sided activity. NSGY and epilepsy following. Patient admitted to [facility] for close monitoring and higher level of care.       Hospital Course: 3/5/2023: NAEON, patient complains of L side headache, L eye pressure (no visual disturbance) PRN pain available  3/5/2023: complained of chest pain- troponin neg, EKG stable, AED's per Epilepsy  03/05/2023 Increased senna, added daily suppository. Weaned off of AEDs for this afternoon per epilepsy.  03/05/2023 No seizures overnight. BM this AM. PT/OT today.  03/05/2023 BM this am. Decreased senna and dc suppository.   03/05/2023 NAEON. Family and epilepsy agreed not to do sleep deprivation last night. 3 minute seizure today, given 2mg ativan. Post ictal. Pt had another 2 min " seizure around noon, no ativan given, post ictal now.  03/06/2023 2 min seizure overnight. 1 seizure this morning, post ictal when seen this AM. Given aptiom 600 and onfi 20 per epilepsy. Doses for tomorrow will be decided after review by epilepsy.  03/07/2023: Home AEDs restarted. Plans for sEEG removal tomorrow. NPO after midnight.   3/8/2023: NAOEN, to OR today w/ NSGY for sEEG removal          Review of Systems  Constitutional: Denies fevers, weight loss, chills, or weakness.  Eyes: Denies changes in vision.  ENT: Denies dysphagia, nasal discharge, ear pain or discharge.  Cardiovascular: Denies chest pain, palpitations, orthopnea, or claudication.  Respiratory: Denies shortness of breath, cough, hemoptysis, or wheezing.  GI: Denies nausea/vomitting, hematochezia, melena, abd pain, or changes in appetite.  : Denies dysuria, incontinence, or hematuria.  Musculoskeletal: Denies joint pain or myalgias.  Skin/breast: Denies rashes, lumps, lesions, or discharge.  Neurologic: Denies headache, dizziness, vertigo, or paresthesias.  Psychiatric: Denies changes in mood or hallucinations.  Endocrine: Denies polyuria, polydipsia, heat/cold intolerance.  Hematologic/Lymph: Denies lymphadenopathy, easy bruising or easy bleeding.  Allergic/Immunologic: Denies rash, rhinitis.    Objective:     Vitals:  Temp: 97.6 °F (36.4 °C)  Pulse: 77  Rhythm: normal sinus rhythm  BP: (!) 155/96  MAP (mmHg): 121  Resp: 16  SpO2: (!) 93 %    Temp  Min: 97.6 °F (36.4 °C)  Max: 98.6 °F (37 °C)  Pulse  Min: 55  Max: 82  BP  Min: 121/66  Max: 163/82  MAP (mmHg)  Min: 88  Max: 123  Resp  Min: 12  Max: 24  SpO2  Min: 92 %  Max: 99 %    03/07 0701 - 03/08 0700  In: 550 [P.O.:550]  Out: 1990 [Urine:1990]   Unmeasured Output  Urine Occurrence: 1  Stool Occurrence: 1  Emesis Occurrence: 0  Pad Count: 1       Physical Exam  GA: Alert, comfortable, no acute distress.   HEENT: No scleral icterus or JVD.   Pulmonary: Clear to auscultation A/L.   Cardiac:  RRR S1 & S2 w/o rubs/murmurs/gallops.   Abdominal: Bowel sounds present x 4. No appreciable hepatosplenomegaly.  Skin: No jaundice, rashes, or visible lesions.  Neuro:  --GCS: E4 V5 M6  --Mental Status:  awake, oriented X4, follows commands, fluent speech  --CN II-XII grossly intact.   --Pupils 3mm, PERRL.   --Corneal reflex, gag, cough intact.  --MOTTA spont    Medications:  Continuous Scheduledacetaminophen, 1,000 mg, Q8H  cefTRIAXone (ROCEPHIN) IVPB, 2 g, Q12H  cloBAZam, 40 mg, QHS  EScitalopram oxalate, 10 mg, Daily  eslicarbazepine, 1,200 mg, Nightly  famotidine, 20 mg, BID  gabapentin, 300 mg, TID  polyethylene glycol, 17 g, Daily  senna-docusate 8.6-50 mg, 1 tablet, Daily  zonisamide, 500 mg, QHS    PRNcalcium carbonate, 1,000 mg, BID PRN  glycerin adult, 1 suppository, Daily PRN  hydrALAZINE, 10 mg, Q4H PRN  HYDROmorphone, 0.5 mg, Q6H PRN  lorazepam, 2 mg, Once PRN  melatonin, 9 mg, Nightly PRN  methocarbamoL, 500 mg, QID PRN  ondansetron, 8 mg, Q8H PRN  oxyCODONE, 5 mg, Q6H PRN  promethazine, 12.5 mg, Q6H PRN      Today I personally reviewed pertinent medications, lines/drains/airways, imaging, cardiology results, laboratory results, microbiology results,  Diet  Diet Adult Regular (IDDSI Level 7)        Assessment/Plan:     Neuro  * Complex partial seizures evolving to generalized tonic-clonic seizures  Declan Néstor Burleson is a 58 y.o.M  admitted to [facility] s/p SEEG placement. He has failed multiple AEDs, including topiramate, lamotrigine, levetiracetam, and oxcarbazepine. He is currently taking eslicarbazepine, zonisamide, and clobazam.     -- s/p sEEG  -- NSGY and epilepsy following  -- AED's per epilepsy reccs- weaned off all AEDs on 3/2 per epilepsy   -- Neuro checks q 2hr  -- SBP <160  -- seizure precautions  -- PT/OT/SLP  - cortical stimulation 3/3 w epilepsy- 1x 30 sec seizure  - Restarted on home AEDs per epilepsy team:  Clobazam 40mg HS  eslicarbazepine 1200mg HS  zonisamide 500mg HS   3/8/2023: to OR  today for sEEG leads removal w/ NSGY team       Migraine without status migrainosus, not intractable  Tylenol 1g q8h  Prn robaxin and oxy     Psychiatric  Depression with anxiety  Continue home med Escitalopram          The patient is being Prophylaxed for:  Venous Thromboembolism with: Mechanical  Stress Ulcer with: H2B  Ventilator Pneumonia with: not applicable    Activity Orders          Diet Adult Regular (IDDSI Level 7): Regular starting at 03/08 1408    Turn patient every 2 hours starting at 03/07 0800        Full Code    Magda Weiss NP  Neurocritical Care  [facility] - Neuro Critical Care

## 2023-03-08 NOTE — BRIEF OP NOTE
[facility]  Brief Operative Note    SUMMARY     Surgery Date: 3/8/2023     Surgeon(s) and Role:     * Ruchi Chen MD - Primary     * Ashley Clements MD - Resident - Assisting        Pre-op Diagnosis:  Complex partial seizures evolving to generalized tonic-clonic seizures [G40.209]    Post-op Diagnosis:  Post-Op Diagnosis Codes:     * Complex partial seizures evolving to generalized tonic-clonic seizures [G40.209]    Procedure(s) (LRB):  REMOVAL OF STEREO EEG LEADS (DEPTH ELECTRODES) (Left)    Anesthesia: General    Operative Findings: removal of all sEEG leads    Estimated Blood Loss: * No values recorded between 3/8/2023 12:56 PM and 3/8/2023  1:25 PM *    Estimated Blood Loss has been documented.         Specimens:   Specimen (24h ago, onward)      None            [id number]

## 2023-03-08 NOTE — ASSESSMENT & PLAN NOTE
Declan Burleson is a 58 y.o.M  admitted to [facility] s/p SEEG placement. He has failed multiple AEDs, including topiramate, lamotrigine, levetiracetam, and oxcarbazepine. He is currently taking eslicarbazepine, zonisamide, and clobazam.     -- s/p sEEG  -- NSGY and epilepsy following  -- AED's per epilepsy reccs- weaned off all AEDs on 3/2 per epilepsy   -- Neuro checks q 2hr  -- SBP <160  -- seizure precautions  -- PT/OT/SLP  - cortical stimulation 3/3 w epilepsy- 1x 30 sec seizure  - Restarted on home AEDs per epilepsy team:  Clobazam 40mg HS  eslicarbazepine 1200mg HS  zonisamide 500mg HS   3/8/2023: to OR today for sEEG leads removal w/ NSGY team

## 2023-03-08 NOTE — PLAN OF CARE
[facility] Care Plan    POC reviewed with Declan Burleson and family at 0300. Pt verbalized understanding. Questions and concerns addressed. No acute events overnight. Pt progressing toward goals. Will continue to monitor. See below and flowsheets for full assessment and VS info.     No seizures overnight  EEG extract planned for today. Has been NPO but meds since midnight.     CHG bath done    Neuro:  [place] Coma Scale  Best Eye Response: 4-->(E4) spontaneous  Best Motor Response: 6-->(M6) obeys commands  Best Verbal Response: 5-->(V5) oriented  [place] Coma Scale Score: 15  Assessment Qualifiers: patient not sedated/intubated  Pupil PERRLA: yes     24hr Temp:  [96.9 °F (36.1 °C)-98.6 °F (37 °C)]     CV:   Rhythm: normal sinus rhythm  BP goals:   SBP < 160  MAP > 65    Resp:      Oxygen Concentration (%): 28    Plan: N/A    GI/:     Diet/Nutrition Received: regular  Last Bowel Movement: 03/07/23  Voiding Characteristics: voids spontaneously without difficulty    Intake/Output Summary (Last 24 hours) at 3/8/2023 0530  Last data filed at 3/7/2023 2005  Gross per 24 hour   Intake 550 ml   Output 1490 ml   Net -940 ml     Unmeasured Output  Urine Occurrence: 1  Stool Occurrence: 1  Emesis Occurrence: 0  Pad Count: 1    Labs/Accuchecks:  Recent Labs   Lab 03/07/23  0527   WBC 9.36   RBC 4.27*   HGB 13.2*   HCT 41.1         Recent Labs   Lab 03/07/23  0527      K 3.9   CO2 29      BUN 15   CREATININE 0.8   ALKPHOS 113   ALT 52*   AST 40   BILITOT 0.5    No results for input(s): PROTIME, INR, APTT, HEPANTIXA in the last 168 hours.   Recent Labs   Lab 03/01/23  1112   TROPONINI <0.006       Electrolytes: N/A - electrolytes WDL  Accuchecks: none    Gtts:      LDA/Wounds:  Lines/Drains/Airways       Peripheral Intravenous Line  Duration                  Peripheral IV - Single Lumen 03/07/23 1110 20 G Anterior;Right Forearm <1 day         Peripheral IV - Single Lumen 03/07/23 1112 18 G Left;Posterior  Forearm <1 day

## 2023-03-08 NOTE — SUBJECTIVE & OBJECTIVE
Interval History: 3/8: NAEON, NPO MN, hep held, OR for sEEG removal today         Medications:  Continuous Infusions:  Scheduled Meds:   acetaminophen  1,000 mg Oral Q8H    cloBAZam  40 mg Oral QHS    EScitalopram oxalate  10 mg Oral Daily    eslicarbazepine  1,200 mg Oral Nightly    famotidine  20 mg Oral BID    gabapentin  300 mg Oral TID    polyethylene glycol  17 g Oral Daily    senna-docusate 8.6-50 mg  1 tablet Oral Daily    zonisamide  500 mg Oral QHS     PRN Meds:calcium carbonate, glycerin adult, hydrALAZINE, HYDROmorphone, lorazepam, melatonin, methocarbamoL, ondansetron, oxyCODONE, promethazine     Review of Systems   Neurological:  Positive for seizures.   All other systems reviewed and are negative.  Objective:     Weight: 87.5 kg (193 lb)  Body mass index is 28.5 kg/m².  Vital Signs (Most Recent):  Temp: 97.8 °F (36.6 °C) (03/08/23 1105)  Pulse: 64 (03/08/23 1105)  Resp: 18 (03/08/23 1105)  BP: (!) 155/92 (03/08/23 1105)  SpO2: (!) 93 % (03/08/23 1105)   Vital Signs (24h Range):  Temp:  [97.6 °F (36.4 °C)-98.6 °F (37 °C)] 97.8 °F (36.6 °C)  Pulse:  [55-82] 64  Resp:  [14-24] 18  SpO2:  [92 %-97 %] 93 %  BP: (121-163)/(66-99) 155/92     Date 03/08/23 0700 - 03/09/23 0659   Shift 5475-9012 7991-3935 0754-4832 24 Hour Total   INTAKE   P.O. 50   50   Shift Total(mL/kg) 50(0.6)   50(0.6)   OUTPUT   Shift Total(mL/kg)       Weight (kg) 87.5 87.5 87.5 87.5                Resp Rate Total:  [18 br/min-23 br/min] 23 br/min         Physical Exam  Constitutional:       Appearance: He is well-developed and well-nourished.   Eyes:      Extraocular Movements: EOM normal.      Conjunctiva/sclera: Conjunctivae normal.      Pupils: Pupils are equal, round, and reactive to light.   Cardiovascular:      Pulses: Normal pulses.   Abdominal:      Palpations: Abdomen is soft.   Neurological:      Mental Status: He is alert and oriented to person, place, and time.      Comments: AAOx3, higher order confusion  PERRL  CN  intact  BUE 5/5  BLE 5/5  SILT    Headwrap in place with sEEG leads   Psychiatric:         Mood and Affect: Mood and affect normal.       Physical Exam:    Constitutional: He appears well-developed and well-nourished.     Eyes: Pupils are equal, round, and reactive to light. Conjunctivae and EOM are normal.     Cardiovascular: Normal pulses.     Abdominal: Soft.     Psych/Behavior: He is alert. He is oriented to person, place, and time. He has a normal mood and affect.     Neurological:   AAOx3, higher order confusion  PERRL  CN intact  BUE 5/5  BLE 5/5  SILT    Headwrap in place with sEEG leads     Significant Labs:  Recent Labs   Lab 03/07/23  0527 03/08/23  0609   GLU 93 95    137   K 3.9 3.7    104   CO2 29 24   BUN 15 13   CREATININE 0.8 0.8   CALCIUM 9.4 9.3   MG 2.0 2.0       Recent Labs   Lab 03/07/23  0527 03/08/23  0609   WBC 9.36 7.47   HGB 13.2* 13.4*   HCT 41.1 42.3    242       Recent Labs   Lab 03/08/23  0609   INR 1.0   APTT 28.5       Microbiology Results (last 7 days)       ** No results found for the last 168 hours. **          All pertinent labs from the last 24 hours have been reviewed.    Significant Diagnostics:  I have reviewed all pertinent imaging results/findings within the past 24 hours.

## 2023-03-08 NOTE — PLAN OF CARE
PT re-eval complete, additional goal added    Problem: Physical Therapy  Goal: Physical Therapy Goal  Description: Goals to be met by: 3/10/23     Patient will increase functional independence with mobility by performin. Maintain strength and ROM WFL grossly- MET  2. Participate in OOB assessment once sEEG removed- MET  3. Ambulate 100' w/ mod-I using LRAD  Outcome: Ongoing, Progressing

## 2023-03-08 NOTE — OP NOTE
[facility]  Neurosurgery  Operative Note    SUMMARY      Date of Procedure: 3/8/2023     Procedure: Procedure(s) (LRB):  REMOVAL OF STEREO EEG LEADS (DEPTH ELECTRODES) (Left)     Surgeon(s) and Role:     * Ruchi Chen MD - Primary     * Ashley Clements MD - Resident - Assisting        Pre-Operative Diagnosis: Complex partial seizures evolving to generalized tonic-clonic seizures [G40.209]    Post-Operative Diagnosis: Post-Op Diagnosis Codes:     * Complex partial seizures evolving to generalized tonic-clonic seizures [G40.209]    Anesthesia: General    Technical Procedure Performed: Removal of left sided sEEG leads and fiducial screws     Indications:   Declan Burleson is a 58 y.o. male who previously underwent left-sided sEEG placement. He was deemed appropriate for removal by the interdisciplinary epilepsy conference. Risks, benefits, and alternatives were reviewed with the patient and his family, and informed consent was obtained.      Description of the Procedure:   The patient was brought to the operating room, and general anesthesia was induced by the anesthesia team. He was carefully positioned with all pressure points padded. SCDs were applied. The patient received two grams of Ancef for prophylaxis before being prepped and draped in the usual sterile fashion. Anesthesia was instructed to keep SBP <140 and that [id number] is upregulated due to chronic AED use.      A time-out was performed. All electrodes were irrigated with rocephin-containing irrigation. Attention was then turned to each of the electrodes in turn. The cap was unscrewed from the anterior-most bone anchor bolt, and the electrode carefully withdrawn, taking care to remove it slowly and ensure the entire piece was intact. The anchor bolt was then removed using the appropriate bolt . After more irrigation, a 3.0 Nylon suture was used to fashion a figure-of-eight stitch.      This exact procedure was  repeated another 7 times, though Dermabond was used to close the 3 incisions in front of the hairline (rather than nylon: 5 nylons and three Dermabond incisions total). All five fiducial screws were removed and staples placed in the scalp.       All electrodes were counted at the end of the procedure and found to be correct in count and in continuity. A sterile dressing of bacitracin ointment and Kerlix head wrap was applied. All counts were correct x2. Antibiotic-containing solution was used throughout the procedure.      The patient was then awakened by the anesthesia team and returned to the neuro ICU via CT in satisfactory condition.     Complications: No     Estimated Blood Loss (EBL): nil            Specimens:   Specimen (24h ago, onward)      None             Implants: * No implants in log *           Condition: Stable    Disposition: ICU - extubated and stable.    Attestation: I was present and scrubbed for the entire procedure.

## 2023-03-08 NOTE — PT/OT/SLP PROGRESS
"Occupational Therapy   Treatment    Name: Declan Burleson  MRN: [id number]  Admitting Diagnosis:  Complex partial seizures evolving to generalized tonic-clonic seizures  9 Days Post-Op    Recommendations:     Discharge Recommendations:  (pending OOB activity once sEEG removed)  Discharge Equipment Recommendations:  none  Barriers to discharge:  None    Assessment:     Declan Burleson is a 58 y.o. male with a medical diagnosis of Complex partial seizures evolving to generalized tonic-clonic seizures.  He presents with performance deficits affecting function are weakness, impaired endurance, impaired self care skills, impaired functional mobility, impaired balance, impaired cognition.     Rehab Prognosis:  Good; patient would benefit from acute skilled OT services to address these deficits and reach maximum level of function.       Plan:     Patient to be seen 3 x/week to address the above listed problems via sensory integration, cognitive retraining, neuromuscular re-education, therapeutic exercises, therapeutic activities, self-care/home management  Plan of Care Expires: 03/28/23  Plan of Care Reviewed with: patient, spouse    Subjective   Patient:  "This is tiring."  WIfe:  "He is getting the electrodes off today."  Pain/Comfort:  Pain Rating 1: 0/10  Pain Rating Post-Intervention 1: 0/10    Objective:     Communicated with: Nurse prior to session.  Patient found supine with peripheral IV, bed alarm, SCD, telemetry, blood pressure cuff, pulse ox (continuous) (sEEG) upon OT entry to room.    General Precautions: Standard, aspiration, fall, seizure    Orthopedic Precautions:N/A  Braces: N/A  Respiratory Status: Room air     Occupational Performance:     AMPAC 6 Click ADL: 18    Treatment & Education:  Patient education provided on role of OT.  1 set x 10 rep of 3# dowel exercises performed for B UE strengthening.   Patient alert and oriented x 3; following all commands.  Able to sequence 7/7 days of the " week and 10/12 months of the year.  Able to identify 4 fruits and 4 vegetables each within 30 second time frame.  Continued education, patient/ family training recommended.      Patient left supine with all lines intact, call button in reach, and bed alarm on    GOALS:   Multidisciplinary Problems       Occupational Therapy Goals          Problem: Occupational Therapy    Goal Priority Disciplines Outcome Interventions   Occupational Therapy Goal     OT, PT/OT Ongoing, Progressing    Description: Goals set 2/28 to be addressed for 14 days with expiration date, 3/15:  Patient will increase functional independence with ADLs by performing:    Patient will demonstrate rolling to the right with modified independence.  Not met   Patient will demonstrate rolling to the left with modified independence.   Not met  Patient will demonstrate supine -sit with modified independence.   Not met  Patient will demonstrate stand pivot transfers with supervision.   Not met  Patient will demonstrate grooming while standing with supervision.   Not met  Patient will demonstrate upper body dressing with supervision while seated EOB.   Not met  Patient will demonstrate lower body dressing with supervision while seated EOB.   Not met  Patient will demonstrate toileting with supervision.   Not met  Patient will demonstrate bathing while seated EOB with supervision.   Not met  Patient's family / caregiver will demonstrate independence and safety with assisting patient with self-care skills and functional mobility.     Not met  Patient's family / caregiver will demonstrate independence with providing ROM and changes in bed positioning.   Not met                           Time Tracking:     OT Date of Treatment: 03/08/23  OT Start Time: 0845  OT Stop Time: 0910  OT Total Time (min): 25 min    Billable Minutes:Therapeutic Exercise 25    OT/BENITEZ: OT          3/8/2023

## 2023-03-08 NOTE — PLAN OF CARE
[facility] Care Plan    POC reviewed with Declan Burleson and family at 1400. Pt verbalized understanding. Questions and concerns addressed. No acute events today. Pt progressing toward goals. Will continue to monitor. See below and flowsheets for full assessment and VS info.     - surgery to remove sEEG  - OOB to chair postop  - pain medication for HA/incisional pain        Is this a stroke patient? no    Neuro:  [place] Coma Scale  Best Eye Response: 4-->(E4) spontaneous  Best Motor Response: 6-->(M6) obeys commands  Best Verbal Response: 5-->(V5) oriented  [place] Coma Scale Score: 15  Assessment Qualifiers: patient not sedated/intubated  Pupil PERRLA: yes     24 hr Temp:  [97.6 °F (36.4 °C)-98.6 °F (37 °C)]     CV:   Rhythm: normal sinus rhythm  BP goals:   SBP < 160  MAP > 65    Resp:      Oxygen Concentration (%): 28    Plan: N/A    GI/:     Diet/Nutrition Received: regular  Last Bowel Movement: 03/07/23  Voiding Characteristics: voids spontaneously without difficulty    Intake/Output Summary (Last 24 hours) at 3/8/2023 1710  Last data filed at 3/8/2023 1310  Gross per 24 hour   Intake 850 ml   Output 1200 ml   Net -350 ml     Unmeasured Output  Urine Occurrence: 1  Stool Occurrence: 1  Emesis Occurrence: 0  Pad Count: 1    Labs/Accuchecks:  Recent Labs   Lab 03/08/23  0609   WBC 7.47   RBC 4.40*   HGB 13.4*   HCT 42.3         Recent Labs   Lab 03/08/23  0609      K 3.7   CO2 24      BUN 13   CREATININE 0.8   ALKPHOS 116   ALT 51*   AST 35   BILITOT 0.4      Recent Labs   Lab 03/08/23  0609   INR 1.0   APTT 28.5    No results for input(s): CPK, CPKMB, TROPONINI, MB in the last 168 hours.    Electrolytes: N/A - electrolytes WDL  Accuchecks: none    Gtts:      LDA/Wounds:  Lines/Drains/Airways       Peripheral Intravenous Line  Duration                  Peripheral IV - Single Lumen 03/07/23 1110 20 G Anterior;Right Forearm 1 day         Peripheral IV - Single Lumen 03/07/23 1112 20 G  Left;Posterior Forearm 1 day                  Wounds: No  Wound care consulted: No

## 2023-03-08 NOTE — PROGRESS NOTES
[facility]  Neurology-Epilepsy  Progress Note    Patient Name: Declan Burleson  MRN: [id number]  Admission Date: 2/27/2023  Hospital Length of Stay: 9 days  Code Status: Full Code   Attending Provider: Fred Wayne MD  Primary Care Physician: Juan Carlos Simmons NP   Principal Problem:Complex partial seizures evolving to generalized tonic-clonic seizures    Subjective:     Hospital Course:   2/27>2/28: Clobazam decreased to 20 mg qHS, Eslicarbazepine decreased to 400 mg qHS, and Zonisamide decreased to 200 mg qHS on admission. No seizures noted overnight. Beginning 2/28 pm, hold Clobazam, decrease Zonisamide further to 100 mg qHS. Continue Eslicarbazepine 400 mg nightly.   2/28>3/1: No seizures overnight, EEG normal. Hold Zonisamide beginning 3/1 pm.   3/1>3/2: No seizures overnight. Event 3/2 approximately 1035 of sudden eye opening/fluttering, confusion. Hold Eslicarbazepine beginning 3/2 pm.   3/2>3/3: No clinical seizures overnight. Continue close vEEG off all AEDs. Plan for cortical stimulation 3/3 evening with Dr. Edgar and Dr. Feliciano.  3/3-3/4: 1 seizure provoked yesterday by cortical stimulation, no spontaneous seizures.  Plan for sleep deprivation tonight.   3/4-3/5: 2 clinical seizures.  First consisted of shuffling leg movements w/ oral automatisms followed by prolonged post ictal aphasia, second consisted of right hand movement (possibly manual automatisms, difficult to tell hand in mitten) -> oral automatisms -> ictal cry and right version -> generalized tonic clonic seizure lasting 2 minutes; afterwards post ictal aphasia.  Onset of both was Lhippocampus (w/ second one having early recruitment of Lorbitofrontal region).    3/5-3/6: Three additional seizures overnight, please see EEG report for full details. On 3/6 am, given Clobazam 20 mg x1, Eslicarbazepine 600 mg x1. No further seizures noted throughout the day. Plan to give additional Clobazam 20 mg, Eslicarbazepine 600 mg,  and Zonisamide 500 mg 3/6 pm, resume all home dose AEDs 3/7 pm.   3/6-3/7: Clinical seizures 3/6 approximately 1939, EEG notable for additional seizures 3/6 at 0924, 1448, and 1938. No further seizures overnight after additional AED doses. Continue home AED regimen, plan for sEEG removal tomorrow.   3/7-3/8: No clinical seizures overnight, sEEG leads explanted 3/8. Continue home AED regimen.      Interval History: No clinical seizures overnight, patient resting comfortably in bed this morning. sEEG leads explanted today. Continue home AED regimen.     Current Facility-Administered Medications   Medication Dose Route Frequency Provider Last Rate Last Admin    acetaminophen tablet 1,000 mg  1,000 mg Oral Q8H Magda Isela, NP   1,000 mg at 03/08/23 1504    calcium carbonate 200 mg calcium (500 mg) chewable tablet 1,000 mg  1,000 mg Oral BID PRN Magda Weiss, NP        cefTRIAXone (ROCEPHIN) 2 g in dextrose 5 % in water (D5W) 5 % 50 mL IVPB (MB+)  2 g Intravenous Q12H Ashley Clements  mL/hr at 03/08/23 1443 2 g at 03/08/23 1443    cloBAZam Tab 40 mg  40 mg Oral QHS Teresa Cerrato PA-C   40 mg at 03/07/23 2039    EScitalopram oxalate tablet 10 mg  10 mg Oral Daily Magda Isela, NP   10 mg at 03/08/23 0808    eslicarbazepine Tab 1,200 mg  1,200 mg Oral Nightly Teresa Cerrato PA-C   1,200 mg at 03/07/23 2038    famotidine tablet 20 mg  20 mg Oral BID Fred Wayne MD   20 mg at 03/08/23 0808    gabapentin capsule 300 mg  300 mg Oral TID Teresa Gurrola PA-C   300 mg at 03/08/23 1417    glycerin adult suppository 1 suppository  1 suppository Rectal Daily PRN Isaura Burgos PA-C        hydrALAZINE injection 10 mg  10 mg Intravenous Q4H PRN Magda Weiss, NP        HYDROmorphone injection 0.5 mg  0.5 mg Intravenous Q6H PRN Isaura Burgos PA-C        LORazepam injection 2 mg  2 mg Intravenous Once PRN Shin López PA-C        melatonin tablet 9 mg  9 mg Oral Nightly PRN Teresa DAY  MOISES Gurrola   9 mg at 03/07/23 2202    methocarbamoL tablet 500 mg  500 mg Oral QID PRN Magda Miinea, NP   500 mg at 03/07/23 2202    ondansetron disintegrating tablet 8 mg  8 mg Oral Q8H PRN Isaura Burgos PA-C        oxyCODONE immediate release tablet 5 mg  5 mg Oral Q6H PRN Magda Miinea, NP   5 mg at 03/08/23 1418    polyethylene glycol packet 17 g  17 g Oral Daily Isaura Burgos PA-C   17 g at 03/07/23 0817    promethazine tablet 12.5 mg  12.5 mg Oral Q6H PRN Teresa Gurrola PA-C        senna-docusate 8.6-50 mg per tablet 1 tablet  1 tablet Oral Daily Shin López PA-C   1 tablet at 03/07/23 0817    zonisamide capsule 500 mg  500 mg Oral QHS Teresa Cerrato PA-C   500 mg at 03/07/23 2038     Continuous Infusions:    Review of Systems   Constitutional:  Positive for fatigue.   Gastrointestinal:  Negative for nausea and vomiting.   Neurological:  Positive for seizures. Negative for headaches.   All other systems reviewed and are negative.  Objective:     Vital Signs (Most Recent):  Temp: 97.6 °F (36.4 °C) (03/08/23 1505)  Pulse: 77 (03/08/23 1505)  Resp: 16 (03/08/23 1505)  BP: (!) 155/96 (03/08/23 1505)  SpO2: (!) 93 % (03/08/23 1505)   Vital Signs (24h Range):  Temp:  [97.6 °F (36.4 °C)-98.6 °F (37 °C)] 97.6 °F (36.4 °C)  Pulse:  [55-82] 77  Resp:  [12-24] 16  SpO2:  [92 %-99 %] 93 %  BP: (121-163)/(66-99) 155/96     Weight: 87.5 kg (193 lb)  Body mass index is 28.5 kg/m².    Physical Exam  Vitals and nursing note reviewed.   Constitutional:       General: He is not in acute distress.     Appearance: He is not diaphoretic.   HENT:      Head: Normocephalic.      Comments: sEEG leads + headwrap in place  Eyes:      General: No scleral icterus.     Extraocular Movements: Extraocular movements intact.      Pupils: Pupils are equal, round, and reactive to light.   Cardiovascular:      Rate and Rhythm: Normal rate.   Pulmonary:      Effort: Pulmonary effort is normal. No respiratory distress.    Abdominal:      General: There is no distension.      Palpations: Abdomen is soft.   Musculoskeletal:         General: No deformity or signs of injury.      Cervical back: Normal range of motion and neck supple.   Skin:     General: Skin is warm and dry.   Neurological:      Mental Status: He is alert and oriented to person, place, and time.   Psychiatric:         Mood and Affect: Mood normal.         Speech: Speech normal.         Behavior: Behavior normal.       NEUROLOGICAL EXAMINATION:     MENTAL STATUS   Oriented to person, place, and time.   Attention: normal. Concentration: normal.   Speech: speech is normal   Level of consciousness: alert    CRANIAL NERVES     CN III, IV, VI   Pupils are equal, round, and reactive to light.    CN VII   Facial expression full, symmetric.     CN VIII   Hearing: intact    CN XI   CN XI normal.     CN XII   CN XII normal.     MOTOR EXAM   Muscle bulk: normal  Overall muscle tone: normal       Follows commands, moves all extremities spontaneously and symmetrically     GAIT AND COORDINATION        No pronator drift noted     Significant Labs: All pertinent lab results from the past 24 hours have been reviewed.    Significant Studies: I have reviewed all pertinent imaging results/findings within the past 24 hours.    Assessment and Plan:     * Complex partial seizures evolving to generalized tonic-clonic seizures  Mr. Burleson is a 58 year old man with intractable epilepsy s/p prior sEEG monitoring in January 2021, left laser amygdalohippocampectomy in March 2021 admitted to [facility] s/p placement of 8 left-sided sEEG electrodes for repeat intracranial monitoring and further localization of his epilepsy in consideration of additional surgical management options of his epilepsy. Currently maintained on Clobazam 40 mg qHS, Eslicarbazepine 1200 mg qHS, and Zonisamide 500 mg qHS with continued events of staring/loss of awareness and generalized convulsions.    - s/p placement of 8  left-sided sEEG electrodes by [facility] 2/27, leads explanted 3/8  - No clinical seizures overnight  - Multiple seizures 3/5>3/6, additional seizure 3/6 pm. Please see EEG reports for full details  - Continue home Clobazam 40 mg qHS, Eslicarbazepine 1200 mg qHS, Zonisamide 500 mg qHS  - Cortical stimulation completed 3/3, 3/7 by Dr. Edgar, please see reports for full details  - Post-operative imaging timing, pain control per NCC, NSGY  - Seizure precautions  - Please call epilepsy to notify of any seizures  - If more than 3 seizures in 2 hours or any seizure lasting greater than 3 minutes, please give 2 mg IV lorazepam and contact on-call epilepsy    Plan of care discussed with NCC team, patient and wife at bedside. Will continue to follow, please call with any questions.     Depression with anxiety  - Continue home Escitalopram 10 mg daily    Migraine without status migrainosus, not intractable  - Followed by Dr. Saldana as outpatient  - Supportive care during admission        VTE Risk Mitigation (From admission, onward)         Ordered     Place sequential compression device  Until discontinued         02/27/23 3960                Teresa Cerrato PA-C  Neurology-Epilepsy  [facility]  Staff: Dr. Edgar

## 2023-03-08 NOTE — PT/OT/SLP RE-EVAL
Physical Therapy Re-evaluation    Patient Name:  Declan Burleson   MRN:  [id number]    Recommendations:     Discharge Recommendations: home  Discharge Equipment Recommendations: walker, rolling   Barriers to discharge: None    Assessment:     Declan Burleson is a 58 y.o. male admitted with a medical diagnosis of Complex partial seizures evolving to generalized tonic-clonic seizures.  He presents with the following impairments/functional limitations: impaired self care skills, impaired functional mobility, gait instability, impaired balance, decreased safety awareness, pain. Pt tired and in pain as he just returned to floor from surgery, but agreeable to working w/ therapy. He ambulated around the room w/ RW CGA w/ 3 LOB requiring min-modA to recover. Pending progress w/ further ambulation tomorrow, anticipate pt will be safe to return home w/ family upon d/c.    Rehab Prognosis:  good; patient would benefit from acute skilled PT services to address these deficits and reach maximum level of function.      Recent Surgery: Procedure(s) (LRB):  REMOVAL OF STEREO EEG LEADS (DEPTH ELECTRODES) (Left) Day of Surgery    Plan:     During this hospitalization, patient to be seen 3 x/week to address the above listed problems via gait training, therapeutic activities, therapeutic exercises, neuromuscular re-education  Plan of Care Expires:  03/10/23  Plan of Care Reviewed with: patient, spouse    Subjective     Communicated with RN prior to session.  Patient found HOB elevated with telemetry, blood pressure cuff, pulse ox (continuous) upon PT entry to room, agreeable to evaluation.      Chief Complaint: pain and tired  Patient comments/goals: go to bathroom  Pain/Comfort:  Pain Rating 1: 6/10  Location - Side 1: Bilateral  Location - Orientation 1: generalized  Location 1: head  Pain Addressed 1: Reposition, Distraction, Nurse notified  Pain Rating Post-Intervention 1: 6/10    Patients cultural, spiritual, [employer]al  conflicts given the current situation: no      Objective:     Patient found with: telemetry, blood pressure cuff, pulse ox (continuous)     General Precautions: Standard, fall, aspiration, seizure  Orthopedic Precautions:    Braces: N/A  Respiratory Status: Room air    Exams:  Cognitive Exam:  Patient is oriented to Person, Place, Time, and Situation  Gross Motor Coordination:  WFL  RLE ROM: WFL  RLE Strength: WFL  LLE ROM: WFL  LLE Strength: WFL    Functional Mobility:  Bed Mobility:     Supine to Sit: supervision  Sit to Supine: supervision  Transfers:     Sit to Stand:  contact guard assistance with rolling walker  Bed to Chair: contact guard assistance with  hand-held assist  using  Stand Pivot  Toilet Transfer: contact guard assistance with  rolling walker  using  Step Transfer  Gait: 25' w/ RW CGA w/ 3 LOB requiring min-modA to recover    AM-PAC 6 CLICK MOBILITY  Total Score:19       Treatment and Education:   Pt educated on PT POC/goals, d/c recs, and continued treatment. All questions answered and pt in agreement w/ POC.   Sitting balance x4min w/ pt getting dizzy initially but recovering w/ inc time; pt educated on importance of moving slow and steady 2/2 prolonged bed rest  Toileting w/ CGA and cueing for slow controlled descent onto low toilet  Gait training 1x10' and 1x25' w/ RW CGA; one trial x10' w/ HHA Kumar    Patient left up in chair with all lines intact, call button in reach, and RN and spouse present.    GOALS:   Multidisciplinary Problems       Physical Therapy Goals          Problem: Physical Therapy    Goal Priority Disciplines Outcome Goal Variances Interventions   Physical Therapy Goal     PT, PT/OT Ongoing, Progressing     Description: Goals to be met by: 3/10/23     Patient will increase functional independence with mobility by performin. Maintain strength and ROM WFL grossly- MET  2. Participate in OOB assessment once sEEG removed- MET  3. Ambulate 100' w/ mod-I using LRAD                        History:     Past Medical History:   Diagnosis Date    Anxiety     Dementia in other diseases classified elsewhere, unspecified severity, without behavioral disturbance, psychotic disturbance, mood disturbance, and anxiety 2/2/2023    Depression     GERD (gastroesophageal reflux disease)     Hyperlipidemia     Hypertension     Long term (current) use of antithrombotics/antiplatelets 12/22/2020    Migraine     Normocytic anemia 2/23/2023    Seizures        Past Surgical History:   Procedure Laterality Date    CYST REMOVAL  March 2016/2017    skin cyst - benign    PLACEMENT OF STEREO EEG LEADS(DEPTH ELECTRODES) Left 2/27/2023    Procedure: PLACEMENT OF STEREO EEG LEADS (DEPTH ELECTRODES);  Surgeon: Ruchi Chen MD;  Location: [facility] OR [address] [place] [address];  Service: Neurosurgery;  Laterality: Left;    REMOVAL OF STEREO EEG LEADS (DEPTH ELECTRODES) Bilateral 1/21/2021    Procedure: REMOVAL OF STEREO EEG LEADS (DEPTH ELECTRODES);  Surgeon: Ruchi Chen MD;  Location: [facility] OR [address] [place] [address];  Service: Neurosurgery;  Laterality: Bilateral;    TONSILLECTOMY      teenage        Time Tracking:     PT Received On: 03/08/23  PT Start Time: 1416     PT Stop Time: 1433  PT Total Time (min): 17 min     Billable Minutes: Re-eval 7 and Therapeutic Activity 10      03/08/2023

## 2023-03-08 NOTE — NURSING
Met BASIA and Tala HOPE in CT on 2nd floor. MD helped RN transport patient to room 9073 after postop CT. Patient on monitor and simple face mask 6L/min. VSS

## 2023-03-08 NOTE — TRANSFER OF CARE
"Anesthesia Transfer of Care Note    Patient: Declan Burleson    Procedure(s) Performed: Procedure(s) (LRB):  REMOVAL OF STEREO EEG LEADS (DEPTH ELECTRODES) (Left)    Patient location: Other: CT handoff to the ICU RN    Anesthesia Type: general    Transport from OR: Transported from OR on 6-10 L/min O2 by face mask with adequate spontaneous ventilation    Post pain: adequate analgesia    Post assessment: no apparent anesthetic complications    Post vital signs: stable    Level of consciousness: responds to stimulation    Nausea/Vomiting: no nausea/vomiting    Complications: none    Transfer of care protocol was followed      Last vitals:   Visit Vitals  /82 (BP Location: Right arm, Patient Position: Lying)   Pulse 64   Temp 36.6 °C (97.8 °F) (Oral)   Resp 18   Ht 5' 9" (1.753 m)   Wt 87.5 kg (193 lb)   SpO2 100%   BMI 28.50 kg/m²     "

## 2023-03-08 NOTE — PROGRESS NOTES
[facility]  Neurosurgery  Progress Note    Subjective:     History of Present Illness: 58 M with epilepsy presents for elective sEEG placement on 2/27      Post-Op Info:  Procedure(s) (LRB):  PLACEMENT OF STEREO EEG LEADS (DEPTH ELECTRODES) (Left)   9 Days Post-Op     Interval History: 3/8: NAEON, NPO MN, hep held, OR for sEEG removal today         Medications:  Continuous Infusions:  Scheduled Meds:   acetaminophen  1,000 mg Oral Q8H    cloBAZam  40 mg Oral QHS    EScitalopram oxalate  10 mg Oral Daily    eslicarbazepine  1,200 mg Oral Nightly    famotidine  20 mg Oral BID    gabapentin  300 mg Oral TID    polyethylene glycol  17 g Oral Daily    senna-docusate 8.6-50 mg  1 tablet Oral Daily    zonisamide  500 mg Oral QHS     PRN Meds:calcium carbonate, glycerin adult, hydrALAZINE, HYDROmorphone, lorazepam, melatonin, methocarbamoL, ondansetron, oxyCODONE, promethazine     Review of Systems   Neurological:  Positive for seizures.   All other systems reviewed and are negative.  Objective:     Weight: 87.5 kg (193 lb)  Body mass index is 28.5 kg/m².  Vital Signs (Most Recent):  Temp: 97.8 °F (36.6 °C) (03/08/23 1105)  Pulse: 64 (03/08/23 1105)  Resp: 18 (03/08/23 1105)  BP: (!) 155/92 (03/08/23 1105)  SpO2: (!) 93 % (03/08/23 1105)   Vital Signs (24h Range):  Temp:  [97.6 °F (36.4 °C)-98.6 °F (37 °C)] 97.8 °F (36.6 °C)  Pulse:  [55-82] 64  Resp:  [14-24] 18  SpO2:  [92 %-97 %] 93 %  BP: (121-163)/(66-99) 155/92     Date 03/08/23 0700 - 03/09/23 0659   Shift 1663-1169 9604-7799 7324-4973 24 Hour Total   INTAKE   P.O. 50   50   Shift Total(mL/kg) 50(0.6)   50(0.6)   OUTPUT   Shift Total(mL/kg)       Weight (kg) 87.5 87.5 87.5 87.5                Resp Rate Total:  [18 br/min-23 br/min] 23 br/min         Physical Exam  Constitutional:       Appearance: He is well-developed and well-nourished.   Eyes:      Extraocular Movements: EOM normal.      Conjunctiva/sclera: Conjunctivae normal.       Pupils: Pupils are equal, round, and reactive to light.   Cardiovascular:      Pulses: Normal pulses.   Abdominal:      Palpations: Abdomen is soft.   Neurological:      Mental Status: He is alert and oriented to person, place, and time.      Comments: AAOx3, higher order confusion  PERRL  CN intact  BUE 5/5  BLE 5/5  SILT    Headwrap in place with sEEG leads   Psychiatric:         Mood and Affect: Mood and affect normal.       Physical Exam:    Constitutional: He appears well-developed and well-nourished.     Eyes: Pupils are equal, round, and reactive to light. Conjunctivae and EOM are normal.     Cardiovascular: Normal pulses.     Abdominal: Soft.     Psych/Behavior: He is alert. He is oriented to person, place, and time. He has a normal mood and affect.     Neurological:   AAOx3, higher order confusion  PERRL  CN intact  BUE 5/5  BLE 5/5  SILT    Headwrap in place with sEEG leads     Significant Labs:  Recent Labs   Lab 03/07/23  0527 03/08/23  0609   GLU 93 95    137   K 3.9 3.7    104   CO2 29 24   BUN 15 13   CREATININE 0.8 0.8   CALCIUM 9.4 9.3   MG 2.0 2.0       Recent Labs   Lab 03/07/23  0527 03/08/23  0609   WBC 9.36 7.47   HGB 13.2* 13.4*   HCT 41.1 42.3    242       Recent Labs   Lab 03/08/23  0609   INR 1.0   APTT 28.5       Microbiology Results (last 7 days)       ** No results found for the last 168 hours. **          All pertinent labs from the last 24 hours have been reviewed.    Significant Diagnostics:  I have reviewed all pertinent imaging results/findings within the past 24 hours.    Assessment/Plan:     * Complex partial seizures evolving to generalized tonic-clonic seizures  59 yo M with intractable epilepsy who is now s/p sEEG lead placement for seizure monitoring/mapping    Plan for sEEG removal today     - admitted to [facility]  - q1 neurochecks  - CT stealth post op reviewed, sEEG leads in appropriate position, no large volume hemorrhage  - AEDs and seizure management per  epilepsy, seizure stimulation per epilepsy  - hyponatremia, ctm  - bowel regiment, last BM 3/6  - voiding spontaneously  - Chest pain, trops negative, likely GI related pain    - PPI, Bowel regimen  - bed rest  - PT/OT, daily stationary bike when fixed   -- Please encourage patient for improved PT activity   - SQH, held for OR  -- NPO since MN    Dispo:  sEEG lead removal 3/8        Obinna Avendano MD  Neurosurgery  [facility]

## 2023-03-08 NOTE — SUBJECTIVE & OBJECTIVE
Interval History: No clinical seizures overnight, patient resting comfortably in bed this morning. sEEG leads explanted today. Continue home AED regimen.     Current Facility-Administered Medications   Medication Dose Route Frequency Provider Last Rate Last Admin    acetaminophen tablet 1,000 mg  1,000 mg Oral Q8H Magda Miinea, NP   1,000 mg at 03/08/23 1504    calcium carbonate 200 mg calcium (500 mg) chewable tablet 1,000 mg  1,000 mg Oral BID PRN Magdabritton Weiss, NP        cefTRIAXone (ROCEPHIN) 2 g in dextrose 5 % in water (D5W) 5 % 50 mL IVPB (MB+)  2 g Intravenous Q12H Ashley Clements  mL/hr at 03/08/23 1443 2 g at 03/08/23 1443    cloBAZam Tab 40 mg  40 mg Oral QHS Teresa Cerrato PA-C   40 mg at 03/07/23 2039    EScitalopram oxalate tablet 10 mg  10 mg Oral Daily Magda Isela, NP   10 mg at 03/08/23 0808    eslicarbazepine Tab 1,200 mg  1,200 mg Oral Nightly Teresa Cerrato PA-C   1,200 mg at 03/07/23 2038    famotidine tablet 20 mg  20 mg Oral BID Ferd Wayne MD   20 mg at 03/08/23 0808    gabapentin capsule 300 mg  300 mg Oral TID Teresa Gurrola PA-C   300 mg at 03/08/23 1417    glycerin adult suppository 1 suppository  1 suppository Rectal Daily PRN Isaura Burgos PA-C        hydrALAZINE injection 10 mg  10 mg Intravenous Q4H PRN Magda Weiss, NP        HYDROmorphone injection 0.5 mg  0.5 mg Intravenous Q6H PRN Isaura Burgos PA-C        LORazepam injection 2 mg  2 mg Intravenous Once PRN Shin López PA-C        melatonin tablet 9 mg  9 mg Oral Nightly PRN Teresa Gurrola PA-C   9 mg at 03/07/23 2202    methocarbamoL tablet 500 mg  500 mg Oral QID PRN Magda Miwill, NP   500 mg at 03/07/23 2202    ondansetron disintegrating tablet 8 mg  8 mg Oral Q8H PRN Isaura Burgos PA-C        oxyCODONE immediate release tablet 5 mg  5 mg Oral Q6H PRN Magda Weiss NP   5 mg at 03/08/23 1418    polyethylene glycol packet 17 g  17 g Oral Daily Isaura Burgos PA-C   17 g at  03/07/23 0817    promethazine tablet 12.5 mg  12.5 mg Oral Q6H PRN Teresa Gurrola PA-C        senna-docusate 8.6-50 mg per tablet 1 tablet  1 tablet Oral Daily Shin López PA-C   1 tablet at 03/07/23 0817    zonisamide capsule 500 mg  500 mg Oral QHS Teresa Cerrato PA-C   500 mg at 03/07/23 2038     Continuous Infusions:    Review of Systems   Constitutional:  Positive for fatigue.   Gastrointestinal:  Negative for nausea and vomiting.   Neurological:  Positive for seizures. Negative for headaches.   All other systems reviewed and are negative.  Objective:     Vital Signs (Most Recent):  Temp: 97.6 °F (36.4 °C) (03/08/23 1505)  Pulse: 77 (03/08/23 1505)  Resp: 16 (03/08/23 1505)  BP: (!) 155/96 (03/08/23 1505)  SpO2: (!) 93 % (03/08/23 1505)   Vital Signs (24h Range):  Temp:  [97.6 °F (36.4 °C)-98.6 °F (37 °C)] 97.6 °F (36.4 °C)  Pulse:  [55-82] 77  Resp:  [12-24] 16  SpO2:  [92 %-99 %] 93 %  BP: (121-163)/(66-99) 155/96     Weight: 87.5 kg (193 lb)  Body mass index is 28.5 kg/m².    Physical Exam  Vitals and nursing note reviewed.   Constitutional:       General: He is not in acute distress.     Appearance: He is not diaphoretic.   HENT:      Head: Normocephalic.      Comments: sEEG leads + headwrap in place  Eyes:      General: No scleral icterus.     Extraocular Movements: Extraocular movements intact.      Pupils: Pupils are equal, round, and reactive to light.   Cardiovascular:      Rate and Rhythm: Normal rate.   Pulmonary:      Effort: Pulmonary effort is normal. No respiratory distress.   Abdominal:      General: There is no distension.      Palpations: Abdomen is soft.   Musculoskeletal:         General: No deformity or signs of injury.      Cervical back: Normal range of motion and neck supple.   Skin:     General: Skin is warm and dry.   Neurological:      Mental Status: He is alert and oriented to person, place, and time.   Psychiatric:         Mood and Affect: Mood normal.         Speech: Speech  normal.         Behavior: Behavior normal.       NEUROLOGICAL EXAMINATION:     MENTAL STATUS   Oriented to person, place, and time.   Attention: normal. Concentration: normal.   Speech: speech is normal   Level of consciousness: alert    CRANIAL NERVES     CN III, IV, VI   Pupils are equal, round, and reactive to light.    CN VII   Facial expression full, symmetric.     CN VIII   Hearing: intact    CN XI   CN XI normal.     CN XII   CN XII normal.     MOTOR EXAM   Muscle bulk: normal  Overall muscle tone: normal       Follows commands, moves all extremities spontaneously and symmetrically     GAIT AND COORDINATION        No pronator drift noted     Significant Labs: All pertinent lab results from the past 24 hours have been reviewed.    Significant Studies: I have reviewed all pertinent imaging results/findings within the past 24 hours.

## 2023-03-08 NOTE — ASSESSMENT & PLAN NOTE
59 yo M with intractable epilepsy who is now s/p sEEG lead placement for seizure monitoring/mapping    Plan for sEEG removal today     - admitted to [facility]  - q1 neurochecks  - CT stealth post op reviewed, sEEG leads in appropriate position, no large volume hemorrhage  - AEDs and seizure management per epilepsy, seizure stimulation per epilepsy  - hyponatremia, ctm  - bowel regiment, last BM 3/6  - voiding spontaneously  - Chest pain, trops negative, likely GI related pain    - PPI, Bowel regimen  - bed rest  - PT/OT, daily stationary bike when fixed   -- Please encourage patient for improved PT activity   - SQ, held for OR  -- NPO since MN    Dispo:  sEEG lead removal 3/8

## 2023-03-08 NOTE — ASSESSMENT & PLAN NOTE
Mr. Burleson is a 58 year old man with intractable epilepsy s/p prior sEEG monitoring in January 2021, left laser amygdalohippocampectomy in March 2021 admitted to [facility] s/p placement of 8 left-sided sEEG electrodes for repeat intracranial monitoring and further localization of his epilepsy in consideration of additional surgical management options of his epilepsy. Currently maintained on Clobazam 40 mg qHS, Eslicarbazepine 1200 mg qHS, and Zonisamide 500 mg qHS with continued events of staring/loss of awareness and generalized convulsions.    - s/p placement of 8 left-sided sEEG electrodes by [facility] 2/27, leads explanted 3/8  - No clinical seizures overnight  - Multiple seizures 3/5>3/6, additional seizure 3/6 pm. Please see EEG reports for full details  - Continue home Clobazam 40 mg qHS, Eslicarbazepine 1200 mg qHS, Zonisamide 500 mg qHS  - Cortical stimulation completed 3/3, 3/7 by Dr. Edgar, please see reports for full details  - Post-operative imaging timing, pain control per [facility] CADENA  - Seizure precautions  - Please call epilepsy to notify of any seizures  - If more than 3 seizures in 2 hours or any seizure lasting greater than 3 minutes, please give 2 mg IV lorazepam and contact on-call epilepsy    Plan of care discussed with [facility] team, patient and wife at bedside. Will continue to follow, please call with any questions.

## 2023-03-08 NOTE — ANESTHESIA POSTPROCEDURE EVALUATION
Anesthesia Post Evaluation    Patient: Declan Burleson    Procedure(s) Performed: Procedure(s) (LRB):  REMOVAL OF STEREO EEG LEADS (DEPTH ELECTRODES) (Left)    Final Anesthesia Type: general      Patient location during evaluation: PACU  Patient participation: Yes- Able to Participate  Level of consciousness: awake and alert  Post-procedure vital signs: reviewed and stable  Pain management: adequate  Airway patency: patent    PONV status at discharge: No PONV  Anesthetic complications: no      Cardiovascular status: blood pressure returned to baseline  Respiratory status: unassisted  Hydration status: euvolemic  Follow-up not needed.          Vitals Value Taken Time   /93 03/08/23 1419   Temp 34.5 °C (94.1 °F) 03/08/23 1354   Pulse 65 03/08/23 1432   Resp 17 03/08/23 1432   SpO2 91 % 03/08/23 1432   Vitals shown include unvalidated device data.      No case tracking events are documented in the log.      Pain/Karri Score: Pain Rating Prior to Med Admin: 7 (3/8/2023  2:18 PM)

## 2023-03-08 NOTE — ANESTHESIA PROCEDURE NOTES
Intubation    Date/Time: 3/8/2023 12:38 PM  Performed by: Art Manriquez CRNA  Authorized by: Art Manriquez CRNA     Intubation:     Induction:  Intravenous    Intubated:  Postinduction    Mask Ventilation:  Easy mask    Attempts:  1    Attempted By:  Student    Method of Intubation:  Direct    Blade:  Carpio 3    Laryngeal View Grade: Grade I - full view of cords      Difficult Airway Encountered?: No      Complications:  None    Airway Device Size:  7.5    Style/Cuff Inflation:  Cuffed (inflated to minimal occlusive pressure)    Secured at:  The lips    Placement Verified By:  Capnometry    Complicating Factors:  None    Findings Post-Intubation:  BS equal bilateral and atraumatic/condition of teeth unchanged

## 2023-03-08 NOTE — SUBJECTIVE & OBJECTIVE
Review of Systems  Constitutional: Denies fevers, weight loss, chills, or weakness.  Eyes: Denies changes in vision.  ENT: Denies dysphagia, nasal discharge, ear pain or discharge.  Cardiovascular: Denies chest pain, palpitations, orthopnea, or claudication.  Respiratory: Denies shortness of breath, cough, hemoptysis, or wheezing.  GI: Denies nausea/vomitting, hematochezia, melena, abd pain, or changes in appetite.  : Denies dysuria, incontinence, or hematuria.  Musculoskeletal: Denies joint pain or myalgias.  Skin/breast: Denies rashes, lumps, lesions, or discharge.  Neurologic: Denies headache, dizziness, vertigo, or paresthesias.  Psychiatric: Denies changes in mood or hallucinations.  Endocrine: Denies polyuria, polydipsia, heat/cold intolerance.  Hematologic/Lymph: Denies lymphadenopathy, easy bruising or easy bleeding.  Allergic/Immunologic: Denies rash, rhinitis.    Objective:     Vitals:  Temp: 97.6 °F (36.4 °C)  Pulse: 77  Rhythm: normal sinus rhythm  BP: (!) 155/96  MAP (mmHg): 121  Resp: 16  SpO2: (!) 93 %    Temp  Min: 97.6 °F (36.4 °C)  Max: 98.6 °F (37 °C)  Pulse  Min: 55  Max: 82  BP  Min: 121/66  Max: 163/82  MAP (mmHg)  Min: 88  Max: 123  Resp  Min: 12  Max: 24  SpO2  Min: 92 %  Max: 99 %    03/07 0701 - 03/08 0700  In: 550 [P.O.:550]  Out: 1990 [Urine:1990]   Unmeasured Output  Urine Occurrence: 1  Stool Occurrence: 1  Emesis Occurrence: 0  Pad Count: 1       Physical Exam  GA: Alert, comfortable, no acute distress.   HEENT: No scleral icterus or JVD.   Pulmonary: Clear to auscultation A/L.   Cardiac: RRR S1 & S2 w/o rubs/murmurs/gallops.   Abdominal: Bowel sounds present x 4. No appreciable hepatosplenomegaly.  Skin: No jaundice, rashes, or visible lesions.  Neuro:  --GCS: E4 V5 M6  --Mental Status:  awake, oriented X4, follows commands, fluent speech  --CN II-XII grossly intact.   --Pupils 3mm, PERRL.   --Corneal reflex, gag, cough intact.  --MOTTA spont    Medications:  Continuous  Scheduledacetaminophen, 1,000 mg, Q8H  cefTRIAXone (ROCEPHIN) IVPB, 2 g, Q12H  cloBAZam, 40 mg, QHS  EScitalopram oxalate, 10 mg, Daily  eslicarbazepine, 1,200 mg, Nightly  famotidine, 20 mg, BID  gabapentin, 300 mg, TID  polyethylene glycol, 17 g, Daily  senna-docusate 8.6-50 mg, 1 tablet, Daily  zonisamide, 500 mg, QHS    PRNcalcium carbonate, 1,000 mg, BID PRN  glycerin adult, 1 suppository, Daily PRN  hydrALAZINE, 10 mg, Q4H PRN  HYDROmorphone, 0.5 mg, Q6H PRN  lorazepam, 2 mg, Once PRN  melatonin, 9 mg, Nightly PRN  methocarbamoL, 500 mg, QID PRN  ondansetron, 8 mg, Q8H PRN  oxyCODONE, 5 mg, Q6H PRN  promethazine, 12.5 mg, Q6H PRN      Today I personally reviewed pertinent medications, lines/drains/airways, imaging, cardiology results, laboratory results, microbiology results,  Diet  Diet Adult Regular (IDDSI Level 7)

## 2023-03-08 NOTE — PROGRESS NOTES
Epilepsy LCSW visited patient in his room in the ICU this afternoon.   Patient alert and spoke to SW briefly.  Patient's speech was tangential and conversation presented with loose associations.  Patient did present with upbeat mood and happy demeanor overall.    LCSW did ask patient if he knew what day it was and he stated it was Tuesday correctly.       LCSW met with patient's wife who noted the patient has had several seizures in the past few days.  She also reports the patient will have his leads surgically removed tomorrow, with a potential discharge this Thursday.     LCSW noted that he may not see patient and Trista if they go to surgery tomorrow and discharge the following day.  Encouraged wife to reach out for any needs.

## 2023-03-09 VITALS
OXYGEN SATURATION: 96 % | RESPIRATION RATE: 14 BRPM | SYSTOLIC BLOOD PRESSURE: 160 MMHG | HEIGHT: 69 IN | HEART RATE: 65 BPM | BODY MASS INDEX: 28.58 KG/M2 | TEMPERATURE: 98 F | WEIGHT: 193 LBS | DIASTOLIC BLOOD PRESSURE: 92 MMHG

## 2023-03-09 PROCEDURE — 99233 PR SUBSEQUENT HOSPITAL CARE,LEVL III: ICD-10-PCS | Mod: ,,, | Performed by: PSYCHIATRY & NEUROLOGY

## 2023-03-09 PROCEDURE — 97116 GAIT TRAINING THERAPY: CPT

## 2023-03-09 PROCEDURE — 97535 SELF CARE MNGMENT TRAINING: CPT

## 2023-03-09 PROCEDURE — 99233 SBSQ HOSP IP/OBS HIGH 50: CPT | Mod: ,,, | Performed by: PSYCHIATRY & NEUROLOGY

## 2023-03-09 PROCEDURE — 97168 OT RE-EVAL EST PLAN CARE: CPT

## 2023-03-09 PROCEDURE — 25000003 PHARM REV CODE 250: Performed by: STUDENT IN AN ORGANIZED HEALTH CARE EDUCATION/TRAINING PROGRAM

## 2023-03-09 PROCEDURE — 25000003 PHARM REV CODE 250: Performed by: PHYSICIAN ASSISTANT

## 2023-03-09 PROCEDURE — 25000003 PHARM REV CODE 250: Performed by: NURSE PRACTITIONER

## 2023-03-09 PROCEDURE — 25000003 PHARM REV CODE 250: Performed by: PSYCHIATRY & NEUROLOGY

## 2023-03-09 PROCEDURE — 25000003 PHARM REV CODE 250

## 2023-03-09 PROCEDURE — 99233 PR SUBSEQUENT HOSPITAL CARE,LEVL III: ICD-10-PCS | Mod: FS,,, | Performed by: PHYSICIAN ASSISTANT

## 2023-03-09 PROCEDURE — 99233 SBSQ HOSP IP/OBS HIGH 50: CPT | Mod: ,,, | Performed by: NURSE PRACTITIONER

## 2023-03-09 PROCEDURE — 99233 SBSQ HOSP IP/OBS HIGH 50: CPT | Mod: FS,,, | Performed by: PHYSICIAN ASSISTANT

## 2023-03-09 PROCEDURE — 94761 N-INVAS EAR/PLS OXIMETRY MLT: CPT

## 2023-03-09 PROCEDURE — 99233 PR SUBSEQUENT HOSPITAL CARE,LEVL III: ICD-10-PCS | Mod: ,,, | Performed by: NURSE PRACTITIONER

## 2023-03-09 PROCEDURE — 63600175 PHARM REV CODE 636 W HCPCS: Performed by: STUDENT IN AN ORGANIZED HEALTH CARE EDUCATION/TRAINING PROGRAM

## 2023-03-09 RX ORDER — ZONISAMIDE 100 MG/1
500 CAPSULE ORAL NIGHTLY
Qty: 450 CAPSULE | Refills: 3 | Status: SHIPPED | OUTPATIENT
Start: 2023-03-09 | End: 2023-05-17 | Stop reason: SDUPTHER

## 2023-03-09 RX ADMIN — GABAPENTIN 300 MG: 300 CAPSULE ORAL at 08:03

## 2023-03-09 RX ADMIN — ACETAMINOPHEN 1000 MG: 500 TABLET ORAL at 06:03

## 2023-03-09 RX ADMIN — SENNOSIDES AND DOCUSATE SODIUM 1 TABLET: 50; 8.6 TABLET ORAL at 08:03

## 2023-03-09 RX ADMIN — POLYETHYLENE GLYCOL 3350 17 G: 17 POWDER, FOR SOLUTION ORAL at 08:03

## 2023-03-09 RX ADMIN — FAMOTIDINE 20 MG: 20 TABLET ORAL at 08:03

## 2023-03-09 RX ADMIN — ESCITALOPRAM OXALATE 10 MG: 10 TABLET ORAL at 08:03

## 2023-03-09 RX ADMIN — CEFTRIAXONE 2 G: 2 INJECTION, POWDER, FOR SOLUTION INTRAMUSCULAR; INTRAVENOUS at 02:03

## 2023-03-09 NOTE — DISCHARGE SUMMARY
"[facility]  Neurocritical Care  Discharge Summary    Admit Date: 2/27/2023    Service Date: 03/09/2023    Discharge Date:  03/09/2023      Length of Stay: 10    Final Active Diagnoses:    Diagnosis Date Noted POA    PRINCIPAL PROBLEM:  Complex partial seizures evolving to generalized tonic-clonic seizures [G40.209] 05/18/2020 Yes     Chronic    Depression with anxiety [F41.8] 01/15/2021 Yes     Chronic    Migraine without status migrainosus, not intractable [G43.909] 05/18/2020 Yes     Chronic      Problems Resolved During this Admission:      History of Present Illness: Declan Burleson is a 58 y.o.M  admitted to [facility] s/p SEEG placement. Per chart review he was referred by Dr. Benoit (originally by Dr. Saldana of [place]) for consideration of surgical therapy for intractable epilepsy. The patient was formerly employed as a . He is accompanied by his wife Trista, who helps to provide the history. His seizures began when he was 48 years old. He can recall no inciting event. He has 3 semiologies: "full blown" generalized events, staring episodes, and "mild" seizures, which are characterized as generalized events of shorter duration without incontinence and a shorter post-ictal period. He and his wife estimate that he persists in having about 2 events/month. He has failed multiple AEDs, including topiramate, lamotrigine, levetiracetam, and oxcarbazepine. He is currently taking eslicarbazepine, zonisamide, and clobazam. He had EMU monitoring in May-June of this year, which suggests left-sided activity. NSGY and epilepsy following. Patient admitted to [facility] for close monitoring and higher level of care.       Hospital Course by Event: 3/5/2023: NAEON, patient complains of L side headache, L eye pressure (no visual disturbance) PRN pain available  3/5/2023: complained of chest pain- troponin neg, EKG stable, AED's per Epilepsy  03/05/2023 Increased senna, added daily suppository. " Weaned off of AEDs for this afternoon per epilepsy.  03/05/2023 No seizures overnight. BM this AM. PT/OT today.  03/05/2023 BM this am. Decreased senna and dc suppository.   03/05/2023 NAEON. Family and epilepsy agreed not to do sleep deprivation last night. 3 minute seizure today, given 2mg ativan. Post ictal. Pt had another 2 min seizure around noon, no ativan given, post ictal now.  03/06/2023 2 min seizure overnight. 1 seizure this morning, post ictal when seen this AM. Given aptiom 600 and onfi 20 per epilepsy. Doses for tomorrow will be decided after review by epilepsy.  03/07/2023: Home AEDs restarted. Plans for sEEG removal tomorrow. NPO after midnight.   3/8/2023: NAOEN, to OR today w/ NSGY for sEEG removal  3/9/2023: NAEON, discharge home today      Review of Symptoms:  Constitutional: Denies fevers, weight loss, chills, or weakness.  Eyes: Denies changes in vision.  ENT: Denies dysphagia, nasal discharge, ear pain or discharge.  Cardiovascular: Denies chest pain, palpitations, orthopnea, or claudication.  Respiratory: Denies shortness of breath, cough, hemoptysis, or wheezing.  GI: Denies nausea/vomitting, hematochezia, melena, abd pain, or changes in appetite.  : Denies dysuria, incontinence, or hematuria.  Musculoskeletal: Denies joint pain or myalgias.  Skin/breast: Denies rashes, lumps, lesions, or discharge.  Neurologic: Denies headache, dizziness, vertigo, or paresthesias.  Psychiatric: Denies changes in mood or hallucinations.  Endocrine: Denies polyuria, polydipsia, heat/cold intolerance.  Hematologic/Lymph: Denies lymphadenopathy, easy bruising or easy bleeding.  Allergic/Immunologic: Denies rash, rhinitis.         Physical Exam:  GA: Alert, comfortable, no acute distress.   HEENT: No scleral icterus or JVD.   Pulmonary: Clear to auscultation A/P/L. No wheezing, crackles, or rhonchi.  Cardiac: RRR S1 & S2 w/o rubs/murmurs/gallops.   Abdominal: Bowel sounds present x 4. No appreciable  hepatosplenomegaly.  Skin: No jaundice, rashes, or visible lesions.  Neuro:  --GCS: E4 V5 M6  --Mental Status:  Awake, oriented X4, follows commands, fluent speech   --CN II-XII grossly intact.   --Pupils 3mm, PERRL.   --Corneal reflex, gag, cough intact.  --MOTTA spont    Hospital Course by Problem:   * Complex partial seizures evolving to generalized tonic-clonic seizures  Declan Burleson is a 58 y.o.M  admitted to [facility] s/p SEEG placement. He has failed multiple AEDs, including topiramate, lamotrigine, levetiracetam, and oxcarbazepine. He is currently taking eslicarbazepine, zonisamide, and clobazam.     -- s/p sEEG  -- NSGY and epilepsy following  -- AED's per epilepsy reccs- weaned off all AEDs on 3/2 per epilepsy   -- Neuro checks q 2hr  -- SBP <160  -- seizure precautions  -- PT/OT/SLP  - cortical stimulation 3/3 w epilepsy- 1x 30 sec seizure  - Restarted on home AEDs per epilepsy team:  Clobazam 40mg HS  eslicarbazepine 1200mg HS  zonisamide 500mg HS   3/9/2023: to OR today for sEEG leads removal w/ NSGY team   3/9/2023: NAEON -continue all AED's per epilepsy team  Discharge home      Depression with anxiety  Continue home med Escitalopram    Migraine without status migrainosus, not intractable  Tylenol 1g q8h  Prn robaxin and oxy         Goals of Care Treatment Preferences:  Code Status: Full Code      Significant Results:  Imaging:  CT 3/8: Postsurgical change recent stereo EEG electrode removal without apparent intracranial complication.    Cardiology:  EKG on 3/1: NSR    Microbiology:  Microbiology Results (last 7 days)     ** No results found for the last 168 hours. **            Laboratory:  Lab Results   Component Value Date    HGBA1C 5.4 05/31/2020    TSH 1.476 07/22/2021       Pending Results: none    Consultations:  None  NSGY   Epilepsy    Procedures:   Procedure(s) (LRB):  REMOVAL OF STEREO EEG LEADS (DEPTH ELECTRODES) (Left) by Ruchi Chen MD.      Medications:      Medication List       CONTINUE taking these medications    ANTACID ORAL     aspirin 81 MG EC tablet  Commonly known as: ECOTRIN  Take 1 tablet (81 mg total) by mouth once daily.     cloBAZam 20 mg Tab  Commonly known as: ONFI  Take 2 tablets (40 mg total) by mouth every evening.     EScitalopram oxalate 10 MG tablet  Commonly known as: LEXAPRO  Take 1 tablet (10 mg total) by mouth once daily.     * eslicarbazepine 800 mg Tab  Commonly known as: APTIOM  Take 800 mg by mouth once daily.     * eslicarbazepine 400 mg Tab tablet  Commonly known as: APTIOM  Take 1 tablet (400 mg total) by mouth once daily.     midazolam 5 mg/spray (0.1 mL) Spry     NURTEC 75 mg odt  Generic drug: rimegepant     ondansetron 8 MG Tbdl  Commonly known as: ZOFRAN-ODT     oxyCODONE 5 MG immediate release tablet  Commonly known as: ROXICODONE     rizatriptan 10 MG disintegrating tablet  Commonly known as: MAXALT-MLT     zonisamide 100 MG Cap  Commonly known as: ZONEGRAN  Take 5 capsules (500 mg total) by mouth every evening.         * This list has 2 medication(s) that are the same as other medications prescribed for you. Read the directions carefully, and ask your doctor or other care provider to review them with you.               Where to Get Your Medications      These medications were sent to [facility]  [address] [address]    Hours: Mon-Fri 7a-7p, Sat-Sun 10a-4p Phone: [phone]   · zonisamide 100 MG Cap       Diet:    Activity: As tolerated.     Disposition: Discharged to home in stable condition.    Follow Up Plan:  NSGY- suture removal    PCP    This discharge took greater than 30 minutes to complete.    Magda Weiss NP  Neurocritical Care  [facility]

## 2023-03-09 NOTE — ASSESSMENT & PLAN NOTE
59 yo M with intractable epilepsy who is now s/p sEEG lead placement for seizure monitoring/mapping    sEEG removed 3/8    - admitted to [facility]  - q1 neurochecks  - CT stealth post op reviewed, sEEG leads in appropriate position, no large volume hemorrhage  - CTH post op sEEG removeal with expected changes, no large volume hematoma.   - AEDs and seizure management per epilepsy  - bowel regiment, last BM 3/6  - voiding spontaneously  - Chest pain, trops negative, likely GI related pain    - PPI, Bowel regimen  - PT/OT OOB today  - DC after work with PT/OT

## 2023-03-09 NOTE — PLAN OF CARE
Discharged from OT  Problem: Occupational Therapy  Goal: Occupational Therapy Goal  Description: Goals set 2/28 to be addressed for 14 days with expiration date, 3/15:  Patient will increase functional independence with ADLs by performing:    Patient will demonstrate rolling to the right with modified independence.   met   Patient will demonstrate rolling to the left with modified independence.    met  Patient will demonstrate supine -sit with modified independence.    met  Patient will demonstrate stand pivot transfers with supervision.    met  Patient will demonstrate grooming while standing with supervision.    met  Patient will demonstrate upper body dressing with supervision while seated EOB.    met  Patient will demonstrate lower body dressing with supervision while seated EOB.    met  Patient will demonstrate toileting with supervision.    met  Patient will demonstrate bathing while seated EOB with supervision.    met  Patient's family / caregiver will demonstrate independence and safety with assisting patient with self-care skills and functional mobility.      met  Patient's family / caregiver will demonstrate independence with providing ROM and changes in bed positioning.    met      Outcome: Met

## 2023-03-09 NOTE — PLAN OF CARE
[facility]  Discharge Final Note    Primary Care Provider: Juan Carlos Simmons NP    Expected Discharge Date: 3/9/2023    Patient discharged to home with no post acute needs per MD.  CM phoned PCP's Office x 2.  No answer.  Msg left on V/m for Haleigh,  at PCP's office, to call the patient to schedule the appointment.      Final Discharge Note (most recent)       Final Note - 03/09/23 1029          Final Note    Assessment Type Final Discharge Note     Anticipated Discharge Disposition Home or Self Care     What phone number can be called within the next 1-3 days to see how you are doing after discharge? [phone]                              Contact Info       Juan Carlos Simmons NP   Specialty: Family Medicine   Relationship: PCP - General    [facility] [place] [address]   Phone: [phone]       Next Steps: Schedule an appointment as soon as possible for a visit in 1 week(s)    Instructions: Hospital follow up. Per Haleigh's V/M at your PCP's office, she will call you with an appointment.  If you do not receive a call by the end of this week, please call to schedule.          Tammy Garcia RN, CCRN-K, [facility]  Neuro-Critical Care   X [phone]

## 2023-03-09 NOTE — PROGRESS NOTES
[facility]  Neurology-Epilepsy  Progress Note    Patient Name: Declan Burleson  MRN: [id number]  Admission Date: 2/27/2023  Hospital Length of Stay: 10 days  Code Status: Prior   Attending Provider: No att. providers found  Primary Care Physician: Juan Carlos Simmons NP   Principal Problem:Complex partial seizures evolving to generalized tonic-clonic seizures    Subjective:     Hospital Course:   2/27>2/28: Clobazam decreased to 20 mg qHS, Eslicarbazepine decreased to 400 mg qHS, and Zonisamide decreased to 200 mg qHS on admission. No seizures noted overnight. Beginning 2/28 pm, hold Clobazam, decrease Zonisamide further to 100 mg qHS. Continue Eslicarbazepine 400 mg nightly.   2/28>3/1: No seizures overnight, EEG normal. Hold Zonisamide beginning 3/1 pm.   3/1>3/2: No seizures overnight. Event 3/2 approximately 1035 of sudden eye opening/fluttering, confusion. Hold Eslicarbazepine beginning 3/2 pm.   3/2>3/3: No clinical seizures overnight. Continue close vEEG off all AEDs. Plan for cortical stimulation 3/3 evening with Dr. Edgar and Dr. Feliciano.  3/3-3/4: 1 seizure provoked yesterday by cortical stimulation, no spontaneous seizures.  Plan for sleep deprivation tonight.   3/4-3/5: 2 clinical seizures.  First consisted of shuffling leg movements w/ oral automatisms followed by prolonged post ictal aphasia, second consisted of right hand movement (possibly manual automatisms, difficult to tell hand in mitten) -> oral automatisms -> ictal cry and right version -> generalized tonic clonic seizure lasting 2 minutes; afterwards post ictal aphasia.  Onset of both was Lhippocampus (w/ second one having early recruitment of Lorbitofrontal region).    3/5-3/6: Three additional seizures overnight, please see EEG report for full details. On 3/6 am, given Clobazam 20 mg x1, Eslicarbazepine 600 mg x1. No further seizures noted throughout the day. Plan to give additional Clobazam 20 mg, Eslicarbazepine 600 mg,  and Zonisamide 500 mg 3/6 pm, resume all home dose AEDs 3/7 pm.   3/6-3/7: Clinical seizures 3/6 approximately 1939, EEG notable for additional seizures 3/6 at 0924, 1448, and 1938. No further seizures overnight after additional AED doses. Continue home AED regimen, plan for sEEG removal tomorrow.   3/7-3/8: No clinical seizures overnight, sEEG leads explanted 3/8. Continue home AED regimen.  3/8>3/9: sEEG electrodes explanted 3/8, patient tolerated procedure well. Evaluated by PT/OT 3/9, stable for discharge home. Continue Clobazam 40 mg qHS, Eslicarbazepine 1200 mg qHS, and Zonisamide 500 mg qHS. Outpatient follow up with Dr. Edgar for further discussion of surgical options.       Interval History: sEEG leads explanted 3/8, patient tolerated procedure well, post-op CTH satisfactory. Stable for discharge home today.     Current Facility-Administered Medications   Medication Dose Route Frequency Provider Last Rate Last Admin    acetaminophen tablet 1,000 mg  1,000 mg Oral Q8H Magda Weiss NP   1,000 mg at 03/09/23 0620    calcium carbonate 200 mg calcium (500 mg) chewable tablet 1,000 mg  1,000 mg Oral BID PRN Magda Weiss NP        cloBAZam Tab 40 mg  40 mg Oral QHS Teresa Cerrato PA-C   40 mg at 03/08/23 2102    EScitalopram oxalate tablet 10 mg  10 mg Oral Daily Magda Weiss NP   10 mg at 03/09/23 0804    eslicarbazepine Tab 1,200 mg  1,200 mg Oral Nightly Teresa Cerrato PA-C   1,200 mg at 03/08/23 2102    famotidine tablet 20 mg  20 mg Oral BID Fred Wayne MD   20 mg at 03/09/23 0804    gabapentin capsule 300 mg  300 mg Oral TID Teresa Gurrola PA-C   300 mg at 03/09/23 0804    glycerin adult suppository 1 suppository  1 suppository Rectal Daily PRN Isaura Burgos PA-C        hydrALAZINE injection 10 mg  10 mg Intravenous Q4H PRN Magda Weiss NP        HYDROmorphone injection 0.5 mg  0.5 mg Intravenous Q6H PRN Isaura Burgos PA-C        LORazepam injection 2 mg  2 mg Intravenous  Once PRN Shin López PA-C        melatonin tablet 9 mg  9 mg Oral Nightly PRN Teresa Gurrola PA-C   9 mg at 03/07/23 2202    methocarbamoL tablet 500 mg  500 mg Oral QID PRN Magda Miinea, NP   500 mg at 03/08/23 1805    ondansetron disintegrating tablet 8 mg  8 mg Oral Q8H PRN Isaura Burgos PA-C        oxyCODONE immediate release tablet 5 mg  5 mg Oral Q6H PRN Magda Miinea, NP   5 mg at 03/08/23 1418    polyethylene glycol packet 17 g  17 g Oral Daily Isaura Burgos PA-C   17 g at 03/09/23 0805    promethazine tablet 12.5 mg  12.5 mg Oral Q6H PRN Teresa Gurrola PA-C        senna-docusate 8.6-50 mg per tablet 1 tablet  1 tablet Oral Daily Shin López PA-C   1 tablet at 03/09/23 0804    zonisamide capsule 500 mg  500 mg Oral QHS Teresa Cerrato PA-C   500 mg at 03/08/23 2102     Current Outpatient Medications   Medication Sig Dispense Refill    eslicarbazepine (APTIOM) 400 mg Tab tablet Take 1 tablet (400 mg total) by mouth once daily. 30 tablet 5    eslicarbazepine (APTIOM) 800 mg Tab Take 800 mg by mouth once daily. 30 tablet 5    mag hydrox/aluminum hyd/simeth (ANTACID ORAL) Take by mouth as needed. Acid reflux      midazolam 5 mg/spray (0.1 mL) Spry 0.1 mLs by Nasal route.      ondansetron (ZOFRAN-ODT) 8 MG TbDL Take 8 mg by mouth every 8 (eight) hours as needed. nausea      rimegepant (NURTEC) 75 mg odt Take 75 mg by mouth.      rizatriptan (MAXALT-MLT) 10 MG disintegrating tablet Take 10 mg by mouth as needed. No more than 3 doses / week      aspirin (ECOTRIN) 81 MG EC tablet Take 1 tablet (81 mg total) by mouth once daily. 360 tablet 0    cloBAZam (ONFI) 20 mg Tab Take 2 tablets (40 mg total) by mouth every evening. 60 tablet 3    EScitalopram oxalate (LEXAPRO) 10 MG tablet Take 1 tablet (10 mg total) by mouth once daily. 30 tablet 11    oxyCODONE (ROXICODONE) 5 MG immediate release tablet Take 5 mg by mouth every 4 (four) hours as needed.      zonisamide (ZONEGRAN)  100 MG Cap Take 5 capsules (500 mg total) by mouth every evening. 450 capsule 3     Continuous Infusions:    Review of Systems   Constitutional:  Positive for fatigue.   Gastrointestinal:  Negative for nausea and vomiting.   Neurological:  Positive for seizures. Negative for headaches.   All other systems reviewed and are negative.  Objective:     Vital Signs (Most Recent):  Temp: 98.4 °F (36.9 °C) (03/09/23 0705)  Pulse: 65 (03/09/23 1005)  Resp: 14 (03/09/23 1005)  BP: (!) 160/92 (03/09/23 1005)  SpO2: 96 % (03/09/23 1005)   Vital Signs (24h Range):  Temp:  [97.9 °F (36.6 °C)-98.4 °F (36.9 °C)] 98.4 °F (36.9 °C)  Pulse:  [] 65  Resp:  [12-29] 14  SpO2:  [89 %-99 %] 96 %  BP: (120-160)/(67-92) 160/92     Weight: 87.5 kg (193 lb)  Body mass index is 28.5 kg/m².    Physical Exam  Vitals and nursing note reviewed.   Constitutional:       General: He is not in acute distress.     Appearance: He is not diaphoretic.   HENT:      Head: Normocephalic.      Comments: sEEG leads + headwrap in place  Eyes:      General: No scleral icterus.     Extraocular Movements: Extraocular movements intact.      Pupils: Pupils are equal, round, and reactive to light.   Cardiovascular:      Rate and Rhythm: Normal rate.   Pulmonary:      Effort: Pulmonary effort is normal. No respiratory distress.   Abdominal:      General: There is no distension.      Palpations: Abdomen is soft.   Musculoskeletal:         General: No deformity or signs of injury.      Cervical back: Normal range of motion and neck supple.   Skin:     General: Skin is warm and dry.   Neurological:      Mental Status: He is alert and oriented to person, place, and time.   Psychiatric:         Mood and Affect: Mood normal.         Speech: Speech normal.         Behavior: Behavior normal.       NEUROLOGICAL EXAMINATION:     MENTAL STATUS   Oriented to person, place, and time.   Attention: normal. Concentration: normal.   Speech: speech is normal   Level of  consciousness: alert    CRANIAL NERVES     CN III, IV, VI   Pupils are equal, round, and reactive to light.    CN VII   Facial expression full, symmetric.     CN VIII   Hearing: intact    CN XI   CN XI normal.     CN XII   CN XII normal.     MOTOR EXAM   Muscle bulk: normal  Overall muscle tone: normal       Follows commands, moves all extremities spontaneously and symmetrically     GAIT AND COORDINATION        No pronator drift noted     Significant Labs: All pertinent lab results from the past 24 hours have been reviewed.    Significant Studies: I have reviewed all pertinent imaging results/findings within the past 24 hours.    Assessment and Plan:     * Complex partial seizures evolving to generalized tonic-clonic seizures  Mr. Burleson is a 58 year old man with intractable epilepsy s/p prior sEEG monitoring in January 2021, left laser amygdalohippocampectomy in March 2021 admitted to [facility] s/p placement of 8 left-sided sEEG electrodes for repeat intracranial monitoring and further localization of his epilepsy in consideration of additional surgical management options of his epilepsy. Currently maintained on Clobazam 40 mg qHS, Eslicarbazepine 1200 mg qHS, and Zonisamide 500 mg qHS with continued events of staring/loss of awareness and generalized convulsions.    - s/p placement of 8 left-sided sEEG electrodes by [facility]I 2/27, leads explanted 3/8, patient tolerated procedure well   - Post-op CTH satisfactory  - Multiple seizures captured during admission, please see EEG reports for full details  - Continue Clobazam 40 mg qHS, Eslicarbazepine 1200 mg qHS, Zonisamide 500 mg qHS on discharge  - Cortical stimulation completed 3/3, 3/7 by Dr. Edgar, please see reports for full details  - Stable for discharge home with close outpatient follow up with NSGY/Epilepsy for further discussion    Plan of care discussed with [facility] team, patient and wife at bedside. Will sign off, please call with any questions.     Depression with  anxiety  - Continue home Escitalopram 10 mg daily    Migraine without status migrainosus, not intractable  - Followed by Dr. Saldana as outpatient  - Supportive care during admission        VTE Risk Mitigation (From admission, onward)    None          Teresa Cerrato PA-C  Neurology-Epilepsy  [facility]  Staff: Dr. Edgar

## 2023-03-09 NOTE — PT/OT/SLP PROGRESS
"Occupational Therapy   Reassessment / D/C Summary    Name: Declan Burleson  MRN: [id number]  Admitting Diagnosis:  Complex partial seizures evolving to generalized tonic-clonic seizures  1 Day Post-Op    Recommendations:     Discharge Recommendations: home  Discharge Equipment Recommendations:  none  Barriers to discharge:  None    Assessment:     Declan Burleson is a 58 y.o. male with a medical diagnosis of Complex partial seizures evolving to generalized tonic-clonic seizures.  He presents with performance deficits affecting function are weakness, impaired endurance, impaired self care skills, impaired functional mobility, impaired balance, impaired cognition.     Rehab Prognosis:  Good; patient would benefit from acute skilled OT services to address these deficits and reach maximum level of function.       Plan:     Patient to be seen 3 x/week to address the above listed problems via sensory integration, cognitive retraining, neuromuscular re-education, therapeutic exercises, therapeutic activities, self-care/home management  Plan of Care Expires: 04/08/23  Plan of Care Reviewed with: patient, spouse    Subjective   Patient:  "I am feeling good."  Pain/Comfort:  Pain Rating 1: 0/10  Pain Rating Post-Intervention 1: 0/10    Objective:     Communicated with: Nurse prior to session.  Patient found supine with telemetry, bed alarm, blood pressure cuff, peripheral IV, pulse ox (continuous) upon OT entry to room.    General Precautions: Standard, aspiration, fall, seizure    Orthopedic Precautions:N/A  Braces: N/A  Respiratory Status: Room air     Occupational Performance:     Bed Mobility:    Patient completed Rolling/Turning to Left with  modified independence  Patient completed Rolling/Turning to Right with modified independence  Patient completed Supine to Sit with modified independence  Patient completed Sit to Supine with modified independence     Functional Mobility/Transfers:  Patient completed Sit <> " Stand Transfer with modified independence  with  no assistive device   Patient completed Bed <> Chair Transfer using Stand Pivot technique with modified independence with no assistive device    Activities of Daily Living:  Grooming: modified independence    Upper Body Dressing: modified independence    Lower Body Dressing: modified independence      [facility] 6 Click ADL: 24    Treatment & Education:  Patient alert and oriented x 3; able to follow 4/4 one step commands.  Patient attentive and interactive throughout the session.      Patient left supine with all lines intact, call button in reach, and bed alarm on    GOALS:   Multidisciplinary Problems       Occupational Therapy Goals       Not on file              Multidisciplinary Problems (Resolved)          Problem: Occupational Therapy    Goal Priority Disciplines Outcome Interventions   Occupational Therapy Goal   (Resolved)     OT, PT/OT Met    Description: Goals set 2/28 to be addressed for 14 days with expiration date, 3/15:  Patient will increase functional independence with ADLs by performing:    Patient will demonstrate rolling to the right with modified independence.  Not met   Patient will demonstrate rolling to the left with modified independence.   Not met  Patient will demonstrate supine -sit with modified independence.   Not met  Patient will demonstrate stand pivot transfers with supervision.   Not met  Patient will demonstrate grooming while standing with supervision.   Not met  Patient will demonstrate upper body dressing with supervision while seated EOB.   Not met  Patient will demonstrate lower body dressing with supervision while seated EOB.   Not met  Patient will demonstrate toileting with supervision.   Not met  Patient will demonstrate bathing while seated EOB with supervision.   Not met  Patient's family / caregiver will demonstrate independence and safety with assisting patient with self-care skills and functional mobility.     Not  met  Patient's family / caregiver will demonstrate independence with providing ROM and changes in bed positioning.   Not met                           Time Tracking:     OT Date of Treatment: 03/09/23  OT Start Time: 0705  OT Stop Time: 0720  OT Total Time (min): 15 min    Billable Minutes:Re-eval 5  Self Care/Home Management 8    OT/BENITEZ: OT          3/9/2023

## 2023-03-09 NOTE — ASSESSMENT & PLAN NOTE
Mr. Burleson is a 58 year old man with intractable epilepsy s/p prior sEEG monitoring in January 2021, left laser amygdalohippocampectomy in March 2021 admitted to [facility] s/p placement of 8 left-sided sEEG electrodes for repeat intracranial monitoring and further localization of his epilepsy in consideration of additional surgical management options of his epilepsy. Currently maintained on Clobazam 40 mg qHS, Eslicarbazepine 1200 mg qHS, and Zonisamide 500 mg qHS with continued events of staring/loss of awareness and generalized convulsions.    - s/p placement of 8 left-sided sEEG electrodes by [facility] 2/27, leads explanted 3/8, patient tolerated procedure well   - Post-op CTH satisfactory  - Multiple seizures captured during admission, please see EEG reports for full details  - Continue Clobazam 40 mg qHS, Eslicarbazepine 1200 mg qHS, Zonisamide 500 mg qHS on discharge  - Cortical stimulation completed 3/3, 3/7 by Dr. Edgar, please see reports for full details  - Stable for discharge home with close outpatient follow up with NSGY/Epilepsy for further discussion    Plan of care discussed with [facility] team, patient and wife at bedside. Will sign off, please call with any questions.

## 2023-03-09 NOTE — NURSING
Discharge orders placed. AVS packet reviewed with patient's wife. Ivs removed and patient dressed in his own clothing. Waiting for walker to arrive before leaving hospital.

## 2023-03-09 NOTE — PLAN OF CARE
03/09/23 0916   Post-Acute Status   Post-Acute Authorization Other   Other Status No Post-Acute Service Needs     Latisha Javier LCSW  Neurocritical Care   [facility]  [id number]

## 2023-03-09 NOTE — PLAN OF CARE
[facility] Care Plan    POC reviewed with Declan Burleson and wife at 0300. Pt verbalized understanding. Questions and concerns addressed. No acute events overnight. Pt progressing toward goals. Will continue to monitor. See below and flowsheets for full assessment and VS info.           Is this a stroke patient? no    Neuro:  [place] Coma Scale  Best Eye Response: 4-->(E4) spontaneous  Best Motor Response: 6-->(M6) obeys commands  Best Verbal Response: 5-->(V5) oriented  [place] Coma Scale Score: 15  Assessment Qualifiers: patient not sedated/intubated  Pupil PERRLA: yes     24hr Temp:  [97.6 °F (36.4 °C)-98.1 °F (36.7 °C)]     CV:   Rhythm: normal sinus rhythm  BP goals:   SBP < 160  MAP > 65    Resp:      Oxygen Concentration (%): 28    Plan:  Continuing to monitor    GI/:     Diet/Nutrition Received: regular  Last Bowel Movement: 03/07/23  Voiding Characteristics: voids spontaneously without difficulty    Intake/Output Summary (Last 24 hours) at 3/9/2023 0315  Last data filed at 3/8/2023 2105  Gross per 24 hour   Intake 1350 ml   Output 800 ml   Net 550 ml     Unmeasured Output  Urine Occurrence: 1  Stool Occurrence: 1  Emesis Occurrence: 0  Pad Count: 1    Labs/Accuchecks:  Recent Labs   Lab 03/08/23  0609   WBC 7.47   RBC 4.40*   HGB 13.4*   HCT 42.3         Recent Labs   Lab 03/08/23  0609      K 3.7   CO2 24      BUN 13   CREATININE 0.8   ALKPHOS 116   ALT 51*   AST 35   BILITOT 0.4      Recent Labs   Lab 03/08/23  0609   INR 1.0   APTT 28.5    No results for input(s): CPK, CPKMB, TROPONINI, MB in the last 168 hours.    Electrolytes: N/A - electrolytes WDL  Accuchecks: none    Gtts:      LDA/Wounds:  Lines/Drains/Airways       Peripheral Intravenous Line  Duration                  Peripheral IV - Single Lumen 03/07/23 1110 20 G Anterior;Right Forearm 1 day         Peripheral IV - Single Lumen 03/07/23 1112 20 G Left;Posterior Forearm 1 day                  Wounds: No  Wound care  consulted: No

## 2023-03-09 NOTE — SUBJECTIVE & OBJECTIVE
Interval History: 3/9: sEEG removed yesterday, CTH post op with expected changes, no large volume hematoma. OOB today, DC after working with PT/OT      Medications:  Continuous Infusions:  Scheduled Meds:   acetaminophen  1,000 mg Oral Q8H    cefTRIAXone (ROCEPHIN) IVPB  2 g Intravenous Q12H    cloBAZam  40 mg Oral QHS    EScitalopram oxalate  10 mg Oral Daily    eslicarbazepine  1,200 mg Oral Nightly    famotidine  20 mg Oral BID    gabapentin  300 mg Oral TID    polyethylene glycol  17 g Oral Daily    senna-docusate 8.6-50 mg  1 tablet Oral Daily    zonisamide  500 mg Oral QHS     PRN Meds:calcium carbonate, glycerin adult, hydrALAZINE, HYDROmorphone, lorazepam, melatonin, methocarbamoL, ondansetron, oxyCODONE, promethazine     Review of Systems   Neurological:  Positive for seizures.   All other systems reviewed and are negative.  Objective:     Weight: 87.5 kg (193 lb)  Body mass index is 28.5 kg/m².  Vital Signs (Most Recent):  Temp: 98.4 °F (36.9 °C) (03/09/23 0705)  Pulse: 65 (03/09/23 1005)  Resp: 14 (03/09/23 1005)  BP: (!) 160/92 (03/09/23 1005)  SpO2: 96 % (03/09/23 1005)   Vital Signs (24h Range):  Temp:  [97.6 °F (36.4 °C)-98.4 °F (36.9 °C)] 98.4 °F (36.9 °C)  Pulse:  [] 65  Resp:  [12-29] 14  SpO2:  [89 %-99 %] 96 %  BP: (120-160)/(65-96) 160/92     Date 03/09/23 0700 - 03/10/23 0659   Shift 3482-6288 4475-2433 4324-9611 24 Hour Total   INTAKE   P.O. 240   240   Shift Total(mL/kg) 240(2.7)   240(2.7)   OUTPUT   Shift Total(mL/kg)       Weight (kg) 87.5 87.5 87.5 87.5                          Physical Exam  Constitutional:       Appearance: He is well-developed and well-nourished.   Eyes:      Extraocular Movements: EOM normal.      Conjunctiva/sclera: Conjunctivae normal.      Pupils: Pupils are equal, round, and reactive to light.   Cardiovascular:      Pulses: Normal pulses.   Abdominal:      Palpations: Abdomen is soft.   Neurological:      Mental Status: He is alert and oriented to person,  place, and time.      Comments: AAOx3, higher order confusion  PERRL  CN intact  BUE 5/5  BLE 5/5  SILT    Headwrap in place with sEEG leads   Psychiatric:         Mood and Affect: Mood and affect normal.       Physical Exam:    Constitutional: He appears well-developed and well-nourished.     Eyes: Pupils are equal, round, and reactive to light. Conjunctivae and EOM are normal.     Cardiovascular: Normal pulses.     Abdominal: Soft.     Psych/Behavior: He is alert. He is oriented to person, place, and time. He has a normal mood and affect.     Neurological:   AAOx3, higher order confusion  PERRL  CN intact  BUE 5/5  BLE 5/5  SILT    Headwrap in place with sEEG leads     Significant Labs:  Recent Labs   Lab 03/08/23  0609   GLU 95      K 3.7      CO2 24   BUN 13   CREATININE 0.8   CALCIUM 9.3   MG 2.0       Recent Labs   Lab 03/08/23  0609   WBC 7.47   HGB 13.4*   HCT 42.3          Recent Labs   Lab 03/08/23  0609   INR 1.0   APTT 28.5       Microbiology Results (last 7 days)       ** No results found for the last 168 hours. **          All pertinent labs from the last 24 hours have been reviewed.    Significant Diagnostics:  I have reviewed all pertinent imaging results/findings within the past 24 hours.

## 2023-03-09 NOTE — PROGRESS NOTES
[facility]  Neurosurgery  Progress Note    Subjective:     History of Present Illness: 58 M with epilepsy presents for elective sEEG placement on 2/27      Post-Op Info:  Procedure(s) (LRB):  REMOVAL OF STEREO EEG LEADS (DEPTH ELECTRODES) (Left)   1 Day Post-Op     Interval History: 3/9: sEEG removed yesterday, CTH post op with expected changes, no large volume hematoma. OOB today, DC after working with PT/OT      Medications:  Continuous Infusions:  Scheduled Meds:   acetaminophen  1,000 mg Oral Q8H    cefTRIAXone (ROCEPHIN) IVPB  2 g Intravenous Q12H    cloBAZam  40 mg Oral QHS    EScitalopram oxalate  10 mg Oral Daily    eslicarbazepine  1,200 mg Oral Nightly    famotidine  20 mg Oral BID    gabapentin  300 mg Oral TID    polyethylene glycol  17 g Oral Daily    senna-docusate 8.6-50 mg  1 tablet Oral Daily    zonisamide  500 mg Oral QHS     PRN Meds:calcium carbonate, glycerin adult, hydrALAZINE, HYDROmorphone, lorazepam, melatonin, methocarbamoL, ondansetron, oxyCODONE, promethazine     Review of Systems   Neurological:  Positive for seizures.   All other systems reviewed and are negative.  Objective:     Weight: 87.5 kg (193 lb)  Body mass index is 28.5 kg/m².  Vital Signs (Most Recent):  Temp: 98.4 °F (36.9 °C) (03/09/23 0705)  Pulse: 65 (03/09/23 1005)  Resp: 14 (03/09/23 1005)  BP: (!) 160/92 (03/09/23 1005)  SpO2: 96 % (03/09/23 1005)   Vital Signs (24h Range):  Temp:  [97.6 °F (36.4 °C)-98.4 °F (36.9 °C)] 98.4 °F (36.9 °C)  Pulse:  [] 65  Resp:  [12-29] 14  SpO2:  [89 %-99 %] 96 %  BP: (120-160)/(65-96) 160/92     Date 03/09/23 0700 - 03/10/23 0659   Shift 3310-4753 6895-2035 7084-5393 24 Hour Total   INTAKE   P.O. 240   240   Shift Total(mL/kg) 240(2.7)   240(2.7)   OUTPUT   Shift Total(mL/kg)       Weight (kg) 87.5 87.5 87.5 87.5                          Physical Exam  Constitutional:       Appearance: He is well-developed and well-nourished.   Eyes:      Extraocular  Movements: EOM normal.      Conjunctiva/sclera: Conjunctivae normal.      Pupils: Pupils are equal, round, and reactive to light.   Cardiovascular:      Pulses: Normal pulses.   Abdominal:      Palpations: Abdomen is soft.   Neurological:      Mental Status: He is alert and oriented to person, place, and time.      Comments: AAOx3, higher order confusion  PERRL  CN intact  BUE 5/5  BLE 5/5  SILT    Headwrap in place with sEEG leads   Psychiatric:         Mood and Affect: Mood and affect normal.       Physical Exam:    Constitutional: He appears well-developed and well-nourished.     Eyes: Pupils are equal, round, and reactive to light. Conjunctivae and EOM are normal.     Cardiovascular: Normal pulses.     Abdominal: Soft.     Psych/Behavior: He is alert. He is oriented to person, place, and time. He has a normal mood and affect.     Neurological:   AAOx3, higher order confusion  PERRL  CN intact  BUE 5/5  BLE 5/5  SILT    Headwrap in place with sEEG leads     Significant Labs:  Recent Labs   Lab 03/08/23  0609   GLU 95      K 3.7      CO2 24   BUN 13   CREATININE 0.8   CALCIUM 9.3   MG 2.0       Recent Labs   Lab 03/08/23  0609   WBC 7.47   HGB 13.4*   HCT 42.3          Recent Labs   Lab 03/08/23  0609   INR 1.0   APTT 28.5       Microbiology Results (last 7 days)       ** No results found for the last 168 hours. **          All pertinent labs from the last 24 hours have been reviewed.    Significant Diagnostics:  I have reviewed all pertinent imaging results/findings within the past 24 hours.    Assessment/Plan:     * Complex partial seizures evolving to generalized tonic-clonic seizures  57 yo M with intractable epilepsy who is now s/p sEEG lead placement for seizure monitoring/mapping    sEEG removed 3/8    - admitted to [facility]  - q1 neurochecks  - CT stealth post op reviewed, sEEG leads in appropriate position, no large volume hemorrhage  - CTH post op sEEG removeal with expected changes, no  large volume hematoma.   - AEDs and seizure management per epilepsy  - bowel regiment, last BM 3/6  - voiding spontaneously  - Chest pain, trops negative, likely GI related pain    - PPI, Bowel regimen  - PT/OT OOB today  - DC after work with PT/OT          Obinna Avendano MD  Neurosurgery  [facility]

## 2023-03-09 NOTE — PT/OT/SLP PROGRESS
Physical Therapy Treatment    Patient Name:  Declan Burleson   MRN:  [id number]    Recommendations:     Discharge Recommendations: home  Discharge Equipment Recommendations: walker, rolling  Barriers to discharge: None    Assessment:     Declan Burleson is a 58 y.o. male admitted with a medical diagnosis of Complex partial seizures evolving to generalized tonic-clonic seizures.  He presents with the following impairments/functional limitations: impaired self care skills, impaired functional mobility, gait instability, impaired balance, decreased safety awareness, pain. Pt w/ improved ambulation, walked 350' w/ RW mod-I. Pt safe to d/c home w/ family, no further PT needs at this time.    Rehab Prognosis: Good; patient would benefit from acute skilled PT services to address these deficits and reach maximum level of function.    Recent Surgery: Procedure(s) (LRB):  REMOVAL OF STEREO EEG LEADS (DEPTH ELECTRODES) (Left) 1 Day Post-Op    Plan:     During this hospitalization, patient to be seen 3 x/week to address the identified rehab impairments via gait training, therapeutic activities, therapeutic exercises, neuromuscular re-education and progress toward the following goals:    Plan of Care Expires:  03/10/23    Subjective     Chief Complaint: none  Patient/Family Comments/goals: get home today  Pain/Comfort:  Pain Rating 1: 0/10  Pain Rating Post-Intervention 1: 0/10      Objective:     Communicated with RN prior to session.  Patient found with bed in chair position with telemetry, blood pressure cuff, pulse ox (continuous) upon PT entry to room.     General Precautions: Standard, aspiration, fall, seizure  Orthopedic Precautions:    Braces: N/A  Respiratory Status: Room air     Functional Mobility:  Bed Mobility:     Supine to Sit: independence  Sit to Supine: independence  Transfers:     Sit to Stand:  modified independence with rolling walker  Gait: 350' mod-I w/ RW      AM-PAC 6 CLICK MOBILITY  Turning  over in bed (including adjusting bedclothes, sheets and blankets)?: 4  Sitting down on and standing up from a chair with arms (e.g., wheelchair, bedside commode, etc.): 4  Moving from lying on back to sitting on the side of the bed?: 4  Moving to and from a bed to a chair (including a wheelchair)?: 4  Need to walk in hospital room?: 4  Climbing 3-5 steps with a railing?: 4  Basic Mobility Total Score: 24       Treatment & Education:  Gait training 350' w/ RW and cueing for safety awareness and proper use of RW  Pt and family educated on PT assessment and plan to d/c orders 2/2 no current needs for skilled PT. Pt instructed to slowly progress mobility each day towards PLOF without attempting to jump right into previous daily routine immediately upon d/c 2/2 general deconditioning from being in hospital. Pt and family in agreement w/ POC and verbalized understanding w/ plan to slowly progress to normal daily activities at home and inform MD team if pt experiences any difficulty progressing back to PLOF while here or upon d/c in order for PT to be re-consulted/orders placed for OP PT as needed.     Patient left sitting edge of bed with all lines intact, call button in reach, RN notified, and wife present..    GOALS:   Multidisciplinary Problems       Physical Therapy Goals          Problem: Physical Therapy    Goal Priority Disciplines Outcome Goal Variances Interventions   Physical Therapy Goal     PT, PT/OT Ongoing, Progressing     Description: Goals to be met by: 3/10/23     Patient will increase functional independence with mobility by performin. Maintain strength and ROM WFL grossly- MET  2. Participate in OOB assessment once sEEG removed- MET  3. Ambulate 100' w/ mod-I using LRAD                       Time Tracking:     PT Received On: 23  PT Start Time: 741     PT Stop Time: 805  PT Total Time (min): 24 min     Billable Minutes: Gait Training 24    Treatment Type: Treatment  PT/PTA: PT     PTA  Visit Number: 0     03/09/2023

## 2023-03-09 NOTE — SUBJECTIVE & OBJECTIVE
Interval History: sEEG leads explanted 3/8, patient tolerated procedure well, post-op CTH satisfactory. Stable for discharge home today.     Current Facility-Administered Medications   Medication Dose Route Frequency Provider Last Rate Last Admin    acetaminophen tablet 1,000 mg  1,000 mg Oral Q8H Magda Miinea, NP   1,000 mg at 03/09/23 0620    calcium carbonate 200 mg calcium (500 mg) chewable tablet 1,000 mg  1,000 mg Oral BID PRN Magda Miinea, NP        cloBAZam Tab 40 mg  40 mg Oral QHS Teresa Cerrato PA-C   40 mg at 03/08/23 2102    EScitalopram oxalate tablet 10 mg  10 mg Oral Daily Magda Miinea, NP   10 mg at 03/09/23 0804    eslicarbazepine Tab 1,200 mg  1,200 mg Oral Nightly Teresa Cerrato PA-C   1,200 mg at 03/08/23 2102    famotidine tablet 20 mg  20 mg Oral BID Fred Wayne MD   20 mg at 03/09/23 0804    gabapentin capsule 300 mg  300 mg Oral TID Teresa Gurrola PA-C   300 mg at 03/09/23 0804    glycerin adult suppository 1 suppository  1 suppository Rectal Daily PRN Isaura Burgos PA-C        hydrALAZINE injection 10 mg  10 mg Intravenous Q4H PRN Magda Weiss, BUZZ        HYDROmorphone injection 0.5 mg  0.5 mg Intravenous Q6H PRN Isaura Burgos PA-C        LORazepam injection 2 mg  2 mg Intravenous Once PRN Shin López PA-C        melatonin tablet 9 mg  9 mg Oral Nightly PRN Teresa Gurrola PA-C   9 mg at 03/07/23 2202    methocarbamoL tablet 500 mg  500 mg Oral QID PRN Magda Miinea, NP   500 mg at 03/08/23 1805    ondansetron disintegrating tablet 8 mg  8 mg Oral Q8H PRN Isaura Burgos PA-C        oxyCODONE immediate release tablet 5 mg  5 mg Oral Q6H PRN Magda Miinea, NP   5 mg at 03/08/23 1418    polyethylene glycol packet 17 g  17 g Oral Daily Isaura Burgos PA-C   17 g at 03/09/23 0805    promethazine tablet 12.5 mg  12.5 mg Oral Q6H PRN Teresa Gurrola PA-C        senna-docusate 8.6-50 mg per tablet 1 tablet  1 tablet Oral Daily Shin López PA-C   1 tablet at  03/09/23 0804    zonisamide capsule 500 mg  500 mg Oral QHS Teresa Cerrato PA-C   500 mg at 03/08/23 2102     Current Outpatient Medications   Medication Sig Dispense Refill    eslicarbazepine (APTIOM) 400 mg Tab tablet Take 1 tablet (400 mg total) by mouth once daily. 30 tablet 5    eslicarbazepine (APTIOM) 800 mg Tab Take 800 mg by mouth once daily. 30 tablet 5    mag hydrox/aluminum hyd/simeth (ANTACID ORAL) Take by mouth as needed. Acid reflux      midazolam 5 mg/spray (0.1 mL) Spry 0.1 mLs by Nasal route.      ondansetron (ZOFRAN-ODT) 8 MG TbDL Take 8 mg by mouth every 8 (eight) hours as needed. nausea      rimegepant (NURTEC) 75 mg odt Take 75 mg by mouth.      rizatriptan (MAXALT-MLT) 10 MG disintegrating tablet Take 10 mg by mouth as needed. No more than 3 doses / week      aspirin (ECOTRIN) 81 MG EC tablet Take 1 tablet (81 mg total) by mouth once daily. 360 tablet 0    cloBAZam (ONFI) 20 mg Tab Take 2 tablets (40 mg total) by mouth every evening. 60 tablet 3    EScitalopram oxalate (LEXAPRO) 10 MG tablet Take 1 tablet (10 mg total) by mouth once daily. 30 tablet 11    oxyCODONE (ROXICODONE) 5 MG immediate release tablet Take 5 mg by mouth every 4 (four) hours as needed.      zonisamide (ZONEGRAN) 100 MG Cap Take 5 capsules (500 mg total) by mouth every evening. 450 capsule 3     Continuous Infusions:    Review of Systems   Constitutional:  Positive for fatigue.   Gastrointestinal:  Negative for nausea and vomiting.   Neurological:  Positive for seizures. Negative for headaches.   All other systems reviewed and are negative.  Objective:     Vital Signs (Most Recent):  Temp: 98.4 °F (36.9 °C) (03/09/23 0705)  Pulse: 65 (03/09/23 1005)  Resp: 14 (03/09/23 1005)  BP: (!) 160/92 (03/09/23 1005)  SpO2: 96 % (03/09/23 1005)   Vital Signs (24h Range):  Temp:  [97.9 °F (36.6 °C)-98.4 °F (36.9 °C)] 98.4 °F (36.9 °C)  Pulse:  [] 65  Resp:  [12-29] 14  SpO2:  [89 %-99 %] 96 %  BP: (120-160)/(67-92) 160/92      Weight: 87.5 kg (193 lb)  Body mass index is 28.5 kg/m².    Physical Exam  Vitals and nursing note reviewed.   Constitutional:       General: He is not in acute distress.     Appearance: He is not diaphoretic.   HENT:      Head: Normocephalic.      Comments: sEEG leads + headwrap in place  Eyes:      General: No scleral icterus.     Extraocular Movements: Extraocular movements intact.      Pupils: Pupils are equal, round, and reactive to light.   Cardiovascular:      Rate and Rhythm: Normal rate.   Pulmonary:      Effort: Pulmonary effort is normal. No respiratory distress.   Abdominal:      General: There is no distension.      Palpations: Abdomen is soft.   Musculoskeletal:         General: No deformity or signs of injury.      Cervical back: Normal range of motion and neck supple.   Skin:     General: Skin is warm and dry.   Neurological:      Mental Status: He is alert and oriented to person, place, and time.   Psychiatric:         Mood and Affect: Mood normal.         Speech: Speech normal.         Behavior: Behavior normal.       NEUROLOGICAL EXAMINATION:     MENTAL STATUS   Oriented to person, place, and time.   Attention: normal. Concentration: normal.   Speech: speech is normal   Level of consciousness: alert    CRANIAL NERVES     CN III, IV, VI   Pupils are equal, round, and reactive to light.    CN VII   Facial expression full, symmetric.     CN VIII   Hearing: intact    CN XI   CN XI normal.     CN XII   CN XII normal.     MOTOR EXAM   Muscle bulk: normal  Overall muscle tone: normal       Follows commands, moves all extremities spontaneously and symmetrically     GAIT AND COORDINATION        No pronator drift noted     Significant Labs: All pertinent lab results from the past 24 hours have been reviewed.    Significant Studies: I have reviewed all pertinent imaging results/findings within the past 24 hours.

## 2023-03-09 NOTE — ASSESSMENT & PLAN NOTE
Declan Burleson is a 58 y.o.M  admitted to [facility] s/p SEEG placement. He has failed multiple AEDs, including topiramate, lamotrigine, levetiracetam, and oxcarbazepine. He is currently taking eslicarbazepine, zonisamide, and clobazam.     -- s/p sEEG  -- NSGY and epilepsy following  -- AED's per epilepsy reccs- weaned off all AEDs on 3/2 per epilepsy   -- Neuro checks q 2hr  -- SBP <160  -- seizure precautions  -- PT/OT/SLP  - cortical stimulation 3/3 w epilepsy- 1x 30 sec seizure  - Restarted on home AEDs per epilepsy team:  Clobazam 40mg HS  eslicarbazepine 1200mg HS  zonisamide 500mg HS   3/9/2023: to OR today for sEEG leads removal w/ NSGY team   3/9/2023: NAEON -continue all AED's per epilepsy team  Discharge home

## 2023-03-13 DIAGNOSIS — G40.209 COMPLEX PARTIAL SEIZURES EVOLVING TO GENERALIZED TONIC-CLONIC SEIZURES: Primary | ICD-10-CM

## 2023-03-16 NOTE — PROCEDURES
SEEG Report      IMPLANTATION DATE:  2/27/2023  DATE OF ADMISSION: 2/27/2023       ADMITTING/REQUESTING PROVIDER: Ruchi Chen MD     REASON FOR CONSULT:  58-year-old man admitted for phase 2 epilepsy surgical workup with the placement of intracranial sEEG leads for seizure capture and onset localization.     METHODOLOGY  Depth electrodes consist of thin wires with electrical contacts at fixed distances along the wire. These are implanted using a stereotactic procedure targeting cortical and subcortical structures. Digital video recording of the patient is simultaneously recorded with the EEG. The patient is instructed to report clinical symptoms which may occur during the recording session. EEG and video recording are stored and archived in digital format. Activation procedures which include photic stimulation, hyperventilation and instructing patients to perform simple tasks are done in selected patients. Compresses spectral analysis (CSA) is performed on the activity recorded from each individual channel. This is displayed as a power display of frequencies from 0 to 30 Hz over time. The CSA analysis is done and displayed continuously. This is reviewed for asymmetries in power between homologous areas of the scalp and for presence of changes in power which can be seen when seizures occur. Sections of suspected abnormalities on the CSA is then compared with the original EEG recording.      The following is a chart outlying the details related to the depth electrodes implanted:       Electrode    Medial target  Lateral target  Cable Color Serial Number # Of Contacts Head Box  Location   1 L Sup margin Piriformis Inf Temp Gyrus Blk/Gr 59672 16 1-16   2 L Entorihinal Entorhinal Inf Temp Gyrus Yel/Scott 04700 12 17-28   3 L Prefrontal Ant Cing Mid Frontal Gyrus Scott/Blk 15634 16 29-44   4 L Ant Insular Ant Insula Frontal Operculum Yel/Gre 44043 10 45-54   5 L Mid Insular Mid Insula Pars Triangularis Yel/Scott 13035 10  55-64   6 L Orbitofrontal Med OrbFr Lat OrbFr Blk/Yel 80306 16 65-80   7 L Mid Cing Mid Cingulate Sup Frontal Gyrus Red/Yel 33479 14 81-94   8 L Hippo Tail Post Hippo Mid Temp Gyrus Red/Gre 16525 14     EKG           109-110        RECORDING TIMES  Start on March 6, 2023 at hours 7 minute 0 seconds 46  End on March 7, 2023 at hours 7 minute 0 seconds 4   The total time of EEG recording for the study was 23 hours and 59 minutes     EEG FINDINGS     Interictal:  - Independent spikes LSuMar1-2  - Spikes with a broad field LSuMar1-2, LPreFr 1-4, LOrFr 1-4  - Independent spikes Lhippo1-2    Ictal:   During this study a total of 4 seizures were recorded.    Seizure 1.  Was recorded from hour 9 minute 24 seconds 43 to hour 9 minute 27 seconds 47  Electrographically the onset was characterized by buildup of periodic sharp activity at Lhippo1-2, which evolved in morphology and rhythmicity and subsequently spread to LEnCort  and LOrFr, and then subsequently to involve LHippo and LSuMar electrodes and was characterized by generalized periodic discharges.  Clinically the patient had noticeable automatisms of the face 68 seconds after electrographic onset.  The seizure progressed to a secondary generalized tonic-clonic seizure    Seizure 2.  Was recorded from hours 13 minute 47 seconds 54 to hours 13 minute 49 seconds 59  Electrographically the onset was characterized by buildup of periodic sharp activity at Lhippo1-2, which evolved in morphology and rhythmicity and subsequently spread to LOrFr, and then subsequently to involve LHippo, LEnCort and LSuMar electrodes and was characterized by generalized periodic discharges.  Clinically the patient had noticeable automatisms of the face at 55 seconds after electrographic onset.  This was followed by right hand automatisms..    Seizure 3.  Was recorded from hours 16 minute 44 seconds 4 to hours 16 minute 45 seconds 58  Electrographically the onset was characterized by buildup  of periodic sharp activity at Lhippo1-2, which evolved in morphology and rhythmicity and subsequently spread to LEnCort  and LOrFr, and then subsequently to involve LHippo and LSuMar electrodes and was characterized by generalized periodic discharges.  Clinically the patient was drinking during the onset of the seizure.  Oral automatisms were noted 57 seconds after the electrographic onset.    Seizure 4.  Was recorded from hour 19 minute 37 seconds 32 to hour 19 minute 39 seconds 39  Electrographically the onset was characterized by buildup of periodic sharp activity at Lhippo1-2, which evolved in morphology and rhythmicity and subsequently spread to LEnCort  and LOrFr, and then subsequently to involve LHippo and LSuMar electrodes and was characterized by generalized periodic discharges.  Clinically the patient had noticeable oral automatisms at 46 seconds after the electrographic onset of the seizure.      Impression:    This is an abnormal EEG during wakefulness, drowsiness and sleep.  The presence of interictal discharges in the   LSuMar1-2 contacts is suggestive of an irritable region.  At times a broad field was noted involving LPreFr 1-4, LOrFr 1-4 contacts.  In addition independent interictal discharges were also noted in the Lhippo1-2 contacts.  4  electrographic seizures were recorded which emanated from the Lhippo1-2 contacts.

## 2023-03-16 NOTE — PROCEDURES
SEEG Report      IMPLANTATION DATE:  2/27/2023  DATE OF ADMISSION: 2/27/2023       ADMITTING/REQUESTING PROVIDER: Ruchi Chen MD     REASON FOR CONSULT:  58-year-old man admitted for phase 2 epilepsy surgical workup with the placement of intracranial sEEG leads for seizure capture and onset localization.     METHODOLOGY  Depth electrodes consist of thin wires with electrical contacts at fixed distances along the wire. These are implanted using a stereotactic procedure targeting cortical and subcortical structures. Digital video recording of the patient is simultaneously recorded with the EEG. The patient is instructed to report clinical symptoms which may occur during the recording session. EEG and video recording are stored and archived in digital format. Activation procedures which include photic stimulation, hyperventilation and instructing patients to perform simple tasks are done in selected patients. Compresses spectral analysis (CSA) is performed on the activity recorded from each individual channel. This is displayed as a power display of frequencies from 0 to 30 Hz over time. The CSA analysis is done and displayed continuously. This is reviewed for asymmetries in power between homologous areas of the scalp and for presence of changes in power which can be seen when seizures occur. Sections of suspected abnormalities on the CSA is then compared with the original EEG recording.      The following is a chart outlying the details related to the depth electrodes implanted:       Electrode    Medial target  Lateral target  Cable Color Serial Number # Of Contacts Head Box  Location   1 L Sup margin Piriformis Inf Temp Gyrus Blk/Gr 23425 16 1-16   2 L Entorihinal Entorhinal Inf Temp Gyrus Yel/Scott 86241 12 17-28   3 L Prefrontal Ant Cing Mid Frontal Gyrus Scott/Blk 85689 16 29-44   4 L Ant Insular Ant Insula Frontal Operculum Yel/Gre 95312 10 45-54   5 L Mid Insular Mid Insula Pars Triangularis Yel/Scott 44750 10  55-64   6 L Orbitofrontal Med OrbFr Lat OrbFr Blk/Yel 14128 16 65-80   7 L Mid Cing Mid Cingulate Sup Frontal Gyrus Red/Yel 41739 14 81-94   8 L Hippo Tail Post Hippo Mid Temp Gyrus Red/Gre 47285 14     EKG           109-110        RECORDING TIMES  Start on March 7, 2023 at hours 7 minute 0 seconds 48  End on March 8, 2023 at hours 7 minute 0 seconds 0   The total time of EEG recording for the study was 23 hours and 59 minutes   EEG FINDINGS     Interictal:  - Independent spikes LSuMar1-2  - Independent spikes Lhippo1-2    Ictal:   During this study no seizures were recorded..    Impression:    This is an abnormal EEG during wakefulness, drowsiness and sleep.  The presence of interictal discharges in the   LSuMar1-2 and Lhippo1-2 contacts is suggestive of an irritable region. No electrographic seizures were recorded

## 2023-03-16 NOTE — PROCEDURES
Patient: Declan Burleson   Date: 3/3/23   High Hz stim 50Hz,  300 ms, 1-5 sec duration, 1-5 mA  Electrode  Contact A Contact B mA Duration  Physiological response  Elicited discharges/After discharges  Hz, duration    Stim induced seizure (details)    L Encor 8 9 1 5         2 5         3 5         4 5  2 sec        4.5 5  1 sec      6 7 2 5         3 5  2 sec        4 5  2 sec        4.5 5  2 sec      4 5 2 5  2 sec        3 5  2 sec        4 5  2 sec      2 3 2 5  2 sec        3 5  4 sec        4 5  8 sec      1 2 2 5         3 5         4 5                L SuMar 10 11 2 5  5 sec        3 5  6 sec        4 5  5 sec      8 9 2 5  4 sec        3 5  3 sec       4 5  3 sec      6 7 2 5  3 sec        3 5  3 sec        4 5  3 sec      4 5 2 5         3 5   At 1-4 contacts   Seizure  Inability to folllow commands, staring into space.

## 2023-03-22 NOTE — PROCEDURES
Patient: Declan Burleson   Date: 3/7/23   High Hz stim 50Hz,  300 ms, 1-5 sec duration, 1-5 mA    Electrode  Contact A Contact B mA Duration  Physiological response  Elicited discharges/After discharges  Hz, duration    Stim induced seizure (details)    L Prefro 1 2 1 5         2 5         2 5                L OrFro 1 2 1 5         2 5         2 5       3 4 1 5         2 5         2 5

## 2023-03-23 ENCOUNTER — HOSPITAL ENCOUNTER (OUTPATIENT)
Dept: RADIOLOGY | Facility: HOSPITAL | Age: 58
Discharge: HOME | End: 2023-03-23
Attending: NEUROLOGICAL SURGERY
Payer: MEDICARE

## 2023-03-23 ENCOUNTER — OFFICE VISIT (OUTPATIENT)
Dept: NEUROSURGERY | Facility: CLINIC | Age: 58
End: 2023-03-23
Payer: MEDICARE

## 2023-03-23 ENCOUNTER — TELEPHONE (OUTPATIENT)
Dept: NEUROLOGY | Facility: CLINIC | Age: 58
End: 2023-03-23
Payer: MEDICARE

## 2023-03-23 DIAGNOSIS — G40.219 COMPLEX PARTIAL EPILEPSY WITH GENERALIZATION AND WITH INTRACTABLE EPILEPSY: Primary | ICD-10-CM

## 2023-03-23 DIAGNOSIS — G40.209 COMPLEX PARTIAL SEIZURES EVOLVING TO GENERALIZED TONIC-CLONIC SEIZURES: ICD-10-CM

## 2023-03-23 PROCEDURE — 99024 POSTOP FOLLOW-UP VISIT: CPT | Mod: S$GLB,,, | Performed by: NEUROLOGICAL SURGERY

## 2023-03-23 PROCEDURE — 70552 MRI BRAIN STEM W/DYE: CPT | Mod: 26,,, | Performed by: RADIOLOGY

## 2023-03-23 PROCEDURE — 99024 PR POST-OP FOLLOW-UP VISIT: ICD-10-PCS | Mod: S$GLB,,, | Performed by: NEUROLOGICAL SURGERY

## 2023-03-23 PROCEDURE — 99999 PR PBB SHADOW E&M-EST. PATIENT-LVL II: ICD-10-PCS | Mod: PBBFAC,,, | Performed by: NEUROLOGICAL SURGERY

## 2023-03-23 PROCEDURE — 25500020 PHARM REV CODE 255: Performed by: NEUROLOGICAL SURGERY

## 2023-03-23 PROCEDURE — 70552 MRI BRAIN STEALTH W/O FIDUCIALS WITH CONTRAST: ICD-10-PCS | Mod: 26,,, | Performed by: RADIOLOGY

## 2023-03-23 PROCEDURE — A9585 GADOBUTROL INJECTION: HCPCS | Performed by: NEUROLOGICAL SURGERY

## 2023-03-23 PROCEDURE — 99999 PR PBB SHADOW E&M-EST. PATIENT-LVL II: CPT | Mod: PBBFAC,,, | Performed by: NEUROLOGICAL SURGERY

## 2023-03-23 PROCEDURE — 1111F DSCHRG MED/CURRENT MED MERGE: CPT | Mod: CPTII,S$GLB,, | Performed by: NEUROLOGICAL SURGERY

## 2023-03-23 PROCEDURE — 1159F MED LIST DOCD IN RCRD: CPT | Mod: CPTII,S$GLB,, | Performed by: NEUROLOGICAL SURGERY

## 2023-03-23 PROCEDURE — 1111F PR DISCHARGE MEDS RECONCILED W/ CURRENT OUTPATIENT MED LIST: ICD-10-PCS | Mod: CPTII,S$GLB,, | Performed by: NEUROLOGICAL SURGERY

## 2023-03-23 PROCEDURE — 70552 MRI BRAIN STEM W/DYE: CPT | Mod: TC

## 2023-03-23 PROCEDURE — 1159F PR MEDICATION LIST DOCUMENTED IN MEDICAL RECORD: ICD-10-PCS | Mod: CPTII,S$GLB,, | Performed by: NEUROLOGICAL SURGERY

## 2023-03-23 RX ORDER — GADOBUTROL 604.72 MG/ML
10 INJECTION INTRAVENOUS
Status: COMPLETED | OUTPATIENT
Start: 2023-03-23 | End: 2023-03-23

## 2023-03-23 RX ADMIN — GADOBUTROL 10 ML: 604.72 INJECTION INTRAVENOUS at 01:03

## 2023-03-23 NOTE — PROGRESS NOTES
"Neurosurgery  Follow-up     SUBJECTIVE:     Chief Complaint: intractable epilepsy      History of Present Illness:  Declan Burleson is a 58 y.o. right-handed male referred by Dr. Benoit (originally by Dr. Saldana of [place]) for consideration of surgical therapy for intractable epilepsy. The patient was formerly employed as a . He is accompanied by his wife Trista today, who helps to provide the history. His seizures began when he was 48 years old. He can recall no inciting event. He has 3 semiologies: "full blown" generalized events, staring episodes, and "mild" seizures, which are characterized as generalized events of shorter duration without incontinence and a shorter post-ictal period. He and his wife estimate that he persists in having about 2 events/month.     He has failed multiple AEDs, including topiramate, lamotrigine, levetiracetam, and oxcarbazepine. He is currently taking eslicarbazepine, zonisamide, and clobazam. He had EMU monitoring in May-June of this year, which suggests left-sided activity. He had 3T MRI in June (personally reviewed) that was non-lesional; he also had PET at that time suggesting possible left-sided activity. He had neuropsychological testing on 8/10/20; summary findings included below. He lives with his wife and 16-year-old daughter, who provide excellent social support.     He takes ASA 81, which will need to be held for one week prior to surgery.     As of 12/22/20, the patient reports he has had no significant change. He is hoping to be seen today for pre-operative optimization. He is out of Aptiom, which his wife is working to re-obtain for him.      As of 2/9/21, the patient underwent bilateral sEEG monitoring on 1/11/21. He self-removed several of the leads on 1/21/21 and was taken to the OR for removal of the remaining electrodes. Fortunately, there was no significant intracranial hemorrhage associated. Since being discharged home, the patient " reports that he is overall doing well. He has had no fever, chills, or drainage from the incisions. He has had some headache. He also feels that he has been sleeping more than before the procedure. He persists in having bad memory.     As of 4/13/21, the patient underwent left laser amygdalohippocampectomy on 3/1/21. Overall, the has done well since then. He and his wife report that he has been seizure free. He persists in having some days that are better than others; he is more irritable and tired than before surgery at times. This is inconsistent, and some days he is at his preoperative baseline. He has been compliant with his AEDs; he is taking 1200 mg of aptiom daily.    They deny any fever, chills, or drainage from the incision, though there was a scab that was removed when he got a haircut recently. He is also experiencing headache that radiates from the site of his incision forward. This improves with Maxalt.     As of 7/6/21, the patient reports that he is having headache from the base of the neck correction up the head. It feels like a small drum that comes and goes. He had to turn off the lights, lay down, and take over-the-counter medication (Tylenol, at times) last week. He also became more pale with these episodes. It is made worse by lying on the left side in bed.  He is now at his normal baseline. He also reports chest pain; he remains hyponatremic. He is currently out of the anxiety and blood pressure medications for at least 4 days.     He has been seizure free since the procedure. Trista reports that the incision is well healed. No fever/chills.     He is more irritable than before surgery and has some memory issues.     As of 7/22/21, the patient returns in follow up. He still has headache and mood swings at times. He has not had any seizures. He and his wife both feel that his speech and conversation and improving. He remains highly distractable. He and his wife endorse that his grandfather had  "migraines.     He will need to re-establish with a new epileptologist since Dr. Benoit is moving to [place].     As of 9/30/21, the patient reports he has remained seizure-free. He continues to have significant headaches, particularly over the left posterior aspect of the head, radiating over to the right side. His wife endorses that he has bradykinesia and decreased arm swing when walking. Jean Edgar and Karen are concerned he may have Parkinson disease.     As of 12/15/22, the patient returns with his wife, Trista. They are frustrated that he is "like a zombie" because of the high doses of AEDs. He has been seizure-free since April, but when he decreased the meds back in April, he had 3 events within 24 hours.     Mood swings are also an issue; he gets irritated more readily than he did in the past. He is seeing a speech therapist in [facility]; he is making progress WRT conversational communication and memory.     They now have 2 grandchildren.     Goals of surgery: to be free of seizures and reduce medications.     They would like to undergo repeat sEEG since the first was cut short to see if he may be a candidate for further epilepsy surgery.     As of 2/23/23, the patient returns for further discussion of repeat sEEG. He has undergone extensive discussion in interdisciplinary conference, together with review by Dr. Andrew of memory neurology, and he is being recommended for repeat sEEG. He has no evidence of non-epilepsy, non-TBI related neurodegenerative disease on Dr. Andrew's initial evaluation, per discussion with Dr. ALEK Edgra.     He and his wife remain eager to proceed with sEEG placement to determine whether he will be a candidate for further intracranial seizure surgery.     As of 3/23/23, the patient returns in postoperative follow up after having undergone repeat sEEG implanted on 2/27/23 and explanted 3/8/23. He reports that he continues to be very fatigued with the high dosages of medications. He " reports that he continues to sleep a lot. He and his wife both feel that he was much more alert while medications were held during the monitoring period.     They deny fever, chills, or drainage from the incision. He has not had any seizures since they discharged home.     Review of patient's allergies indicates:   Allergen Reactions    Losartan Rash       Current Outpatient Medications   Medication Sig Dispense Refill    eslicarbazepine (APTIOM) 400 mg Tab tablet Take 1 tablet (400 mg total) by mouth once daily. 30 tablet 5    eslicarbazepine (APTIOM) 800 mg Tab Take 800 mg by mouth once daily. 30 tablet 5    mag hydrox/aluminum hyd/simeth (ANTACID ORAL) Take by mouth as needed. Acid reflux      midazolam 5 mg/spray (0.1 mL) Spry 0.1 mLs by Nasal route.      ondansetron (ZOFRAN-ODT) 8 MG TbDL Take 8 mg by mouth every 8 (eight) hours as needed. nausea      oxyCODONE (ROXICODONE) 5 MG immediate release tablet Take 5 mg by mouth every 4 (four) hours as needed.      rimegepant (NURTEC) 75 mg odt Take 75 mg by mouth.      rizatriptan (MAXALT-MLT) 10 MG disintegrating tablet Take 10 mg by mouth as needed. No more than 3 doses / week      zonisamide (ZONEGRAN) 100 MG Cap Take 5 capsules (500 mg total) by mouth every evening. 450 capsule 3    aspirin (ECOTRIN) 81 MG EC tablet Take 1 tablet (81 mg total) by mouth once daily. 360 tablet 0    cloBAZam (ONFI) 20 mg Tab Take 2 tablets (40 mg total) by mouth every evening. 60 tablet 3    EScitalopram oxalate (LEXAPRO) 10 MG tablet Take 1 tablet (10 mg total) by mouth once daily. 30 tablet 11     No current facility-administered medications for this visit.       Past Medical History:   Diagnosis Date    Anxiety     Dementia in other diseases classified elsewhere, unspecified severity, without behavioral disturbance, psychotic disturbance, mood disturbance, and anxiety 2/2/2023    Depression     GERD (gastroesophageal reflux disease)     Hyperlipidemia     Hypertension     Long  term (current) use of antithrombotics/antiplatelets 2020    Migraine     Normocytic anemia 2023    Seizures      Past Surgical History:   Procedure Laterality Date    CYST REMOVAL      skin cyst - benign    PLACEMENT OF STEREO EEG LEADS(DEPTH ELECTRODES) Left 2023    Procedure: PLACEMENT OF STEREO EEG LEADS (DEPTH ELECTRODES);  Surgeon: Ruchi Chen MD;  Location: [facility] OR [address] [place] [address];  Service: Neurosurgery;  Laterality: Left;    REMOVAL OF STEREO EEG LEADS (DEPTH ELECTRODES) Bilateral 2021    Procedure: REMOVAL OF STEREO EEG LEADS (DEPTH ELECTRODES);  Surgeon: Ruchi Chen MD;  Location: [facility] OR [address] [place] [address];  Service: Neurosurgery;  Laterality: Bilateral;    REMOVAL OF STEREO EEG LEADS (DEPTH ELECTRODES) Left 3/8/2023    Procedure: REMOVAL OF STEREO EEG LEADS (DEPTH ELECTRODES);  Surgeon: Ruchi Chen MD;  Location: [facility] OR [address] [place] [address];  Service: Neurosurgery;  Laterality: Left;    TONSILLECTOMY      teenage      Family History       Problem Relation (Age of Onset)    Heart disease Maternal Uncle (50)    Lung disease Father    Migraines Paternal Grandfather    No Known Problems Mother          Social History     Socioeconomic History    Marital status:      Spouse name: Reilly    Years of education: 2 year college    Highest education level: Associate degree: occupational, technical, or vocational program   Tobacco Use    Smoking status: Former     Types: Cigarettes     Quit date:      Years since quittin.2    Smokeless tobacco: Never   Substance and Sexual Activity    Alcohol use: Yes     Comment: one drink once a week    Drug use: Never    Sexual activity: Yes     Partners: Female     Social Determinants of Health     Financial Resource Strain: Medium Risk    Difficulty of Paying Living Expenses: Somewhat hard   Food Insecurity: No Food Insecurity    Worried About Running Out of Food in the Last Year: Never true    Ran Out of Food in the Last Year: Never true    Transportation Needs: No Transportation Needs    Lack of Transportation (Medical): No    Lack of Transportation (Non-Medical): No   Physical Activity: Insufficiently Active    Days of Exercise per Week: 2 days    Minutes of Exercise per Session: 40 min   Stress: No Stress Concern Present    Feeling of Stress : Not at all   Social Connections: Unknown    Frequency of Communication with Friends and Family: More than three times a week    Frequency of Social Gatherings with Friends and Family: More than three times a week    Active Member of Clubs or Organizations: No    Attends Club or Organization Meetings: Never    Marital Status:    Housing Stability: Low Risk     Unable to Pay for Housing in the Last Year: No    Number of Places Lived in the Last Year: 1    Unstable Housing in the Last Year: No       Review of Systems   Constitutional:  Positive for diaphoresis. Negative for fever.   HENT:  Positive for nosebleeds.    Eyes:  Negative for visual disturbance.   Respiratory:  Negative for shortness of breath.    Cardiovascular:  Negative for chest pain.   Gastrointestinal:  Negative for vomiting.   Endocrine: Negative for cold intolerance.   Genitourinary:  Negative for difficulty urinating.   Musculoskeletal:  Negative for back pain.   Skin:  Negative for color change.   Neurological:  Negative for seizures.   Hematological:  Does not bruise/bleed easily.   Psychiatric/Behavioral:  The patient is nervous/anxious.      OBJECTIVE:     Vital Signs  Pain Score: 0-No pain  There is no height or weight on file to calculate BMI.      Physical Exam:    Constitutional: He appears well-developed and well-nourished. No distress.     Eyes: Pupils are equal, round, and reactive to light. EOM are normal.     Cardiovascular: No edema.     Abdominal: Soft.     Skin: Skin displays no rash on extremities. Skin displays no lesions on extremities.   Mostly bald     Psych/Behavior: He is alert. He is oriented to person, place,  and time.     Musculoskeletal: Gait is normal.        Right Upper Extremities: Muscle strength is 5/5.        Left Upper Extremities: Muscle strength is 5/5.       Right Lower Extremities: Muscle strength is 5/5.        Left Lower Extremities: Muscle strength is 5/5.     Neurological:        Coordination: He has normal finger to nose coordination.        DTRs: DTRs are DTRS NORMAL AND SYMMETRICnormal and symmetric.        Cranial nerves:   Face grossly symmetric   Shoulder shrug equal bilaterally  Pulmonary: symmetric expansion     Incisions: well healed     Diagnostic Results:  MRI brain personally reviewed   Piriform cortex appears intact     ASSESSMENT/PLAN:     Declan Burleson is a 58 y.o. male with intractable epilepsy who presents s/p bilateral sEEG and L laser amygdalohippocampectomy (3/1/21). He underwent repeat sEEG implanted on 2/27/23 and explanted 3/8/23.    Sutures and staples were removed without difficulty.     Dr. Kaleb Edgar, Dr. Marnie Horton (neuropsych), and I had a pleasant discussion with the patient, his wife, and his father-in-law about the seizures captured during this admission. He was found to have seizures coming from the tail of the hipoocampus moreso than the remnant amygdala. We discussed that since he had a memory decline after his laser ablation (per neuropsych and memory neurology evaluation), he is at significant risk of further decline with an open LACEY on the left. Dr. Horton provided some examples of what daily life would likely be like after said procedure. We also discussed offering several more laser ablations to do our best to ablate as much of the mesial temporal structures as possible. While more memory loss is certainly possible with these procedures as well, we do not expect that these would result in as significant a decline. However, less tissue can be disrupted this way, which means that seizure freedom rates (and the ability to reduce medications further,  which is the patient's goal) is also somewhat less realistic.     The patient and his family would appropriately like to take some time to consider their options. I would like to arrange a follow-up phone call with them next week to answer any additional questions.     I have encouraged them to contact the clinic in interim with any questions, concerns, or adverse clinical change.     I spent over an hour on this encounter, more than half in direct consultation, outside of removing sutures and staples and discussing postoperative concerns.

## 2023-03-24 ENCOUNTER — PATIENT MESSAGE (OUTPATIENT)
Dept: NEUROLOGY | Facility: CLINIC | Age: 58
End: 2023-03-24
Payer: MEDICARE

## 2023-03-24 ENCOUNTER — TELEPHONE (OUTPATIENT)
Dept: NEUROLOGY | Facility: CLINIC | Age: 58
End: 2023-03-24
Payer: MEDICARE

## 2023-03-24 DIAGNOSIS — G40.209 COMPLEX PARTIAL SEIZURES EVOLVING TO GENERALIZED TONIC-CLONIC SEIZURES: Primary | ICD-10-CM

## 2023-03-24 RX ORDER — CLOBAZAM 20 MG/1
40 TABLET ORAL NIGHTLY
Qty: 60 TABLET | Refills: 4 | Status: SHIPPED | OUTPATIENT
Start: 2023-03-24 | End: 2023-09-08

## 2023-03-24 NOTE — TELEPHONE ENCOUNTER
"Called and spoke with Mr. Burleson's wife after receiving a message from her. We discussed possible post-operative language and memory loss in more detail today, including a sort of "day in the life" style description of what she can potentially expect. She informed me that they plan to discuss the surgical options with his mother today and she wanted to make sure she had clear understanding of the pros and cons before doing so. She told me her father does not want Mr. Burleson to proceed with either option, so they are all planning to move a bit slower with thinking this through then they may have seemed yesterday. 9 minutes  "

## 2023-03-26 ENCOUNTER — PATIENT MESSAGE (OUTPATIENT)
Dept: NEUROSURGERY | Facility: CLINIC | Age: 58
End: 2023-03-26
Payer: MEDICARE

## 2023-03-27 RX ORDER — OXYCODONE HYDROCHLORIDE 5 MG/1
5 TABLET ORAL EVERY 6 HOURS PRN
Qty: 20 TABLET | Refills: 0 | Status: CANCELLED | OUTPATIENT
Start: 2023-03-27 | End: 2023-04-01

## 2023-03-27 RX ORDER — OXYCODONE HYDROCHLORIDE 5 MG/1
5 TABLET ORAL EVERY 4 HOURS PRN
Qty: 21 TABLET | Refills: 0 | Status: SHIPPED | OUTPATIENT
Start: 2023-03-27

## 2023-03-28 ENCOUNTER — PATIENT MESSAGE (OUTPATIENT)
Dept: NEUROSURGERY | Facility: CLINIC | Age: 58
End: 2023-03-28
Payer: MEDICARE

## 2023-03-29 ENCOUNTER — PATIENT MESSAGE (OUTPATIENT)
Dept: NEUROSURGERY | Facility: CLINIC | Age: 58
End: 2023-03-29
Payer: MEDICARE

## 2023-03-29 DIAGNOSIS — G40.219 COMPLEX PARTIAL EPILEPSY WITH GENERALIZATION AND WITH INTRACTABLE EPILEPSY: Primary | ICD-10-CM

## 2023-03-30 DIAGNOSIS — Z01.818 PREOPERATIVE TESTING: Primary | ICD-10-CM

## 2023-03-30 NOTE — PRE-PROCEDURE INSTRUCTIONS
Spoke  with patient and his wife, Trista. Instructed to get an involvement in care form filled out with next clinic or hospital visit. She said he has not had any problem with anesthesia in the past.  Will need poc/NP  appt, and T&S. Had seen Ulysses Magaña NP in the past  and will try to gether to see him again. Our  will call to set up these appts. His wife said he has not taken the ASA 81 mg for years. ( Removed from med card)    Preop instructions given. Hold aspirin, aspirin containing products, nsaids(aleve, advil, motrin, ibuprofen, naprosyn, naproxen, voltaren, diclofenac), vitamins and supplements one week prior to surgery.     May take Tylenol.( Sent  to [facility] portal)  Verbalizes understanding.

## 2023-03-30 NOTE — ANESTHESIA PAT ROS NOTE
03/30/2023  Declan Burleson is a 58 y.o., male.      Pre-op Assessment          Review of Systems         Anesthesia Assessment: Preoperative EQUATION    Planned Procedure: Procedure(s) (LRB):  CRANIOTOMY, USING FRAMELESS STEREOTAXY (Left)  Requested Anesthesia Type:General  Surgeon: Ruchi Chen MD  Service: Neurosurgery  Known or anticipated Date of Surgery:4/24/2023    Surgeon notes: reviewed    Electronic QUestionnaire Assessment completed via nurse interview with patient.        Triage considerations:     The patient has no apparent active cardiac condition (No unstable coronary Syndrome such as severe unstable angina or recent [<1 month] myocardial infarction, decompensated CHF, severe valvular   disease or significant arrhythmia)    Previous anesthesia records:GETA and No problems  3/8/2023   REMOVAL OF STEREO EEG LEADS (DEPTH ELECTRODES) (Left)   Airway:  Mallampati: II / II  Mouth Opening: Normal  TM Distance: Normal  Tongue: Normal  Neck ROM: Normal ROM  Airway Placement Date: 03/08/23 Placement Time: 1238 , created via procedure documentation  Method of Intubation: Direct laryngoscopy Mask Ventilation: Easy Intubated: Postinduction Blade: Carpio #3 Airway Device Size: 7.5 Cuff Inflation: Minimal occlusive pressure Placement Verified By: Capnometry Complicating Factors: None Findings Post-Intubation: Bilateral breath sounds;Atraumatic/Condition of teeth unchanged Secured at: Lips Complications: None Removal Date: 03/08/23 Removal Time: 1317     Last PCP note: outside [facility]   Subspecialty notes: Neurology, Neurosurgery, Psychiatry    Other important co-morbidities: PER Epic: GERD, HTN, and EPILEPSY, SEIZURES, MIGRAINES, ANEMIA       Tests already available:  Available tests,  within 3 months , within [facility] .   3/23/2023 MRI BRAIN STEALTH W/O FIDUCIALS W CONTRAST, 3/8/2023 PTT, PT/INR,  PHOS, MG, CMP, CBC, CT HEAD W/O CONTRAST, 3/1/2023 EKG          Instructions given. (See in Nurse's note)    Optimization:  Anesthesia Preop Clinic Assessment  Indicated    Medical Opinion Indicated              Plan:    Testing:  T&S   Pre-anesthesia  visit       Visit focus: concerns in complex and/or prolonged anesthesia     Consultation:IM Perioperative Hospitalist OR NP     Patient  has previously scheduled Medical Appointment:NONE    Navigation: Tests Scheduled. TBD             Consults scheduled.TBD             Results will be tracked by Preop Clinic.  4/13 T&S resulted and noted by Dr.Stuart Potts. Medical optimization by Ulysses Magaña NP on 4/12.  Sayda Knott RN BSN

## 2023-03-31 ENCOUNTER — TELEPHONE (OUTPATIENT)
Dept: PREADMISSION TESTING | Facility: HOSPITAL | Age: 58
End: 2023-03-31
Payer: MEDICARE

## 2023-03-31 NOTE — TELEPHONE ENCOUNTER
----- Message from Sayda Knott RN sent at 3/30/2023 11:36 AM CDT -----  Surgery 4/24  Please schedule poc/ NP appt with Ulysses Magaña NP if possible. She has seen him in the past. Will also need T&S scheduled.Please talk with his wife, Trista. Patient gave permission.Will need an involvement in care form filled out with this visit.  Thanks!

## 2023-04-11 NOTE — ASSESSMENT & PLAN NOTE
Treated with: Lexapro and Clobazam; controlled.   Denies symptoms of depression , mostly anxiety at this time.   Denies suicidal/homicidal ideations

## 2023-04-11 NOTE — ASSESSMENT & PLAN NOTE
Followed by Neurology; last seen 2/2/23  Not currently treated  Current symptoms :  memory impairment   Able to carry on ADLs

## 2023-04-11 NOTE — ASSESSMENT & PLAN NOTE
Current labs:  Hgb- 13.4     Hct- 42.3; stable.     Recommend monitoring during perioperative period.

## 2023-04-11 NOTE — ASSESSMENT & PLAN NOTE
Current BP:   136/80  Currently off of Amlodipine x 8 months BP is being monitored by wife at home with normal readings.     Lifestyle changes to reduce systolic BP:  exercise 30 minutes per day,  5 days per week or 150 minutes weekly once recovered from surgery; sodium reduction and avoidance of high salt foods such as processed meats, frozen meals and  fast foods.      BP acceptable for surgery. I recommend monitoring BP during perioperative period as uncontrolled pain can elevate blood pressure.

## 2023-04-12 ENCOUNTER — OFFICE VISIT (OUTPATIENT)
Dept: INTERNAL MEDICINE | Facility: CLINIC | Age: 58
End: 2023-04-12
Payer: MEDICARE

## 2023-04-12 ENCOUNTER — LAB VISIT (OUTPATIENT)
Dept: LAB | Facility: HOSPITAL | Age: 58
End: 2023-04-12
Attending: ANESTHESIOLOGY
Payer: MEDICARE

## 2023-04-12 VITALS
DIASTOLIC BLOOD PRESSURE: 80 MMHG | OXYGEN SATURATION: 98 % | WEIGHT: 188 LBS | BODY MASS INDEX: 27.85 KG/M2 | TEMPERATURE: 98 F | HEIGHT: 69 IN | SYSTOLIC BLOOD PRESSURE: 136 MMHG | HEART RATE: 52 BPM

## 2023-04-12 DIAGNOSIS — R60.9 EDEMA, UNSPECIFIED TYPE: ICD-10-CM

## 2023-04-12 DIAGNOSIS — R41.841 COGNITIVE COMMUNICATION DEFICIT: ICD-10-CM

## 2023-04-12 DIAGNOSIS — Z01.818 PREOPERATIVE TESTING: ICD-10-CM

## 2023-04-12 DIAGNOSIS — F41.8 DEPRESSION WITH ANXIETY: Chronic | ICD-10-CM

## 2023-04-12 DIAGNOSIS — G43.909 MIGRAINE WITHOUT STATUS MIGRAINOSUS, NOT INTRACTABLE, UNSPECIFIED MIGRAINE TYPE: Chronic | ICD-10-CM

## 2023-04-12 DIAGNOSIS — D64.9 NORMOCYTIC ANEMIA: ICD-10-CM

## 2023-04-12 DIAGNOSIS — I10 PRIMARY HYPERTENSION: Chronic | ICD-10-CM

## 2023-04-12 DIAGNOSIS — Z01.818 PREOPERATIVE EXAMINATION: Primary | ICD-10-CM

## 2023-04-12 DIAGNOSIS — K21.9 GASTROESOPHAGEAL REFLUX DISEASE, UNSPECIFIED WHETHER ESOPHAGITIS PRESENT: ICD-10-CM

## 2023-04-12 DIAGNOSIS — R06.83 SNORING: ICD-10-CM

## 2023-04-12 DIAGNOSIS — R10.32 ABDOMINAL PAIN, LLQ (LEFT LOWER QUADRANT): ICD-10-CM

## 2023-04-12 DIAGNOSIS — G40.219 COMPLEX PARTIAL EPILEPSY WITH GENERALIZATION AND WITH INTRACTABLE EPILEPSY: ICD-10-CM

## 2023-04-12 LAB
ABO + RH BLD: NORMAL
BLD GP AB SCN CELLS X3 SERPL QL: NORMAL
SPECIMEN OUTDATE: NORMAL

## 2023-04-12 PROCEDURE — 3008F BODY MASS INDEX DOCD: CPT | Mod: CPTII,S$GLB,, | Performed by: NURSE PRACTITIONER

## 2023-04-12 PROCEDURE — 99999 PR PBB SHADOW E&M-EST. PATIENT-LVL IV: ICD-10-PCS | Mod: PBBFAC,,, | Performed by: NURSE PRACTITIONER

## 2023-04-12 PROCEDURE — 36415 COLL VENOUS BLD VENIPUNCTURE: CPT | Performed by: ANESTHESIOLOGY

## 2023-04-12 PROCEDURE — 3075F PR MOST RECENT SYSTOLIC BLOOD PRESS GE 130-139MM HG: ICD-10-PCS | Mod: CPTII,S$GLB,, | Performed by: NURSE PRACTITIONER

## 2023-04-12 PROCEDURE — 1159F MED LIST DOCD IN RCRD: CPT | Mod: CPTII,S$GLB,, | Performed by: NURSE PRACTITIONER

## 2023-04-12 PROCEDURE — 3008F PR BODY MASS INDEX (BMI) DOCUMENTED: ICD-10-PCS | Mod: CPTII,S$GLB,, | Performed by: NURSE PRACTITIONER

## 2023-04-12 PROCEDURE — 99215 OFFICE O/P EST HI 40 MIN: CPT | Mod: S$GLB,,, | Performed by: NURSE PRACTITIONER

## 2023-04-12 PROCEDURE — 3079F PR MOST RECENT DIASTOLIC BLOOD PRESSURE 80-89 MM HG: ICD-10-PCS | Mod: CPTII,S$GLB,, | Performed by: NURSE PRACTITIONER

## 2023-04-12 PROCEDURE — 1159F PR MEDICATION LIST DOCUMENTED IN MEDICAL RECORD: ICD-10-PCS | Mod: CPTII,S$GLB,, | Performed by: NURSE PRACTITIONER

## 2023-04-12 PROCEDURE — 86920 COMPATIBILITY TEST SPIN: CPT | Performed by: NEUROLOGICAL SURGERY

## 2023-04-12 PROCEDURE — 3079F DIAST BP 80-89 MM HG: CPT | Mod: CPTII,S$GLB,, | Performed by: NURSE PRACTITIONER

## 2023-04-12 PROCEDURE — 99999 PR PBB SHADOW E&M-EST. PATIENT-LVL IV: CPT | Mod: PBBFAC,,, | Performed by: NURSE PRACTITIONER

## 2023-04-12 PROCEDURE — 86900 BLOOD TYPING SEROLOGIC ABO: CPT | Performed by: ANESTHESIOLOGY

## 2023-04-12 PROCEDURE — 99215 PR OFFICE/OUTPT VISIT, EST, LEVL V, 40-54 MIN: ICD-10-PCS | Mod: S$GLB,,, | Performed by: NURSE PRACTITIONER

## 2023-04-12 PROCEDURE — 3075F SYST BP GE 130 - 139MM HG: CPT | Mod: CPTII,S$GLB,, | Performed by: NURSE PRACTITIONER
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
## 2023-04-12 NOTE — HPI
This is a 58 y.o. male  who presents today with wife for a preoperative evaluation in preparation for a Neurosurgery  procedure.  Scheduled for  craniotomy.  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 I have seen this patient before on 2/23/23 for preop eval before placement of stereo EEG leads.   Details of current problem: The duration of problem is 10 years. Seizures persisted after multiple AED therapy.   Reports symptoms of  lethargy- sleeping for many hours of the day, and short-term memory loss.  Aggravating factors include:  AED medications.   There are no relieving factors.   Reports pain to head today: 2/10.  The history has been obtained by speaking with the patient and wife as well as by  reviewing the electronic medical record and/or outside health information. Significant health conditions for the perioperative period are discussed below in assessment and plan.     Patient reports current health status to be Good.  Denies any new symptoms before surgery.

## 2023-04-12 NOTE — ASSESSMENT & PLAN NOTE
Reports occasional abdominal pain to Q- not new.  He would like to  follow-up with PCP after craniotomy.  Denies difficulty with BM, no anal bleeding, dysuria, N/V

## 2023-04-12 NOTE — ASSESSMENT & PLAN NOTE
I suggest avoidance of added salt and voidance of NSAID's- unless advised or ordered. I recommend limb elevation and akanksha hose use during perioperative period.
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
## 2023-04-12 NOTE — DISCHARGE INSTRUCTIONS
Your surgery has been scheduled for:________4/24/23__________________________________    You should report to:  ____[facility], located on the [place] side of the first floor of the           [facility] ([phone])  __X__[facility], located on the [facility] side of the            Second floor of the [facility] ([phone])  ____3rd Floor [facility] located on the [facility] side of the [facility] [phone]  ____[facility]: located at [address] [place] [address]. [address]     Please Note   Tell your doctor if you take Aspirin, products containing Aspirin, herbal medications  or blood thinners, such as Coumadin, Ticlid, or Plavix.  (Consult your provider regarding holding or stopping before surgery).  Arrange for someone to drive you home following surgery.  You will not be allowed to leave the surgical facility alone or drive yourself home following sedation and anesthesia.    Before Surgery  Stop taking all herbal medications, vitamins, and supplements 7 days prior to surgery  No Motrin/Advil (Ibuprofen) 7 days before surgery  No Aleve (Naproxen) 7 days before surgery  Stop Taking Asprin, products containing Aspirin __7___days before surgery   No Goody's/BC Powder 7 days before surgery  Refrain from drinking alcoholic beverages for 24 hours before and after surgery  Stop or limit smoking at least 24 hours prior to surgery  You may take Tylenol for pain    Night before Surgery  Do not eat or drink after midnight  Take a shower or bath (shower is recommended).  Bathe with Hibiclens soap or an antibacterial soap from the neck down.  If not supplied by your surgeon, hibiclens soap will need to be purchased over the counter in pharmacy.  Rinse soap off thoroughly.  Shampoo your hair with your regular shampoo    The Day of Surgery  Take another bath or shower with hibiclens  or any antibacterial soap, to reduce the chance of infection.  Take heart and blood pressure medications with a small sip of water, as advised by the perioperative team.  Do not take fluid pills  You may brush your teeth and rinse your mouth, but do not swallow any additional water.   Do not apply perfumes, powder, body lotions or deodorant on the day of surgery.  Nail polish should be removed.  Do not wear makeup or moisturizer  Wear comfortable clothes, such as a button front shirt and loose fitting pants.  Leave all jewelry, including body piercings, and valuables at home.    Bring any devices you will neeed after surgery such as crutches or canes.  If you have sleep apnea, please bring your CPAP machine  In the event that your physical condition changes including the onset of a cold or respiratory illness, or if you have to delay or cancel your surgery, please notify your surgeon.
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
## 2023-04-24 ENCOUNTER — ANESTHESIA (OUTPATIENT)
Dept: SURGERY | Facility: HOSPITAL | Age: 58
DRG: 025 | End: 2023-04-24
Payer: MEDICARE

## 2023-04-24 ENCOUNTER — ANESTHESIA EVENT (OUTPATIENT)
Dept: SURGERY | Facility: HOSPITAL | Age: 58
DRG: 025 | End: 2023-04-24
Payer: MEDICARE

## 2023-04-24 ENCOUNTER — HOSPITAL ENCOUNTER (INPATIENT)
Facility: HOSPITAL | Age: 58
LOS: 10 days | Discharge: NURSING, REHAB OR LONG-TERM CARE | DRG: 025 | End: 2023-05-04
Attending: NEUROLOGICAL SURGERY | Admitting: NEUROLOGICAL SURGERY
Payer: MEDICARE

## 2023-04-24 DIAGNOSIS — R41.841 COGNITIVE COMMUNICATION DEFICIT: Primary | ICD-10-CM

## 2023-04-24 DIAGNOSIS — R00.1 BRADYCARDIA: ICD-10-CM

## 2023-04-24 DIAGNOSIS — F02.80 DEMENTIA IN OTHER DISEASES CLASSIFIED ELSEWHERE, UNSPECIFIED SEVERITY, WITHOUT BEHAVIORAL DISTURBANCE, PSYCHOTIC DISTURBANCE, MOOD DISTURBANCE, AND ANXIETY: ICD-10-CM

## 2023-04-24 DIAGNOSIS — G40.219 COMPLEX PARTIAL EPILEPSY WITH GENERALIZATION AND WITH INTRACTABLE EPILEPSY: ICD-10-CM

## 2023-04-24 DIAGNOSIS — I10 HYPERTENSION: ICD-10-CM

## 2023-04-24 DIAGNOSIS — G40.909 EPILEPSY: ICD-10-CM

## 2023-04-24 LAB
ANION GAP SERPL CALC-SCNC: 7 MMOL/L (ref 8–16)
APTT PPP: 26.2 SEC (ref 21–32)
BASOPHILS # BLD AUTO: 0.07 K/UL (ref 0–0.2)
BASOPHILS NFR BLD: 1.1 % (ref 0–1.9)
BUN SERPL-MCNC: 13 MG/DL (ref 6–20)
CALCIUM SERPL-MCNC: 9 MG/DL (ref 8.7–10.5)
CHLORIDE SERPL-SCNC: 99 MMOL/L (ref 95–110)
CO2 SERPL-SCNC: 26 MMOL/L (ref 23–29)
CREAT SERPL-MCNC: 0.9 MG/DL (ref 0.5–1.4)
DIFFERENTIAL METHOD: ABNORMAL
EOSINOPHIL # BLD AUTO: 0.4 K/UL (ref 0–0.5)
EOSINOPHIL NFR BLD: 6.8 % (ref 0–8)
ERYTHROCYTE [DISTWIDTH] IN BLOOD BY AUTOMATED COUNT: 12.9 % (ref 11.5–14.5)
EST. GFR  (NO RACE VARIABLE): >60 ML/MIN/1.73 M^2
GLUCOSE SERPL-MCNC: 120 MG/DL (ref 70–110)
GLUCOSE SERPL-MCNC: 79 MG/DL (ref 70–110)
HCO3 UR-SCNC: 21.8 MMOL/L (ref 24–28)
HCT VFR BLD AUTO: 42.9 % (ref 40–54)
HCT VFR BLD CALC: 36 %PCV (ref 36–54)
HGB BLD-MCNC: 14.3 G/DL (ref 14–18)
IMM GRANULOCYTES # BLD AUTO: 0.02 K/UL (ref 0–0.04)
IMM GRANULOCYTES NFR BLD AUTO: 0.3 % (ref 0–0.5)
INR PPP: 1 (ref 0.8–1.2)
LYMPHOCYTES # BLD AUTO: 1.6 K/UL (ref 1–4.8)
LYMPHOCYTES NFR BLD: 25.4 % (ref 18–48)
MCH RBC QN AUTO: 31.2 PG (ref 27–31)
MCHC RBC AUTO-ENTMCNC: 33.3 G/DL (ref 32–36)
MCV RBC AUTO: 94 FL (ref 82–98)
MONOCYTES # BLD AUTO: 0.7 K/UL (ref 0.3–1)
MONOCYTES NFR BLD: 11.1 % (ref 4–15)
NEUTROPHILS # BLD AUTO: 3.4 K/UL (ref 1.8–7.7)
NEUTROPHILS NFR BLD: 55.3 % (ref 38–73)
NRBC BLD-RTO: 0 /100 WBC
PCO2 BLDA: 40.1 MMHG (ref 35–45)
PH SMN: 7.34 [PH] (ref 7.35–7.45)
PLATELET # BLD AUTO: 323 K/UL (ref 150–450)
PMV BLD AUTO: 9.4 FL (ref 9.2–12.9)
PO2 BLDA: 201 MMHG (ref 80–100)
POC BE: -4 MMOL/L
POC IONIZED CALCIUM: 1.11 MMOL/L (ref 1.06–1.42)
POC SATURATED O2: 100 % (ref 95–100)
POC TCO2: 23 MMOL/L (ref 23–27)
POCT GLUCOSE: 172 MG/DL (ref 70–110)
POTASSIUM BLD-SCNC: 4 MMOL/L (ref 3.5–5.1)
POTASSIUM SERPL-SCNC: 4.1 MMOL/L (ref 3.5–5.1)
PROTHROMBIN TIME: 10.7 SEC (ref 9–12.5)
RBC # BLD AUTO: 4.58 M/UL (ref 4.6–6.2)
SAMPLE: ABNORMAL
SODIUM BLD-SCNC: 131 MMOL/L (ref 136–145)
SODIUM SERPL-SCNC: 132 MMOL/L (ref 136–145)
WBC # BLD AUTO: 6.15 K/UL (ref 3.9–12.7)

## 2023-04-24 PROCEDURE — 25000003 PHARM REV CODE 250: Performed by: NEUROLOGICAL SURGERY

## 2023-04-24 PROCEDURE — 61537 PR CRANIOT W BONE FLAP TEMPORAL LOBE W/O ECOG: ICD-10-PCS | Mod: 58,,, | Performed by: NEUROLOGICAL SURGERY

## 2023-04-24 PROCEDURE — 61537 PR CRANIOT W BONE FLAP TEMPORAL LOBE W/O ECOG: ICD-10-PCS | Mod: 82,58,, | Performed by: NEUROLOGICAL SURGERY

## 2023-04-24 PROCEDURE — C1713 ANCHOR/SCREW BN/BN,TIS/BN: HCPCS | Performed by: NEUROLOGICAL SURGERY

## 2023-04-24 PROCEDURE — 88307 TISSUE EXAM BY PATHOLOGIST: CPT | Performed by: PATHOLOGY

## 2023-04-24 PROCEDURE — 69990 MICROSURGERY ADD-ON: CPT | Mod: 59,,, | Performed by: NEUROLOGICAL SURGERY

## 2023-04-24 PROCEDURE — 88342 CHG IMMUNOCYTOCHEMISTRY: ICD-10-PCS | Mod: 26,,, | Performed by: PATHOLOGY

## 2023-04-24 PROCEDURE — 20000000 HC ICU ROOM

## 2023-04-24 PROCEDURE — 85610 PROTHROMBIN TIME: CPT | Performed by: STUDENT IN AN ORGANIZED HEALTH CARE EDUCATION/TRAINING PROGRAM

## 2023-04-24 PROCEDURE — C1729 CATH, DRAINAGE: HCPCS | Performed by: NEUROLOGICAL SURGERY

## 2023-04-24 PROCEDURE — 88342 IMHCHEM/IMCYTCHM 1ST ANTB: CPT | Mod: 26,,, | Performed by: PATHOLOGY

## 2023-04-24 PROCEDURE — 99291 PR CRITICAL CARE, E/M 30-74 MINUTES: ICD-10-PCS | Mod: 25,GC,, | Performed by: PSYCHIATRY & NEUROLOGY

## 2023-04-24 PROCEDURE — D9220A PRA ANESTHESIA: ICD-10-PCS | Mod: CRNA,,, | Performed by: NURSE ANESTHETIST, CERTIFIED REGISTERED

## 2023-04-24 PROCEDURE — 99291 CRITICAL CARE FIRST HOUR: CPT | Mod: 25,GC,, | Performed by: PSYCHIATRY & NEUROLOGY

## 2023-04-24 PROCEDURE — 69990 PR MICROSURG TECHNIQUES,REQ OPER MICROSCOPE: ICD-10-PCS | Mod: 59,,, | Performed by: NEUROLOGICAL SURGERY

## 2023-04-24 PROCEDURE — 93005 ELECTROCARDIOGRAM TRACING: CPT

## 2023-04-24 PROCEDURE — 88342 IMHCHEM/IMCYTCHM 1ST ANTB: CPT | Performed by: PATHOLOGY

## 2023-04-24 PROCEDURE — D9220A PRA ANESTHESIA: Mod: ANES,,, | Performed by: STUDENT IN AN ORGANIZED HEALTH CARE EDUCATION/TRAINING PROGRAM

## 2023-04-24 PROCEDURE — D9220A PRA ANESTHESIA: ICD-10-PCS | Mod: ANES,,, | Performed by: STUDENT IN AN ORGANIZED HEALTH CARE EDUCATION/TRAINING PROGRAM

## 2023-04-24 PROCEDURE — 88307 PR  SURG PATH,LEVEL V: ICD-10-PCS | Mod: 26,,, | Performed by: PATHOLOGY

## 2023-04-24 PROCEDURE — 25000003 PHARM REV CODE 250: Performed by: STUDENT IN AN ORGANIZED HEALTH CARE EDUCATION/TRAINING PROGRAM

## 2023-04-24 PROCEDURE — 36000713 HC OR TIME LEV V EA ADD 15 MIN: Performed by: NEUROLOGICAL SURGERY

## 2023-04-24 PROCEDURE — 61781 PR STEREOTACTIC COMP ASSIST PROC,CRANIAL,INTRADURAL: ICD-10-PCS | Mod: ,,, | Performed by: NEUROLOGICAL SURGERY

## 2023-04-24 PROCEDURE — 25000003 PHARM REV CODE 250: Performed by: NURSE ANESTHETIST, CERTIFIED REGISTERED

## 2023-04-24 PROCEDURE — 25000003 PHARM REV CODE 250

## 2023-04-24 PROCEDURE — 61781 SCAN PROC CRANIAL INTRA: CPT | Mod: ,,, | Performed by: NEUROLOGICAL SURGERY

## 2023-04-24 PROCEDURE — 63600175 PHARM REV CODE 636 W HCPCS: Performed by: NURSE ANESTHETIST, CERTIFIED REGISTERED

## 2023-04-24 PROCEDURE — 88341 PR IHC OR ICC EACH ADD'L SINGLE ANTIBODY  STAINPR: ICD-10-PCS | Mod: 26,,, | Performed by: PATHOLOGY

## 2023-04-24 PROCEDURE — 85025 COMPLETE CBC W/AUTO DIFF WBC: CPT | Performed by: STUDENT IN AN ORGANIZED HEALTH CARE EDUCATION/TRAINING PROGRAM

## 2023-04-24 PROCEDURE — 80048 BASIC METABOLIC PNL TOTAL CA: CPT | Performed by: STUDENT IN AN ORGANIZED HEALTH CARE EDUCATION/TRAINING PROGRAM

## 2023-04-24 PROCEDURE — 37000008 HC ANESTHESIA 1ST 15 MINUTES: Performed by: NEUROLOGICAL SURGERY

## 2023-04-24 PROCEDURE — 25000003 PHARM REV CODE 250: Performed by: PSYCHIATRY & NEUROLOGY

## 2023-04-24 PROCEDURE — 93010 ELECTROCARDIOGRAM REPORT: CPT | Mod: ,,, | Performed by: INTERNAL MEDICINE

## 2023-04-24 PROCEDURE — 36620 ARTERIAL: ICD-10-PCS | Mod: 59,,, | Performed by: STUDENT IN AN ORGANIZED HEALTH CARE EDUCATION/TRAINING PROGRAM

## 2023-04-24 PROCEDURE — 37000009 HC ANESTHESIA EA ADD 15 MINS: Performed by: NEUROLOGICAL SURGERY

## 2023-04-24 PROCEDURE — 93010 EKG 12-LEAD: ICD-10-PCS | Mod: ,,, | Performed by: INTERNAL MEDICINE

## 2023-04-24 PROCEDURE — 88307 TISSUE EXAM BY PATHOLOGIST: CPT | Mod: 26,,, | Performed by: PATHOLOGY

## 2023-04-24 PROCEDURE — 85730 THROMBOPLASTIN TIME PARTIAL: CPT | Performed by: STUDENT IN AN ORGANIZED HEALTH CARE EDUCATION/TRAINING PROGRAM

## 2023-04-24 PROCEDURE — D9220A PRA ANESTHESIA: Mod: CRNA,,, | Performed by: NURSE ANESTHETIST, CERTIFIED REGISTERED

## 2023-04-24 PROCEDURE — 61537 REMOVAL OF BRAIN TISSUE: CPT | Mod: 82,58,, | Performed by: NEUROLOGICAL SURGERY

## 2023-04-24 PROCEDURE — 27201423 OPTIME MED/SURG SUP & DEVICES STERILE SUPPLY: Performed by: NEUROLOGICAL SURGERY

## 2023-04-24 PROCEDURE — 63600175 PHARM REV CODE 636 W HCPCS: Performed by: STUDENT IN AN ORGANIZED HEALTH CARE EDUCATION/TRAINING PROGRAM

## 2023-04-24 PROCEDURE — 36000712 HC OR TIME LEV V 1ST 15 MIN: Performed by: NEUROLOGICAL SURGERY

## 2023-04-24 PROCEDURE — 63600175 PHARM REV CODE 636 W HCPCS: Performed by: NEUROLOGICAL SURGERY

## 2023-04-24 PROCEDURE — 88341 IMHCHEM/IMCYTCHM EA ADD ANTB: CPT | Mod: 26,,, | Performed by: PATHOLOGY

## 2023-04-24 PROCEDURE — 36620 INSERTION CATHETER ARTERY: CPT | Mod: 59,,, | Performed by: STUDENT IN AN ORGANIZED HEALTH CARE EDUCATION/TRAINING PROGRAM

## 2023-04-24 PROCEDURE — 61537 REMOVAL OF BRAIN TISSUE: CPT | Mod: 58,,, | Performed by: NEUROLOGICAL SURGERY

## 2023-04-24 PROCEDURE — 88341 IMHCHEM/IMCYTCHM EA ADD ANTB: CPT | Performed by: PATHOLOGY

## 2023-04-24 DEVICE — COVER UN3 BURRHOLE 14MM TAB: Type: IMPLANTABLE DEVICE | Site: CRANIAL | Status: FUNCTIONAL

## 2023-04-24 DEVICE — PLATE BONE BUR HOLE COVER 10MM: Type: IMPLANTABLE DEVICE | Site: CRANIAL | Status: FUNCTIONAL

## 2023-04-24 DEVICE — PLATE CRANIAL UN3 BOX LG 2X2: Type: IMPLANTABLE DEVICE | Site: CRANIAL | Status: FUNCTIONAL

## 2023-04-24 DEVICE — SCREW UN3 AXS SD 1.5X4MM: Type: IMPLANTABLE DEVICE | Site: CRANIAL | Status: FUNCTIONAL

## 2023-04-24 DEVICE — SCREW UN3 AXS SD 1.5X3MM: Type: IMPLANTABLE DEVICE | Site: CRANIAL | Status: FUNCTIONAL

## 2023-04-24 RX ORDER — MUPIROCIN 20 MG/G
1 OINTMENT TOPICAL 2 TIMES DAILY
Status: DISCONTINUED | OUTPATIENT
Start: 2023-04-24 | End: 2023-04-24 | Stop reason: HOSPADM

## 2023-04-24 RX ORDER — NICARDIPINE HYDROCHLORIDE 0.2 MG/ML
INJECTION INTRAVENOUS
Status: COMPLETED
Start: 2023-04-24 | End: 2023-04-24

## 2023-04-24 RX ORDER — MIDAZOLAM HYDROCHLORIDE 1 MG/ML
INJECTION, SOLUTION INTRAMUSCULAR; INTRAVENOUS
Status: DISCONTINUED | OUTPATIENT
Start: 2023-04-24 | End: 2023-04-24

## 2023-04-24 RX ORDER — MUPIROCIN 20 MG/G
OINTMENT TOPICAL
Status: DISCONTINUED | OUTPATIENT
Start: 2023-04-24 | End: 2023-04-24 | Stop reason: HOSPADM

## 2023-04-24 RX ORDER — DEXAMETHASONE SODIUM PHOSPHATE 4 MG/ML
INJECTION, SOLUTION INTRA-ARTICULAR; INTRALESIONAL; INTRAMUSCULAR; INTRAVENOUS; SOFT TISSUE
Status: DISCONTINUED | OUTPATIENT
Start: 2023-04-24 | End: 2023-04-24

## 2023-04-24 RX ORDER — MUPIROCIN 20 MG/G
OINTMENT TOPICAL 2 TIMES DAILY
Status: CANCELLED | OUTPATIENT
Start: 2023-04-24 | End: 2023-04-29

## 2023-04-24 RX ORDER — DEXAMETHASONE SODIUM PHOSPHATE 4 MG/ML
4 INJECTION, SOLUTION INTRA-ARTICULAR; INTRALESIONAL; INTRAMUSCULAR; INTRAVENOUS; SOFT TISSUE EVERY 6 HOURS
Status: DISCONTINUED | OUTPATIENT
Start: 2023-04-24 | End: 2023-04-26

## 2023-04-24 RX ORDER — ROCURONIUM BROMIDE 10 MG/ML
INJECTION, SOLUTION INTRAVENOUS
Status: DISCONTINUED | OUTPATIENT
Start: 2023-04-24 | End: 2023-04-24

## 2023-04-24 RX ORDER — CLOBAZAM 10 MG/1
40 TABLET ORAL NIGHTLY
Status: DISCONTINUED | OUTPATIENT
Start: 2023-04-24 | End: 2023-05-04 | Stop reason: HOSPADM

## 2023-04-24 RX ORDER — BACITRACIN ZINC 500 UNIT/G
OINTMENT (GRAM) TOPICAL
Status: DISCONTINUED | OUTPATIENT
Start: 2023-04-24 | End: 2023-04-24 | Stop reason: HOSPADM

## 2023-04-24 RX ORDER — FENTANYL CITRATE 50 UG/ML
INJECTION, SOLUTION INTRAMUSCULAR; INTRAVENOUS
Status: DISCONTINUED | OUTPATIENT
Start: 2023-04-24 | End: 2023-04-24

## 2023-04-24 RX ORDER — PANTOPRAZOLE SODIUM 40 MG/1
40 TABLET, DELAYED RELEASE ORAL DAILY
Status: DISCONTINUED | OUTPATIENT
Start: 2023-04-25 | End: 2023-04-25

## 2023-04-24 RX ORDER — ESCITALOPRAM OXALATE 10 MG/1
10 TABLET ORAL DAILY
Status: DISCONTINUED | OUTPATIENT
Start: 2023-04-25 | End: 2023-05-04 | Stop reason: HOSPADM

## 2023-04-24 RX ORDER — CEFAZOLIN SODIUM 1 G/3ML
INJECTION, POWDER, FOR SOLUTION INTRAMUSCULAR; INTRAVENOUS
Status: DISCONTINUED | OUTPATIENT
Start: 2023-04-24 | End: 2023-04-24

## 2023-04-24 RX ORDER — ZONISAMIDE 100 MG/1
500 CAPSULE ORAL NIGHTLY
Status: DISCONTINUED | OUTPATIENT
Start: 2023-04-24 | End: 2023-05-04 | Stop reason: HOSPADM

## 2023-04-24 RX ORDER — LIDOCAINE HYDROCHLORIDE AND EPINEPHRINE 10; 10 MG/ML; UG/ML
INJECTION, SOLUTION INFILTRATION; PERINEURAL
Status: DISCONTINUED | OUTPATIENT
Start: 2023-04-24 | End: 2023-04-24 | Stop reason: HOSPADM

## 2023-04-24 RX ORDER — NICARDIPINE HYDROCHLORIDE 0.2 MG/ML
0-15 INJECTION INTRAVENOUS CONTINUOUS
Status: DISCONTINUED | OUTPATIENT
Start: 2023-04-24 | End: 2023-04-25

## 2023-04-24 RX ORDER — PROPOFOL 10 MG/ML
VIAL (ML) INTRAVENOUS
Status: DISCONTINUED | OUTPATIENT
Start: 2023-04-24 | End: 2023-04-24

## 2023-04-24 RX ORDER — LEVETIRACETAM 500 MG/5ML
INJECTION, SOLUTION, CONCENTRATE INTRAVENOUS
Status: DISCONTINUED | OUTPATIENT
Start: 2023-04-24 | End: 2023-04-24

## 2023-04-24 RX ORDER — LIDOCAINE HYDROCHLORIDE 20 MG/ML
INJECTION, SOLUTION EPIDURAL; INFILTRATION; INTRACAUDAL; PERINEURAL
Status: DISCONTINUED | OUTPATIENT
Start: 2023-04-24 | End: 2023-04-24

## 2023-04-24 RX ORDER — ENOXAPARIN SODIUM 100 MG/ML
40 INJECTION SUBCUTANEOUS EVERY 24 HOURS
Status: DISCONTINUED | OUTPATIENT
Start: 2023-04-25 | End: 2023-04-26

## 2023-04-24 RX ORDER — PHENYLEPHRINE HYDROCHLORIDE 10 MG/ML
INJECTION INTRAVENOUS
Status: DISCONTINUED | OUTPATIENT
Start: 2023-04-24 | End: 2023-04-24

## 2023-04-24 RX ORDER — CEFTRIAXONE 1 G/1
INJECTION, POWDER, FOR SOLUTION INTRAMUSCULAR; INTRAVENOUS
Status: DISCONTINUED | OUTPATIENT
Start: 2023-04-24 | End: 2023-04-24 | Stop reason: HOSPADM

## 2023-04-24 RX ORDER — ACETAMINOPHEN 10 MG/ML
INJECTION, SOLUTION INTRAVENOUS
Status: DISCONTINUED | OUTPATIENT
Start: 2023-04-24 | End: 2023-04-24

## 2023-04-24 RX ORDER — MORPHINE SULFATE 2 MG/ML
4 INJECTION, SOLUTION INTRAMUSCULAR; INTRAVENOUS EVERY 4 HOURS PRN
Status: DISCONTINUED | OUTPATIENT
Start: 2023-04-24 | End: 2023-04-25

## 2023-04-24 RX ORDER — HYDROCODONE BITARTRATE AND ACETAMINOPHEN 5; 325 MG/1; MG/1
1 TABLET ORAL EVERY 4 HOURS PRN
Status: CANCELLED | OUTPATIENT
Start: 2023-04-24

## 2023-04-24 RX ORDER — HYDROCODONE BITARTRATE AND ACETAMINOPHEN 7.5; 325 MG/1; MG/1
1 TABLET ORAL EVERY 6 HOURS PRN
Status: DISCONTINUED | OUTPATIENT
Start: 2023-04-24 | End: 2023-05-01

## 2023-04-24 RX ORDER — ONDANSETRON 2 MG/ML
INJECTION INTRAMUSCULAR; INTRAVENOUS
Status: DISCONTINUED | OUTPATIENT
Start: 2023-04-24 | End: 2023-04-24

## 2023-04-24 RX ADMIN — PHENYLEPHRINE HYDROCHLORIDE 50 MCG: 10 INJECTION INTRAVENOUS at 12:04

## 2023-04-24 RX ADMIN — ACETAMINOPHEN 1000 MG: 10 INJECTION, SOLUTION INTRAVENOUS at 01:04

## 2023-04-24 RX ADMIN — DEXAMETHASONE SODIUM PHOSPHATE 4 MG: 4 INJECTION INTRA-ARTICULAR; INTRALESIONAL; INTRAMUSCULAR; INTRAVENOUS; SOFT TISSUE at 11:04

## 2023-04-24 RX ADMIN — ROCURONIUM BROMIDE 10 MG: 10 INJECTION, SOLUTION INTRAVENOUS at 02:04

## 2023-04-24 RX ADMIN — PROPOFOL 60 MG: 10 INJECTION, EMULSION INTRAVENOUS at 11:04

## 2023-04-24 RX ADMIN — GLYCOPYRROLATE 0.2 MG: 0.2 INJECTION, SOLUTION INTRAMUSCULAR; INTRAVENOUS at 11:04

## 2023-04-24 RX ADMIN — PHENYLEPHRINE HYDROCHLORIDE 100 MCG: 10 INJECTION INTRAVENOUS at 02:04

## 2023-04-24 RX ADMIN — FENTANYL CITRATE 100 MCG: 50 INJECTION, SOLUTION INTRAMUSCULAR; INTRAVENOUS at 10:04

## 2023-04-24 RX ADMIN — PHENYLEPHRINE HYDROCHLORIDE 100 MCG: 10 INJECTION INTRAVENOUS at 01:04

## 2023-04-24 RX ADMIN — LIDOCAINE HYDROCHLORIDE 100 MG: 20 INJECTION, SOLUTION EPIDURAL; INFILTRATION; INTRACAUDAL at 10:04

## 2023-04-24 RX ADMIN — ROCURONIUM BROMIDE 10 MG: 10 INJECTION, SOLUTION INTRAVENOUS at 12:04

## 2023-04-24 RX ADMIN — FENTANYL CITRATE 25 MCG: 50 INJECTION, SOLUTION INTRAMUSCULAR; INTRAVENOUS at 01:04

## 2023-04-24 RX ADMIN — DEXAMETHASONE SODIUM PHOSPHATE 8 MG: 4 INJECTION INTRA-ARTICULAR; INTRALESIONAL; INTRAMUSCULAR; INTRAVENOUS; SOFT TISSUE at 10:04

## 2023-04-24 RX ADMIN — ROCURONIUM BROMIDE 10 MG: 10 INJECTION, SOLUTION INTRAVENOUS at 01:04

## 2023-04-24 RX ADMIN — PHENYLEPHRINE HYDROCHLORIDE 50 MCG: 10 INJECTION INTRAVENOUS at 11:04

## 2023-04-24 RX ADMIN — FENTANYL CITRATE 25 MCG: 50 INJECTION, SOLUTION INTRAMUSCULAR; INTRAVENOUS at 12:04

## 2023-04-24 RX ADMIN — SODIUM CHLORIDE: 0.9 INJECTION, SOLUTION INTRAVENOUS at 10:04

## 2023-04-24 RX ADMIN — SODIUM CHLORIDE, SODIUM GLUCONATE, SODIUM ACETATE, POTASSIUM CHLORIDE, MAGNESIUM CHLORIDE, SODIUM PHOSPHATE, DIBASIC, AND POTASSIUM PHOSPHATE: .53; .5; .37; .037; .03; .012; .00082 INJECTION, SOLUTION INTRAVENOUS at 10:04

## 2023-04-24 RX ADMIN — NICARDIPINE HYDROCHLORIDE 5 MG/HR: 0.2 INJECTION, SOLUTION INTRAVENOUS at 04:04

## 2023-04-24 RX ADMIN — MIDAZOLAM HYDROCHLORIDE 2 MG: 2 INJECTION, SOLUTION INTRAMUSCULAR; INTRAVENOUS at 10:04

## 2023-04-24 RX ADMIN — LEVETIRACETAM 1000 MG: 100 INJECTION, SOLUTION INTRAVENOUS at 11:04

## 2023-04-24 RX ADMIN — ROCURONIUM BROMIDE 50 MG: 10 INJECTION, SOLUTION INTRAVENOUS at 10:04

## 2023-04-24 RX ADMIN — ROCURONIUM BROMIDE 10 MG: 10 INJECTION, SOLUTION INTRAVENOUS at 03:04

## 2023-04-24 RX ADMIN — ROCURONIUM BROMIDE 20 MG: 10 INJECTION, SOLUTION INTRAVENOUS at 03:04

## 2023-04-24 RX ADMIN — NICARDIPINE HYDROCHLORIDE 5 MG/HR: 0.2 INJECTION, SOLUTION INTRAVENOUS at 05:04

## 2023-04-24 RX ADMIN — CEFAZOLIN 2 G: 1 INJECTION, POWDER, FOR SOLUTION INTRAMUSCULAR; INTRAVENOUS at 03:04

## 2023-04-24 RX ADMIN — CEFAZOLIN 2 G: 1 INJECTION, POWDER, FOR SOLUTION INTRAMUSCULAR; INTRAVENOUS at 11:04

## 2023-04-24 RX ADMIN — CLOBAZAM 40 MG: 10 TABLET ORAL at 09:04

## 2023-04-24 RX ADMIN — SODIUM CHLORIDE, SODIUM GLUCONATE, SODIUM ACETATE, POTASSIUM CHLORIDE, MAGNESIUM CHLORIDE, SODIUM PHOSPHATE, DIBASIC, AND POTASSIUM PHOSPHATE: .53; .5; .37; .037; .03; .012; .00082 INJECTION, SOLUTION INTRAVENOUS at 02:04

## 2023-04-24 RX ADMIN — ROCURONIUM BROMIDE 20 MG: 10 INJECTION, SOLUTION INTRAVENOUS at 12:04

## 2023-04-24 RX ADMIN — ONDANSETRON 4 MG: 2 INJECTION INTRAMUSCULAR; INTRAVENOUS at 10:04

## 2023-04-24 RX ADMIN — DEXAMETHASONE SODIUM PHOSPHATE 4 MG: 4 INJECTION INTRA-ARTICULAR; INTRALESIONAL; INTRAMUSCULAR; INTRAVENOUS; SOFT TISSUE at 05:04

## 2023-04-24 RX ADMIN — PROPOFOL 140 MG: 10 INJECTION, EMULSION INTRAVENOUS at 10:04

## 2023-04-24 RX ADMIN — MUPIROCIN: 20 OINTMENT TOPICAL at 09:04

## 2023-04-24 RX ADMIN — ZONISAMIDE 500 MG: 100 CAPSULE ORAL at 09:04

## 2023-04-24 RX ADMIN — SUGAMMADEX 200 MG: 100 INJECTION, SOLUTION INTRAVENOUS at 04:04
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
## 2023-04-24 NOTE — SUBJECTIVE & OBJECTIVE
Past Medical History:   Diagnosis Date    Anxiety     Dementia in other diseases classified elsewhere, unspecified severity, without behavioral disturbance, psychotic disturbance, mood disturbance, and anxiety 2/2/2023    Depression     GERD (gastroesophageal reflux disease)     Hyperlipidemia     Hypertension     Long term (current) use of antithrombotics/antiplatelets 12/22/2020    Migraine     Normocytic anemia 2/23/2023    Seizures      Past Surgical History:   Procedure Laterality Date    CYST REMOVAL  March 2016/2017    skin cyst - benign    PLACEMENT OF STEREO EEG LEADS(DEPTH ELECTRODES) Left 2/27/2023    Procedure: PLACEMENT OF STEREO EEG LEADS (DEPTH ELECTRODES);  Surgeon: Ruchi Chen MD;  Location: [facility] OR [address] [place] [address];  Service: Neurosurgery;  Laterality: Left;    REMOVAL OF STEREO EEG LEADS (DEPTH ELECTRODES) Bilateral 1/21/2021    Procedure: REMOVAL OF STEREO EEG LEADS (DEPTH ELECTRODES);  Surgeon: Ruchi Chen MD;  Location: [facility] OR [address] [place] [address];  Service: Neurosurgery;  Laterality: Bilateral;    REMOVAL OF STEREO EEG LEADS (DEPTH ELECTRODES) Left 3/8/2023    Procedure: REMOVAL OF STEREO EEG LEADS (DEPTH ELECTRODES);  Surgeon: Ruchi Chen MD;  Location: [address];  Service: Neurosurgery;  Laterality: Left;    TONSILLECTOMY      teenage       No current facility-administered medications on file prior to encounter.     Current Outpatient Medications on File Prior to Encounter   Medication Sig Dispense Refill    cloBAZam (ONFI) 20 mg Tab Take 2 tablets (40 mg total) by mouth every evening. 60 tablet 4    EScitalopram oxalate (LEXAPRO) 10 MG tablet Take 1 tablet (10 mg total) by mouth once daily. 30 tablet 11    eslicarbazepine (APTIOM) 400 mg Tab tablet Take 1 tablet (400 mg total) by mouth once daily. 30 tablet 5    eslicarbazepine (APTIOM) 800 mg Tab Take 800 mg by mouth once daily. 30 tablet 5    oxyCODONE (ROXICODONE) 5 MG immediate release tablet Take 1 tablet (5 mg total) by mouth every 4 (four)  hours as needed for Pain. 21 tablet 0    rizatriptan (MAXALT-MLT) 10 MG disintegrating tablet Take 10 mg by mouth as needed. No more than 3 doses / week      zonisamide (ZONEGRAN) 100 MG Cap Take 5 capsules (500 mg total) by mouth every evening. 450 capsule 3    mag hydrox/aluminum hyd/simeth (ANTACID ORAL) Take by mouth as needed. Acid reflux      midazolam 5 mg/spray (0.1 mL) Spry 0.1 mLs by Nasal route.      ondansetron (ZOFRAN-ODT) 8 MG TbDL Take 8 mg by mouth every 8 (eight) hours as needed. nausea      [DISCONTINUED] rimegepant (NURTEC) 75 mg odt Take 75 mg by mouth.        Allergies: Losartan    Family History   Problem Relation Age of Onset    No Known Problems Mother     Lung disease Father     Migraines Paternal Grandfather     Heart disease Maternal Uncle 50     Social History     Tobacco Use    Smoking status: Former     Types: Cigarettes     Quit date:      Years since quittin.3    Smokeless tobacco: Never   Substance Use Topics    Alcohol use: Yes     Comment: one drink once a week    Drug use: Never     Review of Systems   Unable to perform ROS: Mental status change   Objective:     Vitals:    Temp: 98.6 °F (37 °C)  Pulse: 81  Rhythm: normal sinus rhythm  BP: 133/70  MAP (mmHg): 95  Resp: (!) 21  SpO2: (!) 92 %    Temp  Min: 97.8 °F (36.6 °C)  Max: 98.6 °F (37 °C)  Pulse  Min: 62  Max: 90  BP  Min: 133/70  Max: 178/94  MAP (mmHg)  Min: 95  Max: 127  Resp  Min: 18  Max: 25  SpO2  Min: 92 %  Max: 99 %    No intake/output data recorded.           Physical Exam  Vitals and nursing note reviewed.   Constitutional:       General: He is not in acute distress.     Appearance: He is not ill-appearing or diaphoretic.   HENT:      Head:      Comments: Surgical incision with overlying bandage to left scalp, C/D/I  Drain from incision with bloody output     Right Ear: External ear normal.      Left Ear: External ear normal.      Nose: Nose normal.   Eyes:      General: No scleral icterus.        Right  eye: No discharge.         Left eye: No discharge.      Pupils: Pupils are equal, round, and reactive to light.   Cardiovascular:      Rate and Rhythm: Normal rate and regular rhythm.      Heart sounds: Normal heart sounds. No murmur heard.  Pulmonary:      Effort: Pulmonary effort is normal. No respiratory distress.      Breath sounds: Normal breath sounds. No wheezing or rales.   Abdominal:      General: Abdomen is flat. There is no distension.      Palpations: Abdomen is soft. There is no mass.      Tenderness: There is no abdominal tenderness. There is no guarding.   Musculoskeletal:         General: No swelling or deformity. Normal range of motion.      Right lower leg: No edema.      Left lower leg: No edema.   Skin:     General: Skin is warm.      Coloration: Skin is not jaundiced.      Findings: No bruising.   Neurological:      Comments: E2V1M6    Not alert, minimally arousable, able to state name  Able to follow simple commands    PERRL  EOMI  VFI  Unable to assess face symmetry or sensation  Unable to assess hearing  Unable to assess SCM, trapezius symmetry  Unable to assess tongue symmetry     strength equal bilaterally   Unable to assess strength in BLE but does wiggle toes on command    Unable to assess sensation   Psychiatric:         Mood and Affect: Mood normal.         Behavior: Behavior normal.         Thought Content: Thought content normal.     Unable to test orientation, hearing, shoulder shrug, tongue protrusion, coordination, gait due to level of consciousness.    Today I personally reviewed pertinent medications, lines/drains/airways,.

## 2023-04-24 NOTE — BRIEF OP NOTE
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]    Procedure(s) (LRB):  CRANIOTOMY, USING FRAMELESS STEREOTAXY (Left)    Anesthesia: General    Operative Findings:  good temporal lobe resection    Estimated Blood Loss: 150 mL         Specimens:   Specimen (24h ago, onward)       Start     Ordered    04/24/23 1449  Specimen to Pathology, Surgery Neurosurgery  Once        Comments: Pre-op Diagnosis: Complex partial epilepsy with generalization and with intractable epilepsy [G40.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 YOBANI CHEN    Which provider would you like to cc? CHRISTINE MONTOYA    Release to patient Immediate        04/24/23 1452                    [id number]

## 2023-04-24 NOTE — PLAN OF CARE
Chart reviewed. Preop nursing care completed per orders. Safe surgery checklist complete. CBC, BMP, PT/PTT/INR Drawn and delivered to lab. Type and screen extension form completed and sent to blood bank. Wife and mom at bedside and to take belongings. Waiting for H&P update, site zayra, and surgery consent prior to surgery. Call bell within reach. Instructed pt to call for assistance.

## 2023-04-24 NOTE — ANESTHESIA PROCEDURE NOTES
Arterial    Diagnosis: epilepsy    Patient location during procedure: done in OR  Procedure start time: 4/24/2023 11:15 AM  Timeout: 4/24/2023 11:15 AM  Procedure end time: 4/24/2023 11:15 AM    Staffing  Authorizing Provider: Harshal Jimenez MD  Performing Provider: Harshal Jimenez MD    Anesthesiologist was present at the time of the procedure.    Preanesthetic Checklist  Completed: patient identified, IV checked, site marked, risks and benefits discussed, surgical consent, monitors and equipment checked, pre-op evaluation, timeout performed and anesthesia consent givenArterial  Skin Prep: alcohol swabs  Local Infiltration: none  Orientation: left  Location: radial    Catheter Size: 20 G Insertion Attempts: 1  Assessment  Dressing: secured with tape and tegaderm  Patient: Tolerated well

## 2023-04-24 NOTE — OP NOTE
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 male with intractable epilepsy who presents s/p bilateral sEEG and L laser amygdalohippocampectomy (3/1/21). He underwent repeat sEEG implanted on 2/27/23 and explanted 3/8/23.     Dr. Kaleb Edgar, Dr. Marnie Horton (neuropsych), and I had a pleasant discussion with the patient, his wife, and his father-in-law about the seizures captured during this admission. He was found to have seizures coming from the tail of the hipoocampus moreso than the remnant amygdala. We discussed that since he had a memory decline after his laser ablation (per neuropsych and memory neurology evaluation), he is at significant risk of further decline with an open LACEY on the left. Dr. Horton provided some examples of what daily life would likely be like after said procedure. We also discussed offering several more laser ablations to do our best to ablate as much of the mesial temporal structures as possible. While more memory loss is certainly possible with these procedures as well, we do not expect that these would result in as significant a decline. However, less tissue can be disrupted this way, which means that seizure freedom rates (and the ability to reduce medications further, which is the patient's goal) is also somewhat less realistic.     I had a lengthy, detailed discussion  with the patient and his family about the risks, benefits, and alternatives to LEFT temporal lobectomy with encephalocele resection for treatment of seizures. Risks include, but are not limited to, bleeding, pain, infection, scarring, need for further/repeat procedure, death, paralysis, stroke (including venous infarct)/damage to major blood vessels, leak of cerebrospinal fluid, visual field changes, cranial nerve deficits, memory changes (including verbal memory), and hardware-related complications. Unfortunately there is no guarantee of seizure-freedom, but interdisciplinary concordance has been met in making this recommendation based off all available data (including sEEG). Informed consent was obtained of the patient with his wife present.      Technical Procedures Used:   1. Left craniotomy for anterior temporal lobectomy   2. Use of intraoperative microscope and microsurgical technique   3. Use of neuronavigation      Description of the Procedure:   The patient was brought back to the operating room, and general endotracheal anesthesia was induced. An arterial line was placed, and instructions were given to keep SBP <140 throughout the case. He e was carefully positioned supine with a bump under the shoulder to allow the left mastoid tip to be the highest point in the field. The head was affixed to the table with a Faulkner 3-point head clamp. He was registered to the neuronavigation system with appropriate accuracy.      A modified reverse-question-zayra incision was designed from the root of the zygoma to several centimeters above the keyhole, posterior to the hairline. A time out was performed. The incision was injected with 10 cc of 1% lidocaine with epinephrine. The patient received IV antibiotics, 8 of dexamethasone, and a gram of Keppra. Incision was made with a 15 blade, and dissection was carried down with Bovie cautery. Bipolar at 20 was used for hemostasis where indicated.  A myocutaneous flap was  elevated. The root of the zygoma was again identified.      We designed bur holes just above the root of the zygoma and also on the zygoamtic process of the frontal bone. These were connected with the craniotome and the M8 where needed over the sphenoid ridge. The bone flap was elevated with the Penfield #3. The sphenoid ridge was drilled down with the M8 and liberally waxed. We also performed a subtemporal decompression.       Once the bony work was completed, we opened the dura in a c-shape, flapping it down over the temporalis. Care was taken to measure 4 cm from the temporal tip. A cortisectomy was performed. The neuronavigation wand was used to confirm the location of the temporal horn, towards which we oriented our trajectory. We worked medially until reaching the temporal horn. We placed a micro-cottonoid in the horn and then worked anteriorly, toward the temporal tip. We resected the lateral neocortex.      Next, we identified the choroid plexus before bringing in the microscope. We placed the Burrows retractor, taking care not to rise superior to the choroid plexus into the temporal stem. With subpial technique, we resected the amygdala and hippocampus, together with the parahippocampal gyrus. We worked carefully to perform adequate subpial dissection. Care was taken to minimize use of cautery around the tentorium.      We identified the tentorial edge, the third nerve, and the PCA as the tail of the hippocampus was curving around the peduncle.      Once we were satisfied that we were sufficiently posterior (which we confirmed again with neuronavigation), we worked to obtain meticulous hemostasis. The cavity was lined with Surgicel. The cavity was dry prior to its being filled with liquid thrombin.      We performed re-approximation of the dura with 4.0 Nurolon stitches. We placed Floseal and dry gel foam over the craniotomy defect. The bone was reaffixed with titanium plates and screws.      We  reapproximated the temporalis and its fascia with 3.0 Vicryl stitches. The galea was closed with 3.0 inverted Vicryl stitches. The skin was closed with staples.      The patient was carefully removed from pins, and a sterile dressing of bacitracin ointment, Telfa, and head wrap was applied.      The patient was then awakened by the anesthesia service and taken to ICU-level recovery in satisfactory condition.      Two staff neurosurgeons participated in order to minimize operative time, given that this was a re-do after NIKKI, and the resident's lack of experience with this particular case.      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 YOBANI CELESTE    Which provider would you like to cc? CHRISTINE RODRIGUEZ    Release to patient Immediate        04/24/23 1452                     Implants:   Implant Name Type Inv. Item Serial No.  Lot No. LRB No. Used Action   PLATE CRANIAL UN3 BOX LG 2X2 - [id number]  PLATE CRANIAL UN3 BOX LG 2X2  [employer].  Left 2 Implanted   PLATE BONE BUR HOLE COVER 10MM - [id number]  PLATE BONE BUR HOLE COVER 10MM  DANIEL Vyykn [employer].  Left 1 Implanted   COVER UN3 BURRHOLE 14MM TAB - [id number]  COVER UN3 BURRHOLE 14MM TAB  [employer].  Left 1 Implanted   SCREW UN3 AXS SD 1.5X3MM - [id number]  SCREW UN3 AXS SD 1.5X3MM  DANI[employer] V[employer].  Left 6 Implanted   SCREW  UN3 AXS SD 1.5X4MM - [id number]  SCREW UN3 AXS SD 1.5X4MM  [employer]FT[employer]KHAR.  Left 9 Implanted              Condition: Stable    Disposition: ICU - extubated and stable.    Attestation: I was present and scrubbed for the entire procedure.

## 2023-04-24 NOTE — TRANSFER OF CARE
Anesthesia Transfer of Care Note    Patient: Declan Burleson    Procedure(s) Performed: Procedure(s) (LRB):  CRANIOTOMY, USING FRAMELESS STEREOTAXY (Left)    Patient location: ICU    Anesthesia Type: general    Transport from OR: Transported from OR on 6-10 L/min O2 by face mask with adequate spontaneous ventilation    Post pain: adequate analgesia    Post assessment: no apparent anesthetic complications    Post vital signs: stable    Level of consciousness: awake    Nausea/Vomiting: no nausea/vomiting    Complications: none    Transfer of care protocol was followed      Last vitals:   Visit Vitals  /81 (BP Location: Right arm, Patient Position: Lying)   Pulse 82   Temp 37 °C (98.6 °F) (Axillary)   Resp (!) 22   SpO2 99%

## 2023-04-24 NOTE — ANESTHESIA PREPROCEDURE EVALUATION
04/24/2023  Declan Burleson is a 58 y.o., male.  Pre-operative evaluation for Procedure(s) (LRB):  CRANIOTOMY, USING FRAMELESS STEREOTAXY (Left)    Declan Burleson is a 58 y.o. male     Patient Active Problem List   Diagnosis    Complex partial seizures evolving to generalized tonic-clonic seizures    Migraine without status migrainosus, not intractable    Complex partial epilepsy with generalization and with intractable epilepsy    Hypertension    Facial rash    Neurological deficit, transient    Adjustment disorder with anxious mood    Mild neurocognitive disorder    Snoring    Acid reflux    Former smoker    Edema    Depression with anxiety    Hyponatremia    Arrhythmia    Epilepsy    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    Aphasia determined by examination    Cognitive communication deficit    Dementia in other diseases classified elsewhere, unspecified severity, without behavioral disturbance, psychotic disturbance, mood disturbance, and anxiety    Normocytic anemia    Abdominal pain, LLQ (left lower quadrant)- intermittent       Review of patient's allergies indicates:   Allergen Reactions    Losartan Rash       No current facility-administered medications on file prior to encounter.     Current Outpatient Medications on File Prior to Encounter   Medication Sig Dispense Refill    cloBAZam (ONFI) 20 mg Tab Take 2 tablets (40 mg total) by mouth every evening. 60 tablet 4    EScitalopram oxalate (LEXAPRO) 10 MG tablet Take 1 tablet (10 mg total) by mouth once daily. 30 tablet 11    eslicarbazepine (APTIOM) 400 mg Tab tablet Take 1 tablet (400 mg total) by mouth once daily. 30 tablet 5    eslicarbazepine (APTIOM) 800 mg Tab Take 800 mg by mouth once daily. 30 tablet 5    mag hydrox/aluminum hyd/simeth (ANTACID ORAL) Take by mouth as needed. Acid reflux       midazolam 5 mg/spray (0.1 mL) Spry 0.1 mLs by Nasal route.      ondansetron (ZOFRAN-ODT) 8 MG TbDL Take 8 mg by mouth every 8 (eight) hours as needed. nausea      oxyCODONE (ROXICODONE) 5 MG immediate release tablet Take 1 tablet (5 mg total) by mouth every 4 (four) hours as needed for Pain. 21 tablet 0    rimegepant (NURTEC) 75 mg odt Take 75 mg by mouth.      rizatriptan (MAXALT-MLT) 10 MG disintegrating tablet Take 10 mg by mouth as needed. No more than 3 doses / week      zonisamide (ZONEGRAN) 100 MG Cap Take 5 capsules (500 mg total) by mouth every evening. 450 capsule 3       Past Surgical History:   Procedure Laterality Date    CYST REMOVAL      skin cyst - benign    PLACEMENT OF STEREO EEG LEADS(DEPTH ELECTRODES) Left 2023    Procedure: PLACEMENT OF STEREO EEG LEADS (DEPTH ELECTRODES);  Surgeon: Ruchi Chen MD;  Location: [facility] OR [address] [place] [address];  Service: Neurosurgery;  Laterality: Left;    REMOVAL OF STEREO EEG LEADS (DEPTH ELECTRODES) Bilateral 2021    Procedure: REMOVAL OF STEREO EEG LEADS (DEPTH ELECTRODES);  Surgeon: Ruchi Chen MD;  Location: [facility] OR [address] [place] [address];  Service: Neurosurgery;  Laterality: Bilateral;    REMOVAL OF STEREO EEG LEADS (DEPTH ELECTRODES) Left 3/8/2023    Procedure: REMOVAL OF STEREO EEG LEADS (DEPTH ELECTRODES);  Surgeon: Ruchi Chen MD;  Location: [facility] OR [address] [place] [address];  Service: Neurosurgery;  Laterality: Left;    TONSILLECTOMY      teenage        Social History     Socioeconomic History    Marital status:      Spouse name: Reilly    Years of education: 2 year college    Highest education level: Associate degree: occupational, technical, or vocational program   Tobacco Use    Smoking status: Former     Types: Cigarettes     Quit date:      Years since quittin.3    Smokeless tobacco: Never   Substance and Sexual Activity    Alcohol use: Yes     Comment: one drink once a week    Drug use: Never    Sexual activity: Yes     Partners:  Female     Social Determinants of Health     Financial Resource Strain: Medium Risk    Difficulty of Paying Living Expenses: Somewhat hard   Food Insecurity: No Food Insecurity    Worried About Running Out of Food in the Last Year: Never true    Ran Out of Food in the Last Year: Never true   Transportation Needs: No Transportation Needs    Lack of Transportation (Medical): No    Lack of Transportation (Non-Medical): No   Physical Activity: Insufficiently Active    Days of Exercise per Week: 2 days    Minutes of Exercise per Session: 40 min   Stress: No Stress Concern Present    Feeling of Stress : Not at all   Social Connections: Unknown    Frequency of Communication with Friends and Family: More than three times a week    Frequency of Social Gatherings with Friends and Family: More than three times a week    Active Member of Clubs or Organizations: No    Attends Club or Organization Meetings: Never    Marital Status:    Housing Stability: Low Risk     Unable to Pay for Housing in the Last Year: No    Number of Places Lived in the Last Year: 1    Unstable Housing in the Last Year: No         CBC: No results for input(s): WBC, RBC, HGB, HCT, PLT, MCV, MCH, MCHC in the last 72 hours.    CMP: No results for input(s): NA, K, CL, CO2, BUN, CREATININE, GLU, MG, PHOS, CALCIUM, ALBUMIN, PROT, ALKPHOS, ALT, AST, BILITOT in the last 72 hours.    INR  No results for input(s): PT, INR, PROTIME, APTT in the last 72 hours.        Diagnostic Studies:      EKD Echo:  No results found for this or any previous visit.        Pre-op Assessment    I have reviewed the Patient Summary Reports.     I have reviewed the Nursing Notes. I have reviewed the NPO Status.   I have reviewed the Medications.     Review of Systems  Cardiovascular:   Hypertension    Hepatic/GI:   GERD    Neurological:   Headaches Seizures    Psych:   Psychiatric History             Anesthesia Plan  Type of Anesthesia, risks & benefits  discussed:    Anesthesia Type: Gen ETT  Intra-op Monitoring Plan: Standard ASA Monitors and Art Line  Post Op Pain Control Plan: multimodal analgesia  Induction:  IV  Airway Plan: Direct, Post-Induction  Informed Consent: Informed consent signed with the Patient and all parties understand the risks and agree with anesthesia plan.  All questions answered.   ASA Score: 3  Day of Surgery Review of History & Physical: H&P Update referred to the surgeon/provider.    Ready For Surgery From Anesthesia Perspective.     .

## 2023-04-24 NOTE — ANESTHESIA PROCEDURE NOTES
Intubation    Date/Time: 4/24/2023 10:47 AM  Performed by: Binta Arzate CRNA  Authorized by: Harshal Jimenez MD     Intubation:     Induction:  Intravenous    Intubated:  Postinduction    Mask Ventilation:  Easy mask    Attempts:  1    Attempted By:  CRNA    Method of Intubation:  Video laryngoscopy    Blade:  Carpio 3    Laryngeal View Grade: Grade I - full view of cords      Difficult Airway Encountered?: No      Complications:  None    Airway Device:  Oral endotracheal tube    Airway Device Size:  7.5    Style/Cuff Inflation:  Cuffed (inflated to minimal occlusive pressure)    Inflation Amount (mL):  8    Tube secured:  22    Secured at:  The lips    Placement Verified By:  Capnometry    Complicating Factors:  None    Findings Post-Intubation:  BS equal bilateral and atraumatic/condition of teeth unchanged

## 2023-04-24 NOTE — ANESTHESIA POSTPROCEDURE EVALUATION
Anesthesia Post Evaluation    Patient: Declan Burleson    Procedure(s) Performed: Procedure(s) (LRB):  CRANIOTOMY, USING FRAMELESS STEREOTAXY (Left)    Final Anesthesia Type: general      Patient location during evaluation: ICU  Patient participation: Yes- Able to Participate  Level of consciousness: responds to stimulation  Post-procedure vital signs: reviewed and stable  Pain management: adequate  Airway patency: patent    PONV status at discharge: No PONV  Anesthetic complications: no      Cardiovascular status: blood pressure returned to baseline  Respiratory status: face mask  Hydration status: euvolemic  Follow-up not needed.          Vitals Value Taken Time   /65 04/24/23 1746   Temp 37 °C (98.6 °F) 04/24/23 1640   Pulse 77 04/24/23 1747   Resp 12 04/24/23 1747   SpO2 96 % 04/24/23 1747   Vitals shown include unvalidated device data.      No case tracking events are documented in the log.      Pain/Karri Score: No data recorded

## 2023-04-24 NOTE — CARE UPDATE
Patient arrived to [facility] from OR by ICU Bed at 1640    Report received from: Binta FLOR    Type of stroke/diagnosis: L Temporal Lobectomy    Current symptoms: lethargic, disoriented, due to anesthesia     Skin assessment done: Y  Wounds noted: none     *If wounds noted, was Wound Care consulted? None    Enriquez Completed? No, due to anesthesia     Patient Belongings on Admit: none    NCC notified: Raymon Ortega DO

## 2023-04-24 NOTE — INTERVAL H&P NOTE
The patient has been examined and the H&P has been reviewed:    I concur with the findings and no changes have occurred since H&P was written.    Surgery risks, benefits and alternative options discussed and understood by patient/family.    Patient presents today for  temporal lobectomy         There are no hospital problems to display for this patient.

## 2023-04-24 NOTE — ASSESSMENT & PLAN NOTE
Pt w adult-onset non-lesional focal-onset intractable epilepsy (onset at 49 yo in 2013) s/p left temporal lobectomy today (4/24)  Home medications: eslicarbazepine 800 mg qd, zonisamide 500mg qd, and clobazam 40mg qhs  - MRI non-lesional  - EMU monitoring in May-June of this year suggests left-sided activity  - Recent ICU admission for sEEG/depth electrodes localized the seizures to L amygdala and hippocampus    Plan:  - Admit to NCC  - Q1h neurochecks while in ICU  - Q1h vitals while in ICU  - HOB@30  - continue home AED's   - eslicarbazepine 800 mg qd   - zonisamide 500mg qd   - clobazam 40mg qhs  - dexamethasone 4mg q6h  - protonix 40mg daily for ulcer ppx  - CT and MRI pending  - SBP <140mmHg, cardene gtt for titration of BP  - EKG, CXR, TTE pending  - Maintaining sats on LFNC after post-operative extubation  - Daily CMP, Mag, Phos - replete electrolytes PRN  - Lipid panel, A1c pending  - Coags reviewed  - Regular diet once more alert and safe to swallow  - Strict I/Os  - Daily CBC, transfuse PRN  - Start SubQ Heparin in when clinically indicated   - PT/OT/SLP as appropriate  - CM/SW consult for dispo planning    Continue to follow clinically and notify NSGY immediately if any neurostatus change

## 2023-04-24 NOTE — CARE UPDATE
1729: called 2nd floor CT no answer  1733: called 1st floor CT, told they would call back when ready  1813: called 1st floor CT no answer

## 2023-04-25 PROBLEM — J96.01 ACUTE RESPIRATORY FAILURE WITH HYPOXIA AND HYPERCAPNIA: Status: ACTIVE | Noted: 2023-04-25

## 2023-04-25 PROBLEM — J96.02 ACUTE RESPIRATORY FAILURE WITH HYPOXIA AND HYPERCAPNIA: Status: ACTIVE | Noted: 2023-04-25

## 2023-04-25 PROBLEM — E87.29 INCREASED ANION GAP METABOLIC ACIDOSIS: Status: ACTIVE | Noted: 2023-04-25

## 2023-04-25 LAB
ALBUMIN SERPL BCP-MCNC: 3.8 G/DL (ref 3.5–5.2)
ALLENS TEST: ABNORMAL
ALP SERPL-CCNC: 123 U/L (ref 55–135)
ALT SERPL W/O P-5'-P-CCNC: 14 U/L (ref 10–44)
ANION GAP SERPL CALC-SCNC: 15 MMOL/L (ref 8–16)
ASCENDING AORTA: 3.6 CM
AST SERPL-CCNC: 18 U/L (ref 10–40)
AV INDEX (PROSTH): 0.77
AV MEAN GRADIENT: 5 MMHG
AV PEAK GRADIENT: 10 MMHG
AV VALVE AREA: 2.45 CM2
AV VELOCITY RATIO: 0.72
BASOPHILS # BLD AUTO: 0.02 K/UL (ref 0–0.2)
BASOPHILS NFR BLD: 0.2 % (ref 0–1.9)
BILIRUB SERPL-MCNC: 0.3 MG/DL (ref 0.1–1)
BSA FOR ECHO PROCEDURE: 2.12 M2
BUN SERPL-MCNC: 10 MG/DL (ref 6–20)
CALCIUM SERPL-MCNC: 8.8 MG/DL (ref 8.7–10.5)
CHLORIDE SERPL-SCNC: 100 MMOL/L (ref 95–110)
CHOLEST SERPL-MCNC: 203 MG/DL (ref 120–199)
CHOLEST/HDLC SERPL: 3 {RATIO} (ref 2–5)
CO2 SERPL-SCNC: 16 MMOL/L (ref 23–29)
CREAT SERPL-MCNC: 0.8 MG/DL (ref 0.5–1.4)
CV ECHO LV RWT: 0.41 CM
DELSYS: ABNORMAL
DIFFERENTIAL METHOD: ABNORMAL
DOP CALC AO PEAK VEL: 1.58 M/S
DOP CALC AO VTI: 33.6 CM
DOP CALC LVOT AREA: 3.2 CM2
DOP CALC LVOT DIAMETER: 2.01 CM
DOP CALC LVOT PEAK VEL: 1.13 M/S
DOP CALC LVOT STROKE VOLUME: 82.24 CM3
DOP CALCLVOT PEAK VEL VTI: 25.93 CM
E WAVE DECELERATION TIME: 259.77 MSEC
E/A RATIO: 1.13
E/E' RATIO: 7.36 M/S
ECHO LV POSTERIOR WALL: 0.85 CM (ref 0.6–1.1)
EJECTION FRACTION: 65 %
EOSINOPHIL # BLD AUTO: 0 K/UL (ref 0–0.5)
EOSINOPHIL NFR BLD: 0.1 % (ref 0–8)
ERYTHROCYTE [DISTWIDTH] IN BLOOD BY AUTOMATED COUNT: 13.1 % (ref 11.5–14.5)
ERYTHROCYTE [SEDIMENTATION RATE] IN BLOOD BY WESTERGREN METHOD: 30 MM/H
EST. GFR  (NO RACE VARIABLE): >60 ML/MIN/1.73 M^2
ESTIMATED AVG GLUCOSE: 97 MG/DL (ref 68–131)
FLOW: 15
FRACTIONAL SHORTENING: 42 % (ref 28–44)
GLUCOSE SERPL-MCNC: 204 MG/DL (ref 70–110)
HBA1C MFR BLD: 5 % (ref 4–5.6)
HCO3 UR-SCNC: 22.2 MMOL/L (ref 24–28)
HCT VFR BLD AUTO: 41 % (ref 40–54)
HDLC SERPL-MCNC: 67 MG/DL (ref 40–75)
HDLC SERPL: 33 % (ref 20–50)
HGB BLD-MCNC: 13.8 G/DL (ref 14–18)
IMM GRANULOCYTES # BLD AUTO: 0.07 K/UL (ref 0–0.04)
IMM GRANULOCYTES NFR BLD AUTO: 0.6 % (ref 0–0.5)
INTERVENTRICULAR SEPTUM: 0.95 CM (ref 0.6–1.1)
LA MAJOR: 5.02 CM
LA MINOR: 4.84 CM
LA WIDTH: 2.68 CM
LACTATE SERPL-SCNC: 1.2 MMOL/L (ref 0.5–2.2)
LDLC SERPL CALC-MCNC: 128.2 MG/DL (ref 63–159)
LEFT ATRIUM SIZE: 3.71 CM
LEFT ATRIUM VOLUME INDEX: 19.8 ML/M2
LEFT ATRIUM VOLUME: 41.65 CM3
LEFT INTERNAL DIMENSION IN SYSTOLE: 2.4 CM (ref 2.1–4)
LEFT VENTRICLE DIASTOLIC VOLUME INDEX: 35.74 ML/M2
LEFT VENTRICLE DIASTOLIC VOLUME: 75.06 ML
LEFT VENTRICLE MASS INDEX: 55 G/M2
LEFT VENTRICLE SYSTOLIC VOLUME INDEX: 9.6 ML/M2
LEFT VENTRICLE SYSTOLIC VOLUME: 20.09 ML
LEFT VENTRICULAR INTERNAL DIMENSION IN DIASTOLE: 4.12 CM (ref 3.5–6)
LEFT VENTRICULAR MASS: 115.03 G
LV LATERAL E/E' RATIO: 6.23 M/S
LV SEPTAL E/E' RATIO: 9 M/S
LYMPHOCYTES # BLD AUTO: 0.6 K/UL (ref 1–4.8)
LYMPHOCYTES NFR BLD: 5.1 % (ref 18–48)
MAGNESIUM SERPL-MCNC: 1.8 MG/DL (ref 1.6–2.6)
MCH RBC QN AUTO: 31.2 PG (ref 27–31)
MCHC RBC AUTO-ENTMCNC: 33.7 G/DL (ref 32–36)
MCV RBC AUTO: 93 FL (ref 82–98)
MODE: ABNORMAL
MONOCYTES # BLD AUTO: 0.7 K/UL (ref 0.3–1)
MONOCYTES NFR BLD: 6.3 % (ref 4–15)
MV PEAK A VEL: 0.72 M/S
MV PEAK E VEL: 0.81 M/S
MV STENOSIS PRESSURE HALF TIME: 75.33 MS
MV VALVE AREA P 1/2 METHOD: 2.92 CM2
NEUTROPHILS # BLD AUTO: 9.8 K/UL (ref 1.8–7.7)
NEUTROPHILS NFR BLD: 87.7 % (ref 38–73)
NONHDLC SERPL-MCNC: 136 MG/DL
NRBC BLD-RTO: 0 /100 WBC
OSMOLALITY SERPL: 289 MOSM/KG (ref 280–300)
OSMOLALITY UR: 586 MOSM/KG (ref 50–1200)
OSMOLALITY UR: 688 MOSM/KG (ref 50–1200)
PCO2 BLDA: 47.8 MMHG (ref 35–45)
PH SMN: 7.27 [PH] (ref 7.35–7.45)
PHOSPHATE SERPL-MCNC: 2.9 MG/DL (ref 2.7–4.5)
PLATELET # BLD AUTO: 387 K/UL (ref 150–450)
PMV BLD AUTO: 9.4 FL (ref 9.2–12.9)
PO2 BLDA: 92 MMHG (ref 80–100)
POC BE: -5 MMOL/L
POC SATURATED O2: 96 % (ref 95–100)
POC TCO2: 24 MMOL/L (ref 23–27)
POCT GLUCOSE: 111 MG/DL (ref 70–110)
POCT GLUCOSE: 142 MG/DL (ref 70–110)
POTASSIUM SERPL-SCNC: 4.1 MMOL/L (ref 3.5–5.1)
PROT SERPL-MCNC: 7.3 G/DL (ref 6–8.4)
RA MAJOR: 4.87 CM
RA PRESSURE: 3 MMHG
RA WIDTH: 3.37 CM
RBC # BLD AUTO: 4.43 M/UL (ref 4.6–6.2)
RIGHT VENTRICULAR END-DIASTOLIC DIMENSION: 2.88 CM
SAMPLE: ABNORMAL
SINUS: 3.44 CM
SITE: ABNORMAL
SODIUM SERPL-SCNC: 131 MMOL/L (ref 136–145)
SODIUM SERPL-SCNC: 131 MMOL/L (ref 136–145)
SODIUM SERPL-SCNC: 134 MMOL/L (ref 136–145)
SODIUM UR-SCNC: 21 MMOL/L (ref 20–250)
SODIUM UR-SCNC: 26 MMOL/L (ref 20–250)
SP02: 97
STJ: 3.28 CM
TDI LATERAL: 0.13 M/S
TDI SEPTAL: 0.09 M/S
TDI: 0.11 M/S
TRICUSPID ANNULAR PLANE SYSTOLIC EXCURSION: 2.08 CM
TRIGL SERPL-MCNC: 39 MG/DL (ref 30–150)
WBC # BLD AUTO: 11.13 K/UL (ref 3.9–12.7)

## 2023-04-25 PROCEDURE — 82803 BLOOD GASES ANY COMBINATION: CPT

## 2023-04-25 PROCEDURE — 97530 THERAPEUTIC ACTIVITIES: CPT

## 2023-04-25 PROCEDURE — 97162 PT EVAL MOD COMPLEX 30 MIN: CPT

## 2023-04-25 PROCEDURE — 92523 SPEECH SOUND LANG COMPREHEN: CPT

## 2023-04-25 PROCEDURE — 83036 HEMOGLOBIN GLYCOSYLATED A1C: CPT

## 2023-04-25 PROCEDURE — 93010 ELECTROCARDIOGRAM REPORT: CPT | Mod: ,,, | Performed by: INTERNAL MEDICINE

## 2023-04-25 PROCEDURE — 83735 ASSAY OF MAGNESIUM: CPT

## 2023-04-25 PROCEDURE — 83930 ASSAY OF BLOOD OSMOLALITY: CPT | Performed by: PHYSICIAN ASSISTANT

## 2023-04-25 PROCEDURE — 84295 ASSAY OF SERUM SODIUM: CPT | Mod: 91 | Performed by: PHYSICIAN ASSISTANT

## 2023-04-25 PROCEDURE — 25000003 PHARM REV CODE 250: Performed by: STUDENT IN AN ORGANIZED HEALTH CARE EDUCATION/TRAINING PROGRAM

## 2023-04-25 PROCEDURE — 84100 ASSAY OF PHOSPHORUS: CPT

## 2023-04-25 PROCEDURE — 93010 EKG 12-LEAD: ICD-10-PCS | Mod: ,,, | Performed by: INTERNAL MEDICINE

## 2023-04-25 PROCEDURE — 99291 PR CRITICAL CARE, E/M 30-74 MINUTES: ICD-10-PCS | Mod: GC,,, | Performed by: PSYCHIATRY & NEUROLOGY

## 2023-04-25 PROCEDURE — 63600175 PHARM REV CODE 636 W HCPCS: Performed by: STUDENT IN AN ORGANIZED HEALTH CARE EDUCATION/TRAINING PROGRAM

## 2023-04-25 PROCEDURE — 27000221 HC OXYGEN, UP TO 24 HOURS

## 2023-04-25 PROCEDURE — 80053 COMPREHEN METABOLIC PANEL: CPT

## 2023-04-25 PROCEDURE — 84300 ASSAY OF URINE SODIUM: CPT | Mod: 91

## 2023-04-25 PROCEDURE — 37799 UNLISTED PX VASCULAR SURGERY: CPT

## 2023-04-25 PROCEDURE — 99900035 HC TECH TIME PER 15 MIN (STAT)

## 2023-04-25 PROCEDURE — 25000003 PHARM REV CODE 250: Performed by: PSYCHIATRY & NEUROLOGY

## 2023-04-25 PROCEDURE — 25000003 PHARM REV CODE 250: Performed by: PHYSICIAN ASSISTANT

## 2023-04-25 PROCEDURE — 99291 CRITICAL CARE FIRST HOUR: CPT | Mod: GC,,, | Performed by: PSYCHIATRY & NEUROLOGY

## 2023-04-25 PROCEDURE — 83605 ASSAY OF LACTIC ACID: CPT | Performed by: PHYSICIAN ASSISTANT

## 2023-04-25 PROCEDURE — 85025 COMPLETE CBC W/AUTO DIFF WBC: CPT

## 2023-04-25 PROCEDURE — 84300 ASSAY OF URINE SODIUM: CPT | Performed by: PHYSICIAN ASSISTANT

## 2023-04-25 PROCEDURE — 83935 ASSAY OF URINE OSMOLALITY: CPT | Performed by: PHYSICIAN ASSISTANT

## 2023-04-25 PROCEDURE — 80061 LIPID PANEL: CPT

## 2023-04-25 PROCEDURE — 63600175 PHARM REV CODE 636 W HCPCS

## 2023-04-25 PROCEDURE — 94761 N-INVAS EAR/PLS OXIMETRY MLT: CPT

## 2023-04-25 PROCEDURE — 93005 ELECTROCARDIOGRAM TRACING: CPT

## 2023-04-25 PROCEDURE — 20000000 HC ICU ROOM

## 2023-04-25 PROCEDURE — 83935 ASSAY OF URINE OSMOLALITY: CPT | Mod: 91

## 2023-04-25 RX ORDER — ACETAMINOPHEN 325 MG/1
650 TABLET ORAL EVERY 4 HOURS PRN
Status: DISCONTINUED | OUTPATIENT
Start: 2023-04-25 | End: 2023-05-04 | Stop reason: HOSPADM

## 2023-04-25 RX ORDER — AMOXICILLIN 250 MG
1 CAPSULE ORAL 2 TIMES DAILY
Status: DISCONTINUED | OUTPATIENT
Start: 2023-04-25 | End: 2023-05-02

## 2023-04-25 RX ORDER — FAMOTIDINE 20 MG/1
20 TABLET, FILM COATED ORAL 2 TIMES DAILY
Status: DISCONTINUED | OUTPATIENT
Start: 2023-04-25 | End: 2023-05-02

## 2023-04-25 RX ORDER — ONDANSETRON 2 MG/ML
4 INJECTION INTRAMUSCULAR; INTRAVENOUS EVERY 12 HOURS PRN
Status: DISCONTINUED | OUTPATIENT
Start: 2023-04-25 | End: 2023-05-04 | Stop reason: HOSPADM

## 2023-04-25 RX ORDER — INSULIN ASPART 100 [IU]/ML
1-10 INJECTION, SOLUTION INTRAVENOUS; SUBCUTANEOUS EVERY 6 HOURS PRN
Status: DISCONTINUED | OUTPATIENT
Start: 2023-04-25 | End: 2023-04-28

## 2023-04-25 RX ORDER — MAGNESIUM SULFATE HEPTAHYDRATE 40 MG/ML
2 INJECTION, SOLUTION INTRAVENOUS ONCE
Status: COMPLETED | OUTPATIENT
Start: 2023-04-25 | End: 2023-04-25

## 2023-04-25 RX ORDER — GLUCAGON 1 MG
1 KIT INJECTION
Status: DISCONTINUED | OUTPATIENT
Start: 2023-04-25 | End: 2023-04-28

## 2023-04-25 RX ORDER — SODIUM CHLORIDE 9 MG/ML
INJECTION, SOLUTION INTRAVENOUS CONTINUOUS
Status: DISCONTINUED | OUTPATIENT
Start: 2023-04-25 | End: 2023-05-01

## 2023-04-25 RX ADMIN — ESLICARBAZEPINE ACETATE 800 MG: 400 TABLET ORAL at 11:04

## 2023-04-25 RX ADMIN — FAMOTIDINE 20 MG: 20 TABLET ORAL at 11:04

## 2023-04-25 RX ADMIN — MORPHINE SULFATE 4 MG: 2 INJECTION, SOLUTION INTRAMUSCULAR; INTRAVENOUS at 03:04

## 2023-04-25 RX ADMIN — ESCITALOPRAM OXALATE 10 MG: 10 TABLET ORAL at 11:04

## 2023-04-25 RX ADMIN — DEXAMETHASONE SODIUM PHOSPHATE 4 MG: 4 INJECTION INTRA-ARTICULAR; INTRALESIONAL; INTRAMUSCULAR; INTRAVENOUS; SOFT TISSUE at 05:04

## 2023-04-25 RX ADMIN — CLOBAZAM 40 MG: 10 TABLET ORAL at 08:04

## 2023-04-25 RX ADMIN — ZONISAMIDE 500 MG: 100 CAPSULE ORAL at 08:04

## 2023-04-25 RX ADMIN — MAGNESIUM SULFATE 2 G: 2 INJECTION INTRAVENOUS at 08:04

## 2023-04-25 RX ADMIN — NICARDIPINE HYDROCHLORIDE 7.5 MG/HR: 0.2 INJECTION, SOLUTION INTRAVENOUS at 12:04

## 2023-04-25 RX ADMIN — ONDANSETRON 4 MG: 2 INJECTION INTRAMUSCULAR; INTRAVENOUS at 06:04

## 2023-04-25 RX ADMIN — DEXAMETHASONE SODIUM PHOSPHATE 4 MG: 4 INJECTION INTRA-ARTICULAR; INTRALESIONAL; INTRAMUSCULAR; INTRAVENOUS; SOFT TISSUE at 11:04

## 2023-04-25 RX ADMIN — FAMOTIDINE 20 MG: 20 TABLET ORAL at 08:04

## 2023-04-25 RX ADMIN — SENNOSIDES AND DOCUSATE SODIUM 1 TABLET: 50; 8.6 TABLET ORAL at 11:04

## 2023-04-25 RX ADMIN — ONDANSETRON 4 MG: 2 INJECTION INTRAMUSCULAR; INTRAVENOUS at 07:04

## 2023-04-25 RX ADMIN — SENNOSIDES AND DOCUSATE SODIUM 1 TABLET: 50; 8.6 TABLET ORAL at 08:04

## 2023-04-25 RX ADMIN — CEFTRIAXONE 2 G: 2 INJECTION, POWDER, FOR SOLUTION INTRAMUSCULAR; INTRAVENOUS at 06:04

## 2023-04-25 RX ADMIN — ACETAMINOPHEN 650 MG: 325 TABLET ORAL at 12:04

## 2023-04-25 RX ADMIN — ENOXAPARIN SODIUM 40 MG: 40 INJECTION SUBCUTANEOUS at 05:04

## 2023-04-25 RX ADMIN — ACETAMINOPHEN 650 MG: 325 TABLET ORAL at 08:04

## 2023-04-25 RX ADMIN — SODIUM CHLORIDE: 9 INJECTION, SOLUTION INTRAVENOUS at 06:04

## 2023-04-25 NOTE — PROGRESS NOTES
"[facility]  Neurosurgery  Progress Note    Subjective:     History of Present Illness:  57 y/o M with prior sz refractory to laser ablation. He was drowsy on his AEDs and severely impacted his quality of life.     Post-Op Info:  Procedure(s) (LRB):  CRANIOTOMY, USING FRAMELESS STEREOTAXY (Left)   1 Day Post-Op     Interval History: no acute events post op. Sz free for now. MRI today     Medications:  Continuous Infusions:   nicardipine 2.5 mg/hr (04/25/23 0625)     Scheduled Meds:   cloBAZam  40 mg Oral QHS    dexAMETHasone  4 mg Intravenous Q6H    enoxaparin  40 mg Subcutaneous Daily    EScitalopram oxalate  10 mg Oral Daily    eslicarbazepine  800 mg Oral Daily    pantoprazole  40 mg Oral Daily    sodium chloride  2 g Oral TID    zonisamide  500 mg Oral QHS     PRN Meds:acetaminophen, HYDROcodone-acetaminophen, morphine, ondansetron     Review of Systems  Objective:     Weight: 90 kg (198 lb 6.4 oz)  Body mass index is 27.67 kg/m².  Vital Signs (Most Recent):  Temp: 98.6 °F (37 °C) (04/25/23 0400)  Pulse: 69 (04/25/23 0600)  Resp: (!) 30 (04/25/23 0600)  BP: 124/74 (04/25/23 0600)  SpO2: 99 % (04/25/23 0600)   Vital Signs (24h Range):  Temp:  [97.3 °F (36.3 °C)-98.8 °F (37.1 °C)] 98.6 °F (37 °C)  Pulse:  [62-90] 69  Resp:  [13-36] 30  SpO2:  [92 %-100 %] 99 %  BP: (117-178)/(65-94) 124/74  Arterial Line BP: (124-171)/(55-91) 132/66                              Urethral Catheter 04/24/23 1100 Silicone 16 Fr. (Active)   Site Assessment Clean;Intact 04/25/23 0303   Collection Container Urimeter 04/25/23 0303   Securement Method secured to top of thigh w/ adhesive device 04/25/23 0303   Catheter Care Performed yes 04/25/23 0303   Reason for Continuing Urinary Catheterization Post operative 04/25/23 0303   CAUTI Prevention Bundle Securement Device in place with 1" slack;Intact seal between catheter & drainage tubing;Drainage bag/urimeter off the floor;Sheeting clip in use;No dependent loops " or kinks;Drainage bag/urimeter not overfilled (<2/3 full);Drainage bag/urimeter below bladder 04/24/23 1909   Output (mL) 100 mL 04/25/23 0625            Drain/Device  04/24/23 1524 Left lateral temporal region gravity (Active)   Insertion Site no hematoma 04/25/23 0303   Drainage Characteristics/Odor Bleeding controlled 04/24/23 1909   Drainage Amount None 04/24/23 1909   General Intake (mL) 0 04/24/23 1909   General Output (mL) 140 04/25/23 0625       Physical Exam  Neurosurgery Physical Exam    [id number] perrl eomi visual fields full to confrontation   Incision CDI   Drain with bloody output.      Significant Labs:  Recent Labs   Lab 04/24/23  0928 04/25/23  0010   GLU 79 204*   * 131*   K 4.1 4.1   CL 99 100   CO2 26 16*   BUN 13 10   CREATININE 0.9 0.8   CALCIUM 9.0 8.8   MG  --  1.8     Recent Labs   Lab 04/24/23  0928 04/24/23  1436 04/25/23  0010   WBC 6.15  --  11.13   HGB 14.3  --  13.8*   HCT 42.9 36 41.0     --  387     Recent Labs   Lab 04/24/23  0928   INR 1.0   APTT 26.2     Microbiology Results (last 7 days)       ** No results found for the last 168 hours. **          All pertinent labs from the last 24 hours have been reviewed.    Significant Diagnostics:  I have reviewed all pertinent imaging results/findings within the past 24 hours.    Assessment/Plan:     * Complex partial epilepsy with generalization and with intractable epilepsy  57 y/o M s/p left temporal lobectomy on 4/24  for seizures refractory to prior laser ablation      -Lovenox for prophylaxis   -MRI brain today   - will begin dex wean tomorrow.   -q1 neuro checks for now   -continue home AEDs  - SLP for speech eval   -continue drain for now to full Hemovac compresion.         Henok Melissa MD  Neurosurgery  [facility]

## 2023-04-25 NOTE — PLAN OF CARE
[facility] - Neuro Critical Care  Initial Discharge Assessment       Primary Care Provider: Juan Carlos Simmons NP    Admission Diagnosis: Complex partial epilepsy with generalization and with intractable epilepsy [G40.219]  Epilepsy [G40.909]    Admission Date: 4/24/2023  Expected Discharge Date: 5/3/2023    Discharge Barriers Identified: None    Payor: HU[employer]NA MANAGED MEDICARE / Plan: HUMANA SNP HMO PPO SPECIAL NEEDS / Product Type: Medicare Advantage /     Extended Emergency Contact Information  Primary Emergency Contact: PEPPER MOSES  Address: [address] [id number] [facility]  Home Phone: [phone]  Work Phone: [phone]  Mobile Phone: [phone]  Relation: Spouse  Preferred language: English   needed? No  Secondary Emergency Contact: Lucinda Demarco  Address: [address] [id number] [facility]  Home Phone: [phone]  Mobile Phone: [phone]  Relation: Mother    Discharge Plan A: Rehab  Discharge Plan B: Replaced by [facility] ([place] - [place], [place] - [address]  [address]  [place] [address]  Phone: [phone] Fax: [phone]        Transferred from:  home    Past Medical History:   Diagnosis Date    Anxiety     Dementia in other diseases classified elsewhere, unspecified severity, without behavioral disturbance, psychotic disturbance, mood disturbance, and anxiety 2/2/2023    Depression     GERD (gastroesophageal reflux disease)     Hyperlipidemia     Hypertension     Long term (current) use of antithrombotics/antiplatelets 12/22/2020    Migraine     Normocytic anemia 2/23/2023    Seizures          CM met with patient and Pepper Moses (wife) [phone] in room for Discharge Planning Assessment.  Patient is unable to answer questions.  Per wife, the patient lives with wife in a single story house with 2 step(s) to enter.   Per wife, the patient was independent with ADLS with standby  assist and used no dme for ambulation.  Patient will have assistance from his wife upon discharge.   Discharge Planning Booklet given to patient/family and discussed.  All questions addressed.  CM will follow for needs.    Inpatient rehab discussed and list provided for choices.  Per wife, she will review list and give choices.    Initial Assessment (most recent)       Adult Discharge Assessment - 04/25/23 1546          Discharge Assessment    Assessment Type Discharge Planning Assessment     Confirmed/corrected address, phone number and insurance Yes     Confirmed Demographics Correct on Facesheet     Source of Information unable to respond     Communicated KENNEDY with patient/caregiver Date not available/Unable to determine     Reason For Admission Complex partial epilepsy with generalization and with intractable epilepsy     People in Home spouse     Facility Arrived From: home     Do you expect to return to your current living situation? Yes     Who are your caregiver(s) and their phone number(s)? Trista Burleson (wife) [phone]     Prior to hospitilization cognitive status: Alert/Oriented     Current cognitive status: Unable to Assess     Walking or Climbing Stairs --   independent    Dressing/Bathing bathing difficulty, assistance 1 person;dressing difficulty, assistance 1 person   standby    Dressing/Bathing Management standby assist     Number of Stairs, Main Entrance two     Home Layout Able to live on 1st floor     Equipment Currently Used at Home walker, rolling     Readmission within 30 days? No     Patient currently being followed by outpatient case management? No     Do you currently have service(s) that help you manage your care at home? No     Do you take prescription medications? No     Do you have prescription coverage? Yes     Coverage [employer]     Do you have any problems affording any of your prescribed medications? No     Is the patient taking medications as prescribed? yes     Who is going to  help you get home at discharge? Trista Burleson (wife) [phone]     How do you get to doctors appointments? family or friend will provide     Are you on dialysis? No     Do you take coumadin? No     Discharge Plan A Rehab     Discharge Plan B Home Health     DME Needed Upon Discharge  none     Discharge Plan discussed with: Spouse/sig other     Name(s) and Number(s) Trista Burleson (wife) [phone]     Discharge Barriers Identified None                   Tammy Garcia RN, CCRN-K, [facility]  Neuro-Critical Care   X [phone]

## 2023-04-25 NOTE — PLAN OF CARE
Problem: SLP  Goal: SLP Goal  Description: Speech Language Pathology Goals  Goals expected to be met by 5/2  1. Pt will complete bedside swallow assessment pending team approval.   2. Pt will follow 1 step commands with 80% accy given min A.   3. Pt will answer simple y/n questions with 80% accy given min A.   4. Pt will complete simple auto speech tasks with 70% accy given mod A.   5. Pt will complete simple naming tasks with 50% accy given mod A.   Outcome: Ongoing, Progressing    Evaluation initiated.  Wife reports difficulty swallowing yesterday.  Pt with high aspiration risk.  Recommend bedside swallow assessment in addition to speech, language, cognitive assessment.

## 2023-04-25 NOTE — HOSPITAL COURSE
04/25/2023: pt continues to be somnolent and had a likely aspiration event after vomiting undigested meds with acute hypoxia and bradycardia requiring NRB to maintain sats in 90's. He was quick to recover from hypoxia and is back to [facility]. Na 131, hyponatremia w/u pending. Off cardene gtt.   04/26/2023: Marked improvement in neuro exam. NSGY attempted bedside drain removal but it broke and a portion is retained. Pt to OR today for complete removal of retained drain. O2 req down trending and bradycardia resolved. Hypovolemic hyponatremia improved w IVF. Dex weaning.   04/27/2023: Patient stable for SD to NSGY. SLP cleared patient for dysphagia diet. Satting well on RA.  04/28/2023: Patient slower to respond today. Hyponatremia studies repeated, am cortisol and TSH pending. Patient to remain in ICU for further monitoring.  04/29/2023 PRN hydralazine x1 overnight. EEG negative for seizures. Na 130, NS @100. TSH WNL. AM cortisol <1. Neuro checks q2h.  04/30/2023 EEG with 1 seizure from L temporal overnight. NS @ 50 while improving PO intake. NGT in place for AEDs, reassess tomorrow if continuing to have good intake and wakefulness.   5/1: D/c normal saline, monitor Na closely, EEG  5/2: transfer to floor  5/3: suppository, TTF, resubmitting auth for IPR

## 2023-04-25 NOTE — PLAN OF CARE
Problem: Physical Therapy  Goal: Physical Therapy Goal  Description: PT goals until 5/4/23    1. Pt supine to sit with minimal assist-not met  2. Pt sit to supine with minimal assist-not met  3. Pt sit to stand with LRAD as needed for safety with minimal assist-not met  4. Pt to perform gait 15ft with LRAD as needed for safety with moderate assist.-not met  5. Pt to go up/down curb step with LRAD as needed for safety with moderate assist.-not met  6. Pt to transfer bed to/from bedside chair with LRAD as needed for safety with minimal assist.-not met  7. Pt to perform B LE exs in sitting or supine x 10 reps to strengthen B LE to improve functional mobility.-not met   Outcome: Ongoing, Progressing   Pt's goals set and pt will benefit from skilled PT services to work towards improved functional mobility including: bed mobility, transfers, up/down step, and gait.   4/25/2023

## 2023-04-25 NOTE — ASSESSMENT & PLAN NOTE
57 y/o M s/p left temporal lobectomy on 4/24  for seizures refractory to prior laser ablation      -Lovenox for prophylaxis   -MRI brain today   - will begin dex wean tomorrow.   -q1 neuro checks for now   -continue home AEDs  - SLP for speech eval

## 2023-04-25 NOTE — PLAN OF CARE
04/25/23 1554   Post-Acute Status   Post-Acute Authorization Placement   Post-Acute Placement Status Referrals Sent  (rehab)     SW advised by CM that she discussed therapy recs for rehab and addressed questions. She reported she informed the Pt of rehab criteria as being able to tolerate three hours of therapy and need for a MD to see Pt three times a week. If this changes, plan B would be a SNF facility. Per CM, she provided list during dc assessment. Per CM, Pt family is reported they may be at [facility] for two weeks. She will review list.    Sent via [facility] to [place], [facility] rehab.     Latisha Javier LCSW  Neurocritical Care   [facility]  [id number]
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
## 2023-04-25 NOTE — ASSESSMENT & PLAN NOTE
Pt w adult-onset non-lesional focal-onset intractable epilepsy (onset at 49 yo in 2013) s/p left temporal lobectomy today (4/24)  Home medications: eslicarbazepine 800 mg qd, zonisamide 500mg qd, and clobazam 40mg qhs  - MRI non-lesional  - EMU monitoring last year suggests left-sided activity  - ICU admissions for sEEG/depth electrodes localized the seizures to L amygdala and hippocampus  - refractory to prior laser ablation of left amygdala and hippocampus     Plan:  - Admit to [facility]  - Q1h neurochecks while in ICU  - Q1h vitals while in ICU  - HOB@30  - continue home AED's   - eslicarbazepine 800 mg qd   - zonisamide 500mg qd   - clobazam 40mg qhs  - dexamethasone 4mg q6h, will wean tmrw  - protonix 40mg daily for ulcer ppx  - f/u MRI brain  - SBP <140mmHg, off cardene gtt  - TTE pending  - Maintaining sats on LFNC  - Daily CMP, Mag, Phos - replete electrolytes PRN  - Lipid panel, A1c reviewed   - Coags reviewed  - Regular diet once more alert and safe to swallow  - Strict I/Os  - Daily CBC, transfuse PRN  - Start lovenox 40mg daily this evening    - f/u PT/OT/SLP  - CM/SW consult for dispo planning    Continue to follow clinically and notify NSGY immediately if any neurostatus change

## 2023-04-25 NOTE — PLAN OF CARE
Progressing. [facility]. Simple face mask placed, increased to 8 L. Zofran Given.

## 2023-04-25 NOTE — PLAN OF CARE
[facility] Care Plan    POC reviewed with Declan Burleson and family at 1400. Pt verbalized understanding. Questions and concerns addressed. No acute events today. Pt progressing toward goals. Will continue to monitor. See below and flowsheets for full assessment and VS info.     Asymptomatic Bradycardia through the day as low as 44bmp. 12 lead done  Tyra off  LR bolus 1L   Echo completed  Urine osmolality and Na done  Recheck [place] Na at noon, still 131 [facility] notified  MRI still not available for study will try again overnight  NGT placed, KUB completed  Chest x-ray done   NPO SLP evaluation tomorrow, pt lethargic through the day  OT this morning, participation was limited, pt stood with assistance at bedside.   Pt c/o headache and back pain today, tylenol given as per MAR, help Narcotics due to lethargy and vitals.         Is this a stroke patient? no    Neuro:  Zeigler Coma Scale  Best Eye Response: 3-->(E3) to speech  Best Motor Response: 6-->(M6) obeys commands  Best Verbal Response: 4-->(V4) confused  Ruben Coma Scale Score: 13  Assessment Qualifiers: patient not sedated/intubated  Pupil PERRLA: yes     24 hr Temp:  [97.3 °F (36.3 °C)-98.8 °F (37.1 °C)]     CV:   Rhythm: sinus bradycardia  12 lead obtained QTC WNL, sinus Lupillo documented in pt history  BP goals:   SBP < 160  MAP > 65    Resp:    Pt weaned from none re-breather to 2LNC humidified by afternoon. Sats 98%  Chest X-ray done this AM after aspiration, See Read       Plan: N/A    GI/:     Diet/Nutrition Received: NPO  Last Bowel Movement: 04/23/23  Voiding Characteristics: urethral catheter (bladder)    Intake/Output Summary (Last 24 hours) at 4/25/2023 1732  Last data filed at 4/25/2023 1701  Gross per 24 hour   Intake 1583.77 ml   Output 2905 ml   Net -1321.23 ml          Labs/Accuchecks:  Recent Labs   Lab 04/25/23  0010   WBC 11.13   RBC 4.43*   HGB 13.8*   HCT 41.0         Recent Labs   Lab 04/25/23  0010 04/25/23  1152   * 131*    K 4.1  --    CO2 16*  --      --    BUN 10  --    CREATININE 0.8  --    ALKPHOS 123  --    ALT 14  --    AST 18  --    BILITOT 0.3  --       Recent Labs   Lab 04/24/23  0928   INR 1.0   APTT 26.2    No results for input(s): CPK, CPKMB, TROPONINI, MB in the last 168 hours.    Electrolytes: Electrolytes replaced  Accuchecks: Q6H    Gtts:      LDA/Wounds:  Lines/Drains/Airways       Drain  Duration                  Drain/Device  04/24/23 1524 Left lateral temporal region gravity 1 day         Urethral Catheter 04/24/23 1100 Silicone 16 Fr. 1 day         NG/OG Tube 04/25/23 1015 [place] sump 18 Fr. Right nostril <1 day              Arterial Line  Duration             Arterial Line 04/24/23 1115 Left Radial 1 day              Peripheral Intravenous Line  Duration                  Peripheral IV - Single Lumen 04/24/23 0928 18 G Left;Posterior Forearm 1 day         Peripheral IV - Single Lumen 04/24/23 1052 18 G Right Hand 1 day                  Wounds: No  Wound care consulted: No

## 2023-04-25 NOTE — NURSING
Pt placed on non-rebreather. MD notified of increased oxygen requirement, and HR of 51. MD bedside. Chest Xray ordered and ABG verbal order

## 2023-04-25 NOTE — ASSESSMENT & PLAN NOTE
Patient with Hypercapnic and Hypoxic Respiratory failure which is Acute.  he is not on home oxygen. Signs/symptoms of respiratory failure include- tachypnea. Contributing diagnoses includes - Aspiration Labs and images were reviewed. Patient Has recent ABG, which has been reviewed.    Patient vomited undigested medications and pudding on 4/25 and shortly after had an acute episode of hypoxia to 80's and was placed on NRB to maintain sats in mid 90's. He also became bradycardic to 50. He likely had an aspiration event.   - CXR without notable consolidation but does demonstrate atelectasis of right lung base  - ABG with mild respiratory acidosis     Plan:  - supplemental O2 to maintain sat > 92%, wean as tolerated  - NPO  - NGT placed for medication administration  - aspiration precautions  - f/u SLP eval  - hold off on abx for now unless clinical picture suggests developing infection

## 2023-04-25 NOTE — PROGRESS NOTES
Pt remains nauseated after 4mg Zofran IV, Notified [facility] team, OK to give additional 4mg Zofran IV per SHAY Strauss MD.

## 2023-04-25 NOTE — ASSESSMENT & PLAN NOTE
Pt w adult-onset non-lesional focal-onset intractable epilepsy (onset at 47 yo in 2013) s/p left temporal lobectomy today (4/24)  Home medications: eslicarbazepine 800 mg qd, zonisamide 500mg qd, and clobazam 40mg qhs  - MRI non-lesional  - EMU monitoring last year suggests left-sided activity  - ICU admissions for sEEG/depth electrodes localized the seizures to L amygdala and hippocampus  - refractory to prior laser ablation of left amygdala and hippocampus    Plan:  - Admit to NCC  - Q1h neurochecks while in ICU  - Q1h vitals while in ICU  - HOB@30  - continue home AED's   - eslicarbazepine 800 mg qd   - zonisamide 500mg qd   - clobazam 40mg qhs  - dexamethasone 4mg q8h  - protonix 40mg daily for ulcer ppx  - MRI (4/26): craniotomy w partial resection of anterior L temporal lobe w small extra-axial collection underlying the craniotomy site, no new mass/hemorrhage/edema/infarct/hydrocephalus  - SBP <140mmHg, off cardene gtt  - TTE wnl  - Maintaining sats on LFNC  - Daily CMP, Mag, Phos - replete electrolytes PRN  - Lipid panel, A1c reviewed   - Coags reviewed  - Regular diet once more cleared by SLP  - Strict I/Os  - Daily CBC, transfuse PRN  - lovenox 40mg for ppx    - f/u PT/OT/SLP  - CM/SW consult for dispo planning    Continue to follow clinically and notify NSGY immediately if any neurostatus change

## 2023-04-25 NOTE — SUBJECTIVE & OBJECTIVE
"Interval History: no acute events post op. Sz free for now. MRI today     Medications:  Continuous Infusions:   nicardipine 2.5 mg/hr (04/25/23 0625)     Scheduled Meds:   cloBAZam  40 mg Oral QHS    dexAMETHasone  4 mg Intravenous Q6H    enoxaparin  40 mg Subcutaneous Daily    EScitalopram oxalate  10 mg Oral Daily    eslicarbazepine  800 mg Oral Daily    pantoprazole  40 mg Oral Daily    sodium chloride  2 g Oral TID    zonisamide  500 mg Oral QHS     PRN Meds:acetaminophen, HYDROcodone-acetaminophen, morphine, ondansetron     Review of Systems  Objective:     Weight: 90 kg (198 lb 6.4 oz)  Body mass index is 27.67 kg/m².  Vital Signs (Most Recent):  Temp: 98.6 °F (37 °C) (04/25/23 0400)  Pulse: 69 (04/25/23 0600)  Resp: (!) 30 (04/25/23 0600)  BP: 124/74 (04/25/23 0600)  SpO2: 99 % (04/25/23 0600)   Vital Signs (24h Range):  Temp:  [97.3 °F (36.3 °C)-98.8 °F (37.1 °C)] 98.6 °F (37 °C)  Pulse:  [62-90] 69  Resp:  [13-36] 30  SpO2:  [92 %-100 %] 99 %  BP: (117-178)/(65-94) 124/74  Arterial Line BP: (124-171)/(55-91) 132/66                              Urethral Catheter 04/24/23 1100 Silicone 16 Fr. (Active)   Site Assessment Clean;Intact 04/25/23 0303   Collection Container Urimeter 04/25/23 0303   Securement Method secured to top of thigh w/ adhesive device 04/25/23 0303   Catheter Care Performed yes 04/25/23 0303   Reason for Continuing Urinary Catheterization Post operative 04/25/23 0303   CAUTI Prevention Bundle Securement Device in place with 1" slack;Intact seal between catheter & drainage tubing;Drainage bag/urimeter off the floor;Sheeting clip in use;No dependent loops or kinks;Drainage bag/urimeter not overfilled (<2/3 full);Drainage bag/urimeter below bladder 04/24/23 1909   Output (mL) 100 mL 04/25/23 0625            Drain/Device  04/24/23 1524 Left lateral temporal region gravity (Active)   Insertion Site no hematoma 04/25/23 0303   Drainage Characteristics/Odor Bleeding controlled 04/24/23 1909 "   Drainage Amount None 04/24/23 1909   General Intake (mL) 0 04/24/23 1909   General Output (mL) 140 04/25/23 0625       Physical Exam  Neurosurgery Physical Exam    E3V4M6 perrl eomi visual fields full to confrontation   Incision CDI   Drain with bloody output.      Significant Labs:  Recent Labs   Lab 04/24/23  0928 04/25/23  0010   GLU 79 204*   * 131*   K 4.1 4.1   CL 99 100   CO2 26 16*   BUN 13 10   CREATININE 0.9 0.8   CALCIUM 9.0 8.8   MG  --  1.8     Recent Labs   Lab 04/24/23  0928 04/24/23  1436 04/25/23  0010   WBC 6.15  --  11.13   HGB 14.3  --  13.8*   HCT 42.9 36 41.0     --  387     Recent Labs   Lab 04/24/23  0928   INR 1.0   APTT 26.2     Microbiology Results (last 7 days)       ** No results found for the last 168 hours. **          All pertinent labs from the last 24 hours have been reviewed.    Significant Diagnostics:  I have reviewed all pertinent imaging results/findings within the past 24 hours.

## 2023-04-25 NOTE — SUBJECTIVE & OBJECTIVE
Interval History:  Patient vomited medications with pudding and likely aspirated because he shortly after desatted to the 80's and was placed on NRB to maintain sats in the mid 90's. He also became bradycardic to 50. He was quick to recover and has since been weaned to LFNC. His neuro exam has slight improvement but he has aphasia and dysarthria. He follows some commands but not all. He has hyponatremia with a Na of 131 today. Absent bowel sounds and tender abdomen.    Review of Systems  Objective:     Vitals:  Temp: 98.7 °F (37.1 °C)  Pulse: (!) 52  Rhythm: sinus bradycardia  BP: 129/68  MAP (mmHg): 93  Resp: 18  SpO2: 96 %    Temp  Min: 97.3 °F (36.3 °C)  Max: 98.8 °F (37.1 °C)  Pulse  Min: 49  Max: 90  BP  Min: 115/55  Max: 178/94  MAP (mmHg)  Min: 79  Max: 127  Resp  Min: 13  Max: 36  SpO2  Min: 88 %  Max: 100 %    04/24 0701 - 04/25 0700  In: 3203.1 [P.O.:100; I.V.:303.1]  Out: 3410 [Urine:2935]           Physical Exam  Vitals and nursing note reviewed.   Constitutional:       General: He is not in acute distress.     Appearance: He is not ill-appearing or diaphoretic.   HENT:      Head:      Comments: Surgical incision with overlying bandage to left scalp, C/D/I  Drain from incision with bloody output     Right Ear: External ear normal.      Left Ear: External ear normal.      Nose: Nose normal.   Eyes:      General: No scleral icterus.        Right eye: No discharge.         Left eye: No discharge.      Extraocular Movements: Extraocular movements intact.      Conjunctiva/sclera: Conjunctivae normal.      Pupils: Pupils are equal, round, and reactive to light.   Cardiovascular:      Rate and Rhythm: Normal rate and regular rhythm.      Heart sounds: Normal heart sounds. No murmur heard.  Pulmonary:      Effort: Pulmonary effort is normal. No respiratory distress.      Breath sounds: Normal breath sounds. No wheezing or rales.   Abdominal:      General: Abdomen is flat. Bowel sounds are absent. There is no  distension.      Palpations: Abdomen is soft. There is no mass.      Tenderness: There is abdominal tenderness.   Musculoskeletal:         General: No swelling or deformity. Normal range of motion.      Cervical back: Normal range of motion and neck supple.      Right lower leg: No edema.      Left lower leg: No edema.   Skin:     General: Skin is warm.      Coloration: Skin is not jaundiced.      Findings: No bruising.   Neurological:      Comments: E4V4M6     Arousable, able to state name and year  Able to follow simple commands     PERRL  EOMI  VFI  Unable to assess face symmetry or sensation  Unable to assess hearing  Unable to assess SCM, trapezius symmetry  Unable to assess tongue symmetry      strength equal bilaterally   Unable to assess strength in BLE but does wiggle toes on command     Unable to assess sensation     Unable to test memory, judgment, insight, fund of knowledge, hearing, shoulder shrug, tongue protrusion, coordination, gait due to level of consciousness.    Medications:  Continuous ScheduledcloBAZam, 40 mg, QHS  dexAMETHasone, 4 mg, Q6H  enoxaparin, 40 mg, Daily  EScitalopram oxalate, 10 mg, Daily  eslicarbazepine, 800 mg, Daily  famotidine, 20 mg, BID  senna-docusate 8.6-50 mg, 1 tablet, BID  zonisamide, 500 mg, QHS    PRNacetaminophen, 650 mg, Q4H PRN  dextrose 10%, 12.5 g, PRN  dextrose 10%, 25 g, PRN  glucagon (human recombinant), 1 mg, PRN  HYDROcodone-acetaminophen, 1 tablet, Q6H PRN  insulin aspart U-100, 1-10 Units, Q6H PRN  ondansetron, 4 mg, Q12H PRN      Today I personally reviewed pertinent medications, lines/drains/airways, imaging, laboratory results, notably:    Diet: NPO

## 2023-04-25 NOTE — PT/OT/SLP EVAL
Speech Language Pathology Evaluation  Cognitive Communication    Patient Name:  Declan Bulreson   MRN:  [id number]  Admitting Diagnosis: Complex partial epilepsy with generalization and with intractable epilepsy    Recommendations:     Recommendations:                General Recommendations:  Speech/language therapy, Cognitive-linguistic therapy, and Bedside swallow assessment  Diet recommendations:  Pending bedside swallow evaluation once approved by team  Aspiration Precautions: Strict aspiration precautions   General Precautions: Standard, aspiration, fall, seizure, respiratory  Communication strategies:  provide increased time to answer and go to room if call light pushed    History:     Past Medical History:   Diagnosis Date    Anxiety     Dementia in other diseases classified elsewhere, unspecified severity, without behavioral disturbance, psychotic disturbance, mood disturbance, and anxiety 2/2/2023    Depression     GERD (gastroesophageal reflux disease)     Hyperlipidemia     Hypertension     Long term (current) use of antithrombotics/antiplatelets 12/22/2020    Migraine     Normocytic anemia 2/23/2023    Seizures        Past Surgical History:   Procedure Laterality Date    CRANIOTOMY USING FRAMELESS STEREOTAXY Left 4/24/2023    Procedure: CRANIOTOMY, USING FRAMELESS STEREOTAXY;  Surgeon: Ruchi Chen MD;  Location: [facility] OR [address] [place] [address];  Service: Neurosurgery;  Laterality: Left;  Tor III ASA III  Blood: Type & Screen  Neuro Mon: None  Bed: [facility]  Headrest: [place]  Pos: Supine  Stealth MRI Optical   [facility]  Asst Surg: Montoya    CYST REMOVAL  March 2016/2017    skin cyst - benign    PLACEMENT OF STEREO EEG LEADS(DEPTH ELECTRODES) Left 2/27/2023    Procedure: PLACEMENT OF STEREO EEG LEADS (DEPTH ELECTRODES);  Surgeon: Ruchi Chen MD;  Location: [facility] OR [address] [place] [address];  Service: Neurosurgery;  Laterality: Left;    REMOVAL OF STEREO EEG LEADS (DEPTH ELECTRODES) Bilateral 1/21/2021    Procedure: REMOVAL OF STEREO EEG  LEADS (DEPTH ELECTRODES);  Surgeon: Ruchi Chen MD;  [facility];  Service: Neurosurgery;  Laterality: Bilateral;    REMOVAL OF STEREO EEG LEADS (DEPTH ELECTRODES) Left 3/8/2023    Procedure: REMOVAL OF STEREO EEG LEADS (DEPTH ELECTRODES);  Surgeon: Ruchi Chen MD;  Location: [facility] OR 2ND FLR;  Service: Neurosurgery;  Laterality: Left;    TONSILLECTOMY      teenage        Social History: Patient lives with his spouse.  Per spouse and noted in Epic, pt being followed by OP SLP services targeting aphasia and cognitive linguistic impairment.  Spouse reports fair comprehension in function though decreased fluency.  She denied difficulty with swallowing prior to this admission though states difficulty yesterday with meds resulting in nurse crushing medications in pudding.    Prior Intubation HX:  surgery 4/24    Modified Barium Swallow: none reported    Chest X-Rays: 4/25: Prominence of the ascending aorta.Subsegmental atelectasis RIGHT lung base.  Mild bibasilar consolidative change.  No pleural effusion. No evident pneumothorax.      Subjective     Pt seen on 3rd attempt 2/2 vomiting episode in am with increased O2 requirements.     Pain/Comfort:  Pain Rating 1:  (NAD)  Pain Rating Post-Intervention 1:  (NAD)    Respiratory Status: Non-rebreather mask    Objective:   Cognitive Status:    Continue to assess 2/2 limited verbalizations and aphasia       Receptive Language:   Comprehension:   Impaired, frequent no response  Questions Simple yes/no yes response only via head nod  Commands  One step inconsistent-60%    Expressive Language:  Verbal:    Minimal verbalizations elicited, occasional unintelligible utterance   auto speech task of counting 1-10 completed with 60% accy. Confrontation naming 25%, auto phrase completion 0%    Motor Speech:  Decreased articulatory precision with some unintelligible speech, unclear whether true dysarthria vs lethargy    Voice:   Low  intensity    Visual-Spatial:  tba    Reading:   tba      Written Expression:   tba    Treatment: Education provided re: role of SLP, results of preliminary evaluation, swallow precs, and POC.  Wife in agreement.     Assessment:   Declan Burleson is a 58 y.o. male with an SLP diagnosis of Aphasia.  He presents with lethargy, assessment to be ongoing.    Goals:   Multidisciplinary Problems       SLP Goals          Problem: SLP    Goal Priority Disciplines Outcome   SLP Goal     SLP Ongoing, Progressing   Description: Speech Language Pathology Goals  Goals expected to be met by 5/2  1. Pt will complete bedside swallow assessment pending team approval.   2. Pt will follow 1 step commands with 80% accy given min A.   3. Pt will answer simple y/n questions with 80% accy given min A.   4. Pt will complete simple auto speech tasks with 70% accy given mod A.   5. Pt will complete simple naming tasks with 50% accy given mod A.                                Plan:   Patient to be seen:  4 x/week   Plan of Care expires:  05/25/23  Plan of Care reviewed with:  patient, spouse   SLP Follow-Up:  Yes       Discharge recommendations:  Discharge Facility/Level of Care Needs:  (tbd)   Barriers to Discharge:  Level of Skilled Assistance Needed \    Time Tracking:   SLP Treatment Date:   04/25/23  Speech Start Time:  0823  Speech Stop Time:  0835     Speech Total Time (min):  12 min    Billable Minutes: Dell 12     04/25/2023

## 2023-04-25 NOTE — ASSESSMENT & PLAN NOTE
Na at 131 on 3/25    - urine studies suggest hypovolemic hyponatremia   - serum Na improved with IVF further confirming diagnosis  - continue IVF at 100 cc/hr especially while NPO  - monitor Na BID

## 2023-04-25 NOTE — PLAN OF CARE
[facility] Care Plan    POC reviewed with Declan Palm Benjy and family at 1700. Pt's wife verbalized understanding. Questions and concerns addressed. No acute events today. CT done today. Pt progressing toward goals. Will continue to monitor. See below and flowsheets for full assessment and VS info.             Is this a stroke patient? no    Neuro:  [place] Coma Scale  Best Eye Response: 3-->(E3) to speech  Best Motor Response: 6-->(M6) obeys commands  Best Verbal Response: 4-->(V4) confused  [place] Coma Scale Score: 13  Pupil PERRLA: yes     24 hr Temp:  [97.8 °F (36.6 °C)-98.6 °F (37 °C)]     CV:   Rhythm: normal sinus rhythm  BP goals:   SBP < 140  MAP > 65    Resp:           Plan: N/A    GI/:     Diet/Nutrition Received: NPO  Last Bowel Movement: 04/23/23  Voiding Characteristics: urethral catheter (bladder)    Intake/Output Summary (Last 24 hours) at 4/24/2023 1902  Last data filed at 4/24/2023 1900  Gross per 24 hour   Intake 2849.2 ml   Output 1775 ml   Net 1074.2 ml          Labs/Accuchecks:  Recent Labs   Lab 04/24/23 0928 04/24/23  1436   WBC 6.15  --    RBC 4.58*  --    HGB 14.3  --    HCT 42.9 36     --       Recent Labs   Lab 04/24/23 0928   *   K 4.1   CO2 26   CL 99   BUN 13   CREATININE 0.9      Recent Labs   Lab 04/24/23 0928   INR 1.0   APTT 26.2    No results for input(s): CPK, CPKMB, TROPONINI, MB in the last 168 hours.    Electrolytes: N/A - electrolytes WDL  Accuchecks: none    Gtts:   nicardipine 2.5 mg/hr (04/24/23 1900)       LDA/Wounds:  Lines/Drains/Airways       Drain  Duration                  Drain/Device  04/24/23 1524 Left lateral temporal region gravity <1 day         Urethral Catheter 04/24/23 1100 Silicone 16 Fr. <1 day              Arterial Line  Duration             Arterial Line 04/24/23 1115 Left Radial <1 day              Peripheral Intravenous Line  Duration                  Peripheral IV - Single Lumen 04/24/23 0928 18 G Left;Posterior Forearm <1 day          Peripheral IV - Single Lumen 04/24/23 1052 18 G Right Hand <1 day                  Wounds: Yes  Wound care consulted: No

## 2023-04-25 NOTE — ASSESSMENT & PLAN NOTE
Bicarb of 16 today with a gap of 15  Arterial Blood Gas result:  pO2 92; pCO2 47.8; pH 7.274;  HCO3 22.2, %O2 Sat 96 suggesting respiratory acidosis   Lactic acid 1.2    - CTM with daily CMP  - reassess ABG as needed for signs of respiratory distress    RESOLVED

## 2023-04-25 NOTE — PT/OT/SLP EVAL
Physical Therapy Evaluation    Patient Name:  Declan Burleson   MRN:  [id number]    Recommendations:     Discharge Recommendations: rehabilitation facility   Discharge Equipment Recommendations:  (TBD as pt progresses)   Barriers to discharge: Inaccessible home and Decreased caregiver support    Assessment:     Declan Burleson is a 58 y.o. male admitted with a medical diagnosis of Complex partial epilepsy with generalization and with intractable epilepsy.  He presents with the following impairments/functional limitations: weakness, impaired balance, impaired endurance, impaired cognition, impaired functional mobility, gait instability, decreased lower extremity function, decreased upper extremity function, decreased safety awareness, impaired cardiopulmonary response to activity . Pt is unsafe with functional mobility at this time due to pt requires total assist for bed mobility, max assist for transfers, and max assist for sitting balance due to posterior and lateral LOB. Pt limited with functional mobility due to lethargy.     Rehab Prognosis: Good; patient would benefit from acute skilled PT services to address these deficits and reach maximum level of function.    Recent Surgery: Procedure(s) (LRB):  CRANIOTOMY, USING FRAMELESS STEREOTAXY (Left) 1 Day Post-Op    Plan:     During this hospitalization, patient to be seen 4 x/week to address the identified rehab impairments via gait training, therapeutic activities, therapeutic exercises, neuromuscular re-education and progress toward the following goals:    Plan of Care Expires:  05/25/23    Subjective   Pt with minimal verbalization during treatment (yes/no a few times); other verbalization mumbled and unintelligible    Pain/Comfort:  Pain Rating 1: 0/10 (pt did not c/o pain; lethargic during treatment)  Pain Rating Post-Intervention 1: 0/10    Patients cultural, spiritual, Synagogue conflicts given the current situation: no    Living Environment:  Pt  "lives with wife in a 1 story home with no OSMEL and 1 step within the home to the kitchen  Prior to admission, patients level of function was independent per wife but did "lose his balance every once in a while but did not fall".  Equipment used at home: walker, rolling.    Upon discharge, patient will have assistance from wife.    Objective:     Communicated with nurse prior to session.  Patient found HOB elevated with arterial line, bed alarm, blood pressure cuff, peripheral IV, pulse ox (continuous), telemetry, nevarez catheter  upon PT entry to room.    General Precautions: Standard, aspiration, fall, seizure, respiratory  Orthopedic Precautions:N/A   Braces: N/A  Respiratory Status: Nasal cannula, flow 3 L/min    Exams:  Cognitive Exam:  Patient is lethargic and unable to answer orientation questions  Sensation:    -       Intact  light/touch B LE appears intact due to pt responded to touch; testing unreliable due to pt unable to follow commands of testing  RLE ROM: WFL  RLE Strength: Deficits: hip flex 2/5; knee ext 2+/5; knee flex 2+/5; ankle DF 2/5; testing unreliable due to pt inconsistent with following commands due to lethargy  LLE ROM: WFL  LLE Strength: Deficits: Deficits: hip flex 2/5; knee ext 2+/5; knee flex 2+/5; ankle DF 2/5; testing unreliable due to pt inconsistent with following commands due to lethargy    Functional Mobility:  Bed Mobility:     Rolling Right: maximal assistance  Supine to Sit: total assistance  Sit to Supine: total assistance  Transfers:     Sit to Stand:  maximal assistance with hand-held assist    AM-PAC 6 CLICK MOBILITY  Total Score:9     Treatment & Education:   pt sat on the EOB ~ 12 min with max assist  due to posterior and lateral LOB. Pt stood x 1 trials with HHA with max assist for ~ 30 sec each trial.  Pt received verbal and manual cues for midline orientation, manual cues to weight bear into his UEs for support, and upright posture.     Patient left HOB elevated with " all lines intact, call button in reach, bed alarm on, nurse notified, and wife present.    GOALS:   Multidisciplinary Problems       Physical Therapy Goals          Problem: Physical Therapy    Goal Priority Disciplines Outcome Goal Variances Interventions   Physical Therapy Goal     PT, PT/OT Ongoing, Progressing     Description: PT goals until 5/4/23    1. Pt supine to sit with minimal assist-not met  2. Pt sit to supine with minimal assist-not met  3. Pt sit to stand with LRAD as needed for safety with minimal assist-not met  4. Pt to perform gait 15ft with LRAD as needed for safety with moderate assist.-not met  5. Pt to go up/down curb step with LRAD as needed for safety with moderate assist.-not met  6. Pt to transfer bed to/from bedside chair with LRAD as needed for safety with minimal assist.-not met  7. Pt to perform B LE exs in sitting or supine x 10 reps to strengthen B LE to improve functional mobility.-not met                        History:     Past Medical History:   Diagnosis Date    Anxiety     Dementia in other diseases classified elsewhere, unspecified severity, without behavioral disturbance, psychotic disturbance, mood disturbance, and anxiety 2/2/2023    Depression     GERD (gastroesophageal reflux disease)     Hyperlipidemia     Hypertension     Long term (current) use of antithrombotics/antiplatelets 12/22/2020    Migraine     Normocytic anemia 2/23/2023    Seizures        Past Surgical History:   Procedure Laterality Date    CRANIOTOMY USING FRAMELESS STEREOTAXY Left 4/24/2023    Procedure: CRANIOTOMY, USING FRAMELESS STEREOTAXY;  Surgeon: Ruchi Chen MD;  Location: [facility] OR [address] [place] [address];  Service: Neurosurgery;  Laterality: Left;  Tor III ASA III  Blood: Type & Screen  Neuro Mon: None  Bed: Reg  Headrest: [place]  Pos: Supine  Stealth MRI Optical   [facility]  Asst Surg: Montoya    CYST REMOVAL  March 2016/2017    skin cyst - benign    PLACEMENT OF STEREO EEG LEADS(DEPTH ELECTRODES) Left 2/27/2023     Procedure: PLACEMENT OF STEREO EEG LEADS (DEPTH ELECTRODES);  Surgeon: Ruchi Chen MD;  Location: [address];  Service: Neurosurgery;  Laterality: Left;    REMOVAL OF STEREO EEG LEADS (DEPTH ELECTRODES) Bilateral 1/21/2021    Procedure: REMOVAL OF STEREO EEG LEADS (DEPTH ELECTRODES);  Surgeon: Ruchi Chen MD;  Location: [address];  Service: Neurosurgery;  Laterality: Bilateral;    REMOVAL OF STEREO EEG LEADS (DEPTH ELECTRODES) Left 3/8/2023    Procedure: REMOVAL OF STEREO EEG LEADS (DEPTH ELECTRODES);  Surgeon: Ruchi Chen MD;  Location: [address];  Service: Neurosurgery;  Laterality: Left;    TONSILLECTOMY      teenage        Time Tracking:     PT Received On: 04/25/23  PT Start Time: 0923     PT Stop Time: 0944  PT Total Time (min): 21 min     Billable Minutes: Evaluation 11 and Therapeutic Activity 10      04/25/2023

## 2023-04-26 ENCOUNTER — ANESTHESIA EVENT (OUTPATIENT)
Dept: SURGERY | Facility: HOSPITAL | Age: 58
DRG: 025 | End: 2023-04-26
Payer: MEDICARE

## 2023-04-26 ENCOUNTER — ANESTHESIA (OUTPATIENT)
Dept: SURGERY | Facility: HOSPITAL | Age: 58
DRG: 025 | End: 2023-04-26
Payer: MEDICARE

## 2023-04-26 LAB
ALBUMIN SERPL BCP-MCNC: 3.1 G/DL (ref 3.5–5.2)
ALP SERPL-CCNC: 89 U/L (ref 55–135)
ALT SERPL W/O P-5'-P-CCNC: 9 U/L (ref 10–44)
ANION GAP SERPL CALC-SCNC: 7 MMOL/L (ref 8–16)
AST SERPL-CCNC: 13 U/L (ref 10–40)
BASOPHILS # BLD AUTO: 0.02 K/UL (ref 0–0.2)
BASOPHILS NFR BLD: 0.1 % (ref 0–1.9)
BILIRUB SERPL-MCNC: 0.3 MG/DL (ref 0.1–1)
BLD PROD TYP BPU: NORMAL
BLD PROD TYP BPU: NORMAL
BLOOD UNIT EXPIRATION DATE: NORMAL
BLOOD UNIT EXPIRATION DATE: NORMAL
BLOOD UNIT TYPE CODE: 5100
BLOOD UNIT TYPE CODE: 5100
BLOOD UNIT TYPE: NORMAL
BLOOD UNIT TYPE: NORMAL
BUN SERPL-MCNC: 12 MG/DL (ref 6–20)
CALCIUM SERPL-MCNC: 8.7 MG/DL (ref 8.7–10.5)
CHLORIDE SERPL-SCNC: 102 MMOL/L (ref 95–110)
CO2 SERPL-SCNC: 23 MMOL/L (ref 23–29)
CODING SYSTEM: NORMAL
CODING SYSTEM: NORMAL
CREAT SERPL-MCNC: 0.6 MG/DL (ref 0.5–1.4)
CROSSMATCH INTERPRETATION: NORMAL
CROSSMATCH INTERPRETATION: NORMAL
DIFFERENTIAL METHOD: ABNORMAL
DISPENSE STATUS: NORMAL
DISPENSE STATUS: NORMAL
EOSINOPHIL # BLD AUTO: 0 K/UL (ref 0–0.5)
EOSINOPHIL NFR BLD: 0 % (ref 0–8)
ERYTHROCYTE [DISTWIDTH] IN BLOOD BY AUTOMATED COUNT: 13.7 % (ref 11.5–14.5)
EST. GFR  (NO RACE VARIABLE): >60 ML/MIN/1.73 M^2
GLUCOSE SERPL-MCNC: 122 MG/DL (ref 70–110)
HCT VFR BLD AUTO: 36.2 % (ref 40–54)
HGB BLD-MCNC: 11.7 G/DL (ref 14–18)
IMM GRANULOCYTES # BLD AUTO: 0.14 K/UL (ref 0–0.04)
IMM GRANULOCYTES NFR BLD AUTO: 0.8 % (ref 0–0.5)
LYMPHOCYTES # BLD AUTO: 0.9 K/UL (ref 1–4.8)
LYMPHOCYTES NFR BLD: 5.2 % (ref 18–48)
MAGNESIUM SERPL-MCNC: 1.9 MG/DL (ref 1.6–2.6)
MCH RBC QN AUTO: 31 PG (ref 27–31)
MCHC RBC AUTO-ENTMCNC: 32.3 G/DL (ref 32–36)
MCV RBC AUTO: 96 FL (ref 82–98)
MONOCYTES # BLD AUTO: 1.2 K/UL (ref 0.3–1)
MONOCYTES NFR BLD: 6.7 % (ref 4–15)
NEUTROPHILS # BLD AUTO: 15.6 K/UL (ref 1.8–7.7)
NEUTROPHILS NFR BLD: 87.2 % (ref 38–73)
NRBC BLD-RTO: 0 /100 WBC
PHOSPHATE SERPL-MCNC: 2.7 MG/DL (ref 2.7–4.5)
PLATELET # BLD AUTO: 309 K/UL (ref 150–450)
PMV BLD AUTO: 9.7 FL (ref 9.2–12.9)
POCT GLUCOSE: 110 MG/DL (ref 70–110)
POCT GLUCOSE: 83 MG/DL (ref 70–110)
POCT GLUCOSE: 88 MG/DL (ref 70–110)
POCT GLUCOSE: 99 MG/DL (ref 70–110)
POTASSIUM SERPL-SCNC: 4.2 MMOL/L (ref 3.5–5.1)
PROT SERPL-MCNC: 6.2 G/DL (ref 6–8.4)
RBC # BLD AUTO: 3.77 M/UL (ref 4.6–6.2)
SODIUM SERPL-SCNC: 132 MMOL/L (ref 136–145)
SODIUM SERPL-SCNC: 134 MMOL/L (ref 136–145)
SODIUM SERPL-SCNC: 134 MMOL/L (ref 136–145)
TRANS ERYTHROCYTES VOL PATIENT: NORMAL ML
TRANS ERYTHROCYTES VOL PATIENT: NORMAL ML
WBC # BLD AUTO: 17.88 K/UL (ref 3.9–12.7)

## 2023-04-26 PROCEDURE — 80053 COMPREHEN METABOLIC PANEL: CPT

## 2023-04-26 PROCEDURE — 51701 INSERT BLADDER CATHETER: CPT

## 2023-04-26 PROCEDURE — 25000003 PHARM REV CODE 250: Performed by: STUDENT IN AN ORGANIZED HEALTH CARE EDUCATION/TRAINING PROGRAM

## 2023-04-26 PROCEDURE — 84100 ASSAY OF PHOSPHORUS: CPT

## 2023-04-26 PROCEDURE — 84295 ASSAY OF SERUM SODIUM: CPT | Mod: 91 | Performed by: NURSE PRACTITIONER

## 2023-04-26 PROCEDURE — 25000003 PHARM REV CODE 250: Performed by: PHYSICIAN ASSISTANT

## 2023-04-26 PROCEDURE — 84295 ASSAY OF SERUM SODIUM: CPT | Performed by: PHYSICIAN ASSISTANT

## 2023-04-26 PROCEDURE — 27000221 HC OXYGEN, UP TO 24 HOURS

## 2023-04-26 PROCEDURE — A9585 GADOBUTROL INJECTION: HCPCS | Performed by: PSYCHIATRY & NEUROLOGY

## 2023-04-26 PROCEDURE — 25000003 PHARM REV CODE 250: Performed by: NEUROLOGICAL SURGERY

## 2023-04-26 PROCEDURE — 51798 US URINE CAPACITY MEASURE: CPT

## 2023-04-26 PROCEDURE — 37000009 HC ANESTHESIA EA ADD 15 MINS: Performed by: NEUROLOGICAL SURGERY

## 2023-04-26 PROCEDURE — 97530 THERAPEUTIC ACTIVITIES: CPT

## 2023-04-26 PROCEDURE — D9220A PRA ANESTHESIA: Mod: CRNA,,, | Performed by: NURSE ANESTHETIST, CERTIFIED REGISTERED

## 2023-04-26 PROCEDURE — 25000003 PHARM REV CODE 250: Performed by: NURSE ANESTHETIST, CERTIFIED REGISTERED

## 2023-04-26 PROCEDURE — 63600175 PHARM REV CODE 636 W HCPCS: Performed by: STUDENT IN AN ORGANIZED HEALTH CARE EDUCATION/TRAINING PROGRAM

## 2023-04-26 PROCEDURE — 25500020 PHARM REV CODE 255: Performed by: PSYCHIATRY & NEUROLOGY

## 2023-04-26 PROCEDURE — 63600175 PHARM REV CODE 636 W HCPCS: Performed by: NURSE ANESTHETIST, CERTIFIED REGISTERED

## 2023-04-26 PROCEDURE — 99900035 HC TECH TIME PER 15 MIN (STAT)

## 2023-04-26 PROCEDURE — 85025 COMPLETE CBC W/AUTO DIFF WBC: CPT

## 2023-04-26 PROCEDURE — 36000705 HC OR TIME LEV I EA ADD 15 MIN: Performed by: NEUROLOGICAL SURGERY

## 2023-04-26 PROCEDURE — 63600175 PHARM REV CODE 636 W HCPCS: Performed by: NEUROLOGICAL SURGERY

## 2023-04-26 PROCEDURE — 20000000 HC ICU ROOM

## 2023-04-26 PROCEDURE — D9220A PRA ANESTHESIA: Mod: ANES,,, | Performed by: ANESTHESIOLOGY

## 2023-04-26 PROCEDURE — 99233 SBSQ HOSP IP/OBS HIGH 50: CPT | Mod: ,,, | Performed by: PSYCHIATRY & NEUROLOGY

## 2023-04-26 PROCEDURE — 99233 PR SUBSEQUENT HOSPITAL CARE,LEVL III: ICD-10-PCS | Mod: ,,, | Performed by: PSYCHIATRY & NEUROLOGY

## 2023-04-26 PROCEDURE — 94761 N-INVAS EAR/PLS OXIMETRY MLT: CPT

## 2023-04-26 PROCEDURE — D9220A PRA ANESTHESIA: ICD-10-PCS | Mod: ANES,,, | Performed by: ANESTHESIOLOGY

## 2023-04-26 PROCEDURE — 37000008 HC ANESTHESIA 1ST 15 MINUTES: Performed by: NEUROLOGICAL SURGERY

## 2023-04-26 PROCEDURE — 97166 OT EVAL MOD COMPLEX 45 MIN: CPT

## 2023-04-26 PROCEDURE — 36000704 HC OR TIME LEV I 1ST 15 MIN: Performed by: NEUROLOGICAL SURGERY

## 2023-04-26 PROCEDURE — D9220A PRA ANESTHESIA: ICD-10-PCS | Mod: CRNA,,, | Performed by: NURSE ANESTHETIST, CERTIFIED REGISTERED

## 2023-04-26 PROCEDURE — 97116 GAIT TRAINING THERAPY: CPT

## 2023-04-26 PROCEDURE — 10120 PR REMOVE FOREIGN BODY SIMPLE: ICD-10-PCS | Mod: 79,,, | Performed by: NEUROLOGICAL SURGERY

## 2023-04-26 PROCEDURE — 10120 INC&RMVL FB SUBQ TISS SMPL: CPT | Mod: 79,,, | Performed by: NEUROLOGICAL SURGERY

## 2023-04-26 PROCEDURE — 83735 ASSAY OF MAGNESIUM: CPT

## 2023-04-26 RX ORDER — SODIUM CHLORIDE 0.9 % (FLUSH) 0.9 %
10 SYRINGE (ML) INJECTION
Status: CANCELLED | OUTPATIENT
Start: 2023-04-26

## 2023-04-26 RX ORDER — POLYETHYLENE GLYCOL 3350 17 G/17G
17 POWDER, FOR SOLUTION ORAL DAILY
Status: DISCONTINUED | OUTPATIENT
Start: 2023-04-26 | End: 2023-05-03

## 2023-04-26 RX ORDER — PROPOFOL 10 MG/ML
VIAL (ML) INTRAVENOUS
Status: DISCONTINUED | OUTPATIENT
Start: 2023-04-26 | End: 2023-04-26

## 2023-04-26 RX ORDER — ENOXAPARIN SODIUM 100 MG/ML
40 INJECTION SUBCUTANEOUS EVERY 24 HOURS
Status: DISCONTINUED | OUTPATIENT
Start: 2023-04-27 | End: 2023-05-04 | Stop reason: HOSPADM

## 2023-04-26 RX ORDER — HYDROMORPHONE HYDROCHLORIDE 1 MG/ML
0.2 INJECTION, SOLUTION INTRAMUSCULAR; INTRAVENOUS; SUBCUTANEOUS EVERY 5 MIN PRN
Status: CANCELLED | OUTPATIENT
Start: 2023-04-26

## 2023-04-26 RX ORDER — BACITRACIN ZINC 500 UNIT/G
OINTMENT (GRAM) TOPICAL
Status: DISCONTINUED | OUTPATIENT
Start: 2023-04-26 | End: 2023-04-26 | Stop reason: HOSPADM

## 2023-04-26 RX ORDER — HALOPERIDOL 5 MG/ML
0.5 INJECTION INTRAMUSCULAR EVERY 10 MIN PRN
Status: CANCELLED | OUTPATIENT
Start: 2023-04-26

## 2023-04-26 RX ORDER — CEFTRIAXONE 1 G/1
INJECTION, POWDER, FOR SOLUTION INTRAMUSCULAR; INTRAVENOUS
Status: DISCONTINUED | OUTPATIENT
Start: 2023-04-26 | End: 2023-04-26 | Stop reason: HOSPADM

## 2023-04-26 RX ORDER — PHENYLEPHRINE HYDROCHLORIDE 10 MG/ML
INJECTION INTRAVENOUS
Status: DISCONTINUED | OUTPATIENT
Start: 2023-04-26 | End: 2023-04-26

## 2023-04-26 RX ORDER — DEXAMETHASONE SODIUM PHOSPHATE 4 MG/ML
INJECTION, SOLUTION INTRA-ARTICULAR; INTRALESIONAL; INTRAMUSCULAR; INTRAVENOUS; SOFT TISSUE
Status: DISCONTINUED | OUTPATIENT
Start: 2023-04-26 | End: 2023-04-26

## 2023-04-26 RX ORDER — DEXAMETHASONE SODIUM PHOSPHATE 4 MG/ML
4 INJECTION, SOLUTION INTRA-ARTICULAR; INTRALESIONAL; INTRAMUSCULAR; INTRAVENOUS; SOFT TISSUE EVERY 8 HOURS
Status: DISCONTINUED | OUTPATIENT
Start: 2023-04-26 | End: 2023-04-28

## 2023-04-26 RX ORDER — EPHEDRINE SULFATE 50 MG/ML
INJECTION, SOLUTION INTRAVENOUS
Status: DISCONTINUED | OUTPATIENT
Start: 2023-04-26 | End: 2023-04-26

## 2023-04-26 RX ORDER — FENTANYL CITRATE 50 UG/ML
INJECTION, SOLUTION INTRAMUSCULAR; INTRAVENOUS
Status: DISCONTINUED | OUTPATIENT
Start: 2023-04-26 | End: 2023-04-26

## 2023-04-26 RX ORDER — ROCURONIUM BROMIDE 10 MG/ML
INJECTION, SOLUTION INTRAVENOUS
Status: DISCONTINUED | OUTPATIENT
Start: 2023-04-26 | End: 2023-04-26

## 2023-04-26 RX ORDER — ONDANSETRON 2 MG/ML
INJECTION INTRAMUSCULAR; INTRAVENOUS
Status: DISCONTINUED | OUTPATIENT
Start: 2023-04-26 | End: 2023-04-26

## 2023-04-26 RX ORDER — GADOBUTROL 604.72 MG/ML
10 INJECTION INTRAVENOUS
Status: COMPLETED | OUTPATIENT
Start: 2023-04-26 | End: 2023-04-26

## 2023-04-26 RX ORDER — LIDOCAINE HYDROCHLORIDE 20 MG/ML
INJECTION, SOLUTION EPIDURAL; INFILTRATION; INTRACAUDAL; PERINEURAL
Status: DISCONTINUED | OUTPATIENT
Start: 2023-04-26 | End: 2023-04-26

## 2023-04-26 RX ADMIN — FAMOTIDINE 20 MG: 20 TABLET ORAL at 08:04

## 2023-04-26 RX ADMIN — SODIUM CHLORIDE: 9 INJECTION, SOLUTION INTRAVENOUS at 03:04

## 2023-04-26 RX ADMIN — SUGAMMADEX 200 MG: 100 INJECTION, SOLUTION INTRAVENOUS at 03:04

## 2023-04-26 RX ADMIN — LIDOCAINE HYDROCHLORIDE 100 MG: 20 INJECTION, SOLUTION EPIDURAL; INFILTRATION; INTRACAUDAL; PERINEURAL at 03:04

## 2023-04-26 RX ADMIN — CLOBAZAM 40 MG: 10 TABLET ORAL at 08:04

## 2023-04-26 RX ADMIN — ROCURONIUM BROMIDE 50 MG: 10 INJECTION, SOLUTION INTRAVENOUS at 03:04

## 2023-04-26 RX ADMIN — SENNOSIDES AND DOCUSATE SODIUM 1 TABLET: 50; 8.6 TABLET ORAL at 08:04

## 2023-04-26 RX ADMIN — DEXAMETHASONE SODIUM PHOSPHATE 4 MG: 4 INJECTION INTRA-ARTICULAR; INTRALESIONAL; INTRAMUSCULAR; INTRAVENOUS; SOFT TISSUE at 09:04

## 2023-04-26 RX ADMIN — ESCITALOPRAM OXALATE 10 MG: 10 TABLET ORAL at 08:04

## 2023-04-26 RX ADMIN — ZONISAMIDE 500 MG: 100 CAPSULE ORAL at 08:04

## 2023-04-26 RX ADMIN — GADOBUTROL 10 ML: 604.72 INJECTION INTRAVENOUS at 01:04

## 2023-04-26 RX ADMIN — ONDANSETRON 4 MG: 2 INJECTION INTRAMUSCULAR; INTRAVENOUS at 03:04

## 2023-04-26 RX ADMIN — HYDROCODONE BITARTRATE AND ACETAMINOPHEN 1 TABLET: 7.5; 325 TABLET ORAL at 11:04

## 2023-04-26 RX ADMIN — FENTANYL CITRATE 100 MCG: 50 INJECTION, SOLUTION INTRAMUSCULAR; INTRAVENOUS at 03:04

## 2023-04-26 RX ADMIN — PHENYLEPHRINE HYDROCHLORIDE 100 MCG: 10 INJECTION INTRAVENOUS at 03:04

## 2023-04-26 RX ADMIN — DEXAMETHASONE SODIUM PHOSPHATE 4 MG: 4 INJECTION, SOLUTION INTRAMUSCULAR; INTRAVENOUS at 03:04

## 2023-04-26 RX ADMIN — SODIUM CHLORIDE: 9 INJECTION, SOLUTION INTRAVENOUS at 05:04

## 2023-04-26 RX ADMIN — CEFTRIAXONE SODIUM 2 G: 1 INJECTION, SOLUTION INTRAVENOUS at 03:04

## 2023-04-26 RX ADMIN — DEXAMETHASONE SODIUM PHOSPHATE 4 MG: 4 INJECTION INTRA-ARTICULAR; INTRALESIONAL; INTRAMUSCULAR; INTRAVENOUS; SOFT TISSUE at 12:04

## 2023-04-26 RX ADMIN — DEXAMETHASONE SODIUM PHOSPHATE 4 MG: 4 INJECTION INTRA-ARTICULAR; INTRALESIONAL; INTRAMUSCULAR; INTRAVENOUS; SOFT TISSUE at 05:04

## 2023-04-26 RX ADMIN — EPHEDRINE SULFATE 10 MG: 50 INJECTION INTRAVENOUS at 03:04

## 2023-04-26 RX ADMIN — ESLICARBAZEPINE ACETATE 800 MG: 400 TABLET ORAL at 08:04

## 2023-04-26 RX ADMIN — DEXAMETHASONE SODIUM PHOSPHATE 4 MG: 4 INJECTION INTRA-ARTICULAR; INTRALESIONAL; INTRAMUSCULAR; INTRAVENOUS; SOFT TISSUE at 02:04

## 2023-04-26 RX ADMIN — PROPOFOL 200 MG: 10 INJECTION, EMULSION INTRAVENOUS at 03:04

## 2023-04-26 NOTE — PT/OT/SLP EVAL
"Occupational Therapy   Evaluation and Co-Tx     Name: Declan Burleson  MRN: [id number]  Admitting Diagnosis: Complex partial epilepsy with generalization and with intractable epilepsy  Recent Surgery: Procedure(s) (LRB):  REMOVAL, DRAIN (Left) Day of Surgery    Recommendations:     Discharge Recommendations: rehabilitation facility  Discharge Equipment Recommendations:  to be determined by next level of care  Barriers to discharge:  Other (Comment) (increased (A) required)    Assessment:     Declan Burleson is a 58 y.o. male with a medical diagnosis of Complex partial epilepsy with generalization and with intractable epilepsy.  He presents with the following performance deficits affecting function: weakness, impaired self care skills, impaired functional mobility, gait instability, impaired balance.  Pt presents with HOB elevated and demonstrated good tolerance to session. At baseline, pt was (I) with mobility, however sometimes unsteady and (I) with ADLs. Pt currently requires increased (A) for all ADLs and functional mobility due to aforementioned deficits and is a fall risk at this time. He required Mod A with HHA for mobility and Mod A for bed mobility. Pt would benefit from continued skilled acute OT services in order to maximize (I) and with ADLs and functional mobility to ensure safe return to PLOF in the least restrictive environment. OT recommending IPR once pt is medically appropriate for d/c.          Rehab Prognosis: Good; patient would benefit from acute skilled OT services to address these deficits and reach maximum level of function.       Plan:     Patient to be seen 4 x/week to address the above listed problems via self-care/home management, therapeutic activities, therapeutic exercises, neuromuscular re-education  Plan of Care Expires: 05/26/23  Plan of Care Reviewed with: patient, spouse    Subjective   "I have a headache"   Chief Complaint: headache at end of session   Patient/Family " "Comments/goals: return to PLOF     Occupational Profile:  Living Environment: Pt lives with wife in a 1 story home with no OSMEL and 1 step within the home to the kitchen  Prior to admission, patients level of function was independent per wife but did "lose his balance every once in a while but did not fall"  Roles and Routines: Pt is a    Equipment Used at Home: walker, rolling  Assistance upon Discharge: Wife     Pain/Comfort:  Pain Rating 1: 0/10  Location - Orientation 1: generalized  Location 1: head  Pain Addressed 1: Cessation of Activity, Nurse notified  Pain Rating Post-Intervention 1: other (see comments) (not reported)    Patients cultural, spiritual, Christianity conflicts given the current situation: no    Objective:     Communicated with: RN prior to session.  Patient found HOB elevated with arterial line, bed alarm, blood pressure cuff, pulse ox (continuous), telemetry, peripheral IV, Condom Catheter, SCD, NG tube upon OT entry to room.    General Precautions: Standard, aspiration, fall, seizure  Orthopedic Precautions: N/A  Braces: N/A  Respiratory Status: Room air    Occupational Performance:    Bed Mobility:    Patient completed Rolling/Turning to Right with moderate assistance  Patient completed Supine to Sit with moderate assistance  Patient completed Sit to Supine with moderate assistance    Functional Mobility/Transfers:  Patient completed Sit <> Stand Transfer with moderate assistance  with  hand-held assist   Functional Mobility: Pt demonstrated ability to take 4 steps forward/back and 2 lateral side steps with HHA and Mod A.     Activities of Daily Living:  Feeding:  pt NPO    Toileting: pt presents with condom cath      Cognitive/Visual Perceptual:  Cognitive/Psychosocial Skills:     -       Oriented to: Person and Place   -       Follows Commands/attention:Follows one-step commands  -       Communication: clear/fluent  -       Safety awareness/insight to disability: impaired "   Visual/Perceptual:      -Intact acuity     Physical Exam:  Balance:    -           Static sitting balance: CGA  - Dynamic sitting balance: CGA  - Static standing balance: Mod A  - Dynamic standing balance:  Mod A   Postural examination/scapula alignment:    -       Rounded shoulders  Skin integrity: I/d on head   Edema:  None noted  Dominant hand:    -       R  Upper Extremity Range of Motion:     -       Right Upper Extremity: WFL  -       Left Upper Extremity: WFL  Upper Extremity Strength:    -       Right Upper Extremity: WFL  -       Left Upper Extremity: WFL   Strength:    -       Right Upper Extremity: WFL  -       Left Upper Extremity: WFL  Fine Motor Coordination:    -       Intact  Left hand thumb/finger opposition skills and Right hand thumb/finger opposition skills    AMPAC 6 Click ADL:  AMPAC Total Score: 15    Treatment & Education:  Pt sat EOB ~15min with CGA for safety. Demonstrated good sitting balance and midline orientation.   Pt and wife educated on:   Role of OT, POC, and d/c planning.   Importance of OOB activities to increase endurance and tolerance for increased participation in daily ADLs.   Utilizing the call bell to request for assistance with all functional mobility to ensure safety during hospital stay.      Pt and wife verbalized understanding and all questions were addressed within the scope of OT.       Patient left HOB elevated with all lines intact, call button in reach, RN notified, and wife present    GOALS:   Multidisciplinary Problems       Occupational Therapy Goals          Problem: Occupational Therapy    Goal Priority Disciplines Outcome Interventions   Occupational Therapy Goal     OT, PT/OT Ongoing, Progressing    Description: Goals to be met by: 5/10/2023     Patient will increase functional independence with ADLs by performing:    UE Dressing with Minimal Assistance.  LE Dressing with Minimal Assistance.  Grooming while standing at sink with Minimal  Assistance.  Toileting from bedside commode with Minimal Assistance for hygiene and clothing management.   Step transfer with Minimal Assistance  Toilet transfer to bedside commode with Minimal Assistance.                         History:     Past Medical History:   Diagnosis Date    Anxiety     Dementia in other diseases classified elsewhere, unspecified severity, without behavioral disturbance, psychotic disturbance, mood disturbance, and anxiety 2/2/2023    Depression     GERD (gastroesophageal reflux disease)     Hyperlipidemia     Hypertension     Long term (current) use of antithrombotics/antiplatelets 12/22/2020    Migraine     Normocytic anemia 2/23/2023    Seizures          Past Surgical History:   Procedure Laterality Date    CRANIOTOMY USING FRAMELESS STEREOTAXY Left 4/24/2023    Procedure: CRANIOTOMY, USING FRAMELESS STEREOTAXY;  Surgeon: Ruchi Chen MD;  Location: [facility] OR [address] [place] [address];  Service: Neurosurgery;  Laterality: Left;  Tor III ASA III  Blood: Type & Screen  Neuro Mon: None  Bed: [facility]  Headrest: [place]  Pos: Supine  Stealth MRI Optical   [facility]  Asst Surg: Montoya    CYST REMOVAL  March 2016/2017    skin cyst - benign    PLACEMENT OF STEREO EEG LEADS(DEPTH ELECTRODES) Left 2/27/2023    Procedure: PLACEMENT OF STEREO EEG LEADS (DEPTH ELECTRODES);  Surgeon: Ruchi Chen MD;  Location: [facility] OR [address] [place] [address];  Service: Neurosurgery;  Laterality: Left;    REMOVAL OF STEREO EEG LEADS (DEPTH ELECTRODES) Bilateral 1/21/2021    Procedure: REMOVAL OF STEREO EEG LEADS (DEPTH ELECTRODES);  Surgeon: Ruchi Chen MD;  Location: [facility] OR [address] [place] [address];  Service: Neurosurgery;  Laterality: Bilateral;    REMOVAL OF STEREO EEG LEADS (DEPTH ELECTRODES) Left 3/8/2023    Procedure: REMOVAL OF STEREO EEG LEADS (DEPTH ELECTRODES);  Surgeon: Ruchi Chen MD;  Location: [facility] OR [address] [place] [address];  Service: Neurosurgery;  Laterality: Left;    TONSILLECTOMY      teenage        Time Tracking:     OT Date of Treatment: 04/26/23  OT Start Time:  1027  OT Stop Time: 1046  OT Total Time (min): 19 min    Billable Minutes:Evaluation 10  Therapeutic Activity 9    4/26/2023   Co-treatment performed due to patient's multiple deficits requiring two skilled therapists to appropriately and safely assess patient's strength, endurance, functional mobility, and ADL performance while facilitating functional tasks in addition to accommodating for patient's activity tolerance and medical acuity 2/2 Complex partial epilepsy with generalization and with intractable epilepsy.

## 2023-04-26 NOTE — PLAN OF CARE
OT evaluation completed. OT POC and goals established.     Problem: Occupational Therapy  Goal: Occupational Therapy Goal  Description: Goals to be met by: 5/10/2023     Patient will increase functional independence with ADLs by performing:    UE Dressing with Minimal Assistance.  LE Dressing with Minimal Assistance.  Grooming while standing at sink with Minimal Assistance.  Toileting from bedside commode with Minimal Assistance for hygiene and clothing management.   Step transfer with Minimal Assistance  Toilet transfer to bedside commode with Minimal Assistance.    Outcome: Ongoing, Progressing

## 2023-04-26 NOTE — PLAN OF CARE
[facility] Care Plan    POC reviewed with Declan Burleson and family at 1400. Pt verbalized understanding. Questions and concerns addressed. No acute events today. Pt progressing toward goals. Will continue to monitor. See below and flowsheets for full assessment and VS info.   Drain pulled in the am. Patient went to surgery at 14:30 to pull out pieces of drain.   On fluid at 100.  Patient NPO            Is this a stroke patient? yes- Stroke booklet reviewed with patient and family, risk factors identified for patient and stroke booklet remains at bedside for ongoing education.     Neuro:  Boutte Coma Scale  Best Eye Response: 3-->(E3) to speech  Best Motor Response: 6-->(M6) obeys commands  Best Verbal Response: 4-->(V4) confused  Ruben Coma Scale Score: 13  Assessment Qualifiers: patient not sedated/intubated  Pupil PERRLA: yes     24 hr Temp:  [98.2 °F (36.8 °C)-98.9 °F (37.2 °C)]     CV:   Rhythm: normal sinus rhythm, sinus bradycardia  BP goals:   SBP < 160  MAP > 65    Resp:           Plan: N/A    GI/:     Diet/Nutrition Received: NPO  Last Bowel Movement: 04/25/23  Voiding Characteristics: external catheter    Intake/Output Summary (Last 24 hours) at 4/26/2023 1539  Last data filed at 4/26/2023 1405  Gross per 24 hour   Intake 2118.03 ml   Output 1625 ml   Net 493.03 ml          Labs/Accuchecks:  Recent Labs   Lab 04/26/23  0010   WBC 17.88*   RBC 3.77*   HGB 11.7*   HCT 36.2*         Recent Labs   Lab 04/26/23  0010 04/26/23  0509   * 134*   K 4.2  --    CO2 23  --      --    BUN 12  --    CREATININE 0.6  --    ALKPHOS 89  --    ALT 9*  --    AST 13  --    BILITOT 0.3  --       Recent Labs   Lab 04/24/23  0928   INR 1.0   APTT 26.2    No results for input(s): CPK, CPKMB, TROPONINI, MB in the last 168 hours.    Electrolytes: N/A - electrolytes WDL  Accuchecks: Q6H    Gtts:   sodium chloride 0.9% 100 mL/hr at 04/26/23 1405       LDA/Wounds:  Lines/Drains/Airways       Drain  Duration                   NG/OG Tube 04/25/23 1015 Thayer sump 18 Fr. Right nostril 1 day    Male External Urinary Catheter 04/26/23 0600 <1 day              Airway  Duration                  Airway - Non-Surgical 04/26/23 1515 <1 day              Arterial Line  Duration             Arterial Line 04/24/23 1115 Left Radial 2 days              Peripheral Intravenous Line  Duration                  Peripheral IV - Single Lumen 04/24/23 0928 18 G Left;Posterior Forearm 2 days         Peripheral IV - Single Lumen 04/26/23 1218 Anterior;Right Forearm <1 day                  Wounds: No  Wound care consulted: No

## 2023-04-26 NOTE — ANESTHESIA PROCEDURE NOTES
Intubation    Date/Time: 4/26/2023 3:15 PM  Performed by: Kayleigh Escalona CRNA  Authorized by: Sofya Atkins MD     Intubation:     Induction:  Intravenous    Intubated:  Postinduction    Mask Ventilation:  Moderately difficult with oral airway (presence of NGT = difficult mask seal)    Attempts:  2    Attempted By:  Student    Method of Intubation:  Direct    Blade:  Darling 2    Laryngeal View Grade: Grade III - only epiglottis visible      Attempted By (2nd Attempt):  Student    Method of Intubation (2nd Attempt):  Video laryngoscopy    Blade (2nd Attempt):  Carpio 3    Laryngeal View Grade (2nd Attempt): Grade I - full view of cords      Difficult Airway Encountered?: No      Complications:  None    Airway Device:  Oral endotracheal tube    Airway Device Size:  7.5    Style/Cuff Inflation:  Cuffed    Placement Verified By:  Capnometry    Complicating Factors:  Anterior larynx and short neck    Findings Post-Intubation:  BS equal bilateral

## 2023-04-26 NOTE — TRANSFER OF CARE
"Anesthesia Transfer of Care Note    Patient: Declan Burleson    Procedure(s) Performed: Procedure(s) (LRB):  REMOVAL, DRAIN (Left)    Patient location: ICU    Anesthesia Type: general    Transport from OR: Transported from OR on 6-10 L/min O2 by face mask with adequate spontaneous ventilation. Continuous ECG monitoring in transport. Continuous SpO2 monitoring in transport. Continuos invasive BP monitoring in transport    Post pain: adequate analgesia    Post assessment: no apparent anesthetic complications and tolerated procedure well    Post vital signs: stable    Level of consciousness: alert and awake    Nausea/Vomiting: no nausea/vomiting    Complications: none    Transfer of care protocol was followed      Last vitals:   Visit Vitals  BP (!) 150/88 (BP Location: Right arm, Patient Position: Lying)   Pulse 65   Temp 37 °C (98.6 °F) (Axillary)   Resp 13   Ht 5' 11" (1.803 m)   Wt 89.8 kg (198 lb)   SpO2 97%   BMI 27.62 kg/m²     "

## 2023-04-26 NOTE — SUBJECTIVE & OBJECTIVE
Interval History:  NSGY attempted bedside drain removal; however, a portion of the drain was retained in scalp and broke off. Patient to OR today for retrieval of remaining drain. Patient neuro exam markedly improved today. Abdominal exam improved and patient hungry. O2 requirements downtrending.     Review of Systems  Objective:     Vitals:  Temp: 98.6 °F (37 °C)  Pulse: 67  Rhythm: normal sinus rhythm, sinus bradycardia  BP: 139/85  MAP (mmHg): 108  Resp: 15  SpO2: 98 %    Temp  Min: 98.1 °F (36.7 °C)  Max: 98.9 °F (37.2 °C)  Pulse  Min: 55  Max: 73  BP  Min: 117/64  Max: 156/81  MAP (mmHg)  Min: 83  Max: 113  Resp  Min: 6  Max: 18  SpO2  Min: 95 %  Max: 100 %    04/25 0701 - 04/26 0700  In: 2070.7 [I.V.:862.2]  Out: 1230 [Urine:1020]           Physical Exam  Vitals and nursing note reviewed.   Constitutional:       General: He is not in acute distress.     Appearance: He is not ill-appearing or diaphoretic.   HENT:      Head:      Comments: Surgical incision to left scalp, C/D/I  Drain from incision with minimal bloody output     Right Ear: External ear normal.      Left Ear: External ear normal.      Nose: Nose normal.      Comments: NGT intact  Eyes:      General: No scleral icterus.        Right eye: No discharge.         Left eye: No discharge.      Extraocular Movements: Extraocular movements intact.      Conjunctiva/sclera: Conjunctivae normal.      Pupils: Pupils are equal, round, and reactive to light.   Cardiovascular:      Rate and Rhythm: Normal rate and regular rhythm.      Heart sounds: Normal heart sounds. No murmur heard.  Pulmonary:      Effort: Pulmonary effort is normal. No respiratory distress.      Breath sounds: Normal breath sounds. No wheezing or rales.   Abdominal:      General: Abdomen is flat. Bowel sounds are normal. There is no distension.      Palpations: Abdomen is soft. There is no mass.      Tenderness: There is no abdominal tenderness.   Musculoskeletal:         General: No  swelling or deformity. Normal range of motion.      Cervical back: Normal range of motion and neck supple.      Right lower leg: No edema.      Left lower leg: No edema.   Skin:     General: Skin is warm.      Coloration: Skin is not jaundiced.      Findings: No bruising.   Neurological:      Comments: [id number]     A&O x 3, unable to state situation  Able to follow all commands     PERRL  EOMI  VFI  Facial motor and sensation intact and equal bilaterally  Tongue midline   Smile symmetric      strength equal bilaterally   Antigravity in BLE equal     Sensation intact and equal bilaterally in all extremities     Unable to test memory, judgment, insight, fund of knowledge, hearing, shoulder shrug, tongue protrusion, coordination, gait due to level of consciousness.    Medications:  Continuoussodium chloride 0.9%, Last Rate: 100 mL/hr at 04/26/23 1405    ScheduledcloBAZam, 40 mg, QHS  dexAMETHasone, 4 mg, Q8H  enoxaparin, 40 mg, Daily  EScitalopram oxalate, 10 mg, Daily  eslicarbazepine, 800 mg, Daily  famotidine, 20 mg, BID  polyethylene glycol, 17 g, Daily  senna-docusate 8.6-50 mg, 1 tablet, BID  zonisamide, 500 mg, QHS    PRNacetaminophen, 650 mg, Q4H PRN  dextrose 10%, 12.5 g, PRN  dextrose 10%, 25 g, PRN  glucagon (human recombinant), 1 mg, PRN  HYDROcodone-acetaminophen, 1 tablet, Q6H PRN  insulin aspart U-100, 1-10 Units, Q6H PRN  ondansetron, 4 mg, Q12H PRN      Today I personally reviewed pertinent medications, lines/drains/airways, imaging, laboratory results, notably:    Diet: NPO

## 2023-04-26 NOTE — SUBJECTIVE & OBJECTIVE
"Interval History:  doing well post op . Improving speech this morning.     Medications:  Continuous Infusions:   sodium chloride 0.9% 100 mL/hr at 04/26/23 0705     Scheduled Meds:   cloBAZam  40 mg Oral QHS    dexAMETHasone  4 mg Intravenous Q6H    enoxaparin  40 mg Subcutaneous Daily    EScitalopram oxalate  10 mg Oral Daily    eslicarbazepine  800 mg Oral Daily    famotidine  20 mg Per NG tube BID    senna-docusate 8.6-50 mg  1 tablet Per NG tube BID    zonisamide  500 mg Oral QHS     PRN Meds:acetaminophen, dextrose 10%, dextrose 10%, glucagon (human recombinant), HYDROcodone-acetaminophen, insulin aspart U-100, ondansetron     Review of Systems  Objective:     Weight: 89.8 kg (198 lb)  Body mass index is 27.62 kg/m².  Vital Signs (Most Recent):  Temp: 98.5 °F (36.9 °C) (04/26/23 0705)  Pulse: 63 (04/26/23 0705)  Resp: 13 (04/26/23 0705)  BP: (!) 147/69 (04/26/23 0705)  SpO2: 99 % (04/26/23 0705)   Vital Signs (24h Range):  Temp:  [97.7 °F (36.5 °C)-98.9 °F (37.2 °C)] 98.5 °F (36.9 °C)  Pulse:  [49-84] 63  Resp:  [6-36] 13  SpO2:  [96 %-100 %] 99 %  BP: (115-147)/(55-81) 147/69  Arterial Line BP: (105-140)/() 122/81     Date 04/26/23 0700 - 04/27/23 0659   Shift 9371-3969 7076-9314 5012-0040 24 Hour Total   INTAKE   I.V.(mL/kg) 76.7(0.9)   76.7(0.9)   NG/GT 0   0   Shift Total(mL/kg) 76.7(0.9)   76.7(0.9)   OUTPUT   Urine(mL/kg/hr) 420   420   Shift Total(mL/kg) 420(4.7)   420(4.7)   Weight (kg) 89.8 89.8 89.8 89.8                        Urethral Catheter 04/24/23 1100 Silicone 16 Fr. (Active)   Site Assessment Clean;Intact 04/25/23 0303   Collection Container Urimeter 04/25/23 0303   Securement Method secured to top of thigh w/ adhesive device 04/25/23 0303   Catheter Care Performed yes 04/25/23 0303   Reason for Continuing Urinary Catheterization Post operative 04/25/23 0303   CAUTI Prevention Bundle Securement Device in place with 1" slack;Intact seal between catheter & drainage tubing;Drainage " bag/urimeter off the floor;Sheeting clip in use;No dependent loops or kinks;Drainage bag/urimeter not overfilled (<2/3 full);Drainage bag/urimeter below bladder 04/24/23 1909   Output (mL) 100 mL 04/25/23 0625            Drain/Device  04/24/23 1524 Left lateral temporal region gravity (Active)   Insertion Site no hematoma 04/25/23 0303   Drainage Characteristics/Odor Bleeding controlled 04/24/23 1909   Drainage Amount None 04/24/23 1909   General Intake (mL) 0 04/24/23 1909   General Output (mL) 140 04/25/23 0625       Physical Exam  Neurosurgery Physical Exam    E3V4M6 perrl eomi visual fields full to confrontation   Incision CDI   Drain with bloody output.      Significant Labs:  Recent Labs   Lab 04/24/23  0928 04/25/23  0010 04/25/23  1152 04/25/23  1834 04/26/23  0010 04/26/23  0509   GLU 79 204*  --   --  122*  --    * 131*   < > 134* 132* 134*   K 4.1 4.1  --   --  4.2  --    CL 99 100  --   --  102  --    CO2 26 16*  --   --  23  --    BUN 13 10  --   --  12  --    CREATININE 0.9 0.8  --   --  0.6  --    CALCIUM 9.0 8.8  --   --  8.7  --    MG  --  1.8  --   --  1.9  --     < > = values in this interval not displayed.       Recent Labs   Lab 04/24/23  0928 04/24/23  1436 04/25/23  0010 04/26/23  0010   WBC 6.15  --  11.13 17.88*   HGB 14.3  --  13.8* 11.7*   HCT 42.9 36 41.0 36.2*     --  387 309       Recent Labs   Lab 04/24/23 0928   INR 1.0   APTT 26.2       Microbiology Results (last 7 days)       ** No results found for the last 168 hours. **          All pertinent labs from the last 24 hours have been reviewed.    Significant Diagnostics:  I have reviewed all pertinent imaging results/findings within the past 24 hours.

## 2023-04-26 NOTE — PT/OT/SLP PROGRESS
Speech Language Pathology      Declan Burleson  MRN: [id number]    SLP Evaluation orders received and reviewed with RN and MD.  MD confirmed Pt NPO pending surgery.  Patient not seen today secondary to NPO/ surgery. ST to continue to monitor and follow-up to re-attempt pending medical status and clearance for PO trials.     4/26/2023

## 2023-04-26 NOTE — OP NOTE
[facility]  Neurosurgery  Operative Note    SUMMARY      Date of Procedure: 4/26/2023     Procedure: Procedure(s) (LRB):  REMOVAL, DRAIN (Left)     Surgeon(s) and Role:     * Ruchi Chen MD - Primary     * Bharat Moralez MD - Resident - Assisting    Pre-Operative Diagnosis: Epilepsy [G40.909]    Post-Operative Diagnosis: Post-Op Diagnosis Codes:     * Epilepsy [G40.909]    Anesthesia: General    Indications:   Declan Burleson is a 58 y.o. male with intractable epilepsy who presents s/p bilateral sEEG and L laser amygdalohippocampectomy (3/1/21). He underwent repeat sEEG implanted on 2/27/23 and explanted 3/8/23.     Dr. Kaleb Edgar, Dr. Marnie Horton (neuropsych), and I had a pleasant discussion with the patient, his wife, and his father-in-law about the seizures captured during this admission. He was found to have seizures coming from the tail of the hipoocampus moreso than the remnant amygdala. We discussed that since he had a memory decline after his laser ablation (per neuropsych and memory neurology evaluation), he is at significant risk of further decline with an open LACEY on the left. Dr. Horton provided some examples of what daily life would likely be like after said procedure. We also discussed offering several more laser ablations to do our best to ablate as much of the mesial temporal structures as possible. While more memory loss is certainly possible with these procedures as well, we do not expect that these would result in as significant a decline. However, less tissue can be disrupted this way, which means that seizure freedom rates (and the ability to reduce medications further, which is the patient's goal) is also somewhat less realistic.      I had a lengthy, detailed discussion with the patient and his family about the risks, benefits, and alternatives to LEFT temporal lobectomy with encephalocele resection for treatment of seizures. Risks include, but are not limited  to, bleeding, pain, infection, scarring, need for further/repeat procedure, death, paralysis, stroke (including venous infarct)/damage to major blood vessels, leak of cerebrospinal fluid, visual field changes, cranial nerve deficits, memory changes (including verbal memory), and hardware-related complications. Unfortunately there is no guarantee of seizure-freedom, but interdisciplinary concordance has been met in making this recommendation based off all available data (including sEEG). He underwent this surgery on Monday. Unfortunately, as the drain was being removed today, it broke, leaving a retained portion behind. He was thus offered removal of the remainder under anesthesia. Informed consent was obtained of the patient with his wife present.      Technical Procedures Used:   1. Wound washout/drain removal, left frontotemporal incision      Description of the Procedure:   The patient was brought back to the operating room, and general endotracheal anesthesia was induced. An arterial line was placed, and instructions were given to keep SBP <140 throughout the case. He was carefully positioned supine. The head was affixed to the table with a Ione horseshoe.       The previously created modified reverse-question-zayra incision was identified. A time out was performed. The patient received IV antibiotics and was prepped and draped in the usual sterile fashion. Staples were removed, and the incision was reopened with Metzenbaum scissors. Bipolar at 20 was used for hemostasis where indicated.  The drain was easily identified and removed cleanly, in its entirety.      The wound was copiously irrigated with rocephin-containing irrigation. The galea was closed with 3.0 inverted Vicryl stitches. The skin was closed with staples.      The patient was carefully removed from the Ione, and a sterile dressing of bacitracin ointment, Telfa, and Medipore tape was applied.      The patient was then awakened by the  anesthesia service and taken to ICU-level recovery in satisfactory condition.      Complications: No    Estimated Blood Loss (EBL): minimal           Specimens:   Specimen (24h ago, onward)      None             Implants: * No implants in log *           Condition: Stable    Disposition: ICU - extubated and stable.    Attestation: I was present and scrubbed for the entire procedure.

## 2023-04-26 NOTE — PROGRESS NOTES
Patient returned from surgery in stable condition at 1606 with nasal trumpet, oral airway in place and crani incision.    Dr. OLMAN Chen to bedside and updated family prior to arrival

## 2023-04-26 NOTE — PT/OT/SLP PROGRESS
"Physical Therapy Treatment/ co treat with OT    Patient Name:  Declan Burleson   MRN:  [id number]    Recommendations:     Discharge Recommendations: rehabilitation facility  Discharge Equipment Recommendations:  (TBD as pt progresses)  Barriers to discharge: Decreased caregiver support    Assessment:     Declan Burleson is a 58 y.o. male admitted with a medical diagnosis of Complex partial epilepsy with generalization and with intractable epilepsy.  He presents with the following impairments/functional limitations: weakness, impaired balance, decreased safety awareness, impaired functional mobility, gait instability . Pt is unsafe with functional mobility at this time due to pt requires moderate assist for bed mobility, moderate assist for transfers, and moderate assist for gait due to instability, difficulty clearing his R>L foot to step, and difficulty with weight shift to step. Pt is motivated to progress with functional mobility.     Rehab Prognosis: Good; patient would benefit from acute skilled PT services to address these deficits and reach maximum level of function.    Recent Surgery: Procedure(s) (LRB):  CRANIOTOMY, USING FRAMELESS STEREOTAXY (Left) 2 Days Post-Op    Plan:     During this hospitalization, patient to be seen 4 x/week to address the identified rehab impairments via gait training, therapeutic activities, therapeutic exercises, neuromuscular re-education and progress toward the following goals:    Plan of Care Expires:  05/25/23    Subjective   Pt able to state his name and that he is in a hospital, unsure of name of the hospital    Pain/Comfort:  Pain Rating 1: 0/10  Location - Orientation 1: generalized  Location 1: head  Pain Addressed 1: Cessation of Activity  Pain Rating Post-Intervention 1:  (pt c/o "big" headache after return to supine; unable to grade)      Objective:     Communicated with nurse prior to session.  Patient found HOB elevated with arterial line, bed alarm, blood " pressure cuff, peripheral IV, pulse ox (continuous), telemetry, Condom Catheter, SCD upon PT entry to room.     General Precautions: Standard, aspiration, fall, seizure  Orthopedic Precautions: N/A  Braces: N/A  Respiratory Status: Room air     Functional Mobility:  Bed Mobility:     Rolling Right: moderate assistance  Supine to Sit: moderate assistance  Sit to Supine: moderate assistance  Transfers:     Sit to Stand:  moderate assistance with hand-held assist  Gait: 2 sidesteps to the R then 4 steps forward/back with HHA with moderate assist. Pt rested in sitting between gait trials. Pt performed gait with posterior instability at times, decreased clearance of R>L foot during swing phase, and difficulty with weight shift to step.    AM-PAC 6 CLICK MOBILITY  Turning over in bed (including adjusting bedclothes, sheets and blankets)?: 2  Sitting down on and standing up from a chair with arms (e.g., wheelchair, bedside commode, etc.): 2  Moving from lying on back to sitting on the side of the bed?: 2  Moving to and from a bed to a chair (including a wheelchair)?: 2  Need to walk in hospital room?: 2  Climbing 3-5 steps with a railing?: 1  Basic Mobility Total Score: 11     Treatment & Education:  Pt sat on the EOB ~ 14 min with CGA between standing/gait trials.   Co-treat with OT due to medical complexity of pt and need for skilled hands for safe intervention.   Patient left HOB elevated with all lines intact, call button in reach, bed alarm on, nurse notified, and wife present..    GOALS:   Multidisciplinary Problems       Physical Therapy Goals          Problem: Physical Therapy    Goal Priority Disciplines Outcome Goal Variances Interventions   Physical Therapy Goal     PT, PT/OT Ongoing, Progressing     Description: PT goals until 5/4/23    1. Pt supine to sit with minimal assist-not met  2. Pt sit to supine with minimal assist-not met  3. Pt sit to stand with LRAD as needed for safety with minimal assist-not  met  4. Pt to perform gait 15ft with LRAD as needed for safety with moderate assist.-not met  5. Pt to go up/down curb step with LRAD as needed for safety with moderate assist.-not met  6. Pt to transfer bed to/from bedside chair with LRAD as needed for safety with minimal assist.-not met  7. Pt to perform B LE exs in sitting or supine x 10 reps to strengthen B LE to improve functional mobility.-not met                        Time Tracking:     PT Received On: 04/26/23  PT Start Time: 1027     PT Stop Time: 1046  PT Total Time (min): 19 min     Billable Minutes: Gait Training 19    Treatment Type: Treatment  PT/PTA: PT           04/26/2023

## 2023-04-26 NOTE — BRIEF OP NOTE
[facility]  Brief Operative Note  Neurosurgery    SUMMARY     Surgery Date: 4/26/2023     Surgeon(s) and Role:     * Ruchi Chen MD - Primary     * Bharat Moralez MD - Resident - Assisting        Pre-op Diagnosis:  Epilepsy [G40.909]    Post-op Diagnosis: Post-Op Diagnosis Codes:     * Epilepsy [G40.909]    Procedure Performed:     Procedure(s) (LRB):  REMOVAL, DRAIN (Left)    Technical Procedures Used:   Left scalp incision partially re-opened with removal of retained subgaleal drain  Closure with staples    Operative Findings: see full op note    Estimated Blood Loss: 10cc         Specimens:   Specimen (24h ago, onward)      None

## 2023-04-26 NOTE — PROGRESS NOTES
[facility]  Neurosurgery  Progress Note    Subjective:     History of Present Illness: pt presents for refractory seizures s/p prior  Laser ablation     Post-Op Info:  Procedure(s) (LRB):  CRANIOTOMY, USING FRAMELESS STEREOTAXY (Left)   2 Days Post-Op     Interval History:  doing well post op . Improving speech this morning.     Medications:  Continuous Infusions:   sodium chloride 0.9% 100 mL/hr at 04/26/23 0705     Scheduled Meds:   cloBAZam  40 mg Oral QHS    dexAMETHasone  4 mg Intravenous Q6H    enoxaparin  40 mg Subcutaneous Daily    EScitalopram oxalate  10 mg Oral Daily    eslicarbazepine  800 mg Oral Daily    famotidine  20 mg Per NG tube BID    senna-docusate 8.6-50 mg  1 tablet Per NG tube BID    zonisamide  500 mg Oral QHS     PRN Meds:acetaminophen, dextrose 10%, dextrose 10%, glucagon (human recombinant), HYDROcodone-acetaminophen, insulin aspart U-100, ondansetron     Review of Systems  Objective:     Weight: 89.8 kg (198 lb)  Body mass index is 27.62 kg/m².  Vital Signs (Most Recent):  Temp: 98.5 °F (36.9 °C) (04/26/23 0705)  Pulse: 63 (04/26/23 0705)  Resp: 13 (04/26/23 0705)  BP: (!) 147/69 (04/26/23 0705)  SpO2: 99 % (04/26/23 0705)   Vital Signs (24h Range):  Temp:  [97.7 °F (36.5 °C)-98.9 °F (37.2 °C)] 98.5 °F (36.9 °C)  Pulse:  [49-84] 63  Resp:  [6-36] 13  SpO2:  [96 %-100 %] 99 %  BP: (115-147)/(55-81) 147/69  Arterial Line BP: (105-140)/() 122/81     Date 04/26/23 0700 - 04/27/23 0659   Shift 0975-1216 9781-6202 6925-0232 24 Hour Total   INTAKE   I.V.(mL/kg) 76.7(0.9)   76.7(0.9)   NG/GT 0   0   Shift Total(mL/kg) 76.7(0.9)   76.7(0.9)   OUTPUT   Urine(mL/kg/hr) 420   420   Shift Total(mL/kg) 420(4.7)   420(4.7)   Weight (kg) 89.8 89.8 89.8 89.8                        Urethral Catheter 04/24/23 1100 Silicone 16 Fr. (Active)   Site Assessment Clean;Intact 04/25/23 0303   Collection Container Urimeter 04/25/23 0303   Securement Method secured to top of thigh  "w/ adhesive device 04/25/23 0303   Catheter Care Performed yes 04/25/23 0303   Reason for Continuing Urinary Catheterization Post operative 04/25/23 0303   CAUTI Prevention Bundle Securement Device in place with 1" slack;Intact seal between catheter & drainage tubing;Drainage bag/urimeter off the floor;Sheeting clip in use;No dependent loops or kinks;Drainage bag/urimeter not overfilled (<2/3 full);Drainage bag/urimeter below bladder 04/24/23 1909   Output (mL) 100 mL 04/25/23 0625            Drain/Device  04/24/23 1524 Left lateral temporal region gravity (Active)   Insertion Site no hematoma 04/25/23 0303   Drainage Characteristics/Odor Bleeding controlled 04/24/23 1909   Drainage Amount None 04/24/23 1909   General Intake (mL) 0 04/24/23 1909   General Output (mL) 140 04/25/23 0625       Physical Exam  Neurosurgery Physical Exam    [id number] perrl eomi visual fields full to confrontation   Incision CDI   Drain with bloody output.      Significant Labs:  Recent Labs   Lab 04/24/23  0928 04/25/23  0010 04/25/23  1152 04/25/23  1834 04/26/23  0010 04/26/23  0509   GLU 79 204*  --   --  122*  --    * 131*   < > 134* 132* 134*   K 4.1 4.1  --   --  4.2  --    CL 99 100  --   --  102  --    CO2 26 16*  --   --  23  --    BUN 13 10  --   --  12  --    CREATININE 0.9 0.8  --   --  0.6  --    CALCIUM 9.0 8.8  --   --  8.7  --    MG  --  1.8  --   --  1.9  --     < > = values in this interval not displayed.       Recent Labs   Lab 04/24/23  0928 04/24/23  1436 04/25/23  0010 04/26/23  0010   WBC 6.15  --  11.13 17.88*   HGB 14.3  --  13.8* 11.7*   HCT 42.9 36 41.0 36.2*     --  387 309       Recent Labs   Lab 04/24/23 0928   INR 1.0   APTT 26.2       Microbiology Results (last 7 days)       ** No results found for the last 168 hours. **          All pertinent labs from the last 24 hours have been reviewed.    Significant Diagnostics:  I have reviewed all pertinent imaging results/findings within the past 24 " hours.    Assessment/Plan:     * Complex partial epilepsy with generalization and with intractable epilepsy  57 y/o M s/p left temporal lobectomy on 4/24  for seizures refractory to prior laser ablation    -Lovenox for DVT prophylaxis   -MRI brain with good resection   - will begin dex wean today.   -q1 neuro checks for now   -continue home AEDs  - continue SLP for speech simone Melissa MD  Neurosurgery  [facility]

## 2023-04-26 NOTE — PLAN OF CARE
04/26/23 1429   Post-Acute Status   Post-Acute Authorization Placement   Coverage [employer]   Discharge Plan   Discharge Plan A Rehab     1.[facility] ([facility]) Phone: [phone] - Interested, but need more information.    2.[facility] Phone: [phone]  -No response  3. [facility] Phone: [phone]  -No response  4.[facility] Phone: [phone] - No response    SW sent updated clinicals to [facility] ([facility]) Phone: [phone] via [employer] for review. [facility] will continue to follow patient.      Nava Mosqueda LMSW  PRN - [facility]  [id number]

## 2023-04-26 NOTE — ASSESSMENT & PLAN NOTE
57 y/o M s/p left temporal lobectomy on 4/24  for seizures refractory to prior laser ablation    -Lovenox for DVT prophylaxis   -MRI brain with good resection   - will begin dex wean today.   -q1 neuro checks for now   -continue home AEDs  - continue SLP for speech eval

## 2023-04-26 NOTE — PLAN OF CARE
Problem: Physical Therapy  Goal: Physical Therapy Goal  Description: PT goals until 5/4/23    1. Pt supine to sit with minimal assist-not met  2. Pt sit to supine with minimal assist-not met  3. Pt sit to stand with LRAD as needed for safety with minimal assist-not met  4. Pt to perform gait 15ft with LRAD as needed for safety with moderate assist.-not met  5. Pt to go up/down curb step with LRAD as needed for safety with moderate assist.-not met  6. Pt to transfer bed to/from bedside chair with LRAD as needed for safety with minimal assist.-not met  7. Pt to perform B LE exs in sitting or supine x 10 reps to strengthen B LE to improve functional mobility.-not met   Outcome: Ongoing, Progressing   Pt's goals remain appropriate and pt will continue to benefit from skilled PT services to work towards improved functional mobility including: bed mobility, transfers, and gait.   4/26/2023

## 2023-04-26 NOTE — ANESTHESIA PREPROCEDURE EVALUATION
[facility]  Anesthesia Pre-Operative Evaluation         Patient Name: Declan Burleson  YOB: 1965  MRN: [id number]    SUBJECTIVE:     Pre-operative evaluation for Procedure(s) (LRB):  removal of retained drain (Left)     04/26/2023    Declan Burleson is a 58 y.o. male with a significant medical history of SHILO, HTN, migraines, and adult-onset non-lesional focal-onset intractable epilepsy (onset 2013) who underwent left temporal lobectomy (4/24) who presents for the above procedure. Incomplete surgical drain removal this morning with increased patient discomfort. NPO since at least yesterday per nursing.     LDA:        Peripheral IV - Single Lumen 04/24/23 0928 18 G Left;Posterior Forearm (Active)   Site Assessment Clean;Dry;Intact 04/26/23 1105   Extremity Assessment Distal to IV No abnormal discoloration;No redness;No swelling;No warmth 04/26/23 1105   Line Status Flushed 04/26/23 1105   Dressing Status Clean;Dry;Intact 04/26/23 1105   Dressing Intervention Integrity maintained 04/26/23 1105   Dressing Change Due 04/28/23 04/26/23 1105   Site Change Due 04/28/23 04/26/23 0705   Reason Not Rotated Not due 04/26/23 1105   Number of days: 2            Peripheral IV - Single Lumen 04/26/23 1218 Anterior;Right Forearm (Active)   Number of days: 0       Arterial Line 04/24/23 1115 Left Radial (Active)   Site Assessment Clean;Dry;Intact 04/26/23 1105   Line Status Pulsatile blood flow 04/26/23 1105   Art Line Waveform Appropriate 04/26/23 1105   Arterial Line Interventions Zeroed and calibrated 04/26/23 1105   Color/Movement/Sensation Capillary refill less than 3 sec 04/26/23 1105   Dressing Type Biopatch in place 04/26/23 1105   Dressing Status Clean;Dry;Intact 04/26/23 1105   Dressing Intervention Integrity maintained 04/26/23 1105   Dressing Change Due 05/01/23 04/26/23 1105   Tubing Change Due 05/01/23 04/26/23 0705   Number of days: 2            NG/OG Tube 04/25/23 1015  Livia sump 18 Fr. Right nostril (Active)   Placement Check placement verified by x-ray 04/26/23 1105   Tolerance no signs/symptoms of discomfort 04/26/23 1105   Securement secured to commercial device 04/26/23 1105   Clamp Status/Tolerance clamped 04/26/23 1105   Suction Setting/Drainage Method suction at the bedside 04/26/23 1105   Insertion Site Appearance no redness, warmth, tenderness, skin breakdown, drainage 04/26/23 1105   Drainage None 04/26/23 1105   Flush/Irrigation flushed w/;water 04/26/23 1105   Feeding Type by pump 04/26/23 1105   Feeding Action feeding held 04/26/23 1105   Intake (mL) 60 mL 04/26/23 1205   Water Bolus (mL) 0 mL 04/26/23 0705   Intake (mL) - Formula Tube Feeding 0 04/26/23 1305   Number of days: 1       Male External Urinary Catheter 04/26/23 0600 (Active)   Collection Container Urimeter 04/26/23 1105   Securement Method secured to top of thigh w/ adhesive device 04/26/23 1105   Skin no redness;no breakdown 04/26/23 1105   Tolerance no signs/symptoms of discomfort 04/26/23 1105   Output (mL) 600 mL 04/26/23 1005   Catheter Change Date 04/26/23 04/26/23 1105   Catheter Change Time 0803 04/26/23 1105   Number of days: 0       Prev airway:     Method of Intubation:  Video laryngoscopy    Blade:  Carpio 3    Laryngeal View Grade: Grade I - full view of cords      Difficult Airway Encountered?: No      Complications:  None    Airway Device:  Oral endotracheal tube    Airway Device Size:  7.5    Drips:   sodium chloride 0.9% 100 mL/hr at 04/26/23 1305       Patient Active Problem List   Diagnosis    Complex partial seizures evolving to generalized tonic-clonic seizures    Migraine without status migrainosus, not intractable    Complex partial epilepsy with generalization and with intractable epilepsy    Hypertension    Facial rash    Neurological deficit, transient    Adjustment disorder with anxious mood    Mild neurocognitive disorder    Snoring    Acid reflux    Former smoker     Edema    Depression with anxiety    Hyponatremia    Arrhythmia    Epilepsy    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    Aphasia determined by examination    Cognitive communication deficit    Dementia in other diseases classified elsewhere, unspecified severity, without behavioral disturbance, psychotic disturbance, mood disturbance, and anxiety    Normocytic anemia    Abdominal pain, LLQ (left lower quadrant)- intermittent    Acute respiratory failure with hypoxia and hypercapnia    Increased anion gap metabolic acidosis       Review of patient's allergies indicates:   Allergen Reactions    Losartan Rash       Current Inpatient Medications:   cloBAZam  40 mg Oral QHS    dexAMETHasone  4 mg Intravenous Q8H    enoxaparin  40 mg Subcutaneous Daily    EScitalopram oxalate  10 mg Oral Daily    eslicarbazepine  800 mg Oral Daily    famotidine  20 mg Per NG tube BID    polyethylene glycol  17 g Oral Daily    senna-docusate 8.6-50 mg  1 tablet Per NG tube BID    zonisamide  500 mg Oral QHS       No current facility-administered medications on file prior to encounter.     Current Outpatient Medications on File Prior to Encounter   Medication Sig Dispense Refill    cloBAZam (ONFI) 20 mg Tab Take 2 tablets (40 mg total) by mouth every evening. 60 tablet 4    EScitalopram oxalate (LEXAPRO) 10 MG tablet Take 1 tablet (10 mg total) by mouth once daily. 30 tablet 11    eslicarbazepine (APTIOM) 400 mg Tab tablet Take 1 tablet (400 mg total) by mouth once daily. 30 tablet 5    eslicarbazepine (APTIOM) 800 mg Tab Take 800 mg by mouth once daily. 30 tablet 5    oxyCODONE (ROXICODONE) 5 MG immediate release tablet Take 1 tablet (5 mg total) by mouth every 4 (four) hours as needed for Pain. 21 tablet 0    rizatriptan (MAXALT-MLT) 10 MG disintegrating tablet Take 10 mg by mouth as needed. No more than 3 doses / week      zonisamide (ZONEGRAN) 100 MG Cap Take 5 capsules (500 mg total) by mouth every  evening. 450 capsule 3    mag hydrox/aluminum hyd/simeth (ANTACID ORAL) Take by mouth as needed. Acid reflux      midazolam 5 mg/spray (0.1 mL) Spry 0.1 mLs by Nasal route.      ondansetron (ZOFRAN-ODT) 8 MG TbDL Take 8 mg by mouth every 8 (eight) hours as needed. nausea         Past Surgical History:   Procedure Laterality Date    CRANIOTOMY USING FRAMELESS STEREOTAXY Left 2023    Procedure: CRANIOTOMY, USING FRAMELESS STEREOTAXY;  Surgeon: Ruchi Chen MD;  [facility];  Service: Neurosurgery;  Laterality: Left;  Tor III ASA III  Blood: Type & Screen  Neuro Mon: None  Bed: Reg  Headrest: [place]  Pos: Supine  Stealth MRI Optical   [place]  Asst Surg: Montoya    CYST REMOVAL      skin cyst - benign    PLACEMENT OF STEREO EEG LEADS(DEPTH ELECTRODES) Left 2023    Procedure: PLACEMENT OF STEREO EEG LEADS (DEPTH ELECTRODES);  Surgeon: Ruchi Chen MD;  Location: [facility] OR [address] [place] [address];  Service: Neurosurgery;  Laterality: Left;    REMOVAL OF STEREO EEG LEADS (DEPTH ELECTRODES) Bilateral 2021    Procedure: REMOVAL OF STEREO EEG LEADS (DEPTH ELECTRODES);  Surgeon: Ruchi Chen MD;  [facility];  Service: Neurosurgery;  Laterality: Bilateral;    REMOVAL OF STEREO EEG LEADS (DEPTH ELECTRODES) Left 3/8/2023    Procedure: REMOVAL OF STEREO EEG LEADS (DEPTH ELECTRODES);  Surgeon: Ruchi Chen MD;  Location: [facility] OR [address] [place] [address];  Service: Neurosurgery;  Laterality: Left;    TONSILLECTOMY      teenage        Social History     Socioeconomic History    Marital status:      Spouse name: Reilly    Years of education: 2 year college    Highest education level: Associate degree: occupational, technical, or vocational program   Tobacco Use    Smoking status: Former     Types: Cigarettes     Quit date:      Years since quittin.3    Smokeless tobacco: Never   Substance and Sexual Activity    Alcohol use: Yes     Comment: one drink once a week    Drug use:  Never    Sexual activity: Yes     Partners: Female     Social Determinants of Health     Financial Resource Strain: Medium Risk    Difficulty of Paying Living Expenses: Somewhat hard   Food Insecurity: No Food Insecurity    Worried About Running Out of Food in the Last Year: Never true    Ran Out of Food in the Last Year: Never true   Transportation Needs: No Transportation Needs    Lack of Transportation (Medical): No    Lack of Transportation (Non-Medical): No   Physical Activity: Insufficiently Active    Days of Exercise per Week: 2 days    Minutes of Exercise per Session: 30 min   Stress: No Stress Concern Present    Feeling of Stress : Not at all   Social Connections: Unknown    Frequency of Communication with Friends and Family: More than three times a week    Frequency of Social Gatherings with Friends and Family: More than three times a week    Active Member of Clubs or Organizations: No    Attends Club or Organization Meetings: Never    Marital Status:    Housing Stability: Low Risk     Unable to Pay for Housing in the Last Year: No    Number of Places Lived in the Last Year: 1    Unstable Housing in the Last Year: No       OBJECTIVE:     Vital Signs Range:  Vitals - 1 value per visit 4/26/2023 4/26/2023 4/26/2023   SYSTOLIC - 154 156   DIASTOLIC - 81 81   Pulse - 62 63   Temp - - -   Resp 18 12 13   SPO2 - 100 97   Weight (lb) - - -   Weight (kg) - - -   Height - - -   BMI (Calculated) - - -   VISIT REPORT - - -   Pain Score  - - -   Some recent data might be hidden         CBC:   Lab Results   Component Value Date    WBC 17.88 (H) 04/26/2023    HGB 11.7 (L) 04/26/2023    HCT 36.2 (L) 04/26/2023    MCV 96 04/26/2023     04/26/2023         CMP:   Sodium   Date Value Ref Range Status   04/26/2023 134 (L) 136 - 145 mmol/L Final     Potassium   Date Value Ref Range Status   04/26/2023 4.2 3.5 - 5.1 mmol/L Final     Chloride   Date Value Ref Range Status   04/26/2023 102 95 - 110  mmol/L Final     CO2   Date Value Ref Range Status   04/26/2023 23 23 - 29 mmol/L Final     Glucose   Date Value Ref Range Status   04/26/2023 122 (H) 70 - 110 mg/dL Final     BUN   Date Value Ref Range Status   04/26/2023 12 6 - 20 mg/dL Final     Creatinine   Date Value Ref Range Status   04/26/2023 0.6 0.5 - 1.4 mg/dL Final     Calcium   Date Value Ref Range Status   04/26/2023 8.7 8.7 - 10.5 mg/dL Final     Total Protein   Date Value Ref Range Status   04/26/2023 6.2 6.0 - 8.4 g/dL Final     Albumin   Date Value Ref Range Status   04/26/2023 3.1 (L) 3.5 - 5.2 g/dL Final     Total Bilirubin   Date Value Ref Range Status   04/26/2023 0.3 0.1 - 1.0 mg/dL Final     Comment:     For infants and newborns, interpretation of results should be based  on gestational age, weight and in agreement with clinical  observations.    Premature Infant recommended reference ranges:  Up to 24 hours.............<8.0 mg/dL  Up to 48 hours............<12.0 mg/dL  3-5 days..................<15.0 mg/dL  6-29 days.................<15.0 mg/dL       Alkaline Phosphatase   Date Value Ref Range Status   04/26/2023 89 55 - 135 U/L Final     AST   Date Value Ref Range Status   04/26/2023 13 10 - 40 U/L Final     ALT   Date Value Ref Range Status   04/26/2023 9 (L) 10 - 44 U/L Final     Anion Gap   Date Value Ref Range Status   04/26/2023 7 (L) 8 - 16 mmol/L Final     eGFR if    Date Value Ref Range Status   05/23/2022 >60.0 >60 mL/min/1.73 m^2 Final     eGFR if non    Date Value Ref Range Status   05/23/2022 >60.0 >60 mL/min/1.73 m^2 Final     Comment:     Calculation used to obtain the estimated glomerular filtration  rate (eGFR) is the CKD-EPI equation.          INR:  Lab Results   Component Value Date    INR 1.0 04/24/2023    INR 1.0 03/08/2023    INR 1.0 02/27/2023       EKG:   Results for orders placed or performed during the hospital encounter of 04/24/23   EKG 12-lead    Collection Time: 04/25/23  7:17 AM     Narrative    Test Reason : R00.1,    Vent. Rate : 058 BPM     Atrial Rate : 058 BPM     P-R Int : 196 ms          QRS Dur : 088 ms      QT Int : 464 ms       P-R-T Axes : 029 -01 -10 degrees     QTc Int : 455 ms    Sinus bradycardia with marked sinus arrythmia  Voltage criteria for left ventricular hypertrophy  Nonspecific T wave abnormality  Abnormal ECG  When compared with ECG of 24-APR-2023 17:49,  QT has shortened  Confirmed by Piotr KEITH MD ([id number]) on 4/25/2023 4:21:48 PM    Referred By: YOBANI CELESTE           Confirmed By:Piotr KEITH MD        2D ECHO:   Results for orders placed during the hospital encounter of 04/24/23    Echo    Interpretation Summary  · The left ventricle is normal in size with normal systolic function. The estimated ejection fraction is 65%.  · Normal right ventricular size with normal right ventricular systolic function.  · Normal left ventricular diastolic function.  · Normal central venous pressure (3 mmHg).         ASSESSMENT/PLAN:           Pre-op Assessment    I have reviewed the Patient Summary Reports.     I have reviewed the Nursing Notes. I have reviewed the NPO Status.   I have reviewed the Medications.     Review of Systems  Anesthesia Hx:  No problems with previous Anesthesia    Cardiovascular:   Hypertension    Hepatic/GI:   GERD    Neurological:   Headaches Seizures    Psych:   Psychiatric History          Physical Exam  General: Well nourished, Cooperative, Confusion and Alert    Airway:  Mallampati: II / II  Mouth Opening: Normal  TM Distance: Normal  Tongue: Normal  Neck ROM: Normal ROM    Dental:  Intact        Anesthesia Plan  Type of Anesthesia, risks & benefits discussed:    Anesthesia Type: Gen Supraglottic Airway, Gen Natural Airway, MAC  Intra-op Monitoring Plan: Standard ASA Monitors and Art Line  Post Op Pain Control Plan: multimodal analgesia  Induction:  IV  Airway Plan: , Post-Induction  Informed Consent: Informed consent signed with the Patient representative  and all parties understand the risks and agree with anesthesia plan.  All questions answered.   ASA Score: 3  Day of Surgery Review of History & Physical: H&P Update referred to the surgeon/provider.    Ready For Surgery From Anesthesia Perspective.     .

## 2023-04-27 LAB
ALBUMIN SERPL BCP-MCNC: 2.9 G/DL (ref 3.5–5.2)
ALP SERPL-CCNC: 89 U/L (ref 55–135)
ALT SERPL W/O P-5'-P-CCNC: 8 U/L (ref 10–44)
ANION GAP SERPL CALC-SCNC: 7 MMOL/L (ref 8–16)
AST SERPL-CCNC: 11 U/L (ref 10–40)
BASOPHILS # BLD AUTO: 0.01 K/UL (ref 0–0.2)
BASOPHILS NFR BLD: 0.1 % (ref 0–1.9)
BILIRUB SERPL-MCNC: 0.2 MG/DL (ref 0.1–1)
BUN SERPL-MCNC: 13 MG/DL (ref 6–20)
CALCIUM SERPL-MCNC: 8.1 MG/DL (ref 8.7–10.5)
CHLORIDE SERPL-SCNC: 105 MMOL/L (ref 95–110)
CO2 SERPL-SCNC: 22 MMOL/L (ref 23–29)
CREAT SERPL-MCNC: 0.7 MG/DL (ref 0.5–1.4)
DIFFERENTIAL METHOD: ABNORMAL
EOSINOPHIL # BLD AUTO: 0 K/UL (ref 0–0.5)
EOSINOPHIL NFR BLD: 0 % (ref 0–8)
ERYTHROCYTE [DISTWIDTH] IN BLOOD BY AUTOMATED COUNT: 13.4 % (ref 11.5–14.5)
EST. GFR  (NO RACE VARIABLE): >60 ML/MIN/1.73 M^2
GLUCOSE SERPL-MCNC: 123 MG/DL (ref 70–110)
HCT VFR BLD AUTO: 35.6 % (ref 40–54)
HGB BLD-MCNC: 11.5 G/DL (ref 14–18)
IMM GRANULOCYTES # BLD AUTO: 0.06 K/UL (ref 0–0.04)
IMM GRANULOCYTES NFR BLD AUTO: 0.5 % (ref 0–0.5)
LYMPHOCYTES # BLD AUTO: 0.7 K/UL (ref 1–4.8)
LYMPHOCYTES NFR BLD: 5.3 % (ref 18–48)
MAGNESIUM SERPL-MCNC: 1.7 MG/DL (ref 1.6–2.6)
MCH RBC QN AUTO: 30.9 PG (ref 27–31)
MCHC RBC AUTO-ENTMCNC: 32.3 G/DL (ref 32–36)
MCV RBC AUTO: 96 FL (ref 82–98)
MONOCYTES # BLD AUTO: 0.8 K/UL (ref 0.3–1)
MONOCYTES NFR BLD: 5.8 % (ref 4–15)
NEUTROPHILS # BLD AUTO: 11.4 K/UL (ref 1.8–7.7)
NEUTROPHILS NFR BLD: 88.3 % (ref 38–73)
NRBC BLD-RTO: 0 /100 WBC
PHOSPHATE SERPL-MCNC: 2.5 MG/DL (ref 2.7–4.5)
PLATELET # BLD AUTO: 306 K/UL (ref 150–450)
PMV BLD AUTO: 9.4 FL (ref 9.2–12.9)
POCT GLUCOSE: 85 MG/DL (ref 70–110)
POCT GLUCOSE: 87 MG/DL (ref 70–110)
POTASSIUM SERPL-SCNC: 4.1 MMOL/L (ref 3.5–5.1)
PROT SERPL-MCNC: 5.8 G/DL (ref 6–8.4)
RBC # BLD AUTO: 3.72 M/UL (ref 4.6–6.2)
SODIUM SERPL-SCNC: 134 MMOL/L (ref 136–145)
WBC # BLD AUTO: 12.93 K/UL (ref 3.9–12.7)

## 2023-04-27 PROCEDURE — 99233 PR SUBSEQUENT HOSPITAL CARE,LEVL III: ICD-10-PCS | Mod: ,,, | Performed by: PSYCHIATRY & NEUROLOGY

## 2023-04-27 PROCEDURE — 97535 SELF CARE MNGMENT TRAINING: CPT

## 2023-04-27 PROCEDURE — 63600175 PHARM REV CODE 636 W HCPCS: Performed by: STUDENT IN AN ORGANIZED HEALTH CARE EDUCATION/TRAINING PROGRAM

## 2023-04-27 PROCEDURE — 25000003 PHARM REV CODE 250: Performed by: PHYSICIAN ASSISTANT

## 2023-04-27 PROCEDURE — 85025 COMPLETE CBC W/AUTO DIFF WBC: CPT

## 2023-04-27 PROCEDURE — 92507 TX SP LANG VOICE COMM INDIV: CPT

## 2023-04-27 PROCEDURE — 63600175 PHARM REV CODE 636 W HCPCS: Performed by: NURSE PRACTITIONER

## 2023-04-27 PROCEDURE — 99900035 HC TECH TIME PER 15 MIN (STAT)

## 2023-04-27 PROCEDURE — 80053 COMPREHEN METABOLIC PANEL: CPT

## 2023-04-27 PROCEDURE — 84100 ASSAY OF PHOSPHORUS: CPT

## 2023-04-27 PROCEDURE — 20000000 HC ICU ROOM

## 2023-04-27 PROCEDURE — 94761 N-INVAS EAR/PLS OXIMETRY MLT: CPT

## 2023-04-27 PROCEDURE — 25000003 PHARM REV CODE 250

## 2023-04-27 PROCEDURE — 92610 EVALUATE SWALLOWING FUNCTION: CPT

## 2023-04-27 PROCEDURE — 97116 GAIT TRAINING THERAPY: CPT

## 2023-04-27 PROCEDURE — 97530 THERAPEUTIC ACTIVITIES: CPT

## 2023-04-27 PROCEDURE — 25000003 PHARM REV CODE 250: Performed by: STUDENT IN AN ORGANIZED HEALTH CARE EDUCATION/TRAINING PROGRAM

## 2023-04-27 PROCEDURE — 99233 SBSQ HOSP IP/OBS HIGH 50: CPT | Mod: ,,, | Performed by: PSYCHIATRY & NEUROLOGY

## 2023-04-27 PROCEDURE — 25000003 PHARM REV CODE 250: Performed by: NURSE PRACTITIONER

## 2023-04-27 PROCEDURE — 83735 ASSAY OF MAGNESIUM: CPT

## 2023-04-27 RX ORDER — LABETALOL HCL 20 MG/4 ML
10 SYRINGE (ML) INTRAVENOUS EVERY 6 HOURS PRN
Status: DISCONTINUED | OUTPATIENT
Start: 2023-04-27 | End: 2023-04-28

## 2023-04-27 RX ORDER — HYDRALAZINE HYDROCHLORIDE 20 MG/ML
10 INJECTION INTRAMUSCULAR; INTRAVENOUS EVERY 6 HOURS PRN
Status: DISCONTINUED | OUTPATIENT
Start: 2023-04-27 | End: 2023-05-04 | Stop reason: HOSPADM

## 2023-04-27 RX ORDER — SODIUM,POTASSIUM PHOSPHATES 280-250MG
2 POWDER IN PACKET (EA) ORAL EVERY 4 HOURS
Status: COMPLETED | OUTPATIENT
Start: 2023-04-27 | End: 2023-04-27

## 2023-04-27 RX ADMIN — HYDRALAZINE HYDROCHLORIDE 10 MG: 20 INJECTION, SOLUTION INTRAMUSCULAR; INTRAVENOUS at 05:04

## 2023-04-27 RX ADMIN — ENOXAPARIN SODIUM 40 MG: 40 INJECTION SUBCUTANEOUS at 03:04

## 2023-04-27 RX ADMIN — HYDROCODONE BITARTRATE AND ACETAMINOPHEN 1 TABLET: 7.5; 325 TABLET ORAL at 06:04

## 2023-04-27 RX ADMIN — POTASSIUM & SODIUM PHOSPHATES POWDER PACK 280-160-250 MG 2 PACKET: 280-160-250 PACK at 08:04

## 2023-04-27 RX ADMIN — ZONISAMIDE 500 MG: 100 CAPSULE ORAL at 09:04

## 2023-04-27 RX ADMIN — ESLICARBAZEPINE ACETATE 800 MG: 400 TABLET ORAL at 08:04

## 2023-04-27 RX ADMIN — DEXAMETHASONE SODIUM PHOSPHATE 4 MG: 4 INJECTION INTRA-ARTICULAR; INTRALESIONAL; INTRAMUSCULAR; INTRAVENOUS; SOFT TISSUE at 05:04

## 2023-04-27 RX ADMIN — HYDROCODONE BITARTRATE AND ACETAMINOPHEN 1 TABLET: 7.5; 325 TABLET ORAL at 03:04

## 2023-04-27 RX ADMIN — SENNOSIDES AND DOCUSATE SODIUM 1 TABLET: 50; 8.6 TABLET ORAL at 09:04

## 2023-04-27 RX ADMIN — POLYETHYLENE GLYCOL 3350 17 G: 17 POWDER, FOR SOLUTION ORAL at 08:04

## 2023-04-27 RX ADMIN — SENNOSIDES AND DOCUSATE SODIUM 1 TABLET: 50; 8.6 TABLET ORAL at 08:04

## 2023-04-27 RX ADMIN — FAMOTIDINE 20 MG: 20 TABLET ORAL at 08:04

## 2023-04-27 RX ADMIN — ESCITALOPRAM OXALATE 10 MG: 10 TABLET ORAL at 08:04

## 2023-04-27 RX ADMIN — POTASSIUM & SODIUM PHOSPHATES POWDER PACK 280-160-250 MG 2 PACKET: 280-160-250 PACK at 01:04

## 2023-04-27 RX ADMIN — FAMOTIDINE 20 MG: 20 TABLET ORAL at 09:04

## 2023-04-27 RX ADMIN — CLOBAZAM 40 MG: 10 TABLET ORAL at 09:04

## 2023-04-27 RX ADMIN — DEXAMETHASONE SODIUM PHOSPHATE 4 MG: 4 INJECTION INTRA-ARTICULAR; INTRALESIONAL; INTRAMUSCULAR; INTRAVENOUS; SOFT TISSUE at 01:04

## 2023-04-27 RX ADMIN — DEXAMETHASONE SODIUM PHOSPHATE 4 MG: 4 INJECTION INTRA-ARTICULAR; INTRALESIONAL; INTRAMUSCULAR; INTRAVENOUS; SOFT TISSUE at 09:04

## 2023-04-27 NOTE — ASSESSMENT & PLAN NOTE
Pt w adult-onset non-lesional focal-onset intractable epilepsy (onset at 47 yo in 2013) s/p left temporal lobectomy today (4/24)  Home medications: eslicarbazepine 800 mg qd, zonisamide 500mg qd, and clobazam 40mg qhs  - MRI non-lesional  - EMU monitoring last year suggests left-sided activity  - ICU admissions for sEEG/depth electrodes localized the seizures to L amygdala and hippocampus  - refractory to prior laser ablation of left amygdala and hippocampus    Plan:  - Admit to NCC  - Q1h neurochecks while in ICU  - Q1h vitals while in ICU  - HOB@30  - continue home AED's   - eslicarbazepine 800 mg qd   - zonisamide 500mg qd   - clobazam 40mg qhs  - dexamethasone 4mg q8h  - protonix 40mg daily for ulcer ppx  - MRI (4/26): craniotomy w partial resection of anterior L temporal lobe w small extra-axial collection underlying the craniotomy site, no new mass/hemorrhage/edema/infarct/hydrocephalus  - SBP <140mmHg, off cardene gtt  - TTE wnl  - Maintaining sats on RA  - Daily CMP, Mag, Phos - replete electrolytes PRN  - Lipid panel, A1c reviewed   - Coags reviewed  - SLP cleared pt for dysphagia diet  - Strict I/Os  - Daily CBC, transfuse PRN  - lovenox 40mg for ppx    - f/u PT/OT/SLP  - CM/SW consult for dispo planning    Continue to follow clinically and notify NSGY immediately if any neurostatus change

## 2023-04-27 NOTE — PLAN OF CARE
MESSI met with Pt wife to obtain rehab preference as Pt will be stepping down. Per Pt wife, they want [facility] as preference. Notified Maxime with [facility] and Rachel with [facility].     Latisha Javier LCSW  Neurocritical Care   [facility]  [id number]

## 2023-04-27 NOTE — PLAN OF CARE
Problem: Occupational Therapy  Goal: Occupational Therapy Goal  Description: Goals to be met by: 5/10/2023     Patient will increase functional independence with ADLs by performing:    UE Dressing with Minimal Assistance.  LE Dressing with Minimal Assistance.  Grooming while standing at sink with Minimal Assistance.  Toileting from bedside commode with Minimal Assistance for hygiene and clothing management.   Step transfer with Minimal Assistance  Toilet transfer to bedside commode with Minimal Assistance.    Outcome: Ongoing, Progressing     Goals remain appropriate. Cont POC.

## 2023-04-27 NOTE — PROGRESS NOTES
[facility]  Neurosurgery  Progress Note    Subjective:     History of Present Illness:  Pt with seizures refractory to laser ablation who presented for LACEY     Post-Op Info:  Procedure(s) (LRB):  REMOVAL, DRAIN (Left)   1 Day Post-Op     Interval History:   doing well post op drain removal from yesterday.     Medications:  Continuous Infusions:   sodium chloride 0.9% 100 mL/hr at 04/27/23 0705     Scheduled Meds:   cloBAZam  40 mg Oral QHS    dexAMETHasone  4 mg Intravenous Q8H    enoxaparin  40 mg Subcutaneous Daily    EScitalopram oxalate  10 mg Oral Daily    eslicarbazepine  800 mg Oral Daily    famotidine  20 mg Per NG tube BID    polyethylene glycol  17 g Oral Daily    senna-docusate 8.6-50 mg  1 tablet Per NG tube BID    zonisamide  500 mg Oral QHS     PRN Meds:acetaminophen, dextrose 10%, dextrose 10%, glucagon (human recombinant), HYDROcodone-acetaminophen, insulin aspart U-100, ondansetron     Review of Systems  Objective:     Weight: 89.8 kg (198 lb)  Body mass index is 27.62 kg/m².  Vital Signs (Most Recent):  Temp: 98.4 °F (36.9 °C) (04/27/23 0705)  Pulse: (!) 59 (04/27/23 0705)  Resp: 13 (04/27/23 0705)  BP: (!) 148/78 (04/27/23 0705)  SpO2: (!) 94 % (04/27/23 0705)   Vital Signs (24h Range):  Temp:  [96.1 °F (35.6 °C)-98.6 °F (37 °C)] 98.4 °F (36.9 °C)  Pulse:  [57-90] 59  Resp:  [6-20] 13  SpO2:  [89 %-100 %] 94 %  BP: (130-157)/() 148/78  Arterial Line BP: (106-128)/(75-90) 122/85     Date 04/27/23 0700 - 04/28/23 0659   Shift 2107-8216 2359-1367 6525-6303 24 Hour Total   INTAKE   I.V.(mL/kg) 106.7(1.2)   106.7(1.2)   NG/GT 0   0   Shift Total(mL/kg) 106.7(1.2)   106.7(1.2)   OUTPUT   Shift Total(mL/kg)       Weight (kg) 89.8 89.8 89.8 89.8                          Urethral Catheter 04/24/23 1100 Silicone 16 Fr. (Active)   Site Assessment Clean;Intact 04/25/23 0303   Collection Container Urimeter 04/25/23 0303   Securement Method secured to top of thigh w/ adhesive device  "04/25/23 0303   Catheter Care Performed yes 04/25/23 0303   Reason for Continuing Urinary Catheterization Post operative 04/25/23 0303   CAUTI Prevention Bundle Securement Device in place with 1" slack;Intact seal between catheter & drainage tubing;Drainage bag/urimeter off the floor;Sheeting clip in use;No dependent loops or kinks;Drainage bag/urimeter not overfilled (<2/3 full);Drainage bag/urimeter below bladder 04/24/23 1909   Output (mL) 100 mL 04/25/23 0625            Drain/Device  04/24/23 1524 Left lateral temporal region gravity (Active)   Insertion Site no hematoma 04/25/23 0303   Drainage Characteristics/Odor Bleeding controlled 04/24/23 1909   Drainage Amount None 04/24/23 1909   General Intake (mL) 0 04/24/23 1909   General Output (mL) 140 04/25/23 0625       Physical Exam  Neurosurgery Physical Exam    [id number] perrl eomi visual fields full to confrontation   Incision CDI      Significant Labs:  Recent Labs   Lab 04/26/23  0010 04/26/23  0509 04/26/23  1412 04/27/23  0119   *  --   --  123*   * 134* 134* 134*   K 4.2  --   --  4.1     --   --  105   CO2 23  --   --  22*   BUN 12  --   --  13   CREATININE 0.6  --   --  0.7   CALCIUM 8.7  --   --  8.1*   MG 1.9  --   --  1.7       Recent Labs   Lab 04/26/23  0010 04/27/23  0119   WBC 17.88* 12.93*   HGB 11.7* 11.5*   HCT 36.2* 35.6*    306       No results for input(s): LABPT, INR, APTT in the last 48 hours.    Microbiology Results (last 7 days)       ** No results found for the last 168 hours. **          All pertinent labs from the last 24 hours have been reviewed.    Significant Diagnostics:  I have reviewed all pertinent imaging results/findings within the past 24 hours.    Assessment/Plan:     * Complex partial epilepsy with generalization and with intractable epilepsy  59 y/o M s/p left temporal lobectomy on 4/24  for seizures refractory to prior laser ablation    -Lovenox for DVT prophylaxis   -MRI brain with good resection "   - continue 3 week dex taper  -q1 neuro checks for now  but potential transfer to floor now that on room air.   -continue home AEDs  - continue SLP for speech eval  and swallow eval.           Henok Melissa MD  Neurosurgery  [facility]

## 2023-04-27 NOTE — ASSESSMENT & PLAN NOTE
57 y/o M s/p left temporal lobectomy on 4/24  for seizures refractory to prior laser ablation    -Lovenox for DVT prophylaxis   -MRI brain with good resection   - continue 3 week dex taper  -q1 neuro checks for now  but potential transfer to floor now that on room air.   -continue home AEDs  - continue SLP for speech eval  and swallow eval.

## 2023-04-27 NOTE — ASSESSMENT & PLAN NOTE
Na at 131 on 3/25    - urine studies suggest hypovolemic hyponatremia   - serum Na improved with IVF further confirming diagnosis  - continue IVF at 100 cc/hr  - monitor Na BID

## 2023-04-27 NOTE — PLAN OF CARE
Problem: Physical Therapy  Goal: Physical Therapy Goal  Description: PT goals until 5/4/23    1. Pt supine to sit with minimal assist- met 4/27/23  Revised goal: supine to sit with supervision-not met  2. Pt sit to supine with minimal assist-not met  3. Pt sit to stand with LRAD as needed for safety with minimal assist-met 4/27/23  Revised goal: sit to stand with Rw with supervision-not met  4. Pt to perform gait 15ft with LRAD as needed for safety with moderate assist.- met 4/27/23  Revised goal: gait 150ft with RW with supervision-not met  5. Pt to go up/down curb step with LRAD as needed for safety with moderate assist.-not met  6. Pt to transfer bed to/from bedside chair with LRAD as needed for safety with minimal assist.-not met  7. Pt to perform B LE exs in sitting or supine x 10 reps to strengthen B LE to improve functional mobility.-not met   Outcome: Ongoing, Progressing   Pt's goals revised as appropriate and pt will continue to benefit from skilled PT services to work towards improved functional mobility including: bed mobility, transfers,up/down step, and gait.   4/27/2023

## 2023-04-27 NOTE — PT/OT/SLP EVAL
Speech Language Pathology Evaluation  Bedside Swallow    Patient Name:  Declan Burleson   MRN:  [id number]  Admitting Diagnosis: Complex partial epilepsy with generalization and with intractable epilepsy    Recommendations:                 General Recommendations:  Dysphagia therapy, Speech/language therapy, and Cognitive-linguistic therapy  Diet recommendations:  Soft & Bite Sized Diet - IDDSI Level 6, Mildly thick/Nectar thick liquids - IDDSI Level 2   Aspiration Precautions: 1 bite/sip at a time, Alternating bites/sips, Assistance with meals and Assistance with thickening liquids, Avoid talking while eating, Eliminate distractions, Feed only when awake/alert, HOB to 90 degrees, Meds crushed in puree, No straws, Small bites/sips, and Strict aspiration precautions   General Precautions: Standard, aspiration, fall, seizure  Communication strategies:  provide increased time to answer and go to room if call light pushed    History:     Past Medical History:   Diagnosis Date    Anxiety     Dementia in other diseases classified elsewhere, unspecified severity, without behavioral disturbance, psychotic disturbance, mood disturbance, and anxiety 2/2/2023    Depression     GERD (gastroesophageal reflux disease)     Hyperlipidemia     Hypertension     Long term (current) use of antithrombotics/antiplatelets 12/22/2020    Migraine     Normocytic anemia 2/23/2023    Seizures        Past Surgical History:   Procedure Laterality Date    CRANIOTOMY USING FRAMELESS STEREOTAXY Left 4/24/2023    Procedure: CRANIOTOMY, USING FRAMELESS STEREOTAXY;  Surgeon: Ruchi Chen MD;  Location: [facility] OR [address] [place] [address];  Service: Neurosurgery;  Laterality: Left;  Tor III ASA III  Blood: Type & Screen  Neuro Mon: None  Bed: [facility]  Headrest: [place]  Pos: Supine  [facility]   [place]  Asst Surg: [place]    CYST REMOVAL  March 2016/2017    skin cyst - benign    PLACEMENT OF STEREO EEG LEADS(DEPTH ELECTRODES) Left 2/27/2023    Procedure: PLACEMENT  "OF STEREO EEG LEADS (DEPTH ELECTRODES);  Surgeon: Ruchi Chen MD;  Location: [facility] OR 2ND FLR;  Service: Neurosurgery;  Laterality: Left;    REMOVAL OF DRAIN Left 4/26/2023    Procedure: REMOVAL, DRAIN;  Surgeon: Ruchi Chen MD;  Location: [facility] OR 2ND FLR;  Service: Neurosurgery;  Laterality: Left;    REMOVAL OF STEREO EEG LEADS (DEPTH ELECTRODES) Bilateral 1/21/2021    Procedure: REMOVAL OF STEREO EEG LEADS (DEPTH ELECTRODES);  Surgeon: Ruchi Chen MD;  Location: [facility] OR 2ND FLR;  Service: Neurosurgery;  Laterality: Bilateral;    REMOVAL OF STEREO EEG LEADS (DEPTH ELECTRODES) Left 3/8/2023    Procedure: REMOVAL OF STEREO EEG LEADS (DEPTH ELECTRODES);  Surgeon: Ruchi Chen MD;  Location: [facility] OR [facility] FLR;  Service: Neurosurgery;  Laterality: Left;    TONSILLECTOMY      teenage        Social History: Patient lives with his spouse.  Per spouse and noted in Epic, pt being followed by OP SLP services targeting aphasia and cognitive linguistic impairment.  Spouse reports fair comprehension in function though decreased fluency.  She denied difficulty with swallowing prior to this admission though states difficulty yesterday with meds resulting in nurse crushing medications in pudding.     Prior Intubation HX:  surgery 4/24, 4/26     Modified Barium Swallow: none reported    Chest X-Rays: 4/26: "No significant detrimental interval change in the appearance of the chest since 04/25/2023 is appreciated."    Subjective     "He can not drink anything with ice.  It is too cold for him."      Pain/Comfort:  Pain Rating 1: 0/10  Pain Rating Post-Intervention 1: 0/10    Respiratory Status: Room air    Objective:     Oral Musculature Evaluation  Oral Musculature: general weakness (mild)  Dentition: present and adequate  Secretion Management: adequate  Mucosal Quality: dry, sticky  Mandibular Strength and Mobility: WFL  Oral Labial Strength and Mobility: WFL  Lingual Strength and Mobility: impaired strength (mild)  Volitional " Cough: WFL  Volitional Swallow: WFL  Voice Prior to PO Intake: clear    Bedside Swallow Eval:   Consistencies Assessed:  Thin liquids    Nectar thick liquids    Puree    Solids        Oral Phase:   Prolonged mastication  Slow oral transit time    Pharyngeal Phase:   Coughing with thin via cup   delayed swallow initation  multiple spontaneous swallows  throat clearing with cyclical sips of nectar thick liquids with straws     Compensatory Strategies  None    Treatment: Pt seen bedside with wife present. Improved alertness and verbalization attempts noted.  Pt able to formulate some simple sentences given increased time.  Circumlocution, paraphasias, perseverations, and occasional neologism noted with verbalization attempts.  Simple y/n questions answered with 80% accy.  2 step commands followed given mod A.  Confrontation naming completed with mod cues with 60% accy.  Education provided re: results of evaluation, diet recs, swallow precs, s/s aspiration, progress made, and SLP POC.  Pt attentive.  Spouse verbalized understanding.          Assessment:     Declan Burleson is a 58 y.o. male with an SLP diagnosis of Aphasia, Dysphagia, and Dysarthria.     Goals:   Multidisciplinary Problems       SLP Goals          Problem: SLP    Goal Priority Disciplines Outcome   SLP Goal     SLP Ongoing, Progressing   Description: Speech Language Pathology Goals  Goals expected to be met by 5/2  1. Pt will complete ongoing assessment of swallow to advance diet as appropriate.    2. Pt will follow 1 step commands with 80% accy given min A.   3. Pt will answer simple y/n questions with 80% accy given min A.   4. Pt will complete simple auto speech tasks with 70% accy given mod A.   5. Pt will complete simple naming tasks with 50% accy given mod A.                                Plan:     Patient to be seen:  4 x/week   Plan of Care expires:  05/25/23  Plan of Care reviewed with:  patient, spouse   SLP Follow-Up:  Yes        Discharge recommendations:  rehabilitation facility   Barriers to Discharge:  Level of Skilled Assistance Needed      Time Tracking:     SLP Treatment Date:   04/27/23  Speech Start Time:  0738  Speech Stop Time:  0809     Speech Total Time (min):  31 min    Billable Minutes: Speech Therapy Individual 10, Eval Swallow and Oral Function 13, and Self Care/Home Management Training 8    04/27/2023

## 2023-04-27 NOTE — PT/OT/SLP PROGRESS
Occupational Therapy   Treatment    Name: Declan Burleson  MRN: [id number]  Admitting Diagnosis:  Complex partial epilepsy with generalization and with intractable epilepsy  1 Day Post-Op    Recommendations:     Discharge Recommendations: rehabilitation facility  Discharge Equipment Recommendations:  bedside commode  Barriers to discharge:   (increased A required)    Assessment:     Declan Burleson is a 58 y.o. male with a medical diagnosis of Complex partial epilepsy with generalization and with intractable epilepsy.  He presents with performance deficits affecting function are weakness, impaired endurance, impaired self care skills, impaired functional mobility, gait instability, impaired balance, decreased coordination, decreased safety awareness. Pt demo improved mobility and performance of self care tasks today. Pt participates well and is motivated to regain functional independence in mobility and self care.      Rehab Prognosis:  Good; patient would benefit from acute skilled OT services to address these deficits and reach maximum level of function.       Plan:     Patient to be seen 4 x/week to address the above listed problems via self-care/home management, therapeutic activities, therapeutic exercises, neuromuscular re-education  Plan of Care Expires: 05/26/23  Plan of Care Reviewed with: patient, spouse    Subjective     Chief Complaint: Blood pressure cuff  Patient/Family Comments/goals: Get well  Pain/Comfort:  Pain Rating 1: 0/10  Pain Rating Post-Intervention 1: 0/10    Objective:     Communicated with: RN prior to session.  Patient found HOB elevated with bed alarm, blood pressure cuff, peripheral IV, pulse ox (continuous), telemetry, Condom Catheter upon OT entry to room.    General Precautions: Standard, aspiration, fall, seizure    Orthopedic Precautions:N/A  Braces: N/A  Respiratory Status: Room air     Occupational Performance:     Bed Mobility:    Patient completed Supine to Sit with  minimum assistance     Functional Mobility/Transfers:  Patient completed Sit <> Stand Transfer with minimum assistance  with  rolling walker   Patient completed Bed <> Chair Transfer using Step Transfer technique with Mod A on first trial and Min A on second trial with rolling walker  Functional Mobility: Min/Mod A with RW; See PT note    Activities of Daily Living:  Grooming: SBA washcloth to face with assist to manage NGT  Upper Body Dressing: moderate assistance gown as robe  Lower Body Dressing: maximal assistance pt initially able to don 1 sock, but was limited by dizziness in forward position, and unable to perform in other modified positions, ultimately requiring more assistance to don socks and slip on shoes.      [facility] 6 Click ADL: 15    Treatment & Education:  Pt edu re OT role, POC and safety.  Pt performed the above tasks and mobility.    Patient left up in chair with all lines intact, call button in reach, RN notified, and wife present    GOALS:   Multidisciplinary Problems       Occupational Therapy Goals          Problem: Occupational Therapy    Goal Priority Disciplines Outcome Interventions   Occupational Therapy Goal     OT, PT/OT Ongoing, Progressing    Description: Goals to be met by: 5/10/2023     Patient will increase functional independence with ADLs by performing:    UE Dressing with Minimal Assistance.  LE Dressing with Minimal Assistance.  Grooming while standing at sink with Minimal Assistance.  Toileting from bedside commode with Minimal Assistance for hygiene and clothing management.   Step transfer with Minimal Assistance  Toilet transfer to bedside commode with Minimal Assistance.                         Time Tracking:     OT Date of Treatment: 04/27/23  OT Start Time: 1339  OT Stop Time: 1405  OT Total Time (min): 26 min    Billable Minutes:Self Care/Home Management 16 minutes  Therapeutic Activity 10 minutes    OT/BENITEZ: OT          JORGE Mcnair  4/27/2023  Pager: [phone]

## 2023-04-27 NOTE — PLAN OF CARE
[facility] Care Plan    POC reviewed with Declan Burleson and family at 0300. Pt verbalized understanding. Questions and concerns addressed. No acute events overnight. Pt progressing toward goals. Will continue to monitor. See below and flowsheets for full assessment and VS info.           Is this a stroke patient? no    Neuro:  [place] Coma Scale  Best Eye Response: 3-->(E3) to speech  Best Motor Response: 6-->(M6) obeys commands  Best Verbal Response: 4-->(V4) confused  [place] Coma Scale Score: 13  Assessment Qualifiers: patient not sedated/intubated  Pupil PERRLA: yes     24hr Temp:  [96.1 °F (35.6 °C)-98.6 °F (37 °C)]     CV:   Rhythm: normal sinus rhythm  BP goals:   SBP < 160  MAP > 65    Resp:           Plan: N/A    GI/:     Diet/Nutrition Received: NPO  Last Bowel Movement: 04/25/23  Voiding Characteristics: external catheter    Intake/Output Summary (Last 24 hours) at 4/27/2023 0453  Last data filed at 4/27/2023 0451  Gross per 24 hour   Intake 2590.98 ml   Output 3370 ml   Net -779.02 ml          Labs/Accuchecks:  Recent Labs   Lab 04/27/23  0119   WBC 12.93*   RBC 3.72*   HGB 11.5*   HCT 35.6*         Recent Labs   Lab 04/27/23  0119   *   K 4.1   CO2 22*      BUN 13   CREATININE 0.7   ALKPHOS 89   ALT 8*   AST 11   BILITOT 0.2      Recent Labs   Lab 04/24/23 0928   INR 1.0   APTT 26.2    No results for input(s): CPK, CPKMB, TROPONINI, MB in the last 168 hours.    Electrolytes: N/A - electrolytes WDL  Accuchecks: Q6H    Gtts:   sodium chloride 0.9% 100 mL/hr at 04/27/23 0451       LDA/Wounds:  Lines/Drains/Airways       Drain  Duration                  NG/OG Tube 04/25/23 1015 [place] sump 18 Fr. Right nostril 1 day    Male External Urinary Catheter 04/26/23 0600 <1 day              Arterial Line  Duration             Arterial Line 04/24/23 1115 Left Radial 2 days              Peripheral Intravenous Line  Duration                  Peripheral IV - Single Lumen 04/24/23 0928 18 G  Left;Posterior Forearm 2 days         Peripheral IV - Single Lumen 04/26/23 1218 Anterior;Right Forearm <1 day                  Wounds: Yes  Wound care consulted: Yes

## 2023-04-27 NOTE — ASSESSMENT & PLAN NOTE
Patient with Hypercapnic and Hypoxic Respiratory failure which is Acute.  he is not on home oxygen. Signs/symptoms of respiratory failure include- tachypnea. Contributing diagnoses includes - Aspiration Labs and images were reviewed. Patient Has recent ABG, which has been reviewed.    Patient vomited undigested medications and pudding on 4/25 and shortly after had an acute episode of hypoxia to 80's and was placed on NRB to maintain sats in mid 90's. He also became bradycardic to 50. He likely had an aspiration event.   - CXR without notable consolidation but does demonstrate atelectasis of right lung base  - ABG with mild respiratory acidosis     Plan:  - supplemental O2 to maintain sat > 92%, wean as tolerated  - aspiration precautions  - SLP cleared pt for dysphagia diet  - hold off on abx for now unless clinical picture suggests developing infection    Resolved
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
## 2023-04-27 NOTE — PLAN OF CARE
[facility] Neuro Critical Care  Discharge Reassessment    Primary Care Provider: Juan Carlos Simmons NP    Expected Discharge Date: 5/5/2023    Per MD: 04/27/2023: Patient stable for SD to NSGY. SLP cleared patient for dysphagia diet. Satting well on RA  Therapy is recommending Inpatient Rehab.        Reassessment (most recent)       Discharge Reassessment - 04/27/23 1603          Discharge Reassessment    Assessment Type Discharge Planning Reassessment     Did the patient's condition or plan change since previous assessment? No     Communicated KENNEDY with patient/caregiver Date not available/Unable to determine     Discharge Plan A Rehab     Discharge Plan B Home Health     DME Needed Upon Discharge  none     Discharge Barriers Identified None     Why the patient remains in the hospital Requires continued medical care                   Tammy Garcia RN, CCRN-K, [facility]  Neuro-Critical Care   X [id number]
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
## 2023-04-27 NOTE — PLAN OF CARE
Problem: SLP  Goal: SLP Goal  Description: Speech Language Pathology Goals  Goals expected to be met by 5/2  1. Pt will complete ongoing assessment of swallow to advance diet as appropriate.    2. Pt will follow 1 step commands with 80% accy given min A.   3. Pt will answer simple y/n questions with 80% accy given min A.   4. Pt will complete simple auto speech tasks with 70% accy given mod A.   5. Pt will complete simple naming tasks with 50% accy given mod A.     Outcome: Ongoing, Progressing     Evaluation completed.  Rec dental soft diet with nectar thick liquids with strict aspiration precautions, no straws, crushed meds.  Pt drinks all his liquids at room temperature.  No ice please.

## 2023-04-27 NOTE — SUBJECTIVE & OBJECTIVE
"Interval History:   doing well post op drain removal from yesterday.     Medications:  Continuous Infusions:   sodium chloride 0.9% 100 mL/hr at 04/27/23 0705     Scheduled Meds:   cloBAZam  40 mg Oral QHS    dexAMETHasone  4 mg Intravenous Q8H    enoxaparin  40 mg Subcutaneous Daily    EScitalopram oxalate  10 mg Oral Daily    eslicarbazepine  800 mg Oral Daily    famotidine  20 mg Per NG tube BID    polyethylene glycol  17 g Oral Daily    senna-docusate 8.6-50 mg  1 tablet Per NG tube BID    zonisamide  500 mg Oral QHS     PRN Meds:acetaminophen, dextrose 10%, dextrose 10%, glucagon (human recombinant), HYDROcodone-acetaminophen, insulin aspart U-100, ondansetron     Review of Systems  Objective:     Weight: 89.8 kg (198 lb)  Body mass index is 27.62 kg/m².  Vital Signs (Most Recent):  Temp: 98.4 °F (36.9 °C) (04/27/23 0705)  Pulse: (!) 59 (04/27/23 0705)  Resp: 13 (04/27/23 0705)  BP: (!) 148/78 (04/27/23 0705)  SpO2: (!) 94 % (04/27/23 0705)   Vital Signs (24h Range):  Temp:  [96.1 °F (35.6 °C)-98.6 °F (37 °C)] 98.4 °F (36.9 °C)  Pulse:  [57-90] 59  Resp:  [6-20] 13  SpO2:  [89 %-100 %] 94 %  BP: (130-157)/() 148/78  Arterial Line BP: (106-128)/(75-90) 122/85     Date 04/27/23 0700 - 04/28/23 0659   Shift 3424-0429 6355-0335 2529-6567 24 Hour Total   INTAKE   I.V.(mL/kg) 106.7(1.2)   106.7(1.2)   NG/GT 0   0   Shift Total(mL/kg) 106.7(1.2)   106.7(1.2)   OUTPUT   Shift Total(mL/kg)       Weight (kg) 89.8 89.8 89.8 89.8                          Urethral Catheter 04/24/23 1100 Silicone 16 Fr. (Active)   Site Assessment Clean;Intact 04/25/23 0303   Collection Container Urimeter 04/25/23 0303   Securement Method secured to top of thigh w/ adhesive device 04/25/23 0303   Catheter Care Performed yes 04/25/23 0303   Reason for Continuing Urinary Catheterization Post operative 04/25/23 0303   CAUTI Prevention Bundle Securement Device in place with 1" slack;Intact seal between catheter & drainage tubing;Drainage " bag/urimeter off the floor;Sheeting clip in use;No dependent loops or kinks;Drainage bag/urimeter not overfilled (<2/3 full);Drainage bag/urimeter below bladder 04/24/23 1909   Output (mL) 100 mL 04/25/23 0625            Drain/Device  04/24/23 1524 Left lateral temporal region gravity (Active)   Insertion Site no hematoma 04/25/23 0303   Drainage Characteristics/Odor Bleeding controlled 04/24/23 1909   Drainage Amount None 04/24/23 1909   General Intake (mL) 0 04/24/23 1909   General Output (mL) 140 04/25/23 0625       Physical Exam  Neurosurgery Physical Exam    E3V4M6 perrl eomi visual fields full to confrontation   Incision CDI   Drain with bloody output.      Significant Labs:  Recent Labs   Lab 04/26/23  0010 04/26/23  0509 04/26/23  1412 04/27/23  0119   *  --   --  123*   * 134* 134* 134*   K 4.2  --   --  4.1     --   --  105   CO2 23  --   --  22*   BUN 12  --   --  13   CREATININE 0.6  --   --  0.7   CALCIUM 8.7  --   --  8.1*   MG 1.9  --   --  1.7       Recent Labs   Lab 04/26/23  0010 04/27/23  0119   WBC 17.88* 12.93*   HGB 11.7* 11.5*   HCT 36.2* 35.6*    306       No results for input(s): LABPT, INR, APTT in the last 48 hours.    Microbiology Results (last 7 days)       ** No results found for the last 168 hours. **          All pertinent labs from the last 24 hours have been reviewed.    Significant Diagnostics:  I have reviewed all pertinent imaging results/findings within the past 24 hours.

## 2023-04-27 NOTE — SUBJECTIVE & OBJECTIVE
Review of Systems  Objective:     Vitals:  Temp: 98.6 °F (37 °C)  Pulse: 61  Rhythm: normal sinus rhythm, sinus bradycardia  BP: (!) 162/89  MAP (mmHg): 120  Resp: 16  SpO2: 98 %    Temp  Min: 96.1 °F (35.6 °C)  Max: 98.6 °F (37 °C)  Pulse  Min: 57  Max: 90  BP  Min: 130/83  Max: 162/89  MAP (mmHg)  Min: 89  Max: 121  Resp  Min: 6  Max: 20  SpO2  Min: 89 %  Max: 100 %    04/26 0701 - 04/27 0700  In: 2498.5 [I.V.:2133.5]  Out: 3020 [Urine:3020]           Physical Exam  Vitals and nursing note reviewed.   Constitutional:       General: He is not in acute distress.     Appearance: He is not ill-appearing or diaphoretic.   HENT:      Head:      Comments: Surgical incision to left scalp, C/D/I     Right Ear: External ear normal.      Left Ear: External ear normal.      Nose: Nose normal.      Comments: NGT intact  Eyes:      General: No scleral icterus.        Right eye: No discharge.         Left eye: No discharge.      Extraocular Movements: Extraocular movements intact.      Conjunctiva/sclera: Conjunctivae normal.      Pupils: Pupils are equal, round, and reactive to light.   Cardiovascular:      Rate and Rhythm: Normal rate and regular rhythm.      Heart sounds: Normal heart sounds. No murmur heard.  Pulmonary:      Effort: Pulmonary effort is normal. No respiratory distress.      Breath sounds: Normal breath sounds. No wheezing or rales.   Abdominal:      General: Abdomen is flat. Bowel sounds are normal. There is no distension.      Palpations: Abdomen is soft. There is no mass.      Tenderness: There is no abdominal tenderness.   Musculoskeletal:         General: No swelling or deformity. Normal range of motion.      Cervical back: Normal range of motion and neck supple.      Right lower leg: No edema.      Left lower leg: No edema.   Skin:     General: Skin is warm.      Coloration: Skin is not jaundiced.      Findings: No bruising.   Neurological:      Comments: E4V5M6     A&O x 3, unable to state  situation  Able to follow all commands     PERRL  EOMI  VFI  Facial motor and sensation intact and equal bilaterally  Tongue midline   Smile symmetric      strength equal bilaterally   Antigravity in BLE equal     Sensation intact and equal bilaterally in all extremities     Unable to test memory, judgment, insight, fund of knowledge, hearing, shoulder shrug, tongue protrusion, coordination, gait due to level of consciousness.    Medications:  Continuoussodium chloride 0.9%, Last Rate: 100 mL/hr at 04/27/23 1305    ScheduledcloBAZam, 40 mg, QHS  dexAMETHasone, 4 mg, Q8H  enoxaparin, 40 mg, Daily  EScitalopram oxalate, 10 mg, Daily  eslicarbazepine, 800 mg, Daily  famotidine, 20 mg, BID  polyethylene glycol, 17 g, Daily  senna-docusate 8.6-50 mg, 1 tablet, BID  zonisamide, 500 mg, QHS    PRNacetaminophen, 650 mg, Q4H PRN  dextrose 10%, 12.5 g, PRN  dextrose 10%, 25 g, PRN  glucagon (human recombinant), 1 mg, PRN  HYDROcodone-acetaminophen, 1 tablet, Q6H PRN  insulin aspart U-100, 1-10 Units, Q6H PRN  ondansetron, 4 mg, Q12H PRN      Today I personally reviewed pertinent medications, lines/drains/airways, imaging, laboratory results, notably:

## 2023-04-27 NOTE — PT/OT/SLP PROGRESS
Physical Therapy Treatment/co treat with OT    Patient Name:  Declan Burleson   MRN:  [id number]    Recommendations:     Discharge Recommendations: rehabilitation facility  Discharge Equipment Recommendations: bedside commode  Barriers to discharge: Inaccessible home and Decreased caregiver support OSMEL and requires assist for mobility    Assessment:     Declan Burleson is a 58 y.o. male admitted with a medical diagnosis of Complex partial epilepsy with generalization and with intractable epilepsy.  He presents with the following impairments/functional limitations: weakness, impaired functional mobility, gait instability, decreased safety awareness, impaired self care skills, impaired coordination . Pt is unsafe with functional mobility at this time due to pt requires minimal assist for bed mobility, minimal assist for transfers, and minimal/moderate assist for gait due to decreased clearance of R foot during swing phase and difficulty with directing the walker.. Pt is motivated to progress with functional mobility.     Rehab Prognosis: Good; patient would benefit from acute skilled PT services to address these deficits and reach maximum level of function.    Recent Surgery: Procedure(s) (LRB):  REMOVAL, DRAIN (Left) 1 Day Post-Op    Plan:     During this hospitalization, patient to be seen 4 x/week to address the identified rehab impairments via gait training, therapeutic activities, neuromuscular re-education, therapeutic exercises and progress toward the following goals:    Plan of Care Expires:  05/25/23    Subjective   Pt c/o mild dizziness upon sitting and after gait trials. Pt's /89 upon PT arrival; 151/80 in sitting; 163/86 after 2 gait trials  Pain/Comfort:  Pain Rating 1: 0/10  Pain Rating Post-Intervention 1: 0/10      Objective:     Communicated with nurse prior to session.  Patient found HOB elevated with bed alarm, blood pressure cuff, peripheral IV, pulse ox (continuous), telemetry,  Condom Catheter upon PT entry to room.     General Precautions: Standard, aspiration, fall, seizure  Orthopedic Precautions: N/A  Braces: N/A  Respiratory Status: Room air     Functional Mobility:  Bed Mobility:     Supine to Sit: minimum assistance  Transfers:     Sit to Stand:  minimum assistance with rolling walker  Gait: 30ft with RW with moderate assist due to decreased clearance of R foot during swing phase causing instability and pt required manual cues to direct the walker. Pt then ambulated 35ft with RW with minimal assist, pt with improved clearance of R foot but the step length on R LE was less than L LE and pt able to direct the walker ~ 75% of the time- had difficulty with turning to sit in the chair.  Co-treat with OT due to medical complexity of pt (BP and lethargy prior to treatment) and need for skilled hands for safe intervention.   AM-PAC 6 CLICK MOBILITY  Turning over in bed (including adjusting bedclothes, sheets and blankets)?: 3  Sitting down on and standing up from a chair with arms (e.g., wheelchair, bedside commode, etc.): 3  Moving from lying on back to sitting on the side of the bed?: 3  Moving to and from a bed to a chair (including a wheelchair)?: 3  Need to walk in hospital room?: 2  Climbing 3-5 steps with a railing?: 1  Basic Mobility Total Score: 15     Patient left up in chair with all lines intact, call button in reach, chair alarm on, nurse notified, and wife present..    GOALS:   Multidisciplinary Problems       Physical Therapy Goals          Problem: Physical Therapy    Goal Priority Disciplines Outcome Goal Variances Interventions   Physical Therapy Goal     PT, PT/OT Ongoing, Progressing     Description: PT goals until 5/4/23    1. Pt supine to sit with minimal assist- met 4/27/23  Revised goal: supine to sit with supervision-not met  2. Pt sit to supine with minimal assist-not met  3. Pt sit to stand with LRAD as needed for safety with minimal assist-met 4/27/23  Revised  goal: sit to stand with Rw with supervision-not met  4. Pt to perform gait 15ft with LRAD as needed for safety with moderate assist.- met 4/27/23  Revised goal: gait 150ft with RW with supervision-not met  5. Pt to go up/down curb step with LRAD as needed for safety with moderate assist.-not met  6. Pt to transfer bed to/from bedside chair with LRAD as needed for safety with minimal assist.-not met  7. Pt to perform B LE exs in sitting or supine x 10 reps to strengthen B LE to improve functional mobility.-not met                        Time Tracking:     PT Received On: 04/27/23  PT Start Time: 1339     PT Stop Time: 1405  PT Total Time (min): 26 min     Billable Minutes: Gait Training 26    Treatment Type: Treatment  PT/PTA: PT           04/27/2023

## 2023-04-28 LAB
ALBUMIN SERPL BCP-MCNC: 2.9 G/DL (ref 3.5–5.2)
ALP SERPL-CCNC: 89 U/L (ref 55–135)
ALT SERPL W/O P-5'-P-CCNC: 12 U/L (ref 10–44)
ANION GAP SERPL CALC-SCNC: 5 MMOL/L (ref 8–16)
ANION GAP SERPL CALC-SCNC: 7 MMOL/L (ref 8–16)
AST SERPL-CCNC: 12 U/L (ref 10–40)
BASOPHILS # BLD AUTO: 0.01 K/UL (ref 0–0.2)
BASOPHILS NFR BLD: 0.1 % (ref 0–1.9)
BILIRUB SERPL-MCNC: 0.3 MG/DL (ref 0.1–1)
BUN SERPL-MCNC: 12 MG/DL (ref 6–20)
BUN SERPL-MCNC: 13 MG/DL (ref 6–20)
CALCIUM SERPL-MCNC: 8.6 MG/DL (ref 8.7–10.5)
CALCIUM SERPL-MCNC: 9 MG/DL (ref 8.7–10.5)
CHLORIDE SERPL-SCNC: 101 MMOL/L (ref 95–110)
CHLORIDE SERPL-SCNC: 102 MMOL/L (ref 95–110)
CO2 SERPL-SCNC: 21 MMOL/L (ref 23–29)
CO2 SERPL-SCNC: 24 MMOL/L (ref 23–29)
CREAT SERPL-MCNC: 0.7 MG/DL (ref 0.5–1.4)
CREAT SERPL-MCNC: 0.7 MG/DL (ref 0.5–1.4)
DIFFERENTIAL METHOD: ABNORMAL
EOSINOPHIL # BLD AUTO: 0 K/UL (ref 0–0.5)
EOSINOPHIL NFR BLD: 0 % (ref 0–8)
ERYTHROCYTE [DISTWIDTH] IN BLOOD BY AUTOMATED COUNT: 13.2 % (ref 11.5–14.5)
EST. GFR  (NO RACE VARIABLE): >60 ML/MIN/1.73 M^2
EST. GFR  (NO RACE VARIABLE): >60 ML/MIN/1.73 M^2
GLUCOSE SERPL-MCNC: 120 MG/DL (ref 70–110)
GLUCOSE SERPL-MCNC: 143 MG/DL (ref 70–110)
HCT VFR BLD AUTO: 35.5 % (ref 40–54)
HGB BLD-MCNC: 11.4 G/DL (ref 14–18)
IMM GRANULOCYTES # BLD AUTO: 0.09 K/UL (ref 0–0.04)
IMM GRANULOCYTES NFR BLD AUTO: 0.9 % (ref 0–0.5)
LYMPHOCYTES # BLD AUTO: 1 K/UL (ref 1–4.8)
LYMPHOCYTES NFR BLD: 9.6 % (ref 18–48)
MAGNESIUM SERPL-MCNC: 1.7 MG/DL (ref 1.6–2.6)
MCH RBC QN AUTO: 30.8 PG (ref 27–31)
MCHC RBC AUTO-ENTMCNC: 32.1 G/DL (ref 32–36)
MCV RBC AUTO: 96 FL (ref 82–98)
MONOCYTES # BLD AUTO: 0.7 K/UL (ref 0.3–1)
MONOCYTES NFR BLD: 6.4 % (ref 4–15)
NEUTROPHILS # BLD AUTO: 8.5 K/UL (ref 1.8–7.7)
NEUTROPHILS NFR BLD: 83 % (ref 38–73)
NRBC BLD-RTO: 0 /100 WBC
OSMOLALITY SERPL: 290 MOSM/KG (ref 280–300)
OSMOLALITY UR: 416 MOSM/KG (ref 50–1200)
PHOSPHATE SERPL-MCNC: 3.1 MG/DL (ref 2.7–4.5)
PLATELET # BLD AUTO: 314 K/UL (ref 150–450)
PMV BLD AUTO: 9.4 FL (ref 9.2–12.9)
POCT GLUCOSE: 119 MG/DL (ref 70–110)
POCT GLUCOSE: 72 MG/DL (ref 70–110)
POCT GLUCOSE: 90 MG/DL (ref 70–110)
POTASSIUM SERPL-SCNC: 3.8 MMOL/L (ref 3.5–5.1)
POTASSIUM SERPL-SCNC: 3.9 MMOL/L (ref 3.5–5.1)
PROT SERPL-MCNC: 5.9 G/DL (ref 6–8.4)
RBC # BLD AUTO: 3.7 M/UL (ref 4.6–6.2)
SODIUM SERPL-SCNC: 130 MMOL/L (ref 136–145)
SODIUM SERPL-SCNC: 130 MMOL/L (ref 136–145)
SODIUM UR-SCNC: 127 MMOL/L (ref 20–250)
WBC # BLD AUTO: 10.28 K/UL (ref 3.9–12.7)

## 2023-04-28 PROCEDURE — 83930 ASSAY OF BLOOD OSMOLALITY: CPT | Performed by: NURSE PRACTITIONER

## 2023-04-28 PROCEDURE — 63600175 PHARM REV CODE 636 W HCPCS: Performed by: STUDENT IN AN ORGANIZED HEALTH CARE EDUCATION/TRAINING PROGRAM

## 2023-04-28 PROCEDURE — 80053 COMPREHEN METABOLIC PANEL: CPT

## 2023-04-28 PROCEDURE — 80048 BASIC METABOLIC PNL TOTAL CA: CPT | Mod: XB | Performed by: NURSE PRACTITIONER

## 2023-04-28 PROCEDURE — 83735 ASSAY OF MAGNESIUM: CPT

## 2023-04-28 PROCEDURE — 95720 EEG PHY/QHP EA INCR W/VEEG: CPT | Mod: ,,, | Performed by: PSYCHIATRY & NEUROLOGY

## 2023-04-28 PROCEDURE — 95720 PR EEG, W/VIDEO, CONT RECORD, I&R, >12<26 HRS: ICD-10-PCS | Mod: ,,, | Performed by: PSYCHIATRY & NEUROLOGY

## 2023-04-28 PROCEDURE — 92507 TX SP LANG VOICE COMM INDIV: CPT

## 2023-04-28 PROCEDURE — 95711 VEEG 2-12 HR UNMONITORED: CPT

## 2023-04-28 PROCEDURE — 25000003 PHARM REV CODE 250: Performed by: PHYSICIAN ASSISTANT

## 2023-04-28 PROCEDURE — 94761 N-INVAS EAR/PLS OXIMETRY MLT: CPT

## 2023-04-28 PROCEDURE — 95700 EEG CONT REC W/VID EEG TECH: CPT

## 2023-04-28 PROCEDURE — 99233 PR SUBSEQUENT HOSPITAL CARE,LEVL III: ICD-10-PCS | Mod: ,,, | Performed by: PSYCHIATRY & NEUROLOGY

## 2023-04-28 PROCEDURE — 63600175 PHARM REV CODE 636 W HCPCS: Performed by: NURSE PRACTITIONER

## 2023-04-28 PROCEDURE — 84100 ASSAY OF PHOSPHORUS: CPT

## 2023-04-28 PROCEDURE — 20000000 HC ICU ROOM

## 2023-04-28 PROCEDURE — 99233 SBSQ HOSP IP/OBS HIGH 50: CPT | Mod: ,,, | Performed by: PSYCHIATRY & NEUROLOGY

## 2023-04-28 PROCEDURE — 84300 ASSAY OF URINE SODIUM: CPT | Performed by: NURSE PRACTITIONER

## 2023-04-28 PROCEDURE — 25000003 PHARM REV CODE 250: Performed by: STUDENT IN AN ORGANIZED HEALTH CARE EDUCATION/TRAINING PROGRAM

## 2023-04-28 PROCEDURE — 85025 COMPLETE CBC W/AUTO DIFF WBC: CPT

## 2023-04-28 PROCEDURE — 83935 ASSAY OF URINE OSMOLALITY: CPT | Performed by: NURSE PRACTITIONER

## 2023-04-28 PROCEDURE — 25000003 PHARM REV CODE 250: Performed by: NURSE PRACTITIONER

## 2023-04-28 PROCEDURE — 92526 ORAL FUNCTION THERAPY: CPT

## 2023-04-28 PROCEDURE — 99222 1ST HOSP IP/OBS MODERATE 55: CPT | Mod: ,,, | Performed by: NURSE PRACTITIONER

## 2023-04-28 PROCEDURE — 99222 PR INITIAL HOSPITAL CARE,LEVL II: ICD-10-PCS | Mod: ,,, | Performed by: NURSE PRACTITIONER

## 2023-04-28 RX ORDER — DEXAMETHASONE 4 MG/1
4 TABLET ORAL EVERY 8 HOURS
Status: DISCONTINUED | OUTPATIENT
Start: 2023-04-28 | End: 2023-05-01

## 2023-04-28 RX ADMIN — DEXAMETHASONE 4 MG: 4 TABLET ORAL at 09:04

## 2023-04-28 RX ADMIN — ACETAMINOPHEN 650 MG: 325 TABLET ORAL at 04:04

## 2023-04-28 RX ADMIN — ZONISAMIDE 500 MG: 100 CAPSULE ORAL at 09:04

## 2023-04-28 RX ADMIN — DEXAMETHASONE SODIUM PHOSPHATE 4 MG: 4 INJECTION INTRA-ARTICULAR; INTRALESIONAL; INTRAMUSCULAR; INTRAVENOUS; SOFT TISSUE at 01:04

## 2023-04-28 RX ADMIN — FAMOTIDINE 20 MG: 20 TABLET ORAL at 09:04

## 2023-04-28 RX ADMIN — HYDROCODONE BITARTRATE AND ACETAMINOPHEN 1 TABLET: 7.5; 325 TABLET ORAL at 07:04

## 2023-04-28 RX ADMIN — SENNOSIDES AND DOCUSATE SODIUM 1 TABLET: 50; 8.6 TABLET ORAL at 09:04

## 2023-04-28 RX ADMIN — HYDRALAZINE HYDROCHLORIDE 10 MG: 20 INJECTION, SOLUTION INTRAMUSCULAR; INTRAVENOUS at 07:04

## 2023-04-28 RX ADMIN — FAMOTIDINE 20 MG: 20 TABLET ORAL at 08:04

## 2023-04-28 RX ADMIN — ACETAMINOPHEN 650 MG: 325 TABLET ORAL at 05:04

## 2023-04-28 RX ADMIN — ESCITALOPRAM OXALATE 10 MG: 10 TABLET ORAL at 08:04

## 2023-04-28 RX ADMIN — ENOXAPARIN SODIUM 40 MG: 40 INJECTION SUBCUTANEOUS at 04:04

## 2023-04-28 RX ADMIN — ESLICARBAZEPINE ACETATE 800 MG: 400 TABLET ORAL at 08:04

## 2023-04-28 RX ADMIN — ONDANSETRON 4 MG: 2 INJECTION INTRAMUSCULAR; INTRAVENOUS at 09:04

## 2023-04-28 RX ADMIN — CLOBAZAM 40 MG: 10 TABLET ORAL at 09:04

## 2023-04-28 RX ADMIN — DEXAMETHASONE SODIUM PHOSPHATE 4 MG: 4 INJECTION INTRA-ARTICULAR; INTRALESIONAL; INTRAMUSCULAR; INTRAVENOUS; SOFT TISSUE at 05:04

## 2023-04-28 RX ADMIN — SENNOSIDES AND DOCUSATE SODIUM 1 TABLET: 50; 8.6 TABLET ORAL at 08:04

## 2023-04-28 RX ADMIN — POLYETHYLENE GLYCOL 3350 17 G: 17 POWDER, FOR SOLUTION ORAL at 08:04

## 2023-04-28 NOTE — SIGNIFICANT EVENT
Called at bedside by patient's RN due to patient being more confused, slower to speak and active hallucinations where he described that the ceiling was flashing lights, a white wall that was surrounding him and overall he had difficulty expressing his thoughts. Dr. Dent was notified. Pending formal  EEG. NSGY team was notified about this and agreed with formal EEG placement. EEG tech was called however she stated she can not come and place the EEG. Dr. Edgar with epilepsy notified.
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
## 2023-04-28 NOTE — PLAN OF CARE
04/28/23 1013   Post-Acute Status   Post-Acute Authorization Placement   Post-Acute Placement Status Pending payor review/awaiting authorization (if required)     MESSI received call from Sheri with [facility] ([phone]) regarding this Pt. They have accepted and submitted for auth. MESSI reported Pt wife had expressed interest in going to [facility]. Per Sheri, she will call Pt wife to discuss and if Pt wife still wants [facility], they will rescind the auth request so that [facility] can submit.     MESSI received message from Rachel with [facility] in [place] ([phone]) regarding this Pt. Returned call and advised Rachel that Pt wife has chosen [facility]. They will close referral.     MESSI spoke with Pt wife to confirm they have not changed their mind about wanting [facility]. She reported she was told she can stay there so she wants them.     MESSI advised Sheri with [facility] of the above. They will rescind the auth. Advised Maxime who reported they will submit today.    2:19 PM  MESSI advised by Maxime with [facility] they have auth. Discussed with MD team. They want to observe him one more day then possibly clear him for tomorrow. Per Maxime, they will follow for possible admit tomorrow.     Latisha Javier LCSW  Neurocritical Care   [facility]  [id number]

## 2023-04-28 NOTE — PT/OT/SLP PROGRESS
"Speech Language Pathology Treatment    Patient Name:  Declan Burleson   MRN:  [id number]  Admitting Diagnosis: Complex partial epilepsy with generalization and with intractable epilepsy    Recommendations:                 General Recommendations:  Dysphagia therapy and Speech/language therapy  Diet recommendations:  Dental Soft, Liquid Diet Level: Nectar Thick   Aspiration Precautions: Assistance with thickening liquids, Feed only when awake/alert, HOB to 90 degrees, Meds crushed in puree, No straws, Small bites/sips, and Strict aspiration precautions   General Precautions: Standard, aspiration, aphasia  Communication strategies:  provide increased time to answer and go to room if call light pushed    Subjective     "He is doing a lot better today" per pt  Patient goals: did not state       Pain/Comfort:  Pain Rating 1: 0/10  Pain Rating Post-Intervention 1: 0/10    Respiratory Status: Room air    Objective:     Has the patient been evaluated by SLP for swallowing?   Yes  Keep patient NPO? No   Current Respiratory Status:        Pt. Seen at bedside and hob was raised to 90 degree angle.  Pt. Was fed teaspoons of water x5 and then allowed to take 5 sips of water via cup.  Oral phase of swallow was wfl with min -no anterior loss of bolus and no significant delay in transit.  No delay in initiation of pharyngeal swallow was noted with throat clear noted on 1 of 5 edge of cup sips of water.  Communicatively, pt. Required increased time with delays in responding noted on all tasks with iincreased number of verbalizations elicited per wife, Trista.  Mr. Burleson was oriented to month and year but not to name of hospital with self corrections noted.  Pt. Named common objects with 100% accuracy given min cues and recalled common nouns in response to questions with min cues and delays in responding noted with 100% accuracy.  She named one category member with 90% accuracy with increased time needed.  Ongoing education " provided re role of slp and plan of care.        Assessment:     Declan Burleson is a 58 y.o. male with an SLP diagnosis of Aphasia, Dysphagia, and Dysarthria.    Goals:   Multidisciplinary Problems       SLP Goals          Problem: SLP    Goal Priority Disciplines Outcome   SLP Goal     SLP Ongoing, Progressing   Description: Speech Language Pathology Goals  Goals expected to be met by 5/2  1. Pt will complete ongoing assessment of swallow to advance diet as appropriate.    2. Pt will follow 1 step commands with 80% accy given min A.   3. Pt will answer simple y/n questions with 80% accy given min A.   4. Pt will complete simple auto speech tasks with 70% accy given mod A.   5. Pt will complete simple naming tasks with 50% accy given mod A.                                Plan:     Patient to be seen:  4 x/week   Plan of Care expires:  05/25/23  Plan of Care reviewed with:  patient, spouse   SLP Follow-Up:  Yes       Discharge recommendations:  rehabilitation facility   Barriers to Discharge:  Level of Skilled Assistance Needed 24 hour    Time Tracking:     SLP Treatment Date:   04/28/23  Speech Start Time:  1000  Speech Stop Time:  1020     Speech Total Time (min):  20 min    Billable Minutes: Speech Therapy Individual 10 and Treatment Swallowing Dysfunction 10    04/28/2023

## 2023-04-28 NOTE — ASSESSMENT & PLAN NOTE
Na at 131 on 4/25  - urine studies suggest hypovolemic hyponatremia   - serum Na improved with IVF further confirming diagnosis    Na 130 on 4/28  - repeated urine studies  - f/u UA, am cortisol and TSH  - continue IVF at 100 cc/hr  - monitor Na BID  - goal Na > 135  - consider Na tablets  - may need adjustments to anti-epileptics outpatient
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
## 2023-04-28 NOTE — NURSING
EEG cap on.  Page DR Edgar.   Per Dr Chen call EEG tech on call.   EEG tech called back at 1840 and said that she was going to message Dr Edgar to let him know that the patient was on the EEG cap.

## 2023-04-28 NOTE — SUBJECTIVE & OBJECTIVE
Past Medical History:   Diagnosis Date    Anxiety     Dementia in other diseases classified elsewhere, unspecified severity, without behavioral disturbance, psychotic disturbance, mood disturbance, and anxiety 2/2/2023    Depression     GERD (gastroesophageal reflux disease)     Hyperlipidemia     Hypertension     Long term (current) use of antithrombotics/antiplatelets 12/22/2020    Migraine     Normocytic anemia 2/23/2023    Seizures      Past Surgical History:   Procedure Laterality Date    CRANIOTOMY USING FRAMELESS STEREOTAXY Left 4/24/2023    Procedure: CRANIOTOMY, USING FRAMELESS STEREOTAXY;  Surgeon: Ruchi Chen MD;  [facility];  Service: Neurosurgery;  Laterality: Left;  Tor III ASA III  Blood: Type & Screen  Neuro Mon: None  Bed: Reg  Headrest: Minong  Pos: Supine  Stealth MRI Optical   [place]  Asst Surg: [place]    CYST REMOVAL  March 2016/2017    skin cyst - benign    PLACEMENT OF STEREO EEG LEADS(DEPTH ELECTRODES) Left 2/27/2023    Procedure: PLACEMENT OF STEREO EEG LEADS (DEPTH ELECTRODES);  Surgeon: Ruchi Chen MD;  [facility];  Service: Neurosurgery;  Laterality: Left;    REMOVAL OF DRAIN Left 4/26/2023    Procedure: REMOVAL, DRAIN;  Surgeon: Ruchi Chen MD;  [facility];  Service: Neurosurgery;  Laterality: Left;    REMOVAL OF STEREO EEG LEADS (DEPTH ELECTRODES) Bilateral 1/21/2021    Procedure: REMOVAL OF STEREO EEG LEADS (DEPTH ELECTRODES);  Surgeon: Ruchi Chen MD;  [facility];  Service: Neurosurgery;  Laterality: Bilateral;    REMOVAL OF STEREO EEG LEADS (DEPTH ELECTRODES) Left 3/8/2023    Procedure: REMOVAL OF STEREO EEG LEADS (DEPTH ELECTRODES);  Surgeon: Ruchi Chen MD;  Location: [facility] OR [address] [place] [address];  Service: Neurosurgery;  Laterality: Left;    TONSILLECTOMY      teenage      Review of patient's allergies indicates:   Allergen Reactions    Losartan Rash       Scheduled Medications:    cloBAZam  40 mg Oral QHS    dexAMETHasone  4  mg Oral Q8H    enoxaparin  40 mg Subcutaneous Daily    EScitalopram oxalate  10 mg Oral Daily    eslicarbazepine  800 mg Oral Daily    famotidine  20 mg Per NG tube BID    polyethylene glycol  17 g Oral Daily    senna-docusate 8.6-50 mg  1 tablet Per NG tube BID    zonisamide  500 mg Oral QHS       PRN Medications: acetaminophen, dextrose 10%, dextrose 10%, hydrALAZINE, HYDROcodone-acetaminophen, ondansetron    Family History       Problem Relation (Age of Onset)    Heart disease Maternal Uncle (50)    Lung disease Father    Migraines Paternal Grandfather    No Known Problems Mother          Tobacco Use    Smoking status: Former     Types: Cigarettes     Quit date:      Years since quittin.3    Smokeless tobacco: Never   Substance and Sexual Activity    Alcohol use: Yes     Comment: one drink once a week    Drug use: Never    Sexual activity: Yes     Partners: Female     Review of Systems   Constitutional:  Positive for activity change.   Respiratory:  Negative for shortness of breath.    Gastrointestinal:  Negative for abdominal distention.   Musculoskeletal:  Positive for gait problem.   Neurological:  Positive for speech difficulty and weakness.   Psychiatric/Behavioral:  Positive for confusion. Negative for agitation and behavioral problems.    Objective:     Vital Signs (Most Recent):  Temp: 98.6 °F (37 °C) (23 1105)  Pulse: 62 (23 1405)  Resp: 12 (23 1405)  BP: 130/69 (23 1405)  SpO2: 97 % (23 1405)    Vital Signs (24h Range):  Temp:  [98.4 °F (36.9 °C)-98.8 °F (37.1 °C)] 98.6 °F (37 °C)  Pulse:  [46-74] 62  Resp:  [6-20] 12  SpO2:  [92 %-98 %] 97 %  BP: (125-170)/(66-91) 130/69     Body mass index is 27.62 kg/m².    Physical Exam  Nursing note reviewed.   Constitutional:       Appearance: He is ill-appearing.   HENT:      Head: Atraumatic.   Pulmonary:      Effort: Pulmonary effort is normal. No respiratory distress.   Abdominal:      General: There is no distension.       Palpations: Abdomen is soft.   Musculoskeletal:         General: Normal range of motion.      Cervical back: Normal range of motion and neck supple.   Skin:     Capillary Refill: Capillary refill takes 2 to 3 seconds.   Neurological:      General: No focal deficit present.      Mental Status: He is alert.      GCS: GCS eye subscore is 4. GCS verbal subscore is 5. GCS motor subscore is 6.      Motor: Weakness present.      Gait: Gait abnormal.   Psychiatric:         Attention and Perception: He is inattentive.         Mood and Affect: Mood and affect normal.         Speech: Speech is slurred.         Behavior: Behavior is slowed.     NEUROLOGICAL EXAMINATION:     MENTAL STATUS   Speech: slurred     Diagnostic Results: Labs: Reviewed

## 2023-04-28 NOTE — SUBJECTIVE & OBJECTIVE
"Interval History: 4/28: speech and swallowing continue to improve with SLP. Respiratory status continuing to improve. Satting well on RA. Na 130 today      Medications:  Continuous Infusions:   sodium chloride 0.9% 100 mL/hr at 04/28/23 1005     Scheduled Meds:   cloBAZam  40 mg Oral QHS    dexAMETHasone  4 mg Intravenous Q8H    enoxaparin  40 mg Subcutaneous Daily    EScitalopram oxalate  10 mg Oral Daily    eslicarbazepine  800 mg Oral Daily    famotidine  20 mg Per NG tube BID    polyethylene glycol  17 g Oral Daily    senna-docusate 8.6-50 mg  1 tablet Per NG tube BID    zonisamide  500 mg Oral QHS     PRN Meds:acetaminophen, dextrose 10%, dextrose 10%, glucagon (human recombinant), hydrALAZINE, HYDROcodone-acetaminophen, insulin aspart U-100, ondansetron     Review of Systems  Objective:     Weight: 89.8 kg (198 lb)  Body mass index is 27.62 kg/m².  Vital Signs (Most Recent):  Temp: 98.4 °F (36.9 °C) (04/28/23 0705)  Pulse: 66 (04/28/23 1005)  Resp: 15 (04/28/23 1005)  BP: (!) 141/76 (04/28/23 1005)  SpO2: 95 % (04/28/23 1005)   Vital Signs (24h Range):  Temp:  [98.4 °F (36.9 °C)-98.8 °F (37.1 °C)] 98.4 °F (36.9 °C)  Pulse:  [46-74] 66  Resp:  [7-20] 15  SpO2:  [92 %-98 %] 95 %  BP: (125-170)/(66-91) 141/76     Date 04/28/23 0700 - 04/29/23 0659   Shift 6012-8005 9214-4493 6626-4377 24 Hour Total   INTAKE   I.V.(mL/kg) 406.5(4.5)   406.5(4.5)   NG/   100   Shift Total(mL/kg) 506.5(5.6)   506.5(5.6)   OUTPUT   Shift Total(mL/kg)       Weight (kg) 89.8 89.8 89.8 89.8                          Urethral Catheter 04/24/23 1100 Silicone 16 Fr. (Active)   Site Assessment Clean;Intact 04/25/23 0303   Collection Container Urimeter 04/25/23 0303   Securement Method secured to top of thigh w/ adhesive device 04/25/23 0303   Catheter Care Performed yes 04/25/23 0303   Reason for Continuing Urinary Catheterization Post operative 04/25/23 0303   CAUTI Prevention Bundle Securement Device in place with 1" slack;Intact " seal between catheter & drainage tubing;Drainage bag/urimeter off the floor;Sheeting clip in use;No dependent loops or kinks;Drainage bag/urimeter not overfilled (<2/3 full);Drainage bag/urimeter below bladder 04/24/23 1909   Output (mL) 100 mL 04/25/23 0625            Drain/Device  04/24/23 1524 Left lateral temporal region gravity (Active)   Insertion Site no hematoma 04/25/23 0303   Drainage Characteristics/Odor Bleeding controlled 04/24/23 1909   Drainage Amount None 04/24/23 1909   General Intake (mL) 0 04/24/23 1909   General Output (mL) 140 04/25/23 0625       Physical Exam  Neurosurgery Physical Exam    E4V4M6   Aox3  Perrl  eomi   visual fields full to confrontation   Incision CDI     Significant Labs:  Recent Labs   Lab 04/26/23  1412 04/27/23  0119 04/28/23 0229   GLU  --  123* 120*   * 134* 130*   K  --  4.1 3.8   CL  --  105 102   CO2  --  22* 21*   BUN  --  13 13   CREATININE  --  0.7 0.7   CALCIUM  --  8.1* 8.6*   MG  --  1.7 1.7       Recent Labs   Lab 04/27/23  0119 04/28/23 0229   WBC 12.93* 10.28   HGB 11.5* 11.4*   HCT 35.6* 35.5*    314       No results for input(s): LABPT, INR, APTT in the last 48 hours.    Microbiology Results (last 7 days)       ** No results found for the last 168 hours. **          All pertinent labs from the last 24 hours have been reviewed.    Significant Diagnostics:  I have reviewed all pertinent imaging results/findings within the past 24 hours.

## 2023-04-28 NOTE — NURSING
Spoke with Magda GERBER of [facility] about stat EEG monitoring order. Pt has crani incision with dressing in place. Magda GERBER spoke with Obinna HOPE of [facility] and ok to remove crani dressing to place EEG cap.

## 2023-04-28 NOTE — PROGRESS NOTES
[facility]  Neurosurgery  Progress Note    Subjective:     History of Present Illness: 58 M with epilepsy presents for elective left temporal lobectomy      Post-Op Info:  Procedure(s) (LRB):  REMOVAL, DRAIN (Left)   2 Days Post-Op     Interval History: 4/28: speech and swallowing continue to improve with SLP. Respiratory status continuing to improve. Satting well on RA. Na 130 today      Medications:  Continuous Infusions:   sodium chloride 0.9% 100 mL/hr at 04/28/23 1005     Scheduled Meds:   cloBAZam  40 mg Oral QHS    dexAMETHasone  4 mg Intravenous Q8H    enoxaparin  40 mg Subcutaneous Daily    EScitalopram oxalate  10 mg Oral Daily    eslicarbazepine  800 mg Oral Daily    famotidine  20 mg Per NG tube BID    polyethylene glycol  17 g Oral Daily    senna-docusate 8.6-50 mg  1 tablet Per NG tube BID    zonisamide  500 mg Oral QHS     PRN Meds:acetaminophen, dextrose 10%, dextrose 10%, glucagon (human recombinant), hydrALAZINE, HYDROcodone-acetaminophen, insulin aspart U-100, ondansetron     Review of Systems  Objective:     Weight: 89.8 kg (198 lb)  Body mass index is 27.62 kg/m².  Vital Signs (Most Recent):  Temp: 98.4 °F (36.9 °C) (04/28/23 0705)  Pulse: 66 (04/28/23 1005)  Resp: 15 (04/28/23 1005)  BP: (!) 141/76 (04/28/23 1005)  SpO2: 95 % (04/28/23 1005)   Vital Signs (24h Range):  Temp:  [98.4 °F (36.9 °C)-98.8 °F (37.1 °C)] 98.4 °F (36.9 °C)  Pulse:  [46-74] 66  Resp:  [7-20] 15  SpO2:  [92 %-98 %] 95 %  BP: (125-170)/(66-91) 141/76     Date 04/28/23 0700 - 04/29/23 0659   Shift 6395-0138 3393-6232 9275-3818 24 Hour Total   INTAKE   I.V.(mL/kg) 406.5(4.5)   406.5(4.5)   NG/   100   Shift Total(mL/kg) 506.5(5.6)   506.5(5.6)   OUTPUT   Shift Total(mL/kg)       Weight (kg) 89.8 89.8 89.8 89.8                          Urethral Catheter 04/24/23 1100 Silicone 16 Fr. (Active)   Site Assessment Clean;Intact 04/25/23 0303   Collection Container Urimeter 04/25/23 0309  "  Securement Method secured to top of thigh w/ adhesive device 04/25/23 0303   Catheter Care Performed yes 04/25/23 0303   Reason for Continuing Urinary Catheterization Post operative 04/25/23 0303   CAUTI Prevention Bundle Securement Device in place with 1" slack;Intact seal between catheter & drainage tubing;Drainage bag/urimeter off the floor;Sheeting clip in use;No dependent loops or kinks;Drainage bag/urimeter not overfilled (<2/3 full);Drainage bag/urimeter below bladder 04/24/23 1909   Output (mL) 100 mL 04/25/23 0625            Drain/Device  04/24/23 1524 Left lateral temporal region gravity (Active)   Insertion Site no hematoma 04/25/23 0303   Drainage Characteristics/Odor Bleeding controlled 04/24/23 1909   Drainage Amount None 04/24/23 1909   General Intake (mL) 0 04/24/23 1909   General Output (mL) 140 04/25/23 0625       Physical Exam  Neurosurgery Physical Exam    E4V4M6   Aox3  Perrl  eomi   visual fields full to confrontation   Incision CDI     Significant Labs:  Recent Labs   Lab 04/26/23  1412 04/27/23  0119 04/28/23  0229   GLU  --  123* 120*   * 134* 130*   K  --  4.1 3.8   CL  --  105 102   CO2  --  22* 21*   BUN  --  13 13   CREATININE  --  0.7 0.7   CALCIUM  --  8.1* 8.6*   MG  --  1.7 1.7       Recent Labs   Lab 04/27/23  0119 04/28/23  0229   WBC 12.93* 10.28   HGB 11.5* 11.4*   HCT 35.6* 35.5*    314       No results for input(s): LABPT, INR, APTT in the last 48 hours.    Microbiology Results (last 7 days)       ** No results found for the last 168 hours. **          All pertinent labs from the last 24 hours have been reviewed.    Significant Diagnostics:  I have reviewed all pertinent imaging results/findings within the past 24 hours.    Assessment/Plan:     * Complex partial epilepsy with generalization and with intractable epilepsy  59 y/o M s/p left temporal lobectomy on 4/24  for seizures refractory to prior laser ablation    - ICU status post op, continue ICU care for now " for hyponatremia  - Na goal > 135  -Lovenox for DVT prophylaxis   -MRI brain with good resection   - continue 3 week dex taper  -q1 neuro checks   -continue home AEDs  - continue SLP for speech eval  and swallow eval    - Speech improving   - recs for soft diet with thick liquids  -          Obinna Avendano MD  Neurosurgery  [facility]

## 2023-04-28 NOTE — PLAN OF CARE
[facility] Care Plan    POC reviewed with Declan Burleson and spouse at 0300. Pt verbalized understanding. Questions and concerns addressed. No acute events overnight. Pt progressing toward goals. Will continue to monitor. See below and flowsheets for full assessment and VS info.       Is this a stroke patient? no    Neuro:  Ruben Coma Scale  Best Eye Response: 4-->(E4) spontaneous  Best Motor Response: 6-->(M6) obeys commands  Best Verbal Response: 4-->(V4) confused  [place] Coma Scale Score: 14  Assessment Qualifiers: patient not sedated/intubated  Pupil PERRLA: yes     24hr Temp:  [98.4 °F (36.9 °C)-98.8 °F (37.1 °C)]     CV:   Rhythm: normal sinus rhythm  BP goals:   SBP < 160  MAP > 65    Resp:           Plan: N/A    GI/:     Diet/Nutrition Received: mechanical/dental soft  Last Bowel Movement: 04/25/23  Voiding Characteristics: external catheter    Intake/Output Summary (Last 24 hours) at 4/28/2023 0455  Last data filed at 4/28/2023 0401  Gross per 24 hour   Intake 2648.17 ml   Output 1500 ml   Net 1148.17 ml          Labs/Accuchecks:  Recent Labs   Lab 04/28/23 0229   WBC 10.28   RBC 3.70*   HGB 11.4*   HCT 35.5*         Recent Labs   Lab 04/28/23 0229   *   K 3.8   CO2 21*      BUN 13   CREATININE 0.7   ALKPHOS 89   ALT 12   AST 12   BILITOT 0.3      Recent Labs   Lab 04/24/23  0928   INR 1.0   APTT 26.2    No results for input(s): CPK, CPKMB, TROPONINI, MB in the last 168 hours.    Electrolytes: N/A - electrolytes WDL  Accuchecks: Q6H    Gtts:   sodium chloride 0.9% 100 mL/hr at 04/28/23 0401       LDA/Wounds:  Lines/Drains/Airways       Drain  Duration                  NG/OG Tube 04/25/23 1015 [place] sump 18 Fr. Right nostril 2 days    Male External Urinary Catheter 04/26/23 0600 1 day              Peripheral Intravenous Line  Duration                  Peripheral IV - Single Lumen 04/24/23 0928 18 G Left;Posterior Forearm 3 days         Peripheral IV - Single Lumen 04/26/23 1218  Anterior;Right Forearm 1 day                  Wounds: No  Wound care consulted: No      Problem: Adult Inpatient Plan of Care  Goal: Plan of Care Review  Outcome: Ongoing, Progressing  Goal: Patient-Specific Goal (Individualized)  Outcome: Ongoing, Progressing     Problem: Infection  Goal: Absence of Infection Signs and Symptoms  Outcome: Ongoing, Progressing

## 2023-04-28 NOTE — ASSESSMENT & PLAN NOTE
-S/P Left temporal lobectomy.  -PT/OT rec for IPR.  -Continue AEDs.  -SLP following for aspiration concerns.  -

## 2023-04-28 NOTE — ASSESSMENT & PLAN NOTE
Pt w adult-onset non-lesional focal-onset intractable epilepsy (onset at 49 yo in 2013) s/p left temporal lobectomy today (4/24)  Home medications: eslicarbazepine 800 mg qd, zonisamide 500mg qd, and clobazam 40mg qhs  - MRI non-lesional  - EMU monitoring last year suggests left-sided activity  - ICU admissions for sEEG/depth electrodes localized the seizures to L amygdala and hippocampus  - refractory to prior laser ablation of left amygdala and hippocampus    Plan:  - Admit to NCC  - Q1h neurochecks while in ICU  - Q1h vitals while in ICU  - HOB@30  - continue home AED's   - eslicarbazepine 1200 mg qd   - zonisamide 500mg qd   - clobazam 40mg qhs  - dexamethasone 4mg q8h, taper over 3 weeks   - protonix 40mg daily for ulcer ppx  - MRI (4/26): craniotomy w partial resection of anterior L temporal lobe w small extra-axial collection underlying the craniotomy site, no new mass/hemorrhage/edema/infarct/hydrocephalus  - SBP <140mmHg, off cardene gtt  - TTE wnl  - Maintaining sats on RA  - Daily CMP, Mag, Phos - replete electrolytes PRN  - Lipid panel, A1c reviewed   - Coags reviewed  - SLP cleared pt for dysphagia diet  - Strict I/Os  - Daily CBC, transfuse PRN  - lovenox 40mg for ppx    - f/u PT/OT/SLP  - CM/SW consult for dispo planning    Continue to follow clinically and notify NSGY immediately if any neurostatus change

## 2023-04-28 NOTE — CONSULTS
Inpatient consult to Physical Medicine Rehab  Consult performed by: Talia Harrington NP  Consult ordered by: Griffin Randall MD    Consult received.  Reviewed patient history and current admission.  PM&R following. Will follow up with pt once medically stable and able to participate with therapies.       Talia Harrington NP  Physical Medicine & Rehabilitation   04/28/2023

## 2023-04-28 NOTE — PLAN OF CARE
ICU Attending    Continues to have episodes of hyponatremia  Eslicarbzepine could be the culprit here though patient has been on this med for some time  Continue Saline fluids, may consider salt tabs  Though would benefit from re-evaluation by epileptologist as outpatient for adjustment of this medication.    Griffin Randall MD MPH  NeuroCritical Care & Vascular Neurology

## 2023-04-28 NOTE — HOSPITAL COURSE
Per chart review,    OT- 04/27    Bed Mobility:    Patient completed Supine to Sit with minimum assistance      Functional Mobility/Transfers:  Patient completed Sit <> Stand Transfer with minimum assistance  with  rolling walker   Patient completed Bed <> Chair Transfer using Step Transfer technique with Mod A on first trial and Min A on second trial with rolling walker  Functional Mobility: Min/Mod A with RW; See PT note     Activities of Daily Living:  Grooming: SBA washcloth to face with assist to manage NGT  Upper Body Dressing: moderate assistance gown as ariel  Lower Body Dressing: maximal assistance pt initially able to don 1 sock, but was limited by dizziness in forward position, and unable to perform in other modified positions, ultimately requiring more assistance to don socks and slip on shoes.    PT- 04/27    Functional Mobility:  Bed Mobility:     Supine to Sit: minimum assistance  Transfers:     Sit to Stand:  minimum assistance with rolling walker  Gait: 30ft with RW with moderate assist

## 2023-04-28 NOTE — PLAN OF CARE
[facility] Care Plan    POC reviewed with Declan Burleson and family at 1400. Pt verbalized understanding. Questions and concerns addressed. No acute events today. Pt progressing toward goals. Will continue to monitor. See below and flowsheets for full assessment and VS info.             Is this a stroke patient? yes- Stroke booklet reviewed with patient and family, risk factors identified for patient and stroke booklet remains at bedside for ongoing education.     Neuro:  Ruben Coma Scale  Best Eye Response: 3-->(E3) to speech  Best Motor Response: 6-->(M6) obeys commands  Best Verbal Response: 4-->(V4) confused  [place] Coma Scale Score: 13  Assessment Qualifiers: patient not sedated/intubated  Pupil PERRLA: yes     24 hr Temp:  [98.4 °F (36.9 °C)-98.8 °F (37.1 °C)]     CV:   Rhythm: normal sinus rhythm, sinus bradycardia  BP goals:   SBP < 160  MAP > 65    Resp:           Plan: N/A    GI/:     Diet/Nutrition Received: mechanical/dental soft  Last Bowel Movement: 04/25/23  Voiding Characteristics: external catheter    Intake/Output Summary (Last 24 hours) at 4/28/2023 1745  Last data filed at 4/28/2023 1705  Gross per 24 hour   Intake 2469.44 ml   Output 2700 ml   Net -230.56 ml     Unmeasured Output  Urine Occurrence: 1  Pad Count: 3    Labs/Accuchecks:  Recent Labs   Lab 04/28/23 0229   WBC 10.28   RBC 3.70*   HGB 11.4*   HCT 35.5*         Recent Labs   Lab 04/28/23 0229 04/28/23  1038   * 130*   K 3.8 3.9   CO2 21* 24    101   BUN 13 12   CREATININE 0.7 0.7   ALKPHOS 89  --    ALT 12  --    AST 12  --    BILITOT 0.3  --       Recent Labs   Lab 04/24/23  0928   INR 1.0   APTT 26.2    No results for input(s): CPK, CPKMB, TROPONINI, MB in the last 168 hours.    Electrolytes: N/A - electrolytes WDL  Accuchecks: Q6H    Gtts:   sodium chloride 0.9% 100 mL/hr at 04/28/23 1705       LDA/Wounds:  Lines/Drains/Airways       Drain  Duration                  NG/OG Tube 04/25/23 1015 [place] blancas 18  Fr. Right nostril 3 days    Male External Urinary Catheter 04/26/23 0600 2 days              Peripheral Intravenous Line  Duration                  Peripheral IV - Single Lumen 04/24/23 0928 18 G Left;Posterior Forearm 4 days         Peripheral IV - Single Lumen 04/26/23 1218 Anterior;Right Forearm 2 days                  Wounds: No  Wound care consulted: No

## 2023-04-28 NOTE — HOSPITAL COURSE
4/28: speech and swallowing continue to improve with SLP. Respiratory status continuing to improve. Satting well on RA. Na 130 today   4/29: Na stable 130. EEG negative. On dysphagia diet with thick liquids. NGT still in place until PO intake improves.  for hyponatremia   4/30: some delayed speech yesterday, back on EEG. Briskly awake and responding to questions today. On NS for hyponatremia. Na 130  5/1: exam stable, EEG with left temporal lobe seizures. Sodium 130. Tolerating PO, NGT still in place. Satting well on room air  5/3: NAEON, briskly awake and interactive today. Na 133.  5/4: NAEON. Na stable at 133. Neuro exam stable. Patient tolerating >50% meals. BM yesterday. Stable for dc to IPR.     Neuro exam:   General: well developed, well nourished, no distress.   Head: normocephalic  Neurologic: Alert and oriented. Thought content appropriate.  GCS: Motor: 6/Verbal: 5/Eyes: 4 GCS Total: 15  Mental Status: Awake, Alert, Oriented x 4  Language: No aphasia  Speech: No dysarthria  Cranial nerves: face symmetric, tongue midline, CN II-XII grossly intact.   Eyes: pupils equal, round, reactive to light with accommodation, EOMI.   Pulmonary: normal respirations, no signs of respiratory distress  Abdomen: soft, non-distended, not tender to palpation  Skin: Skin is warm, dry and intact.  Sensory: intact to light touch throughout    Motor Strength:Moves all extremities spontaneously with good tone.  Full strength upper and lower extremities. No abnormal movements seen.        Cerebellar:   Finger-to-nose: mild right dysmetria   Pronator drift: absent bilaterally      Cranial incision: Clean, dry, staples intact. Skin edges well approximated. No surrounding erythema or edema. No drainage or TTP.

## 2023-04-28 NOTE — ASSESSMENT & PLAN NOTE
59 y/o M s/p left temporal lobectomy on 4/24  for seizures refractory to prior laser ablation    - ICU status post op, continue ICU care for now for hyponatremia  - Na goal > 135  -Lovenox for DVT prophylaxis   -MRI brain with good resection   - continue 3 week dex taper  -q1 neuro checks   -continue home AEDs  - continue SLP for speech eval  and swallow eval    - Speech improving   - recs for soft diet with thick liquids  -

## 2023-04-28 NOTE — SUBJECTIVE & OBJECTIVE
Review of Systems  Objective:     Vitals:  Temp: 98.6 °F (37 °C)  Pulse: 60  Rhythm: normal sinus rhythm, sinus bradycardia  BP: 133/69  MAP (mmHg): 95  Resp: 11  SpO2: 97 %    Temp  Min: 98.4 °F (36.9 °C)  Max: 98.8 °F (37.1 °C)  Pulse  Min: 46  Max: 74  BP  Min: 125/78  Max: 170/86  MAP (mmHg)  Min: 93  Max: 122  Resp  Min: 6  Max: 20  SpO2  Min: 92 %  Max: 98 %    04/27 0701 - 04/28 0700  In: 2731.2 [P.O.:100; I.V.:2381.2]  Out: 2400 [Urine:2400]   Unmeasured Output  Urine Occurrence: 1  Pad Count: 3       Physical Exam  Vitals and nursing note reviewed.   Constitutional:       General: He is not in acute distress.     Appearance: He is not ill-appearing or diaphoretic.   HENT:      Head:      Comments: Surgical incision to left scalp, C/D/I     Right Ear: External ear normal.      Left Ear: External ear normal.      Nose: Nose normal.      Comments: NGT intact  Eyes:      General: No scleral icterus.        Right eye: No discharge.         Left eye: No discharge.      Extraocular Movements: Extraocular movements intact.      Conjunctiva/sclera: Conjunctivae normal.      Pupils: Pupils are equal, round, and reactive to light.   Cardiovascular:      Rate and Rhythm: Normal rate and regular rhythm.      Heart sounds: Normal heart sounds. No murmur heard.  Pulmonary:      Effort: Pulmonary effort is normal. No respiratory distress.      Breath sounds: Normal breath sounds. No wheezing or rales.   Abdominal:      General: Abdomen is flat. Bowel sounds are normal. There is no distension.      Palpations: Abdomen is soft. There is no mass.      Tenderness: There is no abdominal tenderness.   Musculoskeletal:         General: No swelling or deformity. Normal range of motion.      Cervical back: Normal range of motion and neck supple.      Right lower leg: No edema.      Left lower leg: No edema.   Skin:     General: Skin is warm.      Coloration: Skin is not jaundiced.      Findings: No bruising.   Neurological:       Comments: [id number]     A&O x 2, unable to state proper location or situation  Able to follow all commands     PERRL  EOMI  VFI  Facial motor and sensation intact and equal bilaterally  Tongue midline   Smile symmetric      strength equal bilaterally   Antigravity in BLE equal     Sensation intact and equal bilaterally in all extremities     Unable to test memory, judgment, insight, fund of knowledge, hearing, shoulder shrug, tongue protrusion, coordination, gait due to level of consciousness.    Medications:  Continuoussodium chloride 0.9%, Last Rate: 100 mL/hr at 04/28/23 1505    ScheduledcloBAZam, 40 mg, QHS  dexAMETHasone, 4 mg, Q8H  enoxaparin, 40 mg, Daily  EScitalopram oxalate, 10 mg, Daily  [START ON 4/29/2023] eslicarbazepine, 1,200 mg, Daily  famotidine, 20 mg, BID  polyethylene glycol, 17 g, Daily  senna-docusate 8.6-50 mg, 1 tablet, BID  zonisamide, 500 mg, QHS    PRNacetaminophen, 650 mg, Q4H PRN  dextrose 10%, 12.5 g, PRN  dextrose 10%, 25 g, PRN  hydrALAZINE, 10 mg, Q6H PRN  HYDROcodone-acetaminophen, 1 tablet, Q6H PRN  ondansetron, 4 mg, Q12H PRN      Today I personally reviewed pertinent medications, lines/drains/airways, imaging, laboratory results, notably:

## 2023-04-29 LAB
ALBUMIN SERPL BCP-MCNC: 2.9 G/DL (ref 3.5–5.2)
ALP SERPL-CCNC: 96 U/L (ref 55–135)
ALT SERPL W/O P-5'-P-CCNC: 30 U/L (ref 10–44)
ANION GAP SERPL CALC-SCNC: 7 MMOL/L (ref 8–16)
AST SERPL-CCNC: 23 U/L (ref 10–40)
BASOPHILS # BLD AUTO: 0.02 K/UL (ref 0–0.2)
BASOPHILS NFR BLD: 0.2 % (ref 0–1.9)
BILIRUB SERPL-MCNC: 0.3 MG/DL (ref 0.1–1)
BUN SERPL-MCNC: 11 MG/DL (ref 6–20)
CALCIUM SERPL-MCNC: 8.3 MG/DL (ref 8.7–10.5)
CHLORIDE SERPL-SCNC: 103 MMOL/L (ref 95–110)
CO2 SERPL-SCNC: 20 MMOL/L (ref 23–29)
CORTIS SERPL-MCNC: <1 UG/DL (ref 4.3–22.4)
CREAT SERPL-MCNC: 0.6 MG/DL (ref 0.5–1.4)
DIFFERENTIAL METHOD: ABNORMAL
EOSINOPHIL # BLD AUTO: 0 K/UL (ref 0–0.5)
EOSINOPHIL NFR BLD: 0.2 % (ref 0–8)
ERYTHROCYTE [DISTWIDTH] IN BLOOD BY AUTOMATED COUNT: 13.2 % (ref 11.5–14.5)
EST. GFR  (NO RACE VARIABLE): >60 ML/MIN/1.73 M^2
GLUCOSE SERPL-MCNC: 117 MG/DL (ref 70–110)
HCT VFR BLD AUTO: 35.6 % (ref 40–54)
HGB BLD-MCNC: 11.5 G/DL (ref 14–18)
IMM GRANULOCYTES # BLD AUTO: 0.08 K/UL (ref 0–0.04)
IMM GRANULOCYTES NFR BLD AUTO: 0.8 % (ref 0–0.5)
LYMPHOCYTES # BLD AUTO: 1.2 K/UL (ref 1–4.8)
LYMPHOCYTES NFR BLD: 11.6 % (ref 18–48)
MAGNESIUM SERPL-MCNC: 1.7 MG/DL (ref 1.6–2.6)
MCH RBC QN AUTO: 30.8 PG (ref 27–31)
MCHC RBC AUTO-ENTMCNC: 32.3 G/DL (ref 32–36)
MCV RBC AUTO: 95 FL (ref 82–98)
MONOCYTES # BLD AUTO: 0.8 K/UL (ref 0.3–1)
MONOCYTES NFR BLD: 8.2 % (ref 4–15)
NEUTROPHILS # BLD AUTO: 8.1 K/UL (ref 1.8–7.7)
NEUTROPHILS NFR BLD: 79 % (ref 38–73)
NRBC BLD-RTO: 0 /100 WBC
PHOSPHATE SERPL-MCNC: 3.3 MG/DL (ref 2.7–4.5)
PLATELET # BLD AUTO: 324 K/UL (ref 150–450)
PMV BLD AUTO: 9.3 FL (ref 9.2–12.9)
POTASSIUM SERPL-SCNC: 3.8 MMOL/L (ref 3.5–5.1)
PROT SERPL-MCNC: 5.9 G/DL (ref 6–8.4)
RBC # BLD AUTO: 3.73 M/UL (ref 4.6–6.2)
SODIUM SERPL-SCNC: 130 MMOL/L (ref 136–145)
TSH SERPL DL<=0.005 MIU/L-ACNC: 1.25 UIU/ML (ref 0.4–4)
WBC # BLD AUTO: 10.2 K/UL (ref 3.9–12.7)

## 2023-04-29 PROCEDURE — 25000003 PHARM REV CODE 250: Performed by: NURSE PRACTITIONER

## 2023-04-29 PROCEDURE — 63600175 PHARM REV CODE 636 W HCPCS: Performed by: STUDENT IN AN ORGANIZED HEALTH CARE EDUCATION/TRAINING PROGRAM

## 2023-04-29 PROCEDURE — 25000003 PHARM REV CODE 250: Performed by: STUDENT IN AN ORGANIZED HEALTH CARE EDUCATION/TRAINING PROGRAM

## 2023-04-29 PROCEDURE — 94761 N-INVAS EAR/PLS OXIMETRY MLT: CPT

## 2023-04-29 PROCEDURE — 85025 COMPLETE CBC W/AUTO DIFF WBC: CPT

## 2023-04-29 PROCEDURE — 80053 COMPREHEN METABOLIC PANEL: CPT

## 2023-04-29 PROCEDURE — 95720 EEG PHY/QHP EA INCR W/VEEG: CPT | Mod: ,,, | Performed by: PSYCHIATRY & NEUROLOGY

## 2023-04-29 PROCEDURE — 63600175 PHARM REV CODE 636 W HCPCS: Performed by: NURSE PRACTITIONER

## 2023-04-29 PROCEDURE — 83735 ASSAY OF MAGNESIUM: CPT

## 2023-04-29 PROCEDURE — 99291 CRITICAL CARE FIRST HOUR: CPT | Mod: ,,,

## 2023-04-29 PROCEDURE — 95720 PR EEG, W/VIDEO, CONT RECORD, I&R, >12<26 HRS: ICD-10-PCS | Mod: ,,, | Performed by: PSYCHIATRY & NEUROLOGY

## 2023-04-29 PROCEDURE — 95714 VEEG EA 12-26 HR UNMNTR: CPT

## 2023-04-29 PROCEDURE — 84100 ASSAY OF PHOSPHORUS: CPT

## 2023-04-29 PROCEDURE — 84443 ASSAY THYROID STIM HORMONE: CPT

## 2023-04-29 PROCEDURE — 82533 TOTAL CORTISOL: CPT

## 2023-04-29 PROCEDURE — 99900035 HC TECH TIME PER 15 MIN (STAT)

## 2023-04-29 PROCEDURE — 20000000 HC ICU ROOM

## 2023-04-29 PROCEDURE — 99291 PR CRITICAL CARE, E/M 30-74 MINUTES: ICD-10-PCS | Mod: ,,,

## 2023-04-29 PROCEDURE — 25000003 PHARM REV CODE 250: Performed by: PHYSICIAN ASSISTANT

## 2023-04-29 RX ADMIN — DEXAMETHASONE 4 MG: 4 TABLET ORAL at 06:04

## 2023-04-29 RX ADMIN — ENOXAPARIN SODIUM 40 MG: 40 INJECTION SUBCUTANEOUS at 05:04

## 2023-04-29 RX ADMIN — HYDRALAZINE HYDROCHLORIDE 10 MG: 20 INJECTION, SOLUTION INTRAMUSCULAR; INTRAVENOUS at 06:04

## 2023-04-29 RX ADMIN — ONDANSETRON 4 MG: 2 INJECTION INTRAMUSCULAR; INTRAVENOUS at 07:04

## 2023-04-29 RX ADMIN — HYDROCODONE BITARTRATE AND ACETAMINOPHEN 1 TABLET: 7.5; 325 TABLET ORAL at 08:04

## 2023-04-29 RX ADMIN — ESLICARBAZEPINE ACETATE 1200 MG: 400 TABLET ORAL at 08:04

## 2023-04-29 RX ADMIN — CLOBAZAM 40 MG: 10 TABLET ORAL at 08:04

## 2023-04-29 RX ADMIN — ACETAMINOPHEN 650 MG: 325 TABLET ORAL at 08:04

## 2023-04-29 RX ADMIN — SENNOSIDES AND DOCUSATE SODIUM 1 TABLET: 50; 8.6 TABLET ORAL at 08:04

## 2023-04-29 RX ADMIN — HYDRALAZINE HYDROCHLORIDE 10 MG: 20 INJECTION, SOLUTION INTRAMUSCULAR; INTRAVENOUS at 11:04

## 2023-04-29 RX ADMIN — FAMOTIDINE 20 MG: 20 TABLET ORAL at 08:04

## 2023-04-29 RX ADMIN — ESCITALOPRAM OXALATE 10 MG: 10 TABLET ORAL at 08:04

## 2023-04-29 RX ADMIN — ZONISAMIDE 500 MG: 100 CAPSULE ORAL at 08:04

## 2023-04-29 RX ADMIN — POLYETHYLENE GLYCOL 3350 17 G: 17 POWDER, FOR SOLUTION ORAL at 08:04

## 2023-04-29 RX ADMIN — DEXAMETHASONE 4 MG: 4 TABLET ORAL at 02:04

## 2023-04-29 RX ADMIN — DEXAMETHASONE 4 MG: 4 TABLET ORAL at 09:04

## 2023-04-29 RX ADMIN — ACETAMINOPHEN 650 MG: 325 TABLET ORAL at 05:04

## 2023-04-29 NOTE — ASSESSMENT & PLAN NOTE
Na at 131 on 4/25  - urine studies suggest hypovolemic hyponatremia   - serum Na improved with IVF further confirming diagnosis    Na 130 on 4/28  - repeated urine studies  - UA, urine studies   - am cortisol <1.00  - TSH WNL  - continue IVF at 100 cc/hr  - monitor Na BID  - goal Na > 135  - consider Na tablets  - may need adjustments to anti-epileptics outpatient

## 2023-04-29 NOTE — PROCEDURES
24 hr. Video EEG Monitoring    Date/Time: 4/24/2023 8:16 AM  Performed by: Johnnie Gaston MD  Authorized by: Magda Weiss NP     ICU EEG/VIDEO MONITORING REPORT    DATE OF SERVICE: 4/28/23-4/29/23  EEG NUMBER: [id number]  REQUESTED BY: Sam  [place] [facility] [place]OLOGY   Electroencephalographic (EEG) recording is with electrodes placed according to the International 10-20 placement system.  Thirty two (32) channels of digital signal are simultaneously recorded from the scalp and may include EKG, EMG, and/or eye monitors.   Recording band pass was 0.1 to 512 hz.  Digital video recording of the patient is simultaneously recorded with the EEG.  The nursing staff report clinical symptoms and may press an event button when the patient has symptoms of clinical interest to the treating physicians.  EEG and video recording is stored and archived in digital format.  The entire recording is visually reviewed and the times identified by computer analysis as being spikes or seizures are reviewed again.  Activation procedures which include photic stimulation, hyperventilation and instructing patients to perform simple task are done in selected patients.   Compresses spectral analysis (CSA) is also performed on the activity recorded from each individual channel.  This is displayed as a power display of frequencies from 0 to 30 Hz over time.   The CSA analysis is done and displayed continuously.  This is reviewed for asymmetries in power between homologous areas of the scalp and for presence of changes in power which canbe seen when seizures occur.  Sections of suspected abnormalities on the CSA is then compared with the original EEG recording.     Thrill software was also utilized in the review of this study.  This software suite analyzes the EEG recording in multiple domains.  Coherence and rhythmicity is computed to identify EEG sections which may contain organized seizures.  Each channel undergoes analysis  to detect presence of spike and sharp waves which have special and morphological characteristic of epileptic activity.  The routine EEG recording is converted from spacial into frequency domain.  This is then displayed comparing homologous areas to identify areas of significant asymmetry.  Algorithm to identify non-cortically generated artifact is used to separate eye movement, EMG and other artifact from the EEG.      Recording Times  Start on 4/28/23 at 18:09:11  Stop on 4/28/23 at 19:23:15  Start on 4/28/23 at 19:58:09  Stop on 4/29/23 at 07:00:02  A total of 12 hours and 14 minutes of EEG was recorded.    EEG FINDINGS  The record shows a fair  organization at rest, consisting of a 8-9 Hz posterior dominant rhythm with good  reactivity. There is mild bilateral beta activity. There is frequent bilateral theta range background slowing. There is further delta and theta range focal slowing over the left hemisphere. Breach artifact is seen over the left hemisphere.     Drowsiness is characterized by attenuation of the background, vertex waves, and bilateral theta slowing. Stage II sleep is characterized by slowing, vertex waves, and symmetric sleep spindles.    Provocative maneuvers including hyperventilation and photic stimulation were not performed.     EKG recording shows a regular rhythm.    There is no push button or clinical event.    IMPRESSION:  Abnormal study due to mild  diffuse background slowing consistent with diffuse cerebral dysfunction and encephalopathy which may be on the basis of toxic, metabolic, or primary neuronal disorder.  Further focal slowing over the left hemisphere is consistent with underlying structural defect. Breach artifact is seen over the region of the patient's know skull defect.    Johnnie Gaston MD  Department of Neurology  [facility]
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
## 2023-04-29 NOTE — SUBJECTIVE & OBJECTIVE
Review of Systems   Constitutional:  Positive for fatigue. Negative for fever.   HENT:  Negative for drooling and trouble swallowing.    Eyes:  Negative for photophobia and visual disturbance.   Respiratory:  Negative for shortness of breath.    Cardiovascular:  Negative for chest pain and palpitations.   Gastrointestinal:  Negative for abdominal distention, abdominal pain, constipation, diarrhea, nausea and vomiting.   Endocrine: Negative for polyuria.   Genitourinary:  Negative for difficulty urinating.   Musculoskeletal:  Negative for neck pain.   Skin:  Negative for pallor.   Neurological:  Positive for headaches. Negative for dizziness, speech difficulty, weakness, light-headedness and numbness.   Psychiatric/Behavioral:  Positive for decreased concentration. Negative for confusion.    Objective:     Vitals:  Temp: 98.7 °F (37.1 °C)  Pulse: (!) 56  Rhythm: sinus bradycardia  BP: (!) 150/74  MAP (mmHg): 103  Resp: 12  SpO2: (!) 93 %    Temp  Min: 98.4 °F (36.9 °C)  Max: 98.8 °F (37.1 °C)  Pulse  Min: 49  Max: 83  BP  Min: 138/77  Max: 166/83  MAP (mmHg)  Min: 93  Max: 122  Resp  Min: 11  Max: 18  SpO2  Min: 93 %  Max: 98 %    04/28 0701 - 04/29 0700  In: 2434.9 [I.V.:2334.9]  Out: 3000 [Urine:3000]   Unmeasured Output  Urine Occurrence: 1  Pad Count: 2       Physical Exam  Vitals and nursing note reviewed.   Constitutional:       General: He is not in acute distress.     Appearance: He is not ill-appearing or diaphoretic.   HENT:      Head:      Comments: Surgical incision to left scalp, C/D/I     Right Ear: External ear normal.      Left Ear: External ear normal.      Nose: Nose normal.      Comments: NGT      Mouth/Throat:      Mouth: Mucous membranes are moist.      Pharynx: Oropharynx is clear.   Eyes:      General: No scleral icterus.        Right eye: No discharge.         Left eye: No discharge.      Extraocular Movements: Extraocular movements intact.      Conjunctiva/sclera: Conjunctivae normal.       Pupils: Pupils are equal, round, and reactive to light.   Cardiovascular:      Rate and Rhythm: Normal rate and regular rhythm.      Heart sounds: Normal heart sounds. No murmur heard.  Pulmonary:      Effort: Pulmonary effort is normal. No respiratory distress.      Breath sounds: Normal breath sounds. No wheezing or rales.   Abdominal:      General: Abdomen is flat. Bowel sounds are normal. There is no distension.      Palpations: Abdomen is soft. There is no mass.      Tenderness: There is no abdominal tenderness.   Musculoskeletal:         General: No swelling or deformity. Normal range of motion.      Cervical back: Normal range of motion and neck supple.      Right lower leg: No edema.      Left lower leg: No edema.   Skin:     General: Skin is warm.      Coloration: Skin is not jaundiced.      Findings: No bruising.   Neurological:      Comments: E3 V5 M6  Oriented x3. Speech fluent- no dysarthria or aphasia (has heavy accent). Following commands.   CN II-XII grossly intact, specifically:  PERRLA. EOMI.  No facial asymmetry. Facial sensation intact in V1-V3.  Tongue midline. Shoulder shrug equal and symmetric.  Motor:  RONDA and AG   Sensation intact and symmetric throughout      Unable to test memory, judgment, insight, fund of knowledge, hearing, shoulder shrug, tongue protrusion, coordination, gait due to level of consciousness.    Medications:  Continuoussodium chloride 0.9%, Last Rate: 100 mL/hr at 04/29/23 1200    ScheduledcloBAZam, 40 mg, QHS  dexAMETHasone, 4 mg, Q8H  enoxaparin, 40 mg, Daily  EScitalopram oxalate, 10 mg, Daily  eslicarbazepine, 1,200 mg, Daily  famotidine, 20 mg, BID  polyethylene glycol, 17 g, Daily  senna-docusate 8.6-50 mg, 1 tablet, BID  zonisamide, 500 mg, QHS    PRNacetaminophen, 650 mg, Q4H PRN  dextrose 10%, 12.5 g, PRN  dextrose 10%, 25 g, PRN  hydrALAZINE, 10 mg, Q6H PRN  HYDROcodone-acetaminophen, 1 tablet, Q6H PRN  ondansetron, 4 mg, Q12H PRN      Today I personally  reviewed pertinent medications, lines/drains/airways, imaging, laboratory results, notably:

## 2023-04-29 NOTE — ASSESSMENT & PLAN NOTE
Pt w adult-onset non-lesional focal-onset intractable epilepsy (onset at 47 yo in 2013) s/p left temporal lobectomy today (4/24)  Home medications: eslicarbazepine 800 mg qd, zonisamide 500mg qd, and clobazam 40mg qhs  - MRI non-lesional  - EMU monitoring last year suggests left-sided activity  - ICU admissions for sEEG/depth electrodes localized the seizures to L amygdala and hippocampus  - refractory to prior laser ablation of left amygdala and hippocampus    Plan:  - Admit to [facility]  - Q2h neurochecks   - Q1h vitals while in ICU  - HOB@30  - continue home AED's   - eslicarbazepine 1200 mg qd   - zonisamide 500mg qd   - clobazam 40mg qhs  - dexamethasone 4mg q8h, taper over 3 weeks   - protonix 40mg daily for ulcer ppx  - MRI (4/26): craniotomy w partial resection of anterior L temporal lobe w small extra-axial collection underlying the craniotomy site, no new mass/hemorrhage/edema/infarct/hydrocephalus  - SBP <140mmHg, off cardene gtt  - TTE wnl  - Maintaining sats on RA  - Daily CMP, Mag, Phos - replete electrolytes PRN  - Lipid panel, A1c reviewed   - Coags reviewed  - SLP cleared pt for dysphagia diet  - Strict I/Os  - Daily CBC, transfuse PRN  - lovenox 40mg for ppx    - f/u PT/OT/SLP  - CM/SW consult for dispo planning    Continue to follow clinically and notify NSGY immediately if any neurostatus change

## 2023-04-29 NOTE — PLAN OF CARE
[facility] Care Plan    POC reviewed with Declan Burleson and family at 1100. Pt  and wife verbalized understanding. Questions and concerns addressed. No acute events today. Pt progressing toward goals. Will continue to monitor. See below and flowsheets for full assessment and VS info. EEG remains in place, drowsy but easily awakens and no hallucinations or seizure activity noted today this shift.            Is this a stroke patient? no    Neuro:  [place] Coma Scale  Best Eye Response: 3-->(E3) to speech  Best Motor Response: 6-->(M6) obeys commands  Best Verbal Response: 4-->(V4) confused  [place] Coma Scale Score: 13  Assessment Qualifiers: patient not sedated/intubated  Pupil PERRLA: yes     24 hr Temp:  [97.6 °F (36.4 °C)-98.8 °F (37.1 °C)]     CV:   Rhythm: sinus bradycardia  BP goals:   SBP < 160  MAP > 65    Resp:           Plan: N/A    GI/:     Diet/Nutrition Received: mechanical/dental soft  Last Bowel Movement: 04/27/23 (per wife pt had BM on bedpan 2 days ago)  Voiding Characteristics: external catheter    Intake/Output Summary (Last 24 hours) at 4/29/2023 1816  Last data filed at 4/29/2023 1800  Gross per 24 hour   Intake 2605.33 ml   Output 3350 ml   Net -744.67 ml     Unmeasured Output  Urine Occurrence: 1  Stool Occurrence: 0  Pad Count: 2    Labs/Accuchecks:  Recent Labs   Lab 04/29/23  0115   WBC 10.20   RBC 3.73*   HGB 11.5*   HCT 35.6*         Recent Labs   Lab 04/29/23  0115   *   K 3.8   CO2 20*      BUN 11   CREATININE 0.6   ALKPHOS 96   ALT 30   AST 23   BILITOT 0.3      Recent Labs   Lab 04/24/23  0928   INR 1.0   APTT 26.2    No results for input(s): CPK, CPKMB, TROPONINI, MB in the last 168 hours.    Electrolytes: N/A - electrolytes WDL  Accuchecks: none    Gtts:   sodium chloride 0.9% 100 mL/hr at 04/29/23 1800       LDA/Wounds:  Lines/Drains/Airways       Drain  Duration                  NG/OG Tube 04/25/23 1015 [place] sump 18 Fr. Right nostril 4 days    Male External  Urinary Catheter 04/26/23 0600 3 days              Peripheral Intravenous Line  Duration                  Peripheral IV - Single Lumen 04/26/23 1218 Anterior;Right Forearm 3 days         Peripheral IV - Single Lumen 04/29/23 0620 20 G Left Antecubital <1 day                  Wounds: Yes  Wound care consulted: No

## 2023-04-29 NOTE — PLAN OF CARE
[facility] Care Plan    POC reviewed with Declan Burleson and spouse at 0300. Pt verbalized understanding. Questions and concerns addressed. No acute events overnight. Pt progressing toward goals. Will continue to monitor. See below and flowsheets for full assessment and VS info.       Is this a stroke patient? no    Neuro:  [place] Coma Scale  Best Eye Response: 4-->(E4) spontaneous  Best Motor Response: 6-->(M6) obeys commands  Best Verbal Response: 4-->(V4) confused  [place] Coma Scale Score: 14  Assessment Qualifiers: patient not sedated/intubated  Pupil PERRLA: yes     24hr Temp:  [98.4 °F (36.9 °C)-98.8 °F (37.1 °C)]     CV:   Rhythm: normal sinus rhythm  BP goals:   SBP < 160  MAP > 65    Resp:           Plan: N/A    GI/:     Diet/Nutrition Received: mechanical/dental soft  Last Bowel Movement: 04/25/23  Voiding Characteristics: external catheter    Intake/Output Summary (Last 24 hours) at 4/29/2023 0345  Last data filed at 4/29/2023 0301  Gross per 24 hour   Intake 2434.55 ml   Output 2100 ml   Net 334.55 ml     Unmeasured Output  Urine Occurrence: 1  Pad Count: 3    Labs/Accuchecks:  Recent Labs   Lab 04/29/23  0115   WBC 10.20   RBC 3.73*   HGB 11.5*   HCT 35.6*         Recent Labs   Lab 04/29/23  0115   *   K 3.8   CO2 20*      BUN 11   CREATININE 0.6   ALKPHOS 96   ALT 30   AST 23   BILITOT 0.3      Recent Labs   Lab 04/24/23  0928   INR 1.0   APTT 26.2    No results for input(s): CPK, CPKMB, TROPONINI, MB in the last 168 hours.    Electrolytes: No replacement orders  Accuchecks: ACHS    Gtts:   sodium chloride 0.9% 100 mL/hr at 04/29/23 0301       LDA/Wounds:  Lines/Drains/Airways       Drain  Duration                  NG/OG Tube 04/25/23 1015 [place] sump 18 Fr. Right nostril 3 days    Male External Urinary Catheter 04/26/23 0600 2 days              Peripheral Intravenous Line  Duration                  Peripheral IV - Single Lumen 04/24/23 0928 18 G Left;Posterior Forearm 4 days          Peripheral IV - Single Lumen 04/26/23 1218 Anterior;Right Forearm 2 days                  Wounds: Yes  Wound care consulted: Yes      Problem: Adult Inpatient Plan of Care  Goal: Plan of Care Review  Outcome: Ongoing, Progressing  Goal: Patient-Specific Goal (Individualized)  Outcome: Ongoing, Progressing  Goal: Readiness for Transition of Care  Outcome: Ongoing, Progressing     Problem: Infection  Goal: Absence of Infection Signs and Symptoms  Outcome: Ongoing, Progressing     Problem: Skin Injury Risk Increased  Goal: Skin Health and Integrity  Outcome: Ongoing, Progressing

## 2023-04-30 LAB
ALBUMIN SERPL BCP-MCNC: 3.2 G/DL (ref 3.5–5.2)
ALP SERPL-CCNC: 110 U/L (ref 55–135)
ALT SERPL W/O P-5'-P-CCNC: 24 U/L (ref 10–44)
ANION GAP SERPL CALC-SCNC: 9 MMOL/L (ref 8–16)
AST SERPL-CCNC: 13 U/L (ref 10–40)
BASOPHILS # BLD AUTO: 0.05 K/UL (ref 0–0.2)
BASOPHILS NFR BLD: 0.5 % (ref 0–1.9)
BILIRUB SERPL-MCNC: 0.5 MG/DL (ref 0.1–1)
BUN SERPL-MCNC: 10 MG/DL (ref 6–20)
CALCIUM SERPL-MCNC: 9 MG/DL (ref 8.7–10.5)
CHLORIDE SERPL-SCNC: 100 MMOL/L (ref 95–110)
CO2 SERPL-SCNC: 21 MMOL/L (ref 23–29)
CREAT SERPL-MCNC: 0.7 MG/DL (ref 0.5–1.4)
DIFFERENTIAL METHOD: ABNORMAL
EOSINOPHIL # BLD AUTO: 0 K/UL (ref 0–0.5)
EOSINOPHIL NFR BLD: 0.3 % (ref 0–8)
ERYTHROCYTE [DISTWIDTH] IN BLOOD BY AUTOMATED COUNT: 12.9 % (ref 11.5–14.5)
EST. GFR  (NO RACE VARIABLE): >60 ML/MIN/1.73 M^2
GLUCOSE SERPL-MCNC: 111 MG/DL (ref 70–110)
HCT VFR BLD AUTO: 38.9 % (ref 40–54)
HGB BLD-MCNC: 13.1 G/DL (ref 14–18)
IMM GRANULOCYTES # BLD AUTO: 0.1 K/UL (ref 0–0.04)
IMM GRANULOCYTES NFR BLD AUTO: 1 % (ref 0–0.5)
LYMPHOCYTES # BLD AUTO: 1.3 K/UL (ref 1–4.8)
LYMPHOCYTES NFR BLD: 12.3 % (ref 18–48)
MAGNESIUM SERPL-MCNC: 1.7 MG/DL (ref 1.6–2.6)
MCH RBC QN AUTO: 31.1 PG (ref 27–31)
MCHC RBC AUTO-ENTMCNC: 33.7 G/DL (ref 32–36)
MCV RBC AUTO: 92 FL (ref 82–98)
MONOCYTES # BLD AUTO: 1 K/UL (ref 0.3–1)
MONOCYTES NFR BLD: 9.7 % (ref 4–15)
NEUTROPHILS # BLD AUTO: 8 K/UL (ref 1.8–7.7)
NEUTROPHILS NFR BLD: 76.2 % (ref 38–73)
NRBC BLD-RTO: 0 /100 WBC
PHOSPHATE SERPL-MCNC: 4.3 MG/DL (ref 2.7–4.5)
PLATELET # BLD AUTO: 367 K/UL (ref 150–450)
PMV BLD AUTO: 9.3 FL (ref 9.2–12.9)
POTASSIUM SERPL-SCNC: 3.7 MMOL/L (ref 3.5–5.1)
PROT SERPL-MCNC: 6.7 G/DL (ref 6–8.4)
RBC # BLD AUTO: 4.21 M/UL (ref 4.6–6.2)
SODIUM SERPL-SCNC: 130 MMOL/L (ref 136–145)
WBC # BLD AUTO: 10.46 K/UL (ref 3.9–12.7)

## 2023-04-30 PROCEDURE — 25000003 PHARM REV CODE 250: Performed by: NURSE PRACTITIONER

## 2023-04-30 PROCEDURE — 94761 N-INVAS EAR/PLS OXIMETRY MLT: CPT

## 2023-04-30 PROCEDURE — 85025 COMPLETE CBC W/AUTO DIFF WBC: CPT

## 2023-04-30 PROCEDURE — 80053 COMPREHEN METABOLIC PANEL: CPT

## 2023-04-30 PROCEDURE — 63600175 PHARM REV CODE 636 W HCPCS: Performed by: NURSE PRACTITIONER

## 2023-04-30 PROCEDURE — 95720 EEG PHY/QHP EA INCR W/VEEG: CPT | Mod: ,,, | Performed by: PSYCHIATRY & NEUROLOGY

## 2023-04-30 PROCEDURE — 84100 ASSAY OF PHOSPHORUS: CPT

## 2023-04-30 PROCEDURE — 20000000 HC ICU ROOM

## 2023-04-30 PROCEDURE — 99291 PR CRITICAL CARE, E/M 30-74 MINUTES: ICD-10-PCS | Mod: ,,,

## 2023-04-30 PROCEDURE — 25000003 PHARM REV CODE 250: Performed by: STUDENT IN AN ORGANIZED HEALTH CARE EDUCATION/TRAINING PROGRAM

## 2023-04-30 PROCEDURE — 95714 VEEG EA 12-26 HR UNMNTR: CPT

## 2023-04-30 PROCEDURE — 83735 ASSAY OF MAGNESIUM: CPT

## 2023-04-30 PROCEDURE — 25000003 PHARM REV CODE 250: Performed by: PHYSICIAN ASSISTANT

## 2023-04-30 PROCEDURE — 95720 PR EEG, W/VIDEO, CONT RECORD, I&R, >12<26 HRS: ICD-10-PCS | Mod: ,,, | Performed by: PSYCHIATRY & NEUROLOGY

## 2023-04-30 PROCEDURE — 99291 CRITICAL CARE FIRST HOUR: CPT | Mod: ,,,

## 2023-04-30 PROCEDURE — 63600175 PHARM REV CODE 636 W HCPCS: Performed by: STUDENT IN AN ORGANIZED HEALTH CARE EDUCATION/TRAINING PROGRAM

## 2023-04-30 PROCEDURE — 99900035 HC TECH TIME PER 15 MIN (STAT)

## 2023-04-30 RX ADMIN — SODIUM CHLORIDE 100 ML/HR: 9 INJECTION, SOLUTION INTRAVENOUS at 09:04

## 2023-04-30 RX ADMIN — POLYETHYLENE GLYCOL 3350 17 G: 17 POWDER, FOR SOLUTION ORAL at 09:04

## 2023-04-30 RX ADMIN — HYDROCODONE BITARTRATE AND ACETAMINOPHEN 1 TABLET: 7.5; 325 TABLET ORAL at 09:04

## 2023-04-30 RX ADMIN — SENNOSIDES AND DOCUSATE SODIUM 1 TABLET: 50; 8.6 TABLET ORAL at 09:04

## 2023-04-30 RX ADMIN — ESLICARBAZEPINE ACETATE 1200 MG: 400 TABLET ORAL at 09:04

## 2023-04-30 RX ADMIN — FAMOTIDINE 20 MG: 20 TABLET ORAL at 08:04

## 2023-04-30 RX ADMIN — FAMOTIDINE 20 MG: 20 TABLET ORAL at 09:04

## 2023-04-30 RX ADMIN — DEXAMETHASONE 4 MG: 4 TABLET ORAL at 05:04

## 2023-04-30 RX ADMIN — ACETAMINOPHEN 650 MG: 325 TABLET ORAL at 02:04

## 2023-04-30 RX ADMIN — ACETAMINOPHEN 650 MG: 325 TABLET ORAL at 09:04

## 2023-04-30 RX ADMIN — ESCITALOPRAM OXALATE 10 MG: 10 TABLET ORAL at 09:04

## 2023-04-30 RX ADMIN — CLOBAZAM 40 MG: 10 TABLET ORAL at 08:04

## 2023-04-30 RX ADMIN — DEXAMETHASONE 4 MG: 4 TABLET ORAL at 09:04

## 2023-04-30 RX ADMIN — ZONISAMIDE 500 MG: 100 CAPSULE ORAL at 08:04

## 2023-04-30 RX ADMIN — DEXAMETHASONE 4 MG: 4 TABLET ORAL at 02:04

## 2023-04-30 RX ADMIN — SENNOSIDES AND DOCUSATE SODIUM 1 TABLET: 50; 8.6 TABLET ORAL at 08:04

## 2023-04-30 RX ADMIN — ENOXAPARIN SODIUM 40 MG: 40 INJECTION SUBCUTANEOUS at 05:04

## 2023-04-30 NOTE — PLAN OF CARE
[facility] Care Plan    POC reviewed with Declan Burleson and family at 1400. Pt verbalized understanding. Questions and concerns addressed. No acute events today. Pt progressing toward goals. Will continue to monitor. See below and flowsheets for full assessment and VS info. Pt remains on EEG but no seizure activity noted today, much more alert and better po intake.            Is this a stroke patient? no    Neuro:  [place] Coma Scale  Best Eye Response: 3-->(E3) to speech  Best Motor Response: 6-->(M6) obeys commands  Best Verbal Response: 5-->(V5) oriented  [place] Coma Scale Score: 14  Assessment Qualifiers: patient not sedated/intubated  Pupil PERRLA: yes     24 hr Temp:  [98.2 °F (36.8 °C)-98.6 °F (37 °C)]     CV:   Rhythm: sinus bradycardia  BP goals:   SBP < 160  MAP > 65    Resp:           Plan: N/A    GI/:     Diet/Nutrition Received: mechanical/dental soft  Last Bowel Movement: 04/27/23 (per wife pt had BM on bedpan 2 days ago)  Voiding Characteristics: external catheter    Intake/Output Summary (Last 24 hours) at 4/30/2023 1732  Last data filed at 4/30/2023 1600  Gross per 24 hour   Intake 2423.99 ml   Output 3700 ml   Net -1276.01 ml     Unmeasured Output  Urine Occurrence: 1  Stool Occurrence: 0  Pad Count: 2    Labs/Accuchecks:  Recent Labs   Lab 04/30/23  0127   WBC 10.46   RBC 4.21*   HGB 13.1*   HCT 38.9*         Recent Labs   Lab 04/30/23  0127   *   K 3.7   CO2 21*      BUN 10   CREATININE 0.7   ALKPHOS 110   ALT 24   AST 13   BILITOT 0.5      Recent Labs   Lab 04/24/23  0928   INR 1.0   APTT 26.2    No results for input(s): CPK, CPKMB, TROPONINI, MB in the last 168 hours.    Electrolytes: No replacement orders  Accuchecks: none    Gtts:   sodium chloride 0.9% 50 mL/hr at 04/30/23 1600       LDA/Wounds:  Lines/Drains/Airways       Drain  Duration                  NG/OG Tube 04/25/23 1015 [place] sump 18 Fr. Right nostril 5 days    Male External Urinary Catheter 04/26/23 0600 4  days              Peripheral Intravenous Line  Duration                  Peripheral IV - Single Lumen 04/26/23 1218 Anterior;Right Forearm 4 days         Peripheral IV - Single Lumen 04/29/23 0620 20 G Left Antecubital 1 day                  Wounds: Yes  Wound care consulted: No

## 2023-04-30 NOTE — SUBJECTIVE & OBJECTIVE
"Interval History: 4/30: some delayed speech yesterday, back on EEG. Briskly awake and responding to questions today. On NS for hyponatremia. Na 130    Medications:  Continuous Infusions:   sodium chloride 0.9% 100 mL/hr at 04/30/23 0601     Scheduled Meds:   cloBAZam  40 mg Oral QHS    dexAMETHasone  4 mg Oral Q8H    enoxaparin  40 mg Subcutaneous Daily    EScitalopram oxalate  10 mg Oral Daily    eslicarbazepine  1,200 mg Oral Daily    famotidine  20 mg Per NG tube BID    polyethylene glycol  17 g Oral Daily    senna-docusate 8.6-50 mg  1 tablet Per NG tube BID    zonisamide  500 mg Oral QHS     PRN Meds:acetaminophen, dextrose 10%, dextrose 10%, hydrALAZINE, HYDROcodone-acetaminophen, ondansetron     Review of Systems  Objective:     Weight: 89.8 kg (198 lb)  Body mass index is 27.62 kg/m².  Vital Signs (Most Recent):  Temp: 98.4 °F (36.9 °C) (04/30/23 0305)  Pulse: (!) 52 (04/30/23 0600)  Resp: 13 (04/30/23 0600)  BP: (!) 157/91 (04/30/23 0600)  SpO2: (!) 94 % (04/30/23 0600)   Vital Signs (24h Range):  Temp:  [97.6 °F (36.4 °C)-98.7 °F (37.1 °C)] 98.4 °F (36.9 °C)  Pulse:  [42-74] 52  Resp:  [10-20] 13  SpO2:  [93 %-97 %] 94 %  BP: (138-186)/(74-95) 157/91                            Urethral Catheter 04/24/23 1100 Silicone 16 Fr. (Active)   Site Assessment Clean;Intact 04/25/23 0303   Collection Container Urimeter 04/25/23 0303   Securement Method secured to top of thigh w/ adhesive device 04/25/23 0303   Catheter Care Performed yes 04/25/23 0303   Reason for Continuing Urinary Catheterization Post operative 04/25/23 0303   CAUTI Prevention Bundle Securement Device in place with 1" slack;Intact seal between catheter & drainage tubing;Drainage bag/urimeter off the floor;Sheeting clip in use;No dependent loops or kinks;Drainage bag/urimeter not overfilled (<2/3 full);Drainage bag/urimeter below bladder 04/24/23 1909   Output (mL) 100 mL 04/25/23 0625            Drain/Device  04/24/23 1524 Left lateral temporal " region gravity (Active)   Insertion Site no hematoma 04/25/23 0303   Drainage Characteristics/Odor Bleeding controlled 04/24/23 1909   Drainage Amount None 04/24/23 1909   General Intake (mL) 0 04/24/23 1909   General Output (mL) 140 04/25/23 0625       Physical Exam  Neurosurgery Physical Exam    E4V4M6   Aox3  Perrl  eomi   visual fields full to confrontation   Incision CDI     Significant Labs:  Recent Labs   Lab 04/28/23  1038 04/29/23  0115 04/30/23  0127   * 117* 111*   * 130* 130*   K 3.9 3.8 3.7    103 100   CO2 24 20* 21*   BUN 12 11 10   CREATININE 0.7 0.6 0.7   CALCIUM 9.0 8.3* 9.0   MG  --  1.7 1.7       Recent Labs   Lab 04/29/23  0115 04/30/23  0127   WBC 10.20 10.46   HGB 11.5* 13.1*   HCT 35.6* 38.9*    367       No results for input(s): LABPT, INR, APTT in the last 48 hours.    Microbiology Results (last 7 days)       ** No results found for the last 168 hours. **          All pertinent labs from the last 24 hours have been reviewed.    Significant Diagnostics:  I have reviewed all pertinent imaging results/findings within the past 24 hours.

## 2023-04-30 NOTE — PROGRESS NOTES
[facility]  Neurosurgery  Progress Note    Subjective:     History of Present Illness: 58 M with epilepsy presents for elective left temporal lobectomy      Post-Op Info:  Procedure(s) (LRB):  REMOVAL, DRAIN (Left)   3 Days Post-Op     Interval History: 4/29: Na stable 130. EEG negative. On dysphagia diet with thick liquids. NGT still in place until PO intake improves.  for hyponatremia     Medications:  Continuous Infusions:   sodium chloride 0.9% 100 mL/hr at 04/29/23 2201     Scheduled Meds:   cloBAZam  40 mg Oral QHS    dexAMETHasone  4 mg Oral Q8H    enoxaparin  40 mg Subcutaneous Daily    EScitalopram oxalate  10 mg Oral Daily    eslicarbazepine  1,200 mg Oral Daily    famotidine  20 mg Per NG tube BID    polyethylene glycol  17 g Oral Daily    senna-docusate 8.6-50 mg  1 tablet Per NG tube BID    zonisamide  500 mg Oral QHS     PRN Meds:acetaminophen, dextrose 10%, dextrose 10%, hydrALAZINE, HYDROcodone-acetaminophen, ondansetron     Review of Systems  Objective:     Weight: 89.8 kg (198 lb)  Body mass index is 27.62 kg/m².  Vital Signs (Most Recent):  Temp: 98.4 °F (36.9 °C) (04/29/23 1905)  Pulse: (!) 53 (04/29/23 2200)  Resp: 16 (04/29/23 2200)  BP: (!) 174/90 (04/29/23 2200)  SpO2: (!) 94 % (04/29/23 2200)   Vital Signs (24h Range):  Temp:  [97.6 °F (36.4 °C)-98.8 °F (37.1 °C)] 98.4 °F (36.9 °C)  Pulse:  [49-74] 53  Resp:  [10-20] 16  SpO2:  [93 %-97 %] 94 %  BP: (138-175)/(74-95) 174/90     Date 04/29/23 0700 - 04/30/23 0659   Shift 2380-7460 8624-4658 3602-8341 24 Hour Total   INTAKE   P.O. 120 120  240   I.V.(mL/kg) 794.3(8.8) 801.6(8.9)  1595.9(17.8)   Shift Total(mL/kg) 914.3(10.2) 921.6(10.3)  1835.9(20.4)   OUTPUT   Urine(mL/kg/hr) 900(1.3) 650  1550   Shift Total(mL/kg) 900(10) 650(7.2)  1550(17.3)   Weight (kg) 89.8 89.8 89.8 89.8                          Urethral Catheter 04/24/23 1100 Silicone 16 Fr. (Active)   Site Assessment Clean;Intact 04/25/23 6431  "  Collection Container Urimeter 04/25/23 0303   Securement Method secured to top of thigh w/ adhesive device 04/25/23 0303   Catheter Care Performed yes 04/25/23 0303   Reason for Continuing Urinary Catheterization Post operative 04/25/23 0303   CAUTI Prevention Bundle Securement Device in place with 1" slack;Intact seal between catheter & drainage tubing;Drainage bag/urimeter off the floor;Sheeting clip in use;No dependent loops or kinks;Drainage bag/urimeter not overfilled (<2/3 full);Drainage bag/urimeter below bladder 04/24/23 1909   Output (mL) 100 mL 04/25/23 0625            Drain/Device  04/24/23 1524 Left lateral temporal region gravity (Active)   Insertion Site no hematoma 04/25/23 0303   Drainage Characteristics/Odor Bleeding controlled 04/24/23 1909   Drainage Amount None 04/24/23 1909   General Intake (mL) 0 04/24/23 1909   General Output (mL) 140 04/25/23 0625       Physical Exam  Neurosurgery Physical Exam    E4V4M6   Aox3  Perrl  eomi   visual fields full to confrontation   Incision CDI     Significant Labs:  Recent Labs   Lab 04/28/23  0229 04/28/23  1038 04/29/23  0115   * 143* 117*   * 130* 130*   K 3.8 3.9 3.8    101 103   CO2 21* 24 20*   BUN 13 12 11   CREATININE 0.7 0.7 0.6   CALCIUM 8.6* 9.0 8.3*   MG 1.7  --  1.7       Recent Labs   Lab 04/28/23  0229 04/29/23  0115   WBC 10.28 10.20   HGB 11.4* 11.5*   HCT 35.5* 35.6*    324       No results for input(s): LABPT, INR, APTT in the last 48 hours.    Microbiology Results (last 7 days)       ** No results found for the last 168 hours. **          All pertinent labs from the last 24 hours have been reviewed.    Significant Diagnostics:  I have reviewed all pertinent imaging results/findings within the past 24 hours.    Assessment/Plan:     * Complex partial epilepsy with generalization and with intractable epilepsy  59 y/o M s/p left temporal lobectomy on 4/24  for seizures refractory to prior laser ablation    - ICU " status post op, continue ICU care for now for hyponatremia  - q2 neuro checks  - MRI brain with good resection   - Na goal > 135, seems to be hypovolemic hyponatremia, started   - continue home AEDs, EEG negative 4/28  - Lovenox for DVT prophylaxis   - continue 3 week dex taper  - continue SLP for speech eval  and swallow eval    - Speech improving   - recs for soft diet with thick liquids   - please keep NGT until improving/adequate PO intake  - PT/OT    Dispo: ongoing          Ashley Clements MD  Neurosurgery  [facility]

## 2023-04-30 NOTE — ASSESSMENT & PLAN NOTE
59 y/o M s/p left temporal lobectomy on 4/24  for seizures refractory to prior laser ablation    - ICU status post op, continue ICU care for now for hyponatremia  - q2 neuro checks  - MRI brain with good resection   - Na goal > 135, seems to be hypovolemic hyponatremia, started   - continue home AEDs, EEG negative 4/28  - Lovenox for DVT prophylaxis   - continue 3 week dex taper  - continue SLP for speech eval  and swallow eval    - Speech improving   - recs for soft diet with thick liquids   - please keep NGT until improving/adequate PO intake  - PT/OT    Dispo: ongoing

## 2023-04-30 NOTE — PROCEDURES
[facility]    DATE OF SERVICE: 4/29/23-4/30/23  EEG NUMBER: [id number]  REQUESTED BY: Sam  [place] [facility] [place]OLOGY   Electroencephalographic (EEG) recording is with electrodes placed according to the International 10-20 placement system.  Thirty two (32) channels of digital signal are simultaneously recorded from the scalp and may include EKG, EMG, and/or eye monitors.   Recording band pass was 0.1 to 512 hz.  Digital video recording of the patient is simultaneously recorded with the EEG.  The nursing staff report clinical symptoms and may press an event button when the patient has symptoms of clinical interest to the treating physicians.  EEG and video recording is stored and archived in digital format.  The entire recording is visually reviewed and the times identified by computer analysis as being spikes or seizures are reviewed again.  Activation procedures which include photic stimulation, hyperventilation and instructing patients to perform simple task are done in selected patients.   Compresses spectral analysis (CSA) is also performed on the activity recorded from each individual channel.  This is displayed as a power display of frequencies from 0 to 30 Hz over time.   The CSA analysis is done and displayed continuously.  This is reviewed for asymmetries in power between homologous areas of the scalp and for presence of changes in power which canbe seen when seizures occur.  Sections of suspected abnormalities on the CSA is then compared with the original EEG recording.     [employer] software was also utilized in the review of this study.  This software suite analyzes the EEG recording in multiple domains.  Coherence and rhythmicity is computed to identify EEG sections which may contain organized seizures.  Each channel undergoes analysis to detect presence of spike and sharp waves which have special and morphological characteristic of epileptic activity.   The routine EEG recording is converted from spacial into frequency domain.  This is then displayed comparing homologous areas to identify areas of significant asymmetry.  Algorithm to identify non-cortically generated artifact is used to separate eye movement, EMG and other artifact from the EEG.      Recording Times  Start on 4/29/23 at 07:00:29  Stop on 4/30/23 at 19:23:15    A total of 23 hours of EEG was recorded.    EEG FINDINGS  The record shows a fair  organization at rest, consisting of a 8-9 Hz posterior dominant rhythm with good  reactivity. There is mild bilateral beta activity. There is frequent bilateral theta range background slowing. There is further delta and theta range focal slowing over the left hemisphere. Breach artifact is seen over the left hemisphere.     Drowsiness is characterized by attenuation of the background, vertex waves, and bilateral theta slowing. Stage II sleep is characterized by slowing, vertex waves, and symmetric sleep spindles.    Provocative maneuvers including hyperventilation and photic stimulation were not performed.     EKG recording shows a regular rhythm.    There is one electrographic seizure at 03:48. The patient exhibits eye opening and slow head turn to left with orofacial automatisms and slow movements of arms/legs. There is onset of rhytymic 4-5 Hz spike and wave activity over the left temporal region phase reversing at T3 increasing to 6-7 Hz by end of seizure at 3:50.    IMPRESSION:  Abnormal study due to mild  diffuse background slowing consistent with diffuse cerebral dysfunction and encephalopathy which may be on the basis of toxic, metabolic, or primary neuronal disorder.  Further focal slowing over the left hemisphere is consistent with underlying structural defect. Breach artifact is seen over the region of the patient's know skull defect. One electroclinical seizure is seen with onset over the left temporal region.    Johnnie Gaston MD  Department of  Neurology  [facility]

## 2023-04-30 NOTE — SUBJECTIVE & OBJECTIVE
Review of Systems   Constitutional:  Positive for fatigue. Negative for fever.   HENT:  Negative for drooling and trouble swallowing.    Eyes:  Negative for photophobia and visual disturbance.   Respiratory:  Negative for shortness of breath.    Cardiovascular:  Negative for chest pain and palpitations.   Gastrointestinal:  Negative for abdominal distention, abdominal pain, constipation, diarrhea, nausea and vomiting.   Endocrine: Negative for polyuria.   Genitourinary:  Negative for difficulty urinating.   Musculoskeletal:  Negative for neck pain.   Skin:  Negative for pallor.   Neurological:  Positive for headaches. Negative for dizziness, speech difficulty, weakness, light-headedness and numbness.   Psychiatric/Behavioral:  Positive for decreased concentration. Negative for confusion.    Objective:     Vitals:  Temp: 98.4 °F (36.9 °C)  Pulse: (!) 58  Rhythm: sinus bradycardia  BP: (!) 149/96  MAP (mmHg): 116  Resp: 14  SpO2: (!) 94 %    Temp  Min: 97.6 °F (36.4 °C)  Max: 98.6 °F (37 °C)  Pulse  Min: 42  Max: 74  BP  Min: 149/96  Max: 186/85  MAP (mmHg)  Min: 113  Max: 125  Resp  Min: 10  Max: 27  SpO2  Min: 93 %  Max: 97 %    04/29 0701 - 04/30 0700  In: 2636 [P.O.:240; I.V.:2396]  Out: 4050 [Urine:4050]   Unmeasured Output  Urine Occurrence: 1  Stool Occurrence: 0  Pad Count: 2       Physical Exam  Vitals and nursing note reviewed.   Constitutional:       General: He is not in acute distress.     Appearance: He is not ill-appearing or diaphoretic.   HENT:      Head:      Comments: Surgical incision to left scalp, C/D/I     Right Ear: External ear normal.      Left Ear: External ear normal.      Nose: Nose normal.      Comments: NGT      Mouth/Throat:      Mouth: Mucous membranes are moist.      Pharynx: Oropharynx is clear.   Eyes:      General: No scleral icterus.        Right eye: No discharge.         Left eye: No discharge.      Extraocular Movements: Extraocular movements intact.      Conjunctiva/sclera:  Conjunctivae normal.      Pupils: Pupils are equal, round, and reactive to light.   Cardiovascular:      Rate and Rhythm: Normal rate and regular rhythm.      Heart sounds: Normal heart sounds. No murmur heard.  Pulmonary:      Effort: Pulmonary effort is normal. No respiratory distress.      Breath sounds: Normal breath sounds. No wheezing or rales.   Abdominal:      General: Abdomen is flat. Bowel sounds are normal. There is no distension.      Palpations: Abdomen is soft. There is no mass.      Tenderness: There is no abdominal tenderness.   Musculoskeletal:         General: No swelling or deformity. Normal range of motion.      Cervical back: Normal range of motion and neck supple.      Right lower leg: No edema.      Left lower leg: No edema.   Skin:     General: Skin is warm.      Coloration: Skin is not jaundiced.      Findings: No bruising.   Neurological:      Comments: E4 V5 M6  Alert. Oriented x4. Speech fluent- no dysarthria or aphasia (has heavy accent). Following commands.   CN II-XII grossly intact, specifically:  PERRLA. EOMI.  No facial asymmetry. Facial sensation intact in V1-V3.  Tongue midline. Shoulder shrug equal and symmetric.  Motor:  RONDA and AG   Sensation intact and symmetric throughout      Unable to test memory, judgment, insight, fund of knowledge, hearing, shoulder shrug, tongue protrusion, coordination, gait due to level of consciousness.    Medications:  Continuoussodium chloride 0.9%, Last Rate: 50 mL/hr at 04/30/23 1600    ScheduledcloBAZam, 40 mg, QHS  dexAMETHasone, 4 mg, Q8H  enoxaparin, 40 mg, Daily  EScitalopram oxalate, 10 mg, Daily  eslicarbazepine, 1,200 mg, Daily  famotidine, 20 mg, BID  polyethylene glycol, 17 g, Daily  senna-docusate 8.6-50 mg, 1 tablet, BID  zonisamide, 500 mg, QHS    PRNacetaminophen, 650 mg, Q4H PRN  dextrose 10%, 12.5 g, PRN  dextrose 10%, 25 g, PRN  hydrALAZINE, 10 mg, Q6H PRN  HYDROcodone-acetaminophen, 1 tablet, Q6H PRN  ondansetron, 4 mg, Q12H  PRN      Today I personally reviewed pertinent medications, lines/drains/airways, imaging, laboratory results, notably:
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
## 2023-04-30 NOTE — ASSESSMENT & PLAN NOTE
59 y/o M s/p left temporal lobectomy on 4/24  for seizures refractory to prior laser ablation    - ICU status post op, continue ICU care for now for hyponatremia  - q2 neuro checks  - MRI brain with good resection   - Na goal > 135, seems to be hypovolemic hyponatremia, started   - continue home AEDs, EEG negative 4/28, on EEG again this morning  - Lovenox for DVT prophylaxis   - continue 3 week dex taper  - continue SLP for speech eval  and swallow eval    - Speech improving   - recs for soft diet with thick liquids   - please keep NGT until improving/adequate PO intake  - PT/OT    Dispo: ongoing

## 2023-04-30 NOTE — PROGRESS NOTES
"[facility]  Neurosurgery  Progress Note    Subjective:     History of Present Illness: 58 M with epilepsy presents for elective left temporal lobectomy      Post-Op Info:  Procedure(s) (LRB):  REMOVAL, DRAIN (Left)   4 Days Post-Op     Interval History: 4/30: some delayed speech yesterday, back on EEG. Briskly awake and responding to questions today. On NS for hyponatremia. Na 130    Medications:  Continuous Infusions:   sodium chloride 0.9% 100 mL/hr at 04/30/23 0601     Scheduled Meds:   cloBAZam  40 mg Oral QHS    dexAMETHasone  4 mg Oral Q8H    enoxaparin  40 mg Subcutaneous Daily    EScitalopram oxalate  10 mg Oral Daily    eslicarbazepine  1,200 mg Oral Daily    famotidine  20 mg Per NG tube BID    polyethylene glycol  17 g Oral Daily    senna-docusate 8.6-50 mg  1 tablet Per NG tube BID    zonisamide  500 mg Oral QHS     PRN Meds:acetaminophen, dextrose 10%, dextrose 10%, hydrALAZINE, HYDROcodone-acetaminophen, ondansetron     Review of Systems  Objective:     Weight: 89.8 kg (198 lb)  Body mass index is 27.62 kg/m².  Vital Signs (Most Recent):  Temp: 98.4 °F (36.9 °C) (04/30/23 0305)  Pulse: (!) 52 (04/30/23 0600)  Resp: 13 (04/30/23 0600)  BP: (!) 157/91 (04/30/23 0600)  SpO2: (!) 94 % (04/30/23 0600)   Vital Signs (24h Range):  Temp:  [97.6 °F (36.4 °C)-98.7 °F (37.1 °C)] 98.4 °F (36.9 °C)  Pulse:  [42-74] 52  Resp:  [10-20] 13  SpO2:  [93 %-97 %] 94 %  BP: (138-186)/(74-95) 157/91                            Urethral Catheter 04/24/23 1100 Silicone 16 Fr. (Active)   Site Assessment Clean;Intact 04/25/23 0303   Collection Container Urimeter 04/25/23 0303   Securement Method secured to top of thigh w/ adhesive device 04/25/23 0303   Catheter Care Performed yes 04/25/23 0303   Reason for Continuing Urinary Catheterization Post operative 04/25/23 0303   CAUTI Prevention Bundle Securement Device in place with 1" slack;Intact seal between catheter & drainage tubing;Drainage " bag/urimeter off the floor;Sheeting clip in use;No dependent loops or kinks;Drainage bag/urimeter not overfilled (<2/3 full);Drainage bag/urimeter below bladder 04/24/23 1909   Output (mL) 100 mL 04/25/23 0625            Drain/Device  04/24/23 1524 Left lateral temporal region gravity (Active)   Insertion Site no hematoma 04/25/23 0303   Drainage Characteristics/Odor Bleeding controlled 04/24/23 1909   Drainage Amount None 04/24/23 1909   General Intake (mL) 0 04/24/23 1909   General Output (mL) 140 04/25/23 0625       Physical Exam  Neurosurgery Physical Exam    E4V4M6   Aox3  Perrl  eomi   visual fields full to confrontation   Incision CDI     Significant Labs:  Recent Labs   Lab 04/28/23  1038 04/29/23  0115 04/30/23  0127   * 117* 111*   * 130* 130*   K 3.9 3.8 3.7    103 100   CO2 24 20* 21*   BUN 12 11 10   CREATININE 0.7 0.6 0.7   CALCIUM 9.0 8.3* 9.0   MG  --  1.7 1.7       Recent Labs   Lab 04/29/23  0115 04/30/23  0127   WBC 10.20 10.46   HGB 11.5* 13.1*   HCT 35.6* 38.9*    367       No results for input(s): LABPT, INR, APTT in the last 48 hours.    Microbiology Results (last 7 days)       ** No results found for the last 168 hours. **          All pertinent labs from the last 24 hours have been reviewed.    Significant Diagnostics:  I have reviewed all pertinent imaging results/findings within the past 24 hours.    Assessment/Plan:     * Complex partial epilepsy with generalization and with intractable epilepsy  57 y/o M s/p left temporal lobectomy on 4/24  for seizures refractory to prior laser ablation    - ICU status post op, continue ICU care for now for hyponatremia  - q2 neuro checks  - MRI brain with good resection   - Na goal > 135, seems to be hypovolemic hyponatremia, started   - continue home AEDs, EEG negative 4/28, on EEG again this morning  - Lovenox for DVT prophylaxis   - continue 3 week dex taper  - continue SLP for speech eval  and swallow eval    -  Speech improving   - recs for soft diet with thick liquids   - please keep NGT until improving/adequate PO intake  - PT/OT    Dispo: ongoing          Obinna Avendano MD  Neurosurgery  [facility]

## 2023-04-30 NOTE — SUBJECTIVE & OBJECTIVE
Interval History: 4/29: Na stable 130. EEG negative. On dysphagia diet with thick liquids. NGT still in place until PO intake improves.  for hyponatremia     Medications:  Continuous Infusions:   sodium chloride 0.9% 100 mL/hr at 04/29/23 2201     Scheduled Meds:   cloBAZam  40 mg Oral QHS    dexAMETHasone  4 mg Oral Q8H    enoxaparin  40 mg Subcutaneous Daily    EScitalopram oxalate  10 mg Oral Daily    eslicarbazepine  1,200 mg Oral Daily    famotidine  20 mg Per NG tube BID    polyethylene glycol  17 g Oral Daily    senna-docusate 8.6-50 mg  1 tablet Per NG tube BID    zonisamide  500 mg Oral QHS     PRN Meds:acetaminophen, dextrose 10%, dextrose 10%, hydrALAZINE, HYDROcodone-acetaminophen, ondansetron     Review of Systems  Objective:     Weight: 89.8 kg (198 lb)  Body mass index is 27.62 kg/m².  Vital Signs (Most Recent):  Temp: 98.4 °F (36.9 °C) (04/29/23 1905)  Pulse: (!) 53 (04/29/23 2200)  Resp: 16 (04/29/23 2200)  BP: (!) 174/90 (04/29/23 2200)  SpO2: (!) 94 % (04/29/23 2200)   Vital Signs (24h Range):  Temp:  [97.6 °F (36.4 °C)-98.8 °F (37.1 °C)] 98.4 °F (36.9 °C)  Pulse:  [49-74] 53  Resp:  [10-20] 16  SpO2:  [93 %-97 %] 94 %  BP: (138-175)/(74-95) 174/90     Date 04/29/23 0700 - 04/30/23 0659   Shift 9221-6322 7257-3908 6926-4289 24 Hour Total   INTAKE   P.O. 120 120  240   I.V.(mL/kg) 794.3(8.8) 801.6(8.9)  1595.9(17.8)   Shift Total(mL/kg) 914.3(10.2) 921.6(10.3)  1835.9(20.4)   OUTPUT   Urine(mL/kg/hr) 900(1.3) 650  1550   Shift Total(mL/kg) 900(10) 650(7.2)  1550(17.3)   Weight (kg) 89.8 89.8 89.8 89.8                          Urethral Catheter 04/24/23 1100 Silicone 16 Fr. (Active)   Site Assessment Clean;Intact 04/25/23 0303   Collection Container Urimeter 04/25/23 0303   Securement Method secured to top of thigh w/ adhesive device 04/25/23 0303   Catheter Care Performed yes 04/25/23 0303   Reason for Continuing Urinary Catheterization Post operative 04/25/23 0303   CAUTI Prevention Bundle  "Securement Device in place with 1" slack;Intact seal between catheter & drainage tubing;Drainage bag/urimeter off the floor;Sheeting clip in use;No dependent loops or kinks;Drainage bag/urimeter not overfilled (<2/3 full);Drainage bag/urimeter below bladder 04/24/23 1909   Output (mL) 100 mL 04/25/23 0625            Drain/Device  04/24/23 1524 Left lateral temporal region gravity (Active)   Insertion Site no hematoma 04/25/23 0303   Drainage Characteristics/Odor Bleeding controlled 04/24/23 1909   Drainage Amount None 04/24/23 1909   General Intake (mL) 0 04/24/23 1909   General Output (mL) 140 04/25/23 0625       Physical Exam  Neurosurgery Physical Exam    E4V4M6   Aox3  Perrl  eomi   visual fields full to confrontation   Incision CDI     Significant Labs:  Recent Labs   Lab 04/28/23  0229 04/28/23  1038 04/29/23  0115   * 143* 117*   * 130* 130*   K 3.8 3.9 3.8    101 103   CO2 21* 24 20*   BUN 13 12 11   CREATININE 0.7 0.7 0.6   CALCIUM 8.6* 9.0 8.3*   MG 1.7  --  1.7       Recent Labs   Lab 04/28/23  0229 04/29/23  0115   WBC 10.28 10.20   HGB 11.4* 11.5*   HCT 35.5* 35.6*    324       No results for input(s): LABPT, INR, APTT in the last 48 hours.    Microbiology Results (last 7 days)       ** No results found for the last 168 hours. **          All pertinent labs from the last 24 hours have been reviewed.    Significant Diagnostics:  I have reviewed all pertinent imaging results/findings within the past 24 hours.  "

## 2023-04-30 NOTE — PLAN OF CARE
[facility] Care Plan    POC reviewed with Declan Burleson and family at 0300. Pt verbalized understanding. Questions and concerns addressed. No acute events overnight. Pt progressing toward goals. Will continue to monitor. See below and flowsheets for full assessment and VS info.           Is this a stroke patient? no    Neuro:  Ruben Coma Scale  Best Eye Response: 3-->(E3) to speech  Best Motor Response: 6-->(M6) obeys commands  Best Verbal Response: 4-->(V4) confused  Rankin Coma Scale Score: 13  Assessment Qualifiers: patient not sedated/intubated  Pupil PERRLA: yes     24hr Temp:  [97.6 °F (36.4 °C)-98.7 °F (37.1 °C)]     CV:   Rhythm: sinus bradycardia  BP goals:   SBP < 160  MAP > 65    Resp:           Plan: N/A    GI/:     Diet/Nutrition Received: mechanical/dental soft  Last Bowel Movement: 04/27/23  Voiding Characteristics: external catheter    Intake/Output Summary (Last 24 hours) at 4/30/2023 0505  Last data filed at 4/30/2023 0502  Gross per 24 hour   Intake 2637.61 ml   Output 5850 ml   Net -3212.39 ml     Unmeasured Output  Urine Occurrence: 1  Stool Occurrence: 0  Pad Count: 2    Labs/Accuchecks:  Recent Labs   Lab 04/30/23  0127   WBC 10.46   RBC 4.21*   HGB 13.1*   HCT 38.9*         Recent Labs   Lab 04/30/23  0127   *   K 3.7   CO2 21*      BUN 10   CREATININE 0.7   ALKPHOS 110   ALT 24   AST 13   BILITOT 0.5      Recent Labs   Lab 04/24/23  0928   INR 1.0   APTT 26.2    No results for input(s): CPK, CPKMB, TROPONINI, MB in the last 168 hours.    Electrolytes: N/A - electrolytes WDL  Accuchecks: Q6H    Gtts:   sodium chloride 0.9% 100 mL/hr at 04/30/23 0502       LDA/Wounds:  Lines/Drains/Airways       Drain  Duration                  NG/OG Tube 04/25/23 1015 [place] sump 18 Fr. Right nostril 4 days    Male External Urinary Catheter 04/26/23 0600 3 days              Peripheral Intravenous Line  Duration                  Peripheral IV - Single Lumen 04/26/23 1218 Anterior;Right  Forearm 3 days         Peripheral IV - Single Lumen 04/29/23 0620 20 G Left Antecubital <1 day                  Wounds: No  Wound care consulted: No

## 2023-05-01 PROBLEM — E87.29 INCREASED ANION GAP METABOLIC ACIDOSIS: Status: RESOLVED | Noted: 2023-04-25 | Resolved: 2023-05-01

## 2023-05-01 PROBLEM — G93.41 ACUTE METABOLIC ENCEPHALOPATHY: Status: ACTIVE | Noted: 2023-05-01

## 2023-05-01 PROBLEM — J69.0 ASPIRATION PNEUMONITIS: Status: ACTIVE | Noted: 2023-04-25

## 2023-05-01 LAB
ALBUMIN SERPL BCP-MCNC: 3.1 G/DL (ref 3.5–5.2)
ALP SERPL-CCNC: 106 U/L (ref 55–135)
ALT SERPL W/O P-5'-P-CCNC: 17 U/L (ref 10–44)
ANION GAP SERPL CALC-SCNC: 9 MMOL/L (ref 8–16)
AST SERPL-CCNC: 10 U/L (ref 10–40)
BASOPHILS # BLD AUTO: 0.05 K/UL (ref 0–0.2)
BASOPHILS NFR BLD: 0.4 % (ref 0–1.9)
BILIRUB SERPL-MCNC: 0.5 MG/DL (ref 0.1–1)
BUN SERPL-MCNC: 9 MG/DL (ref 6–20)
CALCIUM SERPL-MCNC: 8.7 MG/DL (ref 8.7–10.5)
CHLORIDE SERPL-SCNC: 100 MMOL/L (ref 95–110)
CO2 SERPL-SCNC: 21 MMOL/L (ref 23–29)
CREAT SERPL-MCNC: 0.6 MG/DL (ref 0.5–1.4)
DIFFERENTIAL METHOD: ABNORMAL
EOSINOPHIL # BLD AUTO: 0 K/UL (ref 0–0.5)
EOSINOPHIL NFR BLD: 0.3 % (ref 0–8)
ERYTHROCYTE [DISTWIDTH] IN BLOOD BY AUTOMATED COUNT: 12.9 % (ref 11.5–14.5)
EST. GFR  (NO RACE VARIABLE): >60 ML/MIN/1.73 M^2
GLUCOSE SERPL-MCNC: 121 MG/DL (ref 70–110)
HCT VFR BLD AUTO: 37.8 % (ref 40–54)
HGB BLD-MCNC: 12.7 G/DL (ref 14–18)
IMM GRANULOCYTES # BLD AUTO: 0.13 K/UL (ref 0–0.04)
IMM GRANULOCYTES NFR BLD AUTO: 1.1 % (ref 0–0.5)
LYMPHOCYTES # BLD AUTO: 1.5 K/UL (ref 1–4.8)
LYMPHOCYTES NFR BLD: 12.4 % (ref 18–48)
MAGNESIUM SERPL-MCNC: 1.8 MG/DL (ref 1.6–2.6)
MCH RBC QN AUTO: 31 PG (ref 27–31)
MCHC RBC AUTO-ENTMCNC: 33.6 G/DL (ref 32–36)
MCV RBC AUTO: 92 FL (ref 82–98)
MONOCYTES # BLD AUTO: 1.1 K/UL (ref 0.3–1)
MONOCYTES NFR BLD: 9.6 % (ref 4–15)
NEUTROPHILS # BLD AUTO: 9 K/UL (ref 1.8–7.7)
NEUTROPHILS NFR BLD: 76.2 % (ref 38–73)
NRBC BLD-RTO: 0 /100 WBC
PHOSPHATE SERPL-MCNC: 4 MG/DL (ref 2.7–4.5)
PLATELET # BLD AUTO: 377 K/UL (ref 150–450)
PMV BLD AUTO: 8.9 FL (ref 9.2–12.9)
POTASSIUM SERPL-SCNC: 4 MMOL/L (ref 3.5–5.1)
PROT SERPL-MCNC: 6.5 G/DL (ref 6–8.4)
RBC # BLD AUTO: 4.1 M/UL (ref 4.6–6.2)
SODIUM SERPL-SCNC: 130 MMOL/L (ref 136–145)
WBC # BLD AUTO: 11.79 K/UL (ref 3.9–12.7)

## 2023-05-01 PROCEDURE — 99233 SBSQ HOSP IP/OBS HIGH 50: CPT | Mod: ,,, | Performed by: PSYCHIATRY & NEUROLOGY

## 2023-05-01 PROCEDURE — 99233 PR SUBSEQUENT HOSPITAL CARE,LEVL III: ICD-10-PCS | Mod: ,,, | Performed by: PSYCHIATRY & NEUROLOGY

## 2023-05-01 PROCEDURE — 20000000 HC ICU ROOM

## 2023-05-01 PROCEDURE — 97535 SELF CARE MNGMENT TRAINING: CPT

## 2023-05-01 PROCEDURE — 92526 ORAL FUNCTION THERAPY: CPT

## 2023-05-01 PROCEDURE — 95720 EEG PHY/QHP EA INCR W/VEEG: CPT | Mod: ,,, | Performed by: PSYCHIATRY & NEUROLOGY

## 2023-05-01 PROCEDURE — 80053 COMPREHEN METABOLIC PANEL: CPT

## 2023-05-01 PROCEDURE — 94761 N-INVAS EAR/PLS OXIMETRY MLT: CPT

## 2023-05-01 PROCEDURE — 63600175 PHARM REV CODE 636 W HCPCS: Performed by: NURSE PRACTITIONER

## 2023-05-01 PROCEDURE — 92507 TX SP LANG VOICE COMM INDIV: CPT

## 2023-05-01 PROCEDURE — 25000003 PHARM REV CODE 250: Performed by: NURSE PRACTITIONER

## 2023-05-01 PROCEDURE — 95720 PR EEG, W/VIDEO, CONT RECORD, I&R, >12<26 HRS: ICD-10-PCS | Mod: ,,, | Performed by: PSYCHIATRY & NEUROLOGY

## 2023-05-01 PROCEDURE — 85025 COMPLETE CBC W/AUTO DIFF WBC: CPT

## 2023-05-01 PROCEDURE — 97530 THERAPEUTIC ACTIVITIES: CPT | Mod: CQ

## 2023-05-01 PROCEDURE — 63600175 PHARM REV CODE 636 W HCPCS: Performed by: STUDENT IN AN ORGANIZED HEALTH CARE EDUCATION/TRAINING PROGRAM

## 2023-05-01 PROCEDURE — 25000003 PHARM REV CODE 250: Performed by: PHYSICIAN ASSISTANT

## 2023-05-01 PROCEDURE — 83735 ASSAY OF MAGNESIUM: CPT

## 2023-05-01 PROCEDURE — 97530 THERAPEUTIC ACTIVITIES: CPT

## 2023-05-01 PROCEDURE — 25000003 PHARM REV CODE 250

## 2023-05-01 PROCEDURE — 97110 THERAPEUTIC EXERCISES: CPT

## 2023-05-01 PROCEDURE — 84100 ASSAY OF PHOSPHORUS: CPT

## 2023-05-01 PROCEDURE — 25000003 PHARM REV CODE 250: Performed by: STUDENT IN AN ORGANIZED HEALTH CARE EDUCATION/TRAINING PROGRAM

## 2023-05-01 PROCEDURE — 97116 GAIT TRAINING THERAPY: CPT | Mod: CQ

## 2023-05-01 PROCEDURE — 95714 VEEG EA 12-26 HR UNMNTR: CPT

## 2023-05-01 RX ORDER — SODIUM,POTASSIUM PHOSPHATES 280-250MG
2 POWDER IN PACKET (EA) ORAL
Status: DISCONTINUED | OUTPATIENT
Start: 2023-05-01 | End: 2023-05-02

## 2023-05-01 RX ORDER — DEXAMETHASONE 4 MG/1
4 TABLET ORAL EVERY 12 HOURS
Status: DISCONTINUED | OUTPATIENT
Start: 2023-05-01 | End: 2023-05-02

## 2023-05-01 RX ORDER — OXYCODONE HYDROCHLORIDE 5 MG/1
5 TABLET ORAL EVERY 6 HOURS PRN
Status: DISCONTINUED | OUTPATIENT
Start: 2023-05-01 | End: 2023-05-04 | Stop reason: HOSPADM

## 2023-05-01 RX ADMIN — ESCITALOPRAM OXALATE 10 MG: 10 TABLET ORAL at 08:05

## 2023-05-01 RX ADMIN — Medication 800 MG: at 08:05

## 2023-05-01 RX ADMIN — SENNOSIDES AND DOCUSATE SODIUM 1 TABLET: 50; 8.6 TABLET ORAL at 08:05

## 2023-05-01 RX ADMIN — DEXAMETHASONE 4 MG: 4 TABLET ORAL at 08:05

## 2023-05-01 RX ADMIN — Medication 800 MG: at 03:05

## 2023-05-01 RX ADMIN — OXYCODONE HYDROCHLORIDE 5 MG: 5 TABLET ORAL at 06:05

## 2023-05-01 RX ADMIN — ENOXAPARIN SODIUM 40 MG: 40 INJECTION SUBCUTANEOUS at 04:05

## 2023-05-01 RX ADMIN — FAMOTIDINE 20 MG: 20 TABLET ORAL at 08:05

## 2023-05-01 RX ADMIN — OXYCODONE HYDROCHLORIDE 5 MG: 5 TABLET ORAL at 03:05

## 2023-05-01 RX ADMIN — CLOBAZAM 40 MG: 10 TABLET ORAL at 08:05

## 2023-05-01 RX ADMIN — ACETAMINOPHEN 650 MG: 325 TABLET ORAL at 03:05

## 2023-05-01 RX ADMIN — ESLICARBAZEPINE ACETATE 1200 MG: 400 TABLET ORAL at 08:05

## 2023-05-01 RX ADMIN — ZONISAMIDE 500 MG: 100 CAPSULE ORAL at 08:05

## 2023-05-01 RX ADMIN — DEXAMETHASONE 4 MG: 4 TABLET ORAL at 06:05

## 2023-05-01 RX ADMIN — ACETAMINOPHEN 650 MG: 325 TABLET ORAL at 09:05

## 2023-05-01 RX ADMIN — POLYETHYLENE GLYCOL 3350 17 G: 17 POWDER, FOR SOLUTION ORAL at 08:05

## 2023-05-01 NOTE — CONSULTS
[facility]  Neurology-Epilepsy  Consult Note    Patient Name: Declan Burleson  MRN: [id number]  Admission Date: 4/24/2023  Hospital Length of Stay: 7 days  Code Status: Full Code   Attending Provider: Griffin Randall MD   Consulting Provider: Teresa Cerrato PA-C  Primary Care Physician: Juan Carlos Simmons NP  Principal Problem:Complex partial epilepsy with generalization and with intractable epilepsy    Consults   Subjective:     HPI:   Mr. Burleson is a 48 year old man with intractable epilepsy since age 48 s/p bilateral sEEG and L laser amygdalohippocampectomy 3/1/21, repeat sEEG 2/27/23-3/8/23 admitted to [facility] s/p left temporal lobectomy 4/24/23 for treatment of seizures. Patient initially had some improvement in seizures after left laser amygdalohippocampectomy in March of 2021, but ultimately he developed recurrence of seizures. Patient is currently maintained on Clobazam 40 mg qHS, Eslicarbazepine 1200 mg qHS, and Zonisamide 500 mg qHS with continued events of staring/loss of awareness, in addition to generalized convulsions. Patient underwent repeat Neuropsychology testing and evaluation by Memory Neurology, as well as extensive discussion in multi-disciplinary epilepsy surgery conference, with recommendation for repeat sEEG which was completed 2/27/23-3/8/23. On review of repeat sEEG data and discussion among patient/family and outpatient Epilepsy, Neurosurgery, and Neuropsychology teams, decision made to proceed with left anterior temporal lobectomy. Post-operatively, patient had likely aspiration event 4/25 after vomiting undigested medications. Patient remains in ICU for continued management of hyponatremia, mental status. Neurology epilepsy following for assistance in management.       Hospital Course:   4/28>4/29: EEG mild diffuse background slowing, further focal slowing over the left hemisphere consistent with underlying structural defect, breach artifact seen over region of patients  known skull defect.  4/29>4/30: EEG mild diffuse background slowing, further focal slowing over the left hemisphere consistent with underlying structural defect. One electroclinical seizure seen with onset over the left temporal region.   4/30>5/1: EEG left greater than right hemisphere dysfunction with a seizure focus in the left hemisphere, no electrographic seizures. Continue home AED regimen.        Past Medical History:   Diagnosis Date    Anxiety     Dementia in other diseases classified elsewhere, unspecified severity, without behavioral disturbance, psychotic disturbance, mood disturbance, and anxiety 2/2/2023    Depression     GERD (gastroesophageal reflux disease)     Hyperlipidemia     Hypertension     Long term (current) use of antithrombotics/antiplatelets 12/22/2020    Migraine     Normocytic anemia 2/23/2023    Seizures        Past Surgical History:   Procedure Laterality Date    CRANIOTOMY USING FRAMELESS STEREOTAXY Left 4/24/2023    Procedure: CRANIOTOMY, USING FRAMELESS STEREOTAXY;  Surgeon: Ruchi Chen MD;  [facility];  Service: Neurosurgery;  Laterality: Left;  Tor III ASA III  Blood: Type & Screen  Neuro Mon: None  Bed: [facility]  Headrest: [place]  Pos: Supine  [facility]   [place]  Asst Surg: [place]    CYST REMOVAL  March 2016/2017    skin cyst - benign    PLACEMENT OF STEREO EEG LEADS(DEPTH ELECTRODES) Left 2/27/2023    Procedure: PLACEMENT OF STEREO EEG LEADS (DEPTH ELECTRODES);  Surgeon: Ruchi Chen MD;  [facility];  Service: Neurosurgery;  Laterality: Left;    REMOVAL OF DRAIN Left 4/26/2023    Procedure: REMOVAL, DRAIN;  Surgeon: Ruchi Chen MD;  Location: [facility] OR [facility];  Service: Neurosurgery;  Laterality: Left;    REMOVAL OF STEREO EEG LEADS (DEPTH ELECTRODES) Bilateral 1/21/2021    Procedure: REMOVAL OF STEREO EEG LEADS (DEPTH ELECTRODES);  Surgeon: Ruchi Chen MD;  Location: [facility] OR [facility];  Service: Neurosurgery;  Laterality:  Bilateral;    REMOVAL OF STEREO EEG LEADS (DEPTH ELECTRODES) Left 3/8/2023    Procedure: REMOVAL OF STEREO EEG LEADS (DEPTH ELECTRODES);  Surgeon: Ruchi Chen MD;  Location: [facility] OR [address] [place] [address];  Service: Neurosurgery;  Laterality: Left;    TONSILLECTOMY      teenage        Review of patient's allergies indicates:   Allergen Reactions    Losartan Rash       No current facility-administered medications on file prior to encounter.     Current Outpatient Medications on File Prior to Encounter   Medication Sig    cloBAZam (ONFI) 20 mg Tab Take 2 tablets (40 mg total) by mouth every evening.    EScitalopram oxalate (LEXAPRO) 10 MG tablet Take 1 tablet (10 mg total) by mouth once daily.    eslicarbazepine (APTIOM) 400 mg Tab tablet Take 1 tablet (400 mg total) by mouth once daily.    eslicarbazepine (APTIOM) 800 mg Tab Take 800 mg by mouth once daily.    oxyCODONE (ROXICODONE) 5 MG immediate release tablet Take 1 tablet (5 mg total) by mouth every 4 (four) hours as needed for Pain.    rizatriptan (MAXALT-MLT) 10 MG disintegrating tablet Take 10 mg by mouth as needed. No more than 3 doses / week    zonisamide (ZONEGRAN) 100 MG Cap Take 5 capsules (500 mg total) by mouth every evening.    mag hydrox/aluminum hyd/simeth (ANTACID ORAL) Take by mouth as needed. Acid reflux    midazolam 5 mg/spray (0.1 mL) Spry 0.1 mLs by Nasal route.    ondansetron (ZOFRAN-ODT) 8 MG TbDL Take 8 mg by mouth every 8 (eight) hours as needed. nausea     Continuous Infusions:    Family History       Problem Relation (Age of Onset)    Heart disease Maternal Uncle (50)    Lung disease Father    Migraines Paternal Grandfather    No Known Problems Mother          Tobacco Use    Smoking status: Former     Types: Cigarettes     Quit date:      Years since quittin.3    Smokeless tobacco: Never   Substance and Sexual Activity    Alcohol use: Yes     Comment: one drink once a week    Drug use: Never    Sexual activity: Yes      Partners: Female     Review of Systems   Constitutional:  Positive for fatigue.   Gastrointestinal:  Negative for nausea and vomiting.   Neurological:  Positive for seizures (none overnight).   All other systems reviewed and are negative.  Objective:     Vital Signs (Most Recent):  Temp: 98.5 °F (36.9 °C) (05/01/23 0705)  Pulse: (!) 55 (05/01/23 1205)  Resp: 12 (05/01/23 1205)  BP: (!) 153/89 (05/01/23 1205)  SpO2: 95 % (05/01/23 1205)   Vital Signs (24h Range):  Temp:  [98.3 °F (36.8 °C)-98.6 °F (37 °C)] 98.5 °F (36.9 °C)  Pulse:  [50-72] 55  Resp:  [10-27] 12  SpO2:  [93 %-95 %] 95 %  BP: (140-166)/() 153/89     Weight: 89.8 kg (198 lb)  Body mass index is 27.62 kg/m².    Physical Exam  Constitutional:       General: He is not in acute distress.     Appearance: He is not diaphoretic.   HENT:      Head: Normocephalic.      Comments: EEG in place  Eyes:      General: No scleral icterus.     Extraocular Movements: Extraocular movements intact.      Pupils: Pupils are equal, round, and reactive to light.   Pulmonary:      Effort: Pulmonary effort is normal. No respiratory distress.   Abdominal:      General: There is no distension.      Palpations: Abdomen is soft.   Musculoskeletal:         General: No deformity or signs of injury.      Cervical back: Neck supple. No rigidity.   Skin:     General: Skin is warm and dry.   Neurological:      Mental Status: He is alert.   Psychiatric:         Mood and Affect: Mood normal.         Behavior: Behavior normal.       NEUROLOGICAL EXAMINATION:     MENTAL STATUS   Attention: normal.   Level of consciousness: alert    CRANIAL NERVES     CN III, IV, VI   Pupils are equal, round, and reactive to light.       CN II-XII grossly intact  PERRL, EOMI  AAOx3  Follows commands     Significant Labs: All pertinent lab results from the past 24 hours have been reviewed.    Significant Studies: I have reviewed all pertinent imaging results/findings within the past 24  hours.    Assessment and Plan:     * Complex partial epilepsy with generalization and with intractable epilepsy  58 year old man with intractable epilepsy since age 48 s/p bilateral sEEG and L laser amygdalohippocampectomy 3/1/21, repeat sEEG 2/27/23-3/8/23 admitted to [facility] s/p left temporal lobectomy 4/24/23 for treatment of seizures.    Recommendations:  - Continuous vEEG - no seizures noted overnight  - Recommend to continue home AED regimen - Clobazam 40 mg qHS, Eslicarbazepine 1200 mg daily, Zonisamide 500 mg qHS  - Avoid agents that lower seizure threshold  - Management of any infectious/metabolic abnormalities per [facility]  - Seizure precautions    Discussed plan of care with [facility] team, patient and wife at bedside. Will follow, please call with questions.    Hyponatremia  - Management per [facility], Na 130 5/1      Depression with anxiety  - Home Escitalopram        VTE Risk Mitigation (From admission, onward)         Ordered     enoxaparin injection 40 mg  Daily         04/26/23 1556     IP VTE HIGH RISK PATIENT  Once         04/24/23 1711     Place sequential compression device  Until discontinued         04/24/23 1711     Place sequential compression device  Until discontinued         04/24/23 0858                Thank you for your consult. I will follow-up with patient. Please contact us if you have any additional questions.    Teresa Cerrato PA-C  Neurology-Epilepsy  [facility]  Staff: Dr. Feliciano

## 2023-05-01 NOTE — ASSESSMENT & PLAN NOTE
Post partial temporal lobectomy  Continue current AEDs  CVEEG  Has been very slow to get back to baseline  Per wife this is normal after surgery for him

## 2023-05-01 NOTE — ASSESSMENT & PLAN NOTE
59 y/o M s/p left temporal lobectomy on 4/24  for seizures refractory to prior laser ablation    Recommend calorie count to determine if NGT can be removed.   NA management per ICU team.   Dex weaned to 4Q12    - ICU status post op, continue ICU care for now for hyponatremia  - q2 neuro checks  - MRI brain with good resection   - Na goal > 135, seems to be hypovolemic hyponatremia, started   - continue home AEDs, EEG negative 4/28, on EEG again this morning  - Lovenox for DVT prophylaxis   - continue 3 week dex taper  - continue SLP for speech eval  and swallow eval    - Speech improving   - recs for soft diet with thick liquids   - please keep NGT until improving/adequate PO intake  - PT/OT    Dispo: ongoing

## 2023-05-01 NOTE — HOSPITAL COURSE
4/28>4/29: EEG mild diffuse background slowing, further focal slowing over the left hemisphere consistent with underlying structural defect, breach artifact seen over region of patients known skull defect.  4/29>4/30: EEG mild diffuse background slowing, further focal slowing over the left hemisphere consistent with underlying structural defect. One electroclinical seizure seen with onset over the left temporal region.   4/30>5/1: EEG left greater than right hemisphere dysfunction with a seizure focus in the left hemisphere, no electrographic seizures. Continue home AED regimen.   5/1>5/2: EEG with left greater than right hemisphere dysfunction with a seizure focus in the left hemisphere, no electrographic seizures. Na stable at 130. Consider Na tabs. Recommend to continue Clobazam 40 mg qHSD, Eslicarbazepine 1200 mg daily, Zonisamide 500 mg qHS with plan for close Epilepsy follow up to monitor Na, consider transition of Eslicarbazepine to alternate AED as indicated.

## 2023-05-01 NOTE — PT/OT/SLP PROGRESS
"Physical Therapy Treatment    Patient Name:  Declan Burleson   MRN:  [id number]    Recommendations:     Discharge Recommendations: rehabilitation facility  Discharge Equipment Recommendations: to be determined by next level of care  Barriers to discharge:  Inaccessible home and Decreased caregiver support OSMEL and requires assist for mobility    Assessment:     Declan Burleson is a 58 y.o. male admitted with a medical diagnosis of Complex partial epilepsy with generalization and with intractable epilepsy.  He presents with the following impairments/functional limitations: weakness, impaired endurance, impaired self care skills, impaired functional mobility, gait instability, impaired balance, impaired cardiopulmonary response to activity, decreased coordination, decreased safety awareness, pain. Pt required min A with bed mobility, mod A with STS, and mod A x 2 with gait training @ A ~40 ft with one seated rest break. Pt denied symptoms of dizziness/lightheadedness while monitoring BP. Pt is motivated to participate in therapy and will cont to benefit from skilled acute PT until d/c to safely perform functional activities.    Rehab Prognosis: Good; patient would benefit from acute skilled PT services to address these deficits and reach maximum level of function.    Recent Surgery: Procedure(s) (LRB):  REMOVAL, DRAIN (Left) 5 Days Post-Op    Plan:     During this hospitalization, patient to be seen 4 x/week to address the identified rehab impairments via gait training, therapeutic activities, therapeutic exercises, neuromuscular re-education and progress toward the following goals:    Plan of Care Expires:  05/25/23    Subjective     Chief Complaint: HA  Patient/Family Comments/goals: "lets go again (walking)"  Pain/Comfort:  Pain Rating 1: 8/10  Location - Side 1: Bilateral  Location - Orientation 1: generalized  Location 1: head  Pain Addressed 1: Cessation of Activity, Nurse notified      Objective: "     Communicated with nurse prior to session.  Patient found HOB elevated with bed alarm, peripheral IV, telemetry, blood pressure cuff, pulse ox (continuous), Condom Catheter, EEG, NG tube upon PT entry to room.     General Precautions: Standard, aspiration, aphasia, fall  Orthopedic Precautions: N/A  Braces: N/A  Respiratory Status: Room air     Functional Mobility:  Bed Mobility:     Rolling Left:  minimum assistance  Rolling Right: minimum assistance  Scooting: minimum assistance  Supine to Sit: minimum assistance  Sit to Supine: minimum assistance  Transfers:     Sit to Stand:  minimum assistance and moderate assistance with hand-held assist  Gait: ~40 ft in room HHA decrease foot clearance, decrease step length, decrease sangeetha, and no LOB  Balance: Good static standing balance @EOB @HHA      AM-PAC 6 CLICK MOBILITY  Turning over in bed (including adjusting bedclothes, sheets and blankets)?: 3  Sitting down on and standing up from a chair with arms (e.g., wheelchair, bedside commode, etc.): 3  Moving from lying on back to sitting on the side of the bed?: 3  Moving to and from a bed to a chair (including a wheelchair)?: 3  Need to walk in hospital room?: 2  Climbing 3-5 steps with a railing?: 1  Basic Mobility Total Score: 15       Treatment & Education:  STS performed twice 1st trial min A; 2nd trial mod A @ HHA  BP - Before therapy (supine):168/84; sitting @ EOB: 147/87; after therapy (HOB elevated):157/88  Pt was educated on progress towards PT goals    Patient left HOB elevated with all lines intact, call button in reach, bed alarm on, and nurse present..    GOALS:   Multidisciplinary Problems       Physical Therapy Goals          Problem: Physical Therapy    Goal Priority Disciplines Outcome Goal Variances Interventions   Physical Therapy Goal     PT, PT/OT Ongoing, Progressing     Description: PT goals until 5/4/23    1. Pt supine to sit with minimal assist- met 4/27/23  Revised goal: supine to sit with  supervision-not met  2. Pt sit to supine with minimal assist-not met  3. Pt sit to stand with LRAD as needed for safety with minimal assist-met 4/27/23  Revised goal: sit to stand with Rw with supervision-not met  4. Pt to perform gait 15ft with LRAD as needed for safety with moderate assist.- met 4/27/23  Revised goal: gait 150ft with RW with supervision-not met  5. Pt to go up/down curb step with LRAD as needed for safety with moderate assist.-not met  6. Pt to transfer bed to/from bedside chair with LRAD as needed for safety with minimal assist.-not met  7. Pt to perform B LE exs in sitting or supine x 10 reps to strengthen B LE to improve functional mobility.-not met                        Time Tracking:     PT Received On: 05/01/23  PT Start Time: 1343     PT Stop Time: 1406  PT Total Time (min): 23 min     Billable Minutes: Gait Training 10 and Therapeutic Activity 13    Treatment Type: Treatment  PT/PTA: PTA     Number of PTA visits since last PT visit: 1 05/01/2023

## 2023-05-01 NOTE — ASSESSMENT & PLAN NOTE
58 year old man with intractable epilepsy since age 48 s/p bilateral sEEG and L laser amygdalohippocampectomy 3/1/21, repeat sEEG 2/27/23-3/8/23 admitted to [facility] s/p left temporal lobectomy 4/24/23 for treatment of seizures.    Recommendations:  - Continuous vEEG - no seizures noted overnight  - Recommend to continue home AED regimen - Clobazam 40 mg qHS, Eslicarbazepine 1200 mg daily, Zonisamide 500 mg qHS  - Avoid agents that lower seizure threshold  - Management of any infectious/metabolic abnormalities per [facility]  - Seizure precautions    Discussed plan of care with [facility] team, patient and wife at bedside. Will follow, please call with questions.

## 2023-05-01 NOTE — SUBJECTIVE & OBJECTIVE
"Interval History:  5/2   no acute overnight events. Continue calorie count, maintain NGT, and continued hyponatremia despite salt tabs     Medications:  Continuous Infusions:      Scheduled Meds:   cloBAZam  40 mg Oral QHS    dexAMETHasone  4 mg Oral Q12H    Followed by    [START ON 5/5/2023] dexAMETHasone  2 mg Oral Q12H    Followed by    [START ON 5/9/2023] dexAMETHasone  2 mg Oral Daily    Followed by    [START ON 5/13/2023] dexAMETHasone  1 mg Oral Daily    enoxaparin  40 mg Subcutaneous Daily    EScitalopram oxalate  10 mg Oral Daily    eslicarbazepine  1,200 mg Oral Daily    famotidine  20 mg Per NG tube BID    polyethylene glycol  17 g Oral Daily    senna-docusate 8.6-50 mg  1 tablet Per NG tube BID    zonisamide  500 mg Oral QHS     PRN Meds:acetaminophen, dextrose 10%, dextrose 10%, hydrALAZINE, ondansetron, oxyCODONE     Review of Systems  Objective:     Weight: 89.8 kg (198 lb)  Body mass index is 27.62 kg/m².  Vital Signs (Most Recent):  Temp: 98.8 °F (37.1 °C) (05/02/23 1105)  Pulse: 91 (05/02/23 1305)  Resp: 16 (05/02/23 1305)  BP: 115/76 (05/02/23 1305)  SpO2: (!) 94 % (05/02/23 1305)   Vital Signs (24h Range):  Temp:  [98 °F (36.7 °C)-99.8 °F (37.7 °C)] 98.8 °F (37.1 °C)  Pulse:  [56-91] 91  Resp:  [12-21] 16  SpO2:  [91 %-95 %] 94 %  BP: (115-157)/(69-93) 115/76                            Urethral Catheter 04/24/23 1100 Silicone 16 Fr. (Active)   Site Assessment Clean;Intact 04/25/23 0303   Collection Container Urimeter 04/25/23 0303   Securement Method secured to top of thigh w/ adhesive device 04/25/23 0303   Catheter Care Performed yes 04/25/23 0303   Reason for Continuing Urinary Catheterization Post operative 04/25/23 0303   CAUTI Prevention Bundle Securement Device in place with 1" slack;Intact seal between catheter & drainage tubing;Drainage bag/urimeter off the floor;Sheeting clip in use;No dependent loops or kinks;Drainage bag/urimeter not overfilled (<2/3 full);Drainage bag/urimeter below " bladder 04/24/23 1909   Output (mL) 100 mL 04/25/23 0625            Drain/Device  04/24/23 1524 Left lateral temporal region gravity (Active)   Insertion Site no hematoma 04/25/23 0303   Drainage Characteristics/Odor Bleeding controlled 04/24/23 1909   Drainage Amount None 04/24/23 1909   General Intake (mL) 0 04/24/23 1909   General Output (mL) 140 04/25/23 0625       Physical Exam  Neurosurgery Physical Exam    [id number]   Aox3  Perrl  eomi   visual fields full to confrontation   Incision CDI     Significant Labs:  Recent Labs   Lab 05/01/23  0200 05/02/23  0118   * 136*   * 130*   K 4.0 4.1    99   CO2 21* 22*   BUN 9 14   CREATININE 0.6 0.7   CALCIUM 8.7 9.1   MG 1.8 1.9       Recent Labs   Lab 05/01/23  0200 05/02/23  0118   WBC 11.79 14.20*   HGB 12.7* 13.5*   HCT 37.8* 40.8    386       No results for input(s): LABPT, INR, APTT in the last 48 hours.    Microbiology Results (last 7 days)       ** No results found for the last 168 hours. **          All pertinent labs from the last 24 hours have been reviewed.    Significant Diagnostics:  I have reviewed all pertinent imaging results/findings within the past 24 hours.

## 2023-05-01 NOTE — PLAN OF CARE
[facility]  Discharge Reassessment    Primary Care Provider: Juan Carlos Simmons NP    Expected Discharge Date: 5/9/2023    Per MD:  Speech improving          - recs for soft diet with thick liquids          -  keep NGT until improving/adequate PO intake    Patient not medically ready for discharge.     [facility] is family choice.     Reassessment (most recent)       Discharge Reassessment - 05/01/23 1700          Discharge Reassessment    Assessment Type Discharge Planning Reassessment     Did the patient's condition or plan change since previous assessment? No     Communicated KENNEDY with patient/caregiver Date not available/Unable to determine     Discharge Plan A Rehab     Discharge Plan B Rehab     DME Needed Upon Discharge  none     Discharge Barriers Identified None     Why the patient remains in the hospital Requires continued medical care                     Tammy Garcia RN, CCRN-K, [facility]  Neuro-Critical Care   [phone]

## 2023-05-01 NOTE — CONSULTS
RD consulted for calorie count. Spoke with RN. All paperwork taped to front of pt's door. Please collect/save all meal tickets and record % PO intake of all items provided on each tray. RD to collect and analyze results of calorie count on Thursday (5/4). Please contact RD if any questions arise during this time. RD to monitor and follow.     Thanks!    Deepti White RDN,RENÉEN

## 2023-05-01 NOTE — PROCEDURES
EEG REPORT      Declan Burleson  [id number]  1965    DATE OF SERVICE: 4/30/2023     -3    METHODOLOGY      Extended electroencephalographic recording is made while the patient is ambulatory and continuing normal daily activities.  Electrodes are placed according to the International 10-20 placement system and included T1 and T2 electrode placement.  Twenty four (24) channels of digital signal (sampling rate of 512/sec) was simultaneously recorded from the scalp including EKG and eye monitors.  Recording band pass was 0.1 to 100 hz and all data was stored digitally on the recorder.  The patient is instructed to press an event button when clinical symptoms occur and write the symptoms into a diary. Activation procedures which include photic stimulation, hyperventilation and instructing patients to perform simple task are done in selected patients.        The EEG is displayed on a monitor screen and can be reformatted into different montages for evaluation.  The entire recoding is submitted for computer assisted analysis to detect spike and electrographic seizure activity.  The entire recording is visually reviewed and the times identified by computer analysis as being spikes or seizures are reviewed again.  Compresses spectral analysis (CSA) is also performed on the activity recorded from each individual channel.  This is displayed as a power display of frequencies from 0 to 30 Hz over time.   The CSA analysis is done and displayed continuously.  This is reviewed for asymmetries in power between homologous areas of the scalp and for presence of changes in power which canbe seen when seizures occur.  Sections of suspected abnormalities on the CSA is then compared with the original EEG recording.  .     [employer] software was also utilized in the review of this study.  This software suite analyzes the EEG recording in multiple domains.  Coherence and rhythmicity is computed to identify EEG sections which  may contain organized seizures.  Each channel undergoes analysis to detect presence of spike and sharp waves which have special and morphological characteristic of epileptic activity.  The routine EEG recording is converted from spacial into frequency domain.  This is then displayed comparing homologous areas to identify areas of significant asymmetry.  Algorithm to identify non-cortically generated artifact is used to separate eye movement, EMG and other artifact from the EEG     Recording Times  Start on 4/30/2023  Stop on 5/1/2023    A total of 23:58:38 hours of EEG was recorded.      EEG FINDINGS:  Background activity:   The background rhythm over the right hemisphere was characterized by alpha and theta activity with a 7-8Hz posterior dominant alpha rhythm at 30-70 microvolts.   The background rhythm over the left hemisphere was characterized by alpha and theta activity with intermittent delta and a 6-7Hz posterior dominant alpha rhythm at 30-70 microvolts.      Sleep:   Normal sleep transients including sleep spindles, K complexes, vertex waves and POSTS were seen.    Activation procedures:   NA    Abnormal activity:   There are occasional sharp waves at F7    IMPRESSION:   Abnormal EEG due to left greater than right hemisphere dysfunction with a seizure focus in the left hemisphere.  No electrographic seizures.      Douglas Feliciano MD  Neurology-Epilepsy.  [facility].

## 2023-05-01 NOTE — ASSESSMENT & PLAN NOTE
Patient with does NOT have Hypercapnic and Hypoxic Respiratory failure which is Acute.  he is not on home oxygen. No Signs/symptoms of respiratory failure.  Aspiration Labs and images were reviewed. Patient Has recent ABG, which has been reviewed.    Patient vomited undigested medications and pudding on 4/25 and shortly after had an acute episode of hypoxia to 80's and was placed on NRB to maintain sats in mid 90's. He also became bradycardic to 50. He likely had an aspiration event.   - CXR without notable consolidation but does demonstrate atelectasis of right lung base  - ABG with mild respiratory acidosis     Plan:  - supplemental O2 to maintain sat > 92%, wean as tolerated  - aspiration precautions  - SLP cleared pt for dysphagia diet  - hold off on abx for now unless clinical picture suggests developing infection    RESOLVED

## 2023-05-01 NOTE — PLAN OF CARE
[facility] Care Plan    POC reviewed with Declan Burleson and family at 1400. Pt verbalized understanding. Questions and concerns addressed. No acute events today. Pt progressing toward goals. Will continue to monitor. See below and flowsheets for full assessment and VS info.   - Calorie count in place           Is this a stroke patient? no    Neuro:  [place] Coma Scale  Best Eye Response: 4-->(E4) spontaneous  Best Motor Response: 6-->(M6) obeys commands  Best Verbal Response: 5-->(V5) oriented  Ruben Coma Scale Score: 15  Assessment Qualifiers: patient not sedated/intubated  Pupil PERRLA: yes     24 hr Temp:  [98.3 °F (36.8 °C)-98.6 °F (37 °C)]     CV:   Rhythm: sinus bradycardia  BP goals:   SBP < 160  MAP > 65    Resp:           Plan: N/A    GI/:     Diet/Nutrition Received: mechanical/dental soft  Last Bowel Movement: 04/27/23  Voiding Characteristics: external catheter    Intake/Output Summary (Last 24 hours) at 5/1/2023 1645  Last data filed at 5/1/2023 1305  Gross per 24 hour   Intake 1273.43 ml   Output 2600 ml   Net -1326.57 ml     Unmeasured Output  Urine Occurrence: 1  Stool Occurrence: 0  Pad Count: 2    Labs/Accuchecks:  Recent Labs   Lab 05/01/23  0200   WBC 11.79   RBC 4.10*   HGB 12.7*   HCT 37.8*         Recent Labs   Lab 05/01/23  0200   *   K 4.0   CO2 21*      BUN 9   CREATININE 0.6   ALKPHOS 106   ALT 17   AST 10   BILITOT 0.5    No results for input(s): PROTIME, INR, APTT, HEPANTIXA in the last 168 hours. No results for input(s): CPK, CPKMB, TROPONINI, MB in the last 168 hours.    Electrolytes: Electrolytes replaced  Accuchecks: none    Gtts:      LDA/Wounds:  Lines/Drains/Airways       Drain  Duration                  NG/OG Tube 04/25/23 1015 [place] sump 18 Fr. Right nostril 6 days    Male External Urinary Catheter 04/26/23 0600 5 days              Peripheral Intravenous Line  Duration                  Peripheral IV - Single Lumen 04/26/23 1218 Anterior;Right Forearm 5  days         Peripheral IV - Single Lumen 04/29/23 0620 20 G Left Antecubital 2 days                  Wounds: No  Wound care consulted: No
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
## 2023-05-01 NOTE — HPI
Mr. Burleson is a 48 year old man with intractable epilepsy since age 48 s/p bilateral sEEG and L laser amygdalohippocampectomy 3/1/21, repeat sEEG 2/27/23-3/8/23 admitted to [facility] s/p left temporal lobectomy 4/24/23 for treatment of seizures. Patient initially had some improvement in seizures after left laser amygdalohippocampectomy in March of 2021, but ultimately he developed recurrence of seizures. Patient is currently maintained on Clobazam 40 mg qHS, Eslicarbazepine 1200 mg qHS, and Zonisamide 500 mg qHS with continued events of staring/loss of awareness, in addition to generalized convulsions. Patient underwent repeat Neuropsychology testing and evaluation by Memory Neurology, as well as extensive discussion in multi-disciplinary epilepsy surgery conference, with recommendation for repeat sEEG which was completed 2/27/23-3/8/23. On review of repeat sEEG data and discussion among patient/family and outpatient Epilepsy, Neurosurgery, and Neuropsychology teams, decision made to proceed with left anterior temporal lobectomy. Post-operatively, patient had likely aspiration event 4/25 after vomiting undigested medications. Patient remains in ICU for continued management of hyponatremia, mental status. Neurology epilepsy following for assistance in management.

## 2023-05-01 NOTE — PLAN OF CARE
[facility] Care Plan    POC reviewed with Declan Néstor Benjy and family at 0300. Pt verbalized understanding. Questions and concerns addressed. No acute events overnight. Pt progressing toward goals. Will continue to monitor. See below and flowsheets for full assessment and VS info.     -No witnessed seizures.  -PRN tylenol, Norco, and Oxy given for headache.  -Mag replaced.      Is this a stroke patient? no    Neuro:  [place] Coma Scale  Best Eye Response: 4-->(E4) spontaneous  Best Motor Response: 6-->(M6) obeys commands  Best Verbal Response: 5-->(V5) oriented  Ruben Coma Scale Score: 15  Assessment Qualifiers: patient not sedated/intubated, no eye obstruction present  Pupil PERRLA: yes     24hr Temp:  [98.2 °F (36.8 °C)-98.6 °F (37 °C)]     CV:   Rhythm: normal sinus rhythm  BP goals:   SBP < 160  MAP > 65    Resp:           Plan: N/A    GI/:     Diet/Nutrition Received: mechanical/dental soft  Last Bowel Movement: 04/27/23  Voiding Characteristics: external catheter    Intake/Output Summary (Last 24 hours) at 5/1/2023 0641  Last data filed at 5/1/2023 0605  Gross per 24 hour   Intake 2045.99 ml   Output 3050 ml   Net -1004.01 ml     Unmeasured Output  Urine Occurrence: 1  Stool Occurrence: 0  Pad Count: 2    Labs/Accuchecks:  Recent Labs   Lab 05/01/23  0200   WBC 11.79   RBC 4.10*   HGB 12.7*   HCT 37.8*         Recent Labs   Lab 05/01/23  0200   *   K 4.0   CO2 21*      BUN 9   CREATININE 0.6   ALKPHOS 106   ALT 17   AST 10   BILITOT 0.5      Recent Labs   Lab 04/24/23  0928   INR 1.0   APTT 26.2    No results for input(s): CPK, CPKMB, TROPONINI, MB in the last 168 hours.    Electrolytes: Electrolytes replaced  Accuchecks: none    Gtts:   sodium chloride 0.9% 50 mL/hr at 05/01/23 0605       LDA/Wounds:  Lines/Drains/Airways       Drain  Duration                  NG/OG Tube 04/25/23 1015 [place] sump 18 Fr. Right nostril 5 days    Male External Urinary Catheter 04/26/23 0600 5 days               Peripheral Intravenous Line  Duration                  Peripheral IV - Single Lumen 04/26/23 1218 Anterior;Right Forearm 4 days         Peripheral IV - Single Lumen 04/29/23 0620 20 G Left Antecubital 2 days                  Wounds: No  Wound care consulted: No

## 2023-05-01 NOTE — ASSESSMENT & PLAN NOTE
Due to seizures, post opt partial lobectomy, aspiration pneumonitis  Continue AEDs  CVEEG  Monitor

## 2023-05-01 NOTE — ASSESSMENT & PLAN NOTE
Continues to have hyponatremia  Concerned that this is from the Aptiom  Will hold NS and see how much he downtrends  I would be concerned with sending patient home on salt tabs and hope for close monitoring of his Na  Would not want his Na to decrease further then suffer more seizure, or possibly ODS if too quickly corrected  Would recommend changing aptiom instead  Will reach out to his primary epileptologist to discuss after evaluating change in Na with holding NS fluids

## 2023-05-01 NOTE — PT/OT/SLP PROGRESS
Occupational Therapy   Co-Treatment    Name: Declan Burleson  MRN: [id number]  Admitting Diagnosis:  Complex partial epilepsy with generalization and with intractable epilepsy  5 Days Post-Op    Recommendations:     Discharge Recommendations: rehabilitation facility  Discharge Equipment Recommendations:  to be determined by next level of care  Barriers to discharge:  Other (Comment) (increased skilled (A) required)    Assessment:     Declan Burleson is a 58 y.o. male with a medical diagnosis of Complex partial epilepsy with generalization and with intractable epilepsy.  He presents with the following performance deficits affecting function are weakness, impaired endurance, impaired self care skills, impaired functional mobility, gait instability, impaired balance, decreased coordination, decreased safety awareness, pain, impaired cardiopulmonary response to activity.  Pt presents with HOB elevated, reporting head pain however participatory with therapy. Pt participated in functional mobility trials with seated rest break in between. Pt denied dizziness or lightheadedness and BP was monitored and remained WFL throughout. He continues to require Min A for bed mobility and Mod A with HHA for functional mobility. Overall, pt is making good progress towards all OT goals and would benefit from continued skilled acute OT services in order to maximize (I) and with ADLs and functional mobility to ensure safe return to PLOF in the least restrictive environment. OT recommending IPR once pt is medically appropriate for d/c.       Rehab Prognosis:  Good; patient would benefit from acute skilled OT services to address these deficits and reach maximum level of function.       Plan:     Patient to be seen 4 x/week to address the above listed problems via self-care/home management, therapeutic activities, therapeutic exercises, neuromuscular re-education  Plan of Care Expires: 05/26/23  Plan of Care Reviewed with:  "patient    Subjective   "My head"   Chief Complaint: Head pain   Patient/Family Comments/goals: Increase mobility   Pain/Comfort:  Pain Rating 1: 8/10  Location - Side 1: Bilateral  Location - Orientation 1: generalized  Location 1: head  Pain Addressed 1: Cessation of Activity, Nurse notified  Pain Rating Post-Intervention 1: other (see comments) (did not rate)    Objective:     Communicated with: RN prior to session.  Patient found HOB elevated with bed alarm, blood pressure cuff, peripheral IV, pulse ox (continuous), telemetry, Condom Catheter, EEG, NG tube upon OT entry to room.    General Precautions: Standard, aspiration, aphasia, fall    Orthopedic Precautions:N/A  Braces: N/A  Respiratory Status: Room air     Occupational Performance:     Bed Mobility:    Patient completed Supine to Sit with minimum assistance  Patient completed Sit to Supine with minimum assistance     Functional Mobility/Transfers:  Patient completed x2 Sit <> Stand Transfer with minimum assistance for 1st trial and moderate assistance for 2nd trial with  hand-held assist    Pt required verbal cues for hand placement and controlled descend down onto bed.   Functional Mobility: Pt demonstrated ability to perform ~40 ft in room mobility with B HHA for safety. Pt required consistent verbal cues for facilitating upright posture and increasing step length. Pt was able to take forward/backwards and R/L lateral steps. He required increased (A) for backwards steps for safety.     Activities of Daily Living:  Feeding:  pt presents with NG tube     Grooming: pt politely declined facial hygiene     Toileting: pt presents with catheter in place. Linen changed while in standing.        [facility] 6 Click ADL: 15    Treatment & Education:  Pt sat EOB with close SBA-CGA for safety.   Static standing trials prior to mobility trials. Pt required Min A x1 with HHA.   Pt educated on:   Role of OT, POC, and d/c planning.   Importance of OOB activities to increase " endurance and tolerance for increased participation in daily ADLs.   Pt educated on various BUE/BLE therex he can perform outside of therapy session to increase functional endurance and strength.   Utilizing the call bell to request for assistance with all functional mobility to ensure safety during hospital stay.      Pt verbalized understanding and all questions were addressed within the scope of OT.       Patient left HOB elevated with all lines intact, call button in reach, Rn notified, and RN present    GOALS:   Multidisciplinary Problems       Occupational Therapy Goals          Problem: Occupational Therapy    Goal Priority Disciplines Outcome Interventions   Occupational Therapy Goal     OT, PT/OT Ongoing, Progressing    Description: Goals to be met by: 5/10/2023     Patient will increase functional independence with ADLs by performing:    UE Dressing with Minimal Assistance.  LE Dressing with Minimal Assistance.  Grooming while standing at sink with Minimal Assistance.  Toileting from bedside commode with Minimal Assistance for hygiene and clothing management.   Step transfer with Minimal Assistance  Toilet transfer to bedside commode with Minimal Assistance.                         Time Tracking:     OT Date of Treatment: 05/01/23  OT Start Time: 1343  OT Stop Time: 1406  OT Total Time (min): 23 min    Billable Minutes:Therapeutic Activity 13  Therapeutic Exercise 10    OT/BENITEZ: OT          5/1/2023  Co-treatment performed due to patient's multiple deficits requiring two skilled therapists to appropriately and safely assess patient's strength, endurance, functional mobility, and ADL performance while facilitating functional tasks in addition to accommodating for patient's activity tolerance and medical acuity.

## 2023-05-01 NOTE — PT/OT/SLP PROGRESS
Speech Language Pathology Treatment    Patient Name:  Declan Burleson   MRN:  [id number]  Admitting Diagnosis: Complex partial epilepsy with generalization and with intractable epilepsy    Recommendations:                 General Recommendations:  Dysphagia therapy and Speech/language therapy  Diet recommendations:  Dental Soft, Liquid Diet Level: Nectar Thick   Aspiration Precautions: Strict aspiration precautions   General Precautions: Standard, aspiration, aphasia  Communication strategies:  yes/no questions only and provide increased time to answer    Subjective     Awake/alert  Spouse at bedside     Pain/Comfort:  Pain Rating 1: 0/10  Pain Rating Post-Intervention 1: 0/10    Respiratory Status: Room air    Objective:     Has the patient been evaluated by SLP for swallowing?   Yes  Keep patient NPO? No     Pt repositioned upright in bed for PO trials. He tolerated thin liquids x5 with delayed swallow initiation and cough/throat clear post swallow. He spontaneously verbalized throughout session with increased time to answer. Per spouse, pt with increased spontaneous speech noted throughout session. He was eager to have cereal for breakfast. Pt tolerated self fed bites of cereal with milk drained from spoon x10 with timely swallow initiation. Mild globus sensation noted across trials, but pt able to clear with nectar thick liquids. Recommend continue current diet recs at this time. SLP will follow up. Ongoing education provided regarding diet recs, swallow precautions, and SLP POC. Both verbalized understanding.      Assessment:     Declan Burleson is a 58 y.o. male with an SLP diagnosis of Aphasia and Dysphagia.      Goals:   Multidisciplinary Problems       SLP Goals          Problem: SLP    Goal Priority Disciplines Outcome   SLP Goal     SLP Ongoing, Progressing   Description: Speech Language Pathology Goals  Goals expected to be met by 5/2  1. Pt will complete ongoing assessment of swallow to  advance diet as appropriate.    2. Pt will follow 1 step commands with 80% accy given min A.   3. Pt will answer simple y/n questions with 80% accy given min A.   4. Pt will complete simple auto speech tasks with 70% accy given mod A.   5. Pt will complete simple naming tasks with 50% accy given mod A.                                Plan:     Patient to be seen:  4 x/week   Plan of Care expires:  05/25/23  Plan of Care reviewed with:  patient, spouse   SLP Follow-Up:  Yes       Discharge recommendations:  rehabilitation facility     Time Tracking:     SLP Treatment Date:   05/01/23  Speech Start Time:  0808  Speech Stop Time:  0836     Speech Total Time (min):  28 min    Billable Minutes: Speech Therapy Individual 10, Treatment Swallowing Dysfunction 10, and Self Care/Home Management Training 8    05/01/2023

## 2023-05-01 NOTE — SUBJECTIVE & OBJECTIVE
"Interval History:  5/1   no acute overnight events. With EEG cap, NGT and continued hyponatremia     Medications:  Continuous Infusions:   sodium chloride 0.9% 50 mL/hr at 05/01/23 0605     Scheduled Meds:   cloBAZam  40 mg Oral QHS    dexAMETHasone  4 mg Oral Q12H    enoxaparin  40 mg Subcutaneous Daily    EScitalopram oxalate  10 mg Oral Daily    eslicarbazepine  1,200 mg Oral Daily    famotidine  20 mg Per NG tube BID    polyethylene glycol  17 g Oral Daily    senna-docusate 8.6-50 mg  1 tablet Per NG tube BID    zonisamide  500 mg Oral QHS     PRN Meds:acetaminophen, dextrose 10%, dextrose 10%, hydrALAZINE, magnesium oxide, magnesium oxide, ondansetron, oxyCODONE, potassium bicarbonate, potassium bicarbonate, potassium bicarbonate, potassium, sodium phosphates, potassium, sodium phosphates, potassium, sodium phosphates     Review of Systems  Objective:     Weight: 89.8 kg (198 lb)  Body mass index is 27.62 kg/m².  Vital Signs (Most Recent):  Temp: 98.5 °F (36.9 °C) (05/01/23 0305)  Pulse: (!) 58 (05/01/23 0505)  Resp: (!) 25 (05/01/23 0627)  BP: (!) 142/82 (05/01/23 0505)  SpO2: 95 % (05/01/23 0505)   Vital Signs (24h Range):  Temp:  [98.2 °F (36.8 °C)-98.6 °F (37 °C)] 98.5 °F (36.9 °C)  Pulse:  [51-74] 58  Resp:  [11-27] 25  SpO2:  [93 %-95 %] 95 %  BP: (142-176)/() 142/82                            Urethral Catheter 04/24/23 1100 Silicone 16 Fr. (Active)   Site Assessment Clean;Intact 04/25/23 0303   Collection Container Urimeter 04/25/23 0303   Securement Method secured to top of thigh w/ adhesive device 04/25/23 0303   Catheter Care Performed yes 04/25/23 0303   Reason for Continuing Urinary Catheterization Post operative 04/25/23 0303   CAUTI Prevention Bundle Securement Device in place with 1" slack;Intact seal between catheter & drainage tubing;Drainage bag/urimeter off the floor;Sheeting clip in use;No dependent loops or kinks;Drainage bag/urimeter not overfilled (<2/3 full);Drainage bag/urimeter " below bladder 04/24/23 1909   Output (mL) 100 mL 04/25/23 0625            Drain/Device  04/24/23 1524 Left lateral temporal region gravity (Active)   Insertion Site no hematoma 04/25/23 0303   Drainage Characteristics/Odor Bleeding controlled 04/24/23 1909   Drainage Amount None 04/24/23 1909   General Intake (mL) 0 04/24/23 1909   General Output (mL) 140 04/25/23 0625       Physical Exam  Neurosurgery Physical Exam    [id number]   Aox3  Perrl  eomi   visual fields full to confrontation   Incision CDI     Significant Labs:  Recent Labs   Lab 04/30/23 0127 05/01/23  0200   * 121*   * 130*   K 3.7 4.0    100   CO2 21* 21*   BUN 10 9   CREATININE 0.7 0.6   CALCIUM 9.0 8.7   MG 1.7 1.8       Recent Labs   Lab 04/30/23 0127 05/01/23  0200   WBC 10.46 11.79   HGB 13.1* 12.7*   HCT 38.9* 37.8*    377       No results for input(s): LABPT, INR, APTT in the last 48 hours.    Microbiology Results (last 7 days)       ** No results found for the last 168 hours. **          All pertinent labs from the last 24 hours have been reviewed.    Significant Diagnostics:  I have reviewed all pertinent imaging results/findings within the past 24 hours.

## 2023-05-01 NOTE — PROGRESS NOTES
[facility]  Neurosurgery  Progress Note    Subjective:     History of Present Illness: 58 M with epilepsy presents for elective left temporal lobectomy      Post-Op Info:  Procedure(s) (LRB):  REMOVAL, DRAIN (Left)   5 Days Post-Op     Interval History:  5/1   no acute overnight events. With EEG cap, NGT and continued hyponatremia     Medications:  Continuous Infusions:   sodium chloride 0.9% 50 mL/hr at 05/01/23 0605     Scheduled Meds:   cloBAZam  40 mg Oral QHS    dexAMETHasone  4 mg Oral Q12H    enoxaparin  40 mg Subcutaneous Daily    EScitalopram oxalate  10 mg Oral Daily    eslicarbazepine  1,200 mg Oral Daily    famotidine  20 mg Per NG tube BID    polyethylene glycol  17 g Oral Daily    senna-docusate 8.6-50 mg  1 tablet Per NG tube BID    zonisamide  500 mg Oral QHS     PRN Meds:acetaminophen, dextrose 10%, dextrose 10%, hydrALAZINE, magnesium oxide, magnesium oxide, ondansetron, oxyCODONE, potassium bicarbonate, potassium bicarbonate, potassium bicarbonate, potassium, sodium phosphates, potassium, sodium phosphates, potassium, sodium phosphates     Review of Systems  Objective:     Weight: 89.8 kg (198 lb)  Body mass index is 27.62 kg/m².  Vital Signs (Most Recent):  Temp: 98.5 °F (36.9 °C) (05/01/23 0305)  Pulse: (!) 58 (05/01/23 0505)  Resp: (!) 25 (05/01/23 0627)  BP: (!) 142/82 (05/01/23 0505)  SpO2: 95 % (05/01/23 0505)   Vital Signs (24h Range):  Temp:  [98.2 °F (36.8 °C)-98.6 °F (37 °C)] 98.5 °F (36.9 °C)  Pulse:  [51-74] 58  Resp:  [11-27] 25  SpO2:  [93 %-95 %] 95 %  BP: (142-176)/() 142/82                            Urethral Catheter 04/24/23 1100 Silicone 16 Fr. (Active)   Site Assessment Clean;Intact 04/25/23 0303   Collection Container Urimeter 04/25/23 0303   Securement Method secured to top of thigh w/ adhesive device 04/25/23 0303   Catheter Care Performed yes 04/25/23 0303   Reason for Continuing Urinary Catheterization Post operative 04/25/23 0303  "  CAUTI Prevention Bundle Securement Device in place with 1" slack;Intact seal between catheter & drainage tubing;Drainage bag/urimeter off the floor;Sheeting clip in use;No dependent loops or kinks;Drainage bag/urimeter not overfilled (<2/3 full);Drainage bag/urimeter below bladder 04/24/23 1909   Output (mL) 100 mL 04/25/23 0625            Drain/Device  04/24/23 1524 Left lateral temporal region gravity (Active)   Insertion Site no hematoma 04/25/23 0303   Drainage Characteristics/Odor Bleeding controlled 04/24/23 1909   Drainage Amount None 04/24/23 1909   General Intake (mL) 0 04/24/23 1909   General Output (mL) 140 04/25/23 0625       Physical Exam  Neurosurgery Physical Exam    [id number]   Aox3  Perrl  eomi   visual fields full to confrontation   Incision CDI     Significant Labs:  Recent Labs   Lab 04/30/23  0127 05/01/23  0200   * 121*   * 130*   K 3.7 4.0    100   CO2 21* 21*   BUN 10 9   CREATININE 0.7 0.6   CALCIUM 9.0 8.7   MG 1.7 1.8       Recent Labs   Lab 04/30/23  0127 05/01/23  0200   WBC 10.46 11.79   HGB 13.1* 12.7*   HCT 38.9* 37.8*    377       No results for input(s): LABPT, INR, APTT in the last 48 hours.    Microbiology Results (last 7 days)       ** No results found for the last 168 hours. **          All pertinent labs from the last 24 hours have been reviewed.    Significant Diagnostics:  I have reviewed all pertinent imaging results/findings within the past 24 hours.    Assessment/Plan:     * Complex partial epilepsy with generalization and with intractable epilepsy  59 y/o M s/p left temporal lobectomy on 4/24  for seizures refractory to prior laser ablation    Recommend calorie count to determine if NGT can be removed.   NA management per ICU team.   Dex weaned to 4Q12    - ICU status post op, continue ICU care for now for hyponatremia  - q2 neuro checks  - MRI brain with good resection   - Na goal > 135, seems to be hypovolemic hyponatremia, started   - " continue home AEDs, EEG negative 4/28, on EEG again this morning  - Lovenox for DVT prophylaxis   - continue 3 week dex taper  - continue SLP for speech eval  and swallow eval    - Speech improving   - recs for soft diet with thick liquids   - please keep NGT until improving/adequate PO intake  - PT/OT    Dispo: ongoing          Henok Melissa MD  Neurosurgery  [facility]

## 2023-05-01 NOTE — SUBJECTIVE & OBJECTIVE
Past Medical History:   Diagnosis Date    Anxiety     Dementia in other diseases classified elsewhere, unspecified severity, without behavioral disturbance, psychotic disturbance, mood disturbance, and anxiety 2/2/2023    Depression     GERD (gastroesophageal reflux disease)     Hyperlipidemia     Hypertension     Long term (current) use of antithrombotics/antiplatelets 12/22/2020    Migraine     Normocytic anemia 2/23/2023    Seizures        Past Surgical History:   Procedure Laterality Date    CRANIOTOMY USING FRAMELESS STEREOTAXY Left 4/24/2023    Procedure: CRANIOTOMY, USING FRAMELESS STEREOTAXY;  Surgeon: Ruchi Chen MD;  [facility];  Service: Neurosurgery;  Laterality: Left;  Tor III ASA III  Blood: Type & Screen  Neuro Mon: None  Bed: Reg  Headrest: [place]  Pos: Supine  Ste[facility] MRI [facility]   [place]  Asst Surg: [place]    CYST REMOVAL  March 2016/2017    skin cyst - benign    PLACEMENT OF STEREO EEG LEADS(DEPTH ELECTRODES) Left 2/27/2023    Procedure: PLACEMENT OF STEREO EEG LEADS (DEPTH ELECTRODES);  Surgeon: Ruchi Chen MD;  [facility];  Service: Neurosurgery;  Laterality: Left;    REMOVAL OF DRAIN Left 4/26/2023    Procedure: REMOVAL, DRAIN;  Surgeon: Ruchi Chen MD;  [facility];  Service: Neurosurgery;  Laterality: Left;    REMOVAL OF STEREO EEG LEADS (DEPTH ELECTRODES) Bilateral 1/21/2021    Procedure: REMOVAL OF STEREO EEG LEADS (DEPTH ELECTRODES);  Surgeon: Ruchi Chen MD;  Location: [facility] OR [address] [place] [address];  Service: Neurosurgery;  Laterality: Bilateral;    REMOVAL OF STEREO EEG LEADS (DEPTH ELECTRODES) Left 3/8/2023    Procedure: REMOVAL OF STEREO EEG LEADS (DEPTH ELECTRODES);  Surgeon: Ruchi Chen MD;  Location: [facility] OR [address] [place] [address];  Service: Neurosurgery;  Laterality: Left;    TONSILLECTOMY      teenage        Review of patient's allergies indicates:   Allergen Reactions    Losartan Rash       No current facility-administered medications on file prior to  encounter.     Current Outpatient Medications on File Prior to Encounter   Medication Sig    cloBAZam (ONFI) 20 mg Tab Take 2 tablets (40 mg total) by mouth every evening.    EScitalopram oxalate (LEXAPRO) 10 MG tablet Take 1 tablet (10 mg total) by mouth once daily.    eslicarbazepine (APTIOM) 400 mg Tab tablet Take 1 tablet (400 mg total) by mouth once daily.    eslicarbazepine (APTIOM) 800 mg Tab Take 800 mg by mouth once daily.    oxyCODONE (ROXICODONE) 5 MG immediate release tablet Take 1 tablet (5 mg total) by mouth every 4 (four) hours as needed for Pain.    rizatriptan (MAXALT-MLT) 10 MG disintegrating tablet Take 10 mg by mouth as needed. No more than 3 doses / week    zonisamide (ZONEGRAN) 100 MG Cap Take 5 capsules (500 mg total) by mouth every evening.    mag hydrox/aluminum hyd/simeth (ANTACID ORAL) Take by mouth as needed. Acid reflux    midazolam 5 mg/spray (0.1 mL) Spry 0.1 mLs by Nasal route.    ondansetron (ZOFRAN-ODT) 8 MG TbDL Take 8 mg by mouth every 8 (eight) hours as needed. nausea     Continuous Infusions:    Family History       Problem Relation (Age of Onset)    Heart disease Maternal Uncle (50)    Lung disease Father    Migraines Paternal Grandfather    No Known Problems Mother          Tobacco Use    Smoking status: Former     Types: Cigarettes     Quit date:      Years since quittin.3    Smokeless tobacco: Never   Substance and Sexual Activity    Alcohol use: Yes     Comment: one drink once a week    Drug use: Never    Sexual activity: Yes     Partners: Female     Review of Systems   Constitutional:  Positive for fatigue.   Gastrointestinal:  Negative for nausea and vomiting.   Neurological:  Positive for seizures (none overnight).   All other systems reviewed and are negative.  Objective:     Vital Signs (Most Recent):  Temp: 98.5 °F (36.9 °C) (23 0705)  Pulse: (!) 55 (23 1205)  Resp: 12 (23 1205)  BP: (!) 153/89 (23 1205)  SpO2: 95 % (23 1205)    Vital Signs (24h Range):  Temp:  [98.3 °F (36.8 °C)-98.6 °F (37 °C)] 98.5 °F (36.9 °C)  Pulse:  [50-72] 55  Resp:  [10-27] 12  SpO2:  [93 %-95 %] 95 %  BP: (140-166)/() 153/89     Weight: 89.8 kg (198 lb)  Body mass index is 27.62 kg/m².    Physical Exam  Constitutional:       General: He is not in acute distress.     Appearance: He is not diaphoretic.   HENT:      Head: Normocephalic.      Comments: EEG in place  Eyes:      General: No scleral icterus.     Extraocular Movements: Extraocular movements intact.      Pupils: Pupils are equal, round, and reactive to light.   Pulmonary:      Effort: Pulmonary effort is normal. No respiratory distress.   Abdominal:      General: There is no distension.      Palpations: Abdomen is soft.   Musculoskeletal:         General: No deformity or signs of injury.      Cervical back: Neck supple. No rigidity.   Skin:     General: Skin is warm and dry.   Neurological:      Mental Status: He is alert.   Psychiatric:         Mood and Affect: Mood normal.         Behavior: Behavior normal.       NEUROLOGICAL EXAMINATION:     MENTAL STATUS   Attention: normal.   Level of consciousness: alert    CRANIAL NERVES     CN III, IV, VI   Pupils are equal, round, and reactive to light.       CN II-XII grossly intact  PERRL, EOMI  AAOx3  Follows commands     Significant Labs: All pertinent lab results from the past 24 hours have been reviewed.    Significant Studies: I have reviewed all pertinent imaging results/findings within the past 24 hours.

## 2023-05-02 LAB
ALBUMIN SERPL BCP-MCNC: 3.2 G/DL (ref 3.5–5.2)
ALP SERPL-CCNC: 114 U/L (ref 55–135)
ALT SERPL W/O P-5'-P-CCNC: 16 U/L (ref 10–44)
ANION GAP SERPL CALC-SCNC: 9 MMOL/L (ref 8–16)
AST SERPL-CCNC: 10 U/L (ref 10–40)
BASOPHILS # BLD AUTO: 0.08 K/UL (ref 0–0.2)
BASOPHILS NFR BLD: 0.6 % (ref 0–1.9)
BILIRUB SERPL-MCNC: 0.4 MG/DL (ref 0.1–1)
BUN SERPL-MCNC: 14 MG/DL (ref 6–20)
CALCIUM SERPL-MCNC: 9.1 MG/DL (ref 8.7–10.5)
CHLORIDE SERPL-SCNC: 99 MMOL/L (ref 95–110)
CO2 SERPL-SCNC: 22 MMOL/L (ref 23–29)
CREAT SERPL-MCNC: 0.7 MG/DL (ref 0.5–1.4)
DIFFERENTIAL METHOD: ABNORMAL
EOSINOPHIL # BLD AUTO: 0.2 K/UL (ref 0–0.5)
EOSINOPHIL NFR BLD: 1.1 % (ref 0–8)
ERYTHROCYTE [DISTWIDTH] IN BLOOD BY AUTOMATED COUNT: 13.2 % (ref 11.5–14.5)
EST. GFR  (NO RACE VARIABLE): >60 ML/MIN/1.73 M^2
GLUCOSE SERPL-MCNC: 136 MG/DL (ref 70–110)
HCT VFR BLD AUTO: 40.8 % (ref 40–54)
HGB BLD-MCNC: 13.5 G/DL (ref 14–18)
IMM GRANULOCYTES # BLD AUTO: 0.27 K/UL (ref 0–0.04)
IMM GRANULOCYTES NFR BLD AUTO: 1.9 % (ref 0–0.5)
LYMPHOCYTES # BLD AUTO: 1.5 K/UL (ref 1–4.8)
LYMPHOCYTES NFR BLD: 10.3 % (ref 18–48)
MAGNESIUM SERPL-MCNC: 1.9 MG/DL (ref 1.6–2.6)
MCH RBC QN AUTO: 30.8 PG (ref 27–31)
MCHC RBC AUTO-ENTMCNC: 33.1 G/DL (ref 32–36)
MCV RBC AUTO: 93 FL (ref 82–98)
MONOCYTES # BLD AUTO: 1.3 K/UL (ref 0.3–1)
MONOCYTES NFR BLD: 9.1 % (ref 4–15)
NEUTROPHILS # BLD AUTO: 11 K/UL (ref 1.8–7.7)
NEUTROPHILS NFR BLD: 77 % (ref 38–73)
NRBC BLD-RTO: 0 /100 WBC
PHOSPHATE SERPL-MCNC: 4.2 MG/DL (ref 2.7–4.5)
PLATELET # BLD AUTO: 386 K/UL (ref 150–450)
PMV BLD AUTO: 9 FL (ref 9.2–12.9)
POTASSIUM SERPL-SCNC: 4.1 MMOL/L (ref 3.5–5.1)
PROT SERPL-MCNC: 6.9 G/DL (ref 6–8.4)
RBC # BLD AUTO: 4.38 M/UL (ref 4.6–6.2)
SODIUM SERPL-SCNC: 130 MMOL/L (ref 136–145)
WBC # BLD AUTO: 14.2 K/UL (ref 3.9–12.7)

## 2023-05-02 PROCEDURE — 97535 SELF CARE MNGMENT TRAINING: CPT

## 2023-05-02 PROCEDURE — 99233 PR SUBSEQUENT HOSPITAL CARE,LEVL III: ICD-10-PCS | Mod: ,,, | Performed by: PHYSICIAN ASSISTANT

## 2023-05-02 PROCEDURE — 99233 SBSQ HOSP IP/OBS HIGH 50: CPT | Mod: ,,, | Performed by: PHYSICIAN ASSISTANT

## 2023-05-02 PROCEDURE — 80053 COMPREHEN METABOLIC PANEL: CPT

## 2023-05-02 PROCEDURE — 99499 UNLISTED E&M SERVICE: CPT | Mod: ,,, | Performed by: PSYCHIATRY & NEUROLOGY

## 2023-05-02 PROCEDURE — 84100 ASSAY OF PHOSPHORUS: CPT

## 2023-05-02 PROCEDURE — 25000003 PHARM REV CODE 250

## 2023-05-02 PROCEDURE — 83735 ASSAY OF MAGNESIUM: CPT

## 2023-05-02 PROCEDURE — 99499 NO LOS: ICD-10-PCS | Mod: ,,, | Performed by: PSYCHIATRY & NEUROLOGY

## 2023-05-02 PROCEDURE — 63600175 PHARM REV CODE 636 W HCPCS: Performed by: STUDENT IN AN ORGANIZED HEALTH CARE EDUCATION/TRAINING PROGRAM

## 2023-05-02 PROCEDURE — 95718 PR EEG, W/VIDEO, CONT RECORD, I&R, 2-12 HRS: ICD-10-PCS | Mod: ,,, | Performed by: PSYCHIATRY & NEUROLOGY

## 2023-05-02 PROCEDURE — 99233 SBSQ HOSP IP/OBS HIGH 50: CPT | Mod: ,,, | Performed by: PSYCHIATRY & NEUROLOGY

## 2023-05-02 PROCEDURE — 20600001 HC STEP DOWN PRIVATE ROOM

## 2023-05-02 PROCEDURE — 25000003 PHARM REV CODE 250: Performed by: NURSE PRACTITIONER

## 2023-05-02 PROCEDURE — 95718 EEG PHYS/QHP 2-12 HR W/VEEG: CPT | Mod: ,,, | Performed by: PSYCHIATRY & NEUROLOGY

## 2023-05-02 PROCEDURE — 63600175 PHARM REV CODE 636 W HCPCS: Performed by: PHYSICIAN ASSISTANT

## 2023-05-02 PROCEDURE — 25000003 PHARM REV CODE 250: Performed by: PHYSICIAN ASSISTANT

## 2023-05-02 PROCEDURE — 80299 QUANTITATIVE ASSAY DRUG: CPT | Performed by: PSYCHIATRY & NEUROLOGY

## 2023-05-02 PROCEDURE — 94761 N-INVAS EAR/PLS OXIMETRY MLT: CPT

## 2023-05-02 PROCEDURE — 92507 TX SP LANG VOICE COMM INDIV: CPT

## 2023-05-02 PROCEDURE — 92526 ORAL FUNCTION THERAPY: CPT

## 2023-05-02 PROCEDURE — 25000003 PHARM REV CODE 250: Performed by: STUDENT IN AN ORGANIZED HEALTH CARE EDUCATION/TRAINING PROGRAM

## 2023-05-02 PROCEDURE — 85025 COMPLETE CBC W/AUTO DIFF WBC: CPT

## 2023-05-02 PROCEDURE — 99233 PR SUBSEQUENT HOSPITAL CARE,LEVL III: ICD-10-PCS | Mod: ,,, | Performed by: PSYCHIATRY & NEUROLOGY

## 2023-05-02 RX ORDER — BISACODYL 10 MG
10 SUPPOSITORY, RECTAL RECTAL ONCE
Status: DISCONTINUED | OUTPATIENT
Start: 2023-05-02 | End: 2023-05-03

## 2023-05-02 RX ORDER — DEXAMETHASONE 1 MG/1
2 TABLET ORAL DAILY
Status: DISCONTINUED | OUTPATIENT
Start: 2023-05-09 | End: 2023-05-04 | Stop reason: HOSPADM

## 2023-05-02 RX ORDER — AMOXICILLIN 250 MG
1 CAPSULE ORAL 2 TIMES DAILY
Status: DISCONTINUED | OUTPATIENT
Start: 2023-05-02 | End: 2023-05-03

## 2023-05-02 RX ORDER — DEXAMETHASONE 1 MG/1
1 TABLET ORAL DAILY
Status: DISCONTINUED | OUTPATIENT
Start: 2023-05-13 | End: 2023-05-04 | Stop reason: HOSPADM

## 2023-05-02 RX ORDER — FAMOTIDINE 20 MG/1
20 TABLET, FILM COATED ORAL 2 TIMES DAILY
Status: DISCONTINUED | OUTPATIENT
Start: 2023-05-03 | End: 2023-05-04 | Stop reason: HOSPADM

## 2023-05-02 RX ORDER — SODIUM CHLORIDE 1 G/1
1000 TABLET ORAL 3 TIMES DAILY
Status: DISCONTINUED | OUTPATIENT
Start: 2023-05-02 | End: 2023-05-04

## 2023-05-02 RX ORDER — DEXAMETHASONE 1 MG/1
2 TABLET ORAL EVERY 12 HOURS
Status: DISCONTINUED | OUTPATIENT
Start: 2023-05-05 | End: 2023-05-04 | Stop reason: HOSPADM

## 2023-05-02 RX ORDER — DEXAMETHASONE 4 MG/1
4 TABLET ORAL EVERY 12 HOURS
Status: DISCONTINUED | OUTPATIENT
Start: 2023-05-02 | End: 2023-05-04 | Stop reason: HOSPADM

## 2023-05-02 RX ORDER — FAMOTIDINE 20 MG/1
20 TABLET, FILM COATED ORAL ONCE
Status: COMPLETED | OUTPATIENT
Start: 2023-05-02 | End: 2023-05-02

## 2023-05-02 RX ADMIN — SODIUM CHLORIDE 1000 MG: 1 TABLET ORAL at 06:05

## 2023-05-02 RX ADMIN — DEXAMETHASONE 4 MG: 4 TABLET ORAL at 09:05

## 2023-05-02 RX ADMIN — OXYCODONE HYDROCHLORIDE 5 MG: 5 TABLET ORAL at 01:05

## 2023-05-02 RX ADMIN — FAMOTIDINE 20 MG: 20 TABLET, FILM COATED ORAL at 09:05

## 2023-05-02 RX ADMIN — ZONISAMIDE 500 MG: 100 CAPSULE ORAL at 09:05

## 2023-05-02 RX ADMIN — FAMOTIDINE 20 MG: 20 TABLET ORAL at 08:05

## 2023-05-02 RX ADMIN — ENOXAPARIN SODIUM 40 MG: 40 INJECTION SUBCUTANEOUS at 04:05

## 2023-05-02 RX ADMIN — SENNOSIDES AND DOCUSATE SODIUM 1 TABLET: 50; 8.6 TABLET ORAL at 08:05

## 2023-05-02 RX ADMIN — POLYETHYLENE GLYCOL 3350 17 G: 17 POWDER, FOR SOLUTION ORAL at 08:05

## 2023-05-02 RX ADMIN — SENNOSIDES AND DOCUSATE SODIUM 1 TABLET: 50; 8.6 TABLET ORAL at 09:05

## 2023-05-02 RX ADMIN — ESLICARBAZEPINE ACETATE 1200 MG: 400 TABLET ORAL at 08:05

## 2023-05-02 RX ADMIN — SODIUM CHLORIDE 1000 MG: 1 TABLET ORAL at 09:05

## 2023-05-02 RX ADMIN — DEXAMETHASONE 4 MG: 4 TABLET ORAL at 08:05

## 2023-05-02 RX ADMIN — OXYCODONE HYDROCHLORIDE 5 MG: 5 TABLET ORAL at 09:05

## 2023-05-02 RX ADMIN — CLOBAZAM 40 MG: 10 TABLET ORAL at 09:05

## 2023-05-02 RX ADMIN — ESCITALOPRAM OXALATE 10 MG: 10 TABLET ORAL at 08:05

## 2023-05-02 NOTE — PROGRESS NOTES
[facility]  Neurology-Epilepsy  Progress Note    Patient Name: Declan Burleson  MRN: [id number]  Admission Date: 4/24/2023  Hospital Length of Stay: 8 days  Code Status: Full Code   Attending Provider: Griffin Randall MD  Primary Care Physician: Juan Carlos Simmons NP   Principal Problem:Complex partial epilepsy with generalization and with intractable epilepsy    Subjective:     Hospital Course:   4/28>4/29: EEG mild diffuse background slowing, further focal slowing over the left hemisphere consistent with underlying structural defect, breach artifact seen over region of patients known skull defect.  4/29>4/30: EEG mild diffuse background slowing, further focal slowing over the left hemisphere consistent with underlying structural defect. One electroclinical seizure seen with onset over the left temporal region.   4/30>5/1: EEG left greater than right hemisphere dysfunction with a seizure focus in the left hemisphere, no electrographic seizures. Continue home AED regimen.   5/1>5/2: EEG with left greater than right hemisphere dysfunction with a seizure focus in the left hemisphere, no electrographic seizures. Na stable at 130. Consider Na tabs. Recommend to continue Clobazam 40 mg qHSD, Eslicarbazepine 1200 mg daily, Zonisamide 500 mg qHS with plan for close Epilepsy follow up to monitor Na, consider transition of Eslicarbazepine to alternate AED as indicated.       Interval History: No acute events overnight, pending stepdown to NSGY service.     Current Facility-Administered Medications   Medication Dose Route Frequency Provider Last Rate Last Admin    acetaminophen tablet 650 mg  650 mg Oral Q4H PRN Henok Melissa MD   650 mg at 05/01/23 2116    cloBAZam Tab 40 mg  40 mg Oral QHS Henok Melissa MD   40 mg at 05/01/23 2049    dexAMETHasone tablet 4 mg  4 mg Oral Q12H Rachel Shaffer PA-C        Followed by    [START ON 5/5/2023] dexAMETHasone tablet 2 mg  2 mg Oral Q12H Rachel Shaffer  MOISES        Followed by    [START ON 5/9/2023] dexAMETHasone tablet 2 mg  2 mg Oral Daily Rachel Shaffer PA-C        Followed by    [START ON 5/13/2023] dexAMETHasone tablet 1 mg  1 mg Oral Daily Rachel Shaffer PA-C        dextrose 10% bolus 125 mL 125 mL  12.5 g Intravenous PRN Griffin Randall MD        dextrose 10% bolus 250 mL 250 mL  25 g Intravenous PRN Griffin Randall MD        enoxaparin injection 40 mg  40 mg Subcutaneous Daily Bharat Moralez MD   40 mg at 05/01/23 1604    EScitalopram oxalate tablet 10 mg  10 mg Oral Daily Henok Melissa MD   10 mg at 05/02/23 0801    eslicarbazepine tablet Tab 1,200 mg  1,200 mg Oral Daily Magda Weiss NP   1,200 mg at 05/02/23 0801    famotidine tablet 20 mg  20 mg Per NG tube BID Maritza Arias PA-C   20 mg at 05/02/23 0801    hydrALAZINE injection 10 mg  10 mg Intravenous Q6H PRN Diana Jordan NP   10 mg at 04/29/23 2325    ondansetron injection 4 mg  4 mg Intravenous Q12H PRN Henok Melissa MD   4 mg at 04/29/23 0753    oxyCODONE immediate release tablet 5 mg  5 mg Oral Q6H PRN Teresa Gurrola PA-C   5 mg at 05/02/23 0143    polyethylene glycol packet 17 g  17 g Oral Daily Diana Jordan NP   17 g at 05/02/23 0801    senna-docusate 8.6-50 mg per tablet 1 tablet  1 tablet Per NG tube BID Maritza Arias PA-C   1 tablet at 05/02/23 0801    zonisamide capsule 500 mg  500 mg Oral QHS Henok Melissa MD   500 mg at 05/01/23 2049     Continuous Infusions:    Review of Systems   Constitutional:  Positive for fatigue.   Gastrointestinal:  Negative for nausea and vomiting.   Neurological:  Positive for seizures (none overnight).   All other systems reviewed and are negative.  Objective:     Vital Signs (Most Recent):  Temp: 98.8 °F (37.1 °C) (05/02/23 1105)  Pulse: 91 (05/02/23 1305)  Resp: 16 (05/02/23 1305)  BP: 115/76 (05/02/23 1305)  SpO2: (!) 94 % (05/02/23 1305)   Vital Signs (24h Range):  Temp:  [98 °F (36.7 °C)-99.8 °F (37.7 °C)]  98.8 °F (37.1 °C)  Pulse:  [56-91] 91  Resp:  [12-21] 16  SpO2:  [91 %-95 %] 94 %  BP: (115-155)/(69-93) 115/76     Weight: 89.8 kg (198 lb)  Body mass index is 27.62 kg/m².    Physical Exam  Constitutional:       General: He is not in acute distress.     Appearance: He is not diaphoretic.   HENT:      Head: Normocephalic.      Comments: EEG in place  Eyes:      General: No scleral icterus.     Extraocular Movements: Extraocular movements intact.      Pupils: Pupils are equal, round, and reactive to light.   Pulmonary:      Effort: Pulmonary effort is normal. No respiratory distress.   Abdominal:      General: There is no distension.      Palpations: Abdomen is soft.   Musculoskeletal:         General: No deformity or signs of injury.      Cervical back: Neck supple. No rigidity.   Skin:     General: Skin is warm and dry.   Neurological:      Mental Status: He is alert.   Psychiatric:         Mood and Affect: Mood normal.         Behavior: Behavior normal.       NEUROLOGICAL EXAMINATION:     MENTAL STATUS   Attention: normal.   Level of consciousness: alert    CRANIAL NERVES     CN III, IV, VI   Pupils are equal, round, and reactive to light.       CN II-XII grossly intact  PERRL, EOMI  AAOx3  Follows commands     Significant Labs: All pertinent lab results from the past 24 hours have been reviewed.    Significant Studies: I have reviewed all pertinent imaging results/findings within the past 24 hours.    Assessment and Plan:     * Complex partial epilepsy with generalization and with intractable epilepsy  58 year old man with intractable epilepsy since age 48 s/p bilateral sEEG and L laser amygdalohippocampectomy 3/1/21, repeat sEEG 2/27/23-3/8/23 admitted to [facility] s/p left temporal lobectomy 4/24/23 for treatment of seizures.    Recommendations:  - Discontinue vEEG - no seizures noted overnight, L>R hemisphere dysfunction with a seizure focus in the left hemisphere  - Recommend to continue home AED regimen -  Clobazam 40 mg qHS, Eslicarbazepine 1200 mg daily, Zonisamide 500 mg qHS  - Consider Na tabs for management of hyponatremia  - Plan discussed in detail with outpatient epileptologist, Dr. Edgar, by Dr. Feliciano and Dr. Randall - continue current AED regimen with close outpatient follow up for further management/medication adjustment as needed in setting of hyponatremia  - Avoid agents that lower seizure threshold  - Management of any infectious/metabolic abnormalities per NCC  - Seizure precautions    Discussed plan of care with NCC team, patient and wife at bedside. Will sign off, please call with questions.    Hyponatremia  - Management per primary, Na remains stable at 130 5/2  - Plan discussed in detail with outpatient epileptologist, Dr. Edgar, by Dr. Feliciano and Dr. Randall - continue current AED regimen with close outpatient follow up for further management/medication adjustment as needed in setting of hyponatremia  - Consider Na tabs for management of hyponatremia    Depression with anxiety  - Home Escitalopram        VTE Risk Mitigation (From admission, onward)         Ordered     enoxaparin injection 40 mg  Daily         04/26/23 1556     IP VTE HIGH RISK PATIENT  Once         04/24/23 1711     Place sequential compression device  Until discontinued         04/24/23 1711     Place sequential compression device  Until discontinued         04/24/23 0858                Teresa Cerrato PA-C  Neurology-Epilepsy  [facility] - Neuro Critical Care  Staff: Dr. Feliciano

## 2023-05-02 NOTE — ASSESSMENT & PLAN NOTE
Discussed with Epileptologist Dr. Chen  Both in agreement that Aptiom should be changed due to hyponatremia  He will address this in the outpatient setting passed this acute episode and heightened seizure risk from blood products  Will be followed closely as outpatient over next few weeks for his sodium  Transition Aptiom to another AED in a few weeks

## 2023-05-02 NOTE — PROCEDURES
EEG REPORT      Declan Burleson  [id number]  1965    DATE OF SERVICE: 5/1/2023     -4    METHODOLOGY      Extended electroencephalographic recording is made while the patient is ambulatory and continuing normal daily activities.  Electrodes are placed according to the International 10-20 placement system and included T1 and T2 electrode placement.  Twenty four (24) channels of digital signal (sampling rate of 512/sec) was simultaneously recorded from the scalp including EKG and eye monitors.  Recording band pass was 0.1 to 100 hz and all data was stored digitally on the recorder.  The patient is instructed to press an event button when clinical symptoms occur and write the symptoms into a diary. Activation procedures which include photic stimulation, hyperventilation and instructing patients to perform simple task are done in selected patients.        The EEG is displayed on a monitor screen and can be reformatted into different montages for evaluation.  The entire recoding is submitted for computer assisted analysis to detect spike and electrographic seizure activity.  The entire recording is visually reviewed and the times identified by computer analysis as being spikes or seizures are reviewed again.  Compresses spectral analysis (CSA) is also performed on the activity recorded from each individual channel.  This is displayed as a power display of frequencies from 0 to 30 Hz over time.   The CSA analysis is done and displayed continuously.  This is reviewed for asymmetries in power between homologous areas of the scalp and for presence of changes in power which canbe seen when seizures occur.  Sections of suspected abnormalities on the CSA is then compared with the original EEG recording.  .     Elliptic software was also utilized in the review of this study.  This software suite analyzes the EEG recording in multiple domains.  Coherence and rhythmicity is computed to identify EEG sections which  may contain organized seizures.  Each channel undergoes analysis to detect presence of spike and sharp waves which have special and morphological characteristic of epileptic activity.  The routine EEG recording is converted from spacial into frequency domain.  This is then displayed comparing homologous areas to identify areas of significant asymmetry.  Algorithm to identify non-cortically generated artifact is used to separate eye movement, EMG and other artifact from the EEG     Recording Times  Start on 5/1/2023  Stop on 5/2/2023    A total of 23:59:15 hours of EEG was recorded.      EEG FINDINGS:  Background activity:   The background rhythm over the right hemisphere was characterized by alpha and theta activity with a 7-8Hz posterior dominant alpha rhythm at 30-70 microvolts.   The background rhythm over the left hemisphere was characterized by alpha and theta activity with intermittent delta and a 6-7Hz posterior dominant alpha rhythm at 30-70 microvolts.      Sleep:   Normal sleep transients including sleep spindles, K complexes, vertex waves and POSTS were seen.    Activation procedures:   NA    Abnormal activity:   There are occasional sharp waves at F7    IMPRESSION:   Abnormal EEG due to left greater than right hemisphere dysfunction with a seizure focus in the left hemisphere.  No electrographic seizures.      Duoglas Feliciano MD  Neurology-Epilepsy.  [facility].

## 2023-05-02 NOTE — ASSESSMENT & PLAN NOTE
Post partial temporal lobectomy  Continue current AEDs   can stop CVEEG  Has been very slow to get back to baseline  Per wife this is normal after surgery for him

## 2023-05-02 NOTE — ANESTHESIA POSTPROCEDURE EVALUATION
Anesthesia Post Evaluation    Patient: Declan Burleson    Procedure(s) Performed: Procedure(s) (LRB):  REMOVAL, DRAIN (Left)    Final Anesthesia Type: general      Patient location during evaluation: PACU  Patient participation: Yes- Able to Participate  Level of consciousness: awake and alert and oriented  Pain management: adequate  Airway patency: patent    PONV status at discharge: No PONV  Anesthetic complications: no      Cardiovascular status: blood pressure returned to baseline and hemodynamically stable  Respiratory status: unassisted  Hydration status: euvolemic  Follow-up not needed.          Vitals Value Taken Time   /81 05/02/23 1202   Temp 37.7 °C (99.8 °F) 05/02/23 0305   Pulse 73 05/02/23 1217   Resp 14 05/02/23 1217   SpO2 93 % 05/02/23 1217   Vitals shown include unvalidated device data.      No case tracking events are documented in the log.      Pain/Karri Score: Pain Rating Prior to Med Admin: 5 (5/2/2023  1:43 AM)

## 2023-05-02 NOTE — ASSESSMENT & PLAN NOTE
- Management per primary, Na remains stable at 130 5/2  - Plan discussed in detail with outpatient epileptologist, Dr. Edgar, by Dr. Feliciano and Dr. Randall - continue current AED regimen with close outpatient follow up for further management/medication adjustment as needed in setting of hyponatremia  - Consider Na tabs for management of hyponatremia

## 2023-05-02 NOTE — ASSESSMENT & PLAN NOTE
59 y/o M s/p left temporal lobectomy on 4/24  for seizures refractory to prior laser ablation    Recommend calorie count to determine if NGT can be removed. Ongoing   NA management per ICU team. Hyponatremia unresolved on salt tabs. Appreciate ICU team plan and concern that's this may be associated with patient AEDs       Dex weaned to 4Q12 next wean  On Friday     - ICU status post op, continue ICU care for now for hyponatremia  - q2 neuro checks  - MRI brain with good resection   - Na goal > 135, seems to be hypovolemic hyponatremia, started   - continue home AEDs, EEG negative 4/28, on EEG again this morning  - Lovenox for DVT prophylaxis   - continue 3 week dex taper  - continue SLP for speech eval  and swallow eval    - Speech improving   - recs for soft diet with thick liquids   - please keep NGT until improving/adequate PO intake  - PT/OT    Dispo: ongoing
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
## 2023-05-02 NOTE — PLAN OF CARE
[facility] Care Plan    POC reviewed with Declan Burleson and family at 1400. Pt verbalized understanding. Questions and concerns addressed. No acute events today. Pt progressing toward goals. Will continue to monitor. See below and flowsheets for full assessment and VS info.   - cEEG removed   - Calorie count in progress   - Awaiting transfer           Is this a stroke patient? no    Neuro:  Ruben Coma Scale  Best Eye Response: 4-->(E4) spontaneous  Best Motor Response: 6-->(M6) obeys commands  Best Verbal Response: 5-->(V5) oriented  [place] Coma Scale Score: 15  Assessment Qualifiers: patient not sedated/intubated  Pupil PERRLA: yes     24 hr Temp:  [98 °F (36.7 °C)-99.8 °F (37.7 °C)]     CV:   Rhythm: normal sinus rhythm  BP goals:   SBP < 160  MAP > 65    Resp:           Plan: N/A    GI/:     Diet/Nutrition Received: mechanical/dental soft  Last Bowel Movement: 04/27/23  Voiding Characteristics: external catheter    Intake/Output Summary (Last 24 hours) at 5/2/2023 1741  Last data filed at 5/2/2023 0505  Gross per 24 hour   Intake 275 ml   Output 820 ml   Net -545 ml     Unmeasured Output  Urine Occurrence: 1  Stool Occurrence: 0  Pad Count: 3    Labs/Accuchecks:  Recent Labs   Lab 05/02/23  0118   WBC 14.20*   RBC 4.38*   HGB 13.5*   HCT 40.8         Recent Labs   Lab 05/02/23  0118   *   K 4.1   CO2 22*   CL 99   BUN 14   CREATININE 0.7   ALKPHOS 114   ALT 16   AST 10   BILITOT 0.4    No results for input(s): PROTIME, INR, APTT, HEPANTIXA in the last 168 hours. No results for input(s): CPK, CPKMB, TROPONINI, MB in the last 168 hours.    Electrolytes: N/A - electrolytes WDL  Accuchecks: none    Gtts:      LDA/Wounds:  Lines/Drains/Airways       Drain  Duration             Male External Urinary Catheter 05/01/23 2120 Small <1 day              Peripheral Intravenous Line  Duration                  Peripheral IV - Single Lumen 04/26/23 1218 Anterior;Right Forearm 6 days         Peripheral IV -  Single Lumen 04/29/23 0620 20 G Left Antecubital 3 days                  Wounds: Yes  Wound care consulted: No

## 2023-05-02 NOTE — NURSING
Eslicarbazepine lab drawn from patient by RN. Specimen unable to collected electronically in epic due to malfunction process. RN called lab to notify them, per  epic is having technical issues. RN printed paper lab requisition and labeled with date, time, and initials per instructions from . Specimen sent to tube.

## 2023-05-02 NOTE — PROCEDURES
EEG REPORT      Declan Burleson  [id number]  1965    DATE OF SERVICE: 5/2/2023     -5    METHODOLOGY      Extended electroencephalographic recording is made while the patient is ambulatory and continuing normal daily activities.  Electrodes are placed according to the International 10-20 placement system and included T1 and T2 electrode placement.  Twenty four (24) channels of digital signal (sampling rate of 512/sec) was simultaneously recorded from the scalp including EKG and eye monitors.  Recording band pass was 0.1 to 100 hz and all data was stored digitally on the recorder.  The patient is instructed to press an event button when clinical symptoms occur and write the symptoms into a diary. Activation procedures which include photic stimulation, hyperventilation and instructing patients to perform simple task are done in selected patients.        The EEG is displayed on a monitor screen and can be reformatted into different montages for evaluation.  The entire recoding is submitted for computer assisted analysis to detect spike and electrographic seizure activity.  The entire recording is visually reviewed and the times identified by computer analysis as being spikes or seizures are reviewed again.  Compresses spectral analysis (CSA) is also performed on the activity recorded from each individual channel.  This is displayed as a power display of frequencies from 0 to 30 Hz over time.   The CSA analysis is done and displayed continuously.  This is reviewed for asymmetries in power between homologous areas of the scalp and for presence of changes in power which canbe seen when seizures occur.  Sections of suspected abnormalities on the CSA is then compared with the original EEG recording.  .     enVerid software was also utilized in the review of this study.  This software suite analyzes the EEG recording in multiple domains.  Coherence and rhythmicity is computed to identify EEG sections which  may contain organized seizures.  Each channel undergoes analysis to detect presence of spike and sharp waves which have special and morphological characteristic of epileptic activity.  The routine EEG recording is converted from spacial into frequency domain.  This is then displayed comparing homologous areas to identify areas of significant asymmetry.  Algorithm to identify non-cortically generated artifact is used to separate eye movement, EMG and other artifact from the EEG     Recording Times    A total of 5:40:21 hours of EEG was recorded.      EEG FINDINGS:  Background activity:   The background rhythm over the right hemisphere was characterized by alpha and theta activity with a 7-8Hz posterior dominant alpha rhythm at 30-70 microvolts.   The background rhythm over the left hemisphere was characterized by alpha and theta activity with intermittent delta and a 6-7Hz posterior dominant alpha rhythm at 30-70 microvolts.      Sleep:   Normal sleep transients including sleep spindles, K complexes, vertex waves and POSTS were seen.    Activation procedures:   NA    Abnormal activity:   There are occasional sharp waves at F7    IMPRESSION:   Abnormal EEG due to left greater than right hemisphere dysfunction with a seizure focus in the left hemisphere.  No electrographic seizures.      Douglas Feliciano MD  Neurology-Epilepsy.  [facility].

## 2023-05-02 NOTE — PROGRESS NOTES
[facility]  Neurosurgery  Progress Note    Subjective:     History of Present Illness: 58 M with epilepsy presents for elective left temporal lobectomy      Post-Op Info:  Procedure(s) (LRB):  REMOVAL, DRAIN (Left)   6 Days Post-Op     Interval History:  5/2   no acute overnight events. Continue calorie count, maintain NGT, and continued hyponatremia despite salt tabs     Medications:  Continuous Infusions:      Scheduled Meds:   cloBAZam  40 mg Oral QHS    dexAMETHasone  4 mg Oral Q12H    Followed by    [START ON 5/5/2023] dexAMETHasone  2 mg Oral Q12H    Followed by    [START ON 5/9/2023] dexAMETHasone  2 mg Oral Daily    Followed by    [START ON 5/13/2023] dexAMETHasone  1 mg Oral Daily    enoxaparin  40 mg Subcutaneous Daily    EScitalopram oxalate  10 mg Oral Daily    eslicarbazepine  1,200 mg Oral Daily    famotidine  20 mg Per NG tube BID    polyethylene glycol  17 g Oral Daily    senna-docusate 8.6-50 mg  1 tablet Per NG tube BID    zonisamide  500 mg Oral QHS     PRN Meds:acetaminophen, dextrose 10%, dextrose 10%, hydrALAZINE, ondansetron, oxyCODONE     Review of Systems  Objective:     Weight: 89.8 kg (198 lb)  Body mass index is 27.62 kg/m².  Vital Signs (Most Recent):  Temp: 98.8 °F (37.1 °C) (05/02/23 1105)  Pulse: 91 (05/02/23 1305)  Resp: 16 (05/02/23 1305)  BP: 115/76 (05/02/23 1305)  SpO2: (!) 94 % (05/02/23 1305)   Vital Signs (24h Range):  Temp:  [98 °F (36.7 °C)-99.8 °F (37.7 °C)] 98.8 °F (37.1 °C)  Pulse:  [56-91] 91  Resp:  [12-21] 16  SpO2:  [91 %-95 %] 94 %  BP: (115-157)/(69-93) 115/76                            Urethral Catheter 04/24/23 1100 Silicone 16 Fr. (Active)   Site Assessment Clean;Intact 04/25/23 0303   Collection Container Urimeter 04/25/23 0303   Securement Method secured to top of thigh w/ adhesive device 04/25/23 0303   Catheter Care Performed yes 04/25/23 0303   Reason for Continuing Urinary Catheterization Post operative 04/25/23 0303   CAUTI  "Prevention Bundle Securement Device in place with 1" slack;Intact seal between catheter & drainage tubing;Drainage bag/urimeter off the floor;Sheeting clip in use;No dependent loops or kinks;Drainage bag/urimeter not overfilled (<2/3 full);Drainage bag/urimeter below bladder 04/24/23 1909   Output (mL) 100 mL 04/25/23 0625            Drain/Device  04/24/23 1524 Left lateral temporal region gravity (Active)   Insertion Site no hematoma 04/25/23 0303   Drainage Characteristics/Odor Bleeding controlled 04/24/23 1909   Drainage Amount None 04/24/23 1909   General Intake (mL) 0 04/24/23 1909   General Output (mL) 140 04/25/23 0625       Physical Exam  Neurosurgery Physical Exam    [id number]   Aox3  Perrl  eomi   visual fields full to confrontation   Incision CDI     Significant Labs:  Recent Labs   Lab 05/01/23  0200 05/02/23  0118   * 136*   * 130*   K 4.0 4.1    99   CO2 21* 22*   BUN 9 14   CREATININE 0.6 0.7   CALCIUM 8.7 9.1   MG 1.8 1.9       Recent Labs   Lab 05/01/23  0200 05/02/23  0118   WBC 11.79 14.20*   HGB 12.7* 13.5*   HCT 37.8* 40.8    386       No results for input(s): LABPT, INR, APTT in the last 48 hours.    Microbiology Results (last 7 days)       ** No results found for the last 168 hours. **          All pertinent labs from the last 24 hours have been reviewed.    Significant Diagnostics:  I have reviewed all pertinent imaging results/findings within the past 24 hours.    Assessment/Plan:     * Complex partial epilepsy with generalization and with intractable epilepsy  57 y/o M s/p left temporal lobectomy on 4/24  for seizures refractory to prior laser ablation    Recommend calorie count to determine if NGT can be removed. Ongoing   NA management per ICU team. Hyponatremia unresolved on salt tabs. Appreciate ICU team plan and concern that's this may be associated with patient AEDs       Dex weaned to 4Q12 next wean  On Friday     - ICU status post op, continue ICU care for now " for hyponatremia  - q2 neuro checks  - MRI brain with good resection   - Na goal > 135, seems to be hypovolemic hyponatremia, started   - continue home AEDs, EEG negative 4/28, on EEG again this morning  - Lovenox for DVT prophylaxis   - continue 3 week dex taper  - continue SLP for speech eval  and swallow eval    - Speech improving   - recs for soft diet with thick liquids   - please keep NGT until improving/adequate PO intake  - PT/OT    Dispo: ongoing          Henok Melissa MD  Neurosurgery  [facility]

## 2023-05-02 NOTE — PLAN OF CARE
[facility] Care Plan    POC reviewed with Declan Néstor Benjy and family at 0300. Pt verbalized understanding. Questions and concerns addressed. No acute events overnight. Pt progressing toward goals. Will continue to monitor. See below and flowsheets for full assessment and VS info.     -Bath given, linen and gown changed.  -Calorie count maintained.  -PRN tylenol and oxy given for headache.        Is this a stroke patient? no    Neuro:  Ruben Coma Scale  Best Eye Response: 4-->(E4) spontaneous  Best Motor Response: 6-->(M6) obeys commands  Best Verbal Response: 5-->(V5) oriented  [place] Coma Scale Score: 15  Assessment Qualifiers: patient not sedated/intubated, no eye obstruction present  Pupil PERRLA: yes     24hr Temp:  [98 °F (36.7 °C)-99.8 °F (37.7 °C)]     CV:   Rhythm: normal sinus rhythm  BP goals:   SBP < 160  MAP > 65    Resp:           Plan: N/A    GI/:     Diet/Nutrition Received: mechanical/dental soft  Last Bowel Movement: 04/27/23  Voiding Characteristics: external catheter    Intake/Output Summary (Last 24 hours) at 5/2/2023 0450  Last data filed at 5/2/2023 0305  Gross per 24 hour   Intake 674.79 ml   Output 2175 ml   Net -1500.21 ml     Unmeasured Output  Urine Occurrence: 1  Stool Occurrence: 0  Pad Count: 2    Labs/Accuchecks:  Recent Labs   Lab 05/02/23  0118   WBC 14.20*   RBC 4.38*   HGB 13.5*   HCT 40.8         Recent Labs   Lab 05/02/23  0118   *   K 4.1   CO2 22*   CL 99   BUN 14   CREATININE 0.7   ALKPHOS 114   ALT 16   AST 10   BILITOT 0.4    No results for input(s): PROTIME, INR, APTT, HEPANTIXA in the last 168 hours. No results for input(s): CPK, CPKMB, TROPONINI, MB in the last 168 hours.    Electrolytes: N/A - electrolytes WDL  Accuchecks: none    Gtts:      LDA/Wounds:  Lines/Drains/Airways       Drain  Duration                  NG/OG Tube 04/25/23 1015 [place] sump 18 Fr. Right nostril 6 days    Male External Urinary Catheter 05/01/23 2120 Small <1 day               Peripheral Intravenous Line  Duration                  Peripheral IV - Single Lumen 04/26/23 1218 Anterior;Right Forearm 5 days         Peripheral IV - Single Lumen 04/29/23 0620 20 G Left Antecubital 2 days                  Wounds: No  Wound care consulted: No

## 2023-05-02 NOTE — SUBJECTIVE & OBJECTIVE
Interval History: No acute events overnight, pending stepdown to NSGY service.     Current Facility-Administered Medications   Medication Dose Route Frequency Provider Last Rate Last Admin    acetaminophen tablet 650 mg  650 mg Oral Q4H PRN Henok Melissa MD   650 mg at 05/01/23 2116    cloBAZam Tab 40 mg  40 mg Oral QHS Henok Melissa MD   40 mg at 05/01/23 2049    dexAMETHasone tablet 4 mg  4 mg Oral Q12H aRchel Shaffer PA-C        Followed by    [START ON 5/5/2023] dexAMETHasone tablet 2 mg  2 mg Oral Q12H Rachel Shaffer PA-C        Followed by    [START ON 5/9/2023] dexAMETHasone tablet 2 mg  2 mg Oral Daily Rachel Shaffer PA-C        Followed by    [START ON 5/13/2023] dexAMETHasone tablet 1 mg  1 mg Oral Daily Rachel Shaffer PA-C        dextrose 10% bolus 125 mL 125 mL  12.5 g Intravenous PRN Griffin Randall MD        dextrose 10% bolus 250 mL 250 mL  25 g Intravenous PRN Griffin Randall MD        enoxaparin injection 40 mg  40 mg Subcutaneous Daily Bharat Moralez MD   40 mg at 05/01/23 1604    EScitalopram oxalate tablet 10 mg  10 mg Oral Daily Henok Melissa MD   10 mg at 05/02/23 0801    eslicarbazepine tablet Tab 1,200 mg  1,200 mg Oral Daily Magda Weiss NP   1,200 mg at 05/02/23 0801    famotidine tablet 20 mg  20 mg Per NG tube BID Maritza Arias PA-C   20 mg at 05/02/23 0801    hydrALAZINE injection 10 mg  10 mg Intravenous Q6H PRN Diana Jordan NP   10 mg at 04/29/23 2325    ondansetron injection 4 mg  4 mg Intravenous Q12H PRN Henok Melissa MD   4 mg at 04/29/23 0753    oxyCODONE immediate release tablet 5 mg  5 mg Oral Q6H PRN Teresa Gurrola PA-C   5 mg at 05/02/23 0143    polyethylene glycol packet 17 g  17 g Oral Daily Diana Jordan NP   17 g at 05/02/23 0801    senna-docusate 8.6-50 mg per tablet 1 tablet  1 tablet Per NG tube BID Maritza Arias PA-C   1 tablet at 05/02/23 0801    zonisamide capsule 500 mg  500 mg Oral QHS Henok Melissa MD   500 mg at 05/01/23  2049     Continuous Infusions:    Review of Systems   Constitutional:  Positive for fatigue.   Gastrointestinal:  Negative for nausea and vomiting.   Neurological:  Positive for seizures (none overnight).   All other systems reviewed and are negative.  Objective:     Vital Signs (Most Recent):  Temp: 98.8 °F (37.1 °C) (05/02/23 1105)  Pulse: 91 (05/02/23 1305)  Resp: 16 (05/02/23 1305)  BP: 115/76 (05/02/23 1305)  SpO2: (!) 94 % (05/02/23 1305)   Vital Signs (24h Range):  Temp:  [98 °F (36.7 °C)-99.8 °F (37.7 °C)] 98.8 °F (37.1 °C)  Pulse:  [56-91] 91  Resp:  [12-21] 16  SpO2:  [91 %-95 %] 94 %  BP: (115-155)/(69-93) 115/76     Weight: 89.8 kg (198 lb)  Body mass index is 27.62 kg/m².    Physical Exam  Constitutional:       General: He is not in acute distress.     Appearance: He is not diaphoretic.   HENT:      Head: Normocephalic.      Comments: EEG in place  Eyes:      General: No scleral icterus.     Extraocular Movements: Extraocular movements intact.      Pupils: Pupils are equal, round, and reactive to light.   Pulmonary:      Effort: Pulmonary effort is normal. No respiratory distress.   Abdominal:      General: There is no distension.      Palpations: Abdomen is soft.   Musculoskeletal:         General: No deformity or signs of injury.      Cervical back: Neck supple. No rigidity.   Skin:     General: Skin is warm and dry.   Neurological:      Mental Status: He is alert.   Psychiatric:         Mood and Affect: Mood normal.         Behavior: Behavior normal.       NEUROLOGICAL EXAMINATION:     MENTAL STATUS   Attention: normal.   Level of consciousness: alert    CRANIAL NERVES     CN III, IV, VI   Pupils are equal, round, and reactive to light.       CN II-XII grossly intact  PERRL, EOMI  AAOx3  Follows commands     Significant Labs: All pertinent lab results from the past 24 hours have been reviewed.    Significant Studies: I have reviewed all pertinent imaging results/findings within the past 24 hours.

## 2023-05-02 NOTE — ASSESSMENT & PLAN NOTE
58 year old man with intractable epilepsy since age 48 s/p bilateral sEEG and L laser amygdalohippocampectomy 3/1/21, repeat sEEG 2/27/23-3/8/23 admitted to [facility] s/p left temporal lobectomy 4/24/23 for treatment of seizures.    Recommendations:  - Discontinue vEEG - no seizures noted overnight, L>R hemisphere dysfunction with a seizure focus in the left hemisphere  - Recommend to continue home AED regimen - Clobazam 40 mg qHS, Eslicarbazepine 1200 mg daily, Zonisamide 500 mg qHS  - Consider Na tabs for management of hyponatremia  - Plan discussed in detail with outpatient epileptologist, Dr. Edgar, by Dr. Feliciano and Dr. Randall - continue current AED regimen with close outpatient follow up for further management/medication adjustment as needed in setting of hyponatremia  - Avoid agents that lower seizure threshold  - Management of any infectious/metabolic abnormalities per [facility]  - Seizure precautions    Discussed plan of care with [facility] team, patient and wife at bedside. Will sign off, please call with questions.

## 2023-05-02 NOTE — PT/OT/SLP PROGRESS
"Speech Language Pathology Treatment    Patient Name:  Declan Burleson   MRN:  [id number]  Admitting Diagnosis: Complex partial epilepsy with generalization and with intractable epilepsy    Recommendations:                 General Recommendations:  Dysphagia therapy, Speech/language therapy, and Cognitive-linguistic therapy  Diet recommendations:  Soft & Bite Sized Diet - IDDSI Level 6, Liquid Diet Level: Mildly thick/Nectar thick liquids - IDDSI Level 2   Aspiration Precautions: 1 bite/sip at a time, Alternating bites/sips, Assistance with meals and Assistance with thickening liquids, Avoid talking while eating, Double swallow with each bite/sip, Eliminate distractions, HOB to 90 degrees, Meds crushed in puree, No straws, Small bites/sips, and Strict aspiration precautions   General Precautions: Standard, aspiration, aphasia, fall, nectar thick  Communication strategies:  provide increased time to answer and go to room if call light pushed    Subjective     "It's choking me." re: PO     Pain/Comfort:  Pain Rating 1:  (no pain initially)  Location 1:  (back, number not provided)  Pain Addressed 1: Distraction, Nurse notified, Cessation of Activity, Reposition  Pain Rating Post-Intervention 1:  (improved)    Respiratory Status: Room air    Objective:     Has the patient been evaluated by SLP for swallowing?   Yes  Keep patient NPO? No     Pt see bedside with spouse present.  Throat clear/cough noted prior to PO trials.  Increased spontaneous verbalizations though continued increased response time with some repetitions and multiple attempts.  Pt reports decreased appetite though willing to accept some po trials.  He accepted thin liquids via cup with cough noted x2 with large sips following crushed med administration by pt's nurse.  No overt s/s aspiration with small sips of nectar thick.  Pt did endorse globus sensation with nectar thick liquids though globus sensation appeared possibly related to NG tube.  He " reported globus sensation in posterior oral cavity though when visualized, no stasis evident.  White coating to lingual surface present.  NG tube not coiled.  Thin liquid presented via tsp x2 with immediate cough response noted on 2nd presentation.  Pt accepted minimal amounts of soft solids with slightly prolonged mastication.  No overt s/s aspiration.  PO trials held 2/2 pt reports nausea.  He also endorses some odynophagia, distaste of all PO and globus sensation with all trials.  Education provided re: importance of po intake, s/s aspiration, diet recs, swallow precs, and POC.  Pt indicated some understanding.  Wife verbalized understanding.     Assessment:     Dcelan Burleson is a 58 y.o. male with an SLP diagnosis of Aphasia and Dysphagia.      Goals:   Multidisciplinary Problems       SLP Goals          Problem: SLP    Goal Priority Disciplines Outcome   SLP Goal     SLP Ongoing, Progressing   Description: Speech Language Pathology Goals  Goals expected to be met by 5/2  1. Pt will complete ongoing assessment of swallow to advance diet as appropriate.    2. Pt will follow 1 step commands with 80% accy given min A.   3. Pt will answer simple y/n questions with 80% accy given min A.   4. Pt will complete simple auto speech tasks with 70% accy given mod A.   5. Pt will complete simple naming tasks with 50% accy given mod A.                                Plan:     Patient to be seen:  4 x/week   Plan of Care expires:  05/25/23  Plan of Care reviewed with:  patient, spouse   SLP Follow-Up:  Yes       Discharge recommendations:  rehabilitation facility   Barriers to Discharge:  Level of Skilled Assistance Needed      Time Tracking:     SLP Treatment Date:   05/02/23  Speech Start Time:  0740  Speech Stop Time:  0820     Speech Total Time (min):  40 min    Billable Minutes: Treatment Swallowing Dysfunction 17 and Self Care/Home Management Training 23 05/02/2023

## 2023-05-03 LAB
ALBUMIN SERPL BCP-MCNC: 3 G/DL (ref 3.5–5.2)
ALP SERPL-CCNC: 107 U/L (ref 55–135)
ALT SERPL W/O P-5'-P-CCNC: 20 U/L (ref 10–44)
ANION GAP SERPL CALC-SCNC: 10 MMOL/L (ref 8–16)
AST SERPL-CCNC: 14 U/L (ref 10–40)
BASOPHILS # BLD AUTO: 0.1 K/UL (ref 0–0.2)
BASOPHILS NFR BLD: 0.8 % (ref 0–1.9)
BILIRUB SERPL-MCNC: 0.4 MG/DL (ref 0.1–1)
BUN SERPL-MCNC: 23 MG/DL (ref 6–20)
CALCIUM SERPL-MCNC: 9 MG/DL (ref 8.7–10.5)
CHLORIDE SERPL-SCNC: 100 MMOL/L (ref 95–110)
CO2 SERPL-SCNC: 23 MMOL/L (ref 23–29)
CREAT SERPL-MCNC: 0.7 MG/DL (ref 0.5–1.4)
DIFFERENTIAL METHOD: ABNORMAL
EOSINOPHIL # BLD AUTO: 0.3 K/UL (ref 0–0.5)
EOSINOPHIL NFR BLD: 2.2 % (ref 0–8)
ERYTHROCYTE [DISTWIDTH] IN BLOOD BY AUTOMATED COUNT: 13.5 % (ref 11.5–14.5)
EST. GFR  (NO RACE VARIABLE): >60 ML/MIN/1.73 M^2
GLUCOSE SERPL-MCNC: 138 MG/DL (ref 70–110)
HCT VFR BLD AUTO: 39.5 % (ref 40–54)
HGB BLD-MCNC: 13 G/DL (ref 14–18)
IMM GRANULOCYTES # BLD AUTO: 0.52 K/UL (ref 0–0.04)
IMM GRANULOCYTES NFR BLD AUTO: 4.4 % (ref 0–0.5)
LYMPHOCYTES # BLD AUTO: 1.8 K/UL (ref 1–4.8)
LYMPHOCYTES NFR BLD: 14.7 % (ref 18–48)
MAGNESIUM SERPL-MCNC: 2 MG/DL (ref 1.6–2.6)
MCH RBC QN AUTO: 30.9 PG (ref 27–31)
MCHC RBC AUTO-ENTMCNC: 32.9 G/DL (ref 32–36)
MCV RBC AUTO: 94 FL (ref 82–98)
MONOCYTES # BLD AUTO: 1.3 K/UL (ref 0.3–1)
MONOCYTES NFR BLD: 11 % (ref 4–15)
NEUTROPHILS # BLD AUTO: 7.9 K/UL (ref 1.8–7.7)
NEUTROPHILS NFR BLD: 66.9 % (ref 38–73)
NRBC BLD-RTO: 0 /100 WBC
PHOSPHATE SERPL-MCNC: 4.5 MG/DL (ref 2.7–4.5)
PLATELET # BLD AUTO: 404 K/UL (ref 150–450)
PMV BLD AUTO: 9.1 FL (ref 9.2–12.9)
POTASSIUM SERPL-SCNC: 4 MMOL/L (ref 3.5–5.1)
PROT SERPL-MCNC: 6.6 G/DL (ref 6–8.4)
RBC # BLD AUTO: 4.21 M/UL (ref 4.6–6.2)
SODIUM SERPL-SCNC: 133 MMOL/L (ref 136–145)
WBC # BLD AUTO: 11.88 K/UL (ref 3.9–12.7)

## 2023-05-03 PROCEDURE — 94761 N-INVAS EAR/PLS OXIMETRY MLT: CPT

## 2023-05-03 PROCEDURE — 97530 THERAPEUTIC ACTIVITIES: CPT

## 2023-05-03 PROCEDURE — 80053 COMPREHEN METABOLIC PANEL: CPT

## 2023-05-03 PROCEDURE — 92507 TX SP LANG VOICE COMM INDIV: CPT

## 2023-05-03 PROCEDURE — 97535 SELF CARE MNGMENT TRAINING: CPT

## 2023-05-03 PROCEDURE — 63600175 PHARM REV CODE 636 W HCPCS: Performed by: PHYSICIAN ASSISTANT

## 2023-05-03 PROCEDURE — 25000003 PHARM REV CODE 250: Performed by: STUDENT IN AN ORGANIZED HEALTH CARE EDUCATION/TRAINING PROGRAM

## 2023-05-03 PROCEDURE — 25000003 PHARM REV CODE 250

## 2023-05-03 PROCEDURE — 83735 ASSAY OF MAGNESIUM: CPT

## 2023-05-03 PROCEDURE — 11000001 HC ACUTE MED/SURG PRIVATE ROOM

## 2023-05-03 PROCEDURE — 99233 SBSQ HOSP IP/OBS HIGH 50: CPT | Mod: ,,, | Performed by: PSYCHIATRY & NEUROLOGY

## 2023-05-03 PROCEDURE — 63600175 PHARM REV CODE 636 W HCPCS: Performed by: STUDENT IN AN ORGANIZED HEALTH CARE EDUCATION/TRAINING PROGRAM

## 2023-05-03 PROCEDURE — 84100 ASSAY OF PHOSPHORUS: CPT

## 2023-05-03 PROCEDURE — 25000003 PHARM REV CODE 250: Performed by: NURSE PRACTITIONER

## 2023-05-03 PROCEDURE — 85025 COMPLETE CBC W/AUTO DIFF WBC: CPT

## 2023-05-03 PROCEDURE — 99233 PR SUBSEQUENT HOSPITAL CARE,LEVL III: ICD-10-PCS | Mod: ,,, | Performed by: PSYCHIATRY & NEUROLOGY

## 2023-05-03 PROCEDURE — 92526 ORAL FUNCTION THERAPY: CPT

## 2023-05-03 PROCEDURE — 97116 GAIT TRAINING THERAPY: CPT

## 2023-05-03 RX ORDER — BACITRACIN 500 [USP'U]/G
OINTMENT TOPICAL 3 TIMES DAILY
Status: DISCONTINUED | OUTPATIENT
Start: 2023-05-03 | End: 2023-05-04 | Stop reason: HOSPADM

## 2023-05-03 RX ORDER — POLYETHYLENE GLYCOL 3350 17 G/17G
17 POWDER, FOR SOLUTION ORAL 2 TIMES DAILY
Status: DISCONTINUED | OUTPATIENT
Start: 2023-05-03 | End: 2023-05-04 | Stop reason: HOSPADM

## 2023-05-03 RX ORDER — BISACODYL 10 MG
10 SUPPOSITORY, RECTAL RECTAL DAILY PRN
Status: DISCONTINUED | OUTPATIENT
Start: 2023-05-03 | End: 2023-05-04 | Stop reason: HOSPADM

## 2023-05-03 RX ORDER — AMOXICILLIN 250 MG
2 CAPSULE ORAL 2 TIMES DAILY
Status: DISCONTINUED | OUTPATIENT
Start: 2023-05-03 | End: 2023-05-04 | Stop reason: HOSPADM

## 2023-05-03 RX ADMIN — BACITRACIN: 500 OINTMENT TOPICAL at 10:05

## 2023-05-03 RX ADMIN — ESLICARBAZEPINE ACETATE 1200 MG: 400 TABLET ORAL at 09:05

## 2023-05-03 RX ADMIN — SENNOSIDES AND DOCUSATE SODIUM 2 TABLET: 50; 8.6 TABLET ORAL at 10:05

## 2023-05-03 RX ADMIN — DEXAMETHASONE 4 MG: 4 TABLET ORAL at 09:05

## 2023-05-03 RX ADMIN — SENNOSIDES AND DOCUSATE SODIUM 1 TABLET: 50; 8.6 TABLET ORAL at 09:05

## 2023-05-03 RX ADMIN — BISACODYL 10 MG: 10 SUPPOSITORY RECTAL at 10:05

## 2023-05-03 RX ADMIN — POLYETHYLENE GLYCOL 3350 17 G: 17 POWDER, FOR SOLUTION ORAL at 09:05

## 2023-05-03 RX ADMIN — SENNOSIDES AND DOCUSATE SODIUM 2 TABLET: 50; 8.6 TABLET ORAL at 09:05

## 2023-05-03 RX ADMIN — SODIUM CHLORIDE 1000 MG: 1 TABLET ORAL at 03:05

## 2023-05-03 RX ADMIN — FAMOTIDINE 20 MG: 20 TABLET, FILM COATED ORAL at 09:05

## 2023-05-03 RX ADMIN — SODIUM CHLORIDE 1000 MG: 1 TABLET ORAL at 09:05

## 2023-05-03 RX ADMIN — ENOXAPARIN SODIUM 40 MG: 40 INJECTION SUBCUTANEOUS at 05:05

## 2023-05-03 RX ADMIN — CLOBAZAM 40 MG: 10 TABLET ORAL at 09:05

## 2023-05-03 RX ADMIN — OXYCODONE HYDROCHLORIDE 5 MG: 5 TABLET ORAL at 09:05

## 2023-05-03 RX ADMIN — ESCITALOPRAM OXALATE 10 MG: 10 TABLET ORAL at 09:05

## 2023-05-03 RX ADMIN — BACITRACIN: 500 OINTMENT TOPICAL at 03:05

## 2023-05-03 RX ADMIN — ZONISAMIDE 500 MG: 100 CAPSULE ORAL at 09:05

## 2023-05-03 NOTE — ASSESSMENT & PLAN NOTE
59 y/o M s/p left temporal lobectomy on 4/24  for seizures refractory to prior laser ablation    Recommend calorie count to determine if NGT can be removed. Ongoing   NA management per ICU team. Hyponatremia unresolved on salt tabs. Appreciate ICU team plan and concern that's this may be associated with patient AEDs         - ICU status post op, OK for TTF today   - q4 neuro checks  - MRI brain with good resection   - Na goal > 135, seems to be hypovolemic hyponatremia, HM consulted for assistance once patient steps down   - continue home AEDs  - Lovenox for DVT prophylaxis   - continue 3 week dex taper, weaned to dex 4 BID yesterday, next wean on Friday   - continue SLP for speech eval  and swallow eval    - Speech improving   - recs for soft diet with thick liquids   - please keep NGT until improving/adequate PO intake  - PT/OT    Dispo: ongoing

## 2023-05-03 NOTE — SUBJECTIVE & OBJECTIVE
"Interval History:  5/3: NAEON, briskly awake and interactive today. Na 133.    Medications:  Continuous Infusions:      Scheduled Meds:   cloBAZam  40 mg Oral QHS    dexAMETHasone  4 mg Oral Q12H    Followed by    [START ON 5/5/2023] dexAMETHasone  2 mg Oral Q12H    Followed by    [START ON 5/9/2023] dexAMETHasone  2 mg Oral Daily    Followed by    [START ON 5/13/2023] dexAMETHasone  1 mg Oral Daily    enoxaparin  40 mg Subcutaneous Daily    EScitalopram oxalate  10 mg Oral Daily    eslicarbazepine  1,200 mg Oral Daily    famotidine  20 mg Oral BID    polyethylene glycol  17 g Oral Daily    senna-docusate 8.6-50 mg  1 tablet Oral BID    sodium chloride  1,000 mg Oral TID    zonisamide  500 mg Oral QHS     PRN Meds:acetaminophen, dextrose 10%, dextrose 10%, hydrALAZINE, ondansetron, oxyCODONE     Review of Systems  Objective:     Weight: 89.8 kg (198 lb)  Body mass index is 27.62 kg/m².  Vital Signs (Most Recent):  Temp: 98.3 °F (36.8 °C) (05/03/23 0701)  Pulse: 80 (05/03/23 0817)  Resp: (!) 27 (05/03/23 0817)  BP: 125/83 (05/03/23 0701)  SpO2: 96 % (05/03/23 0817)   Vital Signs (24h Range):  Temp:  [98.2 °F (36.8 °C)-98.8 °F (37.1 °C)] 98.3 °F (36.8 °C)  Pulse:  [] 80  Resp:  [11-27] 27  SpO2:  [86 %-96 %] 96 %  BP: (102-141)/(66-88) 125/83     Date 05/03/23 0700 - 05/04/23 0659   Shift 3771-8970 0372-1412 8266-7081 24 Hour Total   INTAKE   Shift Total(mL/kg)       OUTPUT   Urine(mL/kg/hr) 0   0   Shift Total(mL/kg) 0(0)   0(0)   Weight (kg) 89.8 89.8 89.8 89.8                          Urethral Catheter 04/24/23 1100 Silicone 16 Fr. (Active)   Site Assessment Clean;Intact 04/25/23 0303   Collection Container Urimeter 04/25/23 0303   Securement Method secured to top of thigh w/ adhesive device 04/25/23 0303   Catheter Care Performed yes 04/25/23 0303   Reason for Continuing Urinary Catheterization Post operative 04/25/23 0303   CAUTI Prevention Bundle Securement Device in place with 1" slack;Intact seal between " catheter & drainage tubing;Drainage bag/urimeter off the floor;Sheeting clip in use;No dependent loops or kinks;Drainage bag/urimeter not overfilled (<2/3 full);Drainage bag/urimeter below bladder 04/24/23 1909   Output (mL) 100 mL 04/25/23 0625            Drain/Device  04/24/23 1524 Left lateral temporal region gravity (Active)   Insertion Site no hematoma 04/25/23 0303   Drainage Characteristics/Odor Bleeding controlled 04/24/23 1909   Drainage Amount None 04/24/23 1909   General Intake (mL) 0 04/24/23 1909   General Output (mL) 140 04/25/23 0625       Physical Exam  Neurosurgery Physical Exam    [id number]   Aox3  Perrl  eomi   visual fields full to confrontation   Incision CDI     Significant Labs:  Recent Labs   Lab 05/02/23  0118 05/03/23  0101   * 138*   * 133*   K 4.1 4.0   CL 99 100   CO2 22* 23   BUN 14 23*   CREATININE 0.7 0.7   CALCIUM 9.1 9.0   MG 1.9 2.0       Recent Labs   Lab 05/02/23  0118 05/03/23  0101   WBC 14.20* 11.88   HGB 13.5* 13.0*   HCT 40.8 39.5*    404       No results for input(s): LABPT, INR, APTT in the last 48 hours.    Microbiology Results (last 7 days)       ** No results found for the last 168 hours. **          All pertinent labs from the last 24 hours have been reviewed.    Significant Diagnostics:  I have reviewed all pertinent imaging results/findings within the past 24 hours.
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
## 2023-05-03 NOTE — PT/OT/SLP PROGRESS
Occupational Therapy   Treatment    Name: Declan Burleson  MRN: [id number]  Admitting Diagnosis:  Complex partial epilepsy with generalization and with intractable epilepsy  7 Days Post-Op    Recommendations:     Discharge Recommendations: rehabilitation facility  Discharge Equipment Recommendations:  to be determined by next level of care  Barriers to discharge:  Other (Comment) (increased skilled (A) required)    Assessment:     Declan Burleson is a 58 y.o. male with a medical diagnosis of Complex partial epilepsy with generalization and with intractable epilepsy.  He presents with the following performance deficits affecting function are weakness, impaired endurance, impaired self care skills, impaired functional mobility, gait instability, impaired balance, decreased coordination, decreased lower extremity function, decreased safety awareness, pain, impaired cardiopulmonary response to activity. Pt presents with SLP, participating in PO trials. Pt demonstrated increased verbalization and conversational with staff and wife present. Pt was able to tolerate increased time sitting EOB with SBA-CGA for balance. Pt reported some dizziness and head pain throughout, BP monitored and WNL. He continues to require some verbal cues for sequencing transfers and functional mobility, but was motivated and willing to transfer into bedside chair. Overall, pt is making great progress and demonstrates consistent motivation to participate in therapy and return to PLOF. Pt would benefit from continued skilled acute OT services in order to maximize (I) and with ADLs and functional mobility to ensure safe return to PLOF in the least restrictive environment. OT recommending IPR once pt is medically appropriate for d/c.       Rehab Prognosis:  Good; patient would benefit from acute skilled OT services to address these deficits and reach maximum level of function.       Plan:     Patient to be seen 4 x/week to address the above  "listed problems via self-care/home management, therapeutic activities, therapeutic exercises, neuromuscular re-education  Plan of Care Expires: 05/26/23  Plan of Care Reviewed with: patient, spouse    Subjective   "I want to try and walk"   Chief Complaint: head pain  Patient/Family Comments/goals: sit in bedside chair today  Pain/Comfort:  Pain Rating 1: other (see comments) (not rated)  Location - Side 1: Left  Location 1: head  Pain Addressed 1: Distraction, Nurse notified  Pain Rating Post-Intervention 1: other (see comments) (not rated)  Pain Rating 2: other (see comments) (not rated)  Location - Side 2: Right  Location - Orientation 2: lower  Location 2: leg  Pain Addressed 2: Reposition, Cessation of Activity, Nurse notified, Distraction  Pain Rating Post-Intervention 2: other (see comments) (not rated)    Objective:     Communicated with: RN prior to session.  Patient found HOB elevated with bed alarm, peripheral IV, blood pressure cuff, telemetry, SCD, pulse ox (continuous), Condom Catheter upon OT entry to room.    General Precautions: Standard, aspiration, aphasia, fall    Orthopedic Precautions:N/A  Braces: N/A  Respiratory Status: Room air     Occupational Performance:     Bed Mobility:    Patient completed Scooting/Bridging with contact guard assistance  Patient completed Supine to Sit with contact guard assistance     Functional Mobility/Transfers:  Patient completed Sit <> Stand Transfer with minimum assistance  with  hand-held assist    X2 trials   Pt demonstrated posterior lean on first trial and required visual demonstration to facilitate forward leaning for 2nd trial.   Patient completed Bed <> Chair Transfer using Step Transfer technique with minimum assistance with hand-held assist   Verbal cues for sequencing of steps and for controlled descend down into chair  Functional Mobility: Pt demonstrated ability to mobilize to bedside chair with Min A and HHA for balance. Pt required cues for keeping " forward gaze as pt noted to look down at feet.     Activities of Daily Living:  Feeding:  stand by assistance for PO trials with cup.   Upper Body Dressing: moderate assistance to don hospital gown while sitting EOB. Pt required cues for threading BUEs into sleeves.   Toileting: pt with condom catheter        [facility] 6 Click ADL: 16    Treatment & Education:  Pt tolerated sitting EOB ~15min with close SBA-CGA for safety.   Demonstrated ability to maintain static standing for lateral weight shifting and in place marching in preparation for mobility trial.   Education on various BUE/BLE to perform outside of therapy. Pt extremely receptive and excited to perform.   Pt and wife educated on:   Role of OT, POC, and d/c planning.   Importance of OOB activities to increase endurance and tolerance for increased participation in daily ADLs.   Pt motivated to participate in BLE/BUE therex outside of therapy for improved functional strength and endurance.   Utilizing the call bell to request for assistance with all functional mobility to ensure safety during hospital stay.      Pt and family verbalized understanding and all questions were addressed within the scope of OT.       Patient left up in chair with  RN notified and wife present    GOALS:   Multidisciplinary Problems       Occupational Therapy Goals          Problem: Occupational Therapy    Goal Priority Disciplines Outcome Interventions   Occupational Therapy Goal     OT, PT/OT Ongoing, Progressing    Description: Goals to be met by: 5/10/2023     Patient will increase functional independence with ADLs by performing:    UE Dressing with Minimal Assistance.  LE Dressing with Minimal Assistance.  Grooming while standing at sink with Minimal Assistance.  Toileting from bedside commode with Minimal Assistance for hygiene and clothing management.   Step transfer with Minimal Assistance  Toilet transfer to bedside commode with Minimal Assistance.                         Time  Tracking:     OT Date of Treatment: 05/03/23  OT Start Time: 0920  OT Stop Time: 0959  OT Total Time (min): 39 min    Billable Minutes:Self Care/Home Management 14  Therapeutic Activity 25    OT/BENITEZ: OT          5/3/2023   Partial co-treatment performed due to patient's multiple deficits requiring two skilled therapists to appropriately and safely assess patient's strength, endurance, functional mobility, and ADL performance while facilitating functional tasks in addition to accommodating for patient's activity tolerance and medical acuity 2/2 complex partial epilepsy with generalization and with intractable epilepsy.

## 2023-05-03 NOTE — PLAN OF CARE
[facility] Care Plan    POC reviewed with Declan Burleson and family at 0300. Pt verbalized understanding. Questions and concerns addressed. No acute events overnight. Pt progressing toward goals. Will continue to monitor. See below and flowsheets for full assessment and VS info.     -Waiting for stepdown bed.  -Sodium up to 133      Is this a stroke patient? no    Neuro:  Ruben Coma Scale  Best Eye Response: 4-->(E4) spontaneous  Best Motor Response: 6-->(M6) obeys commands  Best Verbal Response: 5-->(V5) oriented  Doland Coma Scale Score: 15  Assessment Qualifiers: patient not sedated/intubated, no eye obstruction present  Pupil PERRLA: yes     24hr Temp:  [98.2 °F (36.8 °C)-99 °F (37.2 °C)]     CV:   Rhythm: normal sinus rhythm  BP goals:   SBP < 160  MAP > 65    Resp:           Plan: N/A    GI/:     Diet/Nutrition Received: mechanical/dental soft  Last Bowel Movement: 04/27/23  Voiding Characteristics: external catheter    Intake/Output Summary (Last 24 hours) at 5/3/2023 0459  Last data filed at 5/2/2023 0505  Gross per 24 hour   Intake 50 ml   Output 45 ml   Net 5 ml     Unmeasured Output  Urine Occurrence: 1  Stool Occurrence: 0  Pad Count: 3    Labs/Accuchecks:  Recent Labs   Lab 05/03/23  0101   WBC 11.88   RBC 4.21*   HGB 13.0*   HCT 39.5*         Recent Labs   Lab 05/03/23  0101   *   K 4.0   CO2 23      BUN 23*   CREATININE 0.7   ALKPHOS 107   ALT 20   AST 14   BILITOT 0.4    No results for input(s): PROTIME, INR, APTT, HEPANTIXA in the last 168 hours. No results for input(s): CPK, CPKMB, TROPONINI, MB in the last 168 hours.    Electrolytes: N/A - electrolytes WDL  Accuchecks: none    Gtts:      LDA/Wounds:  Lines/Drains/Airways       Drain  Duration             Male External Urinary Catheter 05/01/23 2120 Small 1 day              Peripheral Intravenous Line  Duration                  Peripheral IV - Single Lumen 04/26/23 1218 Anterior;Right Forearm 6 days         Peripheral IV -  Single Lumen 04/29/23 0620 20 G Left Antecubital 3 days                  Wounds: No  Wound care consulted: No

## 2023-05-03 NOTE — PT/OT/SLP PROGRESS
"Physical Therapy Treatment    Patient Name:  Declan Burleson   MRN:  [id number]    Recommendations:     Discharge Recommendations: rehabilitation facility  Discharge Equipment Recommendations: to be determined by next level of care  Barriers to discharge: Decreased caregiver support    Assessment:     Declan Burleson is a 58 y.o. male admitted with a medical diagnosis of Complex partial epilepsy with generalization and with intractable epilepsy.  He presents with the following impairments/functional limitations: weakness, impaired functional mobility, gait instability, impaired self care skills, decreased lower extremity function, impaired balance, decreased safety awareness . Pt is unsafe with functional mobility at this time due to pt requires minimal assist for transfers and minimal assist for gait due to decreased step length on R>L, decreased clearance of R foot during swing phase, and R foot crossed over L foot at times. Pt is motivated to progress with functional mobility.     Rehab Prognosis: Good; patient would benefit from acute skilled PT services to address these deficits and reach maximum level of function.    Recent Surgery: Procedure(s) (LRB):  REMOVAL, DRAIN (Left) 7 Days Post-Op    Plan:     During this hospitalization, patient to be seen 4 x/week to address the identified rehab impairments via gait training, therapeutic activities, therapeutic exercises, neuromuscular re-education and progress toward the following goals:    Plan of Care Expires:  05/25/23    Subjective   "I am glad to be sitting up, I can exercise my legs better when I am in the chair"  Pain/Comfort:  Pain Rating 1: 0/10  Pain Rating Post-Intervention 1: 0/10      Objective:     Communicated with nurse prior to session.  Patient found up in chair with chair check, blood pressure cuff, peripheral IV, pulse ox (continuous), telemetry, nevarez catheter upon PT entry to room.     General Precautions: Standard, aspiration, " aphasia, fall  Orthopedic Precautions: N/A  Braces: N/A  Respiratory Status: Room air     Functional Mobility:  Transfers:     Sit to Stand:  minimum assistance with rolling walker  Gait: 35ft x 3 trials with RW with minimal assist. Pt required manual cues to direct the RW at times and for line management. Pt rested in sitting between gait trials    AM-PAC 6 CLICK MOBILITY  Turning over in bed (including adjusting bedclothes, sheets and blankets)?: 3  Sitting down on and standing up from a chair with arms (e.g., wheelchair, bedside commode, etc.): 3  Moving from lying on back to sitting on the side of the bed?: 3  Moving to and from a bed to a chair (including a wheelchair)?: 3  Need to walk in hospital room?: 3  Climbing 3-5 steps with a railing?: 2  Basic Mobility Total Score: 17     Patient left up in chair with all lines intact, call button in reach, chair alarm on, nurse notified, and wife present..    GOALS:   Multidisciplinary Problems       Physical Therapy Goals          Problem: Physical Therapy    Goal Priority Disciplines Outcome Goal Variances Interventions   Physical Therapy Goal     PT, PT/OT Ongoing, Progressing     Description: PT goals until 5/10/23    1. Pt supine to sit with minimal assist- met 4/27/23  Revised goal: supine to sit with supervision-not met  2. Pt sit to supine with minimal assist-not met  3. Pt sit to stand with LRAD as needed for safety with minimal assist-met 4/27/23  Revised goal: sit to stand with Rw with supervision-not met  4. Pt to perform gait 15ft with LRAD as needed for safety with moderate assist.- met 4/27/23  Revised goal: gait 150ft with RW with supervision-not met  5. Pt to go up/down curb step with LRAD as needed for safety with moderate assist.-not met  6. Pt to transfer bed to/from bedside chair with LRAD as needed for safety with minimal assist.-not met  7. Pt to perform B LE exs in sitting or supine x 10 reps to strengthen B LE to improve functional  mobility.-not met                        Time Tracking:     PT Received On: 05/03/23  PT Start Time: 1112     PT Stop Time: 1139  PT Total Time (min): 27 min     Billable Minutes: Gait Training 27    Treatment Type: Treatment  PT/PTA: PT     Number of PTA visits since last PT visit: 1 05/03/2023

## 2023-05-03 NOTE — PLAN OF CARE
Problem: Physical Therapy  Goal: Physical Therapy Goal  Description: PT goals until 5/10/23    1. Pt supine to sit with minimal assist- met 4/27/23  Revised goal: supine to sit with supervision-not met  2. Pt sit to supine with minimal assist-not met  3. Pt sit to stand with LRAD as needed for safety with minimal assist-met 4/27/23  Revised goal: sit to stand with Rw with supervision-not met  4. Pt to perform gait 15ft with LRAD as needed for safety with moderate assist.- met 4/27/23  Revised goal: gait 150ft with RW with supervision-not met  5. Pt to go up/down curb step with LRAD as needed for safety with moderate assist.-not met  6. Pt to transfer bed to/from bedside chair with LRAD as needed for safety with minimal assist.-not met  7. Pt to perform B LE exs in sitting or supine x 10 reps to strengthen B LE to improve functional mobility.-not met   Outcome: Ongoing, Progressing   Pt's goals remain appropriate and pt will continue to benefit from skilled PT services to work towards improved functional mobility including: bed mobility, transfers, and gait.   5/3/2023

## 2023-05-03 NOTE — PLAN OF CARE
Per BUZZ Martinez with [facility], the patient will step down to the floor today and is ready for Rehab.  Maxime with [facility] informed.     Tammy Garcia RN, CCRN-K, [facility]  Neuro-Critical Care   [phone]

## 2023-05-03 NOTE — PROGRESS NOTES
[facility]  Neurosurgery  Progress Note    Subjective:     History of Present Illness: 58 M with epilepsy presents for elective left temporal lobectomy      Post-Op Info:  Procedure(s) (LRB):  REMOVAL, DRAIN (Left)   7 Days Post-Op     Interval History:  5/3: NAEON, briskly awake and interactive today. Na 133.    Medications:  Continuous Infusions:      Scheduled Meds:   cloBAZam  40 mg Oral QHS    dexAMETHasone  4 mg Oral Q12H    Followed by    [START ON 5/5/2023] dexAMETHasone  2 mg Oral Q12H    Followed by    [START ON 5/9/2023] dexAMETHasone  2 mg Oral Daily    Followed by    [START ON 5/13/2023] dexAMETHasone  1 mg Oral Daily    enoxaparin  40 mg Subcutaneous Daily    EScitalopram oxalate  10 mg Oral Daily    eslicarbazepine  1,200 mg Oral Daily    famotidine  20 mg Oral BID    polyethylene glycol  17 g Oral Daily    senna-docusate 8.6-50 mg  1 tablet Oral BID    sodium chloride  1,000 mg Oral TID    zonisamide  500 mg Oral QHS     PRN Meds:acetaminophen, dextrose 10%, dextrose 10%, hydrALAZINE, ondansetron, oxyCODONE     Review of Systems  Objective:     Weight: 89.8 kg (198 lb)  Body mass index is 27.62 kg/m².  Vital Signs (Most Recent):  Temp: 98.3 °F (36.8 °C) (05/03/23 0701)  Pulse: 80 (05/03/23 0817)  Resp: (!) 27 (05/03/23 0817)  BP: 125/83 (05/03/23 0701)  SpO2: 96 % (05/03/23 0817)   Vital Signs (24h Range):  Temp:  [98.2 °F (36.8 °C)-98.8 °F (37.1 °C)] 98.3 °F (36.8 °C)  Pulse:  [] 80  Resp:  [11-27] 27  SpO2:  [86 %-96 %] 96 %  BP: (102-141)/(66-88) 125/83     Date 05/03/23 0700 - 05/04/23 0659   Shift 5144-5494 4678-0729 4848-8583 24 Hour Total   INTAKE   Shift Total(mL/kg)       OUTPUT   Urine(mL/kg/hr) 0   0   Shift Total(mL/kg) 0(0)   0(0)   Weight (kg) 89.8 89.8 89.8 89.8                          Urethral Catheter 04/24/23 1100 Silicone 16 Fr. (Active)   Site Assessment Clean;Intact 04/25/23 0303   Collection Container Urimeter 04/25/23 0303   Securement  "Method secured to top of thigh w/ adhesive device 04/25/23 0303   Catheter Care Performed yes 04/25/23 0303   Reason for Continuing Urinary Catheterization Post operative 04/25/23 0303   CAUTI Prevention Bundle Securement Device in place with 1" slack;Intact seal between catheter & drainage tubing;Drainage bag/urimeter off the floor;Sheeting clip in use;No dependent loops or kinks;Drainage bag/urimeter not overfilled (<2/3 full);Drainage bag/urimeter below bladder 04/24/23 1909   Output (mL) 100 mL 04/25/23 0625            Drain/Device  04/24/23 1524 Left lateral temporal region gravity (Active)   Insertion Site no hematoma 04/25/23 0303   Drainage Characteristics/Odor Bleeding controlled 04/24/23 1909   Drainage Amount None 04/24/23 1909   General Intake (mL) 0 04/24/23 1909   General Output (mL) 140 04/25/23 0625       Physical Exam  Neurosurgery Physical Exam    [id number]   Aox3  Perrl  eomi   visual fields full to confrontation   Incision CDI     Significant Labs:  Recent Labs   Lab 05/02/23  0118 05/03/23  0101   * 138*   * 133*   K 4.1 4.0   CL 99 100   CO2 22* 23   BUN 14 23*   CREATININE 0.7 0.7   CALCIUM 9.1 9.0   MG 1.9 2.0       Recent Labs   Lab 05/02/23  0118 05/03/23  0101   WBC 14.20* 11.88   HGB 13.5* 13.0*   HCT 40.8 39.5*    404       No results for input(s): LABPT, INR, APTT in the last 48 hours.    Microbiology Results (last 7 days)       ** No results found for the last 168 hours. **          All pertinent labs from the last 24 hours have been reviewed.    Significant Diagnostics:  I have reviewed all pertinent imaging results/findings within the past 24 hours.    Assessment/Plan:     * Complex partial epilepsy with generalization and with intractable epilepsy  57 y/o M s/p left temporal lobectomy on 4/24  for seizures refractory to prior laser ablation    Recommend calorie count to determine if NGT can be removed. Ongoing   NA management per ICU team. Hyponatremia unresolved on " salt tabs. Appreciate ICU team plan and concern that's this may be associated with patient AEDs         - ICU status post op, OK for TTF today   - q4 neuro checks  - MRI brain with good resection   - Na goal > 135, seems to be hypovolemic hyponatremia, HM consulted for assistance once patient steps down   - continue home AEDs  - Lovenox for DVT prophylaxis   - continue 3 week dex taper, weaned to dex 4 BID yesterday, next wean on Friday   - continue SLP for speech eval  and swallow eval    - Speech improving   - recs for soft diet with thick liquids   - please keep NGT until improving/adequate PO intake  - PT/OT    Dispo: ongoing          Obinna Avendano MD  Neurosurgery  [facility]

## 2023-05-03 NOTE — PLAN OF CARE
Problem: Adult Inpatient Plan of Care  Goal: Plan of Care Review  Outcome: Ongoing, Progressing  Goal: Patient-Specific Goal (Individualized)  Outcome: Ongoing, Progressing  Goal: Absence of Hospital-Acquired Illness or Injury  Outcome: Ongoing, Progressing  Goal: Optimal Comfort and Wellbeing  Outcome: Ongoing, Progressing  Goal: Readiness for Transition of Care  Outcome: Ongoing, Progressing     Problem: Infection  Goal: Absence of Infection Signs and Symptoms  Outcome: Ongoing, Progressing     Problem: Skin Injury Risk Increased  Goal: Skin Health and Integrity  Outcome: Ongoing, Progressing    POC reviewed with the patient and they verbalized understanding. All comments and concerns addressed. Bed locked in lowest position with bed alarm set, call light within reach. Safety precautions maintained. VSS, see flowsheets. No events this shift. Will continue to monitor for changes to POC and clinical condition.

## 2023-05-03 NOTE — PT/OT/SLP PROGRESS
Speech Language Pathology Treatment    Patient Name:  Declan Burleson   MRN:  [id number]  Admitting Diagnosis: Complex partial epilepsy with generalization and with intractable epilepsy    Recommendations:                 General Recommendations:  Dysphagia therapy and Speech/language therapy  Diet recommendations:  Soft & Bite Sized Diet - IDDSI Level 6, Liquid Diet Level: Thin   Aspiration Precautions: Meds whole 1 at a time, Small bites/sips, and Standard aspiration precautions   General Precautions: Standard, aspiration, fall, aphasia  Communication strategies:  provide increased time to answer    Subjective     Awake/alert    Pain/Comfort:  Pain Rating 1: 0/10  Pain Rating Post-Intervention 1: 0/10    Respiratory Status: Room air    Objective:     Has the patient been evaluated by SLP for swallowing?   Yes  Keep patient NPO? No     Pt repositioned upright in bed for PO trials. He required increased time to answer throughout session. He tolerated thin liquids x12 oz via straw and pills whole x8 one at a time with timely swallow initiation and no overt s/s of airway compromise. He denied any difficulty with PO intake this date. Recommend dental soft diet/thin liquids at this time with straws allowed. He completed simple divergent categorization tasks with mod cues and increased time to answer. Pt was intelligible and mostly fluent throughout conversation with family and staff. SLP will follow up. Ongoing education on diet recs, swallow strategies and POC reviewed with pt and family who verbalized understanding.    Assessment:     Declan Burleson is a 58 y.o. male with an SLP diagnosis of Aphasia and Dysphagia.      Goals:   Multidisciplinary Problems       SLP Goals          Problem: SLP    Goal Priority Disciplines Outcome   SLP Goal     SLP Ongoing, Progressing   Description: Speech Language Pathology Goals  Goals expected to be met by 5/2  1. Pt will complete ongoing assessment of swallow to advance  diet as appropriate.    2. Pt will follow 1 step commands with 80% accy given min A.   3. Pt will answer simple y/n questions with 80% accy given min A.   4. Pt will complete simple auto speech tasks with 70% accy given mod A.   5. Pt will complete simple naming tasks with 50% accy given mod A.                                Plan:     Patient to be seen:  4 x/week   Plan of Care expires:  05/25/23  Plan of Care reviewed with:  patient, spouse   SLP Follow-Up:  Yes       Discharge recommendations:  rehabilitation facility       Time Tracking:     SLP Treatment Date:   05/03/23  Speech Start Time:  0918  Speech Stop Time:  0947     Speech Total Time (min):  29 min    Billable Minutes: Speech Therapy Individual 9, Treatment Swallowing Dysfunction 10, and Self Care/Home Management Training 10    05/03/2023

## 2023-05-04 ENCOUNTER — PATIENT MESSAGE (OUTPATIENT)
Dept: NEUROSURGERY | Facility: CLINIC | Age: 58
End: 2023-05-04
Payer: COMMERCIAL

## 2023-05-04 VITALS
WEIGHT: 198 LBS | SYSTOLIC BLOOD PRESSURE: 120 MMHG | RESPIRATION RATE: 18 BRPM | HEIGHT: 71 IN | HEART RATE: 70 BPM | BODY MASS INDEX: 27.72 KG/M2 | DIASTOLIC BLOOD PRESSURE: 83 MMHG | TEMPERATURE: 99 F | OXYGEN SATURATION: 98 %

## 2023-05-04 LAB
ALBUMIN SERPL BCP-MCNC: 3 G/DL (ref 3.5–5.2)
ALP SERPL-CCNC: 106 U/L (ref 55–135)
ALT SERPL W/O P-5'-P-CCNC: 23 U/L (ref 10–44)
ANION GAP SERPL CALC-SCNC: 9 MMOL/L (ref 8–16)
AST SERPL-CCNC: 15 U/L (ref 10–40)
BASOPHILS # BLD AUTO: 0.09 K/UL (ref 0–0.2)
BASOPHILS NFR BLD: 0.9 % (ref 0–1.9)
BILIRUB SERPL-MCNC: 0.4 MG/DL (ref 0.1–1)
BUN SERPL-MCNC: 23 MG/DL (ref 6–20)
CALCIUM SERPL-MCNC: 9.1 MG/DL (ref 8.7–10.5)
CHLORIDE SERPL-SCNC: 102 MMOL/L (ref 95–110)
CO2 SERPL-SCNC: 22 MMOL/L (ref 23–29)
CREAT SERPL-MCNC: 0.7 MG/DL (ref 0.5–1.4)
DIFFERENTIAL METHOD: ABNORMAL
EOSINOPHIL # BLD AUTO: 0.1 K/UL (ref 0–0.5)
EOSINOPHIL NFR BLD: 1.3 % (ref 0–8)
ERYTHROCYTE [DISTWIDTH] IN BLOOD BY AUTOMATED COUNT: 13.3 % (ref 11.5–14.5)
EST. GFR  (NO RACE VARIABLE): >60 ML/MIN/1.73 M^2
GLUCOSE SERPL-MCNC: 127 MG/DL (ref 70–110)
HCT VFR BLD AUTO: 40.3 % (ref 40–54)
HGB BLD-MCNC: 13.1 G/DL (ref 14–18)
IMM GRANULOCYTES # BLD AUTO: 0.33 K/UL (ref 0–0.04)
IMM GRANULOCYTES NFR BLD AUTO: 3.3 % (ref 0–0.5)
LYMPHOCYTES # BLD AUTO: 1.5 K/UL (ref 1–4.8)
LYMPHOCYTES NFR BLD: 14.9 % (ref 18–48)
MAGNESIUM SERPL-MCNC: 1.9 MG/DL (ref 1.6–2.6)
MCH RBC QN AUTO: 31.1 PG (ref 27–31)
MCHC RBC AUTO-ENTMCNC: 32.5 G/DL (ref 32–36)
MCV RBC AUTO: 96 FL (ref 82–98)
MONOCYTES # BLD AUTO: 0.9 K/UL (ref 0.3–1)
MONOCYTES NFR BLD: 8.9 % (ref 4–15)
NEUTROPHILS # BLD AUTO: 7 K/UL (ref 1.8–7.7)
NEUTROPHILS NFR BLD: 70.7 % (ref 38–73)
NRBC BLD-RTO: 0 /100 WBC
PHOSPHATE SERPL-MCNC: 4.2 MG/DL (ref 2.7–4.5)
PLATELET # BLD AUTO: 431 K/UL (ref 150–450)
PMV BLD AUTO: 9.5 FL (ref 9.2–12.9)
POTASSIUM SERPL-SCNC: 4 MMOL/L (ref 3.5–5.1)
PROT SERPL-MCNC: 6.7 G/DL (ref 6–8.4)
RBC # BLD AUTO: 4.21 M/UL (ref 4.6–6.2)
SODIUM SERPL-SCNC: 133 MMOL/L (ref 136–145)
WBC # BLD AUTO: 9.96 K/UL (ref 3.9–12.7)

## 2023-05-04 PROCEDURE — 92526 ORAL FUNCTION THERAPY: CPT

## 2023-05-04 PROCEDURE — 99233 PR SUBSEQUENT HOSPITAL CARE,LEVL III: ICD-10-PCS | Mod: GC,,, | Performed by: STUDENT IN AN ORGANIZED HEALTH CARE EDUCATION/TRAINING PROGRAM

## 2023-05-04 PROCEDURE — 83735 ASSAY OF MAGNESIUM: CPT

## 2023-05-04 PROCEDURE — 25000003 PHARM REV CODE 250: Performed by: NURSE PRACTITIONER

## 2023-05-04 PROCEDURE — 25000003 PHARM REV CODE 250

## 2023-05-04 PROCEDURE — 97116 GAIT TRAINING THERAPY: CPT | Mod: CQ

## 2023-05-04 PROCEDURE — 25000003 PHARM REV CODE 250: Performed by: STUDENT IN AN ORGANIZED HEALTH CARE EDUCATION/TRAINING PROGRAM

## 2023-05-04 PROCEDURE — 92507 TX SP LANG VOICE COMM INDIV: CPT

## 2023-05-04 PROCEDURE — 36415 COLL VENOUS BLD VENIPUNCTURE: CPT

## 2023-05-04 PROCEDURE — 85025 COMPLETE CBC W/AUTO DIFF WBC: CPT

## 2023-05-04 PROCEDURE — 80053 COMPREHEN METABOLIC PANEL: CPT

## 2023-05-04 PROCEDURE — 99024 POSTOP FOLLOW-UP VISIT: CPT | Mod: ,,, | Performed by: PHYSICIAN ASSISTANT

## 2023-05-04 PROCEDURE — 63600175 PHARM REV CODE 636 W HCPCS: Performed by: PHYSICIAN ASSISTANT

## 2023-05-04 PROCEDURE — 99024 PR POST-OP FOLLOW-UP VISIT: ICD-10-PCS | Mod: ,,, | Performed by: PHYSICIAN ASSISTANT

## 2023-05-04 PROCEDURE — 84100 ASSAY OF PHOSPHORUS: CPT

## 2023-05-04 PROCEDURE — 97530 THERAPEUTIC ACTIVITIES: CPT | Mod: CQ

## 2023-05-04 PROCEDURE — 99233 SBSQ HOSP IP/OBS HIGH 50: CPT | Mod: GC,,, | Performed by: STUDENT IN AN ORGANIZED HEALTH CARE EDUCATION/TRAINING PROGRAM

## 2023-05-04 RX ORDER — DEXAMETHASONE 1 MG/1
TABLET ORAL
Qty: 72 TABLET | Refills: 0
Start: 2023-05-04 | End: 2023-05-19

## 2023-05-04 RX ORDER — SODIUM CHLORIDE 1 G/1
1000 TABLET ORAL 2 TIMES DAILY
Start: 2023-05-04

## 2023-05-04 RX ORDER — FAMOTIDINE 20 MG/1
20 TABLET, FILM COATED ORAL 2 TIMES DAILY
Qty: 60 TABLET | Refills: 11
Start: 2023-05-04 | End: 2024-05-03

## 2023-05-04 RX ORDER — SODIUM CHLORIDE 1 G/1
1000 TABLET ORAL 2 TIMES DAILY
Status: DISCONTINUED | OUTPATIENT
Start: 2023-05-04 | End: 2023-05-04 | Stop reason: HOSPADM

## 2023-05-04 RX ORDER — SODIUM CHLORIDE 1 G/1
1000 TABLET ORAL 3 TIMES DAILY
Start: 2023-05-04 | End: 2023-05-04 | Stop reason: HOSPADM

## 2023-05-04 RX ADMIN — ESCITALOPRAM OXALATE 10 MG: 10 TABLET ORAL at 08:05

## 2023-05-04 RX ADMIN — BACITRACIN: 500 OINTMENT TOPICAL at 09:05

## 2023-05-04 RX ADMIN — DEXAMETHASONE 4 MG: 4 TABLET ORAL at 08:05

## 2023-05-04 RX ADMIN — SODIUM CHLORIDE 1000 MG: 1 TABLET ORAL at 10:05

## 2023-05-04 RX ADMIN — OXYCODONE HYDROCHLORIDE 5 MG: 5 TABLET ORAL at 10:05

## 2023-05-04 RX ADMIN — FAMOTIDINE 20 MG: 20 TABLET, FILM COATED ORAL at 08:05

## 2023-05-04 RX ADMIN — POLYETHYLENE GLYCOL 3350 17 G: 17 POWDER, FOR SOLUTION ORAL at 08:05

## 2023-05-04 NOTE — SUBJECTIVE & OBJECTIVE
Interval History  Medications:  Continuous Infusions:  Scheduled Meds:   bacitracin   Topical (Top) TID    cloBAZam  40 mg Oral QHS    dexAMETHasone  4 mg Oral Q12H    Followed by    [START ON 5/5/2023] dexAMETHasone  2 mg Oral Q12H    Followed by    [START ON 5/9/2023] dexAMETHasone  2 mg Oral Daily    Followed by    [START ON 5/13/2023] dexAMETHasone  1 mg Oral Daily    enoxaparin  40 mg Subcutaneous Daily    EScitalopram oxalate  10 mg Oral Daily    eslicarbazepine  1,200 mg Oral Daily    famotidine  20 mg Oral BID    polyethylene glycol  17 g Oral BID    senna-docusate 8.6-50 mg  2 tablet Oral BID    sodium chloride  1,000 mg Oral TID    zonisamide  500 mg Oral QHS     PRN Meds:acetaminophen, bisacodyL, dextrose 10%, dextrose 10%, hydrALAZINE, ondansetron, oxyCODONE, sodium phosphates     Review of Systems  Objective:     Weight: 89.8 kg (198 lb)  Body mass index is 27.62 kg/m².  Vital Signs (Most Recent):  Temp: 96.9 °F (36.1 °C) (05/04/23 0747)  Pulse: 77 (05/04/23 0747)  Resp: 18 (05/04/23 1006)  BP: 130/82 (05/04/23 0747)  SpO2: 97 % (05/04/23 0747) Vital Signs (24h Range):  Temp:  [96.8 °F (36 °C)-98.2 °F (36.8 °C)] 96.9 °F (36.1 °C)  Pulse:  [65-90] 77  Resp:  [13-19] 18  SpO2:  [93 %-99 %] 97 %  BP: (114-138)/(73-86) 130/82                         Male External Urinary Catheter 05/01/23 2120 Small (Active)   Collection Container Urimeter 05/03/23 2000   Securement Method secured to top of thigh w/ adhesive device 05/03/23 2000   Skin no redness;no breakdown 05/03/23 2000   Tolerance no signs/symptoms of discomfort 05/03/23 2000   Output (mL) 450 mL 05/03/23 0901   Catheter Change Date 05/03/23 05/03/23 0505   Catheter Change Time 0505 05/03/23 0505          Physical Exam         Neurosurgery Physical Exam    Significant Labs:  Recent Labs   Lab 05/03/23  0101 05/04/23  0426   * 127*   * 133*   K 4.0 4.0    102   CO2 23 22*   BUN 23* 23*   CREATININE 0.7 0.7   CALCIUM 9.0 9.1   MG 2.0  1.9     Recent Labs   Lab 05/03/23  0101 05/04/23  0426   WBC 11.88 9.96   HGB 13.0* 13.1*   HCT 39.5* 40.3    431     No results for input(s): LABPT, INR, APTT in the last 48 hours.  Microbiology Results (last 7 days)       ** No results found for the last 168 hours. **              Significant Diagnostics:

## 2023-05-04 NOTE — HPI
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 Also s/p drain removal 4/26. After several days in [facility] for monitoring, he was stepped down to NSGY yesterday.  is consulted for hyponatremia.      His sodium was 134 on admission, had decreased down to 130s for 5 consecutive days in [facility] and is now 133. He received some NS infusion 4/25-5/1 and was also started on salt tablets 1 g TID since 5/2 with improvement. Patient is at neurologic baseline per his wife at bedside. He has had good intake and is mostly drinking apple juice, sprite, and under 1 L of water daily. Cortisol was <1 (likely suppressed due to dexamethasone taper), TSH was normal. Per chart review, patient has had periods of hyponatremia in 7498-8274.  Patient's seizures started at 49 yo (2013) as GTCs and occasionally focal unaware seizures, intractable to several AEDs (including TPM, LTG, LEV, OXC). His current regimen is eslicarbazepine, zonisamide, and clobazam which has been his stable regimen for years. He is also taking escitalopram for anxiety which has been stable for years.

## 2023-05-04 NOTE — CONSULTS
[facility] - Neurosurgery ([facility])  [facility] Medicine  Consult Note    Patient Name: Declan Burleson  MRN: [id number]  Admission Date: 4/24/2023  Hospital Length of Stay: 10 days  Attending Physician: No att. providers found   Primary Care Provider: Juan Carlos Simmons NP           Patient information was obtained from patient, spouse/SO, past medical records and primary team.     Inpatient consult to [facility] Medicine-General  Consult performed by: Taryn Holbrook MD  Consult ordered by: Henok Melissa MD        Subjective:     Principal Problem: Complex partial epilepsy with generalization and with intractable epilepsy    Chief Complaint: No chief complaint on file.       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 Also s/p drain removal 4/26. After several days in [facility] for monitoring, he was stepped down to NSGY yesterday.  is consulted for hyponatremia.      His sodium was 134 on admission, had decreased down to 130s for 5 consecutive days in [facility] and is now 133. He received some NS infusion 4/25-5/1 and was also started on salt tablets 1 g TID since 5/2 with improvement. Patient is at neurologic baseline per his wife at bedside. He has had good intake and is mostly drinking apple juice, sprite, and under 1 L of water daily. Cortisol was <1 (likely suppressed due to dexamethasone taper), TSH was normal. Per chart review, patient has had periods of hyponatremia in 7820-4930.  Patient's seizures started at 47 yo (2013) as GTCs and occasionally focal unaware seizures, intractable to several AEDs (including TPM, LTG, LEV, OXC). His current regimen is eslicarbazepine, zonisamide, and clobazam which has been his stable regimen for years. He is also taking escitalopram for anxiety which has been stable for  years.       Past Medical History:   Diagnosis Date    Anxiety     Dementia in other diseases classified elsewhere, unspecified severity, without behavioral disturbance, psychotic disturbance, mood disturbance, and anxiety 2/2/2023    Depression     GERD (gastroesophageal reflux disease)     Hyperlipidemia     Hypertension     Increased anion gap metabolic acidosis 4/25/2023    Long term (current) use of antithrombotics/antiplatelets 12/22/2020    Migraine     Normocytic anemia 2/23/2023    Seizures        Past Surgical History:   Procedure Laterality Date    CRANIOTOMY USING FRAMELESS STEREOTAXY Left 4/24/2023    Procedure: CRANIOTOMY, USING FRAMELESS STEREOTAXY;  Surgeon: Ruchi Chen MD;  Location: [facility] OR 2ND FLR;  Service: Neurosurgery;  Laterality: Left;  Tor III ASA III  Blood: Type & Screen  Neuro Mon: None  Bed: [facility]  Headrest: [place]  Pos: Supine  [facility]   [place]  Asst Surg: [place]    CYST REMOVAL  March 2016/2017    skin cyst - benign    PLACEMENT OF STEREO EEG LEADS(DEPTH ELECTRODES) Left 2/27/2023    Procedure: PLACEMENT OF STEREO EEG LEADS (DEPTH ELECTRODES);  Surgeon: Ruchi Chen MD;  Location: [facility] OR [facility] FLR;  Service: Neurosurgery;  Laterality: Left;    REMOVAL OF DRAIN Left 4/26/2023    Procedure: REMOVAL, DRAIN;  Surgeon: Ruchi Chen MD;  [facility];  Service: Neurosurgery;  Laterality: Left;    REMOVAL OF STEREO EEG LEADS (DEPTH ELECTRODES) Bilateral 1/21/2021    Procedure: REMOVAL OF STEREO EEG LEADS (DEPTH ELECTRODES);  Surgeon: Ruchi Chen MD;  Location: [facility] OR [facility] FLR;  Service: Neurosurgery;  Laterality: Bilateral;    REMOVAL OF STEREO EEG LEADS (DEPTH ELECTRODES) Left 3/8/2023    Procedure: REMOVAL OF STEREO EEG LEADS (DEPTH ELECTRODES);  Surgeon: Ruchi Chen MD;  Location: [facility] OR [facility] FLR;  Service: Neurosurgery;  Laterality: Left;    TONSILLECTOMY      teenage        Review of patient's allergies indicates:   Allergen Reactions     Losartan Rash       No current facility-administered medications on file prior to encounter.     Current Outpatient Medications on File Prior to Encounter   Medication Sig    cloBAZam (ONFI) 20 mg Tab Take 2 tablets (40 mg total) by mouth every evening.    eslicarbazepine (APTIOM) 400 mg Tab tablet Take 1 tablet (400 mg total) by mouth once daily.    eslicarbazepine (APTIOM) 800 mg Tab Take 800 mg by mouth once daily.    oxyCODONE (ROXICODONE) 5 MG immediate release tablet Take 1 tablet (5 mg total) by mouth every 4 (four) hours as needed for Pain.    rizatriptan (MAXALT-MLT) 10 MG disintegrating tablet Take 10 mg by mouth as needed. No more than 3 doses / week    zonisamide (ZONEGRAN) 100 MG Cap Take 5 capsules (500 mg total) by mouth every evening.    [DISCONTINUED] EScitalopram oxalate (LEXAPRO) 10 MG tablet Take 1 tablet (10 mg total) by mouth once daily.    mag hydrox/aluminum hyd/simeth (ANTACID ORAL) Take by mouth as needed. Acid reflux    midazolam 5 mg/spray (0.1 mL) Spry 0.1 mLs by Nasal route.    ondansetron (ZOFRAN-ODT) 8 MG TbDL Take 8 mg by mouth every 8 (eight) hours as needed. nausea     Family History       Problem Relation (Age of Onset)    Heart disease Maternal Uncle (50)    Lung disease Father    Migraines Paternal Grandfather    No Known Problems Mother          Tobacco Use    Smoking status: Former     Types: Cigarettes     Quit date:      Years since quittin.3    Smokeless tobacco: Never   Substance and Sexual Activity    Alcohol use: Yes     Comment: one drink once a week    Drug use: Never    Sexual activity: Yes     Partners: Female     Review of Systems   Gastrointestinal:  Negative for constipation, nausea and vomiting.   Neurological:  Positive for headaches. Negative for seizures.   Psychiatric/Behavioral:  Negative for agitation, behavioral problems and confusion.    Objective:     Vital Signs (Most Recent):  Temp: 98.6 °F (37 °C) (23 1131)  Pulse: 70  (05/04/23 1131)  Resp: 18 (05/04/23 1131)  BP: 120/83 (05/04/23 1131)  SpO2: 98 % (05/04/23 1131) Vital Signs (24h Range):  Temp:  [96.8 °F (36 °C)-98.6 °F (37 °C)] 98.6 °F (37 °C)  Pulse:  [65-87] 70  Resp:  [13-19] 18  SpO2:  [93 %-99 %] 98 %  BP: (114-138)/(73-86) 120/83     Weight: 89.8 kg (198 lb)  Body mass index is 27.62 kg/m².     Physical Exam  Constitutional:       General: He is not in acute distress.     Appearance: Normal appearance. He is not ill-appearing.   HENT:      Head:      Comments: Sutures in place along right posterior scalp   Eyes:      General: No scleral icterus.  Cardiovascular:      Rate and Rhythm: Normal rate and regular rhythm.   Pulmonary:      Effort: Pulmonary effort is normal. No respiratory distress.      Breath sounds: Normal breath sounds. No wheezing or rales.   Abdominal:      General: Abdomen is flat.      Palpations: Abdomen is soft.   Musculoskeletal:      Right lower leg: No edema.      Left lower leg: No edema.   Skin:     General: Skin is warm and dry.      Coloration: Skin is not jaundiced.   Neurological:      General: No focal deficit present.      Mental Status: He is alert and oriented to person, place, and time.      Comments: AAOx3   Psychiatric:         Mood and Affect: Mood normal.         Behavior: Behavior normal.      Significant Labs: All pertinent labs within the past 24 hours have been reviewed.  CBC:   Recent Labs   Lab 05/03/23  0101 05/04/23 0426   WBC 11.88 9.96   HGB 13.0* 13.1*   HCT 39.5* 40.3    431     CMP:   Recent Labs   Lab 05/03/23  0101 05/04/23  0426   * 133*   K 4.0 4.0    102   CO2 23 22*   * 127*   BUN 23* 23*   CREATININE 0.7 0.7   CALCIUM 9.0 9.1   PROT 6.6 6.7   ALBUMIN 3.0* 3.0*   BILITOT 0.4 0.4   ALKPHOS 107 106   AST 14 15   ALT 20 23   ANIONGAP 10 9     POCT Glucose: No results for input(s): POCTGLUCOSE in the last 48 hours.    Significant Imaging: I have reviewed all pertinent imaging results/findings  within the past 24 hours.    Assessment/Plan:     Hyponatremia  Hyponatremia likely related to possible SIADH from SSRI (escitalopram) and eslicarbazepine. Hyponatremia has improved from 130 to 133 on NaCl tabs. Urine sodium interpretation confounded by normal saline infusion received. Patient is at neurologic baseline, very mild hyponatremia at this time. He has been intermittently hyponatremic to 132 since 2018. Would recommend considering alternative AED with epilepsy outpatient.     - ok to continue NaCl tabs at 1 g BID and follow up BMP in a week   - considering stopping SSRI since patient seems to be getting minimal benefit from it  - fluid restriction to 1 L        VTE Risk Mitigation (From admission, onward)         Ordered     enoxaparin injection 40 mg  Daily         04/26/23 1556     IP VTE HIGH RISK PATIENT  Once         04/24/23 1711     Place sequential compression device  Until discontinued         04/24/23 1711     Place sequential compression device  Until discontinued         04/24/23 0858                    Thank you for your consult. I will sign off. Please contact us if you have any additional questions.    Taryn Holbrook MD  Department of Hospital Medicine   [facility] - Neurosurgery ([facility])

## 2023-05-04 NOTE — PLAN OF CARE
Problem: Adult Inpatient Plan of Care  Goal: Optimal Comfort and Wellbeing  Outcome: Ongoing, Progressing  Goal: Readiness for Transition of Care  Outcome: Ongoing, Progressing     Problem: Adult Inpatient Plan of Care  Goal: Optimal Comfort and Wellbeing  Outcome: Ongoing, Progressing     Problem: Adult Inpatient Plan of Care  Goal: Readiness for Transition of Care  Outcome: Ongoing, Progressing       POC reviewed with patient and his spouse at the bedside. Verbalization of understanding voiced. Precautions maintained. Patient progressing towards goals. No events noted at present during this shift. Cardiac monitoring ongoing. Vital signs all shift as ordered. See flow sheet. Staples remain intact to cranial surgical incision, no signs of infection noted at present.  Bed low and locked with call light within reach and bed alarm activated. Patients wife remains at the bedside. POC ongoing.

## 2023-05-04 NOTE — PT/OT/SLP PROGRESS
"Physical Therapy Treatment    Patient Name:  Declan Burleson   MRN:  [id number]    Recommendations:     Discharge Recommendations: rehabilitation facility  Discharge Equipment Recommendations: to be determined by next level of care  Barriers to discharge: Decreased caregiver support    Assessment:     Declan Burleson is a 58 y.o. male admitted with a medical diagnosis of Complex partial epilepsy with generalization and with intractable epilepsy.  He presents with the following impairments/functional limitations: weakness, impaired self care skills, impaired functional mobility, gait instability, impaired balance, decreased safety awareness, decreased coordination, impaired endurance.    Rehab Prognosis: Good; patient would benefit from acute skilled PT services to address these deficits and reach maximum level of function.    Recent Surgery: Procedure(s) (LRB):  REMOVAL, DRAIN (Left) 8 Days Post-Op    Plan:     During this hospitalization, patient to be seen 4 x/week to address the identified rehab impairments via gait training, therapeutic activities, therapeutic exercises, neuromuscular re-education and progress toward the following goals:    Plan of Care Expires:  05/25/23    Subjective     Chief Complaint: lack of sleep  Patient/Family Comments/goals: "I'm not hurting any, I'm just sleepy."  Pain/Comfort:  Pain Rating 1: 0/10  Pain Rating Post-Intervention 1: 0/10      Objective:     Communicated with RN prior to session.  Patient found supine with bed alarm, telemetry upon PTA entry to room.     General Precautions: Standard, aphasia, aspiration, fall  Orthopedic Precautions: N/A  Braces: N/A  Respiratory Status: Room air     Functional Mobility:  Bed Mobility:     Rolling Left:  stand by assistance  Supine to Sit: contact guard assistance  Transfers:     Sit to Stand:  contact guard assistance with rolling walker  Gait: Pt ambulates 96 ft and 74 ft with RW and CGA. Pt with slow sangeetha, decreased " step length bilaterally, decrease step clearance bilaterally, and forward flexed posture. Pt with worsening flexed posture as he fatigues, heavily reliant on BUE support.         AM-PAC 6 CLICK MOBILITY  Turning over in bed (including adjusting bedclothes, sheets and blankets)?: 3  Sitting down on and standing up from a chair with arms (e.g., wheelchair, bedside commode, etc.): 3  Moving from lying on back to sitting on the side of the bed?: 3  Moving to and from a bed to a chair (including a wheelchair)?: 3  Need to walk in hospital room?: 3  Climbing 3-5 steps with a railing?: 2  Basic Mobility Total Score: 17     Patient left HOB elevated with all lines intact, call button in reach, bed alarm on, and spouse present..    GOALS:   Multidisciplinary Problems       Physical Therapy Goals          Problem: Physical Therapy    Goal Priority Disciplines Outcome Goal Variances Interventions   Physical Therapy Goal     PT, PT/OT Ongoing, Progressing     Description: PT goals until 5/10/23    1. Pt supine to sit with minimal assist- met 4/27/23  Revised goal: supine to sit with supervision-not met  2. Pt sit to supine with minimal assist-not met  3. Pt sit to stand with LRAD as needed for safety with minimal assist-met 4/27/23  Revised goal: sit to stand with Rw with supervision-not met  4. Pt to perform gait 15ft with LRAD as needed for safety with moderate assist.- met 4/27/23  Revised goal: gait 150ft with RW with supervision-not met  5. Pt to go up/down curb step with LRAD as needed for safety with moderate assist.-not met  6. Pt to transfer bed to/from bedside chair with LRAD as needed for safety with minimal assist.-not met  7. Pt to perform B LE exs in sitting or supine x 10 reps to strengthen B LE to improve functional mobility.-not met                        Time Tracking:     PT Received On: 05/04/23  PT Start Time: 1040     PT Stop Time: 1103  PT Total Time (min): 23 min     Billable Minutes: Gait Training 15  and Therapeutic Activity 8    Treatment Type: Treatment  PT/PTA: PTA     Number of PTA visits since last PT visit: 2     05/04/2023

## 2023-05-04 NOTE — DISCHARGE SUMMARY
[facility] Neuro[facility] ([facility])  Neurosurgery  Discharge Summary      Patient Name: Declan Burleson  MRN: [id number]  Admission Date: 4/24/2023  Hospital Length of Stay: 10 days  Discharge Date and Time: 5/4/23  Attending Physician: Ruchi Chen MD   Discharging Provider: Isaura Burgos PA-C  Primary Care Provider: Juan Carlos Simmons NP    HPI:   58 M with epilepsy presents for elective left temporal lobectomy      Procedure(s) (LRB):  REMOVAL, DRAIN (Left)     Hospital Course: 4/28: speech and swallowing continue to improve with SLP. Respiratory status continuing to improve. Satting well on RA. Na 130 today   4/29: Na stable 130. EEG negative. On dysphagia diet with thick liquids. NGT still in place until PO intake improves.  for hyponatremia   4/30: some delayed speech yesterday, back on EEG. Briskly awake and responding to questions today. On NS for hyponatremia. Na 130  5/1: exam stable, EEG with left temporal lobe seizures. Sodium 130. Tolerating PO, NGT still in place. Satting well on room air  5/3: NAEON, briskly awake and interactive today. Na 133.  5/4: NAEON. Na stable at 133. Neuro exam stable. Patient tolerating >50% meals. BM yesterday. Stable for dc to IPR.     Neuro exam:   General: well developed, well nourished, no distress.   Head: normocephalic  Neurologic: Alert and oriented. Thought content appropriate.  GCS: Motor: 6/Verbal: 5/Eyes: 4 GCS Total: 15  Mental Status: Awake, Alert, Oriented x 4  Language: No aphasia  Speech: No dysarthria  Cranial nerves: face symmetric, tongue midline, CN II-XII grossly intact.   Eyes: pupils equal, round, reactive to light with accommodation, EOMI.   Pulmonary: normal respirations, no signs of respiratory distress  Abdomen: soft, non-distended, not tender to palpation  Skin: Skin is warm, dry and intact.  Sensory: intact to light touch throughout    Motor Strength:Moves all extremities spontaneously with good tone.  Full strength upper and lower  extremities. No abnormal movements seen.        Cerebellar:   Finger-to-nose: mild right dysmetria   Pronator drift: absent bilaterally      Cranial incision: Clean, dry, staples intact. Skin edges well approximated. No surrounding erythema or edema. No drainage or TTP.               Goals of Care Treatment Preferences:  Code Status: Full Code      Consults:   Consults (From admission, onward)        Status Ordering Provider     Inpatient consult to [facility]  Once        Provider:  (Not yet assigned)    Acknowledged ZAID LANE     Inpatient consult to Registered Dietitian/Nutritionist  Once        Provider:  (Not yet assigned)    Completed ROSEMARIE BARNHART     Inpatient consult to Physical Medicine Rehab  Once        Provider:  (Not yet assigned)    Completed CAT OATES          Significant Diagnostic Studies: XR abdomen, MRI brain, XR chest   Pending Diagnostic Studies:     Procedure Component Value Units Date/Time    Eslicarbazepine [[id number]] Collected: 05/02/23 1219    Order Status: Sent Lab Status: In process Updated: 05/03/23 0205    Specimen: Blood         Final Active Diagnoses:    Diagnosis Date Noted POA    PRINCIPAL PROBLEM:  Complex partial epilepsy with generalization and with intractable epilepsy [G40.219] 05/26/2020 Yes    Acute metabolic encephalopathy [G93.41] 05/01/2023 Yes    Aspiration pneumonitis [J69.0] 04/25/2023 No    Hyponatremia [E87.1] 02/26/2021 Yes    Depression with anxiety [F41.8] 01/15/2021 Yes     Chronic      Problems Resolved During this Admission:    Diagnosis Date Noted Date Resolved POA    Increased anion gap metabolic acidosis [E87.29] 04/25/2023 05/01/2023 No      Discharged Condition: good     Disposition: Home or Self Care    Follow Up: Plan for 2 week follow up and 6 week follow up with NSGY     Patient Instructions:      Notify your health care provider if you experience any of the following:  temperature >100.4     Notify your health care  provider if you experience any of the following:  persistent nausea and vomiting or diarrhea     Notify your health care provider if you experience any of the following:  severe uncontrolled pain     Notify your health care provider if you experience any of the following:  redness, tenderness, or signs of infection (pain, swelling, redness, odor or green/yellow discharge around incision site)     Notify your health care provider if you experience any of the following:  difficulty breathing or increased cough     Notify your health care provider if you experience any of the following:  severe persistent headache     Notify your health care provider if you experience any of the following:  worsening rash     Notify your health care provider if you experience any of the following:  persistent dizziness, light-headedness, or visual disturbances     Notify your health care provider if you experience any of the following:  increased confusion or weakness     Activity as tolerated     Medications:  Reconciled Home Medications:      Medication List      START taking these medications    dexAMETHasone 1 MG Tab  Commonly known as: DECADRON  Take 4 tablets (4 mg total) by mouth every 12 (twelve) hours for 3 days, THEN 2 tablets (2 mg total) every 8 (eight) hours for 4 days, THEN 2 tablets (2 mg total) 2 (two) times a day for 4 days, THEN 2 tablets (2 mg total) once daily for 4 days.  Start taking on: May 4, 2023     famotidine 20 MG tablet  Commonly known as: PEPCID  Take 1 tablet (20 mg total) by mouth 2 (two) times daily.     sodium chloride 1,000 mg Tbso tablet tbso  Take 1 tablet (1,000 mg total) by mouth 3 (three) times daily.        CONTINUE taking these medications    ANTACID ORAL  Take by mouth as needed. Acid reflux     cloBAZam 20 mg Tab  Commonly known as: ONFI  Take 2 tablets (40 mg total) by mouth every evening.     EScitalopram oxalate 10 MG tablet  Commonly known as: LEXAPRO  Take 1 tablet (10 mg total) by mouth  once daily.     * eslicarbazepine 800 mg Tab  Commonly known as: APTIOM  Take 800 mg by mouth once daily.     * eslicarbazepine 400 mg Tab tablet  Commonly known as: APTIOM  Take 1 tablet (400 mg total) by mouth once daily.     midazolam 5 mg/spray (0.1 mL) Spry  0.1 mLs by Nasal route.     ondansetron 8 MG Tbdl  Commonly known as: ZOFRAN-ODT  Take 8 mg by mouth every 8 (eight) hours as needed. nausea     oxyCODONE 5 MG immediate release tablet  Commonly known as: ROXICODONE  Take 1 tablet (5 mg total) by mouth every 4 (four) hours as needed for Pain.     rizatriptan 10 MG disintegrating tablet  Commonly known as: MAXALT-MLT  Take 10 mg by mouth as needed. No more than 3 doses / week     zonisamide 100 MG Cap  Commonly known as: ZONEGRAN  Take 5 capsules (500 mg total) by mouth every evening.         * This list has 2 medication(s) that are the same as other medications prescribed for you. Read the directions carefully, and ask your doctor or other care provider to review them with you.                Isaura Burgos PA-C  Neurosurgery  [facility] - Neurosurgery ([facility])

## 2023-05-04 NOTE — ASSESSMENT & PLAN NOTE
Hyponatremia likely related to possible SIADH from SSRI (escitalopram) and eslicarbazepine. Hyponatremia has improved from 130 to 133 on NaCl tabs. Urine sodium interpretation confounded by normal saline infusion received. Patient is at neurologic baseline, very mild hyponatremia at this time. He has been intermittently hyponatremic to 132 since 2018. Would recommend considering alternative AED with epilepsy outpatient.     - ok to continue NaCl tabs at 1 g BID and follow up BMP in a week   - considering stopping SSRI since patient seems to be getting minimal benefit from it  - fluid restriction to 1 L

## 2023-05-04 NOTE — PT/OT/SLP PROGRESS
Speech Language Pathology Treatment    Patient Name:  Declan Burleson   MRN:  [id number]  Admitting Diagnosis: Complex partial epilepsy with generalization and with intractable epilepsy    Recommendations:                 General Recommendations:  Dysphagia therapy and Speech/language therapy  Diet recommendations:  Regular Diet - IDDSI Level 7, Liquid Diet Level: Thin liquids - IDDSI Level 0   Aspiration Precautions: Meds whole 1 at a time, Small bites/sips, and Standard aspiration precautions   General Precautions: Standard, fall, aspiration, aphasia  Communication strategies:  provide increased time to answer    Subjective     SLP communicated with RN prior to entry and received clearance for therapy this date.   Pt awake and alert upon ST entry. Spouse present at bedside. Pt agreeable to participate with ST.    Pain/Comfort:  Pain Rating 1: 0/10  Pain Rating Post-Intervention 1: 0/10    Respiratory Status: Room air    Objective:     Has the patient been evaluated by SLP for swallowing?   Yes  Keep patient NPO? No     Pt seated with HOB at 90 degrees upon SLP entry. He reported consumption of items from breakfast tray without difficulty. Pt was agreeable to participate with SLP in further po trials of solid consistency. He tolerated thin liquids x4 oz via cup sip and x4 bites of sourdough bread brought by pt's spouse without overt s/s of airway compromise. Recommend advance to regular diet/thin liquids at this time. Pt participated in concrete category identification and category member generation task with 90% acc ind'ly given increased response time. Pt reported difficult translations between Portuguese and english language during certain prompts. Pt and spouse endorsed eagerness to transition to rehab facility with likely discharge today. SLP discussed new evaluation to be completed at next facility and ongoing SLP services to which both expressed understanding. Continue ST per POC.    Assessment:      Declan Burleson is a 58 y.o. male with an SLP diagnosis of Aphasia and Dysphagia.      Goals:   Multidisciplinary Problems       SLP Goals          Problem: SLP    Goal Priority Disciplines Outcome   SLP Goal     SLP Ongoing, Progressing   Description: Speech Language Pathology Goals  Goals expected to be met by 5/2  1. Pt will complete ongoing assessment of swallow to advance diet as appropriate.    2. Pt will participate in complex word-finding tasks with 80% acc given min cues.   3. Pt will participate in categorical identification tasks with 80% acc given min cues.                                Plan:     Patient to be seen:  4 x/week   Plan of Care expires:  05/25/23  Plan of Care reviewed with:  patient, spouse   SLP Follow-Up:  Yes       Discharge recommendations:  rehabilitation facility       Time Tracking:     SLP Treatment Date:   05/04/23  Speech Start Time:  0932  Speech Stop Time:  0946     Speech Total Time (min):  14 min    Billable Minutes: Speech Therapy Individual 7 and Treatment Swallowing Dysfunction 7  05/04/2023

## 2023-05-04 NOTE — NURSING
Report called to [facility]. Notified facility of transportation time, notified patient and family as well.

## 2023-05-04 NOTE — PLAN OF CARE
[facility]    FACILITY TRANSFER ORDERS      Patient Name: Declan Burleson  YOB: 1965    PCP: Juan Carlos Simmons NP   PCP Address: [address] / [place] [address]  PCP Phone Number: [phone]  PCP Fax: [phone]    Encounter Date: 05/04/2023    Admit to:  IPR    Vital Signs:  Routine    Diagnoses:   Active Hospital Problems    Diagnosis  POA    *Complex partial epilepsy with generalization and with intractable epilepsy [G40.219]  Yes    Acute metabolic encephalopathy [G93.41]  Yes    Aspiration pneumonitis [J69.0]  No    Hyponatremia [E87.1]  Yes    Depression with anxiety [F41.8]  Yes     Chronic      Resolved Hospital Problems    Diagnosis Date Resolved POA    Increased anion gap metabolic acidosis [E87.29] 05/01/2023 No       Allergies:  Review of patient's allergies indicates:   Allergen Reactions    Losartan Rash       Diet: soft and bite sized. IDDSI level 6, thin liquids    Activities: Activity as tolerated    Goals of Care Treatment Preferences:  Code Status: Full Code      Labs, DVT prophylaxis, and bowel regimen per facility protocol     CONSULTS:    Physical Therapy to evaluate and treat.  and Occupational Therapy to evaluate and treat. And Speech therapy.       WOUND CARE ORDERS  Yes: Surgical Wound:  Location: cranial    Keep incision open to air. Apply bacitracin twice daily to incision (thin layer). Cover when showering. If incision well healed, staples can come out on 5/10.     Medications: Review discharge medications with patient and family and provide education.      Current Discharge Medication List        START taking these medications    Details   dexAMETHasone (DECADRON) 1 MG Tab Take 4 tablets (4 mg total) by mouth every 12 (twelve) hours for 3 days, THEN 2 tablets (2 mg total) every 8 (eight) hours for 4 days, THEN 2 tablets (2 mg total) 2 (two) times a day for 4 days, THEN 2 tablets (2 mg total) once daily for 4 days.  Qty: 72 tablet, Refills: 0       famotidine (PEPCID) 20 MG tablet Take 1 tablet (20 mg total) by mouth 2 (two) times daily.  Qty: 60 tablet, Refills: 11      sodium chloride 1,000 mg TbSO tablet tbso Take 1 tablet (1,000 mg total) by mouth 2 (two) times daily.           CONTINUE these medications which have NOT CHANGED    Details   cloBAZam (ONFI) 20 mg Tab Take 2 tablets (40 mg total) by mouth every evening.  Qty: 60 tablet, Refills: 4    Associated Diagnoses: Complex partial seizures evolving to generalized tonic-clonic seizures      !! eslicarbazepine (APTIOM) 400 mg Tab tablet Take 1 tablet (400 mg total) by mouth once daily.  Qty: 30 tablet, Refills: 5    Associated Diagnoses: Complex partial seizures evolving to generalized tonic-clonic seizures; Lennox-Gastaut syndrome with tonic seizures      !! eslicarbazepine (APTIOM) 800 mg Tab Take 800 mg by mouth once daily.  Qty: 30 tablet, Refills: 5    Associated Diagnoses: Complex partial seizures evolving to generalized tonic-clonic seizures; Lennox-Gastaut syndrome with tonic seizures      oxyCODONE (ROXICODONE) 5 MG immediate release tablet Take 1 tablet (5 mg total) by mouth every 4 (four) hours as needed for Pain.  Qty: 21 tablet, Refills: 0    Comments: n/a       rizatriptan (MAXALT-MLT) 10 MG disintegrating tablet Take 10 mg by mouth as needed. No more than 3 doses / week      zonisamide (ZONEGRAN) 100 MG Cap Take 5 capsules (500 mg total) by mouth every evening.  Qty: 450 capsule, Refills: 3    Associated Diagnoses: Temporal lobe epilepsy      mag hydrox/aluminum hyd/simeth (ANTACID ORAL) Take by mouth as needed. Acid reflux      midazolam 5 mg/spray (0.1 mL) Spry 0.1 mLs by Nasal route.      ondansetron (ZOFRAN-ODT) 8 MG TbDL Take 8 mg by mouth every 8 (eight) hours as needed. nausea       !! - Potential duplicate medications found. Please discuss with provider.        STOP taking these medications       EScitalopram oxalate (LEXAPRO) 10 MG tablet Comments:   Reason for Stopping:                   Immunizations Administered as of 5/4/2023       No immunizations on file.            This patient has had both covid vaccinations    Some patients may experience side effects after vaccination.  These may include fever, headache, muscle or joint aches.  Most symptoms resolve with 24-48 hours and do not require urgent medical evaluation unless they persist for more than 72 hours or symptoms are concerning for an unrelated medical condition.          _________________________________  Isaura Burgos PA-C  05/04/2023

## 2023-05-04 NOTE — PLAN OF CARE
Problem: Adult Inpatient Plan of Care  Goal: Plan of Care Review  Outcome: Met  Goal: Patient-Specific Goal (Individualized)  Outcome: Met  Goal: Absence of Hospital-Acquired Illness or Injury  Outcome: Met  Goal: Optimal Comfort and Wellbeing  Outcome: Met  Goal: Readiness for Transition of Care  Outcome: Met     Problem: Infection  Goal: Absence of Infection Signs and Symptoms  Outcome: Met     Problem: Skin Injury Risk Increased  Goal: Skin Health and Integrity  Outcome: Met    Patient alert and oriented, on RA, VSS, pain managed with meds with mild relief. Wife at bedside. All discharge information given to patient and wife, all questions and concerns answered and addressed.

## 2023-05-04 NOTE — SUBJECTIVE & OBJECTIVE
Past Medical History:   Diagnosis Date    Anxiety     Dementia in other diseases classified elsewhere, unspecified severity, without behavioral disturbance, psychotic disturbance, mood disturbance, and anxiety 2/2/2023    Depression     GERD (gastroesophageal reflux disease)     Hyperlipidemia     Hypertension     Increased anion gap metabolic acidosis 4/25/2023    Long term (current) use of antithrombotics/antiplatelets 12/22/2020    Migraine     Normocytic anemia 2/23/2023    Seizures        Past Surgical History:   Procedure Laterality Date    CRANIOTOMY USING FRAMELESS STEREOTAXY Left 4/24/2023    Procedure: CRANIOTOMY, USING FRAMELESS STEREOTAXY;  Surgeon: Ruchi Chen MD;  Location: [facility] OR 2ND FLR;  Service: Neurosurgery;  Laterality: Left;  Tor III ASA III  Blood: Type & Screen  Neuro Mon: None  Bed: [facility]  Headrest: [place]  Pos: Supine  [facility]   [facility]  Asst Surg: Montoya    CYST REMOVAL  March 2016/2017    skin cyst - benign    PLACEMENT OF STEREO EEG LEADS(DEPTH ELECTRODES) Left 2/27/2023    Procedure: PLACEMENT OF STEREO EEG LEADS (DEPTH ELECTRODES);  Surgeon: Ruchi Chen MD;  Location: [facility] OR [facility] FLR;  Service: Neurosurgery;  Laterality: Left;    REMOVAL OF DRAIN Left 4/26/2023    Procedure: REMOVAL, DRAIN;  Surgeon: Ruchi Chen MD;  Location: [facility] OR [facility] FLR;  Service: Neurosurgery;  Laterality: Left;    REMOVAL OF STEREO EEG LEADS (DEPTH ELECTRODES) Bilateral 1/21/2021    Procedure: REMOVAL OF STEREO EEG LEADS (DEPTH ELECTRODES);  Surgeon: Ruchi Chen MD;  Location: [facility] OR [facility] FLR;  Service: Neurosurgery;  Laterality: Bilateral;    REMOVAL OF STEREO EEG LEADS (DEPTH ELECTRODES) Left 3/8/2023    Procedure: REMOVAL OF STEREO EEG LEADS (DEPTH ELECTRODES);  Surgeon: Ruchi Chen MD;  [facility];  Service: Neurosurgery;  Laterality: Left;    TONSILLECTOMY      teenage        Review of patient's allergies indicates:   Allergen Reactions    Losartan Rash       No current  facility-administered medications on file prior to encounter.     Current Outpatient Medications on File Prior to Encounter   Medication Sig    cloBAZam (ONFI) 20 mg Tab Take 2 tablets (40 mg total) by mouth every evening.    eslicarbazepine (APTIOM) 400 mg Tab tablet Take 1 tablet (400 mg total) by mouth once daily.    eslicarbazepine (APTIOM) 800 mg Tab Take 800 mg by mouth once daily.    oxyCODONE (ROXICODONE) 5 MG immediate release tablet Take 1 tablet (5 mg total) by mouth every 4 (four) hours as needed for Pain.    rizatriptan (MAXALT-MLT) 10 MG disintegrating tablet Take 10 mg by mouth as needed. No more than 3 doses / week    zonisamide (ZONEGRAN) 100 MG Cap Take 5 capsules (500 mg total) by mouth every evening.    [DISCONTINUED] EScitalopram oxalate (LEXAPRO) 10 MG tablet Take 1 tablet (10 mg total) by mouth once daily.    mag hydrox/aluminum hyd/simeth (ANTACID ORAL) Take by mouth as needed. Acid reflux    midazolam 5 mg/spray (0.1 mL) Spry 0.1 mLs by Nasal route.    ondansetron (ZOFRAN-ODT) 8 MG TbDL Take 8 mg by mouth every 8 (eight) hours as needed. nausea     Family History       Problem Relation (Age of Onset)    Heart disease Maternal Uncle (50)    Lung disease Father    Migraines Paternal Grandfather    No Known Problems Mother          Tobacco Use    Smoking status: Former     Types: Cigarettes     Quit date:      Years since quittin.3    Smokeless tobacco: Never   Substance and Sexual Activity    Alcohol use: Yes     Comment: one drink once a week    Drug use: Never    Sexual activity: Yes     Partners: Female     Review of Systems   Gastrointestinal:  Negative for constipation, nausea and vomiting.   Neurological:  Positive for headaches. Negative for seizures.   Psychiatric/Behavioral:  Negative for agitation, behavioral problems and confusion.    Objective:     Vital Signs (Most Recent):  Temp: 98.6 °F (37 °C) (23 1131)  Pulse: 70 (23 1131)  Resp: 18 (23 1131)  BP:  120/83 (05/04/23 1131)  SpO2: 98 % (05/04/23 1131) Vital Signs (24h Range):  Temp:  [96.8 °F (36 °C)-98.6 °F (37 °C)] 98.6 °F (37 °C)  Pulse:  [65-87] 70  Resp:  [13-19] 18  SpO2:  [93 %-99 %] 98 %  BP: (114-138)/(73-86) 120/83     Weight: 89.8 kg (198 lb)  Body mass index is 27.62 kg/m².     Physical Exam  Constitutional:       General: He is not in acute distress.     Appearance: Normal appearance. He is not ill-appearing.   HENT:      Head:      Comments: Sutures in place along right posterior scalp   Eyes:      General: No scleral icterus.  Cardiovascular:      Rate and Rhythm: Normal rate and regular rhythm.   Pulmonary:      Effort: Pulmonary effort is normal. No respiratory distress.      Breath sounds: Normal breath sounds. No wheezing or rales.   Abdominal:      General: Abdomen is flat.      Palpations: Abdomen is soft.   Musculoskeletal:      Right lower leg: No edema.      Left lower leg: No edema.   Skin:     General: Skin is warm and dry.      Coloration: Skin is not jaundiced.   Neurological:      General: No focal deficit present.      Mental Status: He is alert and oriented to person, place, and time.      Comments: AAOx3   Psychiatric:         Mood and Affect: Mood normal.         Behavior: Behavior normal.      Significant Labs: All pertinent labs within the past 24 hours have been reviewed.  CBC:   Recent Labs   Lab 05/03/23  0101 05/04/23  0426   WBC 11.88 9.96   HGB 13.0* 13.1*   HCT 39.5* 40.3    431     CMP:   Recent Labs   Lab 05/03/23  0101 05/04/23  0426   * 133*   K 4.0 4.0    102   CO2 23 22*   * 127*   BUN 23* 23*   CREATININE 0.7 0.7   CALCIUM 9.0 9.1   PROT 6.6 6.7   ALBUMIN 3.0* 3.0*   BILITOT 0.4 0.4   ALKPHOS 107 106   AST 14 15   ALT 20 23   ANIONGAP 10 9     POCT Glucose: No results for input(s): POCTGLUCOSE in the last 48 hours.    Significant Imaging: I have reviewed all pertinent imaging results/findings within the past 24 hours.

## 2023-05-04 NOTE — ASSESSMENT & PLAN NOTE
57 y/o M s/p left temporal lobectomy on 4/24  for seizures refractory to prior laser ablation    Recommend calorie count to determine if NGT can be removed. Ongoing   NA management per ICU team. Hyponatremia unresolved on salt tabs. Appreciate ICU team plan and concern that's this may be associated with patient AEDs         - ICU status post op, OK for TTF today   - q4 neuro checks  - MRI brain with good resection   - Na goal > 135, seems to be hypovolemic hyponatremia, HM consulted for assistance once patient steps down   - continue home AEDs  - Lovenox for DVT prophylaxis   - continue 3 week dex taper, weaned to dex 4 BID yesterday, next wean on Friday   - continue SLP for speech eval  and swallow eval    - Speech improving   - recs for soft diet with thick liquids   - please keep NGT until improving/adequate PO intake  - PT/OT    Dispo: ongoing

## 2023-05-04 NOTE — PLAN OF CARE
[facility]surgery (Hospital)  Discharge Final Note    Primary Care Provider: Juan Carlos Simmons NP    Expected Discharge Date: 5/4/2023    [facility] to be discharged to [facility].  Care deferred to [facility].  [facility] to provide wheelchair transportation.  Neurosurgery clinic to schedule follow up appointment.    Nurse to call report to [phone] or [phone].  Wheelchair requested for 12:15 which is not a guaranteed arrival time.    Final Discharge Note (most recent)       Final Note - 05/04/23 1140          Final Note    Assessment Type Final Discharge Note     Anticipated Discharge Disposition Rehab Facility        Post-Acute Status    Post-Acute Authorization Placement     Post-Acute Placement Status Set-up Complete/Auth obtained     Discharge Delays None known at this time                     Important Message from Medicare

## 2023-05-05 ENCOUNTER — PATIENT OUTREACH (OUTPATIENT)
Dept: ADMINISTRATIVE | Facility: CLINIC | Age: 58
End: 2023-05-05
Payer: COMMERCIAL

## 2023-05-08 ENCOUNTER — PATIENT MESSAGE (OUTPATIENT)
Dept: NEUROSURGERY | Facility: CLINIC | Age: 58
End: 2023-05-08
Payer: COMMERCIAL

## 2023-05-08 LAB
FINAL PATHOLOGIC DIAGNOSIS: NORMAL
GROSS: NORMAL
Lab: NORMAL
SUPPLEMENTAL DIAGNOSIS: NORMAL

## 2023-05-10 LAB — ESLICARBAZEPINE: 24.3 MCG/ML

## 2023-05-15 ENCOUNTER — HOSPITAL ENCOUNTER (OUTPATIENT)
Dept: RADIOLOGY | Facility: HOSPITAL | Age: 58
Discharge: HOME | End: 2023-05-15
Attending: PHYSICAL MEDICINE & REHABILITATION
Payer: MEDICARE

## 2023-05-15 ENCOUNTER — PATIENT MESSAGE (OUTPATIENT)
Dept: NEUROLOGY | Facility: CLINIC | Age: 58
End: 2023-05-15
Payer: COMMERCIAL

## 2023-05-15 PROCEDURE — 70450 CT HEAD WITHOUT CONTRAST: ICD-10-PCS | Mod: 26,,, | Performed by: RADIOLOGY

## 2023-05-15 PROCEDURE — 70450 CT HEAD/BRAIN W/O DYE: CPT | Mod: 26,,, | Performed by: RADIOLOGY

## 2023-05-17 ENCOUNTER — TELEPHONE (OUTPATIENT)
Dept: NEUROLOGY | Facility: CLINIC | Age: 58
End: 2023-05-17
Payer: COMMERCIAL

## 2023-05-17 DIAGNOSIS — G40.109 TEMPORAL LOBE EPILEPSY: ICD-10-CM

## 2023-05-17 DIAGNOSIS — G40.209 COMPLEX PARTIAL SEIZURES EVOLVING TO GENERALIZED TONIC-CLONIC SEIZURES: ICD-10-CM

## 2023-05-17 DIAGNOSIS — G40.812 LENNOX-GASTAUT SYNDROME WITH TONIC SEIZURES: ICD-10-CM

## 2023-05-17 RX ORDER — ZONISAMIDE 100 MG/1
500 CAPSULE ORAL NIGHTLY
Qty: 450 CAPSULE | Refills: 3 | Status: SHIPPED | OUTPATIENT
Start: 2023-05-17 | End: 2024-05-16

## 2023-05-17 NOTE — TELEPHONE ENCOUNTER
PA initiated via CMM for 800mg tabs @ 30/ 30 days,   Key [id number]    Message from Plan  Authorization already on file for this request. Authorization starting on 01/01/2022 and ending on 12/31/2023.    PA Initiated via CMM for 400mg tabs @ 30/ 30 days,  Key  [id number]    Message from Plan  Authorization already on file for this request. Authorization starting on 01/01/2022 and ending on 12/31/2023.

## 2023-05-22 ENCOUNTER — PATIENT MESSAGE (OUTPATIENT)
Dept: NEUROLOGY | Facility: CLINIC | Age: 58
End: 2023-05-22
Payer: COMMERCIAL

## 2023-05-22 ENCOUNTER — PATIENT MESSAGE (OUTPATIENT)
Dept: NEUROSURGERY | Facility: CLINIC | Age: 58
End: 2023-05-22
Payer: COMMERCIAL

## 2023-05-23 ENCOUNTER — PATIENT MESSAGE (OUTPATIENT)
Dept: NEUROLOGY | Facility: CLINIC | Age: 58
End: 2023-05-23
Payer: COMMERCIAL

## 2023-05-23 DIAGNOSIS — G40.219 COMPLEX PARTIAL EPILEPSY WITH GENERALIZATION AND WITH INTRACTABLE EPILEPSY: Primary | ICD-10-CM

## 2023-05-26 ENCOUNTER — PATIENT MESSAGE (OUTPATIENT)
Dept: NEUROSURGERY | Facility: CLINIC | Age: 58
End: 2023-05-26
Payer: COMMERCIAL

## 2023-06-08 ENCOUNTER — PATIENT MESSAGE (OUTPATIENT)
Dept: NEUROSURGERY | Facility: CLINIC | Age: 58
End: 2023-06-08

## 2023-06-08 ENCOUNTER — HOSPITAL ENCOUNTER (OUTPATIENT)
Dept: RADIOLOGY | Facility: HOSPITAL | Age: 58
Discharge: HOME | End: 2023-06-08
Attending: NEUROLOGICAL SURGERY
Payer: MEDICARE

## 2023-06-08 ENCOUNTER — OFFICE VISIT (OUTPATIENT)
Dept: NEUROSURGERY | Facility: CLINIC | Age: 58
End: 2023-06-08
Payer: MEDICARE

## 2023-06-08 DIAGNOSIS — G40.219 COMPLEX PARTIAL EPILEPSY WITH GENERALIZATION AND WITH INTRACTABLE EPILEPSY: ICD-10-CM

## 2023-06-08 DIAGNOSIS — E87.1 HYPONATREMIA: Primary | ICD-10-CM

## 2023-06-08 PROCEDURE — 3044F PR MOST RECENT HEMOGLOBIN A1C LEVEL <7.0%: ICD-10-PCS | Mod: CPTII,S$GLB,, | Performed by: NEUROLOGICAL SURGERY

## 2023-06-08 PROCEDURE — 1159F MED LIST DOCD IN RCRD: CPT | Mod: CPTII,S$GLB,, | Performed by: NEUROLOGICAL SURGERY

## 2023-06-08 PROCEDURE — 99024 PR POST-OP FOLLOW-UP VISIT: ICD-10-PCS | Mod: S$GLB,,, | Performed by: NEUROLOGICAL SURGERY

## 2023-06-08 PROCEDURE — 1159F PR MEDICATION LIST DOCUMENTED IN MEDICAL RECORD: ICD-10-PCS | Mod: CPTII,S$GLB,, | Performed by: NEUROLOGICAL SURGERY

## 2023-06-08 PROCEDURE — 70450 CT HEAD WITHOUT CONTRAST: ICD-10-PCS | Mod: 26,,, | Performed by: RADIOLOGY

## 2023-06-08 PROCEDURE — 70450 CT HEAD/BRAIN W/O DYE: CPT | Mod: TC

## 2023-06-08 PROCEDURE — 70450 CT HEAD/BRAIN W/O DYE: CPT | Mod: 26,,, | Performed by: RADIOLOGY

## 2023-06-08 PROCEDURE — 3044F HG A1C LEVEL LT 7.0%: CPT | Mod: CPTII,S$GLB,, | Performed by: NEUROLOGICAL SURGERY

## 2023-06-08 PROCEDURE — 99999 PR PBB SHADOW E&M-EST. PATIENT-LVL III: CPT | Mod: PBBFAC,,, | Performed by: NEUROLOGICAL SURGERY

## 2023-06-08 PROCEDURE — 99024 POSTOP FOLLOW-UP VISIT: CPT | Mod: S$GLB,,, | Performed by: NEUROLOGICAL SURGERY

## 2023-06-08 PROCEDURE — 99999 PR PBB SHADOW E&M-EST. PATIENT-LVL III: ICD-10-PCS | Mod: PBBFAC,,, | Performed by: NEUROLOGICAL SURGERY

## 2023-06-08 RX ORDER — ESCITALOPRAM OXALATE 10 MG/1
10 TABLET ORAL DAILY
COMMUNITY
Start: 2023-03-21 | End: 2024-03-20

## 2023-06-08 RX ORDER — RIZATRIPTAN BENZOATE 10 MG/1
10 TABLET ORAL DAILY PRN
COMMUNITY
Start: 2023-03-21 | End: 2024-03-20

## 2023-06-08 NOTE — PROGRESS NOTES
"Neurosurgery  Follow-up     SUBJECTIVE:     Chief Complaint: intractable epilepsy      History of Present Illness:  Declan Burleson is a 58 y.o. right-handed male referred by Dr. Benoit (originally by Dr. Saldana of [place]) for consideration of surgical therapy for intractable epilepsy. The patient was formerly employed as a . He is accompanied by his wife Trista today, who helps to provide the history. His seizures began when he was 48 years old. He can recall no inciting event. He has 3 semiologies: "full blown" generalized events, staring episodes, and "mild" seizures, which are characterized as generalized events of shorter duration without incontinence and a shorter post-ictal period. He and his wife estimate that he persists in having about 2 events/month.     He has failed multiple AEDs, including topiramate, lamotrigine, levetiracetam, and oxcarbazepine. He is currently taking eslicarbazepine, zonisamide, and clobazam. He had EMU monitoring in May-June of this year, which suggests left-sided activity. He had 3T MRI in June (personally reviewed) that was non-lesional; he also had PET at that time suggesting possible left-sided activity. He had neuropsychological testing on 8/10/20; summary findings included below. He lives with his wife and 16-year-old daughter, who provide excellent social support.     He takes ASA 81, which will need to be held for one week prior to surgery.     As of 12/22/20, the patient reports he has had no significant change. He is hoping to be seen today for pre-operative optimization. He is out of Aptiom, which his wife is working to re-obtain for him.      As of 2/9/21, the patient underwent bilateral sEEG monitoring on 1/11/21. He self-removed several of the leads on 1/21/21 and was taken to the OR for removal of the remaining electrodes. Fortunately, there was no significant intracranial hemorrhage associated. Since being discharged home, the patient " reports that he is overall doing well. He has had no fever, chills, or drainage from the incisions. He has had some headache. He also feels that he has been sleeping more than before the procedure. He persists in having bad memory.     As of 4/13/21, the patient underwent left laser amygdalohippocampectomy on 3/1/21. Overall, the has done well since then. He and his wife report that he has been seizure free. He persists in having some days that are better than others; he is more irritable and tired than before surgery at times. This is inconsistent, and some days he is at his preoperative baseline. He has been compliant with his AEDs; he is taking 1200 mg of aptiom daily.    They deny any fever, chills, or drainage from the incision, though there was a scab that was removed when he got a haircut recently. He is also experiencing headache that radiates from the site of his incision forward. This improves with Maxalt.     As of 7/6/21, the patient reports that he is having headache from the base of the neck USP up the head. It feels like a small drum that comes and goes. He had to turn off the lights, lay down, and take over-the-counter medication (Tylenol, at times) last week. He also became more pale with these episodes. It is made worse by lying on the left side in bed.  He is now at his normal baseline. He also reports chest pain; he remains hyponatremic. He is currently out of the anxiety and blood pressure medications for at least 4 days.     He has been seizure free since the procedure. Trista reports that the incision is well healed. No fever/chills.     He is more irritable than before surgery and has some memory issues.     As of 7/22/21, the patient returns in follow up. He still has headache and mood swings at times. He has not had any seizures. He and his wife both feel that his speech and conversation and improving. He remains highly distractable. He and his wife endorse that his grandfather had  "migraines.     He will need to re-establish with a new epileptologist since Dr. Benoit is moving to [place].     As of 9/30/21, the patient reports he has remained seizure-free. He continues to have significant headaches, particularly over the left posterior aspect of the head, radiating over to the right side. His wife endorses that he has bradykinesia and decreased arm swing when walking. Jean Edgar and Karen are concerned he may have Parkinson disease.     As of 12/15/22, the patient returns with his wife, Trista. They are frustrated that he is "like a zombie" because of the high doses of AEDs. He has been seizure-free since April, but when he decreased the meds back in April, he had 3 events within 24 hours.     Mood swings are also an issue; he gets irritated more readily than he did in the past. He is seeing a speech therapist in [facility]; he is making progress WRT conversational communication and memory.     They now have 2 grandchildren.     Goals of surgery: to be free of seizures and reduce medications.     They would like to undergo repeat sEEG since the first was cut short to see if he may be a candidate for further epilepsy surgery.     As of 2/23/23, the patient returns for further discussion of repeat sEEG. He has undergone extensive discussion in interdisciplinary conference, together with review by Dr. Andrew of memory neurology, and he is being recommended for repeat sEEG. He has no evidence of non-epilepsy, non-TBI related neurodegenerative disease on Dr. Andrew's initial evaluation, per discussion with Dr. ALEK Edgar.     He and his wife remain eager to proceed with sEEG placement to determine whether he will be a candidate for further intracranial seizure surgery.     As of 3/23/23, the patient returns in postoperative follow up after having undergone repeat sEEG implanted on 2/27/23 and explanted 3/8/23. He reports that he continues to be very fatigued with the high dosages of medications. He " reports that he continues to sleep a lot. He and his wife both feel that he was much more alert while medications were held during the monitoring period.     They deny fever, chills, or drainage from the incision. He has not had any seizures since they discharged home.     As 6/8/23, the patient returns in postoperative follow up after having undergone left temporal lobectomy on 4/24/23. He and Trista report that he has been doing well. He still has some headaches, for which he takes 1000mg of Tylenol PRN. He occasionally takes an oxycodone. The headaches can become quite severe if he doesn't take Tylenol.     Trista reports that he is now taking Meloxicam, reportedly as an AED. He is on famotidine and NaCl, though his sodium levels have not been checked recently.     He fell two nights ago, but he didn't hit his head. This happened because he lost his balance.     He is doing home PT, OT, and ST, and he has a home health nurse once a week.     Review of patient's allergies indicates:   Allergen Reactions    Losartan Rash       Current Outpatient Medications   Medication Sig Dispense Refill    cloBAZam (ONFI) 20 mg Tab Take 2 tablets (40 mg total) by mouth every evening. 60 tablet 4    eslicarbazepine (APTIOM) 400 mg Tab tablet Take 1 tablet (400 mg total) by mouth once daily. Take with 800mg = 1200mg 30 tablet 5    eslicarbazepine (APTIOM) 800 mg Tab Take 800 mg by mouth once daily. Take with 400mg = 1200mg 30 tablet 5    famotidine (PEPCID) 20 MG tablet Take 1 tablet (20 mg total) by mouth 2 (two) times daily. 60 tablet 11    famotidine (PEPCID) 20 MG tablet Take 1 tablet (20 mg total) by mouth in the morning for 7 days. 7 tablet 0    mag hydrox/aluminum hyd/simeth (ANTACID ORAL) Take by mouth as needed. Acid reflux      meloxicam (MOBIC) 7.5 MG tablet Take 1 tablet (7.5 mg total) by mouth 2 (two) times a day with Breakfast and Dinner for 7 days. 14 tablet 0    midazolam 5 mg/spray (0.1 mL) Spry 0.1 mLs by Nasal  route.      ondansetron (ZOFRAN-ODT) 8 MG TbDL Take 8 mg by mouth every 8 (eight) hours as needed. nausea      oxyCODONE (ROXICODONE) 5 MG immediate release tablet Take 1 tablet (5 mg total) by mouth every 4 (four) hours as needed for Pain. 21 tablet 0    rizatriptan (MAXALT-MLT) 10 MG disintegrating tablet Take 10 mg by mouth as needed. No more than 3 doses / week      sodium chloride 1 gram tablet Take 1 tablet (1 g total) by mouth 3 (three) times a day for 30 days. 90 tablet 0    sodium chloride 1,000 mg TbSO tablet tbso Take 1 tablet (1,000 mg total) by mouth 2 (two) times daily.      zonisamide (ZONEGRAN) 100 MG Cap Take 5 capsules (500 mg total) by mouth every evening. 450 capsule 3     No current facility-administered medications for this visit.       Past Medical History:   Diagnosis Date    Anxiety     Dementia in other diseases classified elsewhere, unspecified severity, without behavioral disturbance, psychotic disturbance, mood disturbance, and anxiety 2/2/2023    Depression     GERD (gastroesophageal reflux disease)     Hyperlipidemia     Hypertension     Increased anion gap metabolic acidosis 4/25/2023    Long term (current) use of antithrombotics/antiplatelets 12/22/2020    Migraine     Normocytic anemia 2/23/2023    Seizures      Past Surgical History:   Procedure Laterality Date    CRANIOTOMY USING FRAMELESS STEREOTAXY Left 4/24/2023    Procedure: CRANIOTOMY, USING FRAMELESS STEREOTAXY;  Surgeon: Ruchi Chen MD;  Location: [facility] OR [address] [place] [address];  Service: Neurosurgery;  Laterality: Left;  Tor III ASA III  Blood: Type & Screen  Neuro Mon: None  Bed: [facility]  Headrest: [place]  [facility]  Asst Surg: [facility]    CYST REMOVAL  March 2016/2017    skin cyst - benign    PLACEMENT OF STEREO EEG LEADS(DEPTH ELECTRODES) Left 2/27/2023    Procedure: PLACEMENT OF STEREO EEG LEADS (DEPTH ELECTRODES);  Surgeon: Ruchi Chen MD;  Location: [facility] OR [address] [place] [address];  Service: Neurosurgery;  Laterality:  Left;    REMOVAL OF DRAIN Left 2023    Procedure: REMOVAL, DRAIN;  Surgeon: Ruchi Chen MD;  Location: [facility] OR 2ND FLR;  Service: Neurosurgery;  Laterality: Left;    REMOVAL OF STEREO EEG LEADS (DEPTH ELECTRODES) Bilateral 2021    Procedure: REMOVAL OF STEREO EEG LEADS (DEPTH ELECTRODES);  Surgeon: Ruchi Chen MD;  Location: [facility] OR 2ND FLR;  Service: Neurosurgery;  Laterality: Bilateral;    REMOVAL OF STEREO EEG LEADS (DEPTH ELECTRODES) Left 3/8/2023    Procedure: REMOVAL OF STEREO EEG LEADS (DEPTH ELECTRODES);  Surgeon: Ruchi Chen MD;  Location: [facility] OR 2ND FLR;  Service: Neurosurgery;  Laterality: Left;    TONSILLECTOMY      teenage      Family History       Problem Relation (Age of Onset)    Heart disease Maternal Uncle (50)    Lung disease Father    Migraines Paternal Grandfather    No Known Problems Mother          Social History     Socioeconomic History    Marital status:      Spouse name: Reilly    Years of education: 2 year college    Highest education level: Associate degree: occupational, technical, or vocational program   Tobacco Use    Smoking status: Former     Types: Cigarettes     Quit date:      Years since quittin.4    Smokeless tobacco: Never   Substance and Sexual Activity    Alcohol use: Yes     Comment: one drink once a week    Drug use: Never    Sexual activity: Yes     Partners: Female     Social Determinants of Health     Financial Resource Strain: Medium Risk    Difficulty of Paying Living Expenses: Somewhat hard   Food Insecurity: No Food Insecurity    Worried About Running Out of Food in the Last Year: Never true    Ran Out of Food in the Last Year: Never true   Transportation Needs: No Transportation Needs    Lack of Transportation (Medical): No    Lack of Transportation (Non-Medical): No   Physical Activity: Insufficiently Active    Days of Exercise per Week: 2 days    Minutes of Exercise per Session: 30 min   Stress: No Stress Concern Present     Feeling of Stress : Not at all   Social Connections: Unknown    Frequency of Communication with Friends and Family: More than three times a week    Frequency of Social Gatherings with Friends and Family: More than three times a week    Active Member of Clubs or Organizations: No    Attends Club or Organization Meetings: Never    Marital Status:    Housing Stability: Low Risk     Unable to Pay for Housing in the Last Year: No    Number of Places Lived in the Last Year: 1    Unstable Housing in the Last Year: No       Review of Systems   Constitutional:  Positive for diaphoresis. Negative for fever.   HENT:  Positive for nosebleeds.    Eyes:  Negative for visual disturbance.   Respiratory:  Negative for shortness of breath.    Cardiovascular:  Negative for chest pain.   Gastrointestinal:  Negative for vomiting.   Endocrine: Negative for cold intolerance.   Genitourinary:  Negative for difficulty urinating.   Musculoskeletal:  Negative for back pain.   Skin:  Negative for color change.   Neurological:  Negative for seizures.   Hematological:  Does not bruise/bleed easily.   Psychiatric/Behavioral:  The patient is nervous/anxious.      OBJECTIVE:     Vital Signs     There is no height or weight on file to calculate BMI.      Physical Exam:    Constitutional: He appears well-developed and well-nourished. No distress.     Eyes: Pupils are equal, round, and reactive to light. EOM are normal.     Cardiovascular: No edema.     Abdominal: Soft.     Skin: Skin displays no rash on extremities. Skin displays no lesions on extremities.   Mostly bald     Psych/Behavior: He is alert. He is oriented to person, place, and time.     Musculoskeletal: Gait is normal.        Right Upper Extremities: Muscle strength is 5/5.        Left Upper Extremities: Muscle strength is 5/5.       Right Lower Extremities: Muscle strength is 5/5.        Left Lower Extremities: Muscle strength is 5/5.     Neurological:        Coordination: He has  normal finger to nose coordination.        DTRs: DTRs are DTRS NORMAL AND SYMMETRICnormal and symmetric.        Cranial nerves:   Face grossly symmetric   Shoulder shrug equal bilaterally  Pulmonary: symmetric expansion     Incisions: well healed     Diagnostic Results:  CT head personally reviewed    ASSESSMENT/PLAN:     Declan Burleson is a 58 y.o. male with intractable epilepsy who presents s/p bilateral sEEG and L laser amygdalohippocampectomy (3/1/21). He underwent repeat sEEG implanted on 2/27/23 and explanted 3/8/23. He underwent left temporal lobectomy 4/24/23.     Overall, he is doing very well. I would like him to continue his outpatient therapy. I would like to check a sodium level today. I have encouraged Trista to contact the home health agency to have Na checked at home. I also reached out to Dr. Kaleb Edgar at their request.     I would like to see him back in October with MRI brain with/without contrast.     I have encouraged them to contact the clinic in interim with any questions, concerns, or adverse clinical change.

## 2023-06-12 ENCOUNTER — PATIENT MESSAGE (OUTPATIENT)
Dept: NEUROLOGY | Facility: CLINIC | Age: 58
End: 2023-06-12
Payer: COMMERCIAL

## 2023-06-22 ENCOUNTER — PATIENT MESSAGE (OUTPATIENT)
Dept: NEUROSURGERY | Facility: CLINIC | Age: 58
End: 2023-06-22
Payer: COMMERCIAL

## 2023-08-29 ENCOUNTER — PATIENT MESSAGE (OUTPATIENT)
Dept: NEUROSURGERY | Facility: CLINIC | Age: 58
End: 2023-08-29
Payer: MEDICARE

## 2023-08-31 NOTE — PROGRESS NOTES
[facility]  Speech Therapy Treatment Note - Neurological Rehabilitation  Date: 12/5/2022     Name: Declan Burleson   MRN: [id number]   Therapy Diagnosis: Aphasia R 47.01 (ICD-10); Cognitive Communication deficit R 41.841 (ICD-10)   Physician: Anca Gaston PA-C  Physician Orders: [id number] - AMB REFERRAL/CONSULT TO SPEECH THERAPY  Medical Diagnosis: G31.84 (ICD-10-CM) - Mild neurocognitive disorder due to another medical condition    Visit #/ Visits Authorized: 19/ 30  Date of Evaluation:  8/23/2022  Insurance Authorization Period: 12/28/2022  Plan of Care Expiration Date:    12/21/2022  Extended Plan of Care:  N/A   Progress Note: 12/14/2022   Visits Cancelled: 7  Visits No Show: 0    Time In:  1340  Time Out:  1425   Total Billable Time: 45 minutes      Precautions: Standard  Subjective:   Patient reports:  feeling well and agreeable to all outlined therapy tasks.   He was not compliant to home exercise program.  Response to previous treatment: Good  Objective:   TIMED  Procedure Min.   Cognitive Therapeutic Interventions, first 15 minutes CPT 94465  0   Therapeutic INTVTN, COGN Fucntion, EACH ADDL 15 MIN - ST CPT 50010 0                                                 UNTIMED  Procedure Min.   Speech/Lang Tx/Individual CPT 17450 45   N/A  N/A   Total Timed Units: 0  Total Untimed Units: 1  Charges Billed/Number of units: 1      Short Term Goals: (8 weeks) Current Progress   12/5/2022 Current Progress 12/7/2022   1. The patient will produce a minimum of 4 different features, when presented with a word using semantic feature analysis (SFA), given MIN verbal prompts, with 75% accuracy across 2 consecutive sessions.  71% accuracy given MAX cues     30% accuracy given MIN cues     15% accuracy INDP     3. The patient will complete complex auditory comprehension task (I.e. recall items after 60 second delay, recall key details from a narrative) with 80% accuracy given min cues across 2 consecutive  sessions.  NT     4. The patient will describe visual scenes using 3 or more sentences at 80% accuracy given min verbal and phonemic cues across 2 consecutive sessions.  NT    5.  The patient will complete ongoing dynamic assessment.   NT     6. Patient will recall key details from a visually or auditorally presented narrative with 80% accuracy given MIN cues across 2 consecutive sessions.  NT    7. Patient will complete semantic fluency tasks with 80% accuracy given MIN cues across 2 consecutive sessions.  81% accuracy given MAX cues    75% accuracy given MIN cues     70% accuracy INDP     8. Patient will complete executive function (I.e.read/understand mail, respond appropriately to mail) task with 80% accuracy given MIN cues.   80% accuracy given MIN cues     9. Patient will complete complex problem solving task with 90% accuracy given MIN cues.   80% accuracy given MIN cues     10. Patient will complete complex memory task (I.e. remembering names, dates, events, item locations, details of conversations) with 80% accuracy given MIN cues 80% accuracy given MIN cues     75% accuracy INDP       Patient Education/Response:     Home program established: yes-  patient instructed    to complete semantic fluency task (worksheet provided).   Exercises were reviewed and Declan demonstrated good  understanding of the education provided.     Assessment:   Declan is progressing well towards his goals. All current goal remain appropriate. Goals to be updated as necessary.       Patient prognosis is Good. Patient will continue to benefit from skilled outpatient speech and language therapy to address the deficits listed in the problem list on initial evaluation, provide patient/family education, and to maximize patient's level of independence in the home and community environment.     Medical necessity is demonstrated by the following IMPAIRMENTS:  word finding, slow and halting speech, comprehension, executive function,  problem solving, memory, difficulty with sentence structure, and slow speech initiation.   Barriers to Therapy: none identified  Patient's spiritual, cultural and educational needs considered and patient agreeable to plan of care and goals.    Plan:   Continue Plan of Care with focus on improvement of expressive language and cognitive communication skills.     Marnie Desai CCC-SLP   12/5/2022                                           Colchicine Pregnancy And Lactation Text: This medication is Pregnancy Category C and isn't considered safe during pregnancy. It is excreted in breast milk.

## 2023-09-06 DIAGNOSIS — G40.209 COMPLEX PARTIAL SEIZURES EVOLVING TO GENERALIZED TONIC-CLONIC SEIZURES: ICD-10-CM

## 2023-09-08 ENCOUNTER — TELEPHONE (OUTPATIENT)
Dept: NEUROLOGY | Facility: CLINIC | Age: 58
End: 2023-09-08
Payer: MEDICARE

## 2023-09-08 ENCOUNTER — PATIENT MESSAGE (OUTPATIENT)
Dept: NEUROLOGY | Facility: CLINIC | Age: 58
End: 2023-09-08
Payer: MEDICARE

## 2023-09-08 RX ORDER — CLOBAZAM 20 MG/1
40 TABLET ORAL NIGHTLY
Qty: 60 TABLET | Refills: 5 | Status: SHIPPED | OUTPATIENT
Start: 2023-09-08 | End: 2024-03-06

## 2023-09-08 NOTE — TELEPHONE ENCOUNTER
----- Message from Betsy Diallo sent at 9/6/2023  3:52 PM CDT -----  Regarding: appt access  Contact: pt  [phone]  Pt wife Trista  is calling to speak with someone in provider office in regards to getting pt scheduled with this provider for a f/u appt Trista stated because they are coming such a long distance is there anyway possible to schedule appt the same date the pt sees Ruchi Chen on 10/5 Trista  is asking for a return call please call her at  [phone]/ or leave her a message in the portal

## 2023-09-08 NOTE — TELEPHONE ENCOUNTER
Left a voice mail and sent a patient portal message offering to add an appointment with Dr. Edgar on 10/5/23 at 2:00 pm.

## 2023-10-05 ENCOUNTER — OFFICE VISIT (OUTPATIENT)
Dept: NEUROSURGERY | Facility: CLINIC | Age: 58
End: 2023-10-05
Payer: COMMERCIAL

## 2023-10-05 ENCOUNTER — OFFICE VISIT (OUTPATIENT)
Dept: NEUROLOGY | Facility: CLINIC | Age: 58
End: 2023-10-05
Payer: COMMERCIAL

## 2023-10-05 ENCOUNTER — HOSPITAL ENCOUNTER (OUTPATIENT)
Dept: RADIOLOGY | Facility: HOSPITAL | Age: 58
Discharge: HOME | End: 2023-10-05
Attending: NEUROLOGICAL SURGERY
Payer: COMMERCIAL

## 2023-10-05 VITALS
DIASTOLIC BLOOD PRESSURE: 80 MMHG | HEART RATE: 60 BPM | HEIGHT: 71 IN | SYSTOLIC BLOOD PRESSURE: 114 MMHG | WEIGHT: 185.06 LBS | BODY MASS INDEX: 25.91 KG/M2

## 2023-10-05 DIAGNOSIS — G40.209 COMPLEX PARTIAL SEIZURES EVOLVING TO GENERALIZED TONIC-CLONIC SEIZURES: Chronic | ICD-10-CM

## 2023-10-05 DIAGNOSIS — G40.219 COMPLEX PARTIAL EPILEPSY WITH GENERALIZATION AND WITH INTRACTABLE EPILEPSY: Primary | ICD-10-CM

## 2023-10-05 DIAGNOSIS — G43.909 MIGRAINE WITHOUT STATUS MIGRAINOSUS, NOT INTRACTABLE, UNSPECIFIED MIGRAINE TYPE: Primary | Chronic | ICD-10-CM

## 2023-10-05 DIAGNOSIS — G40.219 COMPLEX PARTIAL EPILEPSY WITH GENERALIZATION AND WITH INTRACTABLE EPILEPSY: ICD-10-CM

## 2023-10-05 PROCEDURE — 99999 PR PBB SHADOW E&M-EST. PATIENT-LVL III: ICD-10-PCS | Mod: PBBFAC,,, | Performed by: NEUROLOGICAL SURGERY

## 2023-10-05 PROCEDURE — 1159F MED LIST DOCD IN RCRD: CPT | Mod: CPTII,S$GLB,, | Performed by: PSYCHIATRY & NEUROLOGY

## 2023-10-05 PROCEDURE — 1159F PR MEDICATION LIST DOCUMENTED IN MEDICAL RECORD: ICD-10-PCS | Mod: CPTII,S$GLB,, | Performed by: PSYCHIATRY & NEUROLOGY

## 2023-10-05 PROCEDURE — 3044F HG A1C LEVEL LT 7.0%: CPT | Mod: CPTII,S$GLB,, | Performed by: PSYCHIATRY & NEUROLOGY

## 2023-10-05 PROCEDURE — 3044F PR MOST RECENT HEMOGLOBIN A1C LEVEL <7.0%: ICD-10-PCS | Mod: CPTII,S$GLB,, | Performed by: NEUROLOGICAL SURGERY

## 2023-10-05 PROCEDURE — A9585 GADOBUTROL INJECTION: HCPCS | Performed by: NEUROLOGICAL SURGERY

## 2023-10-05 PROCEDURE — 3008F BODY MASS INDEX DOCD: CPT | Mod: CPTII,S$GLB,, | Performed by: PSYCHIATRY & NEUROLOGY

## 2023-10-05 PROCEDURE — 99214 PR OFFICE/OUTPT VISIT, EST, LEVL IV, 30-39 MIN: ICD-10-PCS | Mod: S$GLB,,, | Performed by: NEUROLOGICAL SURGERY

## 2023-10-05 PROCEDURE — 70553 MRI BRAIN W WO CONTRAST: ICD-10-PCS | Mod: 26,,, | Performed by: STUDENT IN AN ORGANIZED HEALTH CARE EDUCATION/TRAINING PROGRAM

## 2023-10-05 PROCEDURE — 25500020 PHARM REV CODE 255: Performed by: NEUROLOGICAL SURGERY

## 2023-10-05 PROCEDURE — 99214 OFFICE O/P EST MOD 30 MIN: CPT | Mod: S$GLB,,, | Performed by: PSYCHIATRY & NEUROLOGY

## 2023-10-05 PROCEDURE — 1160F PR REVIEW ALL MEDS BY PRESCRIBER/CLIN PHARMACIST DOCUMENTED: ICD-10-PCS | Mod: CPTII,S$GLB,, | Performed by: PSYCHIATRY & NEUROLOGY

## 2023-10-05 PROCEDURE — 3074F PR MOST RECENT SYSTOLIC BLOOD PRESSURE < 130 MM HG: ICD-10-PCS | Mod: CPTII,S$GLB,, | Performed by: PSYCHIATRY & NEUROLOGY

## 2023-10-05 PROCEDURE — 1160F RVW MEDS BY RX/DR IN RCRD: CPT | Mod: CPTII,S$GLB,, | Performed by: PSYCHIATRY & NEUROLOGY

## 2023-10-05 PROCEDURE — 70553 MRI BRAIN STEM W/O & W/DYE: CPT | Mod: TC

## 2023-10-05 PROCEDURE — 99214 PR OFFICE/OUTPT VISIT, EST, LEVL IV, 30-39 MIN: ICD-10-PCS | Mod: S$GLB,,, | Performed by: PSYCHIATRY & NEUROLOGY

## 2023-10-05 PROCEDURE — 70553 MRI BRAIN STEM W/O & W/DYE: CPT | Mod: 26,,, | Performed by: STUDENT IN AN ORGANIZED HEALTH CARE EDUCATION/TRAINING PROGRAM

## 2023-10-05 PROCEDURE — 99999 PR PBB SHADOW E&M-EST. PATIENT-LVL IV: ICD-10-PCS | Mod: PBBFAC,,, | Performed by: PSYCHIATRY & NEUROLOGY

## 2023-10-05 PROCEDURE — 3079F DIAST BP 80-89 MM HG: CPT | Mod: CPTII,S$GLB,, | Performed by: PSYCHIATRY & NEUROLOGY

## 2023-10-05 PROCEDURE — 3074F SYST BP LT 130 MM HG: CPT | Mod: CPTII,S$GLB,, | Performed by: PSYCHIATRY & NEUROLOGY

## 2023-10-05 PROCEDURE — 99999 PR PBB SHADOW E&M-EST. PATIENT-LVL III: CPT | Mod: PBBFAC,,, | Performed by: NEUROLOGICAL SURGERY

## 2023-10-05 PROCEDURE — 99214 OFFICE O/P EST MOD 30 MIN: CPT | Mod: S$GLB,,, | Performed by: NEUROLOGICAL SURGERY

## 2023-10-05 PROCEDURE — 3008F PR BODY MASS INDEX (BMI) DOCUMENTED: ICD-10-PCS | Mod: CPTII,S$GLB,, | Performed by: PSYCHIATRY & NEUROLOGY

## 2023-10-05 PROCEDURE — 3079F PR MOST RECENT DIASTOLIC BLOOD PRESSURE 80-89 MM HG: ICD-10-PCS | Mod: CPTII,S$GLB,, | Performed by: PSYCHIATRY & NEUROLOGY

## 2023-10-05 PROCEDURE — 3044F HG A1C LEVEL LT 7.0%: CPT | Mod: CPTII,S$GLB,, | Performed by: NEUROLOGICAL SURGERY

## 2023-10-05 PROCEDURE — 3044F PR MOST RECENT HEMOGLOBIN A1C LEVEL <7.0%: ICD-10-PCS | Mod: CPTII,S$GLB,, | Performed by: PSYCHIATRY & NEUROLOGY

## 2023-10-05 PROCEDURE — 99999 PR PBB SHADOW E&M-EST. PATIENT-LVL IV: CPT | Mod: PBBFAC,,, | Performed by: PSYCHIATRY & NEUROLOGY

## 2023-10-05 RX ORDER — AMLODIPINE BESYLATE 5 MG/1
5 TABLET ORAL DAILY
COMMUNITY

## 2023-10-05 RX ORDER — GADOBUTROL 604.72 MG/ML
10 INJECTION INTRAVENOUS
Status: COMPLETED | OUTPATIENT
Start: 2023-10-05 | End: 2023-10-05

## 2023-10-05 RX ORDER — OXCARBAZEPINE 150 MG/1
150 TABLET, FILM COATED ORAL NIGHTLY
Qty: 30 TABLET | Refills: 11 | Status: SHIPPED | OUTPATIENT
Start: 2023-10-05 | End: 2024-10-04

## 2023-10-05 RX ADMIN — GADOBUTROL 10 ML: 604.72 INJECTION INTRAVENOUS at 11:10

## 2023-10-05 NOTE — PROGRESS NOTES
Follow up:   No further sz since discharge   Wife reports some trouble with memory   Last Na level was 125 in Sept 2023     Admission Date: 4/24/2023  Hospital Length of Stay: 10 days  Discharge Date and Time: 5/4/23  Hospital Course: 4/28: speech and swallowing continue to improve with SLP. Respiratory status continuing to improve. Satting well on RA. Na 130 today   4/29: Na stable 130. EEG negative. On dysphagia diet with thick liquids. NGT still in place until PO intake improves.  for hyponatremia   4/30: some delayed speech yesterday, back on EEG. Briskly awake and responding to questions today. On NS for hyponatremia. Na 130  5/1: exam stable, EEG with left temporal lobe seizures. Sodium 130. Tolerating PO, NGT still in place. Satting well on room air  5/3: NAEON, briskly awake and interactive today. Na 133.  5/4: NAEON. Na stable at 133. Neuro exam stable. Patient tolerating >50% meals. BM yesterday. Stable for dc to IPR.     Admission Date: 8/9/2022  Hospital Length of Stay: 7 days  Discharge Date and Time: 8/16/2022  HPI:   Declan Burleson is a 57 year old man with history of seizures s/p left mesial temporal laser ablation 3/2021 admitted to EMU for evaluation of continued staring/unresponsive episodes postop while on medication. Patient is interested in pursuing surgical pathway again. Began having seizures at age 48.     Event type 1  No preceding aura/prodrome. LOC and convulsions with rhythmic shaking of all extremities. Confused afterward. Last occurred 4/2022. Have become less intense since surgery, has not had seizures from sleep since.     Event type 2  Staring and unresponsive. [place]. Frequency initially improved after surgery, since then has been increasing. Occurs twice per week, last on 8/7. Had similar episodes prior to surgery.     Since surgery, has noticed memory issues--forgetting to run the washing machine after putting clothes in, difficulty using , does not recall  conversation from earlier same day. Occasionally has difficulty recalling names, including his children's names. Also has noticed less motivation to do things on some days.     Current AEDs:  - eslicarbazepine 1200 mg QHS  - zonisamide 500 mg QHS - has been gradually increasing from 300 to 600 mg since last clinic visit   - clobazam 30 mg QHS   Has noticed significant daytime sleepiness on increased zonisamide dose. Reports adherence, last dose 8/8 evening.     Prior medications and SE/reason for stopping:  - topiramate - continued seizures  - lamotrigine - continued seizures  - Keppra - anger, mood disturbance  - oxcarbazepine - continued seizures     Prior seizure evaluation:  - ambulatory EEG 6/1/22 - No epileptiform discharges, periodic discharges, lateralized rhythmic delta activity or electrographic seizures were seen. There are 18 event button marks with no associated EEG change.   - sEEG 1/21/21 - 2 seizures (subclinical) were captured. Onset of both appear to be from left amygdala and hippocampus without generalization. Abundant epileptiform activity is also seen in the left amygdala and hippocampus, frequent to abundant epileptiform discharges in the right pre-motor anterior cingulate region, and frequent epileptiform activity in the left superior frontal SMA region.  - MRI Brain w/wo contrast 5/23/22 - Focal postablation encephalomalacia of the medial left temporal lobe  - MRI Brain Stealth 2/11/21 - no definite intracranial enhancing mass lesion  - fMRI 10/26/20 - No epileptogenic lesions identified. 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        Hospital Course:   8/9>8/10: Admit to EMU. Eslicarbazepine and Zonisamide held on admit, Clobazam decreased to 10 mg qhs. EEG with focal intermittent slowing in left temporal region, no epileptiform discharges, no electrographic seizures. PB @0719 for episode of word finding difficulty with maintained awareness and @1221 for headache and  staring- no EEG correlate with events; see EEG report for full details.  8/10>8/11: No typical events. EEG unchanged, focal intermittent slowing in left temporal region, no epileptiform discharges, no electrographic seizures. Continued medication adjustments for event capture- decreased Clobazam to 5 mg qhs.  8/11>8/12: No typical events. EEG with continued left focal slowing and rare left sharps, no electrographic seizures. Clobazam held, last dose 8/11. Lexapro dose increased from 10 to 15 mg QD due to reported low energy.  8/12>8/13: PB @2144 for episode of eyelid fluttering, seeing flashing lights, numbness/tingling with no EEG correlate. EEG with left temporal interictals, no seizures.  8/13>8/14: 1 sz w/ L hemispheric onset.  8/14>8/15: EEG with left temporal focal slowing, left temporal interictals, and electroclinical seizure @0435 with left temporal onset and secondary generalization. Resume home AEDs now: Eslicarbazepine 1200 mg x1, Clobazam 30 mg x1, and Zonisamide 500 mg x1.  8/15>8/16: No further seizures after resuming home AED regimen. Discharged home in stable condition on resumed AED regimen: Eslicarbazepine 1200 mg qhs, Clobazam 30 mg qhs, and Zonisamide 500 mg qhs. Plan to increase Clobazam to 40 mg qhs in 1 week (Rx sent to outpatient pharmacy). Outpatient follow up in Epilepsy clinic with Dr. Edgar. Seizure precautions.       Interval Events/ROS 7/25/2022:  Recent Labs   Lab 07/22/21  1132 05/23/22  1508 08/09/22  1640 05/02/23  1219   Clobazam 338.0 H 261.0 400.0 H  --    Desmethylclobazam 2670.0 2550.0 3620.0 H  --    Zonisamide 12 11 19  --    Eslicarbazepine 19.3 16.8 17.0 24.3      - accompanied by wife who also contributes to the history   - current ASM regimen: eslicarbazepine 1200mg nightly, zonisamide 300mg nightly, clobazam 30mg nightly   - current sz frequency is around 1-2 focal events per week, last event was yesterday 7/24/2022 (described as staring, decreased responsiveness)  -  "last GTC event was 04/2022  - feels seizure frequency improved for a while s/p mesial temporal ablation 03/2021 but has gradually been worsening again  - memory complaints  - interested in what surgical options are still available to him for his epilepsy, expresses disappointment that he is still having frequent seizures and he thought he would be able to decrease his medications but instead we are increasing medication   - migraines around 3 days per month, sometimes last 3 days at a time, not relieved w/ OTC medications   - Otherwise, no fever, no cold symptoms, no changes in vision, no new weakness, no chest pain, no shortness of breath, no nausea, no vomiting, no diarrhea, no constipation, no problems walking    Interval Events/ROS 5/23/2022:  - L TLE s/p ablation on 3/1/21  - current ASM regimen: eslicarbazepine 1200mg nightly, zonisamide 300mg nightly, clobazam 20mg nightly, and nayzilam PRN  - been following w/ Dr. Saldaan (neuro in [place]), who recently referred patient back to [facility] since patient is still having seizures after surgery     - last seizure was 4/22/2022, also had 2 seizures 03/2022 (makes grunting/growling noise, shaking 30-60 seconds, postictal unable to respond, confused, returns to baseline in around 5-10 minutes)  - feels he is cognitively declining, word finding issues  - sleeps a lot, reports some days he "feels completely lazy", denies low mood/feeling depressed   - missed neuropsych appointment, needs to reschedule   - still having frequent migraines, doesn't feel like nurtec is helping   - Otherwise, no fever, no cold symptoms, no changes in vision, no new weakness, no chest pain, no shortness of breath, no nausea, no vomiting, no diarrhea, no constipation, no tingling/numbness, no problems walking, no reported mood issues      Interval events 9/30/2021:   Pt of Dr. Benoit   He presents with wife   Not driving   Not working   No sz   Trouble word finding (this is " "pronounced in English)   No falls   3 nights/week - per wife has growling sound     Severe L occipital HA with intoleranace to mvmnt   1/month     Also has mild HA 3/week      Current AEDs: compliant  1) Eslicarbazepine 1200 mg q day  2) Zonisamide 300 mg q day   3) Clobazam 20 mg qHS  4) Nayzilam prn   Also takes Lexapro 10 mg QHS      Admission Date: 3/1/2021  Hospital Length of Stay: 0 days  Discharge Date and Time: 3/4/2021  2:57 PM     HPI:   Patient is a 57 y/o male with intractable epilepsy who is s/p bilateral sEEG presents today for elective left laser ablation amygdalohippocampectomy. All questions answered and patient wishes to proceed with surgery.    Patient originally had presented to Dr. Chen to discuss elective options of his intractable epilepsy. His seizures began when he was 48 years old. He can recall no inciting event. He has 3 semiologies: "full blown" generalized events, staring episodes, and "mild" seizures, which are characterized as generalized events of shorter duration without incontinence and a shorter post-ictal period. He and his wife estimate that he persists in having about 2 events/month.    He has failed multiple AEDs, including topiramate, lamotrigine, levetiracetam, and oxcarbazepine. He is currently taking eslicarbazepine, zonisamide, and clobazam. He had EMU monitoring in May-June of this year, which suggests left-sided activity. He had 3T MRI in June (personally reviewed) that was non-lesional; he also had PET at that time suggesting possible left-sided activity. He had neuropsychological testing on 8/10/20  Hospital Course: 3/1: POD 0. NCC overnight  3/2: POD1 from L laser amygdalophippocampectomy. Recommend wife at bedside as much as nursing policies will allow. OK for TTF (9th floor only) 24 hours postop if otherwise medically stable/appropriate. Wife reports they have home Aptiom.  3/3: POD 2. Patient with headache behind bilateral eyes. Patient also with episode of emesis " today when sitting up. He is otherwise neurologically intact. CTH ordered to evaluate and was stable. Patient has not gotten up with PT/OT yet, it is ordered for tomorrow. Will keep patient overnight and reevaluate with PT/OT tomorrow regarding dispo recommendations.   3/4: Patient much improved from yesterday. Headache and N/V resolved. Will continue decadron 4 mg q6h x 48 hours. Continue 1L fluid restriction for SIADH, will obtain BMP on 3/8 outpatient to reevaluate. Therapy recommending home with outpatient PT. Patient and his wife are requesting Home Health. Medically stable to discharge home.      Pt reports no auras or seizures since ablation   Sleep - ok   Diet - ok      Migraine Headache   Onset - 12 yrs   F/h migraine   Hz - rare   Tylenol 500 mg effective      HA Hz worse in last 10 yrs   Hz 2-4/week   No change in HA after SEEG   Meds tried - tylenol 1000 mg - mild relief   Since April Worsening:    Word finding difficulty   attention span shorter      Exam:  Physical Exam  Constitutional:       Appearance: Normal appearance.   HENT:      Head: Normocephalic and atraumatic.      Right Ear: External ear normal.      Left Ear: External ear normal.      Nose: Nose normal.      Mouth/Throat:      Mouth: Mucous membranes are moist.   Eyes:      Extraocular Movements: Extraocular movements intact.      Conjunctiva/sclera: Conjunctivae normal.      Pupils: Pupils are equal, round, and reactive to light.   Cardiovascular:      Rate and Rhythm: Normal rate.      Pulses: Normal pulses.   Pulmonary:      Effort: Pulmonary effort is normal.   Musculoskeletal:         General: Normal range of motion.      Cervical back: Normal range of motion.   Skin:     General: Skin is warm and dry.   Neurological:      General: No focal deficit present.      Mental Status: He is alert.   Psychiatric:         Mood and Affect: Mood normal.         Behavior: Behavior normal.         Thought Content: Thought content normal.          Judgment: Judgment normal.     affect - nml   Masked facies   Slow gait   Slow turns   No retropulsion   Decreased arm swing   No tremor      Impression:   L TLE s/p ablation on 3/1/21   Memory trouible - post lobectomy vs subclin sz vs low Na  Migraine on BOTOX, maxalt   Brain atrophy     Plan:   - cont zonisamide 500mg qhs  - continue clobazam 40mg qhs   - switch Aptoim 400 mg QHs (higher doses cause sedation) -> Oxcarb 150 mg QHS  Check Na level in 2 weeks   - nayzilam PRN for seizure cluster     Speech therapy   PCP to monitor Na suppl

## 2023-10-05 NOTE — PROGRESS NOTES
"Neurosurgery  Follow-up     SUBJECTIVE:     Chief Complaint: intractable epilepsy      History of Present Illness:  Declan Burleson is a 58 y.o. right-handed male referred by Dr. Benoit (originally by Dr. Saldana of [place]) for consideration of surgical therapy for intractable epilepsy. The patient was formerly employed as a . He is accompanied by his wife Trista today, who helps to provide the history. His seizures began when he was 48 years old. He can recall no inciting event. He has 3 semiologies: "full blown" generalized events, staring episodes, and "mild" seizures, which are characterized as generalized events of shorter duration without incontinence and a shorter post-ictal period. He and his wife estimate that he persists in having about 2 events/month.     He has failed multiple AEDs, including topiramate, lamotrigine, levetiracetam, and oxcarbazepine. He is currently taking eslicarbazepine, zonisamide, and clobazam. He had EMU monitoring in May-June of this year, which suggests left-sided activity. He had 3T MRI in June (personally reviewed) that was non-lesional; he also had PET at that time suggesting possible left-sided activity. He had neuropsychological testing on 8/10/20; summary findings included below. He lives with his wife and 16-year-old daughter, who provide excellent social support.     He takes ASA 81, which will need to be held for one week prior to surgery.     As of 12/22/20, the patient reports he has had no significant change. He is hoping to be seen today for pre-operative optimization. He is out of Aptiom, which his wife is working to re-obtain for him.      As of 2/9/21, the patient underwent bilateral sEEG monitoring on 1/11/21. He self-removed several of the leads on 1/21/21 and was taken to the OR for removal of the remaining electrodes. Fortunately, there was no significant intracranial hemorrhage associated. Since being discharged home, the patient " reports that he is overall doing well. He has had no fever, chills, or drainage from the incisions. He has had some headache. He also feels that he has been sleeping more than before the procedure. He persists in having bad memory.     As of 4/13/21, the patient underwent left laser amygdalohippocampectomy on 3/1/21. Overall, the has done well since then. He and his wife report that he has been seizure free. He persists in having some days that are better than others; he is more irritable and tired than before surgery at times. This is inconsistent, and some days he is at his preoperative baseline. He has been compliant with his AEDs; he is taking 1200 mg of aptiom daily.    They deny any fever, chills, or drainage from the incision, though there was a scab that was removed when he got a haircut recently. He is also experiencing headache that radiates from the site of his incision forward. This improves with Maxalt.     As of 7/6/21, the patient reports that he is having headache from the base of the neck CHCF up the head. It feels like a small drum that comes and goes. He had to turn off the lights, lay down, and take over-the-counter medication (Tylenol, at times) last week. He also became more pale with these episodes. It is made worse by lying on the left side in bed.  He is now at his normal baseline. He also reports chest pain; he remains hyponatremic. He is currently out of the anxiety and blood pressure medications for at least 4 days.     He has been seizure free since the procedure. Trista reports that the incision is well healed. No fever/chills.     He is more irritable than before surgery and has some memory issues.     As of 7/22/21, the patient returns in follow up. He still has headache and mood swings at times. He has not had any seizures. He and his wife both feel that his speech and conversation and improving. He remains highly distractable. He and his wife endorse that his grandfather had  "migraines.     He will need to re-establish with a new epileptologist since Dr. Benoit is moving to [place].     As of 9/30/21, the patient reports he has remained seizure-free. He continues to have significant headaches, particularly over the left posterior aspect of the head, radiating over to the right side. His wife endorses that he has bradykinesia and decreased arm swing when walking. Jean Edgar and Karen are concerned he may have Parkinson disease.     As of 12/15/22, the patient returns with his wife, Trista. They are frustrated that he is "like a zombie" because of the high doses of AEDs. He has been seizure-free since April, but when he decreased the meds back in April, he had 3 events within 24 hours.     Mood swings are also an issue; he gets irritated more readily than he did in the past. He is seeing a speech therapist in [facility]; he is making progress WRT conversational communication and memory.     They now have 2 grandchildren.     Goals of surgery: to be free of seizures and reduce medications.     They would like to undergo repeat sEEG since the first was cut short to see if he may be a candidate for further epilepsy surgery.     As of 2/23/23, the patient returns for further discussion of repeat sEEG. He has undergone extensive discussion in interdisciplinary conference, together with review by Dr. Andrew of memory neurology, and he is being recommended for repeat sEEG. He has no evidence of non-epilepsy, non-TBI related neurodegenerative disease on Dr. Andrew's initial evaluation, per discussion with Dr. ALEK Edgar.     He and his wife remain eager to proceed with sEEG placement to determine whether he will be a candidate for further intracranial seizure surgery.     As of 3/23/23, the patient returns in postoperative follow up after having undergone repeat sEEG implanted on 2/27/23 and explanted 3/8/23. He reports that he continues to be very fatigued with the high dosages of medications. He " reports that he continues to sleep a lot. He and his wife both feel that he was much more alert while medications were held during the monitoring period.     They deny fever, chills, or drainage from the incision. He has not had any seizures since they discharged home.     As 6/8/23, the patient returns in postoperative follow up after having undergone left temporal lobectomy on 4/24/23. He and Trista report that he has been doing well. He still has some headaches, for which he takes 1000mg of Tylenol PRN. He occasionally takes an oxycodone. The headaches can become quite severe if he doesn't take Tylenol.     Trista reports that he is now taking Meloxicam, reportedly as an AED. He is on famotidine and NaCl, though his sodium levels have not been checked recently.     He fell two nights ago, but he didn't hit his head. This happened because he lost his balance.     He is doing home PT, OT, and ST, and he has a home health nurse once a week.     As of 10/5/23, the patient returns in scheduled follow up with MRI. Trista reports that Mr. Manriquez has been more agitated and irritable lately. He complains of some headaches at times. He also notes that he has an easier time thinking in Romansh or Bulgarian. He is suffering from short-term memory loss, as we expected.     He also has pain when he lays on his left side. This resolves when he lays on the right.     He has had no seizures.     He is getting Botox with Dr. Saldana for his migraines.     Review of patient's allergies indicates:   Allergen Reactions    Losartan Rash       Current Outpatient Medications   Medication Sig Dispense Refill    amLODIPine (NORVASC) 5 MG tablet Take 5 mg by mouth once daily.      cloBAZam (ONFI) 20 mg Tab TAKE 2 TABLETS (40 MG TOTAL) BY MOUTH EVERY EVENING. 60 tablet 5    EScitalopram oxalate (LEXAPRO) 10 MG tablet Take 10 mg by mouth once daily.      eslicarbazepine (APTIOM) 400 mg Tab tablet Take 1 tablet (400 mg total) by mouth  once daily. Take with 800mg = 1200mg 30 tablet 5    famotidine (PEPCID) 20 MG tablet Take 1 tablet (20 mg total) by mouth 2 (two) times daily. 60 tablet 11    famotidine (PEPCID) 20 MG tablet Take 1 tablet (20 mg total) by mouth in the morning for 7 days. 7 tablet 0    mag hydrox/aluminum hyd/simeth (ANTACID ORAL) Take by mouth as needed. Acid reflux      meloxicam (MOBIC) 7.5 MG tablet Take 1 tablet (7.5 mg total) by mouth 2 (two) times a day with Breakfast and Dinner for 7 days. 14 tablet 0    midazolam 5 mg/spray (0.1 mL) Spry 0.1 mLs by Nasal route.      ondansetron (ZOFRAN-ODT) 8 MG TbDL Take 8 mg by mouth every 8 (eight) hours as needed. nausea      rizatriptan (MAXALT) 10 MG tablet Take 10 mg by mouth daily as needed.      sodium chloride 1 gram tablet Take 1 tablet (1 g total) by mouth 3 (three) times a day for 30 days. 90 tablet 0    zonisamide (ZONEGRAN) 100 MG Cap Take 5 capsules (500 mg total) by mouth every evening. 450 capsule 3    eslicarbazepine (APTIOM) 800 mg Tab Take 800 mg by mouth once daily. Take with 400mg = 1200mg (Patient not taking: Reported on 10/5/2023) 30 tablet 5    oxyCODONE (ROXICODONE) 5 MG immediate release tablet Take 1 tablet (5 mg total) by mouth every 4 (four) hours as needed for Pain. 21 tablet 0    rizatriptan (MAXALT-MLT) 10 MG disintegrating tablet Take 10 mg by mouth as needed. No more than 3 doses / week      sodium chloride 1,000 mg TbSO tablet tbso Take 1 tablet (1,000 mg total) by mouth 2 (two) times daily. (Patient not taking: Reported on 10/5/2023)       No current facility-administered medications for this visit.       Past Medical History:   Diagnosis Date    Anxiety     Dementia in other diseases classified elsewhere, unspecified severity, without behavioral disturbance, psychotic disturbance, mood disturbance, and anxiety 2/2/2023    Depression     GERD (gastroesophageal reflux disease)     Hyperlipidemia     Hypertension     Increased anion gap metabolic acidosis  4/25/2023    Long term (current) use of antithrombotics/antiplatelets 12/22/2020    Migraine     Normocytic anemia 2/23/2023    Seizures      Past Surgical History:   Procedure Laterality Date    CRANIOTOMY USING FRAMELESS STEREOTAXY Left 4/24/2023    Procedure: CRANIOTOMY, USING FRAMELESS STEREOTAXY;  Surgeon: Ruchi Chen MD;  Location: [facility] OR 2ND FLR;  Service: Neurosurgery;  Laterality: Left;  Tor III ASA III  Blood: Type & Screen  Neuro Mon: None  Bed: Reg  Headrest: Arona  Pos: Supine  Stealth MRI Optical   [facility]  Asst Surg: [place]    CYST REMOVAL  March 2016/2017    skin cyst - benign    PLACEMENT OF STEREO EEG LEADS(DEPTH ELECTRODES) Left 2/27/2023    Procedure: PLACEMENT OF STEREO EEG LEADS (DEPTH ELECTRODES);  Surgeon: Ruchi Chen MD;  Location: [facility] OR 2ND FLR;  Service: Neurosurgery;  Laterality: Left;    REMOVAL OF DRAIN Left 4/26/2023    Procedure: REMOVAL, DRAIN;  Surgeon: Ruchi Chen MD;  [facility];  Service: Neurosurgery;  Laterality: Left;    REMOVAL OF STEREO EEG LEADS (DEPTH ELECTRODES) Bilateral 1/21/2021    Procedure: REMOVAL OF STEREO EEG LEADS (DEPTH ELECTRODES);  Surgeon: Ruchi Chen MD;  [facility];  Service: Neurosurgery;  Laterality: Bilateral;    REMOVAL OF STEREO EEG LEADS (DEPTH ELECTRODES) Left 3/8/2023    Procedure: REMOVAL OF STEREO EEG LEADS (DEPTH ELECTRODES);  Surgeon: Ruchi Chen MD;  [facility];  Service: Neurosurgery;  Laterality: Left;    TONSILLECTOMY      teenage      Family History       Problem Relation (Age of Onset)    Heart disease Maternal Uncle (50)    Lung disease Father    Migraines Paternal Grandfather    No Known Problems Mother          Social History     Socioeconomic History    Marital status:      Spouse name: Reilly    Years of education: 2 year college    Highest education level: Associate degree: occupational, technical, or vocational program   Tobacco Use    Smoking status: Former     Current  packs/day: 0.00     Types: Cigarettes     Quit date:      Years since quittin.7    Smokeless tobacco: Never   Substance and Sexual Activity    Alcohol use: Yes     Comment: one drink once a week    Drug use: Never    Sexual activity: Yes     Partners: Female     Social Determinants of Health     Financial Resource Strain: Medium Risk (2023)    Overall Financial Resource Strain (CARDIA)     Difficulty of Paying Living Expenses: Somewhat hard   Food Insecurity: No Food Insecurity (2023)    Hunger Vital Sign     Worried About Running Out of Food in the Last Year: Never true     Ran Out of Food in the Last Year: Never true   Transportation Needs: No Transportation Needs (2023)    PRAPARE - Transportation     Lack of Transportation (Medical): No     Lack of Transportation (Non-Medical): No   Physical Activity: Insufficiently Active (2023)    Exercise Vital Sign     Days of Exercise per Week: 2 days     Minutes of Exercise per Session: 30 min   Stress: No Stress Concern Present (2023)    [employer] [place] [employer] - Occupational Stress Questionnaire     Feeling of Stress : Not at all   Social Connections: Unknown (2023)    Social Connection and Isolation Panel [NHANES]     Frequency of Communication with Friends and Family: More than three times a week     Frequency of Social Gatherings with Friends and Family: More than three times a week     Active Member of Clubs or Organizations: No     Attends Club or Organization Meetings: Never     Marital Status:    Housing Stability: Low Risk  (2023)    Housing Stability Vital Sign     Unable to Pay for Housing in the Last Year: No     Number of Places Lived in the Last Year: 1     Unstable Housing in the Last Year: No       Review of Systems   Constitutional:  Positive for diaphoresis. Negative for fever.   HENT:  Positive for nosebleeds.    Eyes:  Negative for visual disturbance.   Respiratory:  Negative for  shortness of breath.    Cardiovascular:  Negative for chest pain.   Gastrointestinal:  Negative for vomiting.   Endocrine: Negative for cold intolerance.   Genitourinary:  Negative for difficulty urinating.   Musculoskeletal:  Negative for back pain.   Skin:  Negative for color change.   Neurological:  Negative for seizures.   Hematological:  Does not bruise/bleed easily.   Psychiatric/Behavioral:  The patient is nervous/anxious.        OBJECTIVE:     Vital Signs  Pain Score: 0-No pain  There is no height or weight on file to calculate BMI.      Physical Exam:    Constitutional: He appears well-developed and well-nourished. No distress.     Eyes: Pupils are equal, round, and reactive to light. EOM are normal.     Cardiovascular: No edema.     Abdominal: Soft.     Skin: Skin displays no rash on extremities. Skin displays no lesions on extremities.   Mostly bald     Psych/Behavior: He is alert. He is oriented to person, place, and time.     Musculoskeletal: Gait is normal.        Right Upper Extremities: Muscle strength is 5/5.        Left Upper Extremities: Muscle strength is 5/5.       Right Lower Extremities: Muscle strength is 5/5.        Left Lower Extremities: Muscle strength is 5/5.     Neurological:        Coordination: He has normal finger to nose coordination.        DTRs: DTRs are DTRS NORMAL AND SYMMETRICnormal and symmetric.        Cranial nerves:   Face grossly symmetric   Shoulder shrug equal bilaterally    Pulmonary: symmetric expansion     Incisions: well healed     Diagnostic Results:  MRI personally reviewed  Expected changes of temporal lobectomy     ASSESSMENT/PLAN:     Declan Burleson is a 58 y.o. male with intractable epilepsy who presents s/p bilateral sEEG and L laser amygdalohippocampectomy (3/1/21). He underwent repeat sEEG implanted on 2/27/23 and explanted 3/8/23. He underwent left temporal lobectomy 4/24/23.     Overall, he is doing well. I would like him to continue his outpatient  speech therapy, which I have renewed today. Dr. Edgar will also see him today and is monitoring his Na levels (presumably 2/2 Aptiom).     I will also send him for repeat neuropsychological evaluation with Dr. Horton to see if there are any additional memory or coping mechanisms she can recommend.     I will not arrange formal follow up, but I am happy to see him back if I can be of assistance.    I have encouraged them to contact the clinic in interim with any questions, concerns, or adverse clinical change.

## 2023-10-08 ENCOUNTER — PATIENT MESSAGE (OUTPATIENT)
Dept: NEUROLOGY | Facility: CLINIC | Age: 58
End: 2023-10-08
Payer: MEDICARE

## 2023-10-25 ENCOUNTER — OFFICE VISIT (OUTPATIENT)
Dept: NEUROLOGY | Facility: CLINIC | Age: 58
End: 2023-10-25
Payer: COMMERCIAL

## 2023-10-25 DIAGNOSIS — F02.818 MAJOR NEUROCOGNITIVE DISORDER DUE TO ANOTHER MEDICAL CONDITION WITH BEHAVIORAL DISTURBANCE: Primary | ICD-10-CM

## 2023-10-25 PROCEDURE — 99499 NO LOS: ICD-10-PCS | Mod: S$GLB,,, | Performed by: CLINICAL NEUROPSYCHOLOGIST

## 2023-10-25 PROCEDURE — 99499 UNLISTED E&M SERVICE: CPT | Mod: S$GLB,,, | Performed by: CLINICAL NEUROPSYCHOLOGIST

## 2023-10-25 PROCEDURE — 96116 PR NEUROBEHAVIORAL STATUS EXAM BY PSYCH/PHYS: ICD-10-PCS | Mod: S$GLB,,, | Performed by: CLINICAL NEUROPSYCHOLOGIST

## 2023-10-25 PROCEDURE — 96116 NUBHVL XM PHYS/QHP 1ST HR: CPT | Mod: S$GLB,,, | Performed by: CLINICAL NEUROPSYCHOLOGIST
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
## 2023-10-30 PROBLEM — F02.818 MAJOR NEUROCOGNITIVE DISORDER DUE TO ANOTHER MEDICAL CONDITION WITH BEHAVIORAL DISTURBANCE: Status: ACTIVE | Noted: 2020-07-22

## 2023-11-16 ENCOUNTER — PATIENT MESSAGE (OUTPATIENT)
Dept: NEUROLOGY | Facility: CLINIC | Age: 58
End: 2023-11-16
Payer: MEDICARE

## 2024-01-29 ENCOUNTER — PATIENT MESSAGE (OUTPATIENT)
Dept: NEUROLOGY | Facility: CLINIC | Age: 59
End: 2024-01-29
Payer: MEDICARE

## 2024-01-29 ENCOUNTER — PATIENT MESSAGE (OUTPATIENT)
Dept: NEUROSURGERY | Facility: CLINIC | Age: 59
End: 2024-01-29
Payer: MEDICARE

## 2024-04-22 NOTE — ASSESSMENT & PLAN NOTE
Problem: Discharge Planning  Goal: Discharge to home or other facility with appropriate resources  4/22/2024 0049 by Christiana Villalobos RN  Outcome: Progressing  4/22/2024 0048 by Christiana Villalobos RN  Outcome: Progressing  Flowsheets (Taken 4/21/2024 1745 by Deisy Awan RN)  Discharge to home or other facility with appropriate resources: Identify barriers to discharge with patient and caregiver     Problem: Safety - Adult  Goal: Free from fall injury  4/22/2024 1430 by Nancy Woodard RN  Outcome: Progressing  4/22/2024 0049 by Christiana Villalobos RN  Outcome: Progressing     Problem: Pain  Goal: Verbalizes/displays adequate comfort level or baseline comfort level  4/22/2024 1430 by Nancy Woodard RN  Outcome: Progressing  4/22/2024 0049 by Christiana Villalobos RN  Outcome: Progressing     Problem: Gastrointestinal - Adult  Goal: Minimal or absence of nausea and vomiting  4/22/2024 1430 by Nancy Woodard RN  Outcome: Progressing  4/22/2024 0049 by Christiana Villalobos RN  Outcome: Progressing  Goal: Maintains or returns to baseline bowel function  4/22/2024 1430 by Nancy Woodard RN  Outcome: Progressing  4/22/2024 0049 by Christiana Villalobos RN  Outcome: Progressing  Goal: Maintains adequate nutritional intake  4/22/2024 1430 by Nancy Woodard RN  Outcome: Progressing  4/22/2024 0049 by Christiana Villalobos RN  Outcome: Progressing  Goal: Establish and maintain optimal ostomy function  4/22/2024 1430 by Nancy Woodard RN  Outcome: Progressing  4/22/2024 0049 by Christiana Villalobos RN  Outcome: Progressing      - Continue home Escitalopram 10 mg daily

## 2024-06-19 ENCOUNTER — TELEPHONE (OUTPATIENT)
Dept: NEUROSURGERY | Facility: CLINIC | Age: 59
End: 2024-06-19
Payer: MEDICARE

## 2024-07-18 ENCOUNTER — OFFICE VISIT (OUTPATIENT)
Dept: NEUROSURGERY | Facility: CLINIC | Age: 59
End: 2024-07-18
Payer: MEDICARE

## 2024-07-18 ENCOUNTER — PATIENT MESSAGE (OUTPATIENT)
Dept: NEUROSURGERY | Facility: CLINIC | Age: 59
End: 2024-07-18

## 2024-07-18 VITALS — HEART RATE: 57 BPM | SYSTOLIC BLOOD PRESSURE: 115 MMHG | DIASTOLIC BLOOD PRESSURE: 72 MMHG

## 2024-07-18 DIAGNOSIS — Z90.89 S/P LOBECTOMY OF BRAIN: Primary | ICD-10-CM

## 2024-07-18 DIAGNOSIS — R41.841 COGNITIVE COMMUNICATION DEFICIT: ICD-10-CM

## 2024-07-18 PROCEDURE — 99999 PR PBB SHADOW E&M-EST. PATIENT-LVL III: CPT | Mod: PBBFAC,,, | Performed by: NEUROLOGICAL SURGERY

## 2024-07-18 PROCEDURE — 3078F DIAST BP <80 MM HG: CPT | Mod: CPTII,S$GLB,, | Performed by: NEUROLOGICAL SURGERY

## 2024-07-18 PROCEDURE — 1159F MED LIST DOCD IN RCRD: CPT | Mod: CPTII,S$GLB,, | Performed by: NEUROLOGICAL SURGERY

## 2024-07-18 PROCEDURE — 3074F SYST BP LT 130 MM HG: CPT | Mod: CPTII,S$GLB,, | Performed by: NEUROLOGICAL SURGERY

## 2024-07-18 PROCEDURE — 99215 OFFICE O/P EST HI 40 MIN: CPT | Mod: S$GLB,,, | Performed by: NEUROLOGICAL SURGERY

## 2024-07-18 RX ORDER — ESCITALOPRAM OXALATE 20 MG/1
20 TABLET ORAL
COMMUNITY
Start: 2024-07-08

## 2024-07-18 RX ORDER — UBROGEPANT 100 MG/1
100 TABLET ORAL DAILY PRN
COMMUNITY
Start: 2024-01-29

## 2024-07-18 RX ORDER — BUTALBITAL, ACETAMINOPHEN AND CAFFEINE 50; 325; 40 MG/1; MG/1; MG/1
1 TABLET ORAL EVERY 4 HOURS PRN
COMMUNITY
Start: 2024-07-08

## 2024-07-18 NOTE — PROGRESS NOTES
"Neurosurgery  Follow-up     SUBJECTIVE:     Chief Complaint: intractable epilepsy      History of Present Illness:  Declan Burleson is a 59 y.o. right-handed male referred by Dr. Benoit (originally by Dr. Saldana of [facility]) for consideration of surgical therapy for intractable epilepsy. The patient was formerly employed as a . He is accompanied by his wife Trista today, who helps to provide the history. His seizures began when he was 48 years old. He can recall no inciting event. He has 3 semiologies: "full blown" generalized events, staring episodes, and "mild" seizures, which are characterized as generalized events of shorter duration without incontinence and a shorter post-ictal period. He and his wife estimate that he persists in having about 2 events/month.     He has failed multiple AEDs, including topiramate, lamotrigine, levetiracetam, and oxcarbazepine. He is currently taking eslicarbazepine, zonisamide, and clobazam. He had EMU monitoring in May-June of this year, which suggests left-sided activity. He had 3T MRI in June (personally reviewed) that was non-lesional; he also had PET at that time suggesting possible left-sided activity. He had neuropsychological testing on 8/10/20; summary findings included below. He lives with his wife and 16-year-old daughter, who provide excellent social support.     He takes ASA 81, which will need to be held for one week prior to surgery.     As of 12/22/20, the patient reports he has had no significant change. He is hoping to be seen today for pre-operative optimization. He is out of Aptiom, which his wife is working to re-obtain for him.      As of 2/9/21, the patient underwent bilateral sEEG monitoring on 1/11/21. He self-removed several of the leads on 1/21/21 and was taken to the OR for removal of the remaining electrodes. Fortunately, there was no significant intracranial hemorrhage associated. Since being discharged home, the patient " reports that he is overall doing well. He has had no fever, chills, or drainage from the incisions. He has had some headache. He also feels that he has been sleeping more than before the procedure. He persists in having bad memory.     As of 4/13/21, the patient underwent left laser amygdalohippocampectomy on 3/1/21. Overall, the has done well since then. He and his wife report that he has been seizure free. He persists in having some days that are better than others; he is more irritable and tired than before surgery at times. This is inconsistent, and some days he is at his preoperative baseline. He has been compliant with his AEDs; he is taking 1200 mg of aptiom daily.    They deny any fever, chills, or drainage from the incision, though there was a scab that was removed when he got a haircut recently. He is also experiencing headache that radiates from the site of his incision forward. This improves with Maxalt.     As of 7/6/21, the patient reports that he is having headache from the base of the neck senior living up the head. It feels like a small drum that comes and goes. He had to turn off the lights, lay down, and take over-the-counter medication (Tylenol, at times) last week. He also became more pale with these episodes. It is made worse by lying on the left side in bed.  He is now at his normal baseline. He also reports chest pain; he remains hyponatremic. He is currently out of the anxiety and blood pressure medications for at least 4 days.     He has been seizure free since the procedure. Trista reports that the incision is well healed. No fever/chills.     He is more irritable than before surgery and has some memory issues.     As of 7/22/21, the patient returns in follow up. He still has headache and mood swings at times. He has not had any seizures. He and his wife both feel that his speech and conversation and improving. He remains highly distractable. He and his wife endorse that his grandfather had  "migraines.     He will need to re-establish with a new epileptologist since Dr. Benoit is moving to [place].     As of 9/30/21, the patient reports he has remained seizure-free. He continues to have significant headaches, particularly over the left posterior aspect of the head, radiating over to the right side. His wife endorses that he has bradykinesia and decreased arm swing when walking. Jean Edgar and Karen are concerned he may have Parkinson disease.     As of 12/15/22, the patient returns with his wife, Trista. They are frustrated that he is "like a zombie" because of the high doses of AEDs. He has been seizure-free since April, but when he decreased the meds back in April, he had 3 events within 24 hours.     Mood swings are also an issue; he gets irritated more readily than he did in the past. He is seeing a speech therapist in [facility], operated by [facility]; he is making progress WRT conversational communication and memory.     They now have 2 grandchildren.     Goals of surgery: to be free of seizures and reduce medications.     They would like to undergo repeat sEEG since the first was cut short to see if he may be a candidate for further epilepsy surgery.     As of 2/23/23, the patient returns for further discussion of repeat sEEG. He has undergone extensive discussion in interdisciplinary conference, together with review by Dr. Andrew of memory neurology, and he is being recommended for repeat sEEG. He has no evidence of non-epilepsy, non-TBI related neurodegenerative disease on Dr. Andrew's initial evaluation, per discussion with Dr. ALEK Edgar.     He and his wife remain eager to proceed with sEEG placement to determine whether he will be a candidate for further intracranial seizure surgery.     As of 3/23/23, the patient returns in postoperative follow up after having undergone repeat sEEG implanted on 2/27/23 and explanted 3/8/23. He reports that he continues to be very fatigued with the high dosages of medications. He " reports that he continues to sleep a lot. He and his wife both feel that he was much more alert while medications were held during the monitoring period.     They deny fever, chills, or drainage from the incision. He has not had any seizures since they discharged home.     As 6/8/23, the patient returns in postoperative follow up after having undergone left temporal lobectomy on 4/24/23. He and Trista report that he has been doing well. He still has some headaches, for which he takes 1000mg of Tylenol PRN. He occasionally takes an oxycodone. The headaches can become quite severe if he doesn't take Tylenol.     Trista reports that he is now taking Meloxicam, reportedly as an AED. He is on famotidine and NaCl, though his sodium levels have not been checked recently.     He fell two nights ago, but he didn't hit his head. This happened because he lost his balance.     He is doing home PT, OT, and ST, and he has a home health nurse once a week.     As of 10/5/23, the patient returns in scheduled follow up with MRI. Trista reports that Mr. Manriquez has been more agitated and irritable lately. He complains of some headaches at times. He also notes that he has an easier time thinking in Serbian or Tajik. He is suffering from short-term memory loss, as we expected.     He also has pain when he lays on his left side. This resolves when he lays on the right.     He has had no seizures.     He is getting Botox with Dr. Saldana for his migraines.     As of 7/18/24, the patient returns because of cognitive decline and more frequent headaches. He also has worsening balance. He has fallen out of bed twice, and he has made noises at night that Trista feels are consistent with his prior seizure activity. He has had no daytime seizure activity that they have appreciated.     After rehab, he was discharged with home health. He had pain when laying on the left side, so he now sleeps on the right side. He has complaints that  his head gets more cold.     He has aphasia, as expected. He is using a tablet with a word game to help with that.     Review of patient's allergies indicates:   Allergen Reactions    Losartan Rash       Current Outpatient Medications   Medication Sig Dispense Refill    amLODIPine (NORVASC) 5 MG tablet Take 5 mg by mouth once daily.      butalbital-acetaminophen-caffeine -40 mg (FIORICET, ESGIC) -40 mg per tablet Take 1 tablet by mouth every 4 (four) hours as needed.      cloBAZam (ONFI) 20 mg Tab TAKE 2 TABLETS (40 MG TOTAL) BY MOUTH EVERY EVENING. 60 tablet 5    EScitalopram oxalate (LEXAPRO) 20 MG tablet Take 20 mg by mouth.      eslicarbazepine (APTIOM) 400 mg Tab tablet Take 1 tablet (400 mg total) by mouth once daily. Take with 800mg = 1200mg 30 tablet 5    famotidine (PEPCID) 20 MG tablet Take 1 tablet (20 mg total) by mouth in the morning for 7 days. 7 tablet 0    mag hydrox/aluminum hyd/simeth (ANTACID ORAL) Take by mouth as needed. Acid reflux      meloxicam (MOBIC) 7.5 MG tablet Take 1 tablet (7.5 mg total) by mouth 2 (two) times a day with Breakfast and Dinner for 7 days. 14 tablet 0    ondansetron (ZOFRAN-ODT) 8 MG TbDL Take 8 mg by mouth every 8 (eight) hours as needed. nausea      OXcarbazepine (TRILEPTAL) 150 MG Tab Take 1 tablet (150 mg total) by mouth every evening. 30 tablet 11    rizatriptan (MAXALT-MLT) 10 MG disintegrating tablet Take 10 mg by mouth as needed. No more than 3 doses / week      ubrogepant (UBRELVY) 100 mg tablet Take 100 mg by mouth daily as needed.      zonisamide (ZONEGRAN) 100 MG Cap Take 5 capsules (500 mg total) by mouth every evening. 450 capsule 3    midazolam 5 mg/spray (0.1 mL) Spry 0.1 mLs by Nasal route. (Patient not taking: Reported on 7/18/2024)      oxyCODONE (ROXICODONE) 5 MG immediate release tablet Take 1 tablet (5 mg total) by mouth every 4 (four) hours as needed for Pain. (Patient not taking: Reported on 7/18/2024) 21 tablet 0    sodium chloride 1  gram tablet Take 1 tablet (1 g total) by mouth 3 (three) times a day for 30 days. (Patient not taking: Reported on 7/18/2024) 90 tablet 0    sodium chloride 1,000 mg TbSO tablet tbso Take 1 tablet (1,000 mg total) by mouth 2 (two) times daily. (Patient not taking: Reported on 7/18/2024)       No current facility-administered medications for this visit.       Past Medical History:   Diagnosis Date    Anxiety     Dementia in other diseases classified elsewhere, unspecified severity, without behavioral disturbance, psychotic disturbance, mood disturbance, and anxiety 2/2/2023    Depression     GERD (gastroesophageal reflux disease)     Hyperlipidemia     Hypertension     Increased anion gap metabolic acidosis 4/25/2023    Long term (current) use of antithrombotics/antiplatelets 12/22/2020    Migraine     Normocytic anemia 2/23/2023    Seizures      Past Surgical History:   Procedure Laterality Date    CRANIOTOMY USING FRAMELESS STEREOTAXY Left 4/24/2023    Procedure: CRANIOTOMY, USING FRAMELESS STEREOTAXY;  Surgeon: Ruchi Chen MD;  Location: [facility] OR [address] [place] [address];  Service: Neurosurgery;  Laterality: Left;  Tor III ASA III  Blood: Type & Screen  Neuro Mon: None  Bed: [facility]  Headrest: Medon  Pos: Supine  [facility]   [facility]  Asst Surg: [facility]    CYST REMOVAL  March 2016/2017    skin cyst - benign    PLACEMENT OF STEREO EEG LEADS(DEPTH ELECTRODES) Left 2/27/2023    Procedure: PLACEMENT OF STEREO EEG LEADS (DEPTH ELECTRODES);  Surgeon: Ruchi Chen MD;  Location: [facility] OR [address] [place] [address];  Service: Neurosurgery;  Laterality: Left;    REMOVAL OF DRAIN Left 4/26/2023    Procedure: REMOVAL, DRAIN;  Surgeon: Ruchi Chen MD;  Location: [facility] OR [address] [place] [address];  Service: Neurosurgery;  Laterality: Left;    REMOVAL OF STEREO EEG LEADS (DEPTH ELECTRODES) Bilateral 1/21/2021    Procedure: REMOVAL OF STEREO EEG LEADS (DEPTH ELECTRODES);  Surgeon: Ruchi Chen MD;  Location: [address];  Service: Neurosurgery;  Laterality: Bilateral;     REMOVAL OF STEREO EEG LEADS (DEPTH ELECTRODES) Left 3/8/2023    Procedure: REMOVAL OF STEREO EEG LEADS (DEPTH ELECTRODES);  Surgeon: Ruchi Chen MD;  Location: [facility] OR [address] [place] [address];  Service: Neurosurgery;  Laterality: Left;    TONSILLECTOMY      teenage      Family History       Problem Relation (Age of Onset)    Heart disease Maternal Uncle (50)    Lung disease Father    Migraines Paternal Grandfather    No Known Problems Mother          Social History     Socioeconomic History    Marital status:      Spouse name: Reilly    Years of education: 2 year college    Highest education level: Associate degree: occupational, technical, or vocational program   Tobacco Use    Smoking status: Former     Current packs/day: 0.00     Types: Cigarettes     Quit date:      Years since quittin.5    Smokeless tobacco: Never   Substance and Sexual Activity    Alcohol use: Yes     Comment: one drink once a week    Drug use: Never    Sexual activity: Yes     Partners: Female     Social Determinants of Health     Financial Resource Strain: Medium Risk (2023)    Overall Financial Resource Strain (CARDIA)     Difficulty of Paying Living Expenses: Somewhat hard   Food Insecurity: No Food Insecurity (2023)    Hunger Vital Sign     Worried About Running Out of Food in the Last Year: Never true     Ran Out of Food in the Last Year: Never true   Transportation Needs: No Transportation Needs (2023)    PRAPARE - Transportation     Lack of Transportation (Medical): No     Lack of Transportation (Non-Medical): No   Physical Activity: Insufficiently Active (2023)    Exercise Vital Sign     Days of Exercise per Week: 2 days     Minutes of Exercise per Session: 30 min   Stress: No Stress Concern Present (2023)    [place] [employer] - Occupational Stress Questionnaire     Feeling of Stress : Not at all   Housing Stability: Low Risk  (2023)    Housing Stability Vital Sign     Unable to  Pay for Housing in the Last Year: No     Number of Places Lived in the Last Year: 1     Unstable Housing in the Last Year: No       Review of Systems   Constitutional:  Positive for diaphoresis. Negative for fever.   HENT:  Positive for nosebleeds.    Eyes:  Negative for visual disturbance.   Respiratory:  Negative for shortness of breath.    Cardiovascular:  Negative for chest pain.   Gastrointestinal:  Negative for vomiting.   Endocrine: Negative for cold intolerance.   Genitourinary:  Negative for difficulty urinating.   Musculoskeletal:  Negative for back pain.   Skin:  Negative for color change.   Neurological:  Negative for seizures.   Hematological:  Does not bruise/bleed easily.   Psychiatric/Behavioral:  The patient is nervous/anxious.        OBJECTIVE:     Vital Signs     There is no height or weight on file to calculate BMI.      Physical Exam:    Constitutional: He appears well-developed and well-nourished. No distress.     Eyes: Pupils are equal, round, and reactive to light. EOM are normal.     Cardiovascular: No edema.     Abdominal: Soft.     Skin: Skin displays no rash on extremities. Skin displays no lesions on extremities.   Mostly bald     Psych/Behavior: He is alert. He is oriented to person, place, and time.     Musculoskeletal: Gait is normal.        Right Upper Extremities: Muscle strength is 5/5.        Left Upper Extremities: Muscle strength is 5/5.       Right Lower Extremities: Muscle strength is 5/5.        Left Lower Extremities: Muscle strength is 5/5.     Neurological:        Coordination: He has normal finger to nose coordination.        DTRs: DTRs are DTRS NORMAL AND SYMMETRICnormal and symmetric.        Cranial nerves:   Face grossly symmetric   Shoulder shrug equal bilaterally    Pulmonary: symmetric expansion     Incisions: well healed     Diagnostic Results:  CT head personally reviewed with patient and Trista   3D model created during clinic     ASSESSMENT/PLAN:     Declan  Néstor Burleson is a 59 y.o. male with intractable epilepsy who presents s/p bilateral sEEG and L laser amygdalohippocampectomy (3/1/21). He underwent repeat sEEG implanted on 2/27/23 and explanted 3/8/23. He underwent left temporal lobectomy 4/24/23.     Overall, he is doing well. The events where he is falling from bed at night are concerning for possible seizures, so I will alert Dr. Edgar of this to see if EEG is warranted.     I would like to review the most recent CT from [facility] to ensure no hardware displacement or other adverse change. Trista requested it via [facility].     I have also reached out to Dr. Horton to see if she feels further follow-up is warranted.     I will plan to see him back in about 9 months or whenever is convenient for them. Virtual is fine.     I have encouraged them to contact the clinic in interim with any questions, concerns, or adverse clinical change.     I spent over an hour on this encounter, more than half in direct consultation.

## 2024-07-19 ENCOUNTER — PATIENT MESSAGE (OUTPATIENT)
Dept: NEUROSURGERY | Facility: CLINIC | Age: 59
End: 2024-07-19
Payer: MEDICARE

## 2024-07-19 ENCOUNTER — TELEPHONE (OUTPATIENT)
Dept: NEUROLOGY | Facility: CLINIC | Age: 59
End: 2024-07-19
Payer: MEDICARE

## 2024-07-19 PROBLEM — Z90.89 S/P LOBECTOMY OF BRAIN: Status: ACTIVE | Noted: 2024-07-19

## 2024-07-19 NOTE — TELEPHONE ENCOUNTER
"Routine + 48 hr vEEG + Tele orders faxed to [facility], [phone]    Your fax has been successfully sent to [phone] at [phone].  ------------------------------------------------------------  From: [id number]  ------------------------------------------------------------  7/19/2024 1:37:54 PM Transmission Record   Sent to [phone] with remote ID "[id number]"   Result: (0/339;0/0) Success   Page record: 1 - 14   Elapsed time: 05:40 on channel 41      "

## 2024-08-01 ENCOUNTER — PATIENT MESSAGE (OUTPATIENT)
Dept: NEUROLOGY | Facility: CLINIC | Age: 59
End: 2024-08-01
Payer: MEDICARE

## 2024-10-02 ENCOUNTER — PATIENT MESSAGE (OUTPATIENT)
Dept: NEUROLOGY | Facility: CLINIC | Age: 59
End: 2024-10-02
Payer: MEDICARE

## 2024-10-02 ENCOUNTER — TELEPHONE (OUTPATIENT)
Dept: NEUROLOGY | Facility: CLINIC | Age: 59
End: 2024-10-02
Payer: MEDICARE

## 2024-10-02 NOTE — TELEPHONE ENCOUNTER
----- Message from Kaleb Edgar MD sent at 10/2/2024 10:04 AM CDT -----  Contact: [phone]  This is a stratus EEG test. I will review the study and share the results with him. Pls let him know I will get back them in a couple weeks.   Dr. Edgar  ----- Message -----  From: Bita Mcpherson RN  Sent: 10/2/2024   9:36 AM CDT  To: Kaleb Edgar MD; Mohsen Stanley, PT; #    Dr. Edgar,    These test results are abnormal so the MD must provide these results to the patient as they will have questions. The nurse and MA can only relay normal findings.     Thanks!  ----- Message -----  From: Idania Ching MA  Sent: 10/2/2024   8:37 AM CDT  To: GWEN Braswell Dr. wants to know if you can reach out to this patient. He accidentally sent it to me.  ----- Message -----  From: Kaleb Edgar MD  Sent: 10/1/2024   5:01 PM CDT  To: BRAYAN Odell, could you reach out to him?  ----- Message -----  From: Idania Ching MA  Sent: 10/1/2024  11:03 AM CDT  To: Kaleb Edgar MD    Please advise pt is requesting test results of some sort.  ----- Message -----  From: Stephanie Somers  Sent: 10/1/2024  10:20 AM CDT  To: Jazmine Manuel Staff      TESTRESULTS     Type:  Test Results     Who Called: Ms Weston   Name of Test (Lab/Mammo/Etc): EEG  Date of Test: Aug  Ordering Provider: jazmine  Where the test was performed: [facility]   Would the patient rather a call back or a response via [facility]? Call   Best Call Back Number:  [phone]  Additional Information she is asking to get an appt to discuss his results

## 2024-10-02 NOTE — TELEPHONE ENCOUNTER
Spoke with patient and his wife, Trista. She says the aEEG via [facility] was completed late August (8/27ish) and was wanting the results. They have an appt in December and do not want to wait until then for the results. I explained Dr. Edgar asked me to relay to them that he will read the study and be in contact in the next 2 weeks with he results. V/U

## 2024-10-18 ENCOUNTER — NURSE TRIAGE (OUTPATIENT)
Dept: ADMINISTRATIVE | Facility: CLINIC | Age: 59
End: 2024-10-18
Payer: MEDICARE

## 2024-10-18 NOTE — TELEPHONE ENCOUNTER
Received messaged from scheduling that pt's wife was calling in. Pt was currently having a seizure. Wife was advised by scheduling to call 911 but wife refused. Call disconnected. Attempted to call back cell number twice with no answer. Left voicemail with number to O[facility]. Called home number and was advised she is not there. Called EMS for a wellness check and was advised they are already there the wife already called.   Reason for Disposition   Message left on unidentified voice mail. Phone number verified.    Protocols used: No Contact or Duplicate Contact Call-A-[place]

## 2024-10-18 NOTE — TELEPHONE ENCOUNTER
Wife returning call, pt had a seizure earlier and EMS was called and pt is now at [facility] ED. Wife says they have no neuro on-call and that she had already tried calling to speak with patient's neurosurgeon and let him know what was going on. Hx of lobectomy of brain.  Recommended she speak with ED staff about contacting neurosurgery/neurology on-call with [facility] if needed. Will also send high priority message to provider to follow-up. Wife v/u.     Reason for Disposition   Follow-up information-only call to recent contact, no triage required   Patient already left for the hospital/clinic    Protocols used: No Contact or Duplicate Contact Call-A-[place], Information Only Call - No Triage-A-[place]

## 2024-10-25 ENCOUNTER — PATIENT MESSAGE (OUTPATIENT)
Dept: NEUROLOGY | Facility: CLINIC | Age: 59
End: 2024-10-25
Payer: MEDICARE

## 2024-12-30 ENCOUNTER — OFFICE VISIT (OUTPATIENT)
Dept: NEUROLOGY | Facility: CLINIC | Age: 59
End: 2024-12-30
Payer: MEDICARE

## 2024-12-30 ENCOUNTER — LAB VISIT (OUTPATIENT)
Dept: LAB | Facility: HOSPITAL | Age: 59
End: 2024-12-30
Attending: PSYCHIATRY & NEUROLOGY
Payer: MEDICARE

## 2024-12-30 VITALS — HEIGHT: 70 IN | BODY MASS INDEX: 26.59 KG/M2 | WEIGHT: 185.75 LBS

## 2024-12-30 DIAGNOSIS — Z51.81 ENCOUNTER FOR MONITORING ANTICONVULSANT THERAPY: ICD-10-CM

## 2024-12-30 DIAGNOSIS — F02.818 MAJOR NEUROCOGNITIVE DISORDER DUE TO ANOTHER MEDICAL CONDITION WITH BEHAVIORAL DISTURBANCE: ICD-10-CM

## 2024-12-30 DIAGNOSIS — Z79.899 ENCOUNTER FOR MONITORING ANTICONVULSANT THERAPY: Primary | ICD-10-CM

## 2024-12-30 DIAGNOSIS — Z51.81 ENCOUNTER FOR MONITORING ANTICONVULSANT THERAPY: Primary | ICD-10-CM

## 2024-12-30 DIAGNOSIS — Z79.899 ENCOUNTER FOR MONITORING ANTICONVULSANT THERAPY: ICD-10-CM

## 2024-12-30 LAB
ALBUMIN SERPL BCP-MCNC: 4 G/DL (ref 3.5–5.2)
ALP SERPL-CCNC: 126 U/L (ref 40–150)
ALT SERPL W/O P-5'-P-CCNC: 12 U/L (ref 10–44)
ANION GAP SERPL CALC-SCNC: 9 MMOL/L (ref 8–16)
AST SERPL-CCNC: 15 U/L (ref 10–40)
BILIRUB SERPL-MCNC: 0.3 MG/DL (ref 0.1–1)
BUN SERPL-MCNC: 12 MG/DL (ref 8–23)
CALCIUM SERPL-MCNC: 9 MG/DL (ref 8.7–10.5)
CHLORIDE SERPL-SCNC: 103 MMOL/L (ref 95–110)
CO2 SERPL-SCNC: 24 MMOL/L (ref 23–29)
CREAT SERPL-MCNC: 0.8 MG/DL (ref 0.5–1.4)
EST. GFR  (NO RACE VARIABLE): >60 ML/MIN/1.73 M^2
GLUCOSE SERPL-MCNC: 90 MG/DL (ref 70–110)
POTASSIUM SERPL-SCNC: 4.1 MMOL/L (ref 3.5–5.1)
PROT SERPL-MCNC: 7.6 G/DL (ref 6–8.4)
SODIUM SERPL-SCNC: 136 MMOL/L (ref 136–145)
TSH SERPL DL<=0.005 MIU/L-ACNC: 1.41 UIU/ML (ref 0.4–4)

## 2024-12-30 PROCEDURE — 80203 DRUG SCREEN QUANT ZONISAMIDE: CPT | Performed by: PSYCHIATRY & NEUROLOGY

## 2024-12-30 PROCEDURE — 1159F MED LIST DOCD IN RCRD: CPT | Mod: CPTII,S$GLB,, | Performed by: PSYCHIATRY & NEUROLOGY

## 2024-12-30 PROCEDURE — 80299 QUANTITATIVE ASSAY DRUG: CPT | Performed by: PSYCHIATRY & NEUROLOGY

## 2024-12-30 PROCEDURE — 80053 COMPREHEN METABOLIC PANEL: CPT | Performed by: PSYCHIATRY & NEUROLOGY

## 2024-12-30 PROCEDURE — 99215 OFFICE O/P EST HI 40 MIN: CPT | Mod: S$GLB,,, | Performed by: PSYCHIATRY & NEUROLOGY

## 2024-12-30 PROCEDURE — 3008F BODY MASS INDEX DOCD: CPT | Mod: CPTII,S$GLB,, | Performed by: PSYCHIATRY & NEUROLOGY

## 2024-12-30 PROCEDURE — 80299 QUANTITATIVE ASSAY DRUG: CPT | Mod: 91 | Performed by: PSYCHIATRY & NEUROLOGY

## 2024-12-30 PROCEDURE — 99999 PR PBB SHADOW E&M-EST. PATIENT-LVL III: CPT | Mod: PBBFAC,,, | Performed by: PSYCHIATRY & NEUROLOGY

## 2024-12-30 PROCEDURE — 36415 COLL VENOUS BLD VENIPUNCTURE: CPT | Performed by: PSYCHIATRY & NEUROLOGY

## 2024-12-30 PROCEDURE — 84443 ASSAY THYROID STIM HORMONE: CPT | Performed by: PSYCHIATRY & NEUROLOGY

## 2024-12-30 NOTE — PROGRESS NOTES
Follow up:   Presents with wife   Oct 28 - GTC (trigger - unknown)   Dec early - GTC    Medication List with Changes/Refills   Current Medications    AMLODIPINE (NORVASC) 5 MG TABLET    Take 5 mg by mouth once daily.       Start Date: --        End Date: --        ESCITALOPRAM OXALATE (LEXAPRO) 20 MG TABLET    Take 20 mg by mouth.       Start Date: 7/8/2024  End Date: --    ESLICARBAZEPINE (APTIOM) 600 MG TAB TABLET    Take 1 tablet daily         FAMOTIDINE (PEPCID) 20 MG TABLET    Take 1 tablet (20 mg total) by mouth in the morning for 7 days.       Start Date: 5/18/2023 End Date: --             MELOXICAM (MOBIC) 7.5 MG TABLET    Take 1 tablet (7.5 mg total) by mouth 2 (two) times a day with Breakfast and Dinner for 7 days.       Start Date: 5/18/2023 End Date: --    MIDAZOLAM 5 MG/SPRAY (0.1 ML) SPRY    0.1 mLs by Nasal route.       Start Date: 7/18/2022 End Date: --    ONDANSETRON (ZOFRAN-ODT) 8 MG TBDL    Take 8 mg by mouth every 8 (eight) hours as needed. nausea       Start Date: --        End Date: --             OXYCODONE (ROXICODONE) 5 MG IMMEDIATE RELEASE TABLET    Take 1 tablet (5 mg total) by mouth every 4 (four) hours as needed for Pain.       Start Date: 3/27/2023 End Date: --    RIZATRIPTAN (MAXALT-MLT) 10 MG DISINTEGRATING TABLET    Take 10 mg by mouth as needed. No more than 3 doses / week          SODIUM CHLORIDE 1 GRAM TABLET    Take 1 tablet (1 g total) by mouth 3 (three) times a day for 30 days.       Start Date: 5/18/2023 End Date: --             UBROGEPANT (UBRELVY) 100 MG TABLET    Take 100 mg by mouth daily as needed.       Start Date: 1/29/2024 End Date: --    ZONISAMIDE (ZONEGRAN) 100 MG CAP    Take 6 capsules (500 mg total) by mouth every evening.            VYEPTI infusion     Neurologist note:   1. Seizures - Currently on zonisamide, Bactrim, and Xcopri 200 mg. Trileptal was discontinued due to its similarity to Aptiom.  - Increase Aptiom dosage to 600 mg daily  - Continue taking Xcopri  and Zonegran  - Provide a two-week supply of Seroquel and trazodone to manage sleep and anxiety while waiting for primary care physician to refill medications  - Advised against using Benadryl for sleep as it can lower the seizure threshold    2. Headaches - Using Ubrelvy for headaches.  - Use Tylenol sporadically for headache relief  - Cautioned against daily use of Tylenol to prevent rebound headaches from medication overuse  - Advised not to overuse over-the-counter medications for headaches     Prior note:   No further sz since discharge   Wife reports some trouble with memory   Last Na level was 125 in Sept 2023     Admission Date: 4/24/2023  Hospital Length of Stay: 10 days  Discharge Date and Time: 5/4/23  Hospital Course: 4/28: speech and swallowing continue to improve with SLP. Respiratory status continuing to improve. Satting well on RA. Na 130 today   4/29: Na stable 130. EEG negative. On dysphagia diet with thick liquids. NGT still in place until PO intake improves.  for hyponatremia   4/30: some delayed speech yesterday, back on EEG. Briskly awake and responding to questions today. On NS for hyponatremia. Na 130  5/1: exam stable, EEG with left temporal lobe seizures. Sodium 130. Tolerating PO, NGT still in place. Satting well on room air  5/3: NAEON, briskly awake and interactive today. Na 133.  5/4: NAEON. Na stable at 133. Neuro exam stable. Patient tolerating >50% meals. BM yesterday. Stable for dc to IPR.     Admission Date: 8/9/2022  Hospital Length of Stay: 7 days  Discharge Date and Time: 8/16/2022  HPI:   Declan Burleson is a 57 year old man with history of seizures s/p left mesial temporal laser ablation 3/2021 admitted to EMU for evaluation of continued staring/unresponsive episodes postop while on medication. Patient is interested in pursuing surgical pathway again. Began having seizures at age 48.     Event type 1  No preceding aura/prodrome. LOC and convulsions with rhythmic  shaking of all extremities. Confused afterward. Last occurred 4/2022. Have become less intense since surgery, has not had seizures from sleep since.     Event type 2  Staring and unresponsive. [place]. Frequency initially improved after surgery, since then has been increasing. Occurs twice per week, last on 8/7. Had similar episodes prior to surgery.     Since surgery, has noticed memory issues--forgetting to run the washing machine after putting clothes in, difficulty using , does not recall conversation from earlier same day. Occasionally has difficulty recalling names, including his children's names. Also has noticed less motivation to do things on some days.     Current AEDs:  - eslicarbazepine 1200 mg QHS  - zonisamide 500 mg QHS - has been gradually increasing from 300 to 600 mg since last clinic visit   - clobazam 30 mg QHS   Has noticed significant daytime sleepiness on increased zonisamide dose. Reports adherence, last dose 8/8 evening.     Prior medications and SE/reason for stopping:  - topiramate - continued seizures  - lamotrigine - continued seizures  - Keppra - anger, mood disturbance  - oxcarbazepine - continued seizures     Prior seizure evaluation:  - ambulatory EEG 6/1/22 - No epileptiform discharges, periodic discharges, lateralized rhythmic delta activity or electrographic seizures were seen. There are 18 event button marks with no associated EEG change.   - sEEG 1/21/21 - 2 seizures (subclinical) were captured. Onset of both appear to be from left amygdala and hippocampus without generalization. Abundant epileptiform activity is also seen in the left amygdala and hippocampus, frequent to abundant epileptiform discharges in the right pre-motor anterior cingulate region, and frequent epileptiform activity in the left superior frontal SMA region.  - MRI Brain w/wo contrast 5/23/22 - Focal postablation encephalomalacia of the medial left temporal lobe  - MRI Brain Stealth 2/11/21 - no  definite intracranial enhancing mass lesion  - fMRI 10/26/20 - No epileptogenic lesions identified. 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        Hospital Course:   8/9>8/10: Admit to EMU. Eslicarbazepine and Zonisamide held on admit, Clobazam decreased to 10 mg qhs. EEG with focal intermittent slowing in left temporal region, no epileptiform discharges, no electrographic seizures. PB @0719 for episode of word finding difficulty with maintained awareness and @1221 for headache and staring- no EEG correlate with events; see EEG report for full details.  8/10>8/11: No typical events. EEG unchanged, focal intermittent slowing in left temporal region, no epileptiform discharges, no electrographic seizures. Continued medication adjustments for event capture- decreased Clobazam to 5 mg qhs.  8/11>8/12: No typical events. EEG with continued left focal slowing and rare left sharps, no electrographic seizures. Clobazam held, last dose 8/11. Lexapro dose increased from 10 to 15 mg QD due to reported low energy.  8/12>8/13: PB @2144 for episode of eyelid fluttering, seeing flashing lights, numbness/tingling with no EEG correlate. EEG with left temporal interictals, no seizures.  8/13>8/14: 1 sz w/ L hemispheric onset.  8/14>8/15: EEG with left temporal focal slowing, left temporal interictals, and electroclinical seizure @0435 with left temporal onset and secondary generalization. Resume home AEDs now: Eslicarbazepine 1200 mg x1, Clobazam 30 mg x1, and Zonisamide 500 mg x1.  8/15>8/16: No further seizures after resuming home AED regimen. Discharged home in stable condition on resumed AED regimen: Eslicarbazepine 1200 mg qhs, Clobazam 30 mg qhs, and Zonisamide 500 mg qhs. Plan to increase Clobazam to 40 mg qhs in 1 week (Rx sent to outpatient pharmacy). Outpatient follow up in Epilepsy clinic with Dr. Edgar. Seizure precautions.       Interval Events/ROS 7/25/2022:  Recent Labs   Lab 05/23/22  5206  "08/09/22  1640 05/02/23  1219   Clobazam 261.0 400.0 H  --    Desmethylclobazam 2550.0 3620.0 H  --    Zonisamide 11 19  --    Eslicarbazepine 16.8 17.0 24.3      - accompanied by wife who also contributes to the history   - current ASM regimen: eslicarbazepine 1200mg nightly, zonisamide 300mg nightly, clobazam 30mg nightly   - current sz frequency is around 1-2 focal events per week, last event was yesterday 7/24/2022 (described as staring, decreased responsiveness)  - last GTC event was 04/2022  - feels seizure frequency improved for a while s/p mesial temporal ablation 03/2021 but has gradually been worsening again  - memory complaints  - interested in what surgical options are still available to him for his epilepsy, expresses disappointment that he is still having frequent seizures and he thought he would be able to decrease his medications but instead we are increasing medication   - migraines around 3 days per month, sometimes last 3 days at a time, not relieved w/ OTC medications   - Otherwise, no fever, no cold symptoms, no changes in vision, no new weakness, no chest pain, no shortness of breath, no nausea, no vomiting, no diarrhea, no constipation, no problems walking    Interval Events/ROS 5/23/2022:  - L TLE s/p ablation on 3/1/21  - current ASM regimen: eslicarbazepine 1200mg nightly, zonisamide 300mg nightly, clobazam 20mg nightly, and nayzilam PRN  - been following w/ Dr. Saldana (neuro in [place]), who recently referred patient back to [facility] since patient is still having seizures after surgery     - last seizure was 4/22/2022, also had 2 seizures 03/2022 (makes grunting/growling noise, shaking 30-60 seconds, postictal unable to respond, confused, returns to baseline in around 5-10 minutes)  - feels he is cognitively declining, word finding issues  - sleeps a lot, reports some days he "feels completely lazy", denies low mood/feeling depressed   - missed neuropsych appointment, needs " "to reschedule   - still having frequent migraines, doesn't feel like nurtec is helping   - Otherwise, no fever, no cold symptoms, no changes in vision, no new weakness, no chest pain, no shortness of breath, no nausea, no vomiting, no diarrhea, no constipation, no tingling/numbness, no problems walking, no reported mood issues      Interval events 9/30/2021:   Pt of Dr. Benoit   He presents with wife   Not driving   Not working   No sz   Trouble word finding (this is pronounced in English)   No falls   3 nights/week - per wife has growling sound     Severe L occipital HA with intoleranace to mvmnt   1/month     Also has mild HA 3/week      Current AEDs: compliant  1) Eslicarbazepine 1200 mg q day  2) Zonisamide 300 mg q day   3) Clobazam 20 mg qHS  4) Nayzilam prn   Also takes Lexapro 10 mg QHS      Admission Date: 3/1/2021  Hospital Length of Stay: 0 days  Discharge Date and Time: 3/4/2021  2:57 PM     HPI:   Patient is a 57 y/o male with intractable epilepsy who is s/p bilateral sEEG presents today for elective left laser ablation amygdalohippocampectomy. All questions answered and patient wishes to proceed with surgery.    Patient originally had presented to Dr. Chen to discuss elective options of his intractable epilepsy. His seizures began when he was 48 years old. He can recall no inciting event. He has 3 semiologies: "full blown" generalized events, staring episodes, and "mild" seizures, which are characterized as generalized events of shorter duration without incontinence and a shorter post-ictal period. He and his wife estimate that he persists in having about 2 events/month.    He has failed multiple AEDs, including topiramate, lamotrigine, levetiracetam, and oxcarbazepine. He is currently taking eslicarbazepine, zonisamide, and clobazam. He had EMU monitoring in May-June of this year, which suggests left-sided activity. He had 3T MRI in June (personally reviewed) that was non-lesional; he also had PET at " that time suggesting possible left-sided activity. He had neuropsychological testing on 8/10/20  Hospital Course: 3/1: POD 0. NCC overnight  3/2: POD1 from L laser amygdalophippocampectomy. Recommend wife at bedside as much as nursing policies will allow. OK for TTF (9th floor only) 24 hours postop if otherwise medically stable/appropriate. Wife reports they have home Aptiom.  3/3: POD 2. Patient with headache behind bilateral eyes. Patient also with episode of emesis today when sitting up. He is otherwise neurologically intact. CTH ordered to evaluate and was stable. Patient has not gotten up with PT/OT yet, it is ordered for tomorrow. Will keep patient overnight and reevaluate with PT/OT tomorrow regarding dispo recommendations.   3/4: Patient much improved from yesterday. Headache and N/V resolved. Will continue decadron 4 mg q6h x 48 hours. Continue 1L fluid restriction for SIADH, will obtain BMP on 3/8 outpatient to reevaluate. Therapy recommending home with outpatient PT. Patient and his wife are requesting Home Health. Medically stable to discharge home.      Pt reports no auras or seizures since ablation   Sleep - ok   Diet - ok      Migraine Headache   Onset - 12 yrs   F/h migraine   Hz - rare   Tylenol 500 mg effective      HA Hz worse in last 10 yrs   Hz 2-4/week   No change in HA after SEEG   Meds tried - tylenol 1000 mg - mild relief   Since April Worsening:    Word finding difficulty   attention span shorter      Exam:  Physical Exam  Constitutional:       Appearance: Normal appearance.   HENT:      Head: Normocephalic and atraumatic.      Right Ear: External ear normal.      Left Ear: External ear normal.      Nose: Nose normal.      Mouth/Throat:      Mouth: Mucous membranes are moist.   Eyes:      Extraocular Movements: Extraocular movements intact.      Conjunctiva/sclera: Conjunctivae normal.      Pupils: Pupils are equal, round, and reactive to light.   Cardiovascular:      Rate and Rhythm:  Normal rate.      Pulses: Normal pulses.   Pulmonary:      Effort: Pulmonary effort is normal.   Musculoskeletal:         General: Normal range of motion.      Cervical back: Normal range of motion.   Skin:     General: Skin is warm and dry.   Neurological:      General: No focal deficit present.      Mental Status: He is alert.   Psychiatric:         Mood and Affect: Mood normal.         Behavior: Behavior normal.         Thought Content: Thought content normal.         Judgment: Judgment normal.     affect - nml   Masked facies   Slow gait   Slow turns   No retropulsion   Decreased arm swing   No tremor      Impression:   s/p bilateral sEEG and L laser amygdalohippocampectomy (3/1/21). He underwent repeat sEEG implanted on 2/27/23 and explanted 3/8/23. He underwent left temporal lobectomy 4/24/23.   Prior to lobectomy 3 sz / day   Breakthrough sz (2 thus)    Frontal lobe dysfunction - disinhibited behaviors - noticeable in mid 2024      Migraine     Brain atrophy - needs cog exam     Cold intolerance     Plan:   - cont AEDs  Medication List with Changes/Refills   Current Medications    AMLODIPINE (NORVASC) 5 MG TABLET    Take 5 mg by mouth once daily.       Start Date: --        End Date: --        ESCITALOPRAM OXALATE (LEXAPRO) 20 MG TABLET    Take 20 mg by mouth.       Start Date: 7/8/2024  End Date: --    ESLICARBAZEPINE (APTIOM) 600 MG TAB TABLET    Take 1 tablet daily         FAMOTIDINE (PEPCID) 20 MG TABLET    Take 1 tablet (20 mg total) by mouth in the morning for 7 days.       Start Date: 5/18/2023 End Date: --             MELOXICAM (MOBIC) 7.5 MG TABLET    Take 1 tablet (7.5 mg total) by mouth 2 (two) times a day with Breakfast and Dinner for 7 days.       Start Date: 5/18/2023 End Date: --    MIDAZOLAM 5 MG/SPRAY (0.1 ML) SPRY    0.1 mLs by Nasal route.       Start Date: 7/18/2022 End Date: --    ONDANSETRON (ZOFRAN-ODT) 8 MG TBDL    Take 8 mg by mouth every 8 (eight) hours as needed. nausea       Start  Date: --        End Date: --             OXYCODONE (ROXICODONE) 5 MG IMMEDIATE RELEASE TABLET    Take 1 tablet (5 mg total) by mouth every 4 (four) hours as needed for Pain.       Start Date: 3/27/2023 End Date: --    RIZATRIPTAN (MAXALT-MLT) 10 MG DISINTEGRATING TABLET    Take 10 mg by mouth as needed. No more than 3 doses / week          SODIUM CHLORIDE 1 GRAM TABLET    Take 1 tablet (1 g total) by mouth 3 (three) times a day for 30 days.       Start Date: 5/18/2023 End Date: --             UBROGEPANT (UBRELVY) 100 MG TABLET    Take 100 mg by mouth daily as needed.       Start Date: 1/29/2024 End Date: --    ZONISAMIDE (ZONEGRAN) 100 MG CAP    Take 6 capsules (500 mg total) by mouth every evening.            - nayzilam PRN for seizure cluster   - check Na level, drug levels   - Obtain EEG report from [facility]   - [facility]   Consider K[facility] at [facility]

## 2024-12-31 LAB — ZONISAMIDE SERPL-MCNC: 18 MCG/ML (ref 10–40)
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
## 2025-02-20 ENCOUNTER — OFFICE VISIT (OUTPATIENT)
Dept: NEUROLOGY | Facility: CLINIC | Age: 60
End: 2025-02-20
Payer: MEDICARE

## 2025-02-20 DIAGNOSIS — F02.818 MAJOR NEUROCOGNITIVE DISORDER DUE TO ANOTHER MEDICAL CONDITION WITH BEHAVIORAL DISTURBANCE: Primary | ICD-10-CM
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
## 2025-03-12 ENCOUNTER — PATIENT MESSAGE (OUTPATIENT)
Dept: NEUROLOGY | Facility: CLINIC | Age: 60
End: 2025-03-12
Payer: MEDICARE

## 2025-03-13 ENCOUNTER — PATIENT OUTREACH (OUTPATIENT)
Dept: NEUROLOGY | Facility: CLINIC | Age: 60
End: 2025-03-13
Payer: MEDICARE

## 2025-03-13 ENCOUNTER — PATIENT MESSAGE (OUTPATIENT)
Dept: NEUROLOGY | Facility: CLINIC | Age: 60
End: 2025-03-13
Payer: MEDICARE

## 2025-03-26 ENCOUNTER — CLINICAL SUPPORT (OUTPATIENT)
Dept: NEUROLOGY | Facility: CLINIC | Age: 60
End: 2025-03-26
Payer: MEDICARE

## 2025-03-26 DIAGNOSIS — F02.818 MAJOR NEUROCOGNITIVE DISORDER DUE TO ANOTHER MEDICAL CONDITION WITH BEHAVIORAL DISTURBANCE: ICD-10-CM

## 2025-03-26 NOTE — PROGRESS NOTES
Audio Only Telehealth Visit     Social Work - Dementia Care Management:    Phone consultation with patient and caregiver together. They reported:  Some questions/concerns related to seizure issues  Pt is not having as much memory loss as they were expecting  They're wondering how to deal with pt accidentally tripping or kicking people due to involuntary movements; and how to manage pt having trouble expressing what he wants to see and getting frustrated  Pt's first language is Korean; he can sometimes talk around what he's trying to say, but in Korean instead of English  Wife sometimes gets frustrated, reminds pt she already told him something, dtr told her she sighs    Intervention/Plan:  Provided psycho-education, support, strategies  Discussed strategies for preparing others for pt's sudden movements ahead of time  Discussed strategies for communication, triston slowing down, taking needed time, and letting they encounter know pt may need extra time to express self  Will email some general resources, and will advise wife of availability to meet privately if needed

## 2025-04-06 ENCOUNTER — PATIENT OUTREACH (OUTPATIENT)
Dept: NEUROLOGY | Facility: CLINIC | Age: 60
End: 2025-04-06
Payer: MEDICARE

## 2025-04-06 NOTE — PROGRESS NOTES
Social Work - Dementia Care Management:    Sent the following to caregiver via email:  Two general Tip Sheets  Home Safety Checklist  Link for landing page for all [facility] supports  [place] for Dementia Caregiver Support Group via [employer]  [place]lier for [place] Dementia Caregiver Support Groups  [place] for Finding  Meaning & Hope  Link to search support group listings through [employer]'s [employer]  Link to discussion board for caregivers through Alzheimer's Association  Website for Alzheimer's Association site  Website for Family Caregiver [employer] website  Website for Teepa Snow videos and podcast  Books recs and offers of copies

## (undated) DEVICE — DURAPREP SURG SCRUB 26ML

## (undated) DEVICE — CORD BIPOLAR 12 FOOT

## (undated) DEVICE — GAUZE SPONGE 4X4 12PLY

## (undated) DEVICE — TAPE CLOTH SILK LIKE 1INX10YD

## (undated) DEVICE — BATTERY AUTODRIVE TURBO

## (undated) DEVICE — Device

## (undated) DEVICE — HOOK LONE STAR BLUNT 12MM

## (undated) DEVICE — MARKERS SPHERZ PASSIVE

## (undated) DEVICE — MARKER SKIN STND TIP BLUE BARR

## (undated) DEVICE — SPONGE DERMACEA GAUZE 4X4

## (undated) DEVICE — SEE MEDLINE ITEM 146322

## (undated) DEVICE — SUT MONOCRYL 4-0 PS-2

## (undated) DEVICE — DRESSING SURGICAL 1X3

## (undated) DEVICE — STAPLER SKIN PROXIMATE WIDE

## (undated) DEVICE — CONTAINER SPECIMEN STRL 4OZ

## (undated) DEVICE — SEE MEDLINE ITEM 156905

## (undated) DEVICE — SEE MEDLINE ITEM 157150

## (undated) DEVICE — SEE MEDLINE ITEM 157216

## (undated) DEVICE — DRAPE OPMI STERILE

## (undated) DEVICE — STOCKINET TUBULAR 1 PLY 6X60IN

## (undated) DEVICE — KIT EVACUATOR 3-SPRING 1/8 DRN

## (undated) DEVICE — ELECTRODE REM PLYHSV RETURN 9

## (undated) DEVICE — TUBE FRAZIER 5MM 2FT SOFT TIP

## (undated) DEVICE — GAUZE FLUFF XXLG 36X36 2 PLY

## (undated) DEVICE — DRAPE CRANIOTOMY T SURG STRL

## (undated) DEVICE — DRAPE T THYROID STERILE

## (undated) DEVICE — DRIVER AUTO DISP OSTEODRIVE

## (undated) DEVICE — SEE MEDLINE ITEM 152622

## (undated) DEVICE — DIFFUSER

## (undated) DEVICE — TRAY FOLEY 16FR INFECTION CONT

## (undated) DEVICE — SHEET EENT SPLIT

## (undated) DEVICE — BANDAGE KERLIX AMD

## (undated) DEVICE — BIT DRILL CANN QC 3.2X170X140

## (undated) DEVICE — ROUTER TAPERED 2.3MM

## (undated) DEVICE — SPONGE GAUZE 16PLY 4X4

## (undated) DEVICE — SEE MEDLINE ITEM 154981

## (undated) DEVICE — IMPLANTABLE DEVICE
Type: IMPLANTABLE DEVICE | Site: CRANIAL | Status: NON-FUNCTIONAL
Removed: 2021-03-01

## (undated) DEVICE — SPRAY MASTISOL

## (undated) DEVICE — DRAPE TOP 53X102IN

## (undated) DEVICE — ADHESIVE DERMABOND ADVANCED

## (undated) DEVICE — TOWEL OR DISP STRL BLUE 4/PK

## (undated) DEVICE — RUBBERBAND STERILE 3X1/8IN

## (undated) DEVICE — DRESSING TELFA STRL 4X3 LF

## (undated) DEVICE — IMPLANTABLE DEVICE
Type: IMPLANTABLE DEVICE | Site: CRANIAL | Status: NON-FUNCTIONAL
Removed: 2023-02-27

## (undated) DEVICE — DRESSING SURGICAL 1/2X1/2

## (undated) DEVICE — DRAPE EENT SPLIT STERILE

## (undated) DEVICE — SUT VICRYL RAPIDE 4-0 18 P

## (undated) DEVICE — SUT ETHILON 3-0 PS2 18 BLK

## (undated) DEVICE — BIT DRILL TWIST 2.1MM

## (undated) DEVICE — DRESSING SURGICAL 1X1

## (undated) DEVICE — TAPE SURG MEDIPORE 6X72IN

## (undated) DEVICE — DRAPE INCISE IOBAN 2 23X17IN

## (undated) DEVICE — SUT 4/0 18IN NUROLON BLK B

## (undated) DEVICE — DRESSING TELFA N ADH 3X8

## (undated) DEVICE — FIDUCIAL KIT UNI STEREO 10 MM
Type: IMPLANTABLE DEVICE | Site: CRANIAL | Status: NON-FUNCTIONAL
Removed: 2021-03-01

## (undated) DEVICE — SUT VICRYL PLUS 3-0 SH 18IN

## (undated) DEVICE — TRAY CATH FOL SIL URIMTR 16FR

## (undated) DEVICE — KIT SURGIFLO HEMOSTATIC MATRIX

## (undated) DEVICE — HEMOSTAT SURGICEL 4X8IN

## (undated) DEVICE — CARTRIDGE OIL

## (undated) DEVICE — BUR BONE CUT MICRO TPS 3X3.8MM

## (undated) DEVICE — STYLET GUIDING W DRL BIT 2.4MM

## (undated) DEVICE — SPONGE NEURO 1/4X1/4

## (undated) DEVICE — BIT DRILL WIRE PASS 1.0MM